# Patient Record
Sex: FEMALE | Race: WHITE | NOT HISPANIC OR LATINO | Employment: OTHER | URBAN - METROPOLITAN AREA
[De-identification: names, ages, dates, MRNs, and addresses within clinical notes are randomized per-mention and may not be internally consistent; named-entity substitution may affect disease eponyms.]

---

## 2017-01-17 ASSESSMENT — ENCOUNTER SYMPTOMS
MYALGIAS: 0
JOINT SWELLING: 0
ARTHRALGIAS: 1
MUSCLE WEAKNESS: 0
MUSCLE CRAMPS: 0
BACK PAIN: 1
STIFFNESS: 1
NECK PAIN: 1

## 2017-01-26 ENCOUNTER — TELEPHONE (OUTPATIENT)
Dept: GASTROENTEROLOGY | Facility: CLINIC | Age: 77
End: 2017-01-26

## 2017-01-31 ENCOUNTER — OFFICE VISIT (OUTPATIENT)
Dept: GASTROENTEROLOGY | Facility: CLINIC | Age: 77
End: 2017-01-31

## 2017-01-31 VITALS
DIASTOLIC BLOOD PRESSURE: 63 MMHG | OXYGEN SATURATION: 96 % | HEART RATE: 64 BPM | WEIGHT: 144.2 LBS | BODY MASS INDEX: 23.18 KG/M2 | SYSTOLIC BLOOD PRESSURE: 136 MMHG | HEIGHT: 66 IN

## 2017-01-31 DIAGNOSIS — K51.20 ULCERATIVE PROCTITIS WITHOUT COMPLICATION (H): Primary | ICD-10-CM

## 2017-01-31 NOTE — Clinical Note
1/31/2017       RE: Debra Denise  [address] [place] [address] [id number]  [facility] [address]     Dear Colleague,    Thank you for referring your patient, Debra Denise, to the [facility] GASTROENTEROLOGY AND IBD at [facility]. Please see a copy of my visit note below.    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 Apparently on initial colonoscopy there was some question of patch of inflammation at hepatic flexure but I can find no actual record of this. Last colonoscopy in 2014 shows normal colon apart from diverticula  Current UC medications: Rowasa enemas as needed  Prior UC surgeries: none  Prior IBD Medications: Rowasa enema. 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     1. Ulcerative proctitis  -completely asymptomatic now with normal labs  -recommend weekly Rowasa enemas to suppress disease and prevent potential flares (she has declined PO meds in the past). If has flare can take enema daily for 2-4 weeks.  -ok to continue current diet  -she has started Tumeric on her own - there is some limited data on this to treat UC. Ok to continue  -will consider flex if has refractory flare of symptoms    #. IBD healthcare maintenance:     Colon cancer surveillance   -- Given proctitis , patient does not need surveillance colonoscopy.       Bone mineral density screening    -- Recommend all patients supplement with calcium and vitamin  D    Vaccinations  -- Yearly influenza vaccination: recommend  -- Hepatitis B vaccination: recommend if not done  -- Hepatitis A vaccination: PCP can consider  -- TDaP: recommend  -- Pneumonia vaccination once and 5 year booster: recommend if not done  -- HPV vaccine as indicated.    Not immunocompromised:  -- Varicella vaccination:    -- Zoster vaccination: Recommend to give if over 60.  Recommend to discuss if over 50. Some authorities recommend to administer to patients > 50 due to increased risk of immunosuppression.      Misc  -- Avoid tobacco use  -- Avoid NSAIDs as there is potentially a 25% chance of causing an IBD flare  -- Recommend yearly skin cancer exam, especially if on, or previously exposed to, an immunomodulator or biologic.     RTC 6 months    Thank you for this consultation.  It was a pleasure to participate in the care of this patient; please contact us with any further questions.  A total of 45 minutes was spent with this patient, >50% of which was counseling regarding the above delineated issues.      Sohail Beal MD    [facility]  Division of Gastroenterology, Hepatology and Nutrition      HPI:    Continues to do well. No flares since last visit.    She attributes much of her improvement to cutting out sugars and chocolate. Also stopped metamucil (thought this was irritating things)    She has not been taking enemas - has not needed to. Wondering if she should take once a week to suppress things.     Having 1-2 formed stool daily with no urgency, blood or abdominal pain.    She feels the best she has in a long time.    ROS:    No fevers or chills  No weight loss  No blurry vision, double vision or change in vision  No sore throat  No lymphadenopathy  No headache, paraesthesias, or weakness in a limb  No shortness of breath or wheezing  No chest pain or pressure  No arthralgias or myalgias  No rashes or skin changes  No odynophagia or dysphagia  No BRBPR,  hematochezia, melena  No dysuria, frequency or urgency  No hot/cold intolerance or polyria  No anxiety or depression    Extra intestinal manifestations of IBD:  No uveitis/episcleritis  No aphthous ulcers    No arthritis    No erythema nodosum/pyoderma gangrenosum.      PERTINENT PAST MEDICAL HISTORY:   Past Medical History     Past Medical History    Diagnosis  Date       Hypertension         Polymyalgia rheumatica (H)             PREVIOUS SURGERIES:   Past Surgical History     Past Surgical History    Procedure  Laterality  Date       Gyn surgery             x 2       Ent surgery            tonsilectomy       Orthopedic surgery            (R) shoulder surgery for frozen shoulder       Colonoscopy    2014        Procedure: COMBINED COLONOSCOPY, SINGLE BIOPSY/POLYPECTOMY BY BIOPSY;  COLONOSCOPY;  Surgeon: Carol Ann Plasencia MD;  Location: [facility]           PREVIOUS ENDOSCOPY:  As above    ALLERGIES:      Allergies    Allergen  Reactions       Sulfa Drugs          PERTINENT MEDICATIONS:    Current Outpatient Prescriptions   Medication     NONFORMULARY     Mesalamine-Cleanser (ROWASA) 4 G KIT     mesalamine (CANASA) 1000 MG suppository     valsartan-hydrochlorothiazide (DIOVAN-HCT) 160-25 MG per tablet     Gabapentin (NEURONTIN PO)     AMITRIPTYLINE HCL PO     Omega-3 Fatty Acids (OMEGA-3 FISH OIL PO)     multivitamin, therapeutic with minerals (MULTI-VITAMIN) TABS     Cholecalciferol (VITAMIN D3 PO)     BIOTIN PO     Rosuvastatin Calcium (CRESTOR PO)     No current facility-administered medications for this visit.         SOCIAL HISTORY:   Social History     Social History        Social History       Marital Status:          Spouse Name:  N/A       Number of Children:  N/A       Years of Education:  N/A        Occupational History       Not on file.        Social History Main Topics       Smoking status:  Never Smoker        Smokeless tobacco:  Not on file       Alcohol Use:  No       Drug Use:   "No       Sexual Activity:  Not Currently        Other Topics  Concern       Not on file        Social History Narrative           FAMILY HISTORY:   Family History     Family History    Problem  Relation  Age of Onset       Cancer - colorectal  Father         CANCER  Brother             Past/family/social history reviewed and no changes    PHYSICAL EXAMINATION:  Constitutional: aaox3, cooperative, pleasant, not dyspneic/diaphoretic, no acute distress  /63 mmHg  Pulse 64  Ht 1.664 m (5' 5.5\")  Wt 65.409 kg (144 lb 3.2 oz)  BMI 23.62 kg/m2  SpO2 96%    Wt:    Wt Readings from Last 2 Encounters:    09/20/16  65.454 kg (144 lb 4.8 oz)    08/02/16  65.953 kg (145 lb 6.4 oz)       Eyes: Sclera anicteric/injected  Ears/nose/mouth/throat: Normal oropharynx without ulcers or exudate, mucus membranes moist, hearing intact  Neck: supple, thyroid normal size  CV: No edema  Respiratory: Unlabored breathing  Lymph: No axillary, submandibular, supraclavicular or inguinal lymphadenopathy  Abd:  Nondistended, +bs, no hepatosplenomegaly, nontender, no peritoneal signs  Skin: warm, perfused, no jaundice  Psych: Normal affect  MSK: Normal gait      PERTINENT STUDIES:  Reviewed in EPIC. Normal CBC with diff, LFTs, CRP, ESR, iron, B12 and folate studies. Normal BMP.               Answers for HPI/ROS submitted by the patient on 1/17/2017   General Symptoms: No  Skin Symptoms: No  HENT Symptoms: No  EYE SYMPTOMS: No  HEART SYMPTOMS: No  LUNG SYMPTOMS: No  INTESTINAL SYMPTOMS: No  URINARY SYMPTOMS: No  GYNECOLOGIC SYMPTOMS: No  BREAST SYMPTOMS: No  SKELETAL SYMPTOMS: Yes  BLOOD SYMPTOMS: No  NERVOUS SYSTEM SYMPTOMS: No  MENTAL HEALTH SYMPTOMS: No  Back pain: Yes  Muscle aches: No  Neck pain: Yes  Swollen joints: No  Joint pain: Yes  Bone pain: Yes  Muscle cramps: No  Muscle weakness: No  Joint stiffness: Yes  Bone fracture: No    Again, thank you for allowing me to participate in the care of your patient.      Sincerely,    Sohail" Girma Beal MD

## 2017-01-31 NOTE — NURSING NOTE
"Chief Complaint   Patient presents with     RECHECK     Ulcerative proctitis       Filed Vitals:    01/31/17 1050   BP: 136/63   Pulse: 64   Height: 1.664 m (5' 5.5\")   Weight: 65.409 kg (144 lb 3.2 oz)   SpO2: 96%       Body mass index is 23.62 kg/(m^2).      Alejandra Wilkerson CMA                          "

## 2017-01-31 NOTE — MR AVS SNAPSHOT
After Visit Summary   1/31/2017    Debra Denise    MRN: [id number]           Patient Information     Date Of Birth          1940        Visit Information        Provider Department      1/31/2017 11:20 AM oShail Beal MD [facility] Gastroenterology and IBD        Care Instructions    1. Keep up the good work!    2. Ok to continue the tumeric    3. Recommend to take the Rowasa enema weekly    Follow up in 6 months    Call with questions.        Follow-ups after your visit        Your next 10 appointments already scheduled     Aug 15, 2017 11:20 AM   (Arrive by 11:05 AM)   Return Visit with MD JESSE Noel [facility] Gastroenterology and IBD ([facility] [place])    [address] [facility]  [address] [facility] [address] [phone]              Who to contact     Please call your clinic at [phone] to:    Ask questions about your health    Make or cancel appointments    Discuss your medicines    Learn about your test results    Speak to your doctor   If you have compliments or concerns about an experience at your clinic, or if you wish to file a complaint, please contact [facility] Patient Relations at [phone] or email us at [email or web address]         Additional Information About Your Visit        MyChart Information     [employer] gives you secure access to your electronic health record. If you see a primary care provider, you can also send messages to your care team and make appointments. If you have questions, please call your primary care clinic.  If you do not have a primary care provider, please call [phone] and they will assist you.      [employer] is an electronic gateway that provides easy, online access to your medical records. With [employer], you can request a clinic appointment, read your test results, renew a prescription or communicate with your care team.     To access your existing  "account, please contact your [facility] or call [phone] for assistance.        Care EveryWhere ID     This is your Care EveryWhere ID. This could be used by other organizations to access your [place] medical records  [id number]        Your Vitals Were     Pulse Height BMI (Body Mass Index) Pulse Oximetry          64 1.664 m (5' 5.5\") 23.62 kg/m2 96%         Blood Pressure from Last 3 Encounters:   01/31/17 136/63   09/20/16 142/75   08/02/16 125/60    Weight from Last 3 Encounters:   01/31/17 65.409 kg (144 lb 3.2 oz)   09/20/16 65.454 kg (144 lb 4.8 oz)   08/02/16 65.953 kg (145 lb 6.4 oz)              Today, you had the following     No orders found for display       Primary Care Provider Office Phone # Fax #    Kristie MOLINA Lucio [phone]       [facility] [address] [place] [id number]        Thank you!     Thank you for choosing [facility] GASTROENTEROLOGY AND IBD  for your care. Our goal is always to provide you with excellent care. Hearing back from our patients is one way we can continue to improve our services. Please take a few minutes to complete the written survey that you may receive in the mail after your visit with us. Thank you!             Your Updated Medication List - Protect others around you: Learn how to safely use, store and throw away your medicines at www.disposemymeds.org.          This list is accurate as of: 1/31/17 11:22 AM.  Always use your most recent med list.                   Brand Name Dispense Instructions for use    AMITRIPTYLINE HCL PO          BIOTIN PO          CRESTOR PO          mesalamine 1000 MG Suppository    CANASA    12 suppository    Take one supp three times per week.       Multi-vitamin Tabs tablet      Take 1 tablet by mouth daily       NEURONTIN PO          NONFORMULARY      Tumeric       OMEGA-3 FISH OIL PO          ROWASA 4 G Kit     4 kit    Place 1 enema rectally three times a week       " valsartan-hydrochlorothiazide 160-25 MG per tablet    DIOVAN-HCT     Take 1 tablet by mouth daily       VITAMIN D3 PO      Take by mouth daily

## 2017-01-31 NOTE — Clinical Note
1/31/2017      RE: Debra Denise  [address]  [facility] [address]       IBD CLINIC VISIT     CC/REFERRING MD:  Kristie Valdes    REASON FOR CONSULTATION: ulcerative proctitis vs proctosigmoiditis    IBD HISTORY  Age at diagnosis: 63 (2004)  Extent of disease: rectum to distal sigmoid (16cm). Apparently on initial colonoscopy there was some question of patch of inflammation at hepatic flexure but I can find no actual record of this. Last colonoscopy in 2014 shows normal colon apart from diverticula  Current UC medications: Rowasa enemas as needed  Prior UC surgeries: none  Prior IBD Medications: Rowasa enema. 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     1. Ulcerative proctitis  -completely asymptomatic now with normal labs  -recommend weekly Rowasa enemas to suppress disease and prevent potential flares (she has declined PO meds in the past). If has flare can take enema daily for 2-4 weeks.  -ok to continue current diet  -she has started Tumeric on her own - there is some limited data on this to treat UC. Ok to continue  -will consider flex if has refractory flare of symptoms    #. IBD healthcare maintenance:     Colon cancer surveillance   -- Given proctitis , patient does not need surveillance colonoscopy.       Bone mineral density screening    -- Recommend all patients supplement with calcium and vitamin D    Vaccinations  -- Yearly influenza vaccination: recommend  -- Hepatitis B vaccination: recommend if not done  -- Hepatitis A vaccination: PCP can consider  -- TDaP: recommend  -- Pneumonia vaccination once and 5 year booster:  recommend if not done  -- HPV vaccine as indicated.    Not immunocompromised:  -- Varicella vaccination:    -- Zoster vaccination: Recommend to give if over 60.  Recommend to discuss if over 50. Some authorities recommend to administer to patients > 50 due to increased risk of immunosuppression.      Misc  -- Avoid tobacco use  -- Avoid NSAIDs as there is potentially a 25% chance of causing an IBD flare  -- Recommend yearly skin cancer exam, especially if on, or previously exposed to, an immunomodulator or biologic.     RTC 6 months    Thank you for this consultation.  It was a pleasure to participate in the care of this patient; please contact us with any further questions.  A total of 45 minutes was spent with this patient, >50% of which was counseling regarding the above delineated issues.      Sohail Beal MD    [facility]  Division of Gastroenterology, Hepatology and Nutrition      HPI:    Continues to do well. No flares since last visit.    She attributes much of her improvement to cutting out sugars and chocolate. Also stopped metamucil (thought this was irritating things)    She has not been taking enemas - has not needed to. Wondering if she should take once a week to suppress things.     Having 1-2 formed stool daily with no urgency, blood or abdominal pain.    She feels the best she has in a long time.    ROS:    No fevers or chills  No weight loss  No blurry vision, double vision or change in vision  No sore throat  No lymphadenopathy  No headache, paraesthesias, or weakness in a limb  No shortness of breath or wheezing  No chest pain or pressure  No arthralgias or myalgias  No rashes or skin changes  No odynophagia or dysphagia  No BRBPR, hematochezia, melena  No dysuria, frequency or urgency  No hot/cold intolerance or polyria  No anxiety or depression    Extra intestinal manifestations of IBD:  No uveitis/episcleritis  No aphthous ulcers    No arthritis    No  erythema nodosum/pyoderma gangrenosum.      PERTINENT PAST MEDICAL HISTORY:   Past Medical History     Past Medical History    Diagnosis  Date       Hypertension         Polymyalgia rheumatica (H)             PREVIOUS SURGERIES:   Past Surgical History     Past Surgical History    Procedure  Laterality  Date       Gyn surgery             x 2       Ent surgery            tonsilectomy       Orthopedic surgery            (R) shoulder surgery for frozen shoulder       Colonoscopy    2014        Procedure: COMBINED COLONOSCOPY, SINGLE BIOPSY/POLYPECTOMY BY BIOPSY;  COLONOSCOPY;  Surgeon: Carol Ann Plasencia MD;  Location:  GI           PREVIOUS ENDOSCOPY:  As above    ALLERGIES:      Allergies    Allergen  Reactions       Sulfa Drugs          PERTINENT MEDICATIONS:    Current Outpatient Prescriptions   Medication     NONFORMULARY     Mesalamine-Cleanser (ROWASA) 4 G KIT     mesalamine (CANASA) 1000 MG suppository     valsartan-hydrochlorothiazide (DIOVAN-HCT) 160-25 MG per tablet     Gabapentin (NEURONTIN PO)     AMITRIPTYLINE HCL PO     Omega-3 Fatty Acids (OMEGA-3 FISH OIL PO)     multivitamin, therapeutic with minerals (MULTI-VITAMIN) TABS     Cholecalciferol (VITAMIN D3 PO)     BIOTIN PO     Rosuvastatin Calcium (CRESTOR PO)     No current facility-administered medications for this visit.         SOCIAL HISTORY:   Social History     Social History        Social History       Marital Status:          Spouse Name:  N/A       Number of Children:  N/A       Years of Education:  N/A        Occupational History       Not on file.        Social History Main Topics       Smoking status:  Never Smoker        Smokeless tobacco:  Not on file       Alcohol Use:  No       Drug Use:  No       Sexual Activity:  Not Currently        Other Topics  Concern       Not on file        Social History Narrative           FAMILY HISTORY:   Family History     Family History    Problem  Relation  Age of Onset        "Cancer - colorectal  Father         CANCER  Brother             Past/family/social history reviewed and no changes    PHYSICAL EXAMINATION:  Constitutional: aaox3, cooperative, pleasant, not dyspneic/diaphoretic, no acute distress  /63 mmHg  Pulse 64  Ht 1.664 m (5' 5.5\")  Wt 65.409 kg (144 lb 3.2 oz)  BMI 23.62 kg/m2  SpO2 96%    Wt:    Wt Readings from Last 2 Encounters:    09/20/16  65.454 kg (144 lb 4.8 oz)    08/02/16  65.953 kg (145 lb 6.4 oz)       Eyes: Sclera anicteric/injected  Ears/nose/mouth/throat: Normal oropharynx without ulcers or exudate, mucus membranes moist, hearing intact  Neck: supple, thyroid normal size  CV: No edema  Respiratory: Unlabored breathing  Lymph: No axillary, submandibular, supraclavicular or inguinal lymphadenopathy  Abd:  Nondistended, +bs, no hepatosplenomegaly, nontender, no peritoneal signs  Skin: warm, perfused, no jaundice  Psych: Normal affect  MSK: Normal gait      PERTINENT STUDIES:  Reviewed in EPIC. Normal CBC with diff, LFTs, CRP, ESR, iron, B12 and folate studies. Normal BMP.               Answers for HPI/ROS submitted by the patient on 1/17/2017   General Symptoms: No  Skin Symptoms: No  HENT Symptoms: No  EYE SYMPTOMS: No  HEART SYMPTOMS: No  LUNG SYMPTOMS: No  INTESTINAL SYMPTOMS: No  URINARY SYMPTOMS: No  GYNECOLOGIC SYMPTOMS: No  BREAST SYMPTOMS: No  SKELETAL SYMPTOMS: Yes  BLOOD SYMPTOMS: No  NERVOUS SYSTEM SYMPTOMS: No  MENTAL HEALTH SYMPTOMS: No  Back pain: Yes  Muscle aches: No  Neck pain: Yes  Swollen joints: No  Joint pain: Yes  Bone pain: Yes  Muscle cramps: No  Muscle weakness: No  Joint stiffness: Yes  Bone fracture: No      Sohail Beal MD      "

## 2017-01-31 NOTE — PATIENT INSTRUCTIONS
1. Keep up the good work!    2. Ok to continue the tumeric    3. Recommend to take the Rowasa enema weekly    Follow up in 6 months    Call with questions.

## 2017-01-31 NOTE — PROGRESS NOTES
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 Apparently on initial colonoscopy there was some question of patch of inflammation at hepatic flexure but I can find no actual record of this. Last colonoscopy in 2014 shows normal colon apart from diverticula  Current UC medications: Rowasa enemas as needed  Prior UC surgeries: none  Prior IBD Medications: Rowasa enema. 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     1. Ulcerative proctitis  -completely asymptomatic now with normal labs  -recommend weekly Rowasa enemas to suppress disease and prevent potential flares (she has declined PO meds in the past). If has flare can take enema daily for 2-4 weeks.  -ok to continue current diet  -she has started Tumeric on her own - there is some limited data on this to treat UC. Ok to continue  -will consider flex if has refractory flare of symptoms    #. IBD healthcare maintenance:     Colon cancer surveillance   -- Given proctitis , patient does not need surveillance colonoscopy.       Bone mineral density screening    -- Recommend all patients supplement with calcium and vitamin D    Vaccinations  -- Yearly influenza vaccination: recommend  -- Hepatitis B vaccination: recommend if not done  -- Hepatitis A vaccination: PCP can consider  -- TDaP: recommend  -- Pneumonia vaccination once and 5 year booster: recommend if not done  -- HPV vaccine as indicated.    Not immunocompromised:  -- Varicella vaccination:    --  Zoster vaccination: Recommend to give if over 60.  Recommend to discuss if over 50. Some authorities recommend to administer to patients > 50 due to increased risk of immunosuppression.      Misc  -- Avoid tobacco use  -- Avoid NSAIDs as there is potentially a 25% chance of causing an IBD flare  -- Recommend yearly skin cancer exam, especially if on, or previously exposed to, an immunomodulator or biologic.     RTC 6 months    Thank you for this consultation.  It was a pleasure to participate in the care of this patient; please contact us with any further questions.  A total of 45 minutes was spent with this patient, >50% of which was counseling regarding the above delineated issues.      Sohail Beal MD    [facility]  Division of Gastroenterology, Hepatology and Nutrition      HPI:    Continues to do well. No flares since last visit.    She attributes much of her improvement to cutting out sugars and chocolate. Also stopped metamucil (thought this was irritating things)    She has not been taking enemas - has not needed to. Wondering if she should take once a week to suppress things.     Having 1-2 formed stool daily with no urgency, blood or abdominal pain.    She feels the best she has in a long time.    ROS:    No fevers or chills  No weight loss  No blurry vision, double vision or change in vision  No sore throat  No lymphadenopathy  No headache, paraesthesias, or weakness in a limb  No shortness of breath or wheezing  No chest pain or pressure  No arthralgias or myalgias  No rashes or skin changes  No odynophagia or dysphagia  No BRBPR, hematochezia, melena  No dysuria, frequency or urgency  No hot/cold intolerance or polyria  No anxiety or depression    Extra intestinal manifestations of IBD:  No uveitis/episcleritis  No aphthous ulcers    No arthritis    No erythema nodosum/pyoderma gangrenosum.      PERTINENT PAST MEDICAL HISTORY:   Past Medical History     Past Medical  History    Diagnosis  Date       Hypertension         Polymyalgia rheumatica (H)             PREVIOUS SURGERIES:   Past Surgical History     Past Surgical History    Procedure  Laterality  Date       Gyn surgery             x 2       Ent surgery            tonsilectomy       Orthopedic surgery            (R) shoulder surgery for frozen shoulder       Colonoscopy    2014        Procedure: COMBINED COLONOSCOPY, SINGLE BIOPSY/POLYPECTOMY BY BIOPSY;  COLONOSCOPY;  Surgeon: Carol Ann Plasencia MD;  Location: [facility]           PREVIOUS ENDOSCOPY:  As above    ALLERGIES:      Allergies    Allergen  Reactions       Sulfa Drugs          PERTINENT MEDICATIONS:    Current Outpatient Prescriptions   Medication     NONFORMULARY     Mesalamine-Cleanser (ROWASA) 4 G KIT     mesalamine (CANASA) 1000 MG suppository     valsartan-hydrochlorothiazide (DIOVAN-HCT) 160-25 MG per tablet     Gabapentin (NEURONTIN PO)     AMITRIPTYLINE HCL PO     Omega-3 Fatty Acids (OMEGA-3 FISH OIL PO)     multivitamin, therapeutic with minerals (MULTI-VITAMIN) TABS     Cholecalciferol (VITAMIN D3 PO)     BIOTIN PO     Rosuvastatin Calcium (CRESTOR PO)     No current facility-administered medications for this visit.         SOCIAL HISTORY:   Social History     Social History        Social History       Marital Status:          Spouse Name:  N/A       Number of Children:  N/A       Years of Education:  N/A        Occupational History       Not on file.        Social History Main Topics       Smoking status:  Never Smoker        Smokeless tobacco:  Not on file       Alcohol Use:  No       Drug Use:  No       Sexual Activity:  Not Currently        Other Topics  Concern       Not on file        Social History Narrative           FAMILY HISTORY:   Family History     Family History    Problem  Relation  Age of Onset       Cancer - colorectal  Father         CANCER  Brother             Past/family/social history reviewed and no  "changes    PHYSICAL EXAMINATION:  Constitutional: aaox3, cooperative, pleasant, not dyspneic/diaphoretic, no acute distress  /63 mmHg  Pulse 64  Ht 1.664 m (5' 5.5\")  Wt 65.409 kg (144 lb 3.2 oz)  BMI 23.62 kg/m2  SpO2 96%    Wt:    Wt Readings from Last 2 Encounters:    09/20/16  65.454 kg (144 lb 4.8 oz)    08/02/16  65.953 kg (145 lb 6.4 oz)       Eyes: Sclera anicteric/injected  Ears/nose/mouth/throat: Normal oropharynx without ulcers or exudate, mucus membranes moist, hearing intact  Neck: supple, thyroid normal size  CV: No edema  Respiratory: Unlabored breathing  Lymph: No axillary, submandibular, supraclavicular or inguinal lymphadenopathy  Abd:  Nondistended, +bs, no hepatosplenomegaly, nontender, no peritoneal signs  Skin: warm, perfused, no jaundice  Psych: Normal affect  MSK: Normal gait      PERTINENT STUDIES:  Reviewed in EPIC. Normal CBC with diff, LFTs, CRP, ESR, iron, B12 and folate studies. Normal BMP.               Answers for HPI/ROS submitted by the patient on 1/17/2017   General Symptoms: No  Skin Symptoms: No  HENT Symptoms: No  EYE SYMPTOMS: No  HEART SYMPTOMS: No  LUNG SYMPTOMS: No  INTESTINAL SYMPTOMS: No  URINARY SYMPTOMS: No  GYNECOLOGIC SYMPTOMS: No  BREAST SYMPTOMS: No  SKELETAL SYMPTOMS: Yes  BLOOD SYMPTOMS: No  NERVOUS SYSTEM SYMPTOMS: No  MENTAL HEALTH SYMPTOMS: No  Back pain: Yes  Muscle aches: No  Neck pain: Yes  Swollen joints: No  Joint pain: Yes  Bone pain: Yes  Muscle cramps: No  Muscle weakness: No  Joint stiffness: Yes  Bone fracture: No      "

## 2018-11-04 ASSESSMENT — ENCOUNTER SYMPTOMS
JOINT SWELLING: 0
SKIN CHANGES: 0
NECK PAIN: 0
STIFFNESS: 0
BACK PAIN: 1
MUSCLE WEAKNESS: 0
NAIL CHANGES: 1
ARTHRALGIAS: 0
MYALGIAS: 1
POOR WOUND HEALING: 0
MUSCLE CRAMPS: 1

## 2018-11-04 ASSESSMENT — ACTIVITIES OF DAILY LIVING (ADL)
IN_THE_PAST_7_DAYS,_DID_YOU_NEED_HELP_FROM_OTHERS_TO_TAKE_CARE_OF_THINGS_SUCH_AS_LAUNDRY_AND_HOUSEKEEPING,_BANKING,_SHOPPING,_USING_THE_TELEPHONE,_FOOD_PREPARATION,_TRANSPORTATION,_OR_TAKING_YOUR_OWN_MEDICATIONS?: N
IN_THE_PAST_7_DAYS,_DID_YOU_NEED_HELP_FROM_OTHERS_TO_PERFORM_EVERYDAY_ACTIVITIES_SUCH_AS_EATING,_GETTING_DRESSED,_GROOMING,_BATHING,_WALKING,_OR_USING_THE_TOILET: N

## 2018-11-06 ENCOUNTER — OFFICE VISIT (OUTPATIENT)
Dept: INTERNAL MEDICINE | Facility: CLINIC | Age: 78
End: 2018-11-06
Payer: COMMERCIAL

## 2018-11-06 VITALS
HEIGHT: 65 IN | OXYGEN SATURATION: 95 % | SYSTOLIC BLOOD PRESSURE: 136 MMHG | TEMPERATURE: 98.2 F | HEART RATE: 52 BPM | WEIGHT: 142.2 LBS | DIASTOLIC BLOOD PRESSURE: 67 MMHG | BODY MASS INDEX: 23.69 KG/M2

## 2018-11-06 DIAGNOSIS — Z13.220 SCREENING CHOLESTEROL LEVEL: Primary | ICD-10-CM

## 2018-11-06 DIAGNOSIS — Z13.220 SCREENING CHOLESTEROL LEVEL: ICD-10-CM

## 2018-11-06 DIAGNOSIS — Z23 NEED FOR INFLUENZA VACCINATION: ICD-10-CM

## 2018-11-06 LAB
ALBUMIN SERPL-MCNC: 3.8 G/DL (ref 3.4–5)
ALBUMIN UR-MCNC: NEGATIVE MG/DL
ALP SERPL-CCNC: 60 U/L (ref 40–150)
ALT SERPL W P-5'-P-CCNC: 33 U/L (ref 0–50)
ANION GAP SERPL CALCULATED.3IONS-SCNC: 6 MMOL/L (ref 3–14)
APPEARANCE UR: CLEAR
AST SERPL W P-5'-P-CCNC: 16 U/L (ref 0–45)
BASOPHILS # BLD AUTO: 0 10E9/L (ref 0–0.2)
BASOPHILS NFR BLD AUTO: 0.4 %
BILIRUB SERPL-MCNC: 0.3 MG/DL (ref 0.2–1.3)
BILIRUB UR QL STRIP: NEGATIVE
BUN SERPL-MCNC: 14 MG/DL (ref 7–30)
CALCIUM SERPL-MCNC: 8.7 MG/DL (ref 8.5–10.1)
CHLORIDE SERPL-SCNC: 104 MMOL/L (ref 94–109)
CO2 SERPL-SCNC: 31 MMOL/L (ref 20–32)
COLOR UR AUTO: ABNORMAL
CREAT SERPL-MCNC: 0.84 MG/DL (ref 0.52–1.04)
CRP SERPL-MCNC: 7.6 MG/L (ref 0–8)
DIFFERENTIAL METHOD BLD: NORMAL
EOSINOPHIL # BLD AUTO: 0.3 10E9/L (ref 0–0.7)
EOSINOPHIL NFR BLD AUTO: 3.5 %
ERYTHROCYTE [DISTWIDTH] IN BLOOD BY AUTOMATED COUNT: 12.2 % (ref 10–15)
ERYTHROCYTE [SEDIMENTATION RATE] IN BLOOD BY WESTERGREN METHOD: 8 MM/H (ref 0–30)
GFR SERPL CREATININE-BSD FRML MDRD: 65 ML/MIN/1.7M2
GLUCOSE SERPL-MCNC: 78 MG/DL (ref 70–99)
GLUCOSE UR STRIP-MCNC: NEGATIVE MG/DL
HCT VFR BLD AUTO: 44.1 % (ref 35–47)
HGB BLD-MCNC: 14.4 G/DL (ref 11.7–15.7)
HGB UR QL STRIP: NEGATIVE
IMM GRANULOCYTES # BLD: 0.1 10E9/L (ref 0–0.4)
IMM GRANULOCYTES NFR BLD: 0.8 %
KETONES UR STRIP-MCNC: NEGATIVE MG/DL
LEUKOCYTE ESTERASE UR QL STRIP: ABNORMAL
LYMPHOCYTES # BLD AUTO: 2.6 10E9/L (ref 0.8–5.3)
LYMPHOCYTES NFR BLD AUTO: 30.8 %
MCH RBC QN AUTO: 30.4 PG (ref 26.5–33)
MCHC RBC AUTO-ENTMCNC: 32.7 G/DL (ref 31.5–36.5)
MCV RBC AUTO: 93 FL (ref 78–100)
MONOCYTES # BLD AUTO: 0.9 10E9/L (ref 0–1.3)
MONOCYTES NFR BLD AUTO: 10.3 %
MUCOUS THREADS #/AREA URNS LPF: PRESENT /LPF
NEUTROPHILS # BLD AUTO: 4.5 10E9/L (ref 1.6–8.3)
NEUTROPHILS NFR BLD AUTO: 54.2 %
NITRATE UR QL: NEGATIVE
NRBC # BLD AUTO: 0 10*3/UL
NRBC BLD AUTO-RTO: 0 /100
PH UR STRIP: 6 PH (ref 5–7)
PLATELET # BLD AUTO: 396 10E9/L (ref 150–450)
POTASSIUM SERPL-SCNC: 3.4 MMOL/L (ref 3.4–5.3)
PROT SERPL-MCNC: 7.9 G/DL (ref 6.8–8.8)
RBC # BLD AUTO: 4.74 10E12/L (ref 3.8–5.2)
RBC #/AREA URNS AUTO: <1 /HPF (ref 0–2)
SODIUM SERPL-SCNC: 140 MMOL/L (ref 133–144)
SOURCE: ABNORMAL
SP GR UR STRIP: 1.01 (ref 1–1.03)
TSH SERPL DL<=0.005 MIU/L-ACNC: 0.72 MU/L (ref 0.4–4)
UROBILINOGEN UR STRIP-MCNC: 0 MG/DL (ref 0–2)
WBC # BLD AUTO: 8.4 10E9/L (ref 4–11)
WBC #/AREA URNS AUTO: <1 /HPF (ref 0–5)

## 2018-11-06 ASSESSMENT — PAIN SCALES - GENERAL: PAINLEVEL: NO PAIN (0)

## 2018-11-06 NOTE — PATIENT INSTRUCTIONS
Primary Care Center Phone Number [phone]  Primary Care Center Medication Refill Request Information:  * Please contact your pharmacy regarding ANY request for medication refills.  ** [facility] Prescription Fax = [phone]  * Please allow 3 business days for routine medication refills.  * Please allow 5 business days for controlled substance medication refills.     Primary Care Center Test Result notification information:  *You will be notified with in 7-10 days of your appointment day regarding the results of your test.  If you are on MyChart you will be notified as soon as the provider has reviewed the results and signed off on them.

## 2018-11-06 NOTE — NURSING NOTE
Chief Complaint   Patient presents with     Establish Care     patient is here to establish a new PCP     Debra Denise    1. Has the patient received the information for the influenza vaccine? YES    2.  Does the patient have any of the following contraindications?  Allergy to to eggs? No  Allergic reaction to previous influenza vaccines?  No  Any other problems to previous influenza vaccines? No  Paralyzed by Guillain-Fulton syndrome? No  Currently pregnant? NO  Current moderate or severe illness?  No  Allergy to contact lens solution?  No    3. The vaccine has been administered in the usual fashion and the patient was instructed to wait 20 minutes before leaving the building in the even of an allergic reaction: YES    Recorded by Jeanne Edgar, Clinic EMT at 3:25 PM on 11/6/2018

## 2018-11-06 NOTE — MR AVS SNAPSHOT
After Visit Summary   11/6/2018    Debra Denise    MRN: [id number]           Patient Information     Date Of Birth          1940        Visit Information        Provider Department      11/6/2018 3:30 PM Wolfgang Zimmerman MD [facility]        Today's Diagnoses     Screening cholesterol level    -  1    Need for influenza vaccination          Care Instructions    Primary Care Center Phone Number [phone]  Primary Care Center Medication Refill Request Information:  * Please contact your pharmacy regarding ANY request for medication refills.  ** [facility] Prescription Fax = [phone]  * Please allow 3 business days for routine medication refills.  * Please allow 5 business days for controlled substance medication refills.     [facility] Center Test Result notification information:  *You will be notified with in 7-10 days of your appointment day regarding the results of your test.  If you are on MyChart you will be notified as soon as the provider has reviewed the results and signed off on them.                  Follow-ups after your visit        Your next 10 appointments already scheduled     Nov 06, 2018  4:30 PM CST   LAB with [facility] ([facility])    [address] [place] [id number] [phone]           Please do not eat 10-12 hours before your appointment if you are coming in fasting for labs on lipids, cholesterol, or glucose (sugar). This does not apply to pregnant women. Water, hot tea and black coffee (with nothing added) are okay. Do not drink other fluids, diet soda or chew gum.              Future tests that were ordered for you today     Open Future Orders        Priority Expected Expires Ordered    UA with Micro reflex to Culture Routine 11/6/2018 11/6/2019 11/6/2018    Erythrocyte sedimentation rate Routine 11/6/2018 11/6/2019 11/6/2018    Lipid panel reflex to direct LDL Fasting Routine   "11/6/2019 11/6/2018    CRP inflammation Routine 11/6/2018 11/6/2019 11/6/2018    Comprehensive metabolic panel Routine 11/6/2018 11/20/2018 11/6/2018    TSH with free T4 reflex Routine 11/6/2018 11/6/2019 11/6/2018    CBC with platelets differential Routine 11/6/2018 11/20/2018 11/6/2018    Vitamin D Deficiency Routine 11/6/2018 11/6/2019 11/6/2018    Lipid panel reflex to direct LDL Fasting Routine  11/6/2019 11/6/2018    CBC with platelets differential Routine 11/6/2018 11/20/2018 11/6/2018    Comprehensive metabolic panel Routine 11/6/2018 11/20/2018 11/6/2018            Who to contact     Please call your clinic at [phone] to:    Ask questions about your health    Make or cancel appointments    Discuss your medicines    Learn about your test results    Speak to your doctor            Additional Information About Your Visit        [email or web address] Information     [email or web address] gives you secure access to your electronic health record. If you see a primary care provider, you can also send messages to your care team and make appointments. If you have questions, please call your primary care clinic.  If you do not have a primary care provider, please call [phone] and they will assist you.      [email or web address] is an electronic gateway that provides easy, online access to your medical records. With [email or web address], you can request a clinic appointment, read your test results, renew a prescription or communicate with your care team.     To access your existing account, please contact your [facility] or call [phone] for assistance.        Care EveryWhere ID     This is your Care EveryWhere ID. This could be used by other organizations to access your [place] medical records  [id number]        Your Vitals Were     Pulse Temperature Height Pulse Oximetry BMI (Body Mass Index)       52 98.2  F (36.8  C) (Oral) 1.656 m (5' 5.2\") 95% 23.52 kg/m2        Blood Pressure from Last 3 Encounters:   11/06/18 136/67 "   01/31/17 136/63   09/20/16 142/75    Weight from Last 3 Encounters:   11/06/18 64.5 kg (142 lb 3.2 oz)   01/31/17 65.4 kg (144 lb 3.2 oz)   09/20/16 65.5 kg (144 lb 4.8 oz)              We Performed the Following     EKG Performed in Clinic w/ Provider Reading Fee     FLU VACCINE HIGH DOSE PRESERVATIVE FREE, AGE =>65 YR          Today's Medication Changes          These changes are accurate as of 11/6/18  4:21 PM.  If you have any questions, ask your nurse or doctor.               Stop taking these medicines if you haven't already. Please contact your care team if you have questions.     AMITRIPTYLINE HCL PO   Stopped by:  Wolfgang Zimmerman MD           mesalamine 1000 MG Suppository   Commonly known as:  CANASA   Stopped by:  Wolfgang Zimmerman MD           ROWASA 4 g Kit   Stopped by:  Wolfgang Zimmerman MD                    Primary Care Provider Office Phone # Fax #    Kristie MOLINA Lucio [phone]       [facility] [address] [facility] [address]  [facility] [address]        Equal Access to Services     [facility]: Hadii aad ku hadasho Soomaali, waaxda luqadaha, qaybta kaalmada adeegyada, waxay nanyin hayaan adecelena curiel . So [facility] [phone].    ATENCIÓN: Si habla español, tiene a fletcher disposición servicios gratuitos de asistencia lingüística. Llame al [phone].    We comply with applicable federal civil rights laws and [place] laws. We do not discriminate on the basis of race, color, national origin, age, disability, sex, sexual orientation, or gender identity.            Thank you!     Thank you for choosing [facility]  for your care. Our goal is always to provide you with excellent care. Hearing back from our patients is one way we can continue to improve our services. Please take a few minutes to complete the written survey that you may receive in the mail after your visit with us. Thank you!             Your Updated Medication List - Protect others around you:  Learn how to safely use, store and throw away your medicines at www.disposemymeds.org.          This list is accurate as of 11/6/18  4:21 PM.  Always use your most recent med list.                   Brand Name Dispense Instructions for use Diagnosis    BIOTIN PO           CRESTOR PO      Take 5 mg by mouth three times a week        Multi-vitamin Tabs tablet      Take 1 tablet by mouth daily        NEURONTIN PO      Take 100 mg by mouth 2 times daily        NONFORMULARY      Tumeric        OMEGA-3 FISH OIL PO           valsartan-hydrochlorothiazide 160-25 MG per tablet    DIOVAN-HCT     Take 1 tablet by mouth daily        VITAMIN D3 PO      Take by mouth daily

## 2018-11-06 NOTE — PROGRESS NOTES
"HPI:    Pt. Comes into establish care today. Overall she is doing well. Her mother had GCA and probably had CVA from this. She does not smoke nor abuse EtOH. She had \"costochondritis\" about two years ago and started taking OTC Relief Factor Supplementation. She has no further sxs. She had central chest pain more than one month ago but no further sxs. And continues to exercise w/o problems. She has chronic L upper quadrant pain for many years (unpredictable and may get several times a day). She had CT abdomen/pelvis 2014 unremarkable. She may have had EGD as well. No report of PPI/H2 blocker changing sxs. She has h/o PMR and treated with prednisone in the past and no current sxs. She has h/o renal stones and had lithotripsy in [place] several years ago; no current sxs. Today no other HEENT, cardiopulmonary, abdominal, , GYN, neurological, systemic, lymphatic, endocrine complaints.     Past Medical History:   Diagnosis Date     Hypertension      Polymyalgia rheumatica (H)      Past Surgical History:   Procedure Laterality Date     COLONOSCOPY  2014    Procedure: COMBINED COLONOSCOPY, SINGLE BIOPSY/POLYPECTOMY BY BIOPSY;  COLONOSCOPY;  Surgeon: Carol Ann Plasencia MD;  Location:  GI     ENT SURGERY      tonsilectomy     GYN SURGERY       x 2     ORTHOPEDIC SURGERY      (R) shoulder surgery for frozen shoulder     PE:    Vitals noted gen nad cooperative alert, HEENT, B ears normal oropharynx clear no exudate, neck supple nl rom, no B carotid bruits, no masses, LCTA, B, good air movement, RRR, S1, S2, no MRG, abdomen, nt, nd no masses, positive BS, grossly normal neurological exam.     EKG today 2018; SR at 52; no acute findings.    B Carotid U/S 2018:  no significant findings    CT coronary angiogram 2017: mild non-obstructive coronary artery disease    Exercise Echo stress 3/28/2017: Post stress; normal LVF and size, ischemic EKG changes, negative stress echo for " ischemia          A/P:    1. Immunizations:  Flu shot today. New Shingles vaccination she got last week (first injection)  2. HTN; stable today and will check lab today  3. Mammogram done 10/11/2018 (in Care Everywhere)  4. Ulcerative proctitis. Seen by Dr. Beal, GI 1/31/2017 and remains on mesalamine suppositories. Currently no sxs.   5. Dermatology. She had leg lesion that may have been melanoma and follows routinely with dermatology   6. DEXA scan; 10/8/2014  7. Increased cholesterol check labs today and she will restart low dose Crestor (5 mg  Three times a week)  8. H/o PMR; no current sxs. And she has not been on prednisone for about 2 years. She states she follows with outside Rheumatology  9. Renal stones; will reimage  10. Chronic L upper quadrant pain; She had CT imaging 9/30/2014 for this and  Unremarkable; will rescan  11. Colonoscopy 4/8/2014?    Total time spent 45  minutes.  More than 50% of the time spent with Ms. Denise on counseling / coordinating her care

## 2018-11-07 LAB
DEPRECATED CALCIDIOL+CALCIFEROL SERPL-MC: 59 UG/L (ref 20–75)
INTERPRETATION ECG - MUSE: NORMAL

## 2019-02-27 DIAGNOSIS — I10 BENIGN ESSENTIAL HYPERTENSION: Primary | ICD-10-CM

## 2019-02-28 RX ORDER — GABAPENTIN 100 MG/1
200 CAPSULE ORAL AT BEDTIME
Qty: 180 CAPSULE | Refills: 0 | Status: SHIPPED | OUTPATIENT
Start: 2019-02-28 | End: 2019-05-23

## 2019-02-28 RX ORDER — VALSARTAN AND HYDROCHLOROTHIAZIDE 160; 25 MG/1; MG/1
1 TABLET ORAL DAILY
Qty: 90 TABLET | Refills: 0 | Status: SHIPPED | OUTPATIENT
Start: 2019-02-28 | End: 2019-06-24

## 2019-02-28 NOTE — TELEPHONE ENCOUNTER
valsartan-hydrochlorothiazide (DIOVAN-HCT) 160-25 MG per tablet    Historical Medication   Last Office Visit : 11/6/201  Future Office visit:  None    Routing refill request to provider for review/approval because:  Medication is reported/historical    Gabapentin (NEURONTIN PO)  Historical Medication   Not on protocol

## 2019-04-30 DIAGNOSIS — K51.211 ULCERATIVE PROCTITIS WITH RECTAL BLEEDING (H): Primary | ICD-10-CM

## 2019-04-30 RX ORDER — MESALAMINE 4 G/60ML
4 SUSPENSION RECTAL AT BEDTIME
Status: CANCELLED | OUTPATIENT
Start: 2019-04-30

## 2019-04-30 NOTE — TELEPHONE ENCOUNTER
Health Call Center    Phone Message    May a detailed message be left on voicemail: yes    Reason for Call: Medication Question or concern regarding medication   Prescription Clarification  Name of Medication: mesalamine suspension  Prescribing Provider:    Pharmacy: [id number] - [place], [place] - [address] [place] [address]   What on the order needs clarification? Pt is requesting this medication due to rectal bleeding. Is also asking for a recommendation for a GI doctor.          Action Taken: Message routed to:  [facility] ([facility] JOSELITO

## 2019-04-30 NOTE — TELEPHONE ENCOUNTER
Dear Ashtyn ;    (1) Signed GI referral    (2) I recommend Debra see ANY provider today/tommorrow for evaluation/labs to make sure she is OK to start on medication.    ThanksRUTH

## 2019-04-30 NOTE — TELEPHONE ENCOUNTER
Spoke with spouse, pt not at home. Symptoms started 7 days with mucus and rectal bleeding at the end of a BM. Has a hx Ulcerative proctitis was seen by Dr. Beal GI 1/31/2017 and was prescribed mesalamine enemas which were very effective has not had to use in 3 years. Last colonoscopy was in 2014. Requesting  mesalamine enemas and referral to GI with recommendation of a particular provider.

## 2019-05-01 NOTE — TELEPHONE ENCOUNTER
Called and spoke with patient. Scheduled appt with Ofstedal for Friday 5/3 Patient was not available to come on tomorrow 5/2. Connect patient directly with GI .

## 2019-05-01 NOTE — TELEPHONE ENCOUNTER
[facility] Call Center    Phone Message    May a detailed message be left on voicemail: yes    Reason for Call: Other: pt  Aguilar calling to say that his wife is home and will be available when the clinic coordinator calls to schedule.      Action Taken: Message routed to:  [facility] ([facility]): Primary care

## 2019-05-03 ENCOUNTER — OFFICE VISIT (OUTPATIENT)
Dept: FAMILY MEDICINE | Facility: CLINIC | Age: 79
End: 2019-05-03
Payer: MEDICARE

## 2019-05-03 VITALS
WEIGHT: 142.1 LBS | RESPIRATION RATE: 16 BRPM | SYSTOLIC BLOOD PRESSURE: 135 MMHG | BODY MASS INDEX: 23.68 KG/M2 | DIASTOLIC BLOOD PRESSURE: 62 MMHG | HEART RATE: 51 BPM | TEMPERATURE: 98.4 F | HEIGHT: 65 IN

## 2019-05-03 DIAGNOSIS — K62.89 PROCTITIS: ICD-10-CM

## 2019-05-03 RX ORDER — MESALAMINE 4 G/60ML
1 SUSPENSION RECTAL
Qty: 4 KIT | Refills: 3 | Status: SHIPPED | OUTPATIENT
Start: 2019-05-03 | End: 2020-11-23

## 2019-05-03 RX ORDER — TURMERIC 400 MG
400 CAPSULE ORAL DAILY
COMMUNITY
End: 2020-01-29

## 2019-05-03 RX ORDER — MESALAMINE 1000 MG/1
SUPPOSITORY RECTAL
Qty: 12 SUPPOSITORY | Refills: 3 | Status: SHIPPED | OUTPATIENT
Start: 2019-05-03 | End: 2020-10-05

## 2019-05-03 ASSESSMENT — MIFFLIN-ST. JEOR: SCORE: 1120.44

## 2019-05-03 ASSESSMENT — PAIN SCALES - GENERAL: PAINLEVEL: NO PAIN (0)

## 2019-05-03 NOTE — PROGRESS NOTES
[facility]  Primary Care Center   Herman Granados MD  05/03/2019      Chief Complaint:   Medication Request       History of Present Illness:   Debra Denise is a 78 year old female with a history of hypertension and polymyalgia rheumatica who presents alone for medication request. She is a new patient of Dr. Zimmerman. On 4/30/19 the patient contacted clinic and requested Mesalamine enemas and GI referral for rectal bleeding. She experienced a sudden and hard onset of symptoms of mucus and bloody discharge at the end of a bowel movement which have been ongoing for about a week now. She has additionally been experiencing cramping. Of note, she does have internal hemorrhoids. She notes she was eating more fatty foods and is curious as to whether this may have led to this. She denies nausea, vomiting, diarrhea, and fever. She has a history of ulcerative proctitis and was seen by Dr. Beal GI 1/31/2017 and was prescribed mesalamine enemas which were very effective in the past. She has not had to use this for about 2 years. Her last colonoscopy was completed in 2014.      Review of Systems:   Pertinent items are noted in HPI, remainder of complete ROS is negative.      Active Medications:      BIOTIN PO, , Disp: , Rfl:      Cholecalciferol (VITAMIN D3 PO), Take by mouth daily, Disp: , Rfl:      gabapentin (NEURONTIN) 100 MG capsule, Take 2 capsules (200 mg) by mouth At Bedtime, Disp: 180 capsule, Rfl: 0     multivitamin, therapeutic with minerals (MULTI-VITAMIN) TABS, Take 1 tablet by mouth daily, Disp: , Rfl:      NONFORMULARY, Tumeric, Disp: , Rfl:      Omega-3 Fatty Acids (OMEGA-3 FISH OIL PO), , Disp: , Rfl:      Rosuvastatin Calcium (CRESTOR PO), Take 5 mg by mouth three times a week , Disp: , Rfl:      valsartan-hydrochlorothiazide (DIOVAN HCT) 160-25 MG tablet, Take 1 tablet by mouth daily, Disp: 90 tablet, Rfl: 0      Allergies:   Atenolol  Ibuprofen  Naproxen  Ramipril  Sulfa drugs      Past Medical  "History:  Hypertension   Polymyalgia rheumatica  Ulcerative proctitis      Past Surgical History:  Tonsillectomy   section (x2)  Right shoulder surgery for frozen shoulder    Family History:   Colorectal cancer - father  Cancer - brother      Social History:   The patient is , a nonsmoker, and does not consume alcohol.      Physical Exam:   /62 (BP Location: Right arm, Patient Position: Sitting, Cuff Size: Adult Regular)   Pulse 51   Temp 98.4  F (36.9  C) (Oral)   Resp 16   Ht 1.651 m (5' 5\")   Wt 64.5 kg (142 lb 1.6 oz)   Breastfeeding? No   BMI 23.65 kg/m     Constitutional: Alert. In no distress.  Head: Normocephalic. No masses, lesions, tenderness or abnormalities.  Gastrointestinal: Abdomen soft. Non-tender. BS normal. No masses or organomegaly.  Psychiatric: Mentation appears normal. Normal affect.     Assessment and Plan:  1. Proctitis  Per patient her symptoms are exactly like previous flares. As she is seeing GI In a week, I did not order any additional tests or assessments. Of note, she may be due for a colonoscopy soon for family history of colon cancer.    - Mesalamine-Cleanser (ROWASA) 4 g KIT  Dispense: 4 kit; Refill: 3  - mesalamine (CANASA) 1000 MG suppository  Dispense: 12 suppository; Refill: 3       Follow-up: Patient should follow up in clinic in one year or on an as needed basis.         Scribe Disclosure:  I, Marjorie Allison, am serving as a scribe to document services personally performed by Herman Granados MD at this visit, based upon the provider's statements to me. All documentation has been reviewed by the aforementioned provider prior to being entered into the official medical record.       Portions of this medical record were completed by a scribe. UPON MY REVIEW AND AUTHENTICATION BY ELECTRONIC SIGNATURE, this confirms (a) I performed the applicable clinical services, and (b) the record is accurate.   Herman Granados MD    "

## 2019-05-03 NOTE — NURSING NOTE
Chief Complaint   Patient presents with     Medication Request     Patient is here for medication request, Mesalamine        Reinadlo Newell CMA (AAMA) at 12:24 PM on 5/3/2019

## 2019-05-07 ENCOUNTER — DOCUMENTATION ONLY (OUTPATIENT)
Dept: CARE COORDINATION | Facility: CLINIC | Age: 79
End: 2019-05-07

## 2019-05-12 ASSESSMENT — ENCOUNTER SYMPTOMS
HALLUCINATIONS: 0
WEIGHT GAIN: 0
DIARRHEA: 1
WEIGHT LOSS: 1
POLYPHAGIA: 0
DOUBLE VISION: 0
ALTERED TEMPERATURE REGULATION: 0
JAUNDICE: 0
CONSTIPATION: 0
EYE WATERING: 0
VOMITING: 0
DECREASED APPETITE: 0
CHILLS: 0
FATIGUE: 0
BOWEL INCONTINENCE: 0
BLOOD IN STOOL: 1
NIGHT SWEATS: 0
RECTAL PAIN: 0
EYE IRRITATION: 1
FEVER: 0
ABDOMINAL PAIN: 0
HEARTBURN: 0
INCREASED ENERGY: 0
EYE REDNESS: 0
NAUSEA: 0
POLYDIPSIA: 0
EYE PAIN: 1
BLOATING: 0

## 2019-05-14 ENCOUNTER — TELEPHONE (OUTPATIENT)
Dept: GASTROENTEROLOGY | Facility: CLINIC | Age: 79
End: 2019-05-14

## 2019-05-14 ENCOUNTER — HOSPITAL ENCOUNTER (OUTPATIENT)
Dept: LAB | Facility: CLINIC | Age: 79
Setting detail: SPECIMEN
Discharge: HOME | End: 2019-05-14
Attending: INTERNAL MEDICINE | Admitting: INTERNAL MEDICINE
Payer: MEDICARE

## 2019-05-14 ENCOUNTER — OFFICE VISIT (OUTPATIENT)
Dept: GASTROENTEROLOGY | Facility: CLINIC | Age: 79
End: 2019-05-14
Attending: INTERNAL MEDICINE
Payer: MEDICARE

## 2019-05-14 VITALS
BODY MASS INDEX: 23.63 KG/M2 | DIASTOLIC BLOOD PRESSURE: 58 MMHG | HEIGHT: 65 IN | HEART RATE: 59 BPM | SYSTOLIC BLOOD PRESSURE: 132 MMHG | WEIGHT: 141.8 LBS | OXYGEN SATURATION: 98 %

## 2019-05-14 DIAGNOSIS — K51.20 CHRONIC ULCERATIVE PROCTITIS WITHOUT COMPLICATIONS (H): Primary | ICD-10-CM

## 2019-05-14 DIAGNOSIS — K51.20 CHRONIC ULCERATIVE PROCTITIS WITHOUT COMPLICATIONS (H): ICD-10-CM

## 2019-05-14 LAB
ALBUMIN SERPL-MCNC: 3.7 G/DL (ref 3.4–5)
ALP SERPL-CCNC: 65 U/L (ref 40–150)
ALT SERPL W P-5'-P-CCNC: 21 U/L (ref 0–50)
ANION GAP SERPL CALCULATED.3IONS-SCNC: 5 MMOL/L (ref 3–14)
AST SERPL W P-5'-P-CCNC: 13 U/L (ref 0–45)
BASOPHILS # BLD AUTO: 0 10E9/L (ref 0–0.2)
BASOPHILS NFR BLD AUTO: 0.3 %
BILIRUB DIRECT SERPL-MCNC: 0.1 MG/DL (ref 0–0.2)
BILIRUB SERPL-MCNC: 0.3 MG/DL (ref 0.2–1.3)
BUN SERPL-MCNC: 18 MG/DL (ref 7–30)
C DIFF TOX B STL QL: NEGATIVE
CALCIUM SERPL-MCNC: 9.5 MG/DL (ref 8.5–10.1)
CHLORIDE SERPL-SCNC: 103 MMOL/L (ref 94–109)
CO2 SERPL-SCNC: 33 MMOL/L (ref 20–32)
CREAT SERPL-MCNC: 0.72 MG/DL (ref 0.52–1.04)
CRP SERPL-MCNC: <2.9 MG/L (ref 0–8)
DIFFERENTIAL METHOD BLD: NORMAL
EOSINOPHIL # BLD AUTO: 0.3 10E9/L (ref 0–0.7)
EOSINOPHIL NFR BLD AUTO: 2.8 %
ERYTHROCYTE [DISTWIDTH] IN BLOOD BY AUTOMATED COUNT: 12.4 % (ref 10–15)
ERYTHROCYTE [SEDIMENTATION RATE] IN BLOOD BY WESTERGREN METHOD: 7 MM/H (ref 0–30)
GFR SERPL CREATININE-BSD FRML MDRD: 80 ML/MIN/{1.73_M2}
GLUCOSE SERPL-MCNC: 108 MG/DL (ref 70–99)
HCT VFR BLD AUTO: 42.3 % (ref 35–47)
HGB BLD-MCNC: 14 G/DL (ref 11.7–15.7)
IMM GRANULOCYTES # BLD: 0 10E9/L (ref 0–0.4)
IMM GRANULOCYTES NFR BLD: 0.3 %
LYMPHOCYTES # BLD AUTO: 2.4 10E9/L (ref 0.8–5.3)
LYMPHOCYTES NFR BLD AUTO: 23 %
MCH RBC QN AUTO: 30.4 PG (ref 26.5–33)
MCHC RBC AUTO-ENTMCNC: 33.1 G/DL (ref 31.5–36.5)
MCV RBC AUTO: 92 FL (ref 78–100)
MONOCYTES # BLD AUTO: 1 10E9/L (ref 0–1.3)
MONOCYTES NFR BLD AUTO: 9.9 %
NEUTROPHILS # BLD AUTO: 6.6 10E9/L (ref 1.6–8.3)
NEUTROPHILS NFR BLD AUTO: 63.7 %
NRBC # BLD AUTO: 0 10*3/UL
NRBC BLD AUTO-RTO: 0 /100
PLATELET # BLD AUTO: 381 10E9/L (ref 150–450)
POTASSIUM SERPL-SCNC: 3.4 MMOL/L (ref 3.4–5.3)
PROT SERPL-MCNC: 7.5 G/DL (ref 6.8–8.8)
RBC # BLD AUTO: 4.6 10E12/L (ref 3.8–5.2)
SODIUM SERPL-SCNC: 141 MMOL/L (ref 133–144)
SPECIMEN SOURCE: NORMAL
WBC # BLD AUTO: 10.4 10E9/L (ref 4–11)

## 2019-05-14 PROCEDURE — 83993 ASSAY FOR CALPROTECTIN FECAL: CPT | Performed by: INTERNAL MEDICINE

## 2019-05-14 PROCEDURE — 87493 C DIFF AMPLIFIED PROBE: CPT | Performed by: INTERNAL MEDICINE

## 2019-05-14 RX ORDER — BUDESONIDE 28 MG/1
2 AEROSOL, FOAM RECTAL 2 TIMES DAILY
Qty: 4 EACH | Refills: 1 | Status: SHIPPED | OUTPATIENT
Start: 2019-05-14 | End: 2019-06-13

## 2019-05-14 ASSESSMENT — MIFFLIN-ST. JEOR: SCORE: 1124.69

## 2019-05-14 ASSESSMENT — PAIN SCALES - GENERAL: PAINLEVEL: NO PAIN (0)

## 2019-05-14 NOTE — LETTER
2019       RE: Debra Denise  [id number]  [place] [address]     Dear Colleague,    Thank you for referring your patient, Debra Denise, to the [facility] GA[facility] IBD [facility]INIC at [facility]. Please see a copy of my visit note below.    IBD CLINIC VISIT    CC/REFERRING MD:  Wolfgang Zimmerman  REASON FOR CONSULTATION: Follow-up UC Proctitis    IBD HISTORY  IBD type: UC proctitis  Age at diagnosis: 63 ()  Endoscopic extent of disease: Rectum to distal sigmoid (16cm). Apparently on initial colonoscopy there was some question of patch of inflammation at hepatic flexure but I can find no actual record of this. Last colonoscopy in  shows normal colon apart from diverticula  Histologic:   Current IBD medications: Rowasa enema PRN  Prior IBD surgeries: None  Prior IBD Medications: Rowasa enema. She reports being on balsalazide many years ago that did not help    DRUG MONITORING  TPMT enzyme activity: N/A    6-TGN/6-MMPN levels: N/A    Biologic concentration: N/A    DISEASE ASSESSMENT  Labs  Recent Labs   Lab Test 18  1655 16  1324   CRP 7.6 4.4   SED 8 6     Fecal calprotectin: To be collected  Endoscopy: To be scheduled  Enterography: N/A  C diff: To be collected    Driver score:   Stool freq: 1 (1-2 stools/day more than normal)  Rectal bleedin (Visible blood more than half of the time)  PGA: 2 (Moderate)  Endoscopy: Not done    Remission: <3   Mild disease: 3-5  Moderate disease: 6-10  Severe disease: >10    ASSESSMENT/PLAN:  Ms. Denise is a 77 yo F w/ PMHx significant for UC proctitis, HTN, PMR (not on medication) here for follow-up evaluation of UC proctitis.    #. UC Proctitis:  Has been doing well in remission ff of all therapies, now with increased symptoms over the past few weeks concerning for mild flare. Plan to treat with rectal therapy (proctofoam BID added to daily rowasa). If no improvement will  assess endoscopic activity with flex sig and consider additional therapy (oral 5-ASA or may consider budesonide if significant inflammation).    Recommendations:  - Continue rowasa enemas daily  - Start uceris foam twice daily in addition to your rowasa for the next 2 weeks then once daily for 2 weeks  - Labs today: CBC, BMP, ESR, CRP  - Stool studies: Fecal calprotectin, C. Diff  - You may  your stool sample containers at Lab 1st Floor  before you leave today.  You may drop off the stool samples at any [facility]  - Schedule a flexible sigmoidoscopy (short colonoscopy)  - Call if any fevers, chills, increased abdominal pain or bleeding    IBD Health Care Maintenance:    Vaccinations:  All patients on biologics should avoid live vaccines.    -- Influenza (every year)  -- TdaP (every 10 years)  -- Pneumococcal Pneumonia (once plus booster at 5 years)  -- Yearly assessment for latent Tb (verbal screening and exam, PPD or QuantiFERON-Tb testing)  -- Review vaccinations with primary provider    Bone mineral density screening   -- Recommend all patients supplement with calcium and vitamin D    Cancer Screening:  Colon cancer screening:  Given proctitis only do not recommend patient undergo regular dysplasia surveillance - continue routine age-appropriate screening colonoscopy.    Depression Screening:  -- Over the last month, have you felt down, depressed, or hopeless? No  -- Over the last month, have you felt little interest or pleasure doing things? No    Misc:  -- Avoid tobacco use  -- Avoid NSAIDs as there is potentially a 25% chance of causing an IBD flare    Return to clinic in 6-8 weeks.    Rosana Dubose MD  GI Fellow, PGY-4  P: [phone]    HPI:   Currently, Brook is endorsing having 1-2 type 5 bowel movements daily, 100% with blood and mucous. When she does not use the rowasa she does occasionally pass blood and mucous only. She is having urgency but no incontinence. Does have low abdominal  cramping when she is starting her bowel movement. Denies any incomplete evacuation or tenesmus. She has been on an oral antibiotic for an infected stye in her eye. She had been on this for the past month. She cannot recall what antibiotic this is. She has resumed her rowasa enemas once daily for the past 8 days. Denies any fevers, chills, or vomiting. Does have some nausea in the morning that is unchanged. No eye redness or pain, rashes, joint aches or pains or anne-anal disease. States that she thinks that she may have some hemorroids that she can feel.    Debra reports that she has been doing remarkably well for the past 2 years. She has been taking Tumeric on her own for this time. She has not needed any Rowasa enemas. However, recently, she feels that her proctitis has been flaring up for the past 2 weeks.    ROS:    No fevers or chills  No weight loss  No blurry vision, double vision or change in vision  No sore throat  No lymphadenopathy  No headache, paraesthesias, or weakness in a limb  No shortness of breath or wheezing  No chest pain or pressure  No arthralgias or myalgias  No rashes or skin changes  No odynophagia or dysphagia  No hematochezia, melena  + BRBPR  No dysuria, frequency or urgency  No hot/cold intolerance or polyria  No anxiety or depression    Extra intestinal manifestations of IBD:  No uveitis/episcleritis  No aphthous ulcers   No arthritis   No erythema nodosum/pyoderma gangrenosum.     PERTINENT PAST MEDICAL HISTORY:  Past Medical History:   Diagnosis Date     Hypertension      Polymyalgia rheumatica (H)        PREVIOUS SURGERIES:  Past Surgical History:   Procedure Laterality Date     COLONOSCOPY  2014    Procedure: COMBINED COLONOSCOPY, SINGLE BIOPSY/POLYPECTOMY BY BIOPSY;  COLONOSCOPY;  Surgeon: Carol Ann Plasencia MD;  Location:  GI     ENT SURGERY      tonsilectomy     GYN SURGERY       x 2     ORTHOPEDIC SURGERY      (R) shoulder surgery for frozen shoulder      PREVIOUS ENDOSCOPY:  Results for orders placed or performed during the hospital encounter of 04/08/14   COLONOSCOPY   Result Value Ref Range    COLONOSCOPY       [facility] Endoscopy Department  _______________________________________________________________________________  Patient Name: Debra Denise      Procedure Date: 4/8/2014 11:17:57 AM        MRN: [id number]                       Account Number: [id number]                  YOB: 1940             Admit Type: Outpatient                      Age: 73                               Room: 2                                     Note Status: Finalized                Attending MD: Carol Ann Koroma MD                 _______________________________________________________________________________     Procedure:                Colonoscopy  Indications:              Personal history of ulcerative rectosigmoiditis  Providers:                Carol Ann Plasencia MD, Lianna Menchaca RN  Referring MD:             Kristie Valdes MD  Medicines:                Midazolam 5 mg IV  Complications:            No immediate complications  _______________________________________________________________________________  Procedure:                Pre-Anesthesia Assessment:                            - Prior to the procedure, a History and Physical                             was performed, and patient medications and                             allergies were reviewed. The patient is competent.                             The risks and benefits of the procedure and the                             sedation options and risks were discussed with the                             patient. All questions were answered and informed                             consent was obtained. Patient identification and                             proposed procedure were verified by the physician                             and the nurse in the procedure  room. Mental Status                             Examination: alert and oriented. Airway                             Examination: normal oropharyngeal airway and neck                             mobility. Respiratory Examination: clear to                             auscultation. CV Examination: normal. Prophylactic                             Antibiotics: The patient does not require                             prophylactic antibiotics. Prior Anticoagulants: The                             patient has taken no previous anticoagulant or                             antiplatelet agents. ASA Grade Assessment: II - A                             patient with mild systemic disease. After reviewing                             the risks and benefits, the patient was deemed in                             satisfactory condition to undergo the procedure.                             The anesthesia plan was to use moderate sedation /                             analgesia (conscious sedation). Immediately prior                             to administration of medications, the patient was                             re-assessed for adequacy to receive sedatives. The                             heart rate, respiratory rate, oxygen saturations,                             blood pressure, adequacy of pulmonary ventilation,                             and response to care were monitored throughout the                             procedure. The physical status of the patient was                             re-assessed after the procedure.                            After obtaining informed consent, the colonoscope                             was passed under direct vision. Throughout the                             procedure, the patient's blood pressure, pulse, and                             oxygen saturations were monitored continuously. The                             Colonoscope was introduced through the anus and                              advanced to the cecum, identified by appendiceal                             orifice & ileocecal valve. The quality of the bowel                             preparation was excellent.                                                                                   Findings:       The perianal and digital rectal examinations were normal. A few        small-mouthed diverticula were found in the sigmoid colon and in the        descending colon. Internal hemorrhoids were found, and they were small.        Normal mucosa was found in the entire colon. Biopsies were taken with a        cold forceps for histology; biopsies were taken on the left and the        right colon.There was a medium-sized lipoma, at the hepatic flexure.                                                                                   Impression:               - Diverticulosis sigmoid colon and descending colon.                            - Internal hemorrhoids.                            - Normal mucosa entire examined colon. This was                             biopsied.No evidence of inflammation.  Recommendation:           - High fiber diet.                            - Repeat colonoscopy only if new symptoms develop.                            Continue to use the mesalamine suppositories for                             the proctitis symptoms.                                                                                     DIOMEDES Plasencia M.D.  _______________  Carol Ann Plasencia MD  Signed Date: 4/8/2014 12:27:49 PM  Number of Addenda: 0  I was physically present for the entire viewing portion of the exam.  Note Initiated On: 4/8/2014 11:17:57 AM  Scope Withdrawal Time: 0 hours 0 minutes 0 seconds   Scope Withdrawal Time: 0 hours 0 minutes 0 seconds   Total Procedure Duration: 0 hours 0 minutes 0 seconds   Total Procedure Duration: 0 hours 0 minutes 0 seconds        ALLERGIES:     Allergies   Allergen Reactions     Atenolol      Other  reaction(s): Intolerance-Can't Take  Fatigue     Ibuprofen      Other reaction(s): Stomach Upset  4-9-13 tele encounter     Naproxen      Other reaction(s): Stomach Upset  4-9-13 tele encounter     Ramipril Cough     Other reaction(s): Intolerance-Can't Take     Sulfa Drugs      PERTINENT MEDICATIONS:    Current Outpatient Medications:      BIOTIN PO, , Disp: , Rfl:      Cholecalciferol (VITAMIN D3 PO), Take by mouth daily, Disp: , Rfl:      gabapentin (NEURONTIN) 100 MG capsule, Take 2 capsules (200 mg) by mouth At Bedtime, Disp: 180 capsule, Rfl: 0     Mesalamine-Cleanser (ROWASA) 4 g KIT, Place 1 enema rectally three times a week, Disp: 4 kit, Rfl: 3     multivitamin, therapeutic with minerals (MULTI-VITAMIN) TABS, Take 1 tablet by mouth daily, Disp: , Rfl:      Omega-3 Fatty Acids (OMEGA-3 FISH OIL PO), , Disp: , Rfl:      Rosuvastatin Calcium (CRESTOR PO), Take 5 mg by mouth three times a week , Disp: , Rfl:      Turmeric 400 MG CAPS, Take 400 mg by mouth daily, Disp: , Rfl:      valsartan-hydrochlorothiazide (DIOVAN HCT) 160-25 MG tablet, Take 1 tablet by mouth daily, Disp: 90 tablet, Rfl: 0     mesalamine (CANASA) 1000 MG suppository, Take one supp three times per week. (Patient not taking: Reported on 5/14/2019), Disp: 12 suppository, Rfl: 3    SOCIAL HISTORY:  Retired medical technologist. Son is a nephrologist in [place]. , 3 sons.    Tobacco: Never  EtOH: Rare  Illicit: Denies    Social History     Socioeconomic History     Marital status:      Spouse name: Not on file     Number of children: Not on file     Years of education: Not on file     Highest education level: Not on file   Occupational History     Not on file   Social Needs     Financial resource strain: Not on file     Food insecurity:     Worry: Not on file     Inability: Not on file     Transportation needs:     Medical: Not on file     Non-medical: Not on file   Tobacco Use     Smoking status: Never Smoker     Smokeless tobacco:  "Never Used   Substance and Sexual Activity     Alcohol use: No     Drug use: No     Sexual activity: Not Currently   Lifestyle     Physical activity:     Days per week: Not on file     Minutes per session: Not on file     Stress: Not on file   Relationships     Social connections:     Talks on phone: Not on file     Gets together: Not on file     Attends Sikhism service: Not on file     Active member of club or organization: Not on file     Attends meetings of clubs or organizations: Not on file     Relationship status: Not on file     Intimate partner violence:     Fear of current or ex partner: Not on file     Emotionally abused: Not on file     Physically abused: Not on file     Forced sexual activity: Not on file   Other Topics Concern     Parent/sibling w/ CABG, MI or angioplasty before 65F 55M? Not Asked   Social History Narrative     Not on file       FAMILY HISTORY:    Brother - multiple myeloma. No family history of inflammatory bowel disease.    Mother - history of thyroid disease and temporal arteritis.    Family History   Problem Relation Age of Onset     Cancer - colorectal Father      Cancer Brother      Past/family/social history reviewed and no changes    PHYSICAL EXAMINATION:  Constitutional: aaox3, cooperative, pleasant, not dyspneic/diaphoretic, no acute distress  Vitals reviewed: /58   Pulse 59   Ht 1.66 m (5' 5.35\")   Wt 64.3 kg (141 lb 12.8 oz)   SpO2 98%   BMI 23.34 kg/m     Wt:   Wt Readings from Last 2 Encounters:   05/14/19 64.3 kg (141 lb 12.8 oz)   05/03/19 64.5 kg (142 lb 1.6 oz)      Eyes: Sclera anicteric/injected  Ears/nose/mouth/throat: Normal oropharynx without ulcers or exudate, mucus membranes moist, hearing intact  Neck: supple, thyroid normal size  CV: No edema  Respiratory: Unlabored breathing  Lymph: No axillary, submandibular, supraclavicular or inguinal lymphadenopathy  Abd: Nondistended, +bs, no hepatosplenomegaly, nontender, no peritoneal signs  Skin: warm, " perfused, no jaundice  Psych: Normal affect  MSK: Normal gait    PERTINENT STUDIES:  Most recent CBC:  Recent Labs   Lab Test 11/06/18  1655 09/20/16  1324   WBC 8.4 8.1   HGB 14.4 13.7   HCT 44.1 41.4    336     Most recent hepatic panel:  Recent Labs   Lab Test 11/06/18  1655 09/20/16  1324   ALT 33 22   AST 16 9     Most recent creatinine:  Recent Labs   Lab Test 11/06/18  1655 09/20/16  1324   CR 0.84 0.75       I performed a history and physical examination of the above patient and discussed the management with Dr. Dubose on 5/14/2019. I reviewed the note and there are no changes to the past medical, family or social history.  A complete 10 point review of systems was obtained. Please see the HPI for pertinent positives and negatives. All other systems were reviewed and were found to be negative.     I agree with the documented findings and plan of care as outlined.    Joycelyn Rea MD  GI Attending  Pager: [phone]

## 2019-05-14 NOTE — PROGRESS NOTES
IBD CLINIC VISIT    CC/REFERRING MD:  Wolfgang Zimmerman  REASON FOR CONSULTATION: Follow-up UC Proctitis    IBD HISTORY  IBD type: UC proctitis  Age at diagnosis: 63 ()  Endoscopic extent of disease: Rectum to distal sigmoid (16cm). Apparently on initial colonoscopy there was some question of patch of inflammation at hepatic flexure but I can find no actual record of this. Last colonoscopy in  shows normal colon apart from diverticula  Histologic:   Current IBD medications: Rowasa enema PRN  Prior IBD surgeries: None  Prior IBD Medications: Rowasa enema. She reports being on balsalazide many years ago that did not help    DRUG MONITORING  TPMT enzyme activity: N/A    6-TGN/6-MMPN levels: N/A    Biologic concentration: N/A    DISEASE ASSESSMENT  Labs  Recent Labs   Lab Test 18  1655 16  1324   CRP 7.6 4.4   SED 8 6     Fecal calprotectin: To be collected  Endoscopy: To be scheduled  Enterography: N/A  C diff: To be collected    Driver score:   Stool freq: 1 (1-2 stools/day more than normal)  Rectal bleedin (Visible blood more than half of the time)  PGA: 2 (Moderate)  Endoscopy: Not done    Remission: <3   Mild disease: 3-5  Moderate disease: 6-10  Severe disease: >10    ASSESSMENT/PLAN:  Ms. Denise is a 79 yo F w/ PMHx significant for UC proctitis, HTN, PMR (not on medication) here for follow-up evaluation of UC proctitis.    #. UC Proctitis:  Has been doing well in remission ff of all therapies, now with increased symptoms over the past few weeks concerning for mild flare. Plan to treat with rectal therapy (proctofoam BID added to daily rowasa). If no improvement will assess endoscopic activity with flex sig and consider additional therapy (oral 5-ASA or may consider budesonide if significant inflammation).    Recommendations:  - Continue rowasa enemas daily  - Start uceris foam twice daily in addition to your rowasa for the next 2 weeks then once daily for 2 weeks  - Labs today: CBC, BMP,  ESR, CRP  - Stool studies: Fecal calprotectin, C. Diff  - You may  your stool sample containers at Lab 1st Floor  before you leave today.  You may drop off the stool samples at any [facility]  - Schedule a flexible sigmoidoscopy (short colonoscopy)  - Call if any fevers, chills, increased abdominal pain or bleeding    IBD Health Care Maintenance:    Vaccinations:  All patients on biologics should avoid live vaccines.    -- Influenza (every year)  -- TdaP (every 10 years)  -- Pneumococcal Pneumonia (once plus booster at 5 years)  -- Yearly assessment for latent Tb (verbal screening and exam, PPD or QuantiFERON-Tb testing)  -- Review vaccinations with primary provider    Bone mineral density screening   -- Recommend all patients supplement with calcium and vitamin D    Cancer Screening:  Colon cancer screening:  Given proctitis only do not recommend patient undergo regular dysplasia surveillance - continue routine age-appropriate screening colonoscopy.    Depression Screening:  -- Over the last month, have you felt down, depressed, or hopeless? No  -- Over the last month, have you felt little interest or pleasure doing things? No    Misc:  -- Avoid tobacco use  -- Avoid NSAIDs as there is potentially a 25% chance of causing an IBD flare    Return to clinic in 6-8 weeks.    Rosana Dubose MD  GI Fellow, PGY-4  P: [phone]    HPI:   Currently, Brook is endorsing having 1-2 type 5 bowel movements daily, 100% with blood and mucous. When she does not use the rowasa she does occasionally pass blood and mucous only. She is having urgency but no incontinence. Does have low abdominal cramping when she is starting her bowel movement. Denies any incomplete evacuation or tenesmus. She has been on an oral antibiotic for an infected stye in her eye. She had been on this for the past month. She cannot recall what antibiotic this is. She has resumed her rowasa enemas once daily for the past 8 days. Denies any fevers,  chills, or vomiting. Does have some nausea in the morning that is unchanged. No eye redness or pain, rashes, joint aches or pains or anne-anal disease. States that she thinks that she may have some hemorroids that she can feel.    Debra reports that she has been doing remarkably well for the past 2 years. She has been taking Tumeric on her own for this time. She has not needed any Rowasa enemas. However, recently, she feels that her proctitis has been flaring up for the past 2 weeks.    ROS:    No fevers or chills  No weight loss  No blurry vision, double vision or change in vision  No sore throat  No lymphadenopathy  No headache, paraesthesias, or weakness in a limb  No shortness of breath or wheezing  No chest pain or pressure  No arthralgias or myalgias  No rashes or skin changes  No odynophagia or dysphagia  No hematochezia, melena  + BRBPR  No dysuria, frequency or urgency  No hot/cold intolerance or polyria  No anxiety or depression    Extra intestinal manifestations of IBD:  No uveitis/episcleritis  No aphthous ulcers   No arthritis   No erythema nodosum/pyoderma gangrenosum.     PERTINENT PAST MEDICAL HISTORY:  Past Medical History:   Diagnosis Date     Hypertension      Polymyalgia rheumatica (H)        PREVIOUS SURGERIES:  Past Surgical History:   Procedure Laterality Date     COLONOSCOPY  2014    Procedure: COMBINED COLONOSCOPY, SINGLE BIOPSY/POLYPECTOMY BY BIOPSY;  COLONOSCOPY;  Surgeon: Carol Ann Plasencia MD;  Location:  GI     ENT SURGERY      tonsilectomy     GYN SURGERY       x 2     ORTHOPEDIC SURGERY      (R) shoulder surgery for frozen shoulder     PREVIOUS ENDOSCOPY:  Results for orders placed or performed during the hospital encounter of 14   COLONOSCOPY   Result Value Ref Range    COLONOSCOPY       [facility] Endoscopy Department  _______________________________________________________________________________  Patient Name:  Debra Denise      Procedure Date: 4/8/2014 11:17:57 AM        MRN: [id number]                       Account Number: [id number]                  YOB: 1940             Admit Type: Outpatient                      Age: 73                               Room: 2                                     Note Status: Finalized                Attending MD: Carol Ann Koroma MD                 _______________________________________________________________________________     Procedure:                Colonoscopy  Indications:              Personal history of ulcerative rectosigmoiditis  Providers:                Carol Ann Plasencia MD, Lianna Menchaca, NELLA  Referring MD:             Kristie Valdes MD  Medicines:                Midazolam 5 mg IV  Complications:            No immediate complications  _______________________________________________________________________________  Procedure:                Pre-Anesthesia Assessment:                            - Prior to the procedure, a History and Physical                             was performed, and patient medications and                             allergies were reviewed. The patient is competent.                             The risks and benefits of the procedure and the                             sedation options and risks were discussed with the                             patient. All questions were answered and informed                             consent was obtained. Patient identification and                             proposed procedure were verified by the physician                             and the nurse in the procedure room. Mental Status                             Examination: alert and oriented. Airway                             Examination: normal oropharyngeal airway and neck                             mobility. Respiratory Examination: clear to                             auscultation. CV Examination: normal. Prophylactic                              Antibiotics: The patient does not require                             prophylactic antibiotics. Prior Anticoagulants: The                             patient has taken no previous anticoagulant or                             antiplatelet agents. ASA Grade Assessment: II - A                             patient with mild systemic disease. After reviewing                             the risks and benefits, the patient was deemed in                             satisfactory condition to undergo the procedure.                             The anesthesia plan was to use moderate sedation /                             analgesia (conscious sedation). Immediately prior                             to administration of medications, the patient was                             re-assessed for adequacy to receive sedatives. The                             heart rate, respiratory rate, oxygen saturations,                             blood pressure, adequacy of pulmonary ventilation,                             and response to care were monitored throughout the                             procedure. The physical status of the patient was                             re-assessed after the procedure.                            After obtaining informed consent, the colonoscope                             was passed under direct vision. Throughout the                             procedure, the patient's blood pressure, pulse, and                             oxygen saturations were monitored continuously. The                             Colonoscope was introduced through the anus and                             advanced to the cecum, identified by appendiceal                             orifice & ileocecal valve. The quality of the bowel                             preparation was excellent.                                                                                   Findings:       The perianal and digital rectal  examinations were normal. A few        small-mouthed diverticula were found in the sigmoid colon and in the        descending colon. Internal hemorrhoids were found, and they were small.        Normal mucosa was found in the entire colon. Biopsies were taken with a        cold forceps for histology; biopsies were taken on the left and the        right colon.There was a medium-sized lipoma, at the hepatic flexure.                                                                                   Impression:               - Diverticulosis sigmoid colon and descending colon.                            - Internal hemorrhoids.                            - Normal mucosa entire examined colon. This was                             biopsied.No evidence of inflammation.  Recommendation:           - High fiber diet.                            - Repeat colonoscopy only if new symptoms develop.                            Continue to use the mesalamine suppositories for                             the proctitis symptoms.                                                                                     DIOMEDES Plasencia M.D.  _______________  Carol Ann Plasencia MD  Signed Date: 4/8/2014 12:27:49 PM  Number of Addenda: 0  I was physically present for the entire viewing portion of the exam.  Note Initiated On: 4/8/2014 11:17:57 AM  Scope Withdrawal Time: 0 hours 0 minutes 0 seconds   Scope Withdrawal Time: 0 hours 0 minutes 0 seconds   Total Procedure Duration: 0 hours 0 minutes 0 seconds   Total Procedure Duration: 0 hours 0 minutes 0 seconds        ALLERGIES:     Allergies   Allergen Reactions     Atenolol      Other reaction(s): Intolerance-Can't Take  Fatigue     Ibuprofen      Other reaction(s): Stomach Upset  4-9-13 tele encounter     Naproxen      Other reaction(s): Stomach Upset  4-9-13 tele encounter     Ramipril Cough     Other reaction(s): Intolerance-Can't Take     Sulfa Drugs      PERTINENT MEDICATIONS:    Current Outpatient  Medications:      BIOTIN PO, , Disp: , Rfl:      Cholecalciferol (VITAMIN D3 PO), Take by mouth daily, Disp: , Rfl:      gabapentin (NEURONTIN) 100 MG capsule, Take 2 capsules (200 mg) by mouth At Bedtime, Disp: 180 capsule, Rfl: 0     Mesalamine-Cleanser (ROWASA) 4 g KIT, Place 1 enema rectally three times a week, Disp: 4 kit, Rfl: 3     multivitamin, therapeutic with minerals (MULTI-VITAMIN) TABS, Take 1 tablet by mouth daily, Disp: , Rfl:      Omega-3 Fatty Acids (OMEGA-3 FISH OIL PO), , Disp: , Rfl:      Rosuvastatin Calcium (CRESTOR PO), Take 5 mg by mouth three times a week , Disp: , Rfl:      Turmeric 400 MG CAPS, Take 400 mg by mouth daily, Disp: , Rfl:      valsartan-hydrochlorothiazide (DIOVAN HCT) 160-25 MG tablet, Take 1 tablet by mouth daily, Disp: 90 tablet, Rfl: 0     mesalamine (CANASA) 1000 MG suppository, Take one supp three times per week. (Patient not taking: Reported on 5/14/2019), Disp: 12 suppository, Rfl: 3    SOCIAL HISTORY:  Retired medical technologist. Son is a nephrologist in [place]. , 3 sons.    Tobacco: Never  EtOH: Rare  Illicit: Denies    Social History     Socioeconomic History     Marital status:      Spouse name: Not on file     Number of children: Not on file     Years of education: Not on file     Highest education level: Not on file   Occupational History     Not on file   Social Needs     Financial resource strain: Not on file     Food insecurity:     Worry: Not on file     Inability: Not on file     Transportation needs:     Medical: Not on file     Non-medical: Not on file   Tobacco Use     Smoking status: Never Smoker     Smokeless tobacco: Never Used   Substance and Sexual Activity     Alcohol use: No     Drug use: No     Sexual activity: Not Currently   Lifestyle     Physical activity:     Days per week: Not on file     Minutes per session: Not on file     Stress: Not on file   Relationships     Social connections:     Talks on phone: Not on file     Gets  "together: Not on file     Attends Caodaism service: Not on file     Active member of club or organization: Not on file     Attends meetings of clubs or organizations: Not on file     Relationship status: Not on file     Intimate partner violence:     Fear of current or ex partner: Not on file     Emotionally abused: Not on file     Physically abused: Not on file     Forced sexual activity: Not on file   Other Topics Concern     Parent/sibling w/ CABG, MI or angioplasty before 65F 55M? Not Asked   Social History Narrative     Not on file       FAMILY HISTORY:    Brother - multiple myeloma. No family history of inflammatory bowel disease.    Mother - history of thyroid disease and temporal arteritis.    Family History   Problem Relation Age of Onset     Cancer - colorectal Father      Cancer Brother      Past/family/social history reviewed and no changes    PHYSICAL EXAMINATION:  Constitutional: aaox3, cooperative, pleasant, not dyspneic/diaphoretic, no acute distress  Vitals reviewed: /58   Pulse 59   Ht 1.66 m (5' 5.35\")   Wt 64.3 kg (141 lb 12.8 oz)   SpO2 98%   BMI 23.34 kg/m    Wt:   Wt Readings from Last 2 Encounters:   05/14/19 64.3 kg (141 lb 12.8 oz)   05/03/19 64.5 kg (142 lb 1.6 oz)      Eyes: Sclera anicteric/injected  Ears/nose/mouth/throat: Normal oropharynx without ulcers or exudate, mucus membranes moist, hearing intact  Neck: supple, thyroid normal size  CV: No edema  Respiratory: Unlabored breathing  Lymph: No axillary, submandibular, supraclavicular or inguinal lymphadenopathy  Abd: Nondistended, +bs, no hepatosplenomegaly, nontender, no peritoneal signs  Skin: warm, perfused, no jaundice  Psych: Normal affect  MSK: Normal gait    PERTINENT STUDIES:  Most recent CBC:  Recent Labs   Lab Test 11/06/18 1655 09/20/16  1324   WBC 8.4 8.1   HGB 14.4 13.7   HCT 44.1 41.4    336     Most recent hepatic panel:  Recent Labs   Lab Test 11/06/18 1655 09/20/16  1324   ALT 33 22   AST 16 9 "     Most recent creatinine:  Recent Labs   Lab Test 11/06/18  1655 09/20/16  1324   CR 0.84 0.75

## 2019-05-14 NOTE — NURSING NOTE
"Chief Complaint   Patient presents with     RECHECK     Return IBD       Vitals:    05/14/19 1340   BP: 132/58   Pulse: 59   SpO2: 98%   Weight: 64.3 kg (141 lb 12.8 oz)   Height: 1.66 m (5' 5.35\")       Body mass index is 23.34 kg/m .    Devi Newell CMA  No med refill needed.   "

## 2019-05-14 NOTE — PATIENT INSTRUCTIONS
It was a pleasure taking care of you today.  I've included a brief summary of our discussion and care plan from today's visit below.  Please review this information with your primary care provider.  _____________________________________________________________________    My recommendations are summarized as follows:    - Continue rowasa enemas daily - if you are able  - Start uceris foam (budesonide foam) twice daily in addition to your rowasa for the next 2 weeks then once daily for 2 weeks  - Stop by lab today to have your blood drawn  - You may  your stool sample containers at Lab 1st Floor  before you leave today.  You may drop off the stool samples at any [facility]    Closest to you - [place] [address] [facility].[address], [place] [address]   Appointments:[phone] ([phone])   Clinic:[phone]   Fax:[phone]    - Call our nurse Kristie, in one week for updates - at [phone]  - If your symptoms are not improved we will have you schedule a flexible sigmoidoscopy (short colonoscopy)  - Call if any fevers, chills, increased abdominal pain or bleeding    Return to GI Clinic in 6-8 weekss to review your progress after you have completed your flexible sigmoidoscopy.    ____________________________________________________________________    Who do I call with any questions after my visit?  Please be in touch if there are any further questions that arise following today's visit.  There are multiple ways to contact your gastroenterology care team.        During business hours, you may reach a Gastroenterology nurse at [phone] and choose option 3.         To schedule or reschedule an appointment, please call [phone].       You can always send a secure message through [employer]ik[employer].  [employer] messages are answered by your nurse or doctor typically within 24 hours.  Please allow extra time on weekends and holidays.        For urgent/emergent questions after business hours, you may  reach the on-call GI Fellow by contacting the [facility]  at [phone].     How will I get the results of any tests ordered?    You will receive all of your results.  If you have signed up for [employer], any tests ordered at your visit will be available to you after your physician reviews them.  Typically this takes 1-2 weeks.  If there are urgent results that require a change in your care plan, your physician or nurse will call you to discuss the next steps.      What is [employer]?  [employer] is a secure way for you to access all of your healthcare records from the [facility].  It is a web based computer program, so you can sign on to it from any location.  It also allows you to send secure messages to your care team.  I recommend signing up for [employer] access if you have not already done so and are comfortable with using a computer.      How to I schedule a follow-up visit?  If you did not schedule a follow-up visit today, please call [phone] to schedule a follow-up office visit.        Sincerely,    Rosana Dubose MD  GI Fellow  [facility]  Division of Gastroenterology, Hepatology & Nutrition

## 2019-05-15 ENCOUNTER — TELEPHONE (OUTPATIENT)
Dept: GASTROENTEROLOGY | Facility: CLINIC | Age: 79
End: 2019-05-15

## 2019-05-15 NOTE — TELEPHONE ENCOUNTER
Referring Provider: Wolfgang Zimmerman MD    What is your current height? 5'5    What is your current weight?141    Are you on daily home oxygen? n    Have you had a heart or lung transplant? n      In the past year, have you had any heart related issues  Including cardiomyopathy or a MI within 6 months, that required cardiac stenting or other implantable devices? n    What type of implantable device do you have? n    Do you take nitroglycerin? If yes, how often? n    Are you currently taking any blood thinners?n      (Females) Are you currently pregnant? n  If yes, how many weeks?      Have you had a procedure in the past that was difficult to tolerate with conscious sedation? Any allergies to Fentanyl or Versed n        Do you currently use any of the following?    Are you taking any scheduled prescription narcotics more than once daily? n    Drink alcohol daily? n    Currently using any street drugs or methadone?n    Do you have any history of post-traumatic stress syndrome or mental health issues? n

## 2019-05-17 LAB — CALPROTECTIN STL-MCNT: 110.1 MG/KG (ref 0–49.9)

## 2019-05-23 DIAGNOSIS — I10 BENIGN ESSENTIAL HYPERTENSION: ICD-10-CM

## 2019-05-23 RX ORDER — GABAPENTIN 100 MG/1
200 CAPSULE ORAL AT BEDTIME
Qty: 180 CAPSULE | Refills: 0 | Status: SHIPPED | OUTPATIENT
Start: 2019-05-23 | End: 2019-08-23

## 2019-05-23 NOTE — TELEPHONE ENCOUNTER
gabapentin (NEURONTIN) 100 MG capsule    Last Written Prescription Date:  2/28/19  Last Fill Quantity: 180,   # refills: 0  Last Office Visit : 11/9/18  Future Office visit:  none    Routing refill request to provider for review/approval because:  Drug not on the [facility], [facility] or [facility] refill protocol

## 2019-05-23 NOTE — PROGRESS NOTES
I performed a history and physical examination of the above patient and discussed the management with Dr. Dubose on 5/14/2019. I reviewed the note and there are no changes to the past medical, family or social history.  A complete 10 point review of systems was obtained. Please see the HPI for pertinent positives and negatives. All other systems were reviewed and were found to be negative.     I agree with the documented findings and plan of care as outlined.    Joycelyn Rea MD  GI Attending  Pager: [phone]

## 2019-05-24 ENCOUNTER — TELEPHONE (OUTPATIENT)
Dept: GASTROENTEROLOGY | Facility: CLINIC | Age: 79
End: 2019-05-24

## 2019-05-24 ENCOUNTER — DOCUMENTATION ONLY (OUTPATIENT)
Dept: GASTROENTEROLOGY | Facility: CLINIC | Age: 79
End: 2019-05-24

## 2019-05-24 NOTE — TELEPHONE ENCOUNTER
Called pt to discuss her concerns and questions.  Pt is using uceris foam twice a day. Has not been using the rowasa enemas due to time constraints and being out of town.  Denies any loose stools. States on the constipated spectrum.  One stool a day. Little blood in stool and mucus . Blood in stool has decreased.  States she is eating the foods she normally does. Suggested she also try the rowasa enemas but sounds like she is progressing in a positive way.  Pt only picked up the twice a day dosing due to the co pay. Sent pt a message via my chart with a number and website for applying for a co pay card for [employer].

## 2019-05-24 NOTE — PROGRESS NOTES
Received incoming fax for approval of Uceris, fill date 5/14/19 for 30 days; has been scanned into chart.

## 2019-05-24 NOTE — TELEPHONE ENCOUNTER
Health Call Center    Phone Message    May a detailed message be left on voicemail: yes    Reason for Call: Other: Patient has a concern about RX Budesonide 2 MG/ACT FOAM.  She isnt sure how fast this med should start working.  She is having trouble with the canister.  She also wonders about certain types of foods she should eat.  Please call to discuss.  Also, this med isnt on her formulary per her insurance.      Action Taken: Message routed to:  [facility] ([facility] GI

## 2019-05-30 ENCOUNTER — PATIENT OUTREACH (OUTPATIENT)
Dept: GASTROENTEROLOGY | Facility: CLINIC | Age: 79
End: 2019-05-30

## 2019-05-31 NOTE — PROGRESS NOTES
Returned voice mail message asking for a call back. Pt states she is losing weight about 7 pounds.  Loss appetite since being on the uceris foam. Concerned about the weight loss being related to her use of uceris foam. Has seen less mucus and blood in stool greatly diminished. Has tapered to once a day and wondering if she had stop after one week at once a day. Has less urgency and rectal pain and feels that the foam has helped.   Suggested pt try protein shakes or ensure and pt said she has not tolerated these supplements in the past. Informed that will route to Dr. Rea about stopping uceris foam after one week.

## 2019-06-11 ENCOUNTER — TELEPHONE (OUTPATIENT)
Dept: GASTROENTEROLOGY | Facility: CLINIC | Age: 79
End: 2019-06-11

## 2019-06-11 NOTE — TELEPHONE ENCOUNTER
Patient scheduled for Flexible Sigmoidoscopy     In basket message to Kristie Danielle RN working with Dr. Rea to determine if there is a need for the procedure.  Patient reported leaving a couple of messages and received no call back.    Indication for procedure. Flare up of proctitis follow up     Referring Provider. Wolfgang Zimmerman    ? No     Arrival time verified? June 18th @ 9:25 am    Facility location verified? [address] [place] [address];     Instructions given regarding prep and procedure    Prep Type Clear liquid diet and 2 fleets enemas, sent out instructions via [employer]     Are you taking any anticoagulants or blood thinners? no    Instructions given? yes    Electronic implanted devices? no    Pre procedure teaching completed? Yes    Transportation from procedure? Yes, verbalized understanding of designated  and  6 hours after exam    H&P / Pre op physical completed? n/a

## 2019-06-11 NOTE — PROGRESS NOTES
Pt states that since dropping her uceris foam to once a day she is not losing weight.  Pt will stop the foam. Pt is wondering if she still need to have the flex sig.        OK to stop (although now it's been more than a week). She should keep us posted on her weight loss -- that is concerning.

## 2019-06-18 ENCOUNTER — HOSPITAL ENCOUNTER (OUTPATIENT)
Facility: AMBULATORY SURGERY CENTER | Age: 79
End: 2019-06-18
Attending: INTERNAL MEDICINE
Payer: MEDICARE

## 2019-06-18 VITALS
TEMPERATURE: 98.2 F | OXYGEN SATURATION: 98 % | HEART RATE: 57 BPM | RESPIRATION RATE: 16 BRPM | SYSTOLIC BLOOD PRESSURE: 130 MMHG | DIASTOLIC BLOOD PRESSURE: 66 MMHG

## 2019-06-18 LAB — FLEXIBLE SIGMOIDOSCOPY: NORMAL

## 2019-06-18 PROCEDURE — 88305 TISSUE EXAM BY PATHOLOGIST: CPT | Performed by: INTERNAL MEDICINE

## 2019-06-18 RX ORDER — ONDANSETRON 4 MG/1
4 TABLET, ORALLY DISINTEGRATING ORAL EVERY 6 HOURS PRN
Status: CANCELLED | OUTPATIENT
Start: 2019-06-18

## 2019-06-18 RX ORDER — ONDANSETRON 2 MG/ML
4 INJECTION INTRAMUSCULAR; INTRAVENOUS EVERY 6 HOURS PRN
Status: CANCELLED | OUTPATIENT
Start: 2019-06-18

## 2019-06-18 RX ORDER — FLUMAZENIL 0.1 MG/ML
0.2 INJECTION, SOLUTION INTRAVENOUS
Status: CANCELLED | OUTPATIENT
Start: 2019-06-18 | End: 2019-06-19

## 2019-06-18 RX ORDER — LIDOCAINE 40 MG/G
CREAM TOPICAL
Status: DISCONTINUED | OUTPATIENT
Start: 2019-06-18 | End: 2019-06-19 | Stop reason: HOSPADM

## 2019-06-18 RX ORDER — ONDANSETRON 2 MG/ML
4 INJECTION INTRAMUSCULAR; INTRAVENOUS
Status: DISCONTINUED | OUTPATIENT
Start: 2019-06-18 | End: 2019-06-19 | Stop reason: HOSPADM

## 2019-06-18 RX ORDER — NALOXONE HYDROCHLORIDE 0.4 MG/ML
.1-.4 INJECTION, SOLUTION INTRAMUSCULAR; INTRAVENOUS; SUBCUTANEOUS
Status: CANCELLED | OUTPATIENT
Start: 2019-06-18 | End: 2019-06-19

## 2019-06-19 ENCOUNTER — TELEPHONE (OUTPATIENT)
Dept: GASTROENTEROLOGY | Facility: CLINIC | Age: 79
End: 2019-06-19

## 2019-06-19 DIAGNOSIS — K51.20 ULCERATIVE PROCTITIS WITHOUT COMPLICATION (H): Primary | ICD-10-CM

## 2019-06-19 NOTE — TELEPHONE ENCOUNTER
[facility] Call Center    Phone Message    May a detailed message be left on voicemail: yes    Reason for Call: Other: Pharmacy calling because pt stated she needs a prescription for a supposetory but she does not know the name, please send to pharmacy or call with questions    [id number] - [place], [place] - [address] [place] [address] [phone] (Phone)  [phone] (Fax)         Action Taken: Message routed to:  [facility]

## 2019-06-20 RX ORDER — MESALAMINE 1000 MG/1
1000 SUPPOSITORY RECTAL 2 TIMES DAILY
Qty: 60 SUPPOSITORY | Refills: 3 | Status: SHIPPED | OUTPATIENT
Start: 2019-06-20 | End: 2020-10-05

## 2019-06-21 ENCOUNTER — PATIENT OUTREACH (OUTPATIENT)
Dept: GASTROENTEROLOGY | Facility: CLINIC | Age: 79
End: 2019-06-21

## 2019-06-21 DIAGNOSIS — K51.20 ULCERATIVE PROCTITIS WITHOUT COMPLICATION (H): Primary | ICD-10-CM

## 2019-06-21 RX ORDER — MESALAMINE 4 G/60ML
SUSPENSION RECTAL
Qty: 30 KIT | Refills: 3 | Status: SHIPPED | OUTPATIENT
Start: 2019-06-21 | End: 2020-10-05

## 2019-06-21 NOTE — PROGRESS NOTES
Patient called to say she can not afford the canasa  suppository as out of pocket costs is 500. Would like to see if she can go foam or do nothing as she feels her symptoms are minimal.  Will route to Dr. Rea and Ms. Danielle

## 2019-06-21 NOTE — PROGRESS NOTES
Patient is going to hold off on therapy for now.  If patient becomes symptomatic she will start the Rowasa enemas. Order sent to the patient's pharmacy for the Rowasa enemas

## 2019-06-22 LAB — COPATH REPORT: NORMAL

## 2019-06-24 DIAGNOSIS — I10 BENIGN ESSENTIAL HYPERTENSION: Primary | ICD-10-CM

## 2019-06-27 RX ORDER — VALSARTAN AND HYDROCHLOROTHIAZIDE 160; 25 MG/1; MG/1
1 TABLET ORAL DAILY
Qty: 90 TABLET | Refills: 3 | Status: SHIPPED | OUTPATIENT
Start: 2019-06-27 | End: 2020-06-23

## 2019-07-03 ENCOUNTER — TRANSFERRED RECORDS (OUTPATIENT)
Dept: HEALTH INFORMATION MANAGEMENT | Facility: CLINIC | Age: 79
End: 2019-07-03

## 2019-07-08 ENCOUNTER — TELEPHONE (OUTPATIENT)
Dept: GASTROENTEROLOGY | Facility: CLINIC | Age: 79
End: 2019-07-08

## 2019-07-08 NOTE — TELEPHONE ENCOUNTER
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

## 2019-07-11 NOTE — TELEPHONE ENCOUNTER
Prior Authorization Approval    Authorization Effective Date: 4/12/2019  Authorization Expiration Date: 7/10/2020  Medication: mesalamine (CANASA) 1000 MG suppository - APPROVED  Approved Dose/Quantity:   Reference #:     Insurance Company: [employer] Phone [phone] Fax [phone]  Expected CoPay:       CoPay Card Available:      Foundation Assistance Needed:    Which Pharmacy is filling the prescription (Not needed for infusion/clinic administered): [id number] - [place], [place] - [address] [place] [address]  Pharmacy Notified: Yes  Patient Notified: Comment:  **Instructed pharmacy to notify patient when script is ready to /ship.**

## 2019-07-11 NOTE — TELEPHONE ENCOUNTER
PA Initiation    Medication: mesalamine (CANASA) 1000 MG suppository -   Insurance Company: Medicare Blue - Phone [phone] Fax [phone]  Pharmacy Filling the Rx: [facility][id number] - [place], [place] - [address] [place] [address]  Filling Pharmacy Phone: [phone]  Filling Pharmacy Fax: [phone]  Start Date: 7/11/2019

## 2019-08-23 ENCOUNTER — TELEPHONE (OUTPATIENT)
Dept: INTERNAL MEDICINE | Facility: CLINIC | Age: 79
End: 2019-08-23

## 2019-08-23 DIAGNOSIS — I10 BENIGN ESSENTIAL HYPERTENSION: ICD-10-CM

## 2019-08-27 RX ORDER — GABAPENTIN 100 MG/1
200 CAPSULE ORAL AT BEDTIME
Qty: 180 CAPSULE | Refills: 0 | Status: SHIPPED | OUTPATIENT
Start: 2019-08-27 | End: 2019-11-18

## 2019-08-27 NOTE — TELEPHONE ENCOUNTER
gabapentin (NEURONTIN) 100 MG capsule  Last Written Prescription Date:  5/23/19  Last Fill Quantity: 180,   # refills: 0  Last Office Visit : 5/3/19  Future Office visit:  none    Routing refill request to provider for review/approval because:  Drug not on the [facility], [facility] or [facility] refill protocol or controlled substance

## 2019-09-30 ENCOUNTER — HEALTH MAINTENANCE LETTER (OUTPATIENT)
Age: 79
End: 2019-09-30

## 2019-11-18 DIAGNOSIS — I10 BENIGN ESSENTIAL HYPERTENSION: ICD-10-CM

## 2019-11-19 RX ORDER — GABAPENTIN 100 MG/1
CAPSULE ORAL
Qty: 180 CAPSULE | Refills: 0 | Status: SHIPPED | OUTPATIENT
Start: 2019-11-19 | End: 2019-11-20

## 2019-11-19 NOTE — TELEPHONE ENCOUNTER
gabapentin (NEURONTIN) 100 MG capsule      Last Written Prescription Date:  8-27-19  Last Fill Quantity: 180,   # refills: 0  Last Office Visit : 5-3-19  Future Office visit:  none    Routing refill request to provider for review/approval because:  Not on protocol    Kathleen M Doege RN

## 2019-11-20 DIAGNOSIS — I10 BENIGN ESSENTIAL HYPERTENSION: ICD-10-CM

## 2019-11-20 NOTE — TELEPHONE ENCOUNTER
[facility] Call Center    Phone Message    May a detailed message be left on voicemail: yes    Reason for Call: Other:   Pt is leaving town on Friday and will be in and out of town. Pt is unsure of date yet but pt made an appt with linette in January. Pt would like rx, Gabapentin     Please fill this rx to [facility] #[id number] - [place], [place] - [address]    Pt leaving town this Friday and would like this rx before she leaves.     Pharmacy Ph#: [phone]    Action Taken: Message routed to:  [facility] primary      RX for Neurontin faxed to pt pharmacy.  Rachel Aleman RN 7:31 AM on 11/26/2019.

## 2019-11-21 RX ORDER — GABAPENTIN 100 MG/1
CAPSULE ORAL
Qty: 180 CAPSULE | Refills: 0 | Status: SHIPPED | OUTPATIENT
Start: 2019-11-21 | End: 2020-02-19

## 2020-01-20 ASSESSMENT — ENCOUNTER SYMPTOMS
SYNCOPE: 0
LEG PAIN: 1
STIFFNESS: 1
EXERCISE INTOLERANCE: 0
JOINT SWELLING: 0
MUSCLE CRAMPS: 1
NECK PAIN: 1
BACK PAIN: 1
LIGHT-HEADEDNESS: 1
SLEEP DISTURBANCES DUE TO BREATHING: 0
PALPITATIONS: 0
ORTHOPNEA: 0
HYPOTENSION: 1
MUSCLE WEAKNESS: 0
ARTHRALGIAS: 0
HYPERTENSION: 0
MYALGIAS: 1

## 2020-01-29 ENCOUNTER — OFFICE VISIT (OUTPATIENT)
Dept: INTERNAL MEDICINE | Facility: CLINIC | Age: 80
End: 2020-01-29
Payer: MEDICARE

## 2020-01-29 VITALS
HEART RATE: 53 BPM | WEIGHT: 143.6 LBS | BODY MASS INDEX: 23.08 KG/M2 | OXYGEN SATURATION: 98 % | DIASTOLIC BLOOD PRESSURE: 71 MMHG | HEIGHT: 66 IN | SYSTOLIC BLOOD PRESSURE: 132 MMHG | RESPIRATION RATE: 15 BRPM | TEMPERATURE: 98.4 F

## 2020-01-29 DIAGNOSIS — R10.31 BILATERAL GROIN PAIN: ICD-10-CM

## 2020-01-29 DIAGNOSIS — E55.9 VITAMIN D DEFICIENCY: ICD-10-CM

## 2020-01-29 DIAGNOSIS — R10.32 BILATERAL GROIN PAIN: ICD-10-CM

## 2020-01-29 DIAGNOSIS — Z13.220 SCREENING CHOLESTEROL LEVEL: Primary | ICD-10-CM

## 2020-01-29 DIAGNOSIS — Z13.220 SCREENING CHOLESTEROL LEVEL: ICD-10-CM

## 2020-01-29 LAB
ALBUMIN SERPL-MCNC: 3.6 G/DL (ref 3.4–5)
ALP SERPL-CCNC: 59 U/L (ref 40–150)
ALT SERPL W P-5'-P-CCNC: 27 U/L (ref 0–50)
ANION GAP SERPL CALCULATED.3IONS-SCNC: 2 MMOL/L (ref 3–14)
AST SERPL W P-5'-P-CCNC: 16 U/L (ref 0–45)
BASOPHILS # BLD AUTO: 0.1 10E9/L (ref 0–0.2)
BASOPHILS NFR BLD AUTO: 0.6 %
BILIRUB SERPL-MCNC: 0.5 MG/DL (ref 0.2–1.3)
BUN SERPL-MCNC: 17 MG/DL (ref 7–30)
CALCIUM SERPL-MCNC: 9.1 MG/DL (ref 8.5–10.1)
CHLORIDE SERPL-SCNC: 104 MMOL/L (ref 94–109)
CHOLEST SERPL-MCNC: 182 MG/DL
CO2 SERPL-SCNC: 32 MMOL/L (ref 20–32)
CREAT SERPL-MCNC: 0.77 MG/DL (ref 0.52–1.04)
CRP SERPL-MCNC: 8.3 MG/L (ref 0–8)
DEPRECATED CALCIDIOL+CALCIFEROL SERPL-MC: 52 UG/L (ref 20–75)
DIFFERENTIAL METHOD BLD: NORMAL
EOSINOPHIL # BLD AUTO: 0.3 10E9/L (ref 0–0.7)
EOSINOPHIL NFR BLD AUTO: 3 %
ERYTHROCYTE [DISTWIDTH] IN BLOOD BY AUTOMATED COUNT: 12.6 % (ref 10–15)
ERYTHROCYTE [SEDIMENTATION RATE] IN BLOOD BY WESTERGREN METHOD: 9 MM/H (ref 0–30)
GFR SERPL CREATININE-BSD FRML MDRD: 73 ML/MIN/{1.73_M2}
GLUCOSE SERPL-MCNC: 94 MG/DL (ref 70–99)
HCT VFR BLD AUTO: 43.6 % (ref 35–47)
HDLC SERPL-MCNC: 52 MG/DL
HGB BLD-MCNC: 14.2 G/DL (ref 11.7–15.7)
IMM GRANULOCYTES # BLD: 0 10E9/L (ref 0–0.4)
IMM GRANULOCYTES NFR BLD: 0.5 %
LDLC SERPL CALC-MCNC: 102 MG/DL
LYMPHOCYTES # BLD AUTO: 2.3 10E9/L (ref 0.8–5.3)
LYMPHOCYTES NFR BLD AUTO: 25.8 %
MCH RBC QN AUTO: 30.8 PG (ref 26.5–33)
MCHC RBC AUTO-ENTMCNC: 32.6 G/DL (ref 31.5–36.5)
MCV RBC AUTO: 95 FL (ref 78–100)
MONOCYTES # BLD AUTO: 0.8 10E9/L (ref 0–1.3)
MONOCYTES NFR BLD AUTO: 9.4 %
NEUTROPHILS # BLD AUTO: 5.3 10E9/L (ref 1.6–8.3)
NEUTROPHILS NFR BLD AUTO: 60.7 %
NONHDLC SERPL-MCNC: 130 MG/DL
NRBC # BLD AUTO: 0 10*3/UL
NRBC BLD AUTO-RTO: 0 /100
PLATELET # BLD AUTO: 367 10E9/L (ref 150–450)
POTASSIUM SERPL-SCNC: 4.1 MMOL/L (ref 3.4–5.3)
PROT SERPL-MCNC: 7.2 G/DL (ref 6.8–8.8)
RBC # BLD AUTO: 4.61 10E12/L (ref 3.8–5.2)
SODIUM SERPL-SCNC: 138 MMOL/L (ref 133–144)
TRIGL SERPL-MCNC: 141 MG/DL
WBC # BLD AUTO: 8.8 10E9/L (ref 4–11)

## 2020-01-29 PROCEDURE — 82306 VITAMIN D 25 HYDROXY: CPT | Performed by: INTERNAL MEDICINE

## 2020-01-29 ASSESSMENT — MIFFLIN-ST. JEOR: SCORE: 1130.18

## 2020-01-29 ASSESSMENT — PAIN SCALES - GENERAL: PAINLEVEL: SEVERE PAIN (7)

## 2020-01-29 NOTE — PROGRESS NOTES
"HPI:    Pt. Comes in for physical today today. She has a few months of B groin pain worse L than R. She denies any B hip pain. No urinary complaints. She fell on her B knees a few days ago.   Her mother had GCA and probably had CVA from this. She does not smoke nor abuse EtOH. She had \"costochondritis\" about two years ago and started taking OTC Relief Factor Supplementation. She has no further sxs. She had central chest pain more than one month ago but no further sxs. And continues to exercise w/o problems. She had chronic L upper quadrant pain for many years (unpredictable and may get several times a day). She had CT abdomen/pelvis 2014 unremarkable. She may have had EGD as well. No report of PPI/H2 blocker changing sxs. She has h/o PMR and treated with prednisone in the past and no current sxs. She has h/o renal stones and had lithotripsy in [place] several years ago; no current sxs. Today no other HEENT, cardiopulmonary, abdominal, , GYN, neurological, systemic, lymphatic, endocrine complaints.     Past Medical History:   Diagnosis Date     Hypertension      Polymyalgia rheumatica (H)      Past Surgical History:   Procedure Laterality Date     COLONOSCOPY  2014    Procedure: COMBINED COLONOSCOPY, SINGLE BIOPSY/POLYPECTOMY BY BIOPSY;  COLONOSCOPY;  Surgeon: Carol Ann Plasencia MD;  Location:  GI     ENT SURGERY      tonsilectomy     GYN SURGERY       x 2     ORTHOPEDIC SURGERY      (R) shoulder surgery for frozen shoulder     PE:    Vitals noted gen nad cooperative alert, HEENT, B ears normal oropharynx clear no exudate, neck supple nl rom, no B carotid bruits, no masses, LCTA, B, good air movement, RRR, S1, S2, no MRG, abdomen, nt, nd no masses, positive BS, grossly normal neurological exam. B knees w/o tenderness, no swelling and good ROM. No B hip tenderness to palpation. No B groin tenderness nor masses. Good ROM B hips.     EKG 2018; SR at 52; no acute findings.    B Carotid U/S " 1/22/2018:  no significant findings    CT coronary angiogram 4/4/2017: mild non-obstructive coronary artery disease    Exercise Echo stress 3/28/2017: Post stress; normal LVF and size, ischemic EKG changes, negative stress echo for ischemia          A/P:    1. Immunizations:  She got this year's influenza vaccination. She has completed the Shingrex vaccination series.   2. HTN; stable today and will check lab today  3. Mammogram done 10/24/2019 (in Care Everywhere)  4. Ulcerative proctitis. Seen by Dr. Beal, GI 1/31/2017 and remains on mesalamine suppositories. Currently no sxs. She was seen again by Dr. Rea 5/14/2019.   5. Dermatology. She had leg lesion that may have been melanoma and follows routinely with dermatology   6. DEXA scan; 10/8/2014  7. Increased cholesterol check labs today and she will restart low dose Crestor (5 mg  Three times a week)  8. H/o PMR; no current sxs. And she has not been on prednisone for about 2 years. She states she follows with outside Rheumatology and last seen 7/3/2019 (note scanned into system).  Ordered ESR and CRP today  9. Chronic L upper quadrant pain; She had CT imaging 9/30/2014 for this and  Unremarkable; will rescan  10. Colonoscopy 4/8/2014?  11. Will get labs and abdominal/pelvc CT imaging and follow up regarding groin pain.

## 2020-01-29 NOTE — NURSING NOTE
Chief Complaint   Patient presents with     Physical       Reinaldo Newell CMA (AAMA) at 9:46 AM on 1/29/2020

## 2020-02-06 ENCOUNTER — ANCILLARY PROCEDURE (OUTPATIENT)
Dept: CT IMAGING | Facility: CLINIC | Age: 80
End: 2020-02-06
Attending: INTERNAL MEDICINE
Payer: MEDICARE

## 2020-02-06 DIAGNOSIS — E55.9 VITAMIN D DEFICIENCY: ICD-10-CM

## 2020-02-06 DIAGNOSIS — R10.32 BILATERAL GROIN PAIN: ICD-10-CM

## 2020-02-06 DIAGNOSIS — Z13.220 SCREENING CHOLESTEROL LEVEL: ICD-10-CM

## 2020-02-06 DIAGNOSIS — R10.31 BILATERAL GROIN PAIN: ICD-10-CM

## 2020-02-06 RX ORDER — IOPAMIDOL 755 MG/ML
88 INJECTION, SOLUTION INTRAVASCULAR ONCE
Status: COMPLETED | OUTPATIENT
Start: 2020-02-06 | End: 2020-02-06

## 2020-02-06 RX ADMIN — IOPAMIDOL 88 ML: 755 INJECTION, SOLUTION INTRAVASCULAR at 14:45

## 2020-02-06 NOTE — DISCHARGE INSTRUCTIONS

## 2020-02-12 ENCOUNTER — OFFICE VISIT (OUTPATIENT)
Dept: INTERNAL MEDICINE | Facility: CLINIC | Age: 80
End: 2020-02-12
Payer: MEDICARE

## 2020-02-12 VITALS
HEART RATE: 54 BPM | BODY MASS INDEX: 23.57 KG/M2 | DIASTOLIC BLOOD PRESSURE: 66 MMHG | RESPIRATION RATE: 16 BRPM | SYSTOLIC BLOOD PRESSURE: 131 MMHG | WEIGHT: 143.8 LBS

## 2020-02-12 DIAGNOSIS — I87.2 VENOUS (PERIPHERAL) INSUFFICIENCY: Primary | ICD-10-CM

## 2020-02-12 ASSESSMENT — PAIN SCALES - GENERAL: PAINLEVEL: SEVERE PAIN (6)

## 2020-02-12 NOTE — NURSING NOTE
Chief Complaint   Patient presents with     Results     follow up on CT and lab result       Reinaldo Newell CMA (AAMA) at 1:24 PM on 2/12/2020

## 2020-02-12 NOTE — PROGRESS NOTES
"HPI:    Pt. Comes in for follow up  today. I saw her last 2020. She has a few months of B groin pain worse L than R. She denies any B hip pain. No urinary complaints. She fell on her B knees a few days ago.   Her mother had GCA and probably had CVA from this. She does not smoke nor abuse EtOH. She had \"costochondritis\" about two years ago and started taking OTC Relief Factor Supplementation. She has no further sxs. She had central chest pain more than one month ago but no further sxs. And continues to exercise w/o problems. She had chronic L upper quadrant pain for many years (unpredictable and may get several times a day). She had CT abdomen/pelvis 2014 unremarkable. She may have had EGD as well. No report of PPI/H2 blocker changing sxs. She has h/o PMR and treated with prednisone in the past and no current sxs. She has h/o renal stones and had lithotripsy in [place] several years ago; no current sxs. Today no other HEENT, cardiopulmonary, abdominal, , GYN, neurological, systemic, lymphatic, endocrine complaints.     Past Medical History:   Diagnosis Date     Hypertension      Polymyalgia rheumatica (H)      Past Surgical History:   Procedure Laterality Date     COLONOSCOPY  2014    Procedure: COMBINED COLONOSCOPY, SINGLE BIOPSY/POLYPECTOMY BY BIOPSY;  COLONOSCOPY;  Surgeon: Carol Ann Plasencia MD;  Location:  GI     ENT SURGERY      tonsilectomy     GYN SURGERY       x 2     ORTHOPEDIC SURGERY      (R) shoulder surgery for frozen shoulder     PE:    Vitals noted gen nad cooperative alert, HEENT, B ears normal oropharynx clear no exudate, neck supple nl rom, no B carotid bruits, no masses, LCTA, B, good air movement, RRR, S1, S2, no MRG, abdomen, nt, nd no masses, positive BS, grossly normal neurological exam. B knees w/o tenderness, no swelling and good ROM. No B hip tenderness to palpation. No B groin tenderness nor masses. Good ROM B hips.     EKG 2018; SR at 52; no acute " findings.    B Carotid U/S 1/22/2018:  no significant findings    CT coronary angiogram 4/4/2017: mild non-obstructive coronary artery disease    Exercise Echo stress 3/28/2017: Post stress; normal LVF and size, ischemic EKG changes, negative stress echo for ischemia          A/P:    1. Immunizations:  She got this year's influenza vaccination. She has completed the Shingrex vaccination series.   2. HTN; stable today and will check lab today  3. Mammogram done 10/24/2019 (in Care Everywhere)  4. Ulcerative proctitis. Seen by Dr. Beal, GI 1/31/2017 and remains on mesalamine suppositories. Currently no sxs. She was seen again by Dr. Rea 5/14/2019.   5. Dermatology. She had leg lesion that may have been melanoma and follows routinely with dermatology   6. DEXA scan; 10/8/2014  7. Increased cholesterol check labs today and she will restart low dose Crestor (5 mg  Three times a week)  8. H/o PMR; no current sxs. And she has not been on prednisone for about 2 years. She states she follows with outside Rheumatology and last seen 7/3/2019 (note scanned into system).  Ordered ESR and CRP today  9. Chronic L upper quadrant pain; She had CT imaging 9/30/2014 for this and  Unremarkable; will rescan  10. Colonoscopy 4/8/2014?  11. For L groin pain; Debra had abodominal/pelvic CT imaging 2/6/2020 showing a small R sided hernia. Stable L renal stone and adrenal adenoma. Will get MRI for adrenal adenoma and consider testing for functional adrenal mass. Recommended she consider MRI imaging of L hip and/or Lumbar spine. She wants to wait on this.     Total time spent 25 minutes.  More than 50% of the time spent with Ms. Denise on counseling / coordinating her care

## 2020-02-13 ENCOUNTER — TELEPHONE (OUTPATIENT)
Dept: INTERNAL MEDICINE | Facility: CLINIC | Age: 80
End: 2020-02-13

## 2020-02-13 DIAGNOSIS — G89.29 OTHER CHRONIC BACK PAIN: ICD-10-CM

## 2020-02-13 DIAGNOSIS — M54.2 NECK PAIN: ICD-10-CM

## 2020-02-13 DIAGNOSIS — E27.9 ADRENAL NODULE (H): Primary | ICD-10-CM

## 2020-02-13 DIAGNOSIS — M54.89 OTHER CHRONIC BACK PAIN: ICD-10-CM

## 2020-02-13 NOTE — TELEPHONE ENCOUNTER
[facility] Call Center    Phone Message    May a detailed message be left on voicemail: yes     Reason for Call: Order(s): Other:   Reason for requested: MRI for full back because pt had a fall. Pt is okay if Dr. Zimmerman wants to include the neck.   Date needed: Please call pt once it's been place  Provider name: Dr. Zimmerman      Action Taken: Message routed to:  [facility]    Travel Screening: Not Applicable

## 2020-02-16 DIAGNOSIS — I10 BENIGN ESSENTIAL HYPERTENSION: ICD-10-CM

## 2020-02-16 NOTE — TELEPHONE ENCOUNTER
Dear Rachel;    Please see results note I sent to Dia.     I ordered labs this encounter for adrenal nodule     I ordered three MRI's: (1) abdominal for adrenal nodule, (2) cervical MRI for neck pain, and (3) lumbar MRI for back pain    She is aware of these issues.    Please help coordinate    RUTH Coates    Pt called and plan of care for MRI'S and labs. Appt with Dr Zimmerman on March 3rd at 11:35pm. Clinic numbers given for questions or concerns.  Rachel Aleman RN 4:31 PM on 2/17/2020.          Dear Dia;    The results of your abdominal CT scan are attached and as I mentioned you have most likely a benign adrenal nodule. I recommend some additional laboratory tests and an MRI scan. Also my nurse Rachel will coordinate cervical and lumbar MRI imaging for your neck and back pain.     RUTH Zimmerman MD    Left message for pt to call back.  Rachel Aleman RN 11:49 AM on 2/17/2020.

## 2020-02-17 NOTE — TELEPHONE ENCOUNTER
[facility] Call Center    Phone Message    May a detailed message be left on voicemail: yes     Reason for Call: Other: pt is calling back to speak with jd. please call pt thanks     Action Taken: Message routed to:  [facility]    Travel Screening: Not Applicable

## 2020-02-17 NOTE — TELEPHONE ENCOUNTER
[facility] Call Center    Phone Message    May a detailed message be left on voicemail: yes     Reason for Call: Other: pt calling Rachel back. Please call pt back     Action Taken: Message routed to:  [facility] Primary care    Travel Screening: Not Applicable

## 2020-02-18 ENCOUNTER — TELEPHONE (OUTPATIENT)
Dept: INTERNAL MEDICINE | Facility: CLINIC | Age: 80
End: 2020-02-18

## 2020-02-18 DIAGNOSIS — F41.9 ANXIETY: Primary | ICD-10-CM

## 2020-02-18 RX ORDER — LORAZEPAM 0.5 MG/1
TABLET ORAL
Qty: 3 TABLET | Refills: 0 | Status: SHIPPED | OUTPATIENT
Start: 2020-02-18 | End: 2020-03-03

## 2020-02-18 NOTE — TELEPHONE ENCOUNTER
Discussed with Rachel Zimmerman    RX for Ativan to be taken before MRI's on Feb 23rd and March 1st. Signed by Dr Zimmerman.  RX faxed to [facility] @[phone].  Rachel Aleman RN 2:49 PM on 2/18/2020.

## 2020-02-18 NOTE — TELEPHONE ENCOUNTER
[facility] Call Center    Phone Message    May a detailed message be left on voicemail: yes     Reason for Call: Other: PT states she has appointments for MRI's coming up and is requesting a sedative for them.  Her first appointment is this Sunday.  PT is requesting a call back from Rachel.      Action Taken: Message routed to:  [facility]    Travel Screening: Not Applicable                    RX for Ativan signed and faxed to [facility].  Rachel Aleman RN 2:42 PM on 2/18/2020.

## 2020-02-18 NOTE — TELEPHONE ENCOUNTER
[facility] Call Center    Phone Message    May a detailed message be left on voicemail: yes     Reason for Call: Other: The patient tried calling the clinic back to address concerns about Sedative for the upcoming MRI appointments, please call her back to address concerns thank you.     Action Taken: Message routed to:  [facility]    Travel Screening: Not Applicable

## 2020-02-19 RX ORDER — GABAPENTIN 100 MG/1
CAPSULE ORAL
Qty: 180 CAPSULE | Refills: 0 | Status: SHIPPED | OUTPATIENT
Start: 2020-02-19 | End: 2020-05-15

## 2020-02-19 NOTE — TELEPHONE ENCOUNTER
gabapentin (NEURONTIN) 100 MG capsule      Last Written Prescription Date:  11-21-19  Last Fill Quantity: 180,   # refills: 0  Last Office Visit : 2-  Future Office visit:  3-3-2020    Routing refill request to provider for review/approval because:  Not on protocol.       Kathleen M Doege RN

## 2020-02-23 ENCOUNTER — ANCILLARY PROCEDURE (OUTPATIENT)
Dept: MRI IMAGING | Facility: CLINIC | Age: 80
End: 2020-02-23
Attending: INTERNAL MEDICINE
Payer: MEDICARE

## 2020-02-23 DIAGNOSIS — M54.89 OTHER CHRONIC BACK PAIN: ICD-10-CM

## 2020-02-23 DIAGNOSIS — E27.9 ADRENAL NODULE (H): ICD-10-CM

## 2020-02-23 DIAGNOSIS — M54.2 NECK PAIN: ICD-10-CM

## 2020-02-23 DIAGNOSIS — G89.29 OTHER CHRONIC BACK PAIN: ICD-10-CM

## 2020-02-24 DIAGNOSIS — E27.9 ADRENAL NODULE (H): Primary | ICD-10-CM

## 2020-02-24 DIAGNOSIS — G89.29 OTHER CHRONIC BACK PAIN: ICD-10-CM

## 2020-02-24 DIAGNOSIS — M54.2 NECK PAIN: ICD-10-CM

## 2020-02-24 DIAGNOSIS — M54.89 OTHER CHRONIC BACK PAIN: ICD-10-CM

## 2020-02-24 LAB
RADIOLOGIST FLAGS: NORMAL
RADIOLOGIST FLAGS: NORMAL

## 2020-02-24 PROCEDURE — 82088 ASSAY OF ALDOSTERONE: CPT | Performed by: INTERNAL MEDICINE

## 2020-02-25 LAB — ALDOST SERPL-MCNC: 3.4 NG/DL (ref 0–31)

## 2020-02-26 LAB
ANNOTATION COMMENT IMP: NORMAL
METANEPHS SERPL-SCNC: 0.15 NMOL/L (ref 0–0.49)
NORMETANEPHRINE SERPL-SCNC: 0.62 NMOL/L (ref 0–0.89)

## 2020-02-28 ENCOUNTER — TELEPHONE (OUTPATIENT)
Dept: INTERNAL MEDICINE | Facility: CLINIC | Age: 80
End: 2020-02-28

## 2020-02-28 DIAGNOSIS — M54.89 OTHER CHRONIC BACK PAIN: ICD-10-CM

## 2020-02-28 DIAGNOSIS — G89.29 OTHER CHRONIC BACK PAIN: ICD-10-CM

## 2020-02-28 DIAGNOSIS — R82.90 ABNORMAL URINE: Primary | ICD-10-CM

## 2020-02-28 DIAGNOSIS — R82.90 ABNORMAL URINE: ICD-10-CM

## 2020-02-28 DIAGNOSIS — E27.9 ADRENAL NODULE (H): ICD-10-CM

## 2020-02-28 DIAGNOSIS — M54.2 NECK PAIN: ICD-10-CM

## 2020-02-28 LAB
COLLECT DURATION TIME UR: 24 H
SPECIMEN VOL UR: 2140 ML

## 2020-03-01 ENCOUNTER — ANCILLARY PROCEDURE (OUTPATIENT)
Dept: MRI IMAGING | Facility: CLINIC | Age: 80
End: 2020-03-01
Attending: INTERNAL MEDICINE
Payer: MEDICARE

## 2020-03-01 DIAGNOSIS — M54.89 OTHER CHRONIC BACK PAIN: ICD-10-CM

## 2020-03-01 DIAGNOSIS — E27.9 ADRENAL NODULE (H): ICD-10-CM

## 2020-03-01 DIAGNOSIS — G89.29 OTHER CHRONIC BACK PAIN: ICD-10-CM

## 2020-03-01 DIAGNOSIS — M54.2 NECK PAIN: ICD-10-CM

## 2020-03-01 LAB — RENIN PLAS-CCNC: 73.8 NG/ML/HR

## 2020-03-01 RX ORDER — GADOBUTROL 604.72 MG/ML
7.5 INJECTION INTRAVENOUS ONCE
Status: COMPLETED | OUTPATIENT
Start: 2020-03-01 | End: 2020-03-01

## 2020-03-01 RX ADMIN — GADOBUTROL 6.5 ML: 604.72 INJECTION INTRAVENOUS at 10:41

## 2020-03-03 ENCOUNTER — OFFICE VISIT (OUTPATIENT)
Dept: INTERNAL MEDICINE | Facility: CLINIC | Age: 80
End: 2020-03-03
Payer: MEDICARE

## 2020-03-03 VITALS
DIASTOLIC BLOOD PRESSURE: 65 MMHG | HEART RATE: 56 BPM | OXYGEN SATURATION: 99 % | SYSTOLIC BLOOD PRESSURE: 139 MMHG | BODY MASS INDEX: 23.43 KG/M2 | WEIGHT: 143 LBS

## 2020-03-03 DIAGNOSIS — F41.9 ANXIETY: ICD-10-CM

## 2020-03-03 DIAGNOSIS — M54.2 NECK PAIN: Primary | ICD-10-CM

## 2020-03-03 LAB
COLLECT DURATION TIME SPEC: 24 H
COLLECT DURATION TIME UR: 24 H
CORTIS 24H UR-MRATE: 12 MCG/24 H (ref 3.5–45)
CORTISONE 24H UR-MRATE: 68 MCG/24 H (ref 17–129)
CREAT 24H UR-MRATE: 899 MG/D (ref 500–1400)
CREAT UR-MCNC: 42 MG/DL
METANEPH 24H UR-MCNC: 74 UG/L
METANEPH 24H UR-MRATE: 158 UG/D (ref 36–229)
METANEPH+NORMETANEPH UR-IMP: NORMAL
METANEPH/CREAT 24H UR: 176 UG/G CRT (ref 0–300)
NORMETANEPHRINE 24H UR-MCNC: 148 UG/L
NORMETANEPHRINE 24H UR-MRATE: 317 UG/D (ref 95–650)
NORMETANEPHRINE/CREAT 24H UR: 352 UG/G CRT (ref 0–400)
SPECIMEN VOL 24H UR: 2140 ML
SPECIMEN VOL ?TM UR: 2140 ML

## 2020-03-03 RX ORDER — LORAZEPAM 0.5 MG/1
TABLET ORAL
Qty: 3 TABLET | Refills: 0 | Status: SHIPPED | OUTPATIENT
Start: 2020-03-03 | End: 2020-09-19

## 2020-03-03 ASSESSMENT — PAIN SCALES - GENERAL: PAINLEVEL: NO PAIN (0)

## 2020-03-03 NOTE — PATIENT INSTRUCTIONS
[facility] Medication Refill Request Information:  * Please contact your pharmacy regarding ANY request for medication refills.  ** [facility] Prescription Fax = [phone]  * Please allow 3 business days for routine medication refills.  * Please allow 5 business days for controlled substance medication refills.     [facility] Test Result notification information:  *You will be notified with in 7-10 days of your appointment day regarding the results of your test.  If you are on MyChart you will be notified as soon as the provider has reviewed the results and signed off on them.    [facility]: [phone]

## 2020-03-03 NOTE — NURSING NOTE
Chief Complaint   Patient presents with     Results     pt here to discuss lab and MRI results       Jeffrey Esqueda CMA, EMT at 11:29 AM on 3/3/2020.

## 2020-03-03 NOTE — PROGRESS NOTES
"HPI:    Pt. Comes in for follow up  today. I saw her last 2020. She has a few months of B groin pain worse L than R. She denies any B hip pain. No urinary complaints. She fell on her B knees a few days ago.   Her mother had GCA and probably had CVA from this. She does not smoke nor abuse EtOH. She had \"costochondritis\" about two years ago and started taking OTC Relief Factor Supplementation. She has no further sxs. She had central chest pain more than one month ago but no further sxs. And continues to exercise w/o problems. She had chronic L upper quadrant pain for many years (unpredictable and may get several times a day). She had CT abdomen/pelvis 2014 unremarkable. She may have had EGD as well. No report of PPI/H2 blocker changing sxs. She has h/o PMR and treated with prednisone in the past and no current sxs. She has h/o renal stones and had lithotripsy in [place] several years ago; no current sxs. Today no other HEENT, cardiopulmonary, abdominal, , GYN, neurological, systemic, lymphatic, endocrine complaints.     Past Medical History:   Diagnosis Date     Hypertension      Polymyalgia rheumatica (H)      Past Surgical History:   Procedure Laterality Date     COLONOSCOPY  2014    Procedure: COMBINED COLONOSCOPY, SINGLE BIOPSY/POLYPECTOMY BY BIOPSY;  COLONOSCOPY;  Surgeon: Carol Ann Plasencia MD;  Location:  GI     ENT SURGERY      tonsilectomy     GYN SURGERY       x 2     ORTHOPEDIC SURGERY      (R) shoulder surgery for frozen shoulder     PE:    Vitals noted gen nad cooperative alert, HEENT, B ears normal oropharynx clear no exudate, neck supple nl rom, no B carotid bruits, no masses, LCTA, B, good air movement, RRR, S1, S2, no MRG, abdomen, nt, nd no masses, positive BS, grossly normal neurological exam. B knees w/o tenderness, no swelling and good ROM. No B hip tenderness to palpation. No B groin tenderness nor masses. Good ROM B hips.     EKG 2018; SR at 52; no acute " findings.    B Carotid U/S 1/22/2018:  no significant findings    CT coronary angiogram 4/4/2017: mild non-obstructive coronary artery disease    Exercise Echo stress 3/28/2017: Post stress; normal LVF and size, ischemic EKG changes, negative stress echo for ischemia          A/P:    1. Immunizations:  She got this year's influenza vaccination. She has completed the Shingrex vaccination series.   2. HTN; stable today and will check lab today  3. Mammogram done 10/24/2019 (in Care Everywhere)  4. Ulcerative proctitis. Seen by Dr. Beal, GI 1/31/2017 and remains on mesalamine suppositories. Currently no sxs. She was seen again by Dr. Rea 5/14/2019.   5. Dermatology. She had leg lesion that may have been melanoma and follows routinely with dermatology   6. DEXA scan; 10/8/2014  7. Increased cholesterol check labs today and she will restart low dose Crestor (5 mg  Three times a week)  8. H/o PMR; no current sxs. And she has not been on prednisone for about 2 years. She states she follows with outside Rheumatology and last seen 7/3/2019 (note scanned into system).  Ordered ESR and CRP today  9. Chronic L upper quadrant pain; She had CT imaging 9/30/2014 for this and  Unremarkable; will rescan  10. Colonoscopy 4/8/2014?  11. For L groin pain; Debra had abodominal/pelvic CT imaging 2/6/2020 showing a small R sided hernia. Stable L renal stone and adrenal adenoma.  12. MRI adrenal 3/1/2020 suggests a benign adenoma  13. Abnormal findings on cervical and lumbar MRI's. Ordered cervical, thoracic, and lumbar MRI imaging with contrast.     Total time spent 40 minutes.  More than 50% of the time spent with Ms. Denise on counseling / coordinating her care

## 2020-03-05 ENCOUNTER — TELEPHONE (OUTPATIENT)
Dept: INTERNAL MEDICINE | Facility: CLINIC | Age: 80
End: 2020-03-05

## 2020-03-05 NOTE — TELEPHONE ENCOUNTER
[facility] Call Center    Phone Message    May a detailed message be left on voicemail: yes     Reason for Call: Other: Pt requesting call back from Rachel. Pt called and stated that she is having an MRI done on 3/9 and she is having it done in a smaller machine at [facility] and they said it could take up to 2 hours and she does not think she can do it due to be very claustrophobic       Action Taken: Message routed to:  [facility]    Travel Screening: Not Applicable

## 2020-03-06 NOTE — TELEPHONE ENCOUNTER
13th, MRI  Pt advised of Ativan, reminded to get a . Kaila Canales Paramedic on 3/6/2020 at 11:03 AM

## 2020-03-13 ENCOUNTER — ANCILLARY PROCEDURE (OUTPATIENT)
Dept: MRI IMAGING | Facility: CLINIC | Age: 80
End: 2020-03-13
Attending: INTERNAL MEDICINE
Payer: MEDICARE

## 2020-03-13 DIAGNOSIS — M54.2 NECK PAIN: ICD-10-CM

## 2020-03-13 RX ORDER — GADOBUTROL 604.72 MG/ML
7.5 INJECTION INTRAVENOUS ONCE
Status: COMPLETED | OUTPATIENT
Start: 2020-03-13 | End: 2020-03-13

## 2020-03-13 RX ADMIN — GADOBUTROL 7.5 ML: 604.72 INJECTION INTRAVENOUS at 07:54

## 2020-03-13 NOTE — DISCHARGE INSTRUCTIONS
MRI Contrast Discharge Instructions    The IV contrast you received today will pass out of your body in your  urine. This will happen in the next 24 hours. You will not feel this process.  Your urine will not change color.    Drink at least 4 extra glasses of water or juice today (unless your doctor  has restricted your fluids). This reduces the stress on your kidneys.  You may take your regular medicines.    If you are on dialysis: It is best to have dialysis today.    If you have a reaction: Most reactions happen right away. If you have  any new symptoms after leaving the hospital (such as hives or swelling),  call your hospital at the correct number below. Or call your family doctor.  If you have breathing distress or wheezing, call 911.    Special instructions: ***    I have read and understand the above information.    Signature:______________________________________ Date:___________    Staff:__________________________________________ Date:___________     Time:__________    [facility] Departments:    ___[place]: [phone]  ___[facility]: [phone]  ___[place]: [phone] ___[facility]: [phone]  ___[facility]: [phone]  ___[facility]: [phone]  ___[phone]  ___[facility]: [phone]  ___[phone]

## 2020-03-17 ENCOUNTER — MYC MEDICAL ADVICE (OUTPATIENT)
Dept: INTERNAL MEDICINE | Facility: CLINIC | Age: 80
End: 2020-03-17

## 2020-03-17 ENCOUNTER — TELEPHONE (OUTPATIENT)
Dept: INTERNAL MEDICINE | Facility: CLINIC | Age: 80
End: 2020-03-17

## 2020-03-17 NOTE — TELEPHONE ENCOUNTER
[facility] Call Center    Phone Message    May a detailed message be left on voicemail: yes     Reason for Call: Requesting Call back  Name/type of test: MRI  Date of test: 3/13/2020   Was test done at a location other than [facility] : NO    Patient is requesting a call back from Dr. Zimmerman to discuss concerns regarding patient's MRI.    Action Taken: Message routed to:  [facility]    Travel Screening: Not Applicable

## 2020-03-18 ENCOUNTER — HOSPITAL ENCOUNTER (OUTPATIENT)
Facility: CLINIC | Age: 80
End: 2020-03-18
Admitting: INTERNAL MEDICINE
Payer: MEDICARE

## 2020-03-18 ENCOUNTER — TELEPHONE (OUTPATIENT)
Dept: INTERVENTIONAL RADIOLOGY/VASCULAR | Facility: CLINIC | Age: 80
End: 2020-03-18

## 2020-03-18 ENCOUNTER — VIRTUAL VISIT (OUTPATIENT)
Dept: INTERVENTIONAL RADIOLOGY/VASCULAR | Facility: CLINIC | Age: 80
End: 2020-03-18
Payer: MEDICARE

## 2020-03-18 DIAGNOSIS — M89.9 BONE LESION: Primary | ICD-10-CM

## 2020-03-18 PROBLEM — E78.5 HYPERLIPIDEMIA WITH TARGET LOW DENSITY LIPOPROTEIN (LDL) CHOLESTEROL LESS THAN 70 MG/DL: Status: ACTIVE | Noted: 2020-03-18

## 2020-03-18 PROBLEM — E78.5 DYSLIPIDEMIA: Status: ACTIVE | Noted: 2020-03-18

## 2020-03-18 PROBLEM — N20.0 NEPHROLITHIASIS: Status: ACTIVE | Noted: 2020-03-18

## 2020-03-18 PROBLEM — M85.80 LOW BONE MASS: Status: ACTIVE | Noted: 2020-03-18

## 2020-03-18 PROBLEM — M25.50 PAIN IN JOINT, MULTIPLE SITES: Status: ACTIVE | Noted: 2020-03-18

## 2020-03-18 PROBLEM — K51.20 CHRONIC ULCERATIVE PROCTITIS WITHOUT COMPLICATIONS (H): Status: ACTIVE | Noted: 2020-03-18

## 2020-03-18 PROBLEM — Z80.0 FAMILY HISTORY OF MALIGNANT NEOPLASM OF GASTROINTESTINAL TRACT: Status: ACTIVE | Noted: 2020-03-18

## 2020-03-18 PROBLEM — I10 HTN (HYPERTENSION): Status: ACTIVE | Noted: 2020-03-18

## 2020-03-18 PROBLEM — R10.13 ABDOMINAL PAIN, EPIGASTRIC: Status: ACTIVE | Noted: 2020-03-18

## 2020-03-18 PROBLEM — G44.85 IDIOPATHIC STABBING HEADACHE: Status: ACTIVE | Noted: 2020-03-18

## 2020-03-18 RX ORDER — SODIUM CHLORIDE 9 MG/ML
INJECTION, SOLUTION INTRAVENOUS CONTINUOUS
Status: CANCELLED | OUTPATIENT
Start: 2020-03-18

## 2020-03-18 RX ORDER — DEXTROSE MONOHYDRATE 25 G/50ML
25-50 INJECTION, SOLUTION INTRAVENOUS
Status: CANCELLED | OUTPATIENT
Start: 2020-03-18

## 2020-03-18 RX ORDER — LIDOCAINE 40 MG/G
CREAM TOPICAL
Status: CANCELLED | OUTPATIENT
Start: 2020-03-18

## 2020-03-18 RX ORDER — NICOTINE POLACRILEX 4 MG
15-30 LOZENGE BUCCAL
Status: CANCELLED | OUTPATIENT
Start: 2020-03-18

## 2020-03-18 NOTE — PATIENT INSTRUCTIONS
Dear Debra,   You are scheduled for your bone biopsy on Monday, March 23, 2020  Report to the [facility] Waiting room at 8:00 AM  The [facility] Waiting Room is located on the 2nd floor (street level) of the [address].  Your procedure is scheduled to start at approximately 9:30 AM    No solid foods or milk products for 6 hours prior on the day of the procedure 3:30 AM  You may have clear liquids until 2 hours prior on the day of the procedure.(water, apple juice, broth, coffee or tea without milk or sugar, jell-o, white grape juice)  7:30 AM    You may take your medications the morning of the biopsy    You will need a     If you have any questions you may call the Radiology Nurse Line [phone]

## 2020-03-18 NOTE — PROGRESS NOTES
"There is a standardized SmartPhrase available that may be used for both visits: \".TELEPHONE\"   Patient opted to conduct today's visit via telephone vs an in person visit to the clinic.   I spoke with: Debra   The reason for the telephone visit was: Consult for bone lesion biopsy  Pertinent history and review of systems: She has a few months of bilateral groin pain worse L than R. She denies any bilateral hip pain.  Dr. Zimmerman has been evaluating the patient, first with CT which found a benign adrenal adenoma (when followed up with MRI).  She had MRI of the spine without contrast and then with contrast.  Impression:   1. 6 lumbar type vertebrae including a lumbarized S1.   2. Multiple T1 hypointense, T2 iso/hyperintense lesions throughout cervical, thoracic and lumbar vertebral bodies. No abnormal enhancement is seen in cervical spine however, some lesions demonstrate faint enhancement in the thoracic and lumbar spine. The most prominent lesion is in the right side of L1 vertebral body which also extends into right pedicle. This lesion demonstrates diffusion restriction. Findings are suspicious for metastatic disease or lymphoproliferative disease. L1 lesion is amenable to biopsy. Recommend biopsy.  3. Please refer to 2020 dated MRIs for degenerative findings in cervical and lumbar spine.  4. No significant spinal canal or neural foraminal stenosis and thoracic spine. No abnormal cord signal.  5. 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  ODILIA LUNSFORD MD (neuro-radiology)    Patient has a brother with a hx of multiple myeloma, who is now .    Assessment:  Debra is an 80 yo who recently developed bilat groin pain, L>R.  MRI with contrast imaging performed of the spine.  Abnormalities seen in the thoracic and lumbar spine.  The most prominent lesion is at the right side of L1 vertebral body which extends into the right pedicle.  Biopsy is requested to evaluate for " malignancy.  FYI:  The patient tells me that when she has her colonoscopies, she can use Versed, but she has gotten nauseated from Fentanyl-so she asked me to put it on her allergy list.  So if we could give her something else for pain to as a substitute for the conscious sedation that would be great.  Also I would probably give her something for nausea just in case.    Advice/instructions given to patient/guardian including prescriptions, follow up appointment or orders for diagnostic testing:   The patient is scheduled for a bone biopsy, Monday, March 23, 2020  She will arrive at 8:00 AM, biopsy to start @ 9:30 AM, pt told biopsy will last 1 hour and recovery is 1 hour  Given eating and drinking instructions, will have a , instructed there will be no   Patient sent an e-mail with instructions and a map, so they would know where to park.    Will need blood work the morning of the procedure, adding an SPEP (protein electrophoresis) blood work    Phone call contact time   Call Started at: 2:30 PM  Call Ended at: 2:49 PM      Emy Zuniga MS, APRN, CNS, CRN  Clinical Nurse Specialist  Interventional Radiology  [phone] (voice mail)  [phone] (pager)

## 2020-03-18 NOTE — TELEPHONE ENCOUNTER
I called Debra last night and will have her set up with [facility] O[facility]ina for possible biopsy    RUTH Zimmerman

## 2020-03-18 NOTE — PROGRESS NOTES
She has a few months of B groin pain worse L than R. She denies any B hip pain.  Dr. Zimmerman has been evaluating the patient, first with CT which found a benign adrenal adenoma (when followed up with MRI).  She had MRI of the spine without contrast and then with contrast.  Impression:   1. 6 lumbar type vertebrae including a lumbarized S1.   2. Multiple T1 hypointense, T2 iso/hyperintense lesions throughout cervical, thoracic and lumbar vertebral bodies. No abnormal enhancement is seen in cervical spine however, some lesions demonstrate faint enhancement in the thoracic and lumbar spine. The most prominent lesion is in the right side of L1 vertebral body which also extends into right pedicle. This lesion demonstrates diffusion restriction. Findings are suspicious for metastatic disease or lymphoproliferative disease. L1 lesion is amenable to biopsy. Recommend biopsy.  3. Please refer to 2/23/2020 dated MRIs for degenerative findings in cervical and lumbar spine.  4. No significant spinal canal or neural foraminal stenosis and thoracic spine. No abnormal cord signal.  5. 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  MD Emy VALVERDE,   Clinical Nurse Specialist  Interventional Radiology

## 2020-03-20 DIAGNOSIS — M89.9 BONE LESION: Primary | ICD-10-CM

## 2020-03-20 NOTE — TELEPHONE ENCOUNTER
ONCOLOGY INTAKE: Records Information      APPT INFORMATION:  Referring provider: Dr. Wolfgang Zimmerman  Referring provider s clinic:  [facility]  Reason for visit/diagnosis: Bone lesion   Has patient been notified of appointment date and time?: Yes    RECORDS INFORMATION:  Were the records received with the referral (via [employer])? No    Has patient been seen for any external appt for this diagnosis? No    If yes, where? N/a    Has patient had any imaging or procedures outside of [place] for this condition? No      If Yes, where? N/a    ADDITIONAL INFORMATION:  None

## 2020-03-23 ENCOUNTER — VIRTUAL VISIT (OUTPATIENT)
Dept: INTERNAL MEDICINE | Facility: CLINIC | Age: 80
End: 2020-03-23
Payer: MEDICARE

## 2020-03-23 DIAGNOSIS — R93.7: Primary | ICD-10-CM

## 2020-03-23 NOTE — PROGRESS NOTES
Telephone visit    Telephone visit follow up for neck/back pain and abnormal findings on imaging. She has chronic to more acute neck and lower back pain for several years. Prior lumbar MRI w/o contrast 5/11/2016 in [facility]. She had contrast abdominal/pelvic CT scan 2/6/2020 for back pain (and to assess for renal stones) that showed an adrenal mass. MRI adrenal 3/1/2010 was unremarkable. She subsequently had contrast MRI imaging of cervical, thoracic, and lumbar spine areas on 3/13/2020 with multiple lesions. She had unremarkable laboratory testing 1/29/2020. She saw Ms. Campuzano 3/18/2020 Interventional radiology regarding the need for a biopsy. Discussion with neuroradiology recommended she see Dr. Orr (scheduled 3/25/2020). She had a mammogram 10/24/2019 (in [facility]). She had a colonoscopy 4/8/2014 on 4/8/2014 (in [facility]). She has not had Chest imagining yet. Her  Aguilar is also on the phone today. Clinically she feels fine and denies any new HEENT, cardiopulmonary, abdominal, , GYN, neurological, systemic, lymphatic, endocrine complaints. She has some minor chronic B groin pain; as noted CT abdominal/pelvic imaging from 2/6/2020 unremarkable. Could consider MRI hips for musculoskeletal evaluation?     RUTH Zimmerman MD    Spent 11-15 minutes on the phone

## 2020-03-25 ENCOUNTER — ONCOLOGY VISIT (OUTPATIENT)
Dept: ONCOLOGY | Facility: CLINIC | Age: 80
End: 2020-03-25
Attending: CLINICAL NURSE SPECIALIST
Payer: MEDICARE

## 2020-03-25 ENCOUNTER — PRE VISIT (OUTPATIENT)
Dept: ONCOLOGY | Facility: CLINIC | Age: 80
End: 2020-03-25

## 2020-03-25 VITALS
HEART RATE: 62 BPM | SYSTOLIC BLOOD PRESSURE: 184 MMHG | WEIGHT: 140.6 LBS | DIASTOLIC BLOOD PRESSURE: 74 MMHG | TEMPERATURE: 98 F | RESPIRATION RATE: 14 BRPM | HEIGHT: 66 IN | OXYGEN SATURATION: 100 % | BODY MASS INDEX: 22.6 KG/M2

## 2020-03-25 DIAGNOSIS — M89.9 BONE LESION: ICD-10-CM

## 2020-03-25 LAB
ALBUMIN SERPL-MCNC: 4 G/DL (ref 3.4–5)
ALP SERPL-CCNC: 62 U/L (ref 40–150)
ALT SERPL W P-5'-P-CCNC: 22 U/L (ref 0–50)
ANION GAP SERPL CALCULATED.3IONS-SCNC: 2 MMOL/L (ref 3–14)
AST SERPL W P-5'-P-CCNC: 12 U/L (ref 0–45)
BASOPHILS # BLD AUTO: 0 10E9/L (ref 0–0.2)
BASOPHILS NFR BLD AUTO: 0.5 %
BILIRUB SERPL-MCNC: 0.4 MG/DL (ref 0.2–1.3)
BUN SERPL-MCNC: 14 MG/DL (ref 7–30)
CALCIUM SERPL-MCNC: 9.2 MG/DL (ref 8.5–10.1)
CHLORIDE SERPL-SCNC: 106 MMOL/L (ref 94–109)
CO2 SERPL-SCNC: 32 MMOL/L (ref 20–32)
CREAT SERPL-MCNC: 0.72 MG/DL (ref 0.52–1.04)
CREAT UR-MCNC: 52 MG/DL
DIFFERENTIAL METHOD BLD: NORMAL
EOSINOPHIL # BLD AUTO: 0.2 10E9/L (ref 0–0.7)
EOSINOPHIL NFR BLD AUTO: 2 %
ERYTHROCYTE [DISTWIDTH] IN BLOOD BY AUTOMATED COUNT: 12.6 % (ref 10–15)
GFR SERPL CREATININE-BSD FRML MDRD: 78 ML/MIN/{1.73_M2}
GLUCOSE SERPL-MCNC: 98 MG/DL (ref 70–99)
HCT VFR BLD AUTO: 45.2 % (ref 35–47)
HGB BLD-MCNC: 14.9 G/DL (ref 11.7–15.7)
IMM GRANULOCYTES # BLD: 0 10E9/L (ref 0–0.4)
IMM GRANULOCYTES NFR BLD: 0.2 %
LDH SERPL L TO P-CCNC: 146 U/L (ref 81–234)
LYMPHOCYTES # BLD AUTO: 1.7 10E9/L (ref 0.8–5.3)
LYMPHOCYTES NFR BLD AUTO: 19.5 %
MCH RBC QN AUTO: 30.3 PG (ref 26.5–33)
MCHC RBC AUTO-ENTMCNC: 33 G/DL (ref 31.5–36.5)
MCV RBC AUTO: 92 FL (ref 78–100)
MONOCYTES # BLD AUTO: 0.8 10E9/L (ref 0–1.3)
MONOCYTES NFR BLD AUTO: 8.7 %
NEUTROPHILS # BLD AUTO: 6 10E9/L (ref 1.6–8.3)
NEUTROPHILS NFR BLD AUTO: 69.1 %
NRBC # BLD AUTO: 0 10*3/UL
NRBC BLD AUTO-RTO: 0 /100
PLATELET # BLD AUTO: 409 10E9/L (ref 150–450)
POTASSIUM SERPL-SCNC: 3.5 MMOL/L (ref 3.4–5.3)
PROT SERPL-MCNC: 7.7 G/DL (ref 6.8–8.8)
PROT UR-MCNC: 0.05 G/L
PROT/CREAT 24H UR: 0.1 G/G CR (ref 0–0.2)
RBC # BLD AUTO: 4.92 10E12/L (ref 3.8–5.2)
SODIUM SERPL-SCNC: 141 MMOL/L (ref 133–144)
URATE SERPL-MCNC: 6.8 MG/DL (ref 2.6–6)
WBC # BLD AUTO: 8.7 10E9/L (ref 4–11)

## 2020-03-25 PROCEDURE — 82232 ASSAY OF BETA-2 PROTEIN: CPT | Performed by: INTERNAL MEDICINE

## 2020-03-25 PROCEDURE — 00000402 ZZHCL STATISTIC TOTAL PROTEIN: Performed by: INTERNAL MEDICINE

## 2020-03-25 PROCEDURE — 83615 LACTATE (LD) (LDH) ENZYME: CPT | Performed by: INTERNAL MEDICINE

## 2020-03-25 PROCEDURE — 84550 ASSAY OF BLOOD/URIC ACID: CPT | Performed by: INTERNAL MEDICINE

## 2020-03-25 PROCEDURE — 82784 ASSAY IGA/IGD/IGG/IGM EACH: CPT | Performed by: INTERNAL MEDICINE

## 2020-03-25 PROCEDURE — 99204 OFFICE O/P NEW MOD 45 MIN: CPT | Mod: ZP | Performed by: INTERNAL MEDICINE

## 2020-03-25 PROCEDURE — 86335 IMMUNFIX E-PHORSIS/URINE/CSF: CPT | Performed by: INTERNAL MEDICINE

## 2020-03-25 PROCEDURE — 84166 PROTEIN E-PHORESIS/URINE/CSF: CPT | Performed by: INTERNAL MEDICINE

## 2020-03-25 PROCEDURE — G0463 HOSPITAL OUTPT CLINIC VISIT: HCPCS | Mod: ZF

## 2020-03-25 PROCEDURE — 85025 COMPLETE CBC W/AUTO DIFF WBC: CPT | Performed by: INTERNAL MEDICINE

## 2020-03-25 PROCEDURE — 86334 IMMUNOFIX E-PHORESIS SERUM: CPT | Performed by: INTERNAL MEDICINE

## 2020-03-25 PROCEDURE — 82164 ANGIOTENSIN I ENZYME TEST: CPT | Performed by: INTERNAL MEDICINE

## 2020-03-25 PROCEDURE — 80053 COMPREHEN METABOLIC PANEL: CPT | Performed by: INTERNAL MEDICINE

## 2020-03-25 PROCEDURE — 83883 ASSAY NEPHELOMETRY NOT SPEC: CPT | Performed by: INTERNAL MEDICINE

## 2020-03-25 PROCEDURE — 84156 ASSAY OF PROTEIN URINE: CPT | Performed by: INTERNAL MEDICINE

## 2020-03-25 PROCEDURE — 84165 PROTEIN E-PHORESIS SERUM: CPT | Performed by: INTERNAL MEDICINE

## 2020-03-25 ASSESSMENT — MIFFLIN-ST. JEOR: SCORE: 1116.57

## 2020-03-25 ASSESSMENT — PAIN SCALES - GENERAL: PAINLEVEL: NO PAIN (0)

## 2020-03-25 NOTE — NURSING NOTE
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    Weight as of this encounter: 63.8 kg (140 lb 9.6 oz). Body surface area is 1.72 meters squared.  No Pain (0) Comment: Data Unavailable   No LMP recorded. Patient has had a hysterectomy.  Allergies reviewed: Yes  Medications reviewed: Yes    Medications: Medication refills not needed today.  Pharmacy name entered into [facility]: [id number] - [place], [place] - [address] [place] [address]    Clinical concerns: Patient has no new concerns since referral.       José Miguel Walsh, EMT              "

## 2020-03-25 NOTE — PROGRESS NOTES
REASON FOR CONSULTATION:  I was asked to see Ms. Denise for possible myeloma by Dr. Zimmerman.    HISTORY OF PRESENT ILLNESS:  Ms. Denise is a 79 year old woman with acute on chronic neck/back pain and MRI with several lesions in the cervical, thoracic, and lumbar spine suspicious for metastatic vs lymphoproliferative disease such as myeloma.  CT of the abdomen and pelvis in 02/2020 was unremarkable with no bone lesions or adenopathy or other masses.  Blood counts are normal, chemistries including creatinine, calcium, and total protein are normal.  She has ulcerative colitis/proctitis and history of polymyalgia rheumatica but otherwise has been healthy.  Up to date with mammography and colonoscopy last in 2014 was unremarkable.  No fevers, chills, or sweats.    Energy level and appetite are good.  Weight stable.  No fevers, chills, or night sweats.  No headache, vision changes, focal weakness or sensory changes.  No dyspnea, cough, or chest pain.  Normal bowel function.  No urinary complaints.  No bleeding, bruising, or skin rashes.  No pain other than chronic back/hip pain.  No lumps or bumps.  Managing well.    REVIEW OF SYSTEMS:  A complete review of systems was negative other than noted.    PAST MEDICAL HISTORY:  Ulcerative colitis/proctitis, history of PMR, HTN, Kidney stones, DLP    MEDICATIONS:  Current Outpatient Medications   Medication     BIOTIN PO     Cholecalciferol (VITAMIN D3 PO)     GABAPENTIN 100 MG PO capsule     LORazepam (ATIVAN) 0.5 MG tablet     mesalamine (CANASA) 1000 MG suppository     mesalamine (CANASA) 1000 MG suppository     Mesalamine-Cleanser (ROWASA) 4 g KIT     Mesalamine-Cleanser (ROWASA) 4 g KIT     Nutritional Supplements (NUTRITIONAL SUPPLEMENT PO)     Probiotic Product (PROBIOTIC PO)     Rosuvastatin Calcium (CRESTOR PO)     valsartan-hydrochlorothiazide (DIOVAN HCT) 160-25 MG tablet     No current facility-administered medications for this visit.      FAMILY HISTORY:   "Brother had myeloma and  several years ago, no other family history of blood cancer    SOCIAL HISTORY:  Never smoker, no alcohol    PHYSICAL EXAMINATION:  BP (!) 184/74   Pulse 62   Temp 98  F (36.7  C) (Oral)   Resp 14   Ht 1.664 m (5' 5.5\")   Wt 63.8 kg (140 lb 9.6 oz)   SpO2 100%   BMI 23.04 kg/m    Wt Readings from Last 5 Encounters:   20 63.8 kg (140 lb 9.6 oz)   20 64.9 kg (143 lb)   20 65.2 kg (143 lb 12.8 oz)   20 65.1 kg (143 lb 9.6 oz)   19 64.3 kg (141 lb 12.8 oz)     General: Well appearing. No acute distress.  HEENT: Sclerae anicteric. No OP lesions, masses, or tonsilar enlargement.  Lungs: Clear bilaterally without wheezing or crackles.  Heart: Regular rate and rhythm without murmurs.  Gastrointestinal: Bowel sounds present, no tenderness to palpation, spleen tip not palpable.  Extremities: No lower extremity edema.  Lymph:  No cervical, clavicular, axillary, epitrochlear, or inguinal lymphadenopathy.  Neuro: Grossly non-focal.  Skin/access: No visible rash.  Performance status: ECOG 0    LABORATORY DATA:  Recent Labs   Lab Test 208 19  1518 18  1655   WBC 8.8 10.4 8.4   HGB 14.2 14.0 14.4    381 396   ANEU 5.3 6.6 4.5   ALYM 2.3 2.4 2.6     Recent Labs   Lab Test 20  1108 19  1518 18  1655    141 140   POTASSIUM 4.1 3.4 3.4   CHLORIDE 104 103 104   CO2 32 33* 31   BUN 17 18 14   CR 0.77 0.72 0.84   PRASHANTH 9.1 9.5 8.7     Recent Labs   Lab Test 20  1108 19  1518 18  1655   AST 16 13 16   ALT 27 21 33   ALKPHOS 59 65 60   BILITOTAL 0.5 0.3 0.3     IMAGING DATA:  Imaging reviewed by me as outlined in HPI, discussed imaging with Dr. Chadwick from Neuroradiology    IMPRESSION AND PLAN:  Ms. Denise is a 79 year old woman with acute on chronic neck/back pain and MRI with several lesions in the cervical, thoracic, and lumbar spine suspicious for metastatic vs lymphoproliferative disease such as " myeloma.    Her vertebral lesions are not specific for but concerning for some type of malignant process such as myeloma.  Notably, she does not have abnormal blood counts, anemia, hypercalcemia, renal dysfunction, or adenopathy to suggest a lymphoproliferative process.  At this point it would be reasonable to obtain additional blood work and urine analysis to look for abnormal proteins that could be indicative of a lymphoproliferative or plasma cell disorder. If these are normal, a CT chest would also be helpful to look for other primary malignancy, noting that her recent mammogram and abdomen/pelvis CT were unremarkable.  If all of these studies are unrevealing then biopsying a vertebral lesion would be reasonable.    I will call her with the results of the initial studies and discuss next steps from there.  I provided our contact information and advised her to call with questions, concerns, or new issues.    Kristian Orr MD, PhD  Attending Physician, [facility]   of Medicine, [facility]  Division of Hematology, Oncology, and Transplantation  [email or web address] email  [phone] clinic  [phone] pager

## 2020-03-25 NOTE — LETTER
3/25/2020       RE: Debra Denise  [id number]  [place] [address]     Dear Colleague,    Thank you for referring your patient, Debra Denise, to the [facility]. Please see a copy of my visit note below.    REASON FOR CONSULTATION:  I was asked to see Ms. Denise for possible myeloma by Dr. Zimmerman.    HISTORY OF PRESENT ILLNESS:  Ms. Denise is a 79 year old woman with acute on chronic neck/back pain and MRI with several lesions in the cervical, thoracic, and lumbar spine suspicious for metastatic vs lymphoproliferative disease such as myeloma.  CT of the abdomen and pelvis in 02/2020 was unremarkable with no bone lesions or adenopathy or other masses.  Blood counts are normal, chemistries including creatinine, calcium, and total protein are normal.  She has ulcerative colitis/proctitis and history of polymyalgia rheumatica but otherwise has been healthy.  Up to date with mammography and colonoscopy last in 2014 was unremarkable.  No fevers, chills, or sweats.    Energy level and appetite are good.  Weight stable.  No fevers, chills, or night sweats.  No headache, vision changes, focal weakness or sensory changes.  No dyspnea, cough, or chest pain.  Normal bowel function.  No urinary complaints.  No bleeding, bruising, or skin rashes.  No pain other than chronic back/hip pain.  No lumps or bumps.  Managing well.    REVIEW OF SYSTEMS:  A complete review of systems was negative other than noted.    PAST MEDICAL HISTORY:  Ulcerative colitis/proctitis, history of PMR, HTN, Kidney stones, DLP    MEDICATIONS:  Current Outpatient Medications   Medication     BIOTIN PO     Cholecalciferol (VITAMIN D3 PO)     GABAPENTIN 100 MG PO capsule     LORazepam (ATIVAN) 0.5 MG tablet     mesalamine (CANASA) 1000 MG suppository     mesalamine (CANASA) 1000 MG suppository     Mesalamine-Cleanser (ROWASA) 4 g KIT     Mesalamine-Cleanser (ROWASA) 4 g KIT     Nutritional  "Supplements (NUTRITIONAL SUPPLEMENT PO)     Probiotic Product (PROBIOTIC PO)     Rosuvastatin Calcium (CRESTOR PO)     valsartan-hydrochlorothiazide (DIOVAN HCT) 160-25 MG tablet     No current facility-administered medications for this visit.      FAMILY HISTORY:  Brother had myeloma and  several years ago, no other family history of blood cancer    SOCIAL HISTORY:  Never smoker, no alcohol    PHYSICAL EXAMINATION:  BP (!) 184/74   Pulse 62   Temp 98  F (36.7  C) (Oral)   Resp 14   Ht 1.664 m (5' 5.5\")   Wt 63.8 kg (140 lb 9.6 oz)   SpO2 100%   BMI 23.04 kg/m    Wt Readings from Last 5 Encounters:   20 63.8 kg (140 lb 9.6 oz)   20 64.9 kg (143 lb)   20 65.2 kg (143 lb 12.8 oz)   20 65.1 kg (143 lb 9.6 oz)   19 64.3 kg (141 lb 12.8 oz)     General: Well appearing. No acute distress.  HEENT: Sclerae anicteric. No OP lesions, masses, or tonsilar enlargement.  Lungs: Clear bilaterally without wheezing or crackles.  Heart: Regular rate and rhythm without murmurs.  Gastrointestinal: Bowel sounds present, no tenderness to palpation, spleen tip not palpable.  Extremities: No lower extremity edema.  Lymph:  No cervical, clavicular, axillary, epitrochlear, or inguinal lymphadenopathy.  Neuro: Grossly non-focal.  Skin/access: No visible rash.  Performance status: ECOG 0    LABORATORY DATA:  Recent Labs   Lab Test 20  1108 19  1518 18  1655   WBC 8.8 10.4 8.4   HGB 14.2 14.0 14.4    381 396   ANEU 5.3 6.6 4.5   ALYM 2.3 2.4 2.6     Recent Labs   Lab Test 20  1108 19  1518 18  1655    141 140   POTASSIUM 4.1 3.4 3.4   CHLORIDE 104 103 104   CO2 32 33* 31   BUN 17 18 14   CR 0.77 0.72 0.84   PRASHANTH 9.1 9.5 8.7     Recent Labs   Lab Test 20  1108 19  1518 18  1655   AST 16 13 16   ALT 27 21 33   ALKPHOS 59 65 60   BILITOTAL 0.5 0.3 0.3     IMAGING DATA:  Imaging reviewed by me as outlined in HPI, discussed imaging with  " Farrukh from Neuroradiology    IMPRESSION AND PLAN:  Ms. Denise is a 79 year old woman with acute on chronic neck/back pain and MRI with several lesions in the cervical, thoracic, and lumbar spine suspicious for metastatic vs lymphoproliferative disease such as myeloma.    Her vertebral lesions are not specific for but concerning for some type of malignant process such as myeloma.  Notably, she does not have abnormal blood counts, anemia, hypercalcemia, renal dysfunction, or adenopathy to suggest a lymphoproliferative process.  At this point it would be reasonable to obtain additional blood work and urine analysis to look for abnormal proteins that could be indicative of a lymphoproliferative or plasma cell disorder. If these are normal, a CT chest would also be helpful to look for other primary malignancy, noting that her recent mammogram and abdomen/pelvis CT were unremarkable.  If all of these studies are unrevealing then biopsying a vertebral lesion would be reasonable.    I will call her with the results of the initial studies and discuss next steps from there.  I provided our contact information and advised her to call with questions, concerns, or new issues.    Kristian Orr MD, PhD  Attending Physician, [facility]   of Medicine, [facility]  Division of Hematology, Oncology, and Transplantation  [email or web address] email  [phone] clinic  [phone] pager

## 2020-03-26 LAB
ACE SERPL-CCNC: 15 U/L (ref 9–67)
ALBUMIN SERPL ELPH-MCNC: 4.6 G/DL (ref 3.7–5.1)
ALPHA1 GLOB SERPL ELPH-MCNC: 0.3 G/DL (ref 0.2–0.4)
ALPHA2 GLOB SERPL ELPH-MCNC: 0.6 G/DL (ref 0.5–0.9)
B-GLOBULIN SERPL ELPH-MCNC: 0.8 G/DL (ref 0.6–1)
B2 MICROGLOB SERPL-MCNC: 1.9 MG/L
GAMMA GLOB SERPL ELPH-MCNC: 1.1 G/DL (ref 0.7–1.6)
IGA SERPL-MCNC: 207 MG/DL (ref 84–499)
IGG SERPL-MCNC: 1036 MG/DL (ref 610–1616)
IGM SERPL-MCNC: 74 MG/DL (ref 35–242)
KAPPA LC UR-MCNC: <2.83 MG/DL (ref 0.33–1.94)
KAPPA LC/LAMBDA SER: ABNORMAL {RATIO} (ref 0.26–1.65)
LAMBDA LC SERPL-MCNC: 1.01 MG/DL (ref 0.57–2.63)
M PROTEIN SERPL ELPH-MCNC: 0 G/DL
PROT ELPH PNL UR ELPH: NORMAL
PROT PATTERN SERPL ELPH-IMP: NORMAL
PROT PATTERN SERPL IFE-IMP: NORMAL
PROT PATTERN UR ELPH-IMP: NORMAL

## 2020-03-27 ENCOUNTER — TELEPHONE (OUTPATIENT)
Dept: ONCOLOGY | Facility: CLINIC | Age: 80
End: 2020-03-27

## 2020-03-27 ENCOUNTER — VIRTUAL VISIT (OUTPATIENT)
Dept: INTERNAL MEDICINE | Facility: CLINIC | Age: 80
End: 2020-03-27
Payer: MEDICARE

## 2020-03-27 DIAGNOSIS — R93.7 ABNORMAL MAGNETIC RESONANCE IMAGING OF CERVICAL SPINE: Primary | ICD-10-CM

## 2020-03-27 ASSESSMENT — PAIN SCALES - GENERAL: PAINLEVEL: NO PAIN (0)

## 2020-03-27 NOTE — TELEPHONE ENCOUNTER
Spoke with patient on phone to review lab workup. Overall workup unrevealing with nothing to suggest myeloma or plasma cell disorder, lymphoproliferative disorder, sarcoidosis, or other malignant process. She understands that tests are not 100% sensitive. We will proceed with CT chest to further exclude other primary malignancy, and if unremarkable will revisit biopsy of a vertebral lesion with Neuroradiology. She continues to be asymptomatic and agrees with the plan.  Kristian Orr MD, PhD

## 2020-03-27 NOTE — PROGRESS NOTES
Telephone visit    Debra agrees to a telephone visit    Last telephone visit was 3/23/2020 and additional details in that note. She saw Dr. Orr on 3/25/2020 for abnormal findings on MRI spine imaging. He ordered laboratory testing to further evaluate a myelodysplastic process. Depending upon these results he may consider a bone marrow bx. And/or bx. Of the vertebral lesion(s). As noted before, Clinically Debra feels fine and is quite functional. She specifically denies any additional HEENT, cardiopulmonary, abdominal, , GYN, neurological, systemic complaints. She states Dr. Orr called her this AM with results of some of her blood tests and these were stable. His plan it to complete a Chest CT and then follow up. Debra's  Aguilar was also on the phone. Will follow up with her in a month or so.      RUTH Zimmerman MD    Spent between 5-10 minutes on the phone today

## 2020-03-30 ENCOUNTER — ANCILLARY PROCEDURE (OUTPATIENT)
Dept: CT IMAGING | Facility: CLINIC | Age: 80
End: 2020-03-30
Attending: INTERNAL MEDICINE
Payer: MEDICARE

## 2020-03-30 DIAGNOSIS — M89.9 BONE LESION: ICD-10-CM

## 2020-03-30 RX ORDER — IOPAMIDOL 755 MG/ML
69 INJECTION, SOLUTION INTRAVASCULAR ONCE
Status: COMPLETED | OUTPATIENT
Start: 2020-03-30 | End: 2020-03-30

## 2020-03-30 RX ADMIN — IOPAMIDOL 69 ML: 755 INJECTION, SOLUTION INTRAVASCULAR at 10:06

## 2020-03-31 ENCOUNTER — TELEPHONE (OUTPATIENT)
Dept: ONCOLOGY | Facility: CLINIC | Age: 80
End: 2020-03-31

## 2020-03-31 DIAGNOSIS — M89.9 BONE LESION: Primary | ICD-10-CM

## 2020-03-31 NOTE — TELEPHONE ENCOUNTER
Spoke with patient by phone to review CT chest that was unremarkable. Discussed with Dr. Chadwick from Neuroradiology who agrees that biopsy is logical next step. She will await a call for scheduling and get in touch if new questions/problems arise.  Kristian Orr MD, PhD

## 2020-04-01 ENCOUNTER — TELEPHONE (OUTPATIENT)
Dept: INTERVENTIONAL RADIOLOGY/VASCULAR | Facility: CLINIC | Age: 80
End: 2020-04-01

## 2020-04-01 DIAGNOSIS — M89.9 BONE LESION: Primary | ICD-10-CM

## 2020-04-01 NOTE — TELEPHONE ENCOUNTER
"Debra is an 80 yo who has had a few months of bilateral groin pain worse on the left than the right.   She denies any bilateral hip pain.  Dr. Zimmerman has been evaluating the patient, first with CT which found a benign adrenal adenoma (when followed up with MRI).  She had MRI of the spine without contrast and then with contrast.  Impression:   1. 6 lumbar type vertebrae including a lumbarized S1.   2. Multiple T1 hypointense, T2 iso/hyperintense lesions throughout cervical, thoracic and lumbar vertebral bodies. No abnormal enhancement is seen in cervical spine however, some lesions demonstrate faint enhancement in the thoracic and lumbar spine. The most prominent lesion is in the right side of L1 vertebral body which also extends into right pedicle. This lesion demonstrates diffusion restriction. Findings are suspicious for metastatic disease or lymphoproliferative disease. L1 lesion is amenable to biopsy. Recommend biopsy.  3. Please refer to 2/23/2020 dated MRIs for degenerative findings in cervical and lumbar spine.  4. No significant spinal canal or neural foraminal stenosis and thoracic spine. No abnormal cord signal.  5. 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  ODILIA LUNSFORD MD (neuro-radiology)    She was approved for a biopsy with neuro-radiology and scheduled, but then the decision was made that the patient should see oncology first.  The patient say Dr. Orr on 3/25/2020 and has had extensive blood work-up which has been unrevealing.  The patient also had a CT chest which did not find any lesions in the chest.      PLAN:  Schedule the patient for Image guided bone biopsy.  Dr. Orr spoke with Dr. Chadwick who agrees that the bone biopsy is the l\"ogical next step\"  Patient is scheduled for Friday, April 10.  She has been sent her instructions in My Chart and verbally given her instructions over the telephone.  She will need an INR the morning of the procedure  FYI:  The " patient tells me that when she has her colonoscopies, she can use Versed, but she has gotten nauseated from Fentanyl-so she asked me to put it on her allergy list.  So if we could give her something else for pain to as a substitute for the conscious sedation that would be great.  Also I would probably give her something for nausea just in case.    Emy Zuniga MS, APRN, CNS, CRN  Clinical Nurse Specialist  Interventional Radiology  [phone] (voice mail)  [phone] (pager)

## 2020-04-07 ENCOUNTER — TELEPHONE (OUTPATIENT)
Dept: INTERVENTIONAL RADIOLOGY/VASCULAR | Facility: CLINIC | Age: 80
End: 2020-04-07

## 2020-04-10 ENCOUNTER — APPOINTMENT (OUTPATIENT)
Dept: INTERVENTIONAL RADIOLOGY/VASCULAR | Facility: CLINIC | Age: 80
End: 2020-04-10
Attending: CLINICAL NURSE SPECIALIST
Payer: MEDICARE

## 2020-04-10 ENCOUNTER — APPOINTMENT (OUTPATIENT)
Dept: MEDSURG UNIT | Facility: CLINIC | Age: 80
End: 2020-04-10
Attending: INTERNAL MEDICINE
Payer: MEDICARE

## 2020-04-10 ENCOUNTER — HOSPITAL ENCOUNTER (OUTPATIENT)
Facility: CLINIC | Age: 80
Discharge: HOME | End: 2020-04-10
Attending: INTERNAL MEDICINE | Admitting: INTERNAL MEDICINE
Payer: MEDICARE

## 2020-04-10 VITALS
DIASTOLIC BLOOD PRESSURE: 56 MMHG | HEIGHT: 66 IN | WEIGHT: 141 LBS | HEART RATE: 54 BPM | TEMPERATURE: 98.2 F | SYSTOLIC BLOOD PRESSURE: 124 MMHG | OXYGEN SATURATION: 98 % | RESPIRATION RATE: 16 BRPM | BODY MASS INDEX: 22.66 KG/M2

## 2020-04-10 DIAGNOSIS — M89.9 BONE LESION: ICD-10-CM

## 2020-04-10 LAB — INR PPP: 1.13 (ref 0.86–1.14)

## 2020-04-10 PROCEDURE — 25800030 ZZH RX IP 258 OP 636: Performed by: RADIOLOGY

## 2020-04-10 PROCEDURE — 20225 BONE BIOPSY TROCAR/NDL DEEP: CPT

## 2020-04-10 PROCEDURE — 99152 MOD SED SAME PHYS/QHP 5/>YRS: CPT

## 2020-04-10 PROCEDURE — 88341 IMHCHEM/IMCYTCHM EA ADD ANTB: CPT | Performed by: INTERNAL MEDICINE

## 2020-04-10 PROCEDURE — 25000128 H RX IP 250 OP 636: Performed by: RADIOLOGY

## 2020-04-10 PROCEDURE — 88312 SPECIAL STAINS GROUP 1: CPT | Performed by: INTERNAL MEDICINE

## 2020-04-10 PROCEDURE — 88342 IMHCHEM/IMCYTCHM 1ST ANTB: CPT | Performed by: INTERNAL MEDICINE

## 2020-04-10 PROCEDURE — 85610 PROTHROMBIN TIME: CPT | Performed by: RADIOLOGY

## 2020-04-10 PROCEDURE — 88307 TISSUE EXAM BY PATHOLOGIST: CPT | Performed by: INTERNAL MEDICINE

## 2020-04-10 PROCEDURE — 88311 DECALCIFY TISSUE: CPT | Performed by: INTERNAL MEDICINE

## 2020-04-10 PROCEDURE — 25000125 ZZHC RX 250: Performed by: RADIOLOGY

## 2020-04-10 PROCEDURE — 88313 SPECIAL STAINS GROUP 2: CPT | Performed by: INTERNAL MEDICINE

## 2020-04-10 PROCEDURE — 40000167 ZZH STATISTIC PP CARE STAGE 2

## 2020-04-10 RX ORDER — NICOTINE POLACRILEX 4 MG
15-30 LOZENGE BUCCAL
Status: DISCONTINUED | OUTPATIENT
Start: 2020-04-10 | End: 2020-04-10 | Stop reason: HOSPADM

## 2020-04-10 RX ORDER — LIDOCAINE 40 MG/G
CREAM TOPICAL
Status: DISCONTINUED | OUTPATIENT
Start: 2020-04-10 | End: 2020-04-10 | Stop reason: HOSPADM

## 2020-04-10 RX ORDER — DEXTROSE MONOHYDRATE 25 G/50ML
25-50 INJECTION, SOLUTION INTRAVENOUS
Status: DISCONTINUED | OUTPATIENT
Start: 2020-04-10 | End: 2020-04-10 | Stop reason: HOSPADM

## 2020-04-10 RX ORDER — DIPHENHYDRAMINE HYDROCHLORIDE 50 MG/ML
50 INJECTION INTRAMUSCULAR; INTRAVENOUS ONCE
Status: COMPLETED | OUTPATIENT
Start: 2020-04-10 | End: 2020-04-10

## 2020-04-10 RX ORDER — SODIUM CHLORIDE 9 MG/ML
INJECTION, SOLUTION INTRAVENOUS CONTINUOUS
Status: DISCONTINUED | OUTPATIENT
Start: 2020-04-10 | End: 2020-04-10 | Stop reason: HOSPADM

## 2020-04-10 RX ORDER — DIPHENHYDRAMINE HYDROCHLORIDE 50 MG/ML
25 INJECTION INTRAMUSCULAR; INTRAVENOUS ONCE
Status: DISCONTINUED | OUTPATIENT
Start: 2020-04-10 | End: 2020-04-10 | Stop reason: HOSPADM

## 2020-04-10 RX ORDER — ONDANSETRON 2 MG/ML
4 INJECTION INTRAMUSCULAR; INTRAVENOUS
Status: COMPLETED | OUTPATIENT
Start: 2020-04-10 | End: 2020-04-10

## 2020-04-10 RX ORDER — NALOXONE HYDROCHLORIDE 0.4 MG/ML
.1-.4 INJECTION, SOLUTION INTRAMUSCULAR; INTRAVENOUS; SUBCUTANEOUS
Status: DISCONTINUED | OUTPATIENT
Start: 2020-04-10 | End: 2020-04-10 | Stop reason: HOSPADM

## 2020-04-10 RX ORDER — FLUMAZENIL 0.1 MG/ML
0.2 INJECTION, SOLUTION INTRAVENOUS
Status: DISCONTINUED | OUTPATIENT
Start: 2020-04-10 | End: 2020-04-10 | Stop reason: HOSPADM

## 2020-04-10 RX ORDER — FENTANYL CITRATE 50 UG/ML
25-50 INJECTION, SOLUTION INTRAMUSCULAR; INTRAVENOUS EVERY 5 MIN PRN
Status: DISCONTINUED | OUTPATIENT
Start: 2020-04-10 | End: 2020-04-10 | Stop reason: HOSPADM

## 2020-04-10 RX ADMIN — DIPHENHYDRAMINE HYDROCHLORIDE 50 MG: 50 INJECTION, SOLUTION INTRAMUSCULAR; INTRAVENOUS at 09:44

## 2020-04-10 RX ADMIN — LIDOCAINE HYDROCHLORIDE 10 ML: 10 INJECTION, SOLUTION EPIDURAL; INFILTRATION; INTRACAUDAL; PERINEURAL at 09:58

## 2020-04-10 RX ADMIN — MIDAZOLAM 0.5 MG: 1 INJECTION INTRAMUSCULAR; INTRAVENOUS at 09:50

## 2020-04-10 RX ADMIN — SODIUM CHLORIDE: 9 INJECTION, SOLUTION INTRAVENOUS at 08:54

## 2020-04-10 RX ADMIN — MIDAZOLAM 0.5 MG: 1 INJECTION INTRAMUSCULAR; INTRAVENOUS at 09:45

## 2020-04-10 RX ADMIN — ONDANSETRON 4 MG: 2 INJECTION INTRAMUSCULAR; INTRAVENOUS at 09:43

## 2020-04-10 RX ADMIN — FENTANYL CITRATE 25 MCG: 50 INJECTION INTRAMUSCULAR; INTRAVENOUS at 09:45

## 2020-04-10 RX ADMIN — FENTANYL CITRATE 25 MCG: 50 INJECTION INTRAMUSCULAR; INTRAVENOUS at 09:50

## 2020-04-10 ASSESSMENT — MIFFLIN-ST. JEOR: SCORE: 1118.38

## 2020-04-10 NOTE — PROGRESS NOTES
Pt here post bone biopsy; site lower back slightly to right; site flat and dry, covered in opsite. Pt sleepy but arouses easily; denies pain; taking po water sips. Report to Chrissy VALLEJO RN.

## 2020-04-10 NOTE — PROGRESS NOTES
Pt has tolerated PO. Ambulated in enriquez with steady gait-denies dizziness/lightheadedness. Urinated without difficulty. Denies pain. Right mid back site remains CDI, soft, flat, non tender. Discharge instructions reviewed with pt, pt verbalizes understanding. PIV d/c'd.     1205 Pt transported to vehicle via wheelchair. Spouse present and providing ride home.

## 2020-04-10 NOTE — IP AVS SNAPSHOT
[address] [address]                                    After Visit Summary   4/10/2020    Debra Denise    MRN: [id number]           After Visit Summary Signature Page    I have received my discharge instructions, and my questions have been answered. I have discussed any challenges I see with this plan with the nurse or doctor.    ..........................................................................................................................................  Patient/Patient Representative Signature      ..........................................................................................................................................  Patient Representative Print Name and Relationship to Patient    ..................................................               ................................................  Date                                   Time    ..........................................................................................................................................  Reviewed by Signature/Title    ...................................................              ..............................................  Date                                               Time          22EPIC Rev 08/18

## 2020-04-10 NOTE — PROGRESS NOTES
Pt arrives to 2a for bone biopsy. Pre procedure assessment completed. H&P is up to date. Consent is signed. PIV placed. INR sent.   Pt requesting that Dr Meek complete procedure-Dr Mcgowan notified.

## 2020-04-10 NOTE — DISCHARGE INSTRUCTIONS
[facility]    Neuro Radiology  Patient Instructions Following Biopsy    AFTER YOU GO HOME  ? If you were given sedation DO NOT drive or operate machinery at home or at work for at least 24 hours  ? DO relax and take it easy for 48 hours, no strenuous activity for 24 hours  ? DO drink plenty of fluids  ? DO resume your regular diet, unless otherwise instructed by your Primary Physician  ? Keep the dressing dry and in place for 24 hours.  ? DO NOT SMOKE FOR AT LEAST 24 HOURS, if you have been given any medications that were to help you relax or sedate you during your procedure  ? DO NOT drink alcoholic beverages the day of your procedure  ? DO NOT do any strenuous exercise or lifting (> 10 lbs) for at least 7 days following your procedure  ? DO NOT take a bath or shower for at least 12 hours following your procedure  ? Remove dressing after shower the next day. Replace with Band aid for 2 days.  Never leave a wet dressing in place.  ? DO NOT make any important or legal decisions for 24 hours following your procedure  ? There should be minimum drainage from the biopsy site    CALL THE PHYSICIAN IF:  ? You start bleeding from the procedure site.  If you do start to bleed from that site, lie down flat and hold pressure on the site for a minimum of 10 minutes.  Your physician will tell you if you need to return to the hospital  ? You develop nausea or vomiting  ? You have excessive swelling, redness, or tenderness at the site  ? You have drainage that looks like it is infected.  ? You experience severe pain  ? You develop hives or a rash or unexplained itching  ? You develop shortness of breath  ? You develop a temperature of 101 degrees F or greater        [facility] NEURO RADIOLOGY DEPARTMENT  Procedure Physician: Dr Meek                                       Date of procedure: April 10, 2020    Telephone Numbers: [phone] After 4:30 pm Monday-Friday, Weekends & Holidays.   Ask for the Neuro Radiologist  on call.  Someone is on call 24 hrs/day    [facility] Emergency Dept: [phone]

## 2020-04-10 NOTE — PROGRESS NOTES
Interventional Radiology Pre-Procedure Sedation Assessment   Time of Assessment: 8:52 AM    Expected Level: Moderate Sedation    Indication: Sedation is required for the following type of Procedure: Biopsy    Sedation and procedural consent: Risks, benefits and alternatives were discussed with Patient    PO Intake: Appropriately NPO for procedure    ASA Class: Class 1 - HEALTHY PATIENT    Mallampati: Grade 1:  Soft palate, uvula, tonsillar pillars, and posterior pharyngeal wall visible    Lungs: Lungs Clear with good breath sounds bilaterally    Heart: Normal heart sounds and rate    History and physical reviewed and no updates needed. I have reviewed the lab findings, diagnostic data, medications, and the plan for sedation. I have determined this patient to be an appropriate candidate for the planned sedation and procedure and have reassessed the patient IMMEDIATELY PRIOR to sedation and procedure.    Arely Mcgowan MD

## 2020-04-10 NOTE — IP AVS SNAPSHOT
MRN:[id number]                      After Visit Summary   4/10/2020    Debra Denise    MRN: [id number]           Visit Information        Department      4/10/2020  7:52 AM [address] [facility]          Review of your medicines      UNREVIEWED medicines. Ask your doctor about these medicines       Dose / Directions   BIOTIN PO      Dose:  1,000 mg  Take 1,000 mg by mouth daily  Refills:  0     CRESTOR PO      Dose:  5 mg  Take 5 mg by mouth three times a week  Refills:  0     gabapentin 100 MG capsule  Commonly known as:  NEURONTIN  Used for:  Benign essential hypertension      Take 2 capsules (200 mg) by mouth At Bedtime  Quantity:  180 capsule  Refills:  0     LORazepam 0.5 MG tablet  Commonly known as:  ATIVAN  Used for:  Anxiety      Take 1 tablet 30 minutes before MRI scan may repeat once if needed on Feburary 23rd and  then take 1 tablet 30 minutes before your MRI scan on February 25th.  Quantity:  3 tablet  Refills:  0     * mesalamine 1000 MG suppository  Commonly known as:  Canasa  Used for:  Proctitis      Take one supp three times per week.  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 Read the directions carefully, and ask your doctor or other care provider to review them with you.            CONTINUE these medicines which have NOT CHANGED       Dose / Directions   NUTRITIONAL SUPPLEMENT PO      Relief Factor: Fish oil and Tumeric.    Take 1 capsule daily  Refills:  0              Protect others around you: Learn how to safely use, store and throw away your medicines at www.disposemymeds.org.       Follow-ups after your visit       Care Instructions       Further instructions from your care team       [facility]    Neuro Radiology  Patient Instructions Following Biopsy    AFTER YOU GO HOME  ? If you were given sedation DO NOT drive or operate machinery at home or at work for at least 24 hours  ? DO relax and take it easy for 48 hours, no strenuous activity for 24 hours  ? DO drink plenty of fluids  ? DO resume your regular diet, unless otherwise instructed by your Primary Physician  ? Keep the dressing dry and in place for 24 hours.  ? DO NOT SMOKE FOR AT LEAST 24 HOURS, if you have been given any medications that were to help you relax or sedate you during your procedure  ? DO NOT drink alcoholic beverages the day of your procedure  ? DO NOT do any strenuous exercise or lifting (> 10 lbs) for at least 7 days following your procedure  ? DO NOT take a bath or shower for at least 12 hours following your procedure  ? Remove dressing after shower the next day. Replace with Band aid for 2 days.  Never leave a wet dressing in place.  ? DO NOT make any important or legal decisions for 24 hours following your procedure  ? There should be minimum drainage from the biopsy site    CALL THE PHYSICIAN IF:  ? You start bleeding from the procedure site.  If you do start to bleed from that site, lie down flat and hold pressure on  "the site for a minimum of 10 minutes.  Your physician will tell you if you need to return to the hospital  ? You develop nausea or vomiting  ? You have excessive swelling, redness, or tenderness at the site  ? You have drainage that looks like it is infected.  ? You experience severe pain  ? You develop hives or a rash or unexplained itching  ? You develop shortness of breath  ? You develop a temperature of 101 degrees F or greater        [facility] NEURO RADIOLOGY DEPARTMENT  Procedure Physician: Dr Meek                                       Date of procedure: April 10, 2020    Telephone Numbers: [phone] After 4:30 pm Monday-Friday, Weekends & Holidays.   Ask for the Neuro Radiologist on call.  Someone is on call 24 hrs/day    [facility] Emergency Dept: [phone]          Additional Information About Your Visit       [employer] Information    [employer] gives you secure access to your electronic health record. If you see a primary care provider, you can also send messages to your care team and make appointments. If you have questions, please call your primary care clinic.  If you do not have a primary care provider, please call [phone] and they will assist you.       Care EveryWhere ID    This is your Care EveryWhere ID. This could be used by other organizations to access your [place] medical records  [id number]       Your Vitals Were  Most recent update: 4/10/2020 11:20 AM    Blood Pressure   141/55            Pulse   54          Temperature   98.2  F (36.8  C) (Oral)          Respirations   16          Height   1.664 m (5' 5.5\")             Weight   64 kg (141 lb)    Pulse Oximetry   98%    BMI (Body Mass Index)   23.11 kg/m           Primary Care Provider Office Phone # Fax #    Wolfgang Zimmerman [phone]      Equal Access to Services    VÍCTOR COATES AH: Kenroy Shore, fran walters, telloybharrison hassan. So wakatt " [phone].    ATENCIÓN: Si robel lam, tiene a fletcher disposición servicios gratuitos de asistencia lingüística. Dorian silva [phone].    We comply with applicable federal and state civil rights laws, including the [place] Human Rights Act. We do not discriminate on the basis of race, color, creed, Samaritan, national origin, marital status, age, disability, sex, sexual orientation, or gender identity.       Thank you!    Thank you for choosing [facility] for your care. Our goal is always to provide you with excellent care. Hearing back from our patients is one way we can continue to improve our services. Please take a few minutes to complete the written survey that you may receive in the mail after you visit with us. Thank you!            Medication List      Medications          Morning Afternoon Evening Bedtime As Needed    NUTRITIONAL SUPPLEMENT PO  INSTRUCTIONS:  Relief Factor: Fish oil and Tumeric.    Take 1 capsule daily                       ASK your doctor about these medications          Morning Afternoon Evening Bedtime As Needed    BIOTIN PO  INSTRUCTIONS:  Take 1,000 mg by mouth daily                     CRESTOR PO  INSTRUCTIONS:  Take 5 mg by mouth three times a week                     gabapentin 100 MG capsule  Also known as:  NEURONTIN  INSTRUCTIONS:  Take 2 capsules (200 mg) by mouth At Bedtime                     LORazepam 0.5 MG tablet  Also known as:  ATIVAN  INSTRUCTIONS:  Take 1 tablet 30 minutes before MRI scan may repeat once if needed on Feburary 23rd and  then take 1 tablet 30 minutes before your MRI scan on February 25th.                     * mesalamine 1000 MG suppository  Also known as:  Canasa  INSTRUCTIONS:  Take one supp three times per week.                     * mesalamine 1000 MG suppository  Also known as:  Canasa  INSTRUCTIONS:  Place 1 suppository (1,000 mg) rectally 2 times daily                     PROBIOTIC PO  INSTRUCTIONS:  Take by mouth daily                     *  Rowasa 4 g Kit  INSTRUCTIONS:  Place 1 enema rectally three times a week                     * Rowasa 4 g Kit  INSTRUCTIONS:  4 grams in 60 mL once daily                     valsartan-hydrochlorothiazide 160-25 MG tablet  Also known as:  DIOVAN HCT  INSTRUCTIONS:  Take 1 tablet by mouth daily                     VITAMIN D3 PO  INSTRUCTIONS:  Take 2,000 Units by mouth daily                        * This list has 4 medication(s) that are the same as other medications prescribed for you. Read the directions carefully, and ask your doctor or other care provider to review them with you.

## 2020-04-16 LAB — COPATH REPORT: NORMAL

## 2020-04-17 ENCOUNTER — TELEPHONE (OUTPATIENT)
Dept: ONCOLOGY | Facility: CLINIC | Age: 80
End: 2020-04-17

## 2020-04-17 NOTE — TELEPHONE ENCOUNTER
Called patient to review vertebral bone biopsy that showed normal marrow with no evidence of malignant process. At this point with normal lab workup, imaging, and biopsy of vertebral lesion being unrevealing malignancy seems very unlikely, and there is no indication for further workup unless new symptoms/problems arise in the future. Back pain is actually better and no other symptoms. She will continue to work with Dr. Zimmerman for her back and other medical issues. We will not schedule follow up with me, but I am glad to see her in the future if new issues arise.    Kristian Orr MD, PhD

## 2020-06-19 DIAGNOSIS — I10 BENIGN ESSENTIAL HYPERTENSION: ICD-10-CM

## 2020-06-23 RX ORDER — VALSARTAN AND HYDROCHLOROTHIAZIDE 160; 25 MG/1; MG/1
1 TABLET ORAL DAILY
Qty: 90 TABLET | Refills: 2 | Status: SHIPPED | OUTPATIENT
Start: 2020-06-23 | End: 2021-03-24

## 2020-08-08 ENCOUNTER — TRANSFERRED RECORDS (OUTPATIENT)
Dept: HEALTH INFORMATION MANAGEMENT | Facility: CLINIC | Age: 80
End: 2020-08-08

## 2020-08-18 ENCOUNTER — TRANSFERRED RECORDS (OUTPATIENT)
Dept: HEALTH INFORMATION MANAGEMENT | Facility: CLINIC | Age: 80
End: 2020-08-18

## 2020-08-20 ENCOUNTER — TELEPHONE (OUTPATIENT)
Dept: INTERNAL MEDICINE | Facility: CLINIC | Age: 80
End: 2020-08-20

## 2020-08-20 NOTE — TELEPHONE ENCOUNTER
Sent [facility] message: Cannot send PT referral to [facility].  Best option is to provide us fax number to her preferred physical therapist than to mail the order to her home address which can take 3-7 business days.      Reinaldo Newell CMA ([facility]) at 11:54 AM on 8/20/2020 '

## 2020-08-20 NOTE — TELEPHONE ENCOUNTER
[facility] Call Center    Phone Message    May a detailed message be left on voicemail: yes     Reason for Call: Other: Patient Request: Patient is requesting a copy of the Physical Therapy order be sent to her via [facility] so she can bring the order to a Physical therapist that she is familiar with. Patient states she would like it by next week because that's when she can get in with her 's Physical Therapist. Please call patient back to advise.     Action Taken: Message routed to:  [facility]    Travel Screening: Not Applicable

## 2020-08-21 NOTE — TELEPHONE ENCOUNTER
See MyChart encounter.     Reinaldo Newell CMA ([facility]) at 11:49 AM on 8/21/2020

## 2020-08-21 NOTE — TELEPHONE ENCOUNTER
[facility] Call Center    Phone Message    May a detailed message be left on voicemail: yes     Reason for Call: Other: Debra calling to give the fax number where her physical therapy orders can be faxed to and also request a call back from the care team. Debra said the fax number [facility] is: [phone]. Debra would like to get this faxed today (8/21/20), or she will not be able to get physical therapy for next week. Debra would like to request a call back from the care team once the orders have been sent over. Please give Debra a call back at your earliest convenience to discuss.     Action Taken: Message routed to:  [facility]  Primary Care    Travel Screening: Not Applicable

## 2020-08-28 ENCOUNTER — TRANSFERRED RECORDS (OUTPATIENT)
Dept: HEALTH INFORMATION MANAGEMENT | Facility: CLINIC | Age: 80
End: 2020-08-28

## 2020-09-08 ENCOUNTER — TRANSFERRED RECORDS (OUTPATIENT)
Dept: HEALTH INFORMATION MANAGEMENT | Facility: CLINIC | Age: 80
End: 2020-09-08

## 2020-09-08 ENCOUNTER — TELEPHONE (OUTPATIENT)
Dept: INTERNAL MEDICINE | Facility: CLINIC | Age: 80
End: 2020-09-08

## 2020-09-08 NOTE — TELEPHONE ENCOUNTER
[facility] Call Center    Phone Message    May a detailed message be left on voicemail: yes     Reason for Call: Other: Pt  calling to make an appt for the pt to get in for ER f/u. Pt  didn't like what the writer  had to offer him. He wants Dr Zimmerman's nurse to call him back to discuss further     Action Taken: Message routed to:  [facility]    Travel Screening: Not Applicable

## 2020-09-09 NOTE — TELEPHONE ENCOUNTER
Pt  said next Wednesday is not good enough. I notified pt  that there was nothing available sooner. Pt  wants a call back from a nurse.

## 2020-09-10 NOTE — TELEPHONE ENCOUNTER
How about next Monday? Does Debra want to be seen in-person or telephone. She could come in at 7:30 AM?

## 2020-09-10 NOTE — TELEPHONE ENCOUNTER
Appointment changed to Monday for in person per provider approval   BRITTANY Urrutia at 12:42 PM on 9/10/2020.

## 2020-09-12 ENCOUNTER — TRANSFERRED RECORDS (OUTPATIENT)
Dept: HEALTH INFORMATION MANAGEMENT | Facility: CLINIC | Age: 80
End: 2020-09-12

## 2020-09-14 ENCOUNTER — OFFICE VISIT (OUTPATIENT)
Dept: INTERNAL MEDICINE | Facility: CLINIC | Age: 80
End: 2020-09-14
Payer: MEDICARE

## 2020-09-14 VITALS
SYSTOLIC BLOOD PRESSURE: 139 MMHG | DIASTOLIC BLOOD PRESSURE: 67 MMHG | TEMPERATURE: 98.1 F | HEART RATE: 54 BPM | BODY MASS INDEX: 23.48 KG/M2 | OXYGEN SATURATION: 97 % | WEIGHT: 143.3 LBS

## 2020-09-14 DIAGNOSIS — I10 BENIGN ESSENTIAL HYPERTENSION: Primary | ICD-10-CM

## 2020-09-14 DIAGNOSIS — Z23 NEED FOR VACCINATION: ICD-10-CM

## 2020-09-14 DIAGNOSIS — R42 DIZZINESS AND GIDDINESS: ICD-10-CM

## 2020-09-14 DIAGNOSIS — I10 BENIGN ESSENTIAL HYPERTENSION: ICD-10-CM

## 2020-09-14 DIAGNOSIS — M25.552 HIP PAIN, LEFT: ICD-10-CM

## 2020-09-14 LAB
ALBUMIN SERPL-MCNC: 3.7 G/DL (ref 3.4–5)
ALBUMIN UR-MCNC: NEGATIVE MG/DL
ALP SERPL-CCNC: 59 U/L (ref 40–150)
ALT SERPL W P-5'-P-CCNC: 28 U/L (ref 0–50)
ANION GAP SERPL CALCULATED.3IONS-SCNC: 6 MMOL/L (ref 3–14)
APPEARANCE UR: CLEAR
AST SERPL W P-5'-P-CCNC: 12 U/L (ref 0–45)
BASOPHILS # BLD AUTO: 0.1 10E9/L (ref 0–0.2)
BASOPHILS NFR BLD AUTO: 0.7 %
BILIRUB SERPL-MCNC: 0.6 MG/DL (ref 0.2–1.3)
BILIRUB UR QL STRIP: NEGATIVE
BUN SERPL-MCNC: 13 MG/DL (ref 7–30)
CALCIUM SERPL-MCNC: 9.7 MG/DL (ref 8.5–10.1)
CHLORIDE SERPL-SCNC: 102 MMOL/L (ref 94–109)
CHOLEST SERPL-MCNC: 179 MG/DL
CO2 SERPL-SCNC: 30 MMOL/L (ref 20–32)
COLOR UR AUTO: YELLOW
CREAT SERPL-MCNC: 0.77 MG/DL (ref 0.52–1.04)
CREAT UR-MCNC: 28 MG/DL
CRP SERPL-MCNC: <2.9 MG/L (ref 0–8)
DIFFERENTIAL METHOD BLD: NORMAL
EOSINOPHIL # BLD AUTO: 0.2 10E9/L (ref 0–0.7)
EOSINOPHIL NFR BLD AUTO: 2.3 %
ERYTHROCYTE [DISTWIDTH] IN BLOOD BY AUTOMATED COUNT: 12.4 % (ref 10–15)
ERYTHROCYTE [SEDIMENTATION RATE] IN BLOOD BY WESTERGREN METHOD: 7 MM/H (ref 0–30)
GFR SERPL CREATININE-BSD FRML MDRD: 73 ML/MIN/{1.73_M2}
GLUCOSE SERPL-MCNC: 94 MG/DL (ref 70–99)
GLUCOSE UR STRIP-MCNC: NEGATIVE MG/DL
HCT VFR BLD AUTO: 42.8 % (ref 35–47)
HDLC SERPL-MCNC: 52 MG/DL
HGB BLD-MCNC: 14.2 G/DL (ref 11.7–15.7)
HGB UR QL STRIP: NEGATIVE
IMM GRANULOCYTES # BLD: 0 10E9/L (ref 0–0.4)
IMM GRANULOCYTES NFR BLD: 0.4 %
INTERPRETATION ECG - MUSE: NORMAL
KETONES UR STRIP-MCNC: NEGATIVE MG/DL
LDLC SERPL CALC-MCNC: 98 MG/DL
LEUKOCYTE ESTERASE UR QL STRIP: NEGATIVE
LYMPHOCYTES # BLD AUTO: 1.9 10E9/L (ref 0.8–5.3)
LYMPHOCYTES NFR BLD AUTO: 25.2 %
MCH RBC QN AUTO: 30.7 PG (ref 26.5–33)
MCHC RBC AUTO-ENTMCNC: 33.2 G/DL (ref 31.5–36.5)
MCV RBC AUTO: 92 FL (ref 78–100)
MONOCYTES # BLD AUTO: 0.9 10E9/L (ref 0–1.3)
MONOCYTES NFR BLD AUTO: 11.4 %
NEUTROPHILS # BLD AUTO: 4.6 10E9/L (ref 1.6–8.3)
NEUTROPHILS NFR BLD AUTO: 60 %
NITRATE UR QL: NEGATIVE
NONHDLC SERPL-MCNC: 128 MG/DL
NRBC # BLD AUTO: 0 10*3/UL
NRBC BLD AUTO-RTO: 0 /100
PH UR STRIP: 7 PH (ref 5–7)
PLATELET # BLD AUTO: 364 10E9/L (ref 150–450)
POTASSIUM SERPL-SCNC: 3.8 MMOL/L (ref 3.4–5.3)
PROT SERPL-MCNC: 7.4 G/DL (ref 6.8–8.8)
PROT UR-MCNC: <0.05 G/L
PROT/CREAT 24H UR: NORMAL G/G CR (ref 0–0.2)
RBC # BLD AUTO: 4.63 10E12/L (ref 3.8–5.2)
RBC #/AREA URNS AUTO: <1 /HPF (ref 0–2)
SODIUM SERPL-SCNC: 138 MMOL/L (ref 133–144)
SOURCE: NORMAL
SP GR UR STRIP: 1 (ref 1–1.03)
TRIGL SERPL-MCNC: 149 MG/DL
TROPONIN I SERPL-MCNC: <0.015 UG/L (ref 0–0.04)
UROBILINOGEN UR STRIP-MCNC: 0 MG/DL (ref 0–2)
WBC # BLD AUTO: 7.7 10E9/L (ref 4–11)
WBC #/AREA URNS AUTO: <1 /HPF (ref 0–5)

## 2020-09-14 RX ORDER — AMLODIPINE BESYLATE 5 MG/1
2.5 TABLET ORAL DAILY
COMMUNITY
Start: 2020-09-13 | End: 2020-11-18

## 2020-09-14 ASSESSMENT — PAIN SCALES - GENERAL: PAINLEVEL: MILD PAIN (3)

## 2020-09-14 NOTE — NURSING NOTE
Chief Complaint   Patient presents with     Hypertension     pt here for high blood pressure       Yeimy Romero CMA at 7:28 AM on 9/14/2020.

## 2020-09-14 NOTE — PATIENT INSTRUCTIONS
Primary Care Center Medication Refill Request Information:  * Please contact your pharmacy regarding ANY request for medication refills.  ** [facility] Prescription Fax = [phone]  * Please allow 3 business days for routine medication refills.  * Please allow 5 business days for controlled substance medication refills.     Primary Care Center Test Result notification information:  *You will be notified with in 7-10 days of your appointment day regarding the results of your test.  If you are on MyChart you will be notified as soon as the provider has reviewed the results and signed off on them.    Primary Care Center: [phone]     ECHO [phone] (3rd Floor [facility], 3-S)     Radiology:  [phone] [facility]  [phone] [facility]  [phone] [facility]    [facility] [phone] (4th Floor [facility])

## 2020-09-16 ENCOUNTER — TELEPHONE (OUTPATIENT)
Dept: INTERNAL MEDICINE | Facility: CLINIC | Age: 80
End: 2020-09-16

## 2020-09-16 DIAGNOSIS — F41.9 ANXIETY: ICD-10-CM

## 2020-09-16 NOTE — TELEPHONE ENCOUNTER
Patient asking for Rx before MRI on Thursday 9/24/20. Rx pended. Will send to provider to review. Danitza Dorado LPN 9/16/2020 11:44 AM

## 2020-09-18 NOTE — TELEPHONE ENCOUNTER
Action Right hip pain/no img/Wolfgang Zimmerman MD/medicare [facility]/ortho con   Action Taken records in epic

## 2020-09-19 RX ORDER — LORAZEPAM 0.5 MG/1
TABLET ORAL
Qty: 3 TABLET | Refills: 0 | Status: SHIPPED | OUTPATIENT
Start: 2020-09-19 | End: 2020-10-05

## 2020-09-22 ENCOUNTER — HOSPITAL ENCOUNTER (OUTPATIENT)
Dept: CARDIOLOGY | Facility: CLINIC | Age: 80
Discharge: HOME | End: 2020-09-22
Attending: INTERNAL MEDICINE | Admitting: INTERNAL MEDICINE
Payer: MEDICARE

## 2020-09-22 DIAGNOSIS — I10 BENIGN ESSENTIAL HYPERTENSION: ICD-10-CM

## 2020-09-22 PROCEDURE — 93306 TTE W/DOPPLER COMPLETE: CPT | Mod: 26 | Performed by: STUDENT IN AN ORGANIZED HEALTH CARE EDUCATION/TRAINING PROGRAM

## 2020-09-22 PROCEDURE — 93306 TTE W/DOPPLER COMPLETE: CPT

## 2020-09-24 ENCOUNTER — HOSPITAL ENCOUNTER (OUTPATIENT)
Dept: MRI IMAGING | Facility: CLINIC | Age: 80
Discharge: HOME | End: 2020-09-24
Attending: INTERNAL MEDICINE | Admitting: INTERNAL MEDICINE
Payer: MEDICARE

## 2020-09-24 DIAGNOSIS — I10 BENIGN ESSENTIAL HYPERTENSION: ICD-10-CM

## 2020-09-24 DIAGNOSIS — R42 DIZZINESS AND GIDDINESS: ICD-10-CM

## 2020-09-24 PROCEDURE — A9585 GADOBUTROL INJECTION: HCPCS | Performed by: INTERNAL MEDICINE

## 2020-09-24 PROCEDURE — 70549 MR ANGIOGRAPH NECK W/O&W/DYE: CPT

## 2020-09-24 PROCEDURE — 25500064 ZZH RX 255 OP 636: Performed by: INTERNAL MEDICINE

## 2020-09-24 PROCEDURE — 25800030 ZZH RX IP 258 OP 636: Performed by: INTERNAL MEDICINE

## 2020-09-24 RX ORDER — GADOBUTROL 604.72 MG/ML
7.5 INJECTION INTRAVENOUS ONCE
Status: COMPLETED | OUTPATIENT
Start: 2020-09-24 | End: 2020-09-24

## 2020-09-24 RX ADMIN — GADOBUTROL 6.5 ML: 604.72 INJECTION INTRAVENOUS at 10:43

## 2020-09-24 RX ADMIN — SODIUM CHLORIDE 40 ML: 9 INJECTION, SOLUTION INTRAVENOUS at 10:43

## 2020-09-25 ENCOUNTER — TELEPHONE (OUTPATIENT)
Dept: INTERNAL MEDICINE | Facility: CLINIC | Age: 80
End: 2020-09-25

## 2020-09-25 NOTE — TELEPHONE ENCOUNTER
[facility] Call Center    Phone Message    May a detailed message be left on voicemail: yes     Reason for Call: Symptoms or Concerns     If patient has red-flag symptoms, warm transfer to triage line    Current symptom or concern: Per Patients  states Patient woke up feeling very dizzy and states has some nausea also. Patients  states Patient had an MRI yesterday on the brain and wanting to know what to do. Patients  state the dizziness is very bad. Patients  states she has been drinking water.     Symptoms have been present for:  1 day(s)    Has patient previously been seen for this? No    By [facility]    Date: 9/25/2020    Are there any new or worsening symptoms? Yes: the dizziness is very bad      Action Taken: Message routed to:  [facility]    Travel Screening: Not Applicable

## 2020-09-26 ENCOUNTER — TELEPHONE (OUTPATIENT)
Dept: INTERNAL MEDICINE | Facility: CLINIC | Age: 80
End: 2020-09-26

## 2020-09-26 DIAGNOSIS — I10 BENIGN ESSENTIAL HYPERTENSION: Primary | ICD-10-CM

## 2020-09-26 NOTE — TELEPHONE ENCOUNTER
Placed cardiology referral this encounter      Dear Dia;    Your resting heart ultrasound results are attached and there are some subtle findings. Given your increased recent blood pressure, I recommend you have a visit (can be virtual) with the cardiology doctors and I placed referral today. We can also discuss in more detail when I see you on 10/8/2020.    RUTH Zimmerman MD

## 2020-09-28 NOTE — TELEPHONE ENCOUNTER
Called [facility] and left voice message:  Advised urgent care or ED since patient dizziness was reported bad. Scheduled an in-clinic visit to see Dr. Zimmerman on Wednesday at 3:30 PM if pt choose to see Dr. Zimmerman instead and can wait and dizziness is not severe. Otherwise, ED or urgent care if dizziness is severe.         Reinaldo Newell CMA ([facility]) at 10:13 AM on 9/28/2020

## 2020-09-28 NOTE — TELEPHONE ENCOUNTER
Called patient:  Pt reports of dizziness the day after her MRI.  Her dizziness is now resolved and is not of a concern.  Pt will see Dr. Zimmerman on 10/08/20.  Wednesday appt cancelled.     Reinaldo Newell CMA ([facility]) at 3:24 PM on 9/28/2020

## 2020-10-01 ENCOUNTER — TELEPHONE (OUTPATIENT)
Dept: INTERNAL MEDICINE | Facility: CLINIC | Age: 80
End: 2020-10-01

## 2020-10-01 NOTE — TELEPHONE ENCOUNTER
Health Call Center    Phone Message    May a detailed message be left on voicemail: yes     Reason for Call: Medication Question or concern regarding medication   Prescription Clarification  Name of Medication: amLODIPine (NORVASC) 5 MG tablet  Prescribing Provider: Elsie   Pharmacy: [id number] - [place], [place] - [address] [place] [address]   What on the order needs clarification? Per Patient states has been taking medication for 3 weeks, and states still having side effects. Patient states has dizziness and nausea and swelling in feet and muscle twitches. Patient is wanting to know if it is dangerous to stay on the medication or if should be prescribed a different medication.    Patient states has had dizziness since beginning and thinks it is getting worse rather than better. Patient states the medication seems to work just has side effects. Please advise.           Action Taken: Message routed to:  [facility] min    Travel Screening: Not Applicable

## 2020-10-02 NOTE — PROGRESS NOTES
Pt is a 79 year old female referred by Dr Zimmerman here today for:     Bilateral Hip pain :   Location? Groin of both hips. Primarily in her left side. Some low back pain (longstanding).   Duration? 10 months  Injury/ Inciting activity? Hip stretches, brisk walking, too much bending, and lying down.   Pop? No  Swelling/Bruising? No  Limited motion? Yes  Snapping/ Clicking? No  Giving way/ instability? No  Numbness/Tingling? No, but she experiences radiating pain down both legs.  Imaging? Xrays today  Treatment? Tylenol and stretches. Gabapentin for low back pain.     Polymyalgia rheumatica - was on pred 15 mg long ago and weaned off >6 yrs ago  Normal sed rate and CRP in 2020     Past Medical History:   Diagnosis Date     Hypertension      Polymyalgia rheumatica (H)       Past Surgical History:   Procedure Laterality Date     COLONOSCOPY  2014    Procedure: COMBINED COLONOSCOPY, SINGLE BIOPSY/POLYPECTOMY BY BIOPSY;  COLONOSCOPY;  Surgeon: Carol Ann Plasencia MD;  Location:  GI     ENT SURGERY      tonsilectomy, adenoidectomy     GYN SURGERY  ,     x 2     ORTHOPEDIC SURGERY  2004    (R) shoulder surgery for frozen shoulder     ZAC BSO      for fibroids      Current Outpatient Medications   Medication Sig Dispense Refill     amLODIPine (NORVASC) 5 MG tablet Take 2.5 mg by mouth daily       BIOTIN PO Take 1,000 mg by mouth daily        Cholecalciferol (VITAMIN D3 PO) Take 2,000 Units by mouth daily        gabapentin (NEURONTIN) 100 MG capsule Take 2 capsules (200 mg) by mouth At Bedtime 180 capsule 3     Mesalamine-Cleanser (ROWASA) 4 g KIT Place 1 enema rectally three times a week 4 kit 3     Nutritional Supplements (NUTRITIONAL SUPPLEMENT PO) Relief Factor: Fish oil and Tumeric.    Take 1 capsule daily       Probiotic Product (PROBIOTIC PO) Take by mouth daily       valsartan-hydrochlorothiazide (DIOVAN HCT) 160-25 MG tablet Take 1 tablet by mouth daily 90 tablet 2      Allergies  "  Allergen Reactions     Atenolol      Other reaction(s): Intolerance-Can't Take  Fatigue     Ibuprofen      Other reaction(s): Stomach Upset  4-9-13 tele encounter     Naproxen      Other reaction(s): Stomach Upset  4-9-13 tele encounter     Ramipril Cough     Other reaction(s): Intolerance-Can't Take     Sulfa Drugs      Codeine Nausea     Fentanyl Nausea      Social History     Tobacco Use     Smoking status: Never Smoker     Smokeless tobacco: Never Used   Substance Use Topics     Alcohol use: No     Drug use: No      Family History   Problem Relation Age of Onset     Cancer - colorectal Father      Lung Cancer Father      Macular Degeneration Father      Multiple myeloma Brother      Pacemaker Brother      Hypertension Mother      Cerebrovascular Disease Mother         temporal arteritis     Suicide Sister      Hypertension Maternal Grandmother      Cerebrovascular Disease Maternal Grandmother       ROS:   Gen- no fevers/chills   Rheum - no morning stiffness   Derm - no rash/ redness   Neuro - no numbness, no tingling   Remainder of ROS negative.     Exam:   Ht 1.664 m (5' 5.5\")   Wt 64.9 kg (143 lb)   BMI 23.43 kg/m         Bilateral Hip:   Inspection: Swelling - No; Bruising - NO  ROM: Flexion -full; Extension - full; ER - full; IR -full; Abduction -full; Adduction full  Strength: Full in all planes; Pain with resisted abduction in groin  Tenderness: Trochanter - YES ASIS - No; Inguinal Ligament - NO; Pubic Symphysis - NO; Ischial Tuberosity- NO; Hamstring - Neg; SI Joint - Neg.   Maneuvers: HUSSAIN - POS for groin pain; FADIR - Pos for groin pain; Atul - neg; Grind - Neg ; Flaquito - Neg; SI joint - Neg; Trendelenburg - Pos      [place] [facility] [place] w/ pt    Spurring osteophytes of acetabulum, R>L  Moderate OA       (M16.0) Primary osteoarthritis of both hips  (primary encounter diagnosis)  Comment: exam consistent w/ mild hip OA; reviewed tx options including meds/ " "PT/ injections/ surgery; I recommended trying PT again; she was hesitant given she feels she had a great PT in the past who relocated and then her colleague who took over aggravated her pain; I recommended requesting the \"hip specialist\" at a new PT facility and progressing slowly; f/u prn  Plan: PHYSICAL THERAPY REFERRAL (Internal)            Davis Robert MD  October 6, 2020  11:53 AM    "

## 2020-10-02 NOTE — TELEPHONE ENCOUNTER
Action    Action Taken 10-2 add on: requested EKGs 9-13-20 and 9-8-20 from [facility]     Action 10.2.20 MJ    Action Taken Received EKG strips from [employer], sent to scanning.

## 2020-10-03 ENCOUNTER — TELEPHONE (OUTPATIENT)
Dept: INTERNAL MEDICINE | Facility: CLINIC | Age: 80
End: 2020-10-03

## 2020-10-03 NOTE — TELEPHONE ENCOUNTER
Dear Kasey;    (1) She can stop Amlodipine    (2) I recommend she keep her Cardiology appt. With Dr. Spencer on 10/5/2020    (3) I would be happy to see her in person next week as well. She does not have an appt.    ThanksRUTH

## 2020-10-03 NOTE — TELEPHONE ENCOUNTER
----- Message from Audi Marinelli MD sent at 9/14/2020  4:26 PM CDT -----  I probably wouldn't lola it as would avoid surgery in an 80 yo.  You could try just starting her on a low dose spironolactone 25 mg daily and see how she does.  ----- Message -----  From: Wolfgang Zimmerman MD  Sent: 9/14/2020   4:09 PM CDT  To: Audi Marinelli MD    Dear Bakari;    I saw Ms. Bae today. She had been in outside ED last week (twice) for elevated BP with some Head aches. BP is better now with the additional of Amlodipine. I went back an looked at her records. She had an adrenal adenoma last spring. Biochemical labs unremarkable except for elevated renin? Any reason to pursue this?    Thanks, Jason MOLINA

## 2020-10-05 ENCOUNTER — PRE VISIT (OUTPATIENT)
Dept: CARDIOLOGY | Facility: CLINIC | Age: 80
End: 2020-10-05

## 2020-10-05 ENCOUNTER — VIRTUAL VISIT (OUTPATIENT)
Dept: CARDIOLOGY | Facility: CLINIC | Age: 80
End: 2020-10-05
Attending: INTERNAL MEDICINE
Payer: MEDICARE

## 2020-10-05 ENCOUNTER — PRE VISIT (OUTPATIENT)
Dept: ORTHOPEDICS | Facility: CLINIC | Age: 80
End: 2020-10-05

## 2020-10-05 DIAGNOSIS — E78.5 DYSLIPIDEMIA: ICD-10-CM

## 2020-10-05 DIAGNOSIS — M25.551 RIGHT HIP PAIN: Primary | ICD-10-CM

## 2020-10-05 DIAGNOSIS — I10 BENIGN ESSENTIAL HYPERTENSION: Primary | ICD-10-CM

## 2020-10-05 PROCEDURE — 99203 OFFICE O/P NEW LOW 30 MIN: CPT | Mod: 95 | Performed by: INTERNAL MEDICINE

## 2020-10-05 NOTE — TELEPHONE ENCOUNTER
Message left for patient informing of PCP recommendations to stop amlodipine, follow up with cardiology today as scheduled. Patient does have an appointment scheduled for 10/8, advised patient to keep this appointment as well.    Kasey Dailey RN ([place])

## 2020-10-05 NOTE — PATIENT INSTRUCTIONS
Patient Instructions:  It was a pleasure to see you in the cardiology clinic today.      If you have any questions, call  Nereida Beal RN, at [phone].  Press Option #1 for the [facility], and then press Option #4  We are encouraging the use of [employer] to communicate with your HealthCare Provider    Note the new or changes to your medications: Reduce amlodipine to 2.5 mg daily.     Please follow up with Dr. Spencer as needed.     Monitor your blood pressure at home. Follow-up with Dr. Zimmerman this Thursday for reassessment.       If you have an urgent need after hours (8:00 am to 4:30 pm) please call [phone] and ask for the cardiology fellow on call.
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
## 2020-10-06 ENCOUNTER — OFFICE VISIT (OUTPATIENT)
Dept: ORTHOPEDICS | Facility: CLINIC | Age: 80
End: 2020-10-06
Attending: INTERNAL MEDICINE
Payer: MEDICARE

## 2020-10-06 ENCOUNTER — ANCILLARY PROCEDURE (OUTPATIENT)
Dept: GENERAL RADIOLOGY | Facility: CLINIC | Age: 80
End: 2020-10-06
Payer: MEDICARE

## 2020-10-06 VITALS — WEIGHT: 143 LBS | HEIGHT: 66 IN | BODY MASS INDEX: 22.98 KG/M2

## 2020-10-06 DIAGNOSIS — M16.0 PRIMARY OSTEOARTHRITIS OF BOTH HIPS: Primary | ICD-10-CM

## 2020-10-06 DIAGNOSIS — M25.551 RIGHT HIP PAIN: ICD-10-CM

## 2020-10-06 PROCEDURE — 99214 OFFICE O/P EST MOD 30 MIN: CPT | Performed by: FAMILY MEDICINE

## 2020-10-06 PROCEDURE — 73523 X-RAY EXAM HIPS BI 5/> VIEWS: CPT | Mod: GC | Performed by: RADIOLOGY

## 2020-10-06 ASSESSMENT — MIFFLIN-ST. JEOR: SCORE: 1127.45

## 2020-10-06 NOTE — LETTER
10/6/2020      RE: Debra Denise  [address]  [facility] [address]       Pt is a 79 year old female referred by Dr Zimmerman here today for:     Bilateral Hip pain :   Location? Groin of both hips. Primarily in her left side. Some low back pain (longstanding).   Duration? 10 months  Injury/ Inciting activity? Hip stretches, brisk walking, too much bending, and lying down.   Pop? No  Swelling/Bruising? No  Limited motion? Yes  Snapping/ Clicking? No  Giving way/ instability? No  Numbness/Tingling? No, but she experiences radiating pain down both legs.  Imaging? Xrays today  Treatment? Tylenol and stretches. Gabapentin for low back pain.     Polymyalgia rheumatica - was on pred 15 mg long ago and weaned off >6 yrs ago  Normal sed rate and CRP in 2020     Past Medical History:   Diagnosis Date     Hypertension      Polymyalgia rheumatica (H)       Past Surgical History:   Procedure Laterality Date     COLONOSCOPY  2014    Procedure: COMBINED COLONOSCOPY, SINGLE BIOPSY/POLYPECTOMY BY BIOPSY;  COLONOSCOPY;  Surgeon: Carol Ann Plasencia MD;  Location:  GI     ENT SURGERY      tonsilectomy, adenoidectomy     GYN SURGERY  ,     x 2     ORTHOPEDIC SURGERY  2004    (R) shoulder surgery for frozen shoulder     ZAC BSO      for fibroids      Current Outpatient Medications   Medication Sig Dispense Refill     amLODIPine (NORVASC) 5 MG tablet Take 2.5 mg by mouth daily       BIOTIN PO Take 1,000 mg by mouth daily        Cholecalciferol (VITAMIN D3 PO) Take 2,000 Units by mouth daily        gabapentin (NEURONTIN) 100 MG capsule Take 2 capsules (200 mg) by mouth At Bedtime 180 capsule 3     Mesalamine-Cleanser (ROWASA) 4 g KIT Place 1 enema rectally three times a week 4 kit 3     Nutritional Supplements (NUTRITIONAL SUPPLEMENT PO) Relief Factor: Fish oil and Tumeric.    Take 1 capsule daily       Probiotic Product (PROBIOTIC PO) Take by mouth daily        "valsartan-hydrochlorothiazide (DIOVAN HCT) 160-25 MG tablet Take 1 tablet by mouth daily 90 tablet 2      Allergies   Allergen Reactions     Atenolol      Other reaction(s): Intolerance-Can't Take  Fatigue     Ibuprofen      Other reaction(s): Stomach Upset  4-9-13 tele encounter     Naproxen      Other reaction(s): Stomach Upset  4-9-13 tele encounter     Ramipril Cough     Other reaction(s): Intolerance-Can't Take     Sulfa Drugs      Codeine Nausea     Fentanyl Nausea      Social History     Tobacco Use     Smoking status: Never Smoker     Smokeless tobacco: Never Used   Substance Use Topics     Alcohol use: No     Drug use: No      Family History   Problem Relation Age of Onset     Cancer - colorectal Father      Lung Cancer Father      Macular Degeneration Father      Multiple myeloma Brother      Pacemaker Brother      Hypertension Mother      Cerebrovascular Disease Mother         temporal arteritis     Suicide Sister      Hypertension Maternal Grandmother      Cerebrovascular Disease Maternal Grandmother       ROS:   Gen- no fevers/chills   Rheum - no morning stiffness   Derm - no rash/ redness   Neuro - no numbness, no tingling   Remainder of ROS negative.     Exam:   Ht 1.664 m (5' 5.5\")   Wt 64.9 kg (143 lb)   BMI 23.43 kg/m         Bilateral Hip:   Inspection: Swelling - No; Bruising - NO  ROM: Flexion -full; Extension - full; ER - full; IR -full; Abduction -full; Adduction full  Strength: Full in all planes; Pain with resisted abduction in groin  Tenderness: Trochanter - YES ASIS - No; Inguinal Ligament - NO; Pubic Symphysis - NO; Ischial Tuberosity- NO; Hamstring - Neg; SI Joint - Neg.   Maneuvers: HUSSAIN - POS for groin pain; FADIR - Pos for groin pain; Atul - neg; Grind - Neg ; Flqauito - Neg; SI joint - Neg; Trendelenburg - Pos      Xray bilateral hips - 10/6/20 at [facility] - films personally reviewed w/ pt    Spurring osteophytes of acetabulum, R>L  Moderate OA       (M16.0) Primary osteoarthritis of " "both hips  (primary encounter diagnosis)  Comment: exam consistent w/ mild hip OA; reviewed tx options including meds/ PT/ injections/ surgery; I recommended trying PT again; she was hesitant given she feels she had a great PT in the past who relocated and then her colleague who took over aggravated her pain; I recommended requesting the \"hip specialist\" at a new PT facility and progressing slowly; f/u prn  Plan: PHYSICAL THERAPY REFERRAL (Internal)            Davis Robert MD  October 6, 2020  11:53 AM        Davis Robert MD    "

## 2020-10-08 ENCOUNTER — OFFICE VISIT (OUTPATIENT)
Dept: INTERNAL MEDICINE | Facility: CLINIC | Age: 80
End: 2020-10-08
Payer: MEDICARE

## 2020-10-08 VITALS
HEART RATE: 58 BPM | SYSTOLIC BLOOD PRESSURE: 137 MMHG | DIASTOLIC BLOOD PRESSURE: 69 MMHG | OXYGEN SATURATION: 97 % | BODY MASS INDEX: 23.43 KG/M2 | WEIGHT: 143 LBS

## 2020-10-08 DIAGNOSIS — Z28.9 VACCINATION DELAY: Primary | ICD-10-CM

## 2020-10-08 DIAGNOSIS — Z23 ENCOUNTER FOR IMMUNIZATION: ICD-10-CM

## 2020-10-08 DIAGNOSIS — I10 BENIGN ESSENTIAL HYPERTENSION: ICD-10-CM

## 2020-10-08 PROCEDURE — 90662 IIV NO PRSV INCREASED AG IM: CPT | Performed by: INTERNAL MEDICINE

## 2020-10-08 PROCEDURE — G0008 ADMIN INFLUENZA VIRUS VAC: HCPCS | Performed by: INTERNAL MEDICINE

## 2020-10-08 PROCEDURE — 99214 OFFICE O/P EST MOD 30 MIN: CPT | Mod: 25 | Performed by: INTERNAL MEDICINE

## 2020-10-08 RX ORDER — AMLODIPINE BESYLATE 2.5 MG/1
2.5 TABLET ORAL DAILY
Qty: 90 TABLET | Refills: 1 | Status: SHIPPED | OUTPATIENT
Start: 2020-10-08 | End: 2021-02-10

## 2020-10-08 ASSESSMENT — PAIN SCALES - GENERAL: PAINLEVEL: NO PAIN (0)

## 2020-10-08 NOTE — NURSING NOTE
Chief Complaint   Patient presents with     Back Pain     pt here to discuss back issue     Dizziness     pt here to discuss dizziness       Jeffrey Esqueda CMA, EMT at 1:40 PM on 10/8/2020.

## 2020-10-08 NOTE — PROGRESS NOTES
HPI:    Ms. Denise comes in for follow up today. She has HTN and some dizziness. Minor nausea. Overall feeling better. Her BP is controlled and she feels less LE swelling on 2.5 mg Amlodipine. She was seen by Dr. Robert, ortho 10/6/2020 for back and hip pain. No other HEENT, cardiopulmonary, abdominal, , GYN, neurological, systemic, psychiatric, lymphatic, endocrine complaints.     Past Medical History:   Diagnosis Date     Hypertension      Polymyalgia rheumatica (H)      Past Surgical History:   Procedure Laterality Date     COLONOSCOPY  2014    Procedure: COMBINED COLONOSCOPY, SINGLE BIOPSY/POLYPECTOMY BY BIOPSY;  COLONOSCOPY;  Surgeon: Carol Ann Plasencia MD;  Location:  GI     ENT SURGERY      tonsilectomy, adenoidectomy     GYN SURGERY  ,     x 2     ORTHOPEDIC SURGERY  2004    (R) shoulder surgery for frozen shoulder     ZAC BSO      for fibroids     PE:    Vitals noted, gen, nad, cooperative, alert, she is wearing a mask, neck supple nl rom, lungs with good air movement, RRR, S1, S2, no MRG, abdomen, no acute finding and grossly normal neurological exam    1. HTN: stable on current medications and lower dose Amlodipine (2.5 mg). She had a visit with Dr. Spencer, Cardiology 10/5/2020 who commented on her resting echo from 2020.  2. Dizziness; sxs. Mostly resolved and she had normal MRI brain imaging 2020  3. Influenza vaccination today  4. Orthopedics seen by Dr. Robert 10/6/2020 and will continue with physical therapy.   5. Mammogram 10/24/2019    Total time spent 25 minutes.  More than 50% of the time spent with Ms. Denise on counseling / coordinating her care

## 2020-10-08 NOTE — PATIENT INSTRUCTIONS
[facility] Medication Refill Request Information:  * Please contact your pharmacy regarding ANY request for medication refills.  ** [facility] Prescription Fax = [phone]  * Please allow 3 business days for routine medication refills.  * Please allow 5 business days for controlled substance medication refills.     [facility] Test Result notification information:  *You will be notified with in 7-10 days of your appointment day regarding the results of your test.  If you are on MyChart you will be notified as soon as the provider has reviewed the results and signed off on them.    [facility]: [phone]

## 2020-10-19 ENCOUNTER — THERAPY VISIT (OUTPATIENT)
Dept: PHYSICAL THERAPY | Facility: CLINIC | Age: 80
End: 2020-10-19
Attending: FAMILY MEDICINE
Payer: MEDICARE

## 2020-10-19 DIAGNOSIS — M16.0 PRIMARY OSTEOARTHRITIS OF BOTH HIPS: ICD-10-CM

## 2020-10-19 PROCEDURE — 97112 NEUROMUSCULAR REEDUCATION: CPT | Mod: GP | Performed by: PHYSICAL THERAPIST

## 2020-10-19 PROCEDURE — 97161 PT EVAL LOW COMPLEX 20 MIN: CPT | Mod: GP | Performed by: PHYSICAL THERAPIST

## 2020-10-19 PROCEDURE — 97110 THERAPEUTIC EXERCISES: CPT | Mod: GP | Performed by: PHYSICAL THERAPIST

## 2020-10-19 ASSESSMENT — ACTIVITIES OF DAILY LIVING (ADL)
HOW_WOULD_YOU_RATE_YOUR_CURRENT_LEVEL_OF_FUNCTION_DURING_YOUR_USUAL_ACTIVITIES_OF_DAILY_LIVING_FROM_0_TO_100_WITH_100_BEING_YOUR_LEVEL_OF_FUNCTION_PRIOR_TO_YOUR_HIP_PROBLEM_AND_0_BEING_THE_INABILITY_TO_PERFORM_ANY_OF_YOUR_USUAL_DAILY_ACTIVITIES?: 50
HEAVY_WORK: SLIGHT DIFFICULTY
WALKING_UP_STEEP_HILLS: SLIGHT DIFFICULTY
STANDING_FOR_15_MINUTES: SLIGHT DIFFICULTY
LIGHT_TO_MODERATE_WORK: MODERATE DIFFICULTY
HOS_ADL_ITEM_SCORE_TOTAL: 27
GETTING_INTO_AND_OUT_OF_AN_AVERAGE_CAR: SLIGHT DIFFICULTY
RECREATIONAL_ACTIVITIES: SLIGHT DIFFICULTY
TWISTING/PIVOTING_ON_INVOLVED_LEG: EXTREME DIFFICULTY
HOS_ADL_COUNT: 11
PUTTING_ON_SOCKS_AND_SHOES: MODERATE DIFFICULTY
STEPPING_UP_AND_DOWN_CURBS: SLIGHT DIFFICULTY
SITTING_FOR_15_MINUTES: SLIGHT DIFFICULTY
HOS_ADL_HIGHEST_POTENTIAL_SCORE: 44
ROLLING_OVER_IN_BED: MODERATE DIFFICULTY
DEEP_SQUATTING: EXTREME DIFFICULTY
WALKING_DOWN_STEEP_HILLS: SLIGHT DIFFICULTY

## 2020-10-19 NOTE — LETTER
DEPARTMENT OF HEALTH AND HUMAN SERVICES  CENTERS FOR MEDICARE & MEDICAID SERVICES    PLAN/UPDATED PLAN OF PROGRESS FOR OUTPATIENT REHABILITATION    PATIENTS NAME:  Debra Denise     : 1940    PROVIDER NUMBER:    [id number]    HICN:  [id number]    PROVIDER NAME: [place] [facility] [place] [facility]    MEDICAL RECORD NUMBER: [id number]     START OF CARE DATE:  SOC Date: 10/19/20   TYPE:  PT    PRIMARY/TREATMENT DIAGNOSIS: (Pertinent Medical Diagnosis)  Primary osteoarthritis of both hips    VISITS FROM START OF CARE:  Rxs Used: 1     [place] [facility] Initial Evaluation  Subjective:  Patient Health History  Debra Denise being seen for Hip and back pain.     Problem began: 10/6/2020 (MD visit).   Problem occurred: Has had low back pain most of the lift, 2 C-sections, had HNP in 's.  2020 fell and re-provoked pain, is unsure if pain is B hips or low back.  Had therapy in the past but now painful to do those exercises.   Pain is reported as 8/10 on pain scale.  General health as reported by patient is good.  Pertinent medical history includes: calf pain-swelling-warmth, high blood pressure and pain at night/rest (Osteopenia, light headedness).   Red flags:  Pain at rest/night.  Medical allergies: other. Other medical allergies details: Sulfa.   Surgeries include:  Other. Other surgery history details: 2 cercerians . Hysterectomy Shoulder.    Current medications:  High blood pressure medication and pain medication.    Current occupation is None.   Primary job tasks include:  Other.   Other job/home tasks details: Household.                Therapist Generated HPI Evaluation  Type of problem:  Bilateral hips (L>R).  This is a chronic (acute on chronic) condition.    Patient reports pain:  Groin (L>R).  Pain is described as aching and burning (toothache) and is intermittent.  Pain radiates to:  Thigh and low back (posterior thigh). Pain is worse during the  night.  Since onset symptoms are gradually worsening.  PATIENTS NAME:  Debra Denise   : 1940    Associated symptoms:  Loss of motion/stiffness and loss of strength (tired legs, denies T/N, denies crepitus in hip joints). Symptoms are exacerbated by walking, sitting and lying on extremity (turning quickly.  Trying to do old therapy exercises.  Sometimes losing 1-5 hours sleep/night, lying on L side, walking briskly (unable 60 min, now 30 min tolerance), stiff getting up from sitting)  and relieved by ice and other (ice for low back (heat and ice don't help hip), neurontin).  Special tests included:  X-ray ( moderate B hip OA, spurring osteophytes of acetablulum R>L ( although pain L>R)).  Barriers include:  Other (lives in 2nd floor apartment with .  Has to bend low for oven).    Objective:  System   Lumbar/SI Evaluation  ROM:    AROM Lumbar:   Flexion:          Min loss  Ext:                    Max loss    Side Bend:        Left:     Right:   Rotation:           Left:     Right:   Side Glide:        Left:  Min loss L groin/inner thigh strain    Right:  Min loss        Neural Tension/Mobility:    Left side:Slump (almost no difference)  negative.   Right side:   Slump (mild (+) test) positive.    Hip Evaluation  HIP AROM:    Flexion: Left: 90    Right:  90  Abduction: Left:  10    Right:  10  Internal Rotation: Left: 25 groin pain    Right: 25 groin pain  External Rotation: Left: 40    Right: 50      Hip Strength:    Flexion:   Left: 4/5   Pain:  Right: 4/5   Pain:  Internal Rotation:  Left: 3+/5    Pain:Right: 3+/5   Pain:  External Rotation:  Left: 4-/5   Pain:  Right: 4-/5   Pain:  Knee Flexion:  Left: 5/5   Pain:Right: 5/5   Pain:  Knee Extension:  Left: 5/5   Pain:Right: 5/5    Pain:    Functional Testing:      Proprioception:  Stork balance test:   Left:    13 sec hip drop  Right:  13 sec mild hip drop  % of Uninvolved:           PATIENTS NAME:  Debra Denise   :  1940    [place]ie Lumbar Evaluation    Posture:  Sitting: fair  Standing: fair  Lordosis: WNL  Lateral Shift: no  Correction of Posture: no effect    Test Movements:  FIS: Pretest Movements: LBP  EIS: During: no effect  After: no effect    Repeat EIS: During: increases  After: no worse  Mechanical Response: IncROM    Conclusion lumbar: potential lumbar derangement and separate hip OA.    Principle of Treatment:  Posture Correction: Educate keep neutral C-T-L spine, use of lumbar support, slouch over-correct, over-correct and back off 10% to neutral unsupported sitting, see if/how posture affects pain in LB, hips and posterior thighs,     Extension: Trial lumbar EIS supported against counter x 10 every 2-3 hours or prn for relief    Other: Begin assessment just with low back to see if affects hips, will progress to further hip treatment next visit as suspect has both hip OA and lumbar involvement    Assessment/Plan:    Patient is a 80 year old female with bilateral hip and low back complaints.    Patient has the following significant findings with corresponding treatment plan.                Diagnosis 1:  B groin pain, primary hip OA    Pain -  manual therapy, self management, education and home program  Decreased ROM/flexibility - manual therapy, therapeutic exercise and home program  Decreased strength - therapeutic exercise, therapeutic activities and home program  Decreased proprioception - neuro re-education, gait training, therapeutic activities and home program  Decreased function - therapeutic activities and home program  Impaired posture - neuro re-education and home program    Therapy Evaluation Codes:   Cumulative Therapy Evaluation is: Low complexity.    Previous and current functional limitations:  (See Goal Flow Sheet for this information)    Short term and Long term goals: (See Goal Flow Sheet for this information)     Communication ability:  Patient appears to be able to clearly communicate and  "understand verbal and written communication and follow directions correctly.  Treatment Explanation - The following has been discussed with the patient:   RX ordered/plan of care  Anticipated outcomes  Possible risks and side effects  PATIENTS NAME:  Debra Denise   : 1940    This patient would benefit from PT intervention to resume normal activities.   Rehab potential is good.    Frequency:  1 X week, once daily  Duration:  for 8 weeks  Discharge Plan:  Achieve all LTG.  Independent in home treatment program.  Reach maximal therapeutic benefit.        Caregiver Signature/Credentials _____________________________ Date ________       Treating Provider: Sheila Ordonez DPT   I have reviewed and certified the need for these services and plan of treatment while under my care.        PHYSICIAN'S SIGNATURE:   _________________________________________  Date___________   Davis Robert MD     Certification period:  Beginning of Cert date period: 10/19/20 to  End of Cert period date: 21     Functional Level Progress Report: Please see attached \"Goal Flow sheet for Functional level.\"    ____X____ Continue Services or       ________ DC Services                Service dates: From  SOC Date: 10/19/20 date to present                         "

## 2020-10-19 NOTE — PROGRESS NOTES
[facility] Initial Evaluation  Subjective:    Patient Health History  Debra Denise being seen for Hip and back pain.     Problem began: 10/6/2020 (MD visit).   Problem occurred: Has had low back pain most of the lift, 2 C-sections, had HNP in 20's.  1/2020 fell and re-provoked pain, is unsure if pain is B hips or low back.  Had therapy in the past but now painful to do those exercises.   Pain is reported as 8/10 on pain scale.  General health as reported by patient is good.  Pertinent medical history includes: calf pain-swelling-warmth, high blood pressure and pain at night/rest (Osteopenia, light headedness).   Red flags:  Pain at rest/night.  Medical allergies: other. Other medical allergies details: Sulfa.   Surgeries include:  Other. Other surgery history details: 2 cercerians . Hysterectomy Shoulder.    Current medications:  High blood pressure medication and pain medication.    Current occupation is None.   Primary job tasks include:  Other.   Other job/home tasks details: Household.                Therapist Generated HPI Evaluation         Type of problem:  Bilateral hips (L>R).    This is a chronic (acute on chronic) condition.      Patient reports pain:  Groin (L>R).  Pain is described as aching and burning (toothache) and is intermittent.  Pain radiates to:  Thigh and low back (posterior thigh). Pain is worse during the night.  Since onset symptoms are gradually worsening.  Associated symptoms:  Loss of motion/stiffness and loss of strength (tired legs, denies T/N, denies crepitus in hip joints). Symptoms are exacerbated by walking, sitting and lying on extremity (turning quickly.  Trying to do old therapy exercises.  Sometimes losing 1-5 hours sleep/night, lying on L side, walking briskly (unable 60 min, now 30 min tolerance), stiff getting up from sitting)  and relieved by ice and other (ice for low back (heat and ice don't help hip), neurontin).  Special tests included:   X-ray ( moderate B hip OA, spurring osteophytes of acetablulum R>L ( although pain L>R)).    Barriers include:  Other (lives in 2nd floor apartment with .  Has to bend low for oven).                        Objective:  System         Lumbar/SI Evaluation  ROM:    AROM Lumbar:   Flexion:          Min loss  Ext:                    Max loss    Side Bend:        Left:     Right:   Rotation:           Left:     Right:   Side Glide:        Left:  Min loss L groin/inner thigh strain    Right:  Min loss                  Neural Tension/Mobility:      Left side:Slump (almost no difference)  negative.   Right side:   Slump (mild (+) test) positive.                                            Hip Evaluation  HIP AROM:    Flexion: Left: 90    Right:  90      Abduction: Left:  10    Right:  10      Internal Rotation: Left: 25 groin pain    Right: 25 groin pain  External Rotation: Left: 40    Right: 50        Hip Strength:    Flexion:   Left: 4/5   Pain:  Right: 4/5   Pain:                          Internal Rotation:  Left: 3+/5    Pain:Right: 3+/5   Pain:  External Rotation:  Left: 4-/5   Pain:  Right: 4-/5   Pain:  Knee Flexion:  Left: 5/5   Pain:Right: 5/5   Pain:  Knee Extension:  Left: 5/5   Pain:Right: 5/5    Pain:            Functional Testing:            Proprioception:    Stork balance test:   Left:    13 sec hip drop  Right:  13 sec mild hip drop  % of Uninvolved:                  Louie Lumbar Evaluation    Posture:  Sitting: fair  Standing: fair  Lordosis: WNL  Lateral Shift: no  Correction of Posture: no effect      Test Movements:  FIS: Pretest Movements: LBP    EIS: During: no effect  After: no effect    Repeat EIS: During: increases  After: no worse  Mechanical Response: IncROM            Conclusion lumbar: potential lumbar derangement and separate hip OA.  Principle of Treatment:  Posture Correction: Educate keep neutral C-T-L spine, use of lumbar support, slouch over-correct, over-correct and back off 10%  to neutral unsupported sitting, see if/how posture affects pain in LB, hips and posterior thighs,     Extension: Trial lumbar EIS supported against counter x 10 every 2-3 hours or prn for relief    Other: Begin assessment just with low back to see if affects hips, will progress to further hip treatment next visit as suspect has both hip OA and lumbar involvement                                       ROS    Assessment/Plan:    Patient is a 80 year old female with bilateral hip and low back complaints.    Patient has the following significant findings with corresponding treatment plan.                Diagnosis 1:  B groin pain, primary hip OA    Pain -  manual therapy, self management, education and home program  Decreased ROM/flexibility - manual therapy, therapeutic exercise and home program  Decreased strength - therapeutic exercise, therapeutic activities and home program  Decreased proprioception - neuro re-education, gait training, therapeutic activities and home program  Decreased function - therapeutic activities and home program  Impaired posture - neuro re-education and home program    Therapy Evaluation Codes:   Cumulative Therapy Evaluation is: Low complexity.    Previous and current functional limitations:  (See Goal Flow Sheet for this information)    Short term and Long term goals: (See Goal Flow Sheet for this information)     Communication ability:  Patient appears to be able to clearly communicate and understand verbal and written communication and follow directions correctly.  Treatment Explanation - The following has been discussed with the patient:   RX ordered/plan of care  Anticipated outcomes  Possible risks and side effects  This patient would benefit from PT intervention to resume normal activities.   Rehab potential is good.    Frequency:  1 X week, once daily  Duration:  for 8 weeks  Discharge Plan:  Achieve all LTG.  Independent in home treatment program.  Reach maximal therapeutic  benefit.    Please refer to the daily flowsheet for treatment today, total treatment time and time spent performing 1:1 timed codes.

## 2020-10-27 ENCOUNTER — THERAPY VISIT (OUTPATIENT)
Dept: PHYSICAL THERAPY | Facility: CLINIC | Age: 80
End: 2020-10-27
Payer: MEDICARE

## 2020-10-27 DIAGNOSIS — M16.0 PRIMARY OSTEOARTHRITIS OF BOTH HIPS: ICD-10-CM

## 2020-10-27 PROCEDURE — 97112 NEUROMUSCULAR REEDUCATION: CPT | Mod: GP | Performed by: PHYSICAL THERAPIST

## 2020-10-27 PROCEDURE — 97110 THERAPEUTIC EXERCISES: CPT | Mod: GP | Performed by: PHYSICAL THERAPIST

## 2020-11-02 ENCOUNTER — THERAPY VISIT (OUTPATIENT)
Dept: PHYSICAL THERAPY | Facility: CLINIC | Age: 80
End: 2020-11-02
Payer: MEDICARE

## 2020-11-02 DIAGNOSIS — M16.0 PRIMARY OSTEOARTHRITIS OF BOTH HIPS: ICD-10-CM

## 2020-11-02 PROCEDURE — 97110 THERAPEUTIC EXERCISES: CPT | Mod: GP | Performed by: PHYSICAL THERAPIST

## 2020-11-02 PROCEDURE — 97112 NEUROMUSCULAR REEDUCATION: CPT | Mod: GP | Performed by: PHYSICAL THERAPIST

## 2020-11-10 ENCOUNTER — MYC MEDICAL ADVICE (OUTPATIENT)
Dept: INTERNAL MEDICINE | Facility: CLINIC | Age: 80
End: 2020-11-10

## 2020-11-10 DIAGNOSIS — M54.89 OTHER CHRONIC BACK PAIN: ICD-10-CM

## 2020-11-10 DIAGNOSIS — M25.552 HIP PAIN, LEFT: Primary | ICD-10-CM

## 2020-11-10 DIAGNOSIS — G89.29 OTHER CHRONIC BACK PAIN: ICD-10-CM

## 2020-11-11 ENCOUNTER — TELEPHONE (OUTPATIENT)
Dept: INTERNAL MEDICINE | Facility: CLINIC | Age: 80
End: 2020-11-11

## 2020-11-11 NOTE — TELEPHONE ENCOUNTER
Requesting a referral sent to [facility] for PT on Thursday 11/12/20.  Rachel Aleman RN 10:57 AM on 11/11/2020.

## 2020-11-11 NOTE — TELEPHONE ENCOUNTER
[facility] Call Center    Phone Message    May a detailed message be left on voicemail: yes     Reason for Call: Other: the PT is for the back and hips per the pt. if you would like to give her a call back.     Action Taken: Message routed to:  [facility]    Travel Screening: Not Applicable

## 2020-11-12 NOTE — TELEPHONE ENCOUNTER
I called the patient, no questions or concerns. She was informed referral was sent.   Drea Schaefer, EMT at 10:31 AM on 11/12/2020.

## 2020-11-18 DIAGNOSIS — I10 BENIGN ESSENTIAL HYPERTENSION: Primary | ICD-10-CM

## 2020-11-18 NOTE — TELEPHONE ENCOUNTER
[facility] Call Center    Phone Message    May a detailed message be left on voicemail: yes     Reason for Call: Medication Refill Request    Has the patient contacted the pharmacy for the refill? Yes   Name of medication being requested: amLODIPine (NORVASC) 5 MG tablet     Provider who prescribed the medication: Yeimy Romero [facility] MEDICINE   Pharmacy: [id number] - [place], [place] - [address] [place] [address]  Date medication is needed: asap   Please call patient to address any questions or concerns       Action Taken: Message routed to:  [facility]    Travel Screening: Not Applicable

## 2020-11-18 NOTE — TELEPHONE ENCOUNTER
Centralized Medication Refill Team note:      amLODIPine (NORVASC) 5 MG tablet is reported/ historical.      amLODIPine (NORVASC) 2.5 MG tablet  Last Written Prescription Date:  10/8/20  Last Fill Quantity: 90,   # refills: 1  Sent 10/8/20 to: [id number] - [place], [place] - [address] [place] [address]  Last Office Visit : 10/8/20 [facility]  Future Office visit:  None    Actions:   1. Contacted pharmacy to review   -Per pharmacy staff,  and wife they think she should be on amlodipine 7.5 mg total per day  Per Dr Spencer 10/5/20>Reduce amlodipine to 2.5 mg/day  Per Dr Zimmerman 10/8/20: HTN: stable on current medications and lower dose Amlodipine (2.5 mg).   She had a visit with Dr. Spencer, Cardiology 10/5/2020 who commented on her resting echo from 9/22/2020.    2. Contacted family and [facility] on 9/12/20 ED [facility] her BP was 220-230 systolic @ ED ordered additional 5 MG ( Care Everywhere)  She has been taking 3 of the 2.5 mg pills for total daily dose of 7.5 MG amlodipine    She takes her BP daily and its been within range.       Routing refill request to provider for review/approval because:   -Clarify medication dosage. Per chart, taking amlodipine 2.5 mg daily.  -Per patient:She has been taking 3 of the 2.5 mg pills for total daily dose of 7.5 MG amlodipine    She takes her BP daily and its been within range.

## 2020-11-19 DIAGNOSIS — K62.89 PROCTITIS: ICD-10-CM

## 2020-11-19 RX ORDER — AMLODIPINE BESYLATE 5 MG/1
2.5 TABLET ORAL DAILY
Qty: 90 TABLET | Refills: 1 | Status: SHIPPED | OUTPATIENT
Start: 2020-11-19 | End: 2021-02-10

## 2020-11-23 RX ORDER — MESALAMINE 4 G/60ML
1 SUSPENSION RECTAL
Qty: 4 KIT | Refills: 3 | Status: SHIPPED | OUTPATIENT
Start: 2020-11-23 | End: 2021-02-02

## 2020-11-23 NOTE — TELEPHONE ENCOUNTER
Mesalamine-Cleanser (ROWASA) 4 g KIT  Last Written Prescription Date:  5/3/2019  Last Fill Quantity: 4,   # refills: 3  Last Office Visit : 10/8/2020  Future Office visit:  None  4 Kits, 3 Refills sent to pharm 11/23/2020    Martha Casanova RN  Central Triage Red Flags/Med Refills  Warnings Override History for Mesalamine-Cleanser (ROWASA) 4 g KIT [[id number]]    Overridden by Herman Granados MD on May 3, 2019 12:39 PM   Allergy/Contraindication   1. IBUPROFEN [Level: Cross-sensitive Class Match]   2. NAPROXEN [Level: Cross-sensitive Class Match]

## 2020-12-10 PROBLEM — M16.0 PRIMARY OSTEOARTHRITIS OF BOTH HIPS: Status: RESOLVED | Noted: 2020-10-19 | Resolved: 2020-12-10

## 2020-12-10 NOTE — PROGRESS NOTES
Discharge Note    Progress reporting period is from initial evaluation date (please see noted date below) to Nov 2, 2020.  Linked Episodes   Type: Episode: Status: Noted: Resolved: Last update: Updated by:   PHYSICAL THERAPY B hip OA, groin pain Active 10/19/2020  11/2/2020 12:08 PM Sheila Ordonez, PT      Comments:       Debra failed to follow up and current status is unknown.  Please see information below for last relevant information on current status.  Patient seen for 3 visits.    SUBJECTIVE  Subjective changes noted by patient:  After lateral hip distraction felt good the day of last treatment.  Did SLR ABD and thinks it provoked nerve type pain into legs lateral thigh and to lateral B shins- some burning strain.  Rubbing lateral thighs helps some.  Yesterday was putting tarp on outdoor furniture with bending and rotating and this increased pain.    Current pain level is 2/10.     Previous pain level was  8/10.   Changes in function:  Yes (See Goal flowsheet attached for changes in current functional level)  Adverse reaction to treatment or activity: None    OBJECTIVE  Changes noted in objective findings:   Seated lumbar flexion x 10 NE leg pain, NE after but low back strain.    sit to stand functional valgus noted, but able to prevent with cueing.    Responds well to lateral hip distraction to L hip.       ASSESSMENT/PLAN  Diagnosis: B primary hip OA, groin pain L>R   Updated problem list and treatment plan:   Pain - HEP  Decreased ROM/flexibility - HEP  Decreased function - HEP  Decreased strength - HEP  Decreased proprioception - HEP  STG/LTGs have been met or progress has been made towards goals:  Yes, please see goal flowsheet for most current information  Assessment of Progress: current status is unknown.    Self Management Plans:  HEP  I have re-evaluated this patient and find that the nature, scope, duration and intensity of the therapy is appropriate for the medical condition of the  patient.  Debra continues to require the following intervention to meet STG and LTG's:  HEP.    Recommendations:  Discharge with current home program.  Patient to follow up with MD as needed.    Please refer to the daily flowsheet for treatment today, total treatment time and time spent performing 1:1 timed codes.

## 2021-01-20 ENCOUNTER — TELEPHONE (OUTPATIENT)
Dept: INTERNAL MEDICINE | Facility: CLINIC | Age: 81
End: 2021-01-20

## 2021-01-20 DIAGNOSIS — E55.9 VITAMIN D DEFICIENCY: ICD-10-CM

## 2021-01-20 DIAGNOSIS — R94.6 THYROID FUNCTION TEST ABNORMAL: Primary | ICD-10-CM

## 2021-01-20 NOTE — TELEPHONE ENCOUNTER
[facility] Call Center    Phone Message    May a detailed message be left on voicemail: yes     Reason for Call: Order(s): Other: lab orders, including D3 and thyroid per patient    Reason for requested: for physical 1/28/21  Date needed: prior to appt 1/28/21   Provider name: Elsie     Patient requesting orders for labs prior to appointment. Thank you!       Action Taken: Message routed to:  [facility]    Travel Screening: Not Applicable

## 2021-01-23 ASSESSMENT — ENCOUNTER SYMPTOMS
RECTAL PAIN: 0
ABDOMINAL PAIN: 1
NAUSEA: 0
CONSTIPATION: 0
NECK PAIN: 1
HEARTBURN: 0
BLOOD IN STOOL: 1
BACK PAIN: 1
MUSCLE CRAMPS: 0
MYALGIAS: 1
DIARRHEA: 1
MUSCLE WEAKNESS: 0
STIFFNESS: 1
JAUNDICE: 0
JOINT SWELLING: 0
VOMITING: 0
BOWEL INCONTINENCE: 0
BLOATING: 1
ARTHRALGIAS: 1

## 2021-01-28 ENCOUNTER — OFFICE VISIT (OUTPATIENT)
Dept: INTERNAL MEDICINE | Facility: CLINIC | Age: 81
End: 2021-01-28
Payer: COMMERCIAL

## 2021-01-28 VITALS
WEIGHT: 140 LBS | HEART RATE: 58 BPM | BODY MASS INDEX: 22.5 KG/M2 | OXYGEN SATURATION: 99 % | SYSTOLIC BLOOD PRESSURE: 133 MMHG | DIASTOLIC BLOOD PRESSURE: 67 MMHG | HEIGHT: 66 IN

## 2021-01-28 DIAGNOSIS — I10 BENIGN ESSENTIAL HYPERTENSION: ICD-10-CM

## 2021-01-28 DIAGNOSIS — E55.9 VITAMIN D DEFICIENCY: ICD-10-CM

## 2021-01-28 DIAGNOSIS — I10 BENIGN ESSENTIAL HYPERTENSION: Primary | ICD-10-CM

## 2021-01-28 DIAGNOSIS — R94.6 THYROID FUNCTION TEST ABNORMAL: ICD-10-CM

## 2021-01-28 LAB
ALBUMIN SERPL-MCNC: 3.8 G/DL (ref 3.4–5)
ALBUMIN UR-MCNC: NEGATIVE MG/DL
ALP SERPL-CCNC: 63 U/L (ref 40–150)
ALT SERPL W P-5'-P-CCNC: 18 U/L (ref 0–50)
ANION GAP SERPL CALCULATED.3IONS-SCNC: 3 MMOL/L (ref 3–14)
APPEARANCE UR: CLEAR
AST SERPL W P-5'-P-CCNC: 8 U/L (ref 0–45)
BASOPHILS # BLD AUTO: 0 10E9/L (ref 0–0.2)
BASOPHILS NFR BLD AUTO: 0.2 %
BILIRUB SERPL-MCNC: 0.6 MG/DL (ref 0.2–1.3)
BILIRUB UR QL STRIP: NEGATIVE
BUN SERPL-MCNC: 15 MG/DL (ref 7–30)
CALCIUM SERPL-MCNC: 9.2 MG/DL (ref 8.5–10.1)
CHLORIDE SERPL-SCNC: 103 MMOL/L (ref 94–109)
CO2 SERPL-SCNC: 34 MMOL/L (ref 20–32)
COLOR UR AUTO: NORMAL
CREAT SERPL-MCNC: 0.76 MG/DL (ref 0.52–1.04)
DEPRECATED CALCIDIOL+CALCIFEROL SERPL-MC: 54 UG/L (ref 20–75)
DIFFERENTIAL METHOD BLD: NORMAL
EOSINOPHIL # BLD AUTO: 0.2 10E9/L (ref 0–0.7)
EOSINOPHIL NFR BLD AUTO: 2.7 %
ERYTHROCYTE [DISTWIDTH] IN BLOOD BY AUTOMATED COUNT: 12.5 % (ref 10–15)
GFR SERPL CREATININE-BSD FRML MDRD: 74 ML/MIN/{1.73_M2}
GLUCOSE SERPL-MCNC: 91 MG/DL (ref 70–99)
GLUCOSE UR STRIP-MCNC: NEGATIVE MG/DL
HCT VFR BLD AUTO: 42 % (ref 35–47)
HGB BLD-MCNC: 13.8 G/DL (ref 11.7–15.7)
HGB UR QL STRIP: NEGATIVE
IMM GRANULOCYTES # BLD: 0 10E9/L (ref 0–0.4)
IMM GRANULOCYTES NFR BLD: 0.2 %
KETONES UR STRIP-MCNC: NEGATIVE MG/DL
LEUKOCYTE ESTERASE UR QL STRIP: NEGATIVE
LYMPHOCYTES # BLD AUTO: 2.3 10E9/L (ref 0.8–5.3)
LYMPHOCYTES NFR BLD AUTO: 28.1 %
MCH RBC QN AUTO: 30.6 PG (ref 26.5–33)
MCHC RBC AUTO-ENTMCNC: 32.9 G/DL (ref 31.5–36.5)
MCV RBC AUTO: 93 FL (ref 78–100)
MONOCYTES # BLD AUTO: 0.9 10E9/L (ref 0–1.3)
MONOCYTES NFR BLD AUTO: 11.2 %
NEUTROPHILS # BLD AUTO: 4.7 10E9/L (ref 1.6–8.3)
NEUTROPHILS NFR BLD AUTO: 57.6 %
NITRATE UR QL: NEGATIVE
NRBC # BLD AUTO: 0 10*3/UL
NRBC BLD AUTO-RTO: 0 /100
PH UR STRIP: 7 PH (ref 5–7)
PLATELET # BLD AUTO: 414 10E9/L (ref 150–450)
POTASSIUM SERPL-SCNC: 3.9 MMOL/L (ref 3.4–5.3)
PROT SERPL-MCNC: 7.4 G/DL (ref 6.8–8.8)
RBC # BLD AUTO: 4.51 10E12/L (ref 3.8–5.2)
RBC #/AREA URNS AUTO: 1 /HPF (ref 0–2)
SODIUM SERPL-SCNC: 140 MMOL/L (ref 133–144)
SOURCE: NORMAL
SP GR UR STRIP: 1 (ref 1–1.03)
TSH SERPL DL<=0.005 MIU/L-ACNC: 0.66 MU/L (ref 0.4–4)
UROBILINOGEN UR STRIP-MCNC: 0 MG/DL (ref 0–2)
WBC # BLD AUTO: 8.2 10E9/L (ref 4–11)
WBC #/AREA URNS AUTO: 1 /HPF (ref 0–5)

## 2021-01-28 PROCEDURE — 99215 OFFICE O/P EST HI 40 MIN: CPT | Performed by: INTERNAL MEDICINE

## 2021-01-28 PROCEDURE — 82306 VITAMIN D 25 HYDROXY: CPT | Mod: 90 | Performed by: PATHOLOGY

## 2021-01-28 PROCEDURE — 36415 COLL VENOUS BLD VENIPUNCTURE: CPT | Performed by: PATHOLOGY

## 2021-01-28 PROCEDURE — 85025 COMPLETE CBC W/AUTO DIFF WBC: CPT | Performed by: PATHOLOGY

## 2021-01-28 PROCEDURE — 84443 ASSAY THYROID STIM HORMONE: CPT | Performed by: PATHOLOGY

## 2021-01-28 PROCEDURE — 80053 COMPREHEN METABOLIC PANEL: CPT | Performed by: PATHOLOGY

## 2021-01-28 PROCEDURE — 81001 URINALYSIS AUTO W/SCOPE: CPT | Performed by: PATHOLOGY

## 2021-01-28 ASSESSMENT — ANXIETY QUESTIONNAIRES
IF YOU CHECKED OFF ANY PROBLEMS ON THIS QUESTIONNAIRE, HOW DIFFICULT HAVE THESE PROBLEMS MADE IT FOR YOU TO DO YOUR WORK, TAKE CARE OF THINGS AT HOME, OR GET ALONG WITH OTHER PEOPLE: NOT DIFFICULT AT ALL
3. WORRYING TOO MUCH ABOUT DIFFERENT THINGS: SEVERAL DAYS
2. NOT BEING ABLE TO STOP OR CONTROL WORRYING: NOT AT ALL
1. FEELING NERVOUS, ANXIOUS, OR ON EDGE: NOT AT ALL
6. BECOMING EASILY ANNOYED OR IRRITABLE: SEVERAL DAYS
GAD7 TOTAL SCORE: 2
7. FEELING AFRAID AS IF SOMETHING AWFUL MIGHT HAPPEN: NOT AT ALL
5. BEING SO RESTLESS THAT IT IS HARD TO SIT STILL: NOT AT ALL

## 2021-01-28 ASSESSMENT — PATIENT HEALTH QUESTIONNAIRE - PHQ9
SUM OF ALL RESPONSES TO PHQ QUESTIONS 1-9: 2
5. POOR APPETITE OR OVEREATING: NOT AT ALL

## 2021-01-28 ASSESSMENT — MIFFLIN-ST. JEOR: SCORE: 1116.79

## 2021-01-28 NOTE — NURSING NOTE
Chief Complaint   Patient presents with     Physical     physical        Mirella Montano MA, at 10:46 AM on 1/28/2021.

## 2021-01-28 NOTE — PROGRESS NOTES
HPI:    Last in-person visit with me 10/8/2020. She has some rectal bleeding last month or so and needs to see GI. She has continued back pain and has not yet found suitable PT. She has B LE varicosities as well. She has discontinued Amlodipine because of B LE swelling. No other HEENT, cardiopulmonary, abdominal, , GYN, neurological, systemic complaints.     Past Medical History:   Diagnosis Date     Hypertension      Polymyalgia rheumatica (H)      Past Surgical History:   Procedure Laterality Date     COLONOSCOPY  2014    Procedure: COMBINED COLONOSCOPY, SINGLE BIOPSY/POLYPECTOMY BY BIOPSY;  COLONOSCOPY;  Surgeon: Carol Ann Plasencia MD;  Location:  GI     ENT SURGERY      tonsilectomy, adenoidectomy     GYN SURGERY  ,     x 2     ORTHOPEDIC SURGERY  2004    (R) shoulder surgery for frozen shoulder     [facility] BSO      for fibroids     PE:    Vitals noted, gen, nad, cooperative, alert. She is wearing a mask, neck supple nl rom, lungs with good air movement, RRR, S1, S2, no MRG, abdomen, no acute findings and Grossly normal neurological exam.  Minimal tenderness to palpation of lower back.     A/P:    1. Hip pain she is getting PT; see note from 2020. Seen by Dr. Robert 10/6/2020. Plain Film imaging 10/6/2020 pelvis/hips.  Will have her follow up with kendra Banegas for her back complaints. As noted 3/2020 extensive imaging.  2. Mammogram; 10/24/2019 (in Care Everywhere). She states she got her mammogram this 2020  3. Inflammatory bowel disease on Rowasa. Flex Sigmoidoscopy 2019 with Dr. Rea.   Ulcerative proctitis. Seen by Dr. Beal, GI 2017 and remains on mesalamine suppositories. Currently no sxs. She was seen again by Dr. Rea 2019. She would like to follow up in GI again  4. HTN; See Dr. Spencer's Cardiology note from 10/5/2020. Stable today and off Amlodipine. She will remain on Valsartan/HCTZ and ordered labs today  5. Immunizations; Influenza  vaccination 10/8/2020. She had completed the Shingrix vaccination series. She sates she got the first COVID vaccine yesterday  6. She would like a UA today; no significant sxs.  7. Will refer to vascular for her reported B LE varicosities        40 minutes spent on the date of the encounter doing chart review, history and exam, documentation and further activities as noted above

## 2021-01-29 ASSESSMENT — ANXIETY QUESTIONNAIRES: GAD7 TOTAL SCORE: 2

## 2021-01-30 ENCOUNTER — TELEPHONE (OUTPATIENT)
Dept: INTERNAL MEDICINE | Facility: CLINIC | Age: 81
End: 2021-01-30

## 2021-01-30 DIAGNOSIS — M54.89 OTHER CHRONIC BACK PAIN: ICD-10-CM

## 2021-01-30 DIAGNOSIS — K58.8 OTHER IRRITABLE BOWEL SYNDROME: Primary | ICD-10-CM

## 2021-01-30 DIAGNOSIS — G89.29 OTHER CHRONIC BACK PAIN: ICD-10-CM

## 2021-01-30 DIAGNOSIS — M79.661 PAIN IN BOTH LOWER LEGS: ICD-10-CM

## 2021-01-30 DIAGNOSIS — M79.662 PAIN IN BOTH LOWER LEGS: ICD-10-CM

## 2021-01-30 NOTE — TELEPHONE ENCOUNTER
Dear Rachel;    I saw Debra this past weeks and she needs the following and referrals placed this encounter:    (1) Orthopedics back pain with Dr. Mehta    (2) GI with Dr. Villanueva inflammatory bowel disease    (3) Vascular surgery varicose veins    ThanksRUTH

## 2021-02-01 DIAGNOSIS — K62.89 PROCTITIS: ICD-10-CM

## 2021-02-01 NOTE — TELEPHONE ENCOUNTER
Spoke to patient and phone numbers were given for GI and Vascular. Patient want to call to schedule own appointments.

## 2021-02-02 ENCOUNTER — TELEPHONE (OUTPATIENT)
Dept: VASCULAR SURGERY | Facility: CLINIC | Age: 81
End: 2021-02-02

## 2021-02-02 DIAGNOSIS — I83.893 SYMPTOMATIC VARICOSE VEINS OF BOTH LOWER EXTREMITIES: Primary | ICD-10-CM

## 2021-02-02 RX ORDER — MESALAMINE 4 G/60ML
1 SUSPENSION RECTAL
Qty: 6 KIT | Refills: 3 | Status: SHIPPED | OUTPATIENT
Start: 2021-02-03 | End: 2022-05-29

## 2021-02-02 NOTE — TELEPHONE ENCOUNTER
I called and spoke with Debra.  She has had spider veins for years and worsening and becoming bulging and painful.  She states about 12 yrs ago she saw some vascular [place] physician but cant remember the treatment, and didn't go back for additional follow up because she was disappointed that she thought her legs worsened after.  She has not worn compression stockings.  She denies known DVT, her mother had varicose veins.  Pt to be scheduled for bilateral venous competency ultrasound and virtual visit with Dr Galaviz, clinic coordinators were messaged to schedule.  JOE Toussaint RN, BSN  Interventional Radiology Nurse Coordinator   Phone:  [phone]

## 2021-02-03 ENCOUNTER — TELEPHONE (OUTPATIENT)
Dept: GASTROENTEROLOGY | Facility: CLINIC | Age: 81
End: 2021-02-03

## 2021-02-03 NOTE — TELEPHONE ENCOUNTER
[facility] Call Center    Phone Message    May a detailed message be left on voicemail: yes     Reason for Call: Appointment Intake    Referring Provider Name: Dr. Wolfgang Zimmerman  Diagnosis and/or Symptoms: proctitis    Patient is requesting to see Dr. Villanueva for her diagnosis, however she has seen Sedrick Darby and Rona in the past. Please advise if patient is OK to schedule with Dr. Villanueva. Referral/recs in system, please call patient directly for scheduling. Thanks!    Action Taken: Message routed to:  [facility] Gastroenterology    Travel Screening: Not Applicable

## 2021-02-03 NOTE — TELEPHONE ENCOUNTER
Contacted patient   Patient states that she asked to see another provider and that Dr. Zimmerman suggested Dr. Villanueva  Patient told that Dr. Beal had left   informed her that he is back at [facility]  No openings with Dr. Villanueva untill April   Will route to providers.

## 2021-02-05 NOTE — TELEPHONE ENCOUNTER
Contacted patient to discuss appointment   Offered patient appointment with Dr. Beal and patient declined.  Patient states wants to see Dr. Villanueva as states her primary suggested him  Earliest opening is with Dr. Villanueva is April Pt now scheduled in fellow clinic with Dr. SHANI Solomon on March 23 220  Last time spoke with patient she was fine waiting for an appointment with Dr. Villanueva   Now states she is bleeding and has to limit her diet. apologized and will route to Dr. Zimmerman

## 2021-02-05 NOTE — TELEPHONE ENCOUNTER
DIAGNOSIS: Other chronic back pain /Pain in both lower legs /Dr Zimmerman/ XR-Pelvis   APPOINTMENT DATE: 2.10.21   NOTES STATUS DETAILS   OFFICE NOTE from referring provider Internal 1.28.21 Dr Wolfgang Zimmerman, [facility]   OFFICE NOTE from other specialist Internal 11.2.20 Sheila Ordonez PTm [facility]  10.19.20  10.6.20 Dr Davis Robert, [facility]   DISCHARGE SUMMARY from hospital N/A    DISCHARGE REPORT from the ER N/A    OPERATIVE REPORT N/A    EMG report N/A    MEDICATION LIST Internal    MRI Internal 3.13.20 cervical, thoracic, lumbar spine  2.23.20 lumbar, cervical spine   DEXA (osteoporosis/bone health) N/A    CT SCAN Internal 2.6.20 abdomen pelvis   XRAYS (IMAGES & REPORTS) Internal 10.6.20 pelvis

## 2021-02-10 ENCOUNTER — OFFICE VISIT (OUTPATIENT)
Dept: ORTHOPEDICS | Facility: CLINIC | Age: 81
End: 2021-02-10
Payer: COMMERCIAL

## 2021-02-10 ENCOUNTER — PRE VISIT (OUTPATIENT)
Dept: ORTHOPEDICS | Facility: CLINIC | Age: 81
End: 2021-02-10

## 2021-02-10 VITALS — BODY MASS INDEX: 22.5 KG/M2 | WEIGHT: 140 LBS | HEIGHT: 66 IN

## 2021-02-10 DIAGNOSIS — M54.89 OTHER CHRONIC BACK PAIN: ICD-10-CM

## 2021-02-10 DIAGNOSIS — M16.0 PRIMARY OSTEOARTHRITIS OF BOTH HIPS: Primary | ICD-10-CM

## 2021-02-10 DIAGNOSIS — M79.661 PAIN IN BOTH LOWER LEGS: ICD-10-CM

## 2021-02-10 DIAGNOSIS — G89.29 OTHER CHRONIC BACK PAIN: ICD-10-CM

## 2021-02-10 DIAGNOSIS — M79.662 PAIN IN BOTH LOWER LEGS: ICD-10-CM

## 2021-02-10 DIAGNOSIS — K58.8 OTHER IRRITABLE BOWEL SYNDROME: ICD-10-CM

## 2021-02-10 PROCEDURE — 99203 OFFICE O/P NEW LOW 30 MIN: CPT | Performed by: FAMILY MEDICINE

## 2021-02-10 ASSESSMENT — MIFFLIN-ST. JEOR: SCORE: 1116.79

## 2021-02-10 NOTE — LETTER
2/10/2021      RE: Debra Denise  [id number]  [place] [address]        Subjective:   Debra Denise is a 80 year old female who presents with midline low back pain. She reports burning pain in lateral hips and into groin. She reports radiating pain into her both her bilateral lower extremities.   Low back pain, disc herniation, 3 kids, vertical   Tripped over 's foot in dining room, increased groin pain.  Seen by PT.  Thinks pain is in the hips, pain lateral legs.  [facility] 9 sessions, going back to square one again.  Not as agile, at night can't find sleep position  Lateral hip pain, burning on right side.  Can't sleep on her back, both hips flare.  Low back  Left groin pain> right groin pain  Walking slower, weaker in groin  Can't find her glutes when laying down, unable to sleep and more stressful if not sleeping well  Hard to do stretching and not flare the groin.  Fell onto knees, icing hip and right lumbar area is burning.  MTKA area  Not incontinent but has to go in a hurry.  Patient has previously had MRI of her lumbar spine which did not reveal malignancy    Background:   Date of injury: None  Duration of symptoms: Back pain years intermittently; 1 year groin pain   Mechanism of Injury: Chronic; Unknown   Intensity:  8-9/10 with lying down   Aggravating factors: Sitting and lying down   Relieving Factors: Standing  Prior Evaluation: Dr. Robert, xrays, lumbar MRI    PAST MEDICAL, SOCIAL, SURGICAL AND FAMILY HISTORY: She  has a past medical history of Hypertension and Polymyalgia rheumatica (H).  She  has a past surgical history that includes GYN surgery (,); ENT surgery; orthopedic surgery (2004); Colonoscopy (2014); and [facility] BSO ().  Her family history includes Cancer - colorectal in her father; Cerebrovascular Disease in her maternal grandmother and mother; Hypertension in her maternal grandmother and mother; Lung Cancer in her  "father; Macular Degeneration in her father; Multiple myeloma in her brother; Pacemaker in her brother; Suicide in her sister.  She reports that she has never smoked. She has never used smokeless tobacco. She reports that she does not drink alcohol or use drugs.    ALLERGIES: She is allergic to atenolol; ibuprofen; naproxen; ramipril; sulfa drugs; codeine; and fentanyl.    CURRENT MEDICATIONS: She has a current medication list which includes the following prescription(s): biotin, cholecalciferol, gabapentin, rowasa, nutritional supplements, probiotic product, valsartan-hydrochlorothiazide, amlodipine, and amlodipine.     REVIEW OF SYSTEMS: 10 point review of systems is negative except as noted above.     Exam:   Ht 1.676 m (5' 6\")   Wt 63.5 kg (140 lb)   BMI 22.60 kg/m             CONSTITUTIONAL: alert, moderate distress and cooperative  HEAD: Normocephalic. No masses, lesions, tenderness or abnormalities  SKIN: no suspicious lesions or rashes  GAIT: normal  NEUROLOGIC: Non-focal  PSYCHIATRIC: affect normal/bright and mentation appears normal.    MUSCULOSKELETAL: right hip > left hip  Palpation: Tender:   left greater trochanter, right greater trochanter, left gluteus medius, right gluteus medius  Non-tender:  left ASIS, right ASIS, left iliac crest, right iliac crest  Range of Motion:  Left Hip  external rotation  decreased, internal rotation  decreased, Right Hip  external rotation  decreased, internal rotation  decreased  Strength:  full strength  Special tests:  no crepitation/snapping over central inguinal region, no crepitation/snapping over greater trochanter, negative Atul's, logrolling positive, HUSSAIN difficulty reaching position         Assessment/Plan:   Patient is an 80-year-old female with past medical history of low bone mass, irritable bowel syndrome presenting with bilateral hip pain and chronic low back pain  1.  Bilateral hip pain-mild OA  Patient in the past worked with different physical therapy " techniques  Patient getting the most improvement with stretching until recently and now everything is flared  Patient is willing to see a hip specialist.  Recommended Radha MELÉNDEZ at [facility] in [place] because the patient lives in the [facility]  She would like something more convenient to her home  2.  Chronic low back pain-  Patient with hip abductor weakness and history of vertical   Patient reports that she likely does not have the core strength that she would require  Patient not incontinent but has urinary urgency  Patient to pursue whether or not insurance would cover an outside physical therapy referral and if not we will consider a pelvic  at [facility] 6-8 weeks  X-RAY INTERPRETATION:   X-Ray of the Hip: 2-view, ap pelvis, Left/Right Frogleg Lateral ordered and interpreted in the office today was positive for Impression:  1. No acute osseous abnormality.  2. Osteoarthrosis of the both hips with acetabular osteophytes and  tiny osteophytes at the femoral head and neck junction, with no  significant loss of joint space.      Glendy Mehta MD

## 2021-02-10 NOTE — PROGRESS NOTES
Subjective:   Debra Denise is a 80 year old female who presents with midline low back pain. She reports burning pain in lateral hips and into groin. She reports radiating pain into her both her bilateral lower extremities.   Low back pain, disc herniation, 3 kids, vertical   Tripped over 's foot in dining room, increased groin pain.  Seen by PT.  Thinks pain is in the hips, pain lateral legs.  [facility] 9 sessions, going back to square one again.  Not as agile, at night can't find sleep position  Lateral hip pain, burning on right side.  Can't sleep on her back, both hips flare.  Low back  Left groin pain> right groin pain  Walking slower, weaker in groin  Can't find her glutes when laying down, unable to sleep and more stressful if not sleeping well  Hard to do stretching and not flare the groin.  Fell onto knees, icing hip and right lumbar area is burning.  MTKA area  Not incontinent but has to go in a hurry.  Patient has previously had MRI of her lumbar spine which did not reveal malignancy    Background:   Date of injury: None  Duration of symptoms: Back pain years intermittently; 1 year groin pain   Mechanism of Injury: Chronic; Unknown   Intensity:  8-9/10 with lying down   Aggravating factors: Sitting and lying down   Relieving Factors: Standing  Prior Evaluation: Dr. Robert, xrays, lumbar MRI    PAST MEDICAL, SOCIAL, SURGICAL AND FAMILY HISTORY: She  has a past medical history of Hypertension and Polymyalgia rheumatica (H).  She  has a past surgical history that includes GYN surgery (,); ENT surgery; orthopedic surgery (2004); Colonoscopy (2014); and [facility] BSO ().  Her family history includes Cancer - colorectal in her father; Cerebrovascular Disease in her maternal grandmother and mother; Hypertension in her maternal grandmother and mother; Lung Cancer in her father; Macular Degeneration in her father; Multiple myeloma in her brother; Pacemaker in her brother; Suicide  "in her sister.  She reports that she has never smoked. She has never used smokeless tobacco. She reports that she does not drink alcohol or use drugs.    ALLERGIES: She is allergic to atenolol; ibuprofen; naproxen; ramipril; sulfa drugs; codeine; and fentanyl.    CURRENT MEDICATIONS: She has a current medication list which includes the following prescription(s): biotin, cholecalciferol, gabapentin, rowasa, nutritional supplements, probiotic product, valsartan-hydrochlorothiazide, amlodipine, and amlodipine.     REVIEW OF SYSTEMS: 10 point review of systems is negative except as noted above.     Exam:   Ht 1.676 m (5' 6\")   Wt 63.5 kg (140 lb)   BMI 22.60 kg/m             CONSTITUTIONAL: alert, moderate distress and cooperative  HEAD: Normocephalic. No masses, lesions, tenderness or abnormalities  SKIN: no suspicious lesions or rashes  GAIT: normal  NEUROLOGIC: Non-focal  PSYCHIATRIC: affect normal/bright and mentation appears normal.    MUSCULOSKELETAL: right hip > left hip  Palpation: Tender:   left greater trochanter, right greater trochanter, left gluteus medius, right gluteus medius  Non-tender:  left ASIS, right ASIS, left iliac crest, right iliac crest  Range of Motion:  Left Hip  external rotation  decreased, internal rotation  decreased, Right Hip  external rotation  decreased, internal rotation  decreased  Strength:  full strength  Special tests:  no crepitation/snapping over central inguinal region, no crepitation/snapping over greater trochanter, negative Atul's, logrolling positive, HUSSAIN difficulty reaching position         Assessment/Plan:   Patient is an 80-year-old female with past medical history of low bone mass, irritable bowel syndrome presenting with bilateral hip pain and chronic low back pain  1.  Bilateral hip pain-mild OA  Patient in the past worked with different physical therapy techniques  Patient getting the most improvement with stretching until recently and now everything is " flared  Patient is willing to see a hip specialist.  Recommended Radha MELÉNDEZ at [facility] in [place] because the patient lives in the [facility]  She would like something more convenient to her home  2.  Chronic low back pain-  Patient with hip abductor weakness and history of vertical   Patient reports that she likely does not have the core strength that she would require  Patient not incontinent but has urinary urgency  Patient to pursue whether or not insurance would cover an outside physical therapy referral and if not we will consider a pelvic  at [facility] 6-8 weeks  X-RAY INTERPRETATION:   X-Ray of the Hip: 2-view, ap pelvis, Left/Right Frogleg Lateral ordered and interpreted in the office today was positive for Impression:  1. No acute osseous abnormality.  2. Osteoarthrosis of the both hips with acetabular osteophytes and  tiny osteophytes at the femoral head and neck junction, with no  significant loss of joint space.

## 2021-02-23 ENCOUNTER — VIRTUAL VISIT (OUTPATIENT)
Dept: GASTROENTEROLOGY | Facility: CLINIC | Age: 81
End: 2021-02-23
Payer: COMMERCIAL

## 2021-02-23 VITALS — WEIGHT: 140 LBS | BODY MASS INDEX: 22.5 KG/M2 | HEIGHT: 66 IN

## 2021-02-23 DIAGNOSIS — K62.89 PROCTITIS: Primary | ICD-10-CM

## 2021-02-23 PROCEDURE — 99214 OFFICE O/P EST MOD 30 MIN: CPT | Mod: GC | Performed by: INTERNAL MEDICINE

## 2021-02-23 RX ORDER — MESALAMINE 1000 MG/1
1000 SUPPOSITORY RECTAL AT BEDTIME
Qty: 30 SUPPOSITORY | Refills: 0 | Status: SHIPPED | OUTPATIENT
Start: 2021-02-23 | End: 2021-03-25

## 2021-02-23 ASSESSMENT — MIFFLIN-ST. JEOR: SCORE: 1116.79

## 2021-02-23 NOTE — PROGRESS NOTES
"Debra Denise is a 80 year old female who is being evaluated via a billable video visit.      The patient has been notified of following:     \"This video visit will be conducted via a call between you and your physician/provider. We have found that certain health care needs can be provided without the need for an in-person physical exam.  This service lets us provide the care you need with a video conversation.  If a prescription is necessary we can send it directly to your pharmacy.  If lab work is needed we can place an order for that and you can then stop by our lab to have the test done at a later time.    If during the course of the call the physician/provider feels a video visit is not appropriate, you will not be charged for this service.\"     Patient confirmed that they are in [place] for today's visit yes.    Video-Visit Details  Type of service:  Video Visit    Video Start Time: 2:46 PM  Video End Time:  3:00 PM    Originating Location (pt. Location): [place]    Distant Location (provider location):  [facility] [place]     Platform used: [employer]    Virtual visit for patient conducted via three way video conference with myself, patient and Dr. Gaitan. I reviewed the history and abbreviated physical exam and discussed the management with Dr. Gaitan on 2/23/2021. I reviewed the note and there are no changes to the past medical, family or social history.  A complete 10 point review of systems was obtained. Please see the HPI for pertinent positives and negatives. All other systems were reviewed and were found to be negative. I agree with the documented findings and plan of care as outlined.    -- OK to use as needed canasa for proctitis.   -- Annual CBC, hepatic panel and Creatinint    -Dr. Villanueva            "

## 2021-02-23 NOTE — PROGRESS NOTES
IBD CLINIC VISIT     CC/REFERRING MD:  No ref. provider found  REASON FOR CONSULTATION: Ulcerative protcitis    ASSESSMENT/PLAN  80-year-old female with history of ulcerative proctitis here for follow-up.  Recently had a flareup and used her as needed mesalamine enemas with significant relief, now with baseline.    1. Ulcerative proctitis  She currently takes Rowasa enemas and as needed basis if she has any new symptoms.  We discussed few options for future episodes, namely, doing schedule daily oral mesalamine for suppression, continuing the enemas or changing to Canasa suppository.  Given her rectal disease, suppositories might work slightly better than enemas for targeted therapy.  -We will have Canasa available for her and her pharmacy to .  If she does have another flareup episode, we discussed that she should use Canasa every night for 2 weeks, followed by every other day for 2 additional week and then stop.  If she is in remission, no need to continue further details of next episode.  If her symptoms return then maintenance dose would be the lowest dose of Canasa that would provide her symptomatic relief.      2. IBD dysplasia surveillance:     -Not required    Return to clinic in 1 year or earlier if worsening symptoms.     Thank you for this consultation.  It was a pleasure to participate in the care of this patient; please contact us with any further questions.      This note was created with voice recognition software, and while reviewed for accuracy, typos may remain.     Neha MCLEOD MD  Gastroenterology Fellow  Division of Gastroenterology, Hepatology and Nutrition  [facility]  Text page Pager [phone]      IBD HISTORY  Age at diagnosis: 63  Extent of endoscopic disease:Rectum to distal sigmoid (16cm). Apparently on initial colonoscopy there was some question of patch of inflammation at hepatic flexure but I can find no actual record of this. Last colonoscopy in 2014 shows normal colon  apart from diverticula ; last flex sig showed proctitis (Driver 1), with normal sigmoid and descending colon  Extent of histologic disease: proctitis  Prior UC surgeries: None  Prior IBD Medications: Mesalamine    DRUG MONITORING  TPMT enzyme activity: n/a  6-TGN/6-MMPN levels: n/a  Biologic concentration: n/a    HPI:     Ms. Debra Denise is a 80 year old woman with history of UC proctitis , HTN, here for follow-up of UC proctitis.     1-1.5 months ago started having bleeding  1-2 BMs now, no blood anymore  Even during flare; no diarrhea  Rowasa enema started using, started getting better    Extra intestinal manifestations of IBD:  No uveitis/episcleritis  No aphthous ulcers   No arthritis   No erythema nodosum/pyoderma gangrenosum.     sIBDQ:  IBDQ Score Date IBDQ - Total Score  (Higher score better)   2019 51   2017 64        Last endoscopic activity:  Last histologic activity:      Pertinent labs / imaging:     ROS:    Constitutional, HEENT, cardiovascular, pulmonary, GI, , musculoskeletal, neuro, skin, endocrine and psych systems are negative, except as otherwise noted.    PHYSICAL EXAMINATION:  Video Visit     PERTINENT PAST MEDICAL HISTORY:  Past Medical History:   Diagnosis Date     Hypertension      Polymyalgia rheumatica (H)        PREVIOUS SURGERIES:  Past Surgical History:   Procedure Laterality Date     COLONOSCOPY  2014    Procedure: COMBINED COLONOSCOPY, SINGLE BIOPSY/POLYPECTOMY BY BIOPSY;  COLONOSCOPY;  Surgeon: Carol Ann Plasencia MD;  Location:  GI     ENT SURGERY      tonsilectomy, adenoidectomy     GYN SURGERY  ,     x 2     ORTHOPEDIC SURGERY  2004    (R) shoulder surgery for frozen shoulder     ZAC BSO      for fibroids       ALLERGIES:     Allergies   Allergen Reactions     Atenolol      Other reaction(s): Intolerance-Can't Take  Fatigue     Ibuprofen      Other reaction(s): Stomach Upset  13 tele encounter     Naproxen      Other  reaction(s): Stomach Upset  4-9-13 tele encounter     Ramipril Cough     Other reaction(s): Intolerance-Can't Take     Sulfa Drugs      Codeine Nausea     Fentanyl Nausea       PERTINENT MEDICATIONS:    Current Outpatient Medications:      BIOTIN PO, Take 1,000 mg by mouth daily , Disp: , Rfl:      Cholecalciferol (VITAMIN D3 PO), Take 2,000 Units by mouth daily , Disp: , Rfl:      gabapentin (NEURONTIN) 100 MG capsule, Take 2 capsules (200 mg) by mouth At Bedtime, Disp: 180 capsule, Rfl: 3     Mesalamine-Cleanser (ROWASA) 4 g KIT, Place 1 enema rectally three times a week, Disp: 6 kit, Rfl: 3     Nutritional Supplements (NUTRITIONAL SUPPLEMENT PO), Relief Factor: Fish oil and Tumeric.  Take 1 capsule daily, Disp: , Rfl:      Probiotic Product (PROBIOTIC PO), Take by mouth daily, Disp: , Rfl:      valsartan-hydrochlorothiazide (DIOVAN HCT) 160-25 MG tablet, Take 1 tablet by mouth daily, Disp: 90 tablet, Rfl: 2    SOCIAL HISTORY:  Social History     Socioeconomic History     Marital status:      Spouse name: Not on file     Number of children: Not on file     Years of education: Not on file     Highest education level: Not on file   Occupational History     Not on file   Social Needs     Financial resource strain: Not on file     Food insecurity     Worry: Not on file     Inability: Not on file     Transportation needs     Medical: Not on file     Non-medical: Not on file   Tobacco Use     Smoking status: Never Smoker     Smokeless tobacco: Never Used   Substance and Sexual Activity     Alcohol use: No     Drug use: No     Sexual activity: Not Currently   Lifestyle     Physical activity     Days per week: Not on file     Minutes per session: Not on file     Stress: Not on file   Relationships     Social connections     Talks on phone: Not on file     Gets together: Not on file     Attends Gnosticist service: Not on file     Active member of club or organization: Not on file     Attends meetings of clubs or  organizations: Not on file     Relationship status: Not on file     Intimate partner violence     Fear of current or ex partner: Not on file     Emotionally abused: Not on file     Physically abused: Not on file     Forced sexual activity: Not on file   Other Topics Concern     Parent/sibling w/ CABG, MI or angioplasty before 65F 55M? Not Asked   Social History Narrative     Not on file       FAMILY HISTORY:  Family History   Problem Relation Age of Onset     Cancer - colorectal Father      Lung Cancer Father      Macular Degeneration Father      Multiple myeloma Brother      Pacemaker Brother      Hypertension Mother      Cerebrovascular Disease Mother         temporal arteritis     Suicide Sister      Hypertension Maternal Grandmother      Cerebrovascular Disease Maternal Grandmother        Past/family/social history reviewed and no changes

## 2021-02-23 NOTE — NURSING NOTE
"Chief Complaint   Patient presents with     Follow Up       Vitals:    02/23/21 1357   Weight: 63.5 kg (140 lb)   Height: 1.676 m (5' 6\")       Body mass index is 22.6 kg/m .    Devi Aviles CMA    "

## 2021-02-23 NOTE — LETTER
"  2/23/2021      RE: Debra Denise  [id number]  [place] [address]      Dear Colleague,    Thank you for referring your patient, Debra Denise, to the [facility]. Please see a copy of my visit note below.    Debra Denise is a 80 year old female who is being evaluated via a billable video visit.      The patient has been notified of following:     \"This video visit will be conducted via a call between you and your physician/provider. We have found that certain health care needs can be provided without the need for an in-person physical exam.  This service lets us provide the care you need with a video conversation.  If a prescription is necessary we can send it directly to your pharmacy.  If lab work is needed we can place an order for that and you can then stop by our lab to have the test done at a later time.    If during the course of the call the physician/provider feels a video visit is not appropriate, you will not be charged for this service.\"     Patient confirmed that they are in [place] for today's visit yes.    Video-Visit Details  Type of service:  Video Visit    Video Start Time: 2:46 PM  Video End Time:  3:00 PM    Originating Location (pt. Location): Home    Distant Location (provider location):  [facility] [place]     Platform used: [employer]    Virtual visit for patient conducted via three way video conference with myself, patient and Dr. Gaitan. I reviewed the history and abbreviated physical exam and discussed the management with Dr. Gaitan on 2/23/2021. I reviewed the note and there are no changes to the past medical, family or social history.  A complete 10 point review of systems was obtained. Please see the HPI for pertinent positives and negatives. All other systems were reviewed and were found to be negative. I agree with the documented findings and plan of care as " outlined.    -- OK to use as needed canasa for proctitis.   -- Annual CBC, hepatic panel and Creatinint    -Dr. Villanueva      IBD CLINIC VISIT     CC/REFERRING MD:  No ref. provider found  REASON FOR CONSULTATION: Ulcerative protcitis    ASSESSMENT/PLAN  80-year-old female with history of ulcerative proctitis here for follow-up.  Recently had a flareup and used her as needed mesalamine enemas with significant relief, now with baseline.    1. Ulcerative proctitis  She currently takes Rowasa enemas and as needed basis if she has any new symptoms.  We discussed few options for future episodes, namely, doing schedule daily oral mesalamine for suppression, continuing the enemas or changing to Canasa suppository.  Given her rectal disease, suppositories might work slightly better than enemas for targeted therapy.  -We will have Canasa available for her and her pharmacy to .  If she does have another flareup episode, we discussed that she should use Canasa every night for 2 weeks, followed by every other day for 2 additional week and then stop.  If she is in remission, no need to continue further details of next episode.  If her symptoms return then maintenance dose would be the lowest dose of Canasa that would provide her symptomatic relief.      2. IBD dysplasia surveillance:     -Not required    Return to clinic in 1 year or earlier if worsening symptoms.     Thank you for this consultation.  It was a pleasure to participate in the care of this patient; please contact us with any further questions.      This note was created with voice recognition software, and while reviewed for accuracy, typos may remain.     Neha MCLEOD MD  Gastroenterology Fellow  Division of Gastroenterology, Hepatology and Nutrition  [facility]  Text page Pager [phone]      IBD HISTORY  Age at diagnosis: 63  Extent of endoscopic disease:Rectum to distal sigmoid (16cm). Apparently on initial colonoscopy there was some question of  patch of inflammation at hepatic flexure but I can find no actual record of this. Last colonoscopy in  shows normal colon apart from diverticula ; last flex sig showed proctitis (Driver 1), with normal sigmoid and descending colon  Extent of histologic disease: proctitis  Prior UC surgeries: None  Prior IBD Medications: Mesalamine    DRUG MONITORING  TPMT enzyme activity: n/a  6-TGN/6-MMPN levels: n/a  Biologic concentration: n/a    HPI:     Ms. Debra Denise is a 80 year old woman with history of UC proctitis , HTN, here for follow-up of UC proctitis.     1-1.5 months ago started having bleeding  1-2 BMs now, no blood anymore  Even during flare; no diarrhea  Rowasa enema started using, started getting better    Extra intestinal manifestations of IBD:  No uveitis/episcleritis  No aphthous ulcers   No arthritis   No erythema nodosum/pyoderma gangrenosum.     sIBDQ:  IBDQ Score Date IBDQ - Total Score  (Higher score better)   2019 51   2017 64        Last endoscopic activity:  Last histologic activity:      Pertinent labs / imaging:     ROS:    Constitutional, HEENT, cardiovascular, pulmonary, GI, , musculoskeletal, neuro, skin, endocrine and psych systems are negative, except as otherwise noted.    PHYSICAL EXAMINATION:  Video Visit     PERTINENT PAST MEDICAL HISTORY:  Past Medical History:   Diagnosis Date     Hypertension      Polymyalgia rheumatica (H)        PREVIOUS SURGERIES:  Past Surgical History:   Procedure Laterality Date     COLONOSCOPY  2014    Procedure: COMBINED COLONOSCOPY, SINGLE BIOPSY/POLYPECTOMY BY BIOPSY;  COLONOSCOPY;  Surgeon: Carol Ann Plasencia MD;  Location:  GI     ENT SURGERY      tonsilectomy, adenoidectomy     GYN SURGERY  ,     x 2     ORTHOPEDIC SURGERY  2004    (R) shoulder surgery for frozen shoulder     ZAC BSO      for fibroids       ALLERGIES:     Allergies   Allergen Reactions     Atenolol      Other reaction(s):  Intolerance-Can't Take  Fatigue     Ibuprofen      Other reaction(s): Stomach Upset  4-9-13 tele encounter     Naproxen      Other reaction(s): Stomach Upset  4-9-13 tele encounter     Ramipril Cough     Other reaction(s): Intolerance-Can't Take     Sulfa Drugs      Codeine Nausea     Fentanyl Nausea       PERTINENT MEDICATIONS:    Current Outpatient Medications:      BIOTIN PO, Take 1,000 mg by mouth daily , Disp: , Rfl:      Cholecalciferol (VITAMIN D3 PO), Take 2,000 Units by mouth daily , Disp: , Rfl:      gabapentin (NEURONTIN) 100 MG capsule, Take 2 capsules (200 mg) by mouth At Bedtime, Disp: 180 capsule, Rfl: 3     Mesalamine-Cleanser (ROWASA) 4 g KIT, Place 1 enema rectally three times a week, Disp: 6 kit, Rfl: 3     Nutritional Supplements (NUTRITIONAL SUPPLEMENT PO), Relief Factor: Fish oil and Tumeric.  Take 1 capsule daily, Disp: , Rfl:      Probiotic Product (PROBIOTIC PO), Take by mouth daily, Disp: , Rfl:      valsartan-hydrochlorothiazide (DIOVAN HCT) 160-25 MG tablet, Take 1 tablet by mouth daily, Disp: 90 tablet, Rfl: 2    SOCIAL HISTORY:  Social History     Socioeconomic History     Marital status:      Spouse name: Not on file     Number of children: Not on file     Years of education: Not on file     Highest education level: Not on file   Occupational History     Not on file   Social Needs     Financial resource strain: Not on file     Food insecurity     Worry: Not on file     Inability: Not on file     Transportation needs     Medical: Not on file     Non-medical: Not on file   Tobacco Use     Smoking status: Never Smoker     Smokeless tobacco: Never Used   Substance and Sexual Activity     Alcohol use: No     Drug use: No     Sexual activity: Not Currently   Lifestyle     Physical activity     Days per week: Not on file     Minutes per session: Not on file     Stress: Not on file   Relationships     Social connections     Talks on phone: Not on file     Gets together: Not on file      Attends Sabianist service: Not on file     Active member of club or organization: Not on file     Attends meetings of clubs or organizations: Not on file     Relationship status: Not on file     Intimate partner violence     Fear of current or ex partner: Not on file     Emotionally abused: Not on file     Physically abused: Not on file     Forced sexual activity: Not on file   Other Topics Concern     Parent/sibling w/ CABG, MI or angioplasty before 65F 55M? Not Asked   Social History Narrative     Not on file       FAMILY HISTORY:  Family History   Problem Relation Age of Onset     Cancer - colorectal Father      Lung Cancer Father      Macular Degeneration Father      Multiple myeloma Brother      Pacemaker Brother      Hypertension Mother      Cerebrovascular Disease Mother         temporal arteritis     Suicide Sister      Hypertension Maternal Grandmother      Cerebrovascular Disease Maternal Grandmother        Past/family/social history reviewed and no changes    Again, thank you for allowing me to participate in the care of your patient.      Sincerely,    Neha Gaitan MD

## 2021-03-14 ENCOUNTER — HEALTH MAINTENANCE LETTER (OUTPATIENT)
Age: 81
End: 2021-03-14

## 2021-03-16 ASSESSMENT — ENCOUNTER SYMPTOMS
MYALGIAS: 1
VOMITING: 0
ABDOMINAL PAIN: 1
CONSTIPATION: 0
NECK PAIN: 1
BLOATING: 0
MUSCLE WEAKNESS: 0
MUSCLE CRAMPS: 0
STIFFNESS: 1
JOINT SWELLING: 0
NAUSEA: 0
HEARTBURN: 1
BLOOD IN STOOL: 1
BACK PAIN: 1
DIARRHEA: 0
ARTHRALGIAS: 1
JAUNDICE: 0
BOWEL INCONTINENCE: 0
RECTAL PAIN: 0

## 2021-03-17 ENCOUNTER — TRANSFERRED RECORDS (OUTPATIENT)
Dept: HEALTH INFORMATION MANAGEMENT | Facility: CLINIC | Age: 81
End: 2021-03-17

## 2021-03-17 NOTE — PROGRESS NOTES
HPI:    Last note from me 2021 and additional details in that note. She comes in for a physical today. She states continued l hip pain and has gotten PT. She states feels the PT has made her sxs. Worse. She did see her Rheumatology provider yesterday and ESR/Crp were checked as normal (she has h/o PMR). Recommended MRI L hip. She did have plain pelvic X-rays done 10/6/2020. She still has some concerns related to her previous lumbar MRI findings from 3/13/2020. She had a bx. See telephone from Dr. Orr ONC note 2020. We discussed repeating lumbar MRI imagining but she wants to wait on this. No other HEENT, cardiopulmonary, abdominal, , GYN, neurological, systemic, psychiatric, lymphatic, endocrine, vascular complaints.       Past Medical History:   Diagnosis Date     Hypertension      Polymyalgia rheumatica (H)      Past Surgical History:   Procedure Laterality Date     COLONOSCOPY  2014    Procedure: COMBINED COLONOSCOPY, SINGLE BIOPSY/POLYPECTOMY BY BIOPSY;  COLONOSCOPY;  Surgeon: Carol Ann Plasencia MD;  Location:  GI     ENT SURGERY      tonsilectomy, adenoidectomy     GYN SURGERY  ,     x 2     ORTHOPEDIC SURGERY  2004    (R) shoulder surgery for frozen shoulder     ZAC BSO      for fibroids     PE:    Vitals noted, gen, nad, cooperative, alert, neck supple nl rom, lungs with good air movement, RRR, S1, S2, no MRG, abdomen, no acute findings. No tenderness to palpation of lower lumbar spine area. L hip with tenderness to palpation. Grossly normal neurological exam.     A/P:    1. Pt. Had GI visit Ulcerative Proctitis 2021; she is on Meslamine suppository.   2. She was seen by Dr. Mehta, Orthopedics 2/10/2021 for back pain  3. Mammogram in Care Everywhere 2020   4. Vaccinations; she has completed the COVID vaccination series Pfizer  5. She saw outside Dermatology  6. She is off Crestor and lipids checked 2020  7. Vit D labs checked 2021  normal   8. L hip pain ordered future MRI imaging and sent in Rx. For 4% Lidocaine patc

## 2021-03-18 ENCOUNTER — OFFICE VISIT (OUTPATIENT)
Dept: INTERNAL MEDICINE | Facility: CLINIC | Age: 81
End: 2021-03-18
Payer: COMMERCIAL

## 2021-03-18 VITALS
HEART RATE: 59 BPM | SYSTOLIC BLOOD PRESSURE: 138 MMHG | DIASTOLIC BLOOD PRESSURE: 72 MMHG | WEIGHT: 140 LBS | OXYGEN SATURATION: 97 % | BODY MASS INDEX: 22.6 KG/M2

## 2021-03-18 DIAGNOSIS — M25.552 HIP PAIN, LEFT: Primary | ICD-10-CM

## 2021-03-18 PROCEDURE — 99397 PER PM REEVAL EST PAT 65+ YR: CPT | Performed by: INTERNAL MEDICINE

## 2021-03-18 RX ORDER — LIDOCAINE 4 G/G
1 PATCH TOPICAL EVERY 24 HOURS
Qty: 5 PATCH | Refills: 1 | Status: ON HOLD | OUTPATIENT
Start: 2021-03-18 | End: 2021-10-11

## 2021-03-18 ASSESSMENT — PAIN SCALES - GENERAL: PAINLEVEL: NO PAIN (0)

## 2021-03-18 NOTE — PATIENT INSTRUCTIONS
Call to make MRI   Radiology:  [phone] [facility]  [phone] [facility]  [phone] [facility]    Call to make apt with Dr. Zimmerman after MRI   [phone]

## 2021-03-18 NOTE — NURSING NOTE
Chief Complaint   Patient presents with     Physical     Pt would like a physical exam today      BRITTANY Buchanan at 10:43 AM sign on 3/18/2021

## 2021-03-21 DIAGNOSIS — I10 BENIGN ESSENTIAL HYPERTENSION: ICD-10-CM

## 2021-03-22 NOTE — PROGRESS NOTES
HPI:    Last in-person appt. With me was 3/18/2021 and additional details in that note. Discussed ongoing L hip pain. Ordered L hip MRI scan. She still has L hip pain more with walking. She still has pain when she sleeps on her L hip. She has see her Rheumatology provider as well and her sxs. Are not felt to be PMR (she has h/o this). No other HEENT, cardiopulmonary, abdominal, , GYN, neurological, systemic, psychiatric, lymphatic, endocrine complaints.   Past Medical History:   Diagnosis Date     Hypertension      Polymyalgia rheumatica (H)      Past Surgical History:   Procedure Laterality Date     COLONOSCOPY  2014    Procedure: COMBINED COLONOSCOPY, SINGLE BIOPSY/POLYPECTOMY BY BIOPSY;  COLONOSCOPY;  Surgeon: Carol Ann Plasencia MD;  Location:  GI     ENT SURGERY      tonsilectomy, adenoidectomy     GYN SURGERY  ,     x 2     ORTHOPEDIC SURGERY  2004    (R) shoulder surgery for frozen shoulder     ZAC BSO      for fibroids     PE:    Vitals noted, limited exam of the L hip with minimal tenderness to palpation.     MRI L hip 3/23/2021:    IMPRESSION:  1. No marrow signal abnormalities in the left hip to suggest fracture,  osteonecrosis, or marrow infiltration. No stress changes.  2. No significant left hip joint effusion.  3. Tearing of the anterior superior labrum. Thinning of the articular  cartilage along the posterior acetabulum with a subtle focus of  subchondral edema, representing osteoarthrosis. Paralabral cyst  adjacent to the posterior labrum.  4. 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         A/P:    1. She has MRI L hip 3/23/2021 and reviewed. Will have her see hip orthopedics.       20 minutes spent on the date of the encounter doing chart review, history and exam, documentation and further activities as noted above

## 2021-03-23 ENCOUNTER — OFFICE VISIT (OUTPATIENT)
Dept: INTERNAL MEDICINE | Facility: CLINIC | Age: 81
End: 2021-03-23
Payer: COMMERCIAL

## 2021-03-23 ENCOUNTER — TELEPHONE (OUTPATIENT)
Dept: INTERNAL MEDICINE | Facility: CLINIC | Age: 81
End: 2021-03-23

## 2021-03-23 ENCOUNTER — ANCILLARY PROCEDURE (OUTPATIENT)
Dept: MRI IMAGING | Facility: CLINIC | Age: 81
End: 2021-03-23
Attending: INTERNAL MEDICINE
Payer: COMMERCIAL

## 2021-03-23 VITALS — SYSTOLIC BLOOD PRESSURE: 157 MMHG | OXYGEN SATURATION: 97 % | HEART RATE: 61 BPM | DIASTOLIC BLOOD PRESSURE: 75 MMHG

## 2021-03-23 DIAGNOSIS — F41.9 ANXIETY: Primary | ICD-10-CM

## 2021-03-23 DIAGNOSIS — M25.552 HIP PAIN, LEFT: ICD-10-CM

## 2021-03-23 DIAGNOSIS — M25.552 HIP PAIN, LEFT: Primary | ICD-10-CM

## 2021-03-23 PROCEDURE — 73721 MRI JNT OF LWR EXTRE W/O DYE: CPT | Mod: LT | Performed by: RADIOLOGY

## 2021-03-23 PROCEDURE — 99213 OFFICE O/P EST LOW 20 MIN: CPT | Performed by: INTERNAL MEDICINE

## 2021-03-23 RX ORDER — LORAZEPAM 0.5 MG/1
TABLET ORAL
Qty: 1 TABLET | Refills: 0 | Status: ON HOLD | COMMUNITY
Start: 2021-03-23 | End: 2021-10-11

## 2021-03-23 NOTE — TELEPHONE ENCOUNTER
[facility] Call Center    Phone Message    May a detailed message be left on voicemail: yes     Reason for Call: Other: requesting sedative for MRI    [id number] - [place], [place] - [address]  Pt would need this by 1pm so she can have it for her 2:30 appt. notified pt of 24 hour turn around time to responding to messages.    Action Taken: Message routed to:  [facility] pcc    Travel Screening: Not Applicable

## 2021-03-23 NOTE — NURSING NOTE
Chief Complaint   Patient presents with     Results     MRI result       Jennifer Lockett, EMT at 4:30 PM on 3/23/2021

## 2021-03-23 NOTE — TELEPHONE ENCOUNTER
Called patient.  Patient report she has 2 ativan left from Feb, 2020.  Looks like the pills expires on 09/2021.  Instruct pt to take 1 tablet.  Have pt  drive her.     No Rx needed at this time.     Reinaldo Newell CMA ([facility]) at 1:55 PM on 3/23/2021

## 2021-03-24 ENCOUNTER — TELEPHONE (OUTPATIENT)
Dept: INTERNAL MEDICINE | Facility: CLINIC | Age: 81
End: 2021-03-24

## 2021-03-24 DIAGNOSIS — M25.552 HIP PAIN, LEFT: Primary | ICD-10-CM

## 2021-03-24 RX ORDER — VALSARTAN AND HYDROCHLOROTHIAZIDE 160; 25 MG/1; MG/1
1 TABLET ORAL DAILY
Qty: 90 TABLET | Refills: 3 | Status: SHIPPED | OUTPATIENT
Start: 2021-03-24 | End: 2022-03-18

## 2021-03-26 NOTE — TELEPHONE ENCOUNTER
RECORDS RECEIVED FROM: Hip pain, left/ Dr Zimmerman/ MRI and XR/ [employer]/ ortho con   DATE RECEIVED: Apr 29, 2021     NOTES STATUS DETAILS   OFFICE NOTE from referring provider Internal  Wolfgang Zimmerman MD   OFFICE NOTE from other specialist N/A    DISCHARGE SUMMARY from hospital N/A    DISCHARGE REPORT from the ER N/A    OPERATIVE REPORT N/A    MEDICATION LIST Internal    IMPLANT RECORD/STICKER N/A    LABS     CBC/DIFF N/A    CULTURES N/A    INJECTIONS DONE IN RADIOLOGY N/A    MRI Internal    CT SCAN N/A    XRAYS (IMAGES & REPORTS) Internal    TUMOR     PATHOLOGY  Slides & report N/A      03/26/21   2:19 PM   COMPLETE  Sophia Harris, CMA

## 2021-04-06 ENCOUNTER — HOSPITAL ENCOUNTER (OUTPATIENT)
Dept: MRI IMAGING | Facility: CLINIC | Age: 81
Discharge: HOME | End: 2021-04-06
Attending: INTERNAL MEDICINE | Admitting: INTERNAL MEDICINE
Payer: COMMERCIAL

## 2021-04-06 DIAGNOSIS — G89.29 OTHER CHRONIC BACK PAIN: ICD-10-CM

## 2021-04-06 DIAGNOSIS — M54.89 OTHER CHRONIC BACK PAIN: ICD-10-CM

## 2021-04-06 PROCEDURE — 255N000002 HC RX 255 OP 636: Performed by: INTERNAL MEDICINE

## 2021-04-06 PROCEDURE — A9585 GADOBUTROL INJECTION: HCPCS | Performed by: INTERNAL MEDICINE

## 2021-04-06 PROCEDURE — 72158 MRI LUMBAR SPINE W/O & W/DYE: CPT

## 2021-04-06 RX ORDER — GADOBUTROL 604.72 MG/ML
6 INJECTION INTRAVENOUS ONCE
Status: COMPLETED | OUTPATIENT
Start: 2021-04-06 | End: 2021-04-06

## 2021-04-06 RX ADMIN — GADOBUTROL 6 ML: 604.72 INJECTION INTRAVENOUS at 14:52

## 2021-04-15 ENCOUNTER — TELEPHONE (OUTPATIENT)
Dept: INTERNAL MEDICINE | Facility: CLINIC | Age: 81
End: 2021-04-15

## 2021-04-15 DIAGNOSIS — M54.89 OTHER ACUTE BACK PAIN: Primary | ICD-10-CM

## 2021-04-15 NOTE — TELEPHONE ENCOUNTER
Dear Rachel; I spoke Dia on the phone today and placed an orthopedic referral to Dr. Pimentel, ortho spine. Please help coordinate    RUTH Coates    Dear Debra;    As we discussed on the phone, I recommend you see Dr. Pimentel, orthopedic spine. I placed a referral today and Rachel our nursing staff will give you a call to help coordinate this appointment.    RUTH Zimmerman MD    Pt called and plan of care discussed. Clinic coordinator will assist in scheduling ortho appt.  Rachel Aleman RN 10:58 AM on 4/15/2021.

## 2021-04-16 ENCOUNTER — TELEPHONE (OUTPATIENT)
Dept: INTERNAL MEDICINE | Facility: CLINIC | Age: 81
End: 2021-04-16

## 2021-04-16 NOTE — TELEPHONE ENCOUNTER
----- Message from Kristian Orr MD sent at 4/11/2021  1:41 PM CDT -----  Sixto Gomez, I looked at the recent MRI and labs  I would agree that MRI seems most likely normal variant  No need for further workup for that from my standpoint  Kristian  ----- Message -----  From: Wolfgang Zimmerman MD  Sent: 4/10/2021   9:41 AM CDT  To: Kristian Orr MD    Dear Kristian;    When you get a chance, please take a look at Ms. Denise's recent repeat Lumbar MRI scan. You saw her 3/5/2020 for abnormal findings and she had a bx. Clinically she is fine with a little back pain.     Thanks, Jason Zimmerman

## 2021-04-17 ASSESSMENT — ENCOUNTER SYMPTOMS
MUSCLE WEAKNESS: 0
MUSCLE CRAMPS: 1
STIFFNESS: 1
SKIN CHANGES: 0
POOR WOUND HEALING: 0
BACK PAIN: 1
JOINT SWELLING: 0
NECK PAIN: 1
NAIL CHANGES: 0
ARTHRALGIAS: 1
MYALGIAS: 1

## 2021-04-17 ASSESSMENT — HOOS S4: HOW SEVERE IS YOUR HIP JOINT STIFFNESS AFTER FIRST WAKENING IN THE MORNING?: MILD

## 2021-04-17 ASSESSMENT — ACTIVITIES OF DAILY LIVING (ADL)
ADL_SUBSCALE_SCORE: 55.88
ADL_MEAN: 1.76
ADL_SUM: 30

## 2021-04-20 ENCOUNTER — TELEPHONE (OUTPATIENT)
Dept: INTERNAL MEDICINE | Facility: CLINIC | Age: 81
End: 2021-04-20

## 2021-04-20 NOTE — TELEPHONE ENCOUNTER
[facility] Call Center    Phone Message    May a detailed message be left on voicemail: yes     Reason for Call: Other:    The patient would like the call from the care team to go over her Back pain issues, the patient would like to be contacted to get the correct referral for Dr. Pimentel please review and follow up asap thank you.      Action Taken: Message routed to:  [facility]    Travel Screening: Not Applicable     Questions answered. Phone numbers given for scheduling in the spine clinic.  Rachel Aleman RN 12:34 PM on 4/21/2021.

## 2021-04-20 NOTE — TELEPHONE ENCOUNTER
[facility] Call Center    Phone Message    May a detailed message be left on voicemail: yes     Reason for Call: Other: . patient said she was expecting a call back from Rachel for an appointment in orthopedics for a particular doctor. Please call patient back.    Action Taken: Message routed to:  [facility] primary care clinic    Travel Screening: Not Applicable                  April 29th appt with DR Neumann.  Pt called.  Rachel Aleman RN 3:01 PM on 4/20/2021.

## 2021-04-22 NOTE — TELEPHONE ENCOUNTER
RECORDS RECEIVED FROM: Referral to Dr Pimentel ([facility])  Other acute back pain /Dr Zimmerman/ MRI/ [employer]/ ortho con   DATE RECEIVED: Apr 30, 2021     NOTES STATUS DETAILS   OFFICE NOTE from referring provider Internal  Wolfgang Zimmerman MD   OFFICE NOTE from other specialist N/A    DISCHARGE SUMMARY from hospital N/A    DISCHARGE REPORT from the ER N/A    OPERATIVE REPORT N/A    MEDICATION LIST Internal    IMPLANT RECORD/STICKER N/A    LABS     CBC/DIFF N/A    CULTURES N/A    INJECTIONS DONE IN RADIOLOGY N/A    MRI Internal    CT SCAN N/A    XRAYS (IMAGES & REPORTS) N/A    TUMOR     PATHOLOGY  Slides & report N/A      04/22/21   11:36 AM   COMPLETE  Sophia Harris CMA

## 2021-04-23 DIAGNOSIS — M54.50 LUMBAR PAIN: ICD-10-CM

## 2021-04-23 DIAGNOSIS — M25.552 LEFT HIP PAIN: Primary | ICD-10-CM

## 2021-04-26 ENCOUNTER — TELEPHONE (OUTPATIENT)
Dept: ORTHOPEDICS | Facility: CLINIC | Age: 81
End: 2021-04-26

## 2021-04-26 NOTE — TELEPHONE ENCOUNTER
RN called and spoke with patient. Explained to patient the difference between XR and MRIs. Patient expressed understanding. Patient is a new patient scheduled to see Dr. Neumann and Dr. Pimentel.    Alvarez Harris RN        [facility] Call Center    Phone Message    May a detailed message be left on voicemail: yes     Reason for Call: Other: Patient would like to know why she need the xrays for her hip and back when she recently had MRI's done.     Action Taken: Message routed to:  [facility] Orthopedics    Travel Screening: Not Applicable

## 2021-04-27 DIAGNOSIS — I10 BENIGN ESSENTIAL HYPERTENSION: ICD-10-CM

## 2021-04-29 ENCOUNTER — OFFICE VISIT (OUTPATIENT)
Dept: ORTHOPEDICS | Facility: CLINIC | Age: 81
End: 2021-04-29
Attending: INTERNAL MEDICINE
Payer: COMMERCIAL

## 2021-04-29 ENCOUNTER — PRE VISIT (OUTPATIENT)
Dept: ORTHOPEDICS | Facility: CLINIC | Age: 81
End: 2021-04-29

## 2021-04-29 ENCOUNTER — ANCILLARY PROCEDURE (OUTPATIENT)
Dept: GENERAL RADIOLOGY | Facility: CLINIC | Age: 81
End: 2021-04-29
Attending: ORTHOPAEDIC SURGERY
Payer: COMMERCIAL

## 2021-04-29 VITALS — WEIGHT: 141 LBS | HEIGHT: 65 IN | BODY MASS INDEX: 23.49 KG/M2

## 2021-04-29 DIAGNOSIS — M25.552 LEFT HIP PAIN: Primary | ICD-10-CM

## 2021-04-29 DIAGNOSIS — M25.552 HIP PAIN, LEFT: ICD-10-CM

## 2021-04-29 PROCEDURE — 73502 X-RAY EXAM HIP UNI 2-3 VIEWS: CPT | Mod: LT | Performed by: RADIOLOGY

## 2021-04-29 PROCEDURE — 99204 OFFICE O/P NEW MOD 45 MIN: CPT | Performed by: ORTHOPAEDIC SURGERY

## 2021-04-29 RX ORDER — GABAPENTIN 100 MG/1
CAPSULE ORAL
Qty: 180 CAPSULE | Refills: 3 | Status: SHIPPED | OUTPATIENT
Start: 2021-04-29 | End: 2022-01-31

## 2021-04-29 ASSESSMENT — MIFFLIN-ST. JEOR: SCORE: 1105.45

## 2021-04-29 NOTE — LETTER
4/29/2021         RE: Debra Denise  [id number]  [place] [address]        Dear Colleague,    Thank you for referring your patient, Debra Denise, to the [facility] [place]. Please see a copy of my visit note below.    Assessment: This is a 80 year old with both hip and back/radicular symptoms associated with ossification of the cheri, early limitation of motion, and a physical examination and radiographs that suggest that her groin pain is from the hip.     Plan:  If she wants to get after the groin pain I have suggested that a good placed to start would be to consider a steroid injection.   Chief Complaint: Consult (Left hip pain referred by Dr. Zimmerman. a year ago in [place] fell over her husbands food and has been having alot of groin pain, back pain and pain that radiates down the thigh. )      Physician:  Wolfgang Zimmerman    HPI: Debra Denise is a 80 year old female who presents today for evaluation of    Symptom Profile  Location of symptoms:  Left groin and bilateral lateral calf  Onset: insidious  Trend: getting worse   Duration of symptoms: several years  Quality of symptoms: aching, sharp/stabbing  Severity: moderate to severe   Alleviate: activity modification   Exacerbating: activities   Previous Treatments: Previous treatments include activity modification, oral pain medication,     Current Status:  Results of the patient s Hip Disability and Osteoarthritis Outcome Score (HOOS)  are as follows (0-100 scales with 100 being the theoretical best):  Pain: 67.5   Symptoms:75  ADLs:55.88   Sports/Recreation:31.25  Quality of Life:50  (http://koos.nu/)  [facility] Activity Score: 4    MEDICAL HISTORY:   Past Medical History:   Diagnosis Date     Hypertension      Polymyalgia rheumatica (H)        Medications:     Current Outpatient Medications:      BIOTIN PO, Take 1,000 mg by mouth daily , Disp: , Rfl:      Cholecalciferol  (VITAMIN D3 PO), Take 2,000 Units by mouth daily , Disp: , Rfl:      gabapentin (NEURONTIN) 100 MG capsule, Take 2 capsules (200 mg) by mouth At Bedtime, Disp: 180 capsule, Rfl: 3     Lidocaine (LIDOCARE) 4 % Patch, Place 1 patch onto the skin every 24 hours To prevent lidocaine toxicity, patient should be patch free for 12 hrs daily., Disp: 5 patch, Rfl: 1     LORazepam (ATIVAN) 0.5 MG tablet, Take one tablet before MRI, Disp: 1 tablet, Rfl: 0     Mesalamine-Cleanser (ROWASA) 4 g KIT, Place 1 enema rectally three times a week, Disp: 6 kit, Rfl: 3     Nutritional Supplements (NUTRITIONAL SUPPLEMENT PO), Relief Factor: Fish oil and Tumeric.  Take 1 capsule daily, Disp: , Rfl:      Probiotic Product (PROBIOTIC PO), Take by mouth daily, Disp: , Rfl:      valsartan-hydrochlorothiazide (DIOVAN HCT) 160-25 MG tablet, Take 1 tablet by mouth daily, Disp: 90 tablet, Rfl: 3    Allergies: Atenolol, Ibuprofen, Naproxen, Ramipril, Sulfa drugs, Codeine, and Fentanyl    SURGICAL HISTORY:   Past Surgical History:   Procedure Laterality Date     COLONOSCOPY  2014    Procedure: COMBINED COLONOSCOPY, SINGLE BIOPSY/POLYPECTOMY BY BIOPSY;  COLONOSCOPY;  Surgeon: Carol Ann Plasencia MD;  Location:  GI     ENT SURGERY      tonsilectomy, adenoidectomy     GYN SURGERY  ,     x 2     ORTHOPEDIC SURGERY  2004    (R) shoulder surgery for frozen shoulder     ZAC BSO      for fibroids       FAMILY HISTORY:   Family History   Problem Relation Age of Onset     Cancer - colorectal Father      Lung Cancer Father      Macular Degeneration Father      Multiple myeloma Brother      Pacemaker Brother      Hypertension Mother      Cerebrovascular Disease Mother         temporal arteritis     Suicide Sister      Hypertension Maternal Grandmother      Cerebrovascular Disease Maternal Grandmother        SOCIAL HISTORY:   Social History     Tobacco Use     Smoking status: Never Smoker     Smokeless tobacco: Never Used  "  Substance Use Topics     Alcohol use: No       REVIEW OF SYSTEMS:  The comprehensive review of systems from the intake form was reviewed with the patient.  No fever, weight change or fatigue. No dry eyes. No oral ulcers, sore throat or voice change. No palpitations, syncope, angina or edema.  No chest pain, excessive sleepiness, shortness of breath or hemoptysis.   No abdominal pain, nausea, vomiting, diarrhea or heartburn.  No skin rash. No focal weakness or numbness. No bleeding or lymphadenopathy. No rhinitis or hives.     Exam:  On physical examination the patient appears the stated age, is in no acute distress, affectThe is appropriate, and breathing is non-labored.  Vitals are documented in the EMR and have been reviewed:    Ht 1.651 m (5' 5\")   Wt 64 kg (141 lb)   BMI 23.46 kg/m    5' 5\"  Body mass index is 23.46 kg/m .    Rises from chair: easily   Gait: normal   Trendelenburg test:  Gains the exam table easily     LEFT hip subjective: not irritated   Abd:25  Add:10  PFC:  Flexion: 90  IRF:0  ERF:30  Impingement test: + groin symptoms and this reproduces her groin symptoms but does not cause any ridicular symptoms.     Distally, the circulatory, motor, and sensation exam is intact with 5/5 EHL, gastroc-soleus, and tibialis anterior.  Sensation to light touch is intact.  Dorsalis pedis and posterior tibialis pulses are palpable.  There are no sores on the feet, no bruising, and no lymphedema.    X-rays:   Bilateral hips with ossified labrum and moderate joitn space nattoring.     IMPRESSION:  1. Transitional anatomy at the lumbosacral junction. In keeping with  the numbering system used on the previous MRI lumbar spine report of  3/13/2020, I will presume that there are 5 lumbar vertebral bodies  labeled L1-L5 and complete lumbarization of the S1 vertebra with a  well-formed S1-S2 intervertebral disc.  2. Overall, no significant change compared to MRI lumbar spine of  3/13/2020 and 2/23/2020.  3. " Unchanged mild diffuse heterogeneity of the bone marrow signal on  T1 and T2-weighted imaging without convincing abnormality on the STIR  sequence. This is potentially a normal variant. Clinical correlation  and correlation with reported results of previous bone marrow biopsy  is recommended.  4. Multilevel degenerative disc disease and facet arthropathy, as  described.  5. No high-grade central spinal canal stenosis. Mild multilevel  lateral recess stenosis, as described.  6. Moderate right S1-S2 neural foraminal stenosis with lesser degrees  of neural foraminal narrowing elsewhere.                                                                      IMPRESSION:  1. No marrow signal abnormalities in the left hip to suggest fracture,  osteonecrosis, or marrow infiltration. No stress changes.  2. No significant left hip joint effusion.  3. Tearing of the anterior superior labrum. Thinning of the articular  cartilage along the posterior acetabulum with a subtle focus of  subchondral edema, representing osteoarthrosis. Paralabral cyst  adjacent to the posterior labrum.  4. 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     GUILLE WRIGHT MD  Answers for HPI/ROS submitted by the patient on 4/17/2021   General Symptoms: No  Skin Symptoms: Yes  HENT Symptoms: No  EYE SYMPTOMS: No  HEART SYMPTOMS: No  LUNG SYMPTOMS: No  INTESTINAL SYMPTOMS: No  URINARY SYMPTOMS: No  GYNECOLOGIC SYMPTOMS: No  BREAST SYMPTOMS: No  SKELETAL SYMPTOMS: Yes  BLOOD SYMPTOMS: No  NERVOUS SYSTEM SYMPTOMS: No  MENTAL HEALTH SYMPTOMS: No  Changes in hair: No  Changes in moles/birth marks: No  Itching: No  Rashes: Yes  Changes in nails: No  Acne: No  Hair in places you don't want it: No  Change in facial hair: No  Warts: No  Non-healing sores: No  Scarring: No  Flaking of skin: No  Color changes of hands/feet in cold : No  Sun sensitivity: No  Skin thickening: No  Back pain: Yes  Muscle aches: Yes  Neck pain: Yes  Swollen joints: No  Joint  pain: Yes  Bone pain: No  Muscle cramps: Yes  Muscle weakness: No  Joint stiffness: Yes  Bone fracture: No

## 2021-04-29 NOTE — TELEPHONE ENCOUNTER
Gabapentin Oral Capsule 100 MG  Last Written Prescription Date:  5/16/20  Last Fill Quantity: 180,   # refills: 3  Last Office Visit : 3/23/21  Future Office visit:  6/3/21 Elsie Abdalla refill request to provider for review/approval because:  Refill team is not authorized to refill Gabapentin

## 2021-04-29 NOTE — NURSING NOTE
"Reason For Visit:   Chief Complaint   Patient presents with     Consult     Left hip pain referred by Dr. Zimmerman. a year ago in [place] fell over her husbands food and has been having alot of groin pain, back pain and pain that radiates down the thigh.        Ht 1.651 m (5' 5\")   Wt 64 kg (141 lb)   BMI 23.46 kg/m           Nicolas Ruiz ATC  "

## 2021-04-29 NOTE — PROGRESS NOTES
Assessment: This is a 80 year old with both hip and back/radicular symptoms associated with ossification of the cheri, early limitation of motion, and a physical examination and radiographs that suggest that her groin pain is from the hip.     Plan:  If she wants to get after the groin pain I have suggested that a good placed to start would be to consider a steroid injection.   Chief Complaint: Consult (Left hip pain referred by Dr. Zimmerman. a year ago in [place] fell over her husbands food and has been having alot of groin pain, back pain and pain that radiates down the thigh. )      Physician:  Wolfgang Zimmerman    HPI: Debra Denise is a 80 year old female who presents today for evaluation of    Symptom Profile  Location of symptoms:  Left groin and bilateral lateral calf  Onset: insidious  Trend: getting worse   Duration of symptoms: several years  Quality of symptoms: aching, sharp/stabbing  Severity: moderate to severe   Alleviate: activity modification   Exacerbating: activities   Previous Treatments: Previous treatments include activity modification, oral pain medication,     Current Status:  Results of the patient s Hip Disability and Osteoarthritis Outcome Score (HOOS)  are as follows (0-100 scales with 100 being the theoretical best):  Pain: 67.5   Symptoms:75  ADLs:55.88   Sports/Recreation:31.25  Quality of Life:50  ([email or web address].nu/)  [facility] Activity Score: 4    MEDICAL HISTORY:   Past Medical History:   Diagnosis Date     Hypertension      Polymyalgia rheumatica (H)        Medications:     Current Outpatient Medications:      BIOTIN PO, Take 1,000 mg by mouth daily , Disp: , Rfl:      Cholecalciferol (VITAMIN D3 PO), Take 2,000 Units by mouth daily , Disp: , Rfl:      gabapentin (NEURONTIN) 100 MG capsule, Take 2 capsules (200 mg) by mouth At Bedtime, Disp: 180 capsule, Rfl: 3     Lidocaine (LIDOCARE) 4 % Patch, Place 1 patch onto the skin every 24 hours To prevent lidocaine toxicity, patient  should be patch free for 12 hrs daily., Disp: 5 patch, Rfl: 1     LORazepam (ATIVAN) 0.5 MG tablet, Take one tablet before MRI, Disp: 1 tablet, Rfl: 0     Mesalamine-Cleanser (ROWASA) 4 g KIT, Place 1 enema rectally three times a week, Disp: 6 kit, Rfl: 3     Nutritional Supplements (NUTRITIONAL SUPPLEMENT PO), Relief Factor: Fish oil and Tumeric.  Take 1 capsule daily, Disp: , Rfl:      Probiotic Product (PROBIOTIC PO), Take by mouth daily, Disp: , Rfl:      valsartan-hydrochlorothiazide (DIOVAN HCT) 160-25 MG tablet, Take 1 tablet by mouth daily, Disp: 90 tablet, Rfl: 3    Allergies: Atenolol, Ibuprofen, Naproxen, Ramipril, Sulfa drugs, Codeine, and Fentanyl    SURGICAL HISTORY:   Past Surgical History:   Procedure Laterality Date     COLONOSCOPY  2014    Procedure: COMBINED COLONOSCOPY, SINGLE BIOPSY/POLYPECTOMY BY BIOPSY;  COLONOSCOPY;  Surgeon: Carol Ann Plasencia MD;  Location:  GI     ENT SURGERY      tonsilectomy, adenoidectomy     GYN SURGERY  ,     x 2     ORTHOPEDIC SURGERY  2004    (R) shoulder surgery for frozen shoulder     [facility] BSO      for fibroids       FAMILY HISTORY:   Family History   Problem Relation Age of Onset     Cancer - colorectal Father      Lung Cancer Father      Macular Degeneration Father      Multiple myeloma Brother      Pacemaker Brother      Hypertension Mother      Cerebrovascular Disease Mother         temporal arteritis     Suicide Sister      Hypertension Maternal Grandmother      Cerebrovascular Disease Maternal Grandmother        SOCIAL HISTORY:   Social History     Tobacco Use     Smoking status: Never Smoker     Smokeless tobacco: Never Used   Substance Use Topics     Alcohol use: No       REVIEW OF SYSTEMS:  The comprehensive review of systems from the intake form was reviewed with the patient.  No fever, weight change or fatigue. No dry eyes. No oral ulcers, sore throat or voice change. No palpitations, syncope, angina or edema.  No chest  "pain, excessive sleepiness, shortness of breath or hemoptysis.   No abdominal pain, nausea, vomiting, diarrhea or heartburn.  No skin rash. No focal weakness or numbness. No bleeding or lymphadenopathy. No rhinitis or hives.     Exam:  On physical examination the patient appears the stated age, is in no acute distress, affectThe is appropriate, and breathing is non-labored.  Vitals are documented in the EMR and have been reviewed:    Ht 1.651 m (5' 5\")   Wt 64 kg (141 lb)   BMI 23.46 kg/m    5' 5\"  Body mass index is 23.46 kg/m .    Rises from chair: easily   Gait: normal   Trendelenburg test:  Gains the exam table easily     LEFT hip subjective: not irritated   Abd:25  Add:10  PFC:  Flexion: 90  IRF:0  ERF:30  Impingement test: + groin symptoms and this reproduces her groin symptoms but does not cause any ridicular symptoms.     Distally, the circulatory, motor, and sensation exam is intact with 5/5 EHL, gastroc-soleus, and tibialis anterior.  Sensation to light touch is intact.  Dorsalis pedis and posterior tibialis pulses are palpable.  There are no sores on the feet, no bruising, and no lymphedema.    X-rays:   Bilateral hips with ossified labrum and moderate joitn space nattoring.     IMPRESSION:  1. Transitional anatomy at the lumbosacral junction. In keeping with  the numbering system used on the previous MRI lumbar spine report of  3/13/2020, I will presume that there are 5 lumbar vertebral bodies  labeled L1-L5 and complete lumbarization of the S1 vertebra with a  well-formed S1-S2 intervertebral disc.  2. Overall, no significant change compared to MRI lumbar spine of  3/13/2020 and 2/23/2020.  3. Unchanged mild diffuse heterogeneity of the bone marrow signal on  T1 and T2-weighted imaging without convincing abnormality on the STIR  sequence. This is potentially a normal variant. Clinical correlation  and correlation with reported results of previous bone marrow biopsy  is recommended.  4. Multilevel " degenerative disc disease and facet arthropathy, as  described.  5. No high-grade central spinal canal stenosis. Mild multilevel  lateral recess stenosis, as described.  6. Moderate right S1-S2 neural foraminal stenosis with lesser degrees  of neural foraminal narrowing elsewhere.                                                                      IMPRESSION:  1. No marrow signal abnormalities in the left hip to suggest fracture,  osteonecrosis, or marrow infiltration. No stress changes.  2. No significant left hip joint effusion.  3. Tearing of the anterior superior labrum. Thinning of the articular  cartilage along the posterior acetabulum with a subtle focus of  subchondral edema, representing osteoarthrosis. Paralabral cyst  adjacent to the posterior labrum.  4. 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     GUILLE WRIGHT MD  Answers for HPI/ROS submitted by the patient on 4/17/2021   General Symptoms: No  Skin Symptoms: Yes  HENT Symptoms: No  EYE SYMPTOMS: No  HEART SYMPTOMS: No  LUNG SYMPTOMS: No  INTESTINAL SYMPTOMS: No  URINARY SYMPTOMS: No  GYNECOLOGIC SYMPTOMS: No  BREAST SYMPTOMS: No  SKELETAL SYMPTOMS: Yes  BLOOD SYMPTOMS: No  NERVOUS SYSTEM SYMPTOMS: No  MENTAL HEALTH SYMPTOMS: No  Changes in hair: No  Changes in moles/birth marks: No  Itching: No  Rashes: Yes  Changes in nails: No  Acne: No  Hair in places you don't want it: No  Change in facial hair: No  Warts: No  Non-healing sores: No  Scarring: No  Flaking of skin: No  Color changes of hands/feet in cold : No  Sun sensitivity: No  Skin thickening: No  Back pain: Yes  Muscle aches: Yes  Neck pain: Yes  Swollen joints: No  Joint pain: Yes  Bone pain: No  Muscle cramps: Yes  Muscle weakness: No  Joint stiffness: Yes  Bone fracture: No

## 2021-04-30 ENCOUNTER — PRE VISIT (OUTPATIENT)
Dept: ORTHOPEDICS | Facility: CLINIC | Age: 81
End: 2021-04-30

## 2021-04-30 ENCOUNTER — OFFICE VISIT (OUTPATIENT)
Dept: ORTHOPEDICS | Facility: CLINIC | Age: 81
End: 2021-04-30
Payer: COMMERCIAL

## 2021-04-30 ENCOUNTER — ANCILLARY PROCEDURE (OUTPATIENT)
Dept: GENERAL RADIOLOGY | Facility: CLINIC | Age: 81
End: 2021-04-30
Attending: ORTHOPAEDIC SURGERY
Payer: COMMERCIAL

## 2021-04-30 VITALS — BODY MASS INDEX: 23.49 KG/M2 | HEIGHT: 65 IN | WEIGHT: 141 LBS

## 2021-04-30 DIAGNOSIS — M25.552 LEFT HIP PAIN: Primary | ICD-10-CM

## 2021-04-30 DIAGNOSIS — M54.50 LUMBAR PAIN: ICD-10-CM

## 2021-04-30 PROCEDURE — 99214 OFFICE O/P EST MOD 30 MIN: CPT | Performed by: ORTHOPAEDIC SURGERY

## 2021-04-30 PROCEDURE — 72110 X-RAY EXAM L-2 SPINE 4/>VWS: CPT | Performed by: RADIOLOGY

## 2021-04-30 ASSESSMENT — MIFFLIN-ST. JEOR: SCORE: 1105.45

## 2021-04-30 NOTE — LETTER
2021         RE: Debra Denise  [id number]  [place] [address]        Dear Colleague,    Thank you for referring your patient, Debra Denise, to the [facility] [place]. Please see a copy of my visit note below.    Spine Surgery Return Clinic Visit      Chief Complaint:   Consult (low back pain. was seen by Dr. Neumann yesterday 21 for hip pain. coming in to discuss options for her low back.)      Interval HPI:  Symptom Profile Including: location of symptoms, onset, severity, exacerbating/alleviating factors, previous treatments:        Debra Denise is a 80 year old female who presents today as an referral from Dr. Payne for evaluation of back and left leg pain symptoms.  She is intermittent back issues for a number of years.  She is been through 3 rounds of physical therapy, each of which she feels made things different and worse.  Stretching has helped the most.  She also takes 200 mg of gabapentin at nighttime to help her sleep.  She cannot sleep on her left side because she gets a pain coming down into the left anterior thigh.  She had an MRI of the lumbar spine as well as the pelvis and she went to see my partner Dr. Neumann to have the hip evaluated.  She was felt to have some mild degenerative changes there but not bad enough to need a surgery.  She was referred to me to see if the spine could be contributing.            Past Medical History:     Past Medical History:   Diagnosis Date     Hypertension      Polymyalgia rheumatica (H)             Past Surgical History:     Past Surgical History:   Procedure Laterality Date     COLONOSCOPY  2014    Procedure: COMBINED COLONOSCOPY, SINGLE BIOPSY/POLYPECTOMY BY BIOPSY;  COLONOSCOPY;  Surgeon: Carol Ann Plasencia MD;  Location:  GI     ENT SURGERY      tonsilectomy, adenoidectomy     GYN SURGERY  ,     x 2     ORTHOPEDIC SURGERY  2004    (R)  "shoulder surgery for frozen shoulder     [facility] BSO  1988    for fibroids            Social History:     Social History     Tobacco Use     Smoking status: Never Smoker     Smokeless tobacco: Never Used   Substance Use Topics     Alcohol use: No            Family History:     Family History   Problem Relation Age of Onset     Cancer - colorectal Father      Lung Cancer Father      Macular Degeneration Father      Multiple myeloma Brother      Pacemaker Brother      Hypertension Mother      Cerebrovascular Disease Mother         temporal arteritis     Suicide Sister      Hypertension Maternal Grandmother      Cerebrovascular Disease Maternal Grandmother             Allergies:     Allergies   Allergen Reactions     Atenolol      Other reaction(s): Intolerance-Can't Take  Fatigue     Ibuprofen      Other reaction(s): Stomach Upset  4-9-13 tele encounter     Naproxen      Other reaction(s): Stomach Upset  4-9-13 tele encounter     Ramipril Cough     Other reaction(s): Intolerance-Can't Take     Sulfa Drugs      Codeine Nausea     Fentanyl Nausea            Medications:     Current Outpatient Medications   Medication     BIOTIN PO     Cholecalciferol (VITAMIN D3 PO)     gabapentin (NEURONTIN) 100 MG capsule     Lidocaine (LIDOCARE) 4 % Patch     LORazepam (ATIVAN) 0.5 MG tablet     Mesalamine-Cleanser (ROWASA) 4 g KIT     Nutritional Supplements (NUTRITIONAL SUPPLEMENT PO)     Probiotic Product (PROBIOTIC PO)     valsartan-hydrochlorothiazide (DIOVAN HCT) 160-25 MG tablet     No current facility-administered medications for this visit.              Review of Systems:   A focused musculoskeletal and neurologic ROS was performed with pertinent positives and negatives noted in the HPI.  Additional systems were also reviewed and are documented at the bottom of the note.         Physical Exam:   Vitals: Ht 1.651 m (5' 5\")   Wt 64 kg (141 lb)   BMI 23.46 kg/m    Musculoskeletal, Neurologic, and Spine:            Lumbar " Spine:    Appearance - No gross stepoffs or deformities    Motor -     L2-3: Hip flexion 5/5 R and 5/5 L strength          L3/4:  Knee extension R 5/5 and L 5/5 strength         L4/5:  Foot dorsiflexion R 5/5 L 5/5 and       EHL dorsiflexion R 5/5 L 5/5 strength         S1:  Plantarflexion/Peroneal Muscles  R 5/5 and L 5/5 strength    Sensation: intact to light touch L3-S1 distribution BLE      Neurologic:      REFLEXES Left Right                  Patella 1+ 1+   Ankle jerk 1+ 1+   Babinski No upgoing great toe No upgoing great toe   Clonus 0 beats 0 beats       Alignment:  Patient stands with a neutral standing sagittal balance.           Imaging:   We ordered and independently reviewed new radiographs at this clinic visit. The results were discussed with the patient. Findings include:     I reviewed her pelvic MRI and radiographs which show some mild degenerative changes bilaterally    Lumbar MRI from April 2021 shows mild diffuse degenerative changes with right L5-S1 foraminal stenosis but no severe left-sided stenosis.    Standing lumbar radiographs show preserved alignment and no severe deformity or degenerative changes       Assessment and Plan:     80 year old female with age-appropriate lumbar changes.  I do not see any severe stenosis.  She does have some right L5-S1 foraminal stenosis but the left leg is really the one that bothers her so this does not seem to correlate.    Given the absence of clear stenosis or deformity I do not think any surgical intervention is warranted.  We discussed possibly repeating physical therapy, but each of her past therapy visits has made things worse so she was inclined not to do this.  I asked her to speak to Dr. Payne about possibly increasing her gabapentin dose as this could be of some benefit.  She cannot take anti-inflammatories because of stomach issues but is willing to try Tylenol.    I think she was reassured that there is no major issues on the imaging.   Follow-up as needed.           Respectfully,  Sergei Pimentel MD  Spine Surgery  [facility]

## 2021-04-30 NOTE — NURSING NOTE
"Reason For Visit:   Chief Complaint   Patient presents with     Consult     low back pain. was seen by Dr. Neumann yesterday 4/29/21 for hip pain. coming in to discuss options for her low back.       Primary MD: Wolfgang Zimmerman  Ref. MD: Gerald     ?  No  Occupation retired .  Currently working? No.  Work status?  Retired.  Date of surgery: none   Type of surgery: none .  Smoker: No  Request smoking cessation information: No    Ht 1.651 m (5' 5\")   Wt 64 kg (141 lb)   BMI 23.46 kg/m           Oswestry (NGOZI) Questionnaire    No flowsheet data found.         Neck Disability Index (NDI) Questionnaire    No flowsheet data found.                Promis 10 Assessment    PROMIS 10 4/17/2021   In general, would you say your health is: Good   In general, would you say your quality of life is: Very good   In general, how would you rate your physical health? Good   In general, how would you rate your mental health, including your mood and your ability to think? Very good   In general, how would you rate your satisfaction with your social activities and relationships? Very good   In general, please rate how well you carry out your usual social activities and roles Very good   To what extent are you able to carry out your everyday physical activities such as walking, climbing stairs, carrying groceries, or moving a chair? Moderately   How often have you been bothered by emotional problems such as feeling anxious, depressed or irritable? Rarely   How would you rate your fatigue on average? Moderate   How would you rate your pain on average?   0 = No Pain  to  10 = Worst Imaginable Pain 7   In general, would you say your health is: 3   In general, would you say your quality of life is: 4   In general, how would you rate your physical health? 3   In general, how would you rate your mental health, including your mood and your ability to think? 4   In general, how would you rate your satisfaction with your social " activities and relationships? 4   In general, please rate how well you carry out your usual social activities and roles. (This includes activities at home, at work and in your community, and responsibilities as a parent, child, spouse, employee, friend, etc.) 4   To what extent are you able to carry out your everyday physical activities such as walking, climbing stairs, carrying groceries, or moving a chair? 3   In the past 7 days, how often have you been bothered by emotional problems such as feeling anxious, depressed, or irritable? 2   In the past 7 days, how would you rate your fatigue on average? 3   In the past 7 days, how would you rate your pain on average, where 0 means no pain, and 10 means worst imaginable pain? 7   Global Mental Health Score 16   Global Physical Health Score 11   PROMIS TOTAL - SUBSCORES 27   Some recent data might be hidden                Nicolas Ruiz ATC

## 2021-04-30 NOTE — PROGRESS NOTES
Spine Surgery Return Clinic Visit      Chief Complaint:   Consult (low back pain. was seen by Dr. Neumann yesterday 21 for hip pain. coming in to discuss options for her low back.)      Interval HPI:  Symptom Profile Including: location of symptoms, onset, severity, exacerbating/alleviating factors, previous treatments:        Debra Denise is a 80 year old female who presents today as an referral from Dr. Payne for evaluation of back and left leg pain symptoms.  She is intermittent back issues for a number of years.  She is been through 3 rounds of physical therapy, each of which she feels made things different and worse.  Stretching has helped the most.  She also takes 200 mg of gabapentin at nighttime to help her sleep.  She cannot sleep on her left side because she gets a pain coming down into the left anterior thigh.  She had an MRI of the lumbar spine as well as the pelvis and she went to see my partner Dr. Neumann to have the hip evaluated.  She was felt to have some mild degenerative changes there but not bad enough to need a surgery.  She was referred to me to see if the spine could be contributing.            Past Medical History:     Past Medical History:   Diagnosis Date     Hypertension      Polymyalgia rheumatica (H)             Past Surgical History:     Past Surgical History:   Procedure Laterality Date     COLONOSCOPY  2014    Procedure: COMBINED COLONOSCOPY, SINGLE BIOPSY/POLYPECTOMY BY BIOPSY;  COLONOSCOPY;  Surgeon: Carol Ann Plasencia MD;  Location:  GI     ENT SURGERY      tonsilectomy, adenoidectomy     GYN SURGERY  ,     x 2     ORTHOPEDIC SURGERY  2004    (R) shoulder surgery for frozen shoulder     [facility] BSO      for fibroids            Social History:     Social History     Tobacco Use     Smoking status: Never Smoker     Smokeless tobacco: Never Used   Substance Use Topics     Alcohol use: No            Family History:     Family History  "  Problem Relation Age of Onset     Cancer - colorectal Father      Lung Cancer Father      Macular Degeneration Father      Multiple myeloma Brother      Pacemaker Brother      Hypertension Mother      Cerebrovascular Disease Mother         temporal arteritis     Suicide Sister      Hypertension Maternal Grandmother      Cerebrovascular Disease Maternal Grandmother             Allergies:     Allergies   Allergen Reactions     Atenolol      Other reaction(s): Intolerance-Can't Take  Fatigue     Ibuprofen      Other reaction(s): Stomach Upset  4-9-13 tele encounter     Naproxen      Other reaction(s): Stomach Upset  4-9-13 tele encounter     Ramipril Cough     Other reaction(s): Intolerance-Can't Take     Sulfa Drugs      Codeine Nausea     Fentanyl Nausea            Medications:     Current Outpatient Medications   Medication     BIOTIN PO     Cholecalciferol (VITAMIN D3 PO)     gabapentin (NEURONTIN) 100 MG capsule     Lidocaine (LIDOCARE) 4 % Patch     LORazepam (ATIVAN) 0.5 MG tablet     Mesalamine-Cleanser (ROWASA) 4 g KIT     Nutritional Supplements (NUTRITIONAL SUPPLEMENT PO)     Probiotic Product (PROBIOTIC PO)     valsartan-hydrochlorothiazide (DIOVAN HCT) 160-25 MG tablet     No current facility-administered medications for this visit.              Review of Systems:   A focused musculoskeletal and neurologic ROS was performed with pertinent positives and negatives noted in the HPI.  Additional systems were also reviewed and are documented at the bottom of the note.         Physical Exam:   Vitals: Ht 1.651 m (5' 5\")   Wt 64 kg (141 lb)   BMI 23.46 kg/m    Musculoskeletal, Neurologic, and Spine:            Lumbar Spine:    Appearance - No gross stepoffs or deformities    Motor -     L2-3: Hip flexion 5/5 R and 5/5 L strength          L3/4:  Knee extension R 5/5 and L 5/5 strength         L4/5:  Foot dorsiflexion R 5/5 L 5/5 and       EHL dorsiflexion R 5/5 L 5/5 strength         S1:  " Plantarflexion/Peroneal Muscles  R 5/5 and L 5/5 strength    Sensation: intact to light touch L3-S1 distribution BLE      Neurologic:      REFLEXES Left Right                  Patella 1+ 1+   Ankle jerk 1+ 1+   Babinski No upgoing great toe No upgoing great toe   Clonus 0 beats 0 beats       Alignment:  Patient stands with a neutral standing sagittal balance.           Imaging:   We ordered and independently reviewed new radiographs at this clinic visit. The results were discussed with the patient. Findings include:     I reviewed her pelvic MRI and radiographs which show some mild degenerative changes bilaterally    Lumbar MRI from April 2021 shows mild diffuse degenerative changes with right L5-S1 foraminal stenosis but no severe left-sided stenosis.    Standing lumbar radiographs show preserved alignment and no severe deformity or degenerative changes       Assessment and Plan:     80 year old female with age-appropriate lumbar changes.  I do not see any severe stenosis.  She does have some right L5-S1 foraminal stenosis but the left leg is really the one that bothers her so this does not seem to correlate.    Given the absence of clear stenosis or deformity I do not think any surgical intervention is warranted.  We discussed possibly repeating physical therapy, but each of her past therapy visits has made things worse so she was inclined not to do this.  I asked her to speak to Dr. Payne about possibly increasing her gabapentin dose as this could be of some benefit.  She cannot take anti-inflammatories because of stomach issues but is willing to try Tylenol.    I think she was reassured that there is no major issues on the imaging.  Follow-up as needed.           Respectfully,  Sergei Pimentel MD  Spine Surgery  [facility]

## 2021-05-06 ENCOUNTER — TELEPHONE (OUTPATIENT)
Dept: INTERNAL MEDICINE | Facility: CLINIC | Age: 81
End: 2021-05-06

## 2021-05-06 NOTE — TELEPHONE ENCOUNTER
Spoke to Aguilar.  I double checked with Dr. Zimmerman about having patient checked in at 10:00 AM for her 11:30 AM appointment with Dr. Zimmerman on 05/10/21. Dr. Zimmerman had no reason to see patient early.  Confirmed patient appointment at 11:30 AM and to arrive by 11:15 AM.  Aguilar gave verbal understanding.      Reinaldo Newell CMA ([facility]) at 4:38 PM on 5/6/2021

## 2021-05-06 NOTE — TELEPHONE ENCOUNTER
[facility] Call Center    Phone Message    May a detailed message be left on voicemail: yes     Reason for Call: Other: Call back: Patient is scheduled for 11:30am on Monday 05/10/2021. Patient's  would like a call back from Dr. Zimmerman's care team to see why the patient was told by Manpreet to be in by 10am. Please review and contact the patient's  to advise. Patient's  was advised the appointment time scheduled for the patient is 11:30am and arrival time is 11:15am, but the patient's  wants to hear it from the clinic.     Action Taken: Message routed to:  [facility] ([facility]    Travel Screening: Not Applicable

## 2021-05-10 ENCOUNTER — OFFICE VISIT (OUTPATIENT)
Dept: INTERNAL MEDICINE | Facility: CLINIC | Age: 81
End: 2021-05-10
Payer: COMMERCIAL

## 2021-05-10 VITALS
BODY MASS INDEX: 23.83 KG/M2 | HEART RATE: 57 BPM | OXYGEN SATURATION: 96 % | WEIGHT: 143.2 LBS | DIASTOLIC BLOOD PRESSURE: 71 MMHG | SYSTOLIC BLOOD PRESSURE: 152 MMHG

## 2021-05-10 DIAGNOSIS — G89.29 OTHER CHRONIC BACK PAIN: Primary | ICD-10-CM

## 2021-05-10 DIAGNOSIS — M54.89 OTHER CHRONIC BACK PAIN: Primary | ICD-10-CM

## 2021-05-10 PROCEDURE — 99215 OFFICE O/P EST HI 40 MIN: CPT | Performed by: INTERNAL MEDICINE

## 2021-05-10 NOTE — NURSING NOTE
Chief Complaint   Patient presents with     RECHECK     follow up on L sided hip pain       Mirella Montano MA, at 11:11 AM on 5/10/2021.

## 2021-05-10 NOTE — PROGRESS NOTES
HPI:    Last visit with me 3/23/2021 and see additional details from 3/18/2021 note. Recent dx. Pityriasis Rosea. She has some non-specific knee and orther joint complaint since this diagnosis. She denies any systemic sxs. No F/C/NS. Her back an hip pain sxs. Are stable. She has increased her Gabapentin at night.     Past Medical History:   Diagnosis Date     Hypertension      Polymyalgia rheumatica (H)      Past Surgical History:   Procedure Laterality Date     COLONOSCOPY  2014    Procedure: COMBINED COLONOSCOPY, SINGLE BIOPSY/POLYPECTOMY BY BIOPSY;  COLONOSCOPY;  Surgeon: Carol Ann Plasencia MD;  Location:  GI     ENT SURGERY      tonsilectomy, adenoidectomy     GYN SURGERY  ,     x 2     ORTHOPEDIC SURGERY  2004    (R) shoulder surgery for frozen shoulder     ZAC BSO      for fibroids     PE:    Vitals noted, gen, nad, cooperative, alert, neck supple nl rom, lungs with good air movement, RRR, S1, S2, no MRG, abdomen, no acute findings. Grossly normal neurological exam. She has skin changes on B arms, some patches and erythema. Joints do not have tenderness nor swelling.     A/P:    1. Back pain; she had MRI lumbar imaging 2021 and saw Dr. Pimentel, ortho spine 2021. See also telephone note from me and Dr. Orr, ONC 2021 regarding MRI findings.   2. She had an orthopedic appt. With Dr. Neumann 2021 to discuss her hip pain.   3. Scanned in Rheumatology note from Dr. Damian, 3/17/2021. Debra has a h/o PMR.   4. Immunizations; she has completed the Pfizer, COVID vaccination series.   5. HTN; she remains on Diovan HcTz; electrolytes checked 2021   6. Dermatological; she was seen recently outside dermatology for a dx.Pityriasis Rosea and has follow up in about 2 weeks  7. Would recommend she restart low dose Crestor (5 mg) after her musculoskeletal complaints bishnu.   8. Mammogram in Care Everywhere 2020  9. Ulcerative Proctitis. She has followed up in  GI with Dr. Villanueva and is clinically stable. She is now using Mesalamine suppositories and that is effective.       40 minutes spent on the date of the encounter doing chart review, history and exam, documentation and further activities as noted above

## 2021-05-25 ENCOUNTER — TELEPHONE (OUTPATIENT)
Dept: ORTHOPEDICS | Facility: CLINIC | Age: 81
End: 2021-05-25

## 2021-05-25 NOTE — TELEPHONE ENCOUNTER
RN called and spoke with patient. Patient agreed to a phone visit with Dr. Pimentel to discuss potential lumbar injection as his last visit with Dr. Pimentel, there is no indication for lumbar injection. Patient expressed understanding. RN did emphasize that patient also saw Dr. Neumann and he did prescribe a major left joint injection for her. Patient expressed understanding and will follow up with Dr. Neumann when she is ready.    Alvarez Harris RN      [facility]    Phone Message    May a detailed message be left on voicemail: yes     Reason for Call: Other: Pt would like an order for an epidural injection for back sent to [facility] in [place]. Please call regarding this.     Action Taken: Other:  ortho    Travel Screening: Not Applicable

## 2021-06-08 ENCOUNTER — VIRTUAL VISIT (OUTPATIENT)
Dept: ORTHOPEDICS | Facility: CLINIC | Age: 81
End: 2021-06-08
Payer: COMMERCIAL

## 2021-06-08 DIAGNOSIS — M54.50 LUMBAR PAIN: Primary | ICD-10-CM

## 2021-06-08 PROCEDURE — 99212 OFFICE O/P EST SF 10 MIN: CPT | Mod: 95 | Performed by: ORTHOPAEDIC SURGERY

## 2021-06-08 NOTE — NURSING NOTE
RN called and spoke with patient. Advised patient what to do in respect to PT and Pain clinic referral.  Patient will call RN back if she has other questions.    Alvarez Harris RN

## 2021-06-08 NOTE — PROGRESS NOTES
Debra is a 80 year old who is being evaluated via a billable telephone visit.      What phone number would you like to be contacted at? Home  How would you like to obtain your AVS? [facility]  Phone call duration: 18 minutes    Debra has been having more stiffness in her back.      She did get evaluated by Dr. Neumann who offered her a steroid injection of the hip, but she has not pursued this to this point.    She has been to PT but PT is making her back worse.    I again reviewed her imaging which shows diffuse spondylosis in the lumbar spine and her MRI shows a right L5-S1 foraminal disc protrusion, but her symptoms are really on the left side.    I stressed to her that I think the groin pain is in the hip and if she wished to pursue that part she could consider an injection in the left hip per Dr. Neumann's offer.    I do not see any severe spinal deformity or neurologic compression that would benefit from surgery and I recommended against spine interventions at this time.    She was using a corset brace but this made things worse.      I expressed sympathy to her and I am sorry things are so painful.  I do not recommend spine surgery.  I recommended a referral to the pain clinic so she could discuss non-operative options.    Sergei Pimentel MD

## 2021-06-08 NOTE — LETTER
6/8/2021       RE: Debra Denise  [id number]  [place] [address]    Dear Colleague,    Thank you for referring your patient, Debra Denise, to the [facility] [place]. Please see a copy of my visit note below.    Debra is a 80 year old who is being evaluated via a billable telephone visit.      What phone number would you like to be contacted at? Home  How would you like to obtain your AVS? [facility]  Phone call duration: 18 minutes    Debra has been having more stiffness in her back.      She did get evaluated by Dr. Neumann who offered her a steroid injection of the hip, but she has not pursued this to this point.    She has been to PT but PT is making her back worse.    I again reviewed her imaging which shows diffuse spondylosis in the lumbar spine and her MRI shows a right L5-S1 foraminal disc protrusion, but her symptoms are really on the left side.    I stressed to her that I think the groin pain is in the hip and if she wished to pursue that part she could consider an injection in the left hip per Dr. Neumann's offer.    I do not see any severe spinal deformity or neurologic compression that would benefit from surgery and I recommended against spine interventions at this time.    She was using a corset brace but this made things worse.      I expressed sympathy to her and I am sorry things are so painful.  I do not recommend spine surgery.  I recommended a referral to the pain clinic so she could discuss non-operative options.    Sergei Pimentel MD

## 2021-06-08 NOTE — NURSING NOTE
Reason For Visit:   Chief Complaint   Patient presents with     RECHECK     follow up to discuss potential lumbar injection        There were no vitals taken for this visit.         Nicolas Ruiz ATC

## 2021-06-22 NOTE — PROGRESS NOTES
HPI:    Last visit with me 5/10/2021 and additional information in that note. Still with lower back pain. She is going to get PT. She did see an integrated chiropractor yesterday (2021). She states taking more gabapentin at night has side effects of fatigue and a little confusion. She states acetaminophen is not effective his reducing her pain complaints. No other HEENT, cardiopulmonary, abdominal, , GYN, neurological, systemic, psychiatric, lymphatic, endocrine, vascular complaints.      Past Medical History:   Diagnosis Date     Hypertension      Polymyalgia rheumatica (H)      Past Surgical History:   Procedure Laterality Date     COLONOSCOPY  2014    Procedure: COMBINED COLONOSCOPY, SINGLE BIOPSY/POLYPECTOMY BY BIOPSY;  COLONOSCOPY;  Surgeon: Carol Ann Plasencia MD;  Location:  GI     ENT SURGERY      tonsilectomy, adenoidectomy     GYN SURGERY  ,     x 2     ORTHOPEDIC SURGERY  2004    (R) shoulder surgery for frozen shoulder     ZAC BSO      for fibroids     PE:    Vitals noted, gen, nad, cooperative, alert, neck supple, normal rom, lungs with good air movement, RRR, S1, S2, no MRG, abdomen, no acute findings, Grossly normal neurological exam.     A/P:    1. Back pain; she was seen 2021 by Dr. Pimentel, ortho spine and is getting PT. She had a Lumbar MRI 2021. He uses lidocaine patches and Gabapentin but not much relief. She will also follow  With her new Chiropractor provider.   2. She has completed the Pfizer, COVID vaccination series  3. HTN on Valsartan/HcTz; labs ordered checked 2021. BP stable today.   4. Vitamin D level checked normal at 54 on 2021. .   5. Lipid panel today not currently on Crestor and will follow up.   6. Rheumatology h/o PMR, she gets outside ESR,CrP labs, no current sxs.     30 minutes spent on the date of the encounter doing chart review, history and exam, documentation and further activities as noted above

## 2021-06-23 ENCOUNTER — OFFICE VISIT (OUTPATIENT)
Dept: INTERNAL MEDICINE | Facility: CLINIC | Age: 81
End: 2021-06-23
Payer: COMMERCIAL

## 2021-06-23 VITALS
BODY MASS INDEX: 23.8 KG/M2 | WEIGHT: 143 LBS | SYSTOLIC BLOOD PRESSURE: 124 MMHG | OXYGEN SATURATION: 95 % | DIASTOLIC BLOOD PRESSURE: 61 MMHG | HEART RATE: 56 BPM

## 2021-06-23 DIAGNOSIS — E78.00 HIGH BLOOD CHOLESTEROL: ICD-10-CM

## 2021-06-23 DIAGNOSIS — I10 BENIGN ESSENTIAL HYPERTENSION: Primary | ICD-10-CM

## 2021-06-23 LAB
CHOLEST SERPL-MCNC: 157 MG/DL
HDLC SERPL-MCNC: 47 MG/DL
LDLC SERPL CALC-MCNC: 89 MG/DL
NONHDLC SERPL-MCNC: 110 MG/DL
TRIGL SERPL-MCNC: 106 MG/DL

## 2021-06-23 PROCEDURE — 99214 OFFICE O/P EST MOD 30 MIN: CPT | Performed by: INTERNAL MEDICINE

## 2021-06-23 PROCEDURE — 36415 COLL VENOUS BLD VENIPUNCTURE: CPT | Performed by: PATHOLOGY

## 2021-06-23 PROCEDURE — 80061 LIPID PANEL: CPT | Performed by: PATHOLOGY

## 2021-06-23 ASSESSMENT — PAIN SCALES - GENERAL: PAINLEVEL: MILD PAIN (3)

## 2021-06-23 NOTE — NURSING NOTE
Chief Complaint   Patient presents with     Recheck Medication     pt here to follow up       Jeffrey Esqueda CMA, EMT at 10:13 AM on 6/23/2021.

## 2021-07-02 ENCOUNTER — THERAPY VISIT (OUTPATIENT)
Dept: PHYSICAL THERAPY | Facility: CLINIC | Age: 81
End: 2021-07-02
Attending: ORTHOPAEDIC SURGERY
Payer: COMMERCIAL

## 2021-07-02 DIAGNOSIS — M54.50 LUMBAR PAIN: ICD-10-CM

## 2021-07-02 PROCEDURE — 97110 THERAPEUTIC EXERCISES: CPT | Mod: GP | Performed by: PHYSICAL THERAPIST

## 2021-07-02 PROCEDURE — 97161 PT EVAL LOW COMPLEX 20 MIN: CPT | Mod: GP | Performed by: PHYSICAL THERAPIST

## 2021-07-02 PROCEDURE — 97530 THERAPEUTIC ACTIVITIES: CPT | Mod: GP | Performed by: PHYSICAL THERAPIST

## 2021-07-02 NOTE — PROGRESS NOTES
Physical Therapy Initial Evaluation  Subjective:  The history is provided by the patient. No  was used.   Patient Health History  Debra Denise being seen for Low back pain radiating into legs   Left groin pain. Hip and neck pain.     Problem began: 3/1/2020.   Problem occurred: Chronic back pain but has gotten worse   Pain is reported as 6/10 on pain scale.  General health as reported by patient is good.  Pertinent medical history includes: high blood pressure and pain at night/rest.   Red flags:  None as reported by patient.  Medical allergies: other. Other medical allergies details: Sulfa some medications.       Current medications:  Anti-inflammatory and high blood pressure medication.    Current occupation is None.   Primary job tasks include:  Other.   Other job/home tasks details: Cooking and cleaning.                                  Objective:  System    Physical Exam    General     [facility]   [facility] Initial Evaluation -- Lumbar    Referral: Orthopedic spine  Work/Leisure mechanical stresses:  Unable to do regular walks, restricting bending  Functional disability score (NGOZI/STarT Back):  see Epic  VAS score (0-10): 4-8/10  Patient goals/expectations:  Be able to walk and move like before fall    HISTORY:    Present symptoms: Left groin pain with walking, bilateral lower leg pain intermittant; LB stiffness  Pain quality (sharp/shooting/stabbing/aching/burning/cramping):  Ache, nervy/burning   Paresthesia (yes/no):  no  1. No marrow signal abnormalities in the left hip to suggest fracture,  osteonecrosis, or marrow infiltration. No stress changes.  2. No significant left hip joint effusion.  3. Tearing of the anterior superior labrum. Thinning of the articular  cartilage along the posterior acetabulum with a subtle focus of  subchondral edema, representing osteoarthrosis. Paralabral cyst  adjacent to the posterior labrum.  4. Insertional tendinopathy of the  gluteus minimus tendon.    1. Transitional anatomy at the lumbosacral junction. In keeping with  the numbering system used on the previous MRI lumbar spine report of  3/13/2020, I will presume that there are 5 lumbar vertebral bodies  labeled L1-L5 and complete lumbarization of the S1 vertebra with a  well-formed S1-S2 intervertebral disc.  2. Overall, no significant change compared to MRI lumbar spine of  3/13/2020 and 2/23/2020.  3. Unchanged mild diffuse heterogeneity of the bone marrow signal on  T1 and T2-weighted imaging without convincing abnormality on the STIR  sequence. This is potentially a normal variant. Clinical correlation  and correlation with reported results of previous bone marrow biopsy  is recommended.  4. Multilevel degenerative disc disease and facet arthropathy, as  described.  5. No high-grade central spinal canal stenosis. Mild multilevel  lateral recess stenosis, as described.  6. Moderate right S1-S2 neural foraminal stenosis with lesser degrees  of neural foraminal narrowing elsewhere.  Present since (onset date): 3/1/2020 (exacerbation of chronic symptoms..     Symptoms (improving/unchanging/worsening):  unchanging.     Symptoms commenced as a result of: Exacerbation of long-standing issues of LB and left LE pain when she fell at home last year (tripped over husbands legs).  MRI w contrast and biopsy of lesions showed no metastatic or bone issues; repeat MRI normal (spondylolysis).  She notes significant decrease in stretching/normal exercise resulting in changes in function with dressing, cooking, etc.  Has a small gym in her facility, mat, ball, pool but not currently using, because stretches seem to exacerbate.  Condition occurred in the following environment:   home     Symptoms at onset (back/thigh/leg): back  Constant symptoms (back/thigh/leg): back  Intermittent symptoms (back/thigh/leg): thigh, leg    Symptoms are made worse with the following: sleeping/lying; lying on left  side, some movments but not able to clarify  Symptoms are made better with the following: am better, worsens through day    Disturbed sleep (yes/no):  Yes- wakes approx 1 hr after going to sllep and needs to get up.     Previous episodes (0/1-5/6-10/11+): 1-5     Previous history: PT 5 yrs ago that was helpful: dry needling, soft tissue work; more recent PT too aggressive and not as helpful (mecnaical testing and flexion or extension biased exercises); chiropractic- no help    Medications (nil/NSAIDS/analg/steroids/anticoag/other): Gabapentin increased to 300 mg  General health (excellent/good/fair/poor):  good  Imaging (None/Xray/MRI/Other):  See above  Recent or major surgery (yes/no):  no  Night pain (yes/no): yes  Accidents (yes/no): no  Unexplained weight loss (yes/no): no  Barriers at home: no  Other red flags: extensive medical screening by neurology and orthopedic specialties    EXAMINATION    Posture:   Sitting (good/fair/poor): fair  Standing (good/fair/poor):fiar  Lordosis (red/acc/normal): red  Correction of posture (better/worse/no effect): NE    Lateral Shift (right/left/nil): no  Relevant (yes/no):  na  Other Observations: generally guarded posture    Neurological:    Neurological testing (myotomes, sensation, reflexes, nerve tension) not indicated at this time.    Movement Loss:   Floyd Mod Min Nil Pain   Flexion   x  NE   Extension   x  NE   Side Gliding R    x NE   Side Gliding L    x NE       Provisional Classification:  Inconclusive/Other - Chronic Pain Syndrome    Principle of Management:  Education: (Therapeutic Exercise): Pt education regarding four stages of healing: pain reduction, maintenance through exercise, recovery of function, and prevention of re-occurrence. Utilized prescription dosage of exercise theory, cut finger analogy, and scientific method to help patient understand purpose of exercise specificity and importance of compliance with exercise.  Pt also educated in traffic light  response to exercise, and self assessment to guide home exercise program.    Equipment provided:  Postural correction with instruct in use of lumbar roll and sitting posture when unsupported, w education provided re: impact of posture and body mechanics on symptom production. Pt encouraged to monitor this correlation as part of overall self management.  Mechanical therapy (Y/N):  No- general graded activity and pain education   Extension principle:  n  Lateral Principle:  n  Flexion principle:  n  Other:  education              Assessment/Plan:    Patient is a 80 year old female with lumbar complaints.    Patient has the following significant findings with corresponding treatment plan.                Diagnosis 1:  Chronic LBP  Pain -  manual therapy, self management, education and home program  Decreased ROM/flexibility - manual therapy and therapeutic exercise  Decreased strength - therapeutic exercise and therapeutic activities  Impaired muscle performance - neuro re-education  Decreased function - therapeutic activities    Therapy Evaluation Codes:   1) History comprised of:   Personal factors that impact the plan of care:      Overall behavior pattern.    Comorbidity factors that impact the plan of care are:      None.     Medications impacting care: Fariba.  2) Examination of Body Systems comprised of:   Body structures and functions that impact the plan of care:      Lumbar spine.   Activity limitations that impact the plan of care are:      Walking.  3) Clinical presentation characteristics are:   Stable/Uncomplicated.  4) Decision-Making    Low complexity using standardized patient assessment instrument and/or measureable assessment of functional outcome.  Cumulative Therapy Evaluation is: Low complexity.    Previous and current functional limitations:  (See Goal Flow Sheet for this information)    Short term and Long term goals: (See Goal Flow Sheet for this information)     Communication ability:  Patient  appears to be able to clearly communicate and understand verbal and written communication and follow directions correctly.  Treatment Explanation - The following has been discussed with the patient:   RX ordered/plan of care  Anticipated outcomes  Possible risks and side effects  This patient would benefit from PT intervention to resume normal activities.   Rehab potential is good.    Frequency:  1 X week, once daily  Duration:  for 8 weeks  Discharge Plan:  Achieve all LTG.  Independent in home treatment program.  Reach maximal therapeutic benefit.    Please refer to the daily flowsheet for treatment today, total treatment time and time spent performing 1:1 timed codes.

## 2021-07-06 ENCOUNTER — THERAPY VISIT (OUTPATIENT)
Dept: PHYSICAL THERAPY | Facility: CLINIC | Age: 81
End: 2021-07-06
Attending: ORTHOPAEDIC SURGERY
Payer: COMMERCIAL

## 2021-07-06 ENCOUNTER — TELEPHONE (OUTPATIENT)
Dept: RADIOLOGY | Facility: CLINIC | Age: 81
End: 2021-07-06

## 2021-07-06 DIAGNOSIS — M54.50 LUMBAR PAIN: Primary | ICD-10-CM

## 2021-07-06 PROCEDURE — 97140 MANUAL THERAPY 1/> REGIONS: CPT | Mod: GP | Performed by: PHYSICAL THERAPIST

## 2021-07-06 PROCEDURE — 97110 THERAPEUTIC EXERCISES: CPT | Mod: GP | Performed by: PHYSICAL THERAPIST

## 2021-07-06 NOTE — TELEPHONE ENCOUNTER
----- Message from Carol Ann Toussaint RN sent at 2/2/2021  1:12 PM CST -----  Regarding: New referral for bilat varicose veins Dr Zimmerman referring  Please contact pt to schedule her for bilat venous competency ultrasound first on a different date and then virtual (video preferred) visit with Dr Galaviz    Due anytime now per pt preference    Thanks,  JOE Toussaint, RN, BSN  Interventional Radiology Nurse Coordinator   Phone:  [phone]

## 2021-07-06 NOTE — TELEPHONE ENCOUNTER
I left a voice for the pt on 3/20/21stating that the pts provider  has enter a referral for the pt to be seen in Vascular Surgery and to please call to schedule.    I called an spoke with the pt this morning. The pt states that she still has a few other things that she needs to get done before scheduling these appts. The pt asked that we call her back again early July.   I will post pone the pt for the second time until July 2, 2021.   Mo Nguyễn on 3/23/2021 at 9:40 AM     I spoke with the pt this morning and the pt stated that she is having to many problems at this time and she will have to call    before she can schedule in  Vascular. The pt states that she will call back to schedule.  Mo Nguyễn on 7/6/2021 at 12:23 PM

## 2021-07-13 ENCOUNTER — THERAPY VISIT (OUTPATIENT)
Dept: PHYSICAL THERAPY | Facility: CLINIC | Age: 81
End: 2021-07-13
Attending: ORTHOPAEDIC SURGERY
Payer: COMMERCIAL

## 2021-07-13 DIAGNOSIS — M54.50 LUMBAR PAIN: Primary | ICD-10-CM

## 2021-07-13 DIAGNOSIS — M16.0 PRIMARY OSTEOARTHRITIS OF BOTH HIPS: ICD-10-CM

## 2021-07-13 PROCEDURE — 97110 THERAPEUTIC EXERCISES: CPT | Mod: GP | Performed by: PHYSICAL THERAPIST

## 2021-07-13 PROCEDURE — 97140 MANUAL THERAPY 1/> REGIONS: CPT | Mod: GP | Performed by: PHYSICAL THERAPIST

## 2021-07-19 ENCOUNTER — TRANSFERRED RECORDS (OUTPATIENT)
Dept: HEALTH INFORMATION MANAGEMENT | Facility: CLINIC | Age: 81
End: 2021-07-19
Payer: COMMERCIAL

## 2021-07-20 ENCOUNTER — THERAPY VISIT (OUTPATIENT)
Dept: PHYSICAL THERAPY | Facility: CLINIC | Age: 81
End: 2021-07-20
Attending: ORTHOPAEDIC SURGERY
Payer: COMMERCIAL

## 2021-07-20 DIAGNOSIS — M54.50 LUMBAR PAIN: Primary | ICD-10-CM

## 2021-07-20 DIAGNOSIS — M16.0 PRIMARY OSTEOARTHRITIS OF BOTH HIPS: ICD-10-CM

## 2021-07-20 PROCEDURE — 97140 MANUAL THERAPY 1/> REGIONS: CPT | Mod: GP | Performed by: PHYSICAL THERAPIST

## 2021-07-20 PROCEDURE — 97110 THERAPEUTIC EXERCISES: CPT | Mod: GP | Performed by: PHYSICAL THERAPIST

## 2021-07-27 ENCOUNTER — THERAPY VISIT (OUTPATIENT)
Dept: PHYSICAL THERAPY | Facility: CLINIC | Age: 81
End: 2021-07-27
Payer: COMMERCIAL

## 2021-07-27 DIAGNOSIS — M54.50 LUMBAR PAIN: Primary | ICD-10-CM

## 2021-07-27 PROCEDURE — 97110 THERAPEUTIC EXERCISES: CPT | Mod: GP | Performed by: PHYSICAL THERAPIST

## 2021-07-27 PROCEDURE — 97140 MANUAL THERAPY 1/> REGIONS: CPT | Mod: GP | Performed by: PHYSICAL THERAPIST

## 2021-07-29 DIAGNOSIS — K62.89 PROCTITIS: Primary | ICD-10-CM

## 2021-07-29 NOTE — TELEPHONE ENCOUNTER
mesalamine (CANASA) 1000 MG suppository      Last Written Prescription Date:  2/23/21  Last Fill Quantity: 30,   # refills: 0  Last Office Visit : 2/23/21 recommended 1 year follow up  Future Office visit:  None scheduled    Routing refill request to provider for review/approval because:  Drug not active on patient's medication list

## 2021-08-03 ENCOUNTER — THERAPY VISIT (OUTPATIENT)
Dept: PHYSICAL THERAPY | Facility: CLINIC | Age: 81
End: 2021-08-03
Payer: COMMERCIAL

## 2021-08-03 DIAGNOSIS — M16.0 PRIMARY OSTEOARTHRITIS OF BOTH HIPS: ICD-10-CM

## 2021-08-03 DIAGNOSIS — M54.50 LUMBAR PAIN: Primary | ICD-10-CM

## 2021-08-03 PROCEDURE — 97110 THERAPEUTIC EXERCISES: CPT | Mod: GP | Performed by: PHYSICAL THERAPIST

## 2021-08-03 PROCEDURE — 97530 THERAPEUTIC ACTIVITIES: CPT | Mod: GP | Performed by: PHYSICAL THERAPIST

## 2021-08-05 RX ORDER — MESALAMINE 1000 MG/1
1000 SUPPOSITORY RECTAL AT BEDTIME
Qty: 30 SUPPOSITORY | Refills: 0 | Status: SHIPPED | OUTPATIENT
Start: 2021-08-05 | End: 2021-11-16

## 2021-08-10 ENCOUNTER — VIRTUAL VISIT (OUTPATIENT)
Dept: GASTROENTEROLOGY | Facility: CLINIC | Age: 81
End: 2021-08-10
Payer: COMMERCIAL

## 2021-08-10 VITALS — BODY MASS INDEX: 22.82 KG/M2 | HEIGHT: 65 IN | WEIGHT: 137 LBS

## 2021-08-10 DIAGNOSIS — K51.211 ULCERATIVE PROCTITIS WITH RECTAL BLEEDING (H): Primary | ICD-10-CM

## 2021-08-10 PROCEDURE — 99215 OFFICE O/P EST HI 40 MIN: CPT | Mod: 95 | Performed by: PHYSICIAN ASSISTANT

## 2021-08-10 ASSESSMENT — PAIN SCALES - GENERAL: PAINLEVEL: NO PAIN (0)

## 2021-08-10 ASSESSMENT — MIFFLIN-ST. JEOR: SCORE: 1091.27

## 2021-08-10 NOTE — PATIENT INSTRUCTIONS
It was a pleasure taking care of you today.  I've included a brief summary of our discussion and care plan from today's visit below.  Please review this information with your primary care provider.  ______________________________________________________________________    My recommendations are summarized as follows:    -- Continue rowasa enemas and canasa supp as you are   -- Labs with able   -- Stool sample when able   -- Next endoscopic assessment: flex sig when able   -- Patient with IBD we recommend supplementation vitamin D 1000 units daily and calcium 500 mg twice daily.  -- Vaccines/immunizations to be updated: Recommend yearly flu shot, pneumonia vaccines (Prevnar 13 then 8 weeks later Pneumovax 23 then 5 years later Pneumovax 23), tetanus every 10 years.  -- No NSAIDs (ibuprofen, or anything containing ibuprofen).       For additional resources about inflammatory bowel disease visit http://[email or web address].[email or web address]       Return to GI Clinic in 2 months to review your progress.    ______________________________________________________________________    How do I schedule labs, imaging studies, or procedures that were ordered in clinic today?     Labs: To schedule lab appointment at the [facility] and [facility], use my chart or call [phone]. If you have a [place] lab closer to home where you are regularly seen you can give them a call.     Procedures: If a colonoscopy, upper endoscopy, breath test, esophageal manometry, or pH impedence was ordered today, our endoscopy team will call you to schedule this. If you have not heard from our endoscopy team within a week, please call [phone] to schedule.     Imaging Studies: If you were scheduled for a CT scan, X-ray, MRI, ultrasound, HIDA scan or other imaging study, please call [phone] to have this scheduled.     Referral: If a referral to another specialty was ordered, expect a phone call or follow instructions above. If you have not  heard from anyone regarding your referral in a week, please call our clinic to check the status.     Who do I call with any questions after my visit?  Please be in touch if there are any further questions that arise following today's visit.  There are multiple ways to contact your gastroenterology care team.        During business hours, you may reach a Gastroenterology nurse at [phone]      To schedule or reschedule an appointment, please call [phone].       You can always send a secure message through [facility] [employer] ([facility] [place]).  [facility] [employer] [facility] [place]) messages are answered by your nurse or doctor typically within 24 hours.  Please allow extra time on weekends and holidays.        For urgent/emergent questions after business hours, you may reach the on-call GI Fellow by contacting the [facility]  at [phone].     How will I get the results of any tests ordered?    You will receive all of your results.  If you have signed up for [facility], any tests ordered at your visit will be available to you after your physician reviews them.  Typically this takes 1-2 weeks.  If there are urgent results that require a change in your care plan, your physician or nurse will call you to discuss the next steps.      What is [facility] ([facility] [place])?  [facility] ([facility] [place] is a secure way for you to access all of your healthcare records from the [facility].  It is a web based computer program, so you can sign on to it from any location.  It also allows you to send secure messages to your care team.  I recommend signing up for [facility] [employer] [facility] [place] access if you have not already done so and are comfortable with using a computer.         Sincerely,    Lawrence Pang PA-C  [facility]  Division of Gastroenterology

## 2021-08-10 NOTE — LETTER
8/10/2021         RE: Debra Denise  [id number]  [place] [address]        Dear Colleague,    Thank you for referring your patient, Debra Denise, to the [facility] [place]. Please see a copy of my visit note below.    IBD CLINIC VISIT     CC/REFERRING MD:  No ref. provider found  REASON FOR CONSULTATION: Ulcerative protcitis    ASSESSMENT/PLAN  80-year-old female with history of ulcerative proctitis here for follow-up.  Recently had a flareup and used her as needed mesalamine enemas with significant relief, now getting back to baseline.    1. Ulcerative proctitis  Current medication: rowasa enemas + canasa supp  Current clinical disease activity: pMayo2  Current endoscopic disease activity: last flex sig 6/2019 showed proctitis (Driver 1), with normal sigmoid and descending colon    Concern for a second flare this year that seems to be getting better upon returning to using her rectal mesalamine.  Following last appointment she did Canasa suppositories briefly then did not use maintenance therapy for a period of time until symptoms returned a few months later.  She returned to the Canasa suppositories but felt they were not as easy to use as the Rowasa enemas so return to the Rowasa enemas as of a few days ago.    We discussed a few options.  We could start Uceris rectal foam, however patient has used this in the past and due to the expense and minimal effect she did not want to try this.  She was amenable to repeating the flexible sigmoidoscopy and pending the findings return to regular use of Rowasa enemas or if this is not feasible, we would need to consider other options such as tacrolimus suppositories or possible biologic therapy pending the distribution of inflammation.    We will proceed with laboratory studies and stool studies to check for C. difficile and obtain a fecal calprotectin to trend and check in a few months time to  ensure it is either downtrending or normalized.    --Flexible sigmoidoscopy to be completed  --Continue Rowasa enemas for now  --Laboratory studies to include CBC, BMP, CRP, ESR  --Stool studies to include C. difficile and fecal calprotectin  --Nutritional referral placed, although insurance may not cover.  Patient will think about this    2. IBD dysplasia surveillance:     -Not required    Return to clinic in 2 months or earlier if worsening symptoms.     Thank you for this consultation.  52 minutes spent on the date of the encounter doing chart review, history and exam, documentation and further activities as noted above.  It was a pleasure to participate in the care of this patient; please contact us with any further questions.      Lawrence Pang PA-C  Division of Gastroenterology, Hepatology and Nutrition  [facility]          IBD HISTORY  Age at diagnosis: 63  Extent of endoscopic disease:Rectum to distal sigmoid (16cm). Apparently on initial colonoscopy there was some question of patch of inflammation at hepatic flexure but I can find no actual record of this. Last colonoscopy in 2014 shows normal colon apart from diverticula ; last flex sig showed proctitis (Driver 1), with normal sigmoid and descending colon  Extent of histologic disease: proctitis  Prior UC surgeries: None  Prior IBD Medications: Mesalamine    DRUG MONITORING  TPMT enzyme activity: n/a  6-TGN/6-MMPN levels: n/a  Biologic concentration: n/a    HPI:   Ms. Debra Denise is a 80 year old woman with history of UC proctitis , HTN, here for follow-up of UC proctitis.     After last visit, she used the canasa supp for a while (recommended switch from enemas) then only began to use them just sparingly.  She began to have symptoms about a month ago with blood and mucus in stools. Minimized food intake because of the urgency and cramping.  She has lost about 5 lbs with this recent flare. She had a hard time telling if it was blood from  hemorrhoids or if it were IBD. She returned to the mesalamine enemas (changed from the suppositories). She has now had improvement, still with small amount of blood.     Currently having 2 BMs per day. Blood in less than half at this point.  Still cautious with what she is eating. Less cramping. She is doing rowasa enemas nightly.     Driver score:   Stool freq: 1 (1-2 stools/day more than normal)  Rectal bleedin (Visible blood less than half of the time)  PGA: 1 (Mild)  Endoscopy: Not done    Remission: <3   Mild disease: 3-5  Moderate disease: 6-10  Severe disease: >10    Extra intestinal manifestations of IBD:  No uveitis/episcleritis  No aphthous ulcers   No arthritis   No erythema nodosum/pyoderma gangrenosum.     sIBDQ:  IBDQ Score Date IBDQ - Total Score  (Higher score better)   2019 51   2017 64        Last endoscopic activity:  Last histologic activity:      Pertinent labs / imaging:     ROS:    Constitutional, HEENT, cardiovascular, pulmonary, GI, , musculoskeletal, neuro, skin, endocrine and psych systems are negative, except as otherwise noted.    PHYSICAL EXAMINATION:  Video Visit     PERTINENT PAST MEDICAL HISTORY:  Past Medical History:   Diagnosis Date     Hypertension      Polymyalgia rheumatica (H)        PREVIOUS SURGERIES:  Past Surgical History:   Procedure Laterality Date     COLONOSCOPY  2014    Procedure: COMBINED COLONOSCOPY, SINGLE BIOPSY/POLYPECTOMY BY BIOPSY;  COLONOSCOPY;  Surgeon: Carol Ann Plasencia MD;  Location:  GI     ENT SURGERY      tonsilectomy, adenoidectomy     GYN SURGERY  ,     x 2     ORTHOPEDIC SURGERY  2004    (R) shoulder surgery for frozen shoulder     ZAC BSO      for fibroids       ALLERGIES:     Allergies   Allergen Reactions     Atenolol      Other reaction(s): Intolerance-Can't Take  Fatigue     Ibuprofen      Other reaction(s): Stomach Upset  13 tele encounter     Naproxen      Other reaction(s): Stomach  Upset  4-9-13 tele encounter     Ramipril Cough     Other reaction(s): Intolerance-Can't Take     Sulfa Drugs      Codeine Nausea     Fentanyl Nausea       PERTINENT MEDICATIONS:    Current Outpatient Medications:      BIOTIN PO, Take 1,000 mg by mouth daily , Disp: , Rfl:      Cholecalciferol (VITAMIN D3 PO), Take 2,000 Units by mouth daily , Disp: , Rfl:      gabapentin (NEURONTIN) 100 MG capsule, Take 2 capsules (200 mg) by mouth At Bedtime, Disp: 180 capsule, Rfl: 3     mesalamine (CANASA) 1000 MG suppository, Place 1 suppository (1,000 mg) rectally At Bedtime, Disp: 30 suppository, Rfl: 0     Mesalamine-Cleanser (ROWASA) 4 g KIT, Place 1 enema rectally three times a week, Disp: 6 kit, Rfl: 3     Nutritional Supplements (NUTRITIONAL SUPPLEMENT PO), Relief Factor: Fish oil and Tumeric.  Take 1 capsule daily, Disp: , Rfl:      Probiotic Product (PROBIOTIC PO), Take by mouth daily, Disp: , Rfl:      valsartan-hydrochlorothiazide (DIOVAN HCT) 160-25 MG tablet, Take 1 tablet by mouth daily, Disp: 90 tablet, Rfl: 3     Lidocaine (LIDOCARE) 4 % Patch, Place 1 patch onto the skin every 24 hours To prevent lidocaine toxicity, patient should be patch free for 12 hrs daily., Disp: 5 patch, Rfl: 1     LORazepam (ATIVAN) 0.5 MG tablet, Take one tablet before MRI, Disp: 1 tablet, Rfl: 0    SOCIAL HISTORY:  Social History     Socioeconomic History     Marital status:      Spouse name: Not on file     Number of children: Not on file     Years of education: Not on file     Highest education level: Not on file   Occupational History     Not on file   Tobacco Use     Smoking status: Never Smoker     Smokeless tobacco: Never Used   Substance and Sexual Activity     Alcohol use: No     Drug use: No     Sexual activity: Not Currently   Other Topics Concern     Parent/sibling w/ CABG, MI or angioplasty before 65F 55M? Not Asked   Social History Narrative     Not on file     Social Determinants of Health     Financial Resource  Strain:      Difficulty of Paying Living Expenses:    Food Insecurity:      Worried About Running Out of Food in the Last Year:      Ran Out of Food in the Last Year:    Transportation Needs:      Lack of Transportation (Medical):      Lack of Transportation (Non-Medical):    Physical Activity:      Days of Exercise per Week:      Minutes of Exercise per Session:    Stress:      Feeling of Stress :    Social Connections:      Frequency of Communication with Friends and Family:      Frequency of Social Gatherings with Friends and Family:      Attends Mandaen Services:      Active Member of Clubs or Organizations:      Attends Club or Organization Meetings:      Marital Status:    Intimate Partner Violence:      Fear of Current or Ex-Partner:      Emotionally Abused:      Physically Abused:      Sexually Abused:        FAMILY HISTORY:  Family History   Problem Relation Age of Onset     Cancer - colorectal Father      Lung Cancer Father      Macular Degeneration Father      Multiple myeloma Brother      Pacemaker Brother      Hypertension Mother      Cerebrovascular Disease Mother         temporal arteritis     Suicide Sister      Hypertension Maternal Grandmother      Cerebrovascular Disease Maternal Grandmother        Past/family/social history reviewed and no changes      Again, thank you for allowing me to participate in the care of your patient.      Sincerely,    Lawrence Pang PA-C

## 2021-08-10 NOTE — NURSING NOTE
"Chief Complaint   Patient presents with     RECHECK     blood in stool       Vitals:    08/10/21 1248   Weight: 62.1 kg (137 lb)   Height: 1.657 m (5' 5.25\")       Body mass index is 22.62 kg/m .      Kalen Mcmillan LPN                        "

## 2021-08-12 ENCOUNTER — LAB (OUTPATIENT)
Dept: LAB | Facility: CLINIC | Age: 81
End: 2021-08-12
Attending: PHYSICIAN ASSISTANT
Payer: COMMERCIAL

## 2021-08-12 DIAGNOSIS — K51.211 ULCERATIVE PROCTITIS WITH RECTAL BLEEDING (H): ICD-10-CM

## 2021-08-12 LAB
ALBUMIN SERPL-MCNC: 3.5 G/DL (ref 3.4–5)
ALP SERPL-CCNC: 56 U/L (ref 40–150)
ALT SERPL W P-5'-P-CCNC: 21 U/L (ref 0–50)
ANION GAP SERPL CALCULATED.3IONS-SCNC: 3 MMOL/L (ref 3–14)
AST SERPL W P-5'-P-CCNC: 18 U/L (ref 0–45)
BILIRUB DIRECT SERPL-MCNC: <0.1 MG/DL (ref 0–0.2)
BILIRUB SERPL-MCNC: 0.5 MG/DL (ref 0.2–1.3)
BUN SERPL-MCNC: 16 MG/DL (ref 7–30)
CALCIUM SERPL-MCNC: 9.4 MG/DL (ref 8.5–10.1)
CHLORIDE BLD-SCNC: 104 MMOL/L (ref 94–109)
CO2 SERPL-SCNC: 32 MMOL/L (ref 20–32)
CREAT SERPL-MCNC: 0.88 MG/DL (ref 0.52–1.04)
CRP SERPL-MCNC: <2.9 MG/L (ref 0–8)
ERYTHROCYTE [SEDIMENTATION RATE] IN BLOOD BY WESTERGREN METHOD: 8 MM/HR (ref 0–30)
GFR SERPL CREATININE-BSD FRML MDRD: 62 ML/MIN/1.73M2
GLUCOSE BLD-MCNC: 90 MG/DL (ref 70–99)
POTASSIUM BLD-SCNC: 4 MMOL/L (ref 3.4–5.3)
PROT SERPL-MCNC: 7.2 G/DL (ref 6.8–8.8)
SODIUM SERPL-SCNC: 139 MMOL/L (ref 133–144)

## 2021-08-12 PROCEDURE — 85652 RBC SED RATE AUTOMATED: CPT

## 2021-08-12 PROCEDURE — 80053 COMPREHEN METABOLIC PANEL: CPT

## 2021-08-12 PROCEDURE — 36415 COLL VENOUS BLD VENIPUNCTURE: CPT

## 2021-08-12 PROCEDURE — 86140 C-REACTIVE PROTEIN: CPT

## 2021-08-17 ENCOUNTER — THERAPY VISIT (OUTPATIENT)
Dept: PHYSICAL THERAPY | Facility: CLINIC | Age: 81
End: 2021-08-17
Payer: COMMERCIAL

## 2021-08-17 DIAGNOSIS — M54.50 LUMBAR PAIN: Primary | ICD-10-CM

## 2021-08-17 DIAGNOSIS — M16.0 PRIMARY OSTEOARTHRITIS OF BOTH HIPS: ICD-10-CM

## 2021-08-17 PROCEDURE — 97530 THERAPEUTIC ACTIVITIES: CPT | Mod: GP | Performed by: PHYSICAL THERAPIST

## 2021-08-19 ENCOUNTER — TELEPHONE (OUTPATIENT)
Dept: GASTROENTEROLOGY | Facility: CLINIC | Age: 81
End: 2021-08-19

## 2021-08-19 NOTE — TELEPHONE ENCOUNTER
Screening Questions  1. Are you active on mychart? YES    2. What insurance is in the chart? BCBS Medicare     2.  Ordering/Referring Provider: Dr. Pang     3. BMI 23.1    4. Are you on daily home oxygen? NO    5. Do you have a history of difficult airway? NO    6. Have you had a heart, lung, or liver transplant? NO    7. Are you currently on dialysis? NO    8. Have you had a stroke or Transient ischemic atttack (TIA) within 6 months? NO    9. In the past 6 months, have you had any heart related issues including cardiomyopathy or heart attack?         If yes, did it require cardiac stenting or other implantable device?NO    10. Do you have any implantable devices in your body (pacemaker, defib, LVAD)? NO    11. Do you take nitroglycerin? If yes, how often? NO    12. Are you currently taking any blood thinners?NO    13. Are you a diabetic? NO    14. (Females) Are you currently pregnant? NO  If yes, how many weeks?    15. Have you had a procedure in the past that was difficult to tolerate with conscious sedation? Any allergies to Fentanyl or Versed Allergic to Fentanyl    16. Are you taking any scheduled prescription narcotics more than once daily? NO    17. Do you have any chemical dependencies such as alcohol, street drugs, or methadone? NO    18. Do you have any history of post-traumatic stress syndrome or mental health issues? NO    19. Do you transfer independently? YES    20.  Do you have any issues with constipation? NO    21. Preferred Pharmacy for Pre Prescription C[facility] on chart     Scheduling Details    Which Colonoscopy Prep was Sent?:   Procedure Scheduled: Flex Sig   Provider/Surgeon: Dr. Villanueva  Date of Procedure: 10/04/2021  Location: [facility]  Caller (Please ask for phone number if not scheduled by patient): Debra Denise      Sedation Type: CS  Conscious Sedation- Needs  for 6 hours after the procedure  MAC/General-Needs  for 24 hours after procedure    Pre-op Required at [facility], [facility] and OR for MAC sedation:   (if yes advise patient they will need a pre-op prior to procedure)      Is patient on blood thinners? -NO (If yes- inform patient to follow up with PCP or provider for follow up instructions)     Informed patient they will need an adult  YES  Cannot take any type of public or medical transportation alone    Informed Patient of COVID Test Requirement YES    Confirmed Nurse will call to complete assessment YES    Additional comments: NO

## 2021-09-01 DIAGNOSIS — Z11.59 ENCOUNTER FOR SCREENING FOR OTHER VIRAL DISEASES: ICD-10-CM

## 2021-09-20 ENCOUNTER — TELEPHONE (OUTPATIENT)
Dept: GASTROENTEROLOGY | Facility: CLINIC | Age: 81
End: 2021-09-20

## 2021-09-20 NOTE — TELEPHONE ENCOUNTER
[facility] Call Center    Phone Message    May a detailed message be left on voicemail: yes     Reason for Call: Other: Pt rescheduled her appt, however she is requesting a call back from a member of the care team to answer some questions she has. Thank you.     Action Taken: Message routed to:  [facility]    Travel Screening: Not Applicable

## 2021-09-20 NOTE — TELEPHONE ENCOUNTER
Had trouble connecting to phone. Sent mychart regarding 9/22 appt cancellation due to provider out ill. R/S to next available with Poly.

## 2021-09-22 NOTE — PROGRESS NOTES
HPI:    Last in-person visit with me 2021. She still states GI sxs. Related to her proctitis; some sxs. At night. She has been following carefully in GI. She states some atypical central chest pain that is not exertional. She has had this for more than one year but more prominent the last few months. She does not have sxs. Today. No cough, no SOB. No systemic sxs. No F/C/NS. Still with back pain with some B leg radiation sxs. She has tried PT but his is not as effective as in the past. No other HEENT, cardiopulmonary, GI, neurological, systemic, psychiatric, lymphatic, endocrine, vascular complaints.     Past Medical History:   Diagnosis Date     Hypertension      Polymyalgia rheumatica (H)      Past Surgical History:   Procedure Laterality Date     COLONOSCOPY  2014    Procedure: COMBINED COLONOSCOPY, SINGLE BIOPSY/POLYPECTOMY BY BIOPSY;  COLONOSCOPY;  Surgeon: Carol Ann Plasencia MD;  Location:  GI     ENT SURGERY      tonsilectomy, adenoidectomy     GYN SURGERY  ,     x 2     ORTHOPEDIC SURGERY  2004    (R) shoulder surgery for frozen shoulder     ZAC BSO      for fibroids     PE:    Vitals noted, gen, nad, cooperative, alert, neck supple nl rom, lungs with good air movement, some minor chest wall tenderness to palpation anteriorly. RRR, S1, S2, no MRG, abdomen, no acute findings. Grossly normal neurological exam.     EKG; SR at 51; no acute findings. No change from 2020    A/P:    1. Immunizations; she has gotten the COVID Pfizer vaccination series. She has completed the Shingrix vaccination series. Recommend influenza vaccination she wants to wait a while because she states she got the 3rd COVID booster yesterday.   2. Inflammatory bowel disease/ulcerative proctitis. See Ms. Pang's GI note from 8/10/2021 and she remains on PRN mesalamine enemas and/or suppositories. She has flex sig scheduled with Dr. Pace 10/4/2021.   3. HTN; on Valsartan/HcTz; stable today   4. On  Neurontin and Lidocaine patch for back pain. She was seen 6/8/2021 ortho-spine for back pain   5. Lipids done 6/23/2021 with HDL 47 and LDL 89  6. Mammogram 11/16/2020 and results in Care Everywhere   7. Atypical CP,. CXR today. EKG and dobutamine stress echo  8. Back pain; she has gotten PT. She had lumbar MRI imaging 4/6/2021. She saw Dr. Pimentel, ortho spine 6/8/2021 and 4/30/2021. Offered her low dose Tramadol or even Vicodin. She states she has significant sensitivity to narcotics and would like to avoid at this time. She states Lidocaine patches are equivocal to manage her pain.     40 minutes spent on the date of the encounter doing chart review, history and exam, documentation and further activities as noted above

## 2021-09-23 ENCOUNTER — OFFICE VISIT (OUTPATIENT)
Dept: INTERNAL MEDICINE | Facility: CLINIC | Age: 81
End: 2021-09-23
Payer: COMMERCIAL

## 2021-09-23 ENCOUNTER — ANCILLARY PROCEDURE (OUTPATIENT)
Dept: GENERAL RADIOLOGY | Facility: CLINIC | Age: 81
End: 2021-09-23
Attending: INTERNAL MEDICINE
Payer: COMMERCIAL

## 2021-09-23 VITALS
BODY MASS INDEX: 22.66 KG/M2 | OXYGEN SATURATION: 97 % | HEART RATE: 55 BPM | DIASTOLIC BLOOD PRESSURE: 64 MMHG | WEIGHT: 136 LBS | SYSTOLIC BLOOD PRESSURE: 129 MMHG | HEIGHT: 65 IN

## 2021-09-23 DIAGNOSIS — R07.89 ATYPICAL CHEST PAIN: Primary | ICD-10-CM

## 2021-09-23 DIAGNOSIS — R07.89 ATYPICAL CHEST PAIN: ICD-10-CM

## 2021-09-23 LAB
ATRIAL RATE - MUSE: 51 BPM
DIASTOLIC BLOOD PRESSURE - MUSE: NORMAL MMHG
INTERPRETATION ECG - MUSE: NORMAL
P AXIS - MUSE: 50 DEGREES
PR INTERVAL - MUSE: 150 MS
QRS DURATION - MUSE: 86 MS
QT - MUSE: 472 MS
QTC - MUSE: 435 MS
R AXIS - MUSE: -8 DEGREES
SYSTOLIC BLOOD PRESSURE - MUSE: NORMAL MMHG
T AXIS - MUSE: 47 DEGREES
VENTRICULAR RATE- MUSE: 51 BPM

## 2021-09-23 PROCEDURE — 71046 X-RAY EXAM CHEST 2 VIEWS: CPT | Mod: GC | Performed by: RADIOLOGY

## 2021-09-23 PROCEDURE — 93010 ELECTROCARDIOGRAM REPORT: CPT | Performed by: INTERNAL MEDICINE

## 2021-09-23 PROCEDURE — 99215 OFFICE O/P EST HI 40 MIN: CPT | Mod: 25 | Performed by: INTERNAL MEDICINE

## 2021-09-23 ASSESSMENT — MIFFLIN-ST. JEOR: SCORE: 1082.77

## 2021-09-23 ASSESSMENT — PAIN SCALES - GENERAL: PAINLEVEL: MILD PAIN (3)

## 2021-09-23 NOTE — NURSING NOTE
Chief Complaint   Patient presents with     Recheck Medication     stomach issues, back issues       BRITTANY Reyes at 10:31 AM on 9/23/2021

## 2021-09-27 ENCOUNTER — TELEPHONE (OUTPATIENT)
Dept: GASTROENTEROLOGY | Facility: OUTPATIENT CENTER | Age: 81
End: 2021-09-27

## 2021-09-27 NOTE — TELEPHONE ENCOUNTER
Is patient taking blood thinners/antiplatelet medications? No     Heart Disease? Denies     Lung Disease? Denies     Sleep Apnea? Denies     Diabetic? Denies     Kidney Disease? Denies     Electronic Implantable Devices? Denies     PTSD? N/a    Prep instructions reviewed with patient? Instructions,  policy, MAC sedation plan reviewed. Instructed patient to have someone stay with them for 24 hours post exam    : Yes    Pharmacy: N/a    Indication for Procedure: Ulcerative proctitis with rectal bleeding    Referring Provider: Lawrence Pang PA-C    Arrival Time: 0900    COVID test? 10-1 [facility]    Tresa Colon RN

## 2021-09-30 NOTE — PROGRESS NOTES
Discharge Note    Progress reporting period is from initial eval to Aug 17, 2021.     Debra failed to return for next follow up visit and current status is unknown.  Please see information below for last relevant information on current status.  Patient seen for 7 visits.  SUBJECTIVE  Subjective changes noted by patient:  Walking in the water feels really good, but then later in day will get bilateral leg pain that lasts through night. DIfficulty sitting, not much activity then seems to flare.  Colitis is flared.  Unable to sleep on sides due to hip pain.  .  Current pain level is No change.     Previous pain level was   .   Changes in function:  Yes (See Goal flowsheet attached for changes in current functional level)  Adverse reaction to treatment or activity: None    OBJECTIVE  Changes noted in objective findings: Questionned potential for sytemic inflammation but recent Sed and ESR normal.  Pt persists with need for manual techniques only; rec acupuncture and pain managment.  Enc cont of graded movement and recuction of GUSTAVO     ASSESSMENT/PLAN  Diagnosis: Chronic LBP   DIAGP:  The primary encounter diagnosis was Lumbar pain. A diagnosis of Primary osteoarthritis of both hips was also pertinent to this visit.  Updated problem list and treatment plan:     STG/LTGs have been met or progress has been made towards goals:  Yes, please see goal flowsheet for most current information  Assessment of Progress: current status is unknown.    Last current status: Pt has not made progress   Self Management Plans:  HEP  I have re-evaluated this patient and find that the nature, scope, duration and intensity of the therapy is appropriate for the medical condition of the patient.  Debra continues to require the following intervention to meet STG and LTG's:  HEP.    Recommendations:  Discharge with current home program.  Patient to follow up with MD as needed.    Please refer to the daily flowsheet for treatment today,  total treatment time and time spent performing 1:1 timed codes.

## 2021-10-01 ENCOUNTER — LAB (OUTPATIENT)
Dept: LAB | Facility: CLINIC | Age: 81
End: 2021-10-01
Payer: COMMERCIAL

## 2021-10-01 DIAGNOSIS — Z11.59 ENCOUNTER FOR SCREENING FOR OTHER VIRAL DISEASES: ICD-10-CM

## 2021-10-01 LAB — SARS-COV-2 RNA RESP QL NAA+PROBE: NEGATIVE

## 2021-10-01 PROCEDURE — U0005 INFEC AGEN DETEC AMPLI PROBE: HCPCS

## 2021-10-01 PROCEDURE — U0003 INFECTIOUS AGENT DETECTION BY NUCLEIC ACID (DNA OR RNA); SEVERE ACUTE RESPIRATORY SYNDROME CORONAVIRUS 2 (SARS-COV-2) (CORONAVIRUS DISEASE [COVID-19]), AMPLIFIED PROBE TECHNIQUE, MAKING USE OF HIGH THROUGHPUT TECHNOLOGIES AS DESCRIBED BY CMS-2020-01-R: HCPCS

## 2021-10-01 NOTE — TELEPHONE ENCOUNTER
Patient said she was trying to get an appt with Dr. Zimmerman.  Pt does have a flex sig with Dr Villanueva on October 4.

## 2021-10-04 ENCOUNTER — TRANSFERRED RECORDS (OUTPATIENT)
Dept: HEALTH INFORMATION MANAGEMENT | Facility: CLINIC | Age: 81
End: 2021-10-04

## 2021-10-04 ENCOUNTER — DOCUMENTATION ONLY (OUTPATIENT)
Dept: GASTROENTEROLOGY | Facility: OUTPATIENT CENTER | Age: 81
End: 2021-10-04
Payer: COMMERCIAL

## 2021-10-04 DIAGNOSIS — K51.211 ULCERATIVE PROCTITIS WITH RECTAL BLEEDING (H): ICD-10-CM

## 2021-10-04 PROCEDURE — 88305 TISSUE EXAM BY PATHOLOGIST: CPT | Mod: 26 | Performed by: PATHOLOGY

## 2021-10-04 PROCEDURE — 88305 TISSUE EXAM BY PATHOLOGIST: CPT | Mod: TC,ORL | Performed by: INTERNAL MEDICINE

## 2021-10-05 ENCOUNTER — LAB REQUISITION (OUTPATIENT)
Dept: LAB | Facility: CLINIC | Age: 81
End: 2021-10-05
Payer: COMMERCIAL

## 2021-10-07 LAB
PATH REPORT.COMMENTS IMP SPEC: NORMAL
PATH REPORT.COMMENTS IMP SPEC: NORMAL
PATH REPORT.FINAL DX SPEC: NORMAL
PATH REPORT.GROSS SPEC: NORMAL
PATH REPORT.MICROSCOPIC SPEC OTHER STN: NORMAL
PATH REPORT.RELEVANT HX SPEC: NORMAL
PHOTO IMAGE: NORMAL

## 2021-10-11 ENCOUNTER — HOSPITAL ENCOUNTER (OUTPATIENT)
Facility: CLINIC | Age: 81
Discharge: HOME | End: 2021-10-11
Attending: NURSE PRACTITIONER | Admitting: NURSE PRACTITIONER
Payer: COMMERCIAL

## 2021-10-11 ENCOUNTER — HOSPITAL ENCOUNTER (OUTPATIENT)
Dept: CARDIOLOGY | Facility: CLINIC | Age: 81
End: 2021-10-11
Attending: INTERNAL MEDICINE | Admitting: NURSE PRACTITIONER
Payer: COMMERCIAL

## 2021-10-11 VITALS
DIASTOLIC BLOOD PRESSURE: 77 MMHG | HEIGHT: 65 IN | HEART RATE: 77 BPM | BODY MASS INDEX: 22.49 KG/M2 | RESPIRATION RATE: 16 BRPM | WEIGHT: 135 LBS | OXYGEN SATURATION: 99 % | SYSTOLIC BLOOD PRESSURE: 150 MMHG

## 2021-10-11 DIAGNOSIS — R07.89 ATYPICAL CHEST PAIN: ICD-10-CM

## 2021-10-11 PROCEDURE — 999N000184 HC STATISTIC TELEMETRY

## 2021-10-11 PROCEDURE — 93325 DOPPLER ECHO COLOR FLOW MAPG: CPT | Mod: TC

## 2021-10-11 PROCEDURE — 93018 CV STRESS TEST I&R ONLY: CPT | Performed by: INTERNAL MEDICINE

## 2021-10-11 PROCEDURE — 93350 STRESS TTE ONLY: CPT | Mod: 26 | Performed by: INTERNAL MEDICINE

## 2021-10-11 PROCEDURE — 999N000049 HC STATISTIC ECHO STRESS OR NM NPI

## 2021-10-11 PROCEDURE — 250N000011 HC RX IP 250 OP 636: Performed by: INTERNAL MEDICINE

## 2021-10-11 PROCEDURE — 93321 DOPPLER ECHO F-UP/LMTD STD: CPT | Mod: 26 | Performed by: INTERNAL MEDICINE

## 2021-10-11 PROCEDURE — 258N000003 HC RX IP 258 OP 636: Performed by: INTERNAL MEDICINE

## 2021-10-11 PROCEDURE — 93350 STRESS TTE ONLY: CPT | Mod: TC

## 2021-10-11 PROCEDURE — 93325 DOPPLER ECHO COLOR FLOW MAPG: CPT | Mod: 26 | Performed by: INTERNAL MEDICINE

## 2021-10-11 PROCEDURE — 93016 CV STRESS TEST SUPVJ ONLY: CPT | Performed by: INTERNAL MEDICINE

## 2021-10-11 RX ORDER — SODIUM CHLORIDE 9 MG/ML
INJECTION, SOLUTION INTRAVENOUS CONTINUOUS
Status: DISCONTINUED | OUTPATIENT
Start: 2021-10-11 | End: 2021-10-11 | Stop reason: HOSPADM

## 2021-10-11 RX ORDER — DOBUTAMINE HYDROCHLORIDE 200 MG/100ML
10-50 INJECTION INTRAVENOUS CONTINUOUS
Status: DISCONTINUED | OUTPATIENT
Start: 2021-10-11 | End: 2021-10-11 | Stop reason: HOSPADM

## 2021-10-11 RX ORDER — ATROPINE SULFATE 0.1 MG/ML
.2-2 INJECTION INTRAVENOUS
Status: DISCONTINUED | OUTPATIENT
Start: 2021-10-11 | End: 2021-10-11 | Stop reason: HOSPADM

## 2021-10-11 RX ORDER — METOPROLOL TARTRATE 1 MG/ML
1-20 INJECTION, SOLUTION INTRAVENOUS
Status: DISCONTINUED | OUTPATIENT
Start: 2021-10-11 | End: 2021-10-11 | Stop reason: HOSPADM

## 2021-10-11 RX ADMIN — SODIUM CHLORIDE 30 ML/HR: 9 INJECTION, SOLUTION INTRAVENOUS at 13:53

## 2021-10-11 RX ADMIN — DOBUTAMINE HYDROCHLORIDE 10 MCG/KG/MIN: 200 INJECTION INTRAVENOUS at 13:55

## 2021-10-11 ASSESSMENT — MIFFLIN-ST. JEOR: SCORE: 1078.24

## 2021-10-11 NOTE — PROGRESS NOTES
Care Suites Admission Nursing Note    Patient Information  Name: Debra Denise  Age: 80 year old  Reason for admission: Dobutamine GXT  Care Suites arrival time: 1320    Patient Admission/Assessment   Pre-procedure assessment complete: Yes  If abnormal assessment/labs, provider notified: N/A  10/1 COVID negative screen  NPO: N/A  Medications held per instructions/orders: Yes  Consent: deferred  Patient oriented to room: Yes  Education/questions answered: Yes  Plan/other: Dobutamine GXT echo    1415- Dobutamine Echo completed.  Headache gone  VSS  Return to SR in the 60's   1425 Discharged to Door #2     Discharge Planning  Discharge name/phone number: Ike Veras  [phone]  Overnight post sedation caregiver:    Discharge location: home    Bennie Swift RN

## 2021-10-24 ENCOUNTER — HEALTH MAINTENANCE LETTER (OUTPATIENT)
Age: 81
End: 2021-10-24

## 2021-10-30 NOTE — PROGRESS NOTES
HPI:    Last visit with me 2021. We reviewed her recent dobutamine stress test. She states she walks up to 40 minutes w/o chest pain. She gets a little SOB when climbing stairs. She states very rare chest pain when worrying bout her . She has chronic back pain and is trying to find a suitable PT provider. No other HEENT, cardiopulmonary, abdominal, , GYN, neurological, systemic, psychiatric, lymphatic, endocrine complaints.     Past Medical History:   Diagnosis Date     Hypertension      Polymyalgia rheumatica (H)      Past Surgical History:   Procedure Laterality Date     COLONOSCOPY  2014    Procedure: COMBINED COLONOSCOPY, SINGLE BIOPSY/POLYPECTOMY BY BIOPSY;  COLONOSCOPY;  Surgeon: Carol Ann Plasencia MD;  Location:  GI     ENT SURGERY      tonsilectomy, adenoidectomy     GYN SURGERY  ,     x 2     ORTHOPEDIC SURGERY  2004    (R) shoulder surgery for frozen shoulder     ZAC BSO      for fibroids     PE:    Vitals noted, gen, nad, cooperative, alert, neck supple nl rom, lungs with good air movement, RRR, S1, S2, no MRG, abdomen, no acute findings. Grossly normal neurological exam. Minimal tenderness to palpation lower back.     A/P:    1. Immunizations; she has gotten 3 does of Pfizer COVID vaccination. She has completed the Shingrix vaccination series. Influenza vaccine completed   2. Mammogram 2020 in Care Everywhere  3. Dobutamine stress test 10/11/2021 negative for stress-induced WMA's. EKG stress was abnormal with ST segment depressions. False positive in females and NOT exercise. No significant valvular dysfunction. She is able to exercise w/o problems we discussed CTA but she declines. Also looked at CT chest from 3/30/2020 and no overt cardiac calcium.   4. Inflammatory bowel disease/ulcerative proctitis. See GI note from 8/10/2021. She had flex sig with Dr. Villanueva 10/4/2021; bx. Mild active colitis. She is on Rowasa enemas PRN   5. HTN; on  Valstartan/HcTz; electrolytes and Creatinine stable 8/12/2021.   6. Lipid panel 6/23/2021; LDL 89 and HDL 47  7. She remains on Vitamin D  8. Back pain; she had repeat Lumbar MRI scan 4/6/2021 and saw Dr. Pimentel, ortho spine 6/8/2021; she will continue with PT     40 minutes spent on the date of the encounter doing chart review, history and exam, documentation and further activities as noted above

## 2021-11-01 ENCOUNTER — OFFICE VISIT (OUTPATIENT)
Dept: INTERNAL MEDICINE | Facility: CLINIC | Age: 81
End: 2021-11-01
Payer: COMMERCIAL

## 2021-11-01 VITALS
SYSTOLIC BLOOD PRESSURE: 149 MMHG | HEART RATE: 48 BPM | DIASTOLIC BLOOD PRESSURE: 68 MMHG | BODY MASS INDEX: 23.19 KG/M2 | OXYGEN SATURATION: 99 % | HEIGHT: 65 IN | WEIGHT: 139.2 LBS

## 2021-11-01 DIAGNOSIS — G89.29 OTHER CHRONIC BACK PAIN: Primary | ICD-10-CM

## 2021-11-01 DIAGNOSIS — M54.89 OTHER CHRONIC BACK PAIN: Primary | ICD-10-CM

## 2021-11-01 PROCEDURE — 99215 OFFICE O/P EST HI 40 MIN: CPT | Performed by: INTERNAL MEDICINE

## 2021-11-01 ASSESSMENT — PAIN SCALES - GENERAL: PAINLEVEL: NO PAIN (0)

## 2021-11-01 ASSESSMENT — MIFFLIN-ST. JEOR: SCORE: 1088.32

## 2021-11-01 NOTE — NURSING NOTE
Debra Denise is a 81 year old female patient that presents today in clinic for the following:    Chief Complaint   Patient presents with     RECHECK     sigmoidocopy     The patient's allergies and medications were reviewed as noted. A set of vitals were recorded as noted without incident. The patient does not have any other questions for the provider.    Tien Talley, EMT at 9:46 AM on 11/1/2021

## 2021-11-04 ENCOUNTER — TRANSFERRED RECORDS (OUTPATIENT)
Dept: HEALTH INFORMATION MANAGEMENT | Facility: CLINIC | Age: 81
End: 2021-11-04
Payer: COMMERCIAL

## 2021-11-15 DIAGNOSIS — K62.89 PROCTITIS: ICD-10-CM

## 2021-11-16 RX ORDER — MESALAMINE 1000 MG/1
1000 SUPPOSITORY RECTAL AT BEDTIME
Qty: 30 SUPPOSITORY | Refills: 2 | Status: SHIPPED | OUTPATIENT
Start: 2021-11-16 | End: 2022-09-19

## 2021-11-16 NOTE — TELEPHONE ENCOUNTER
Mesalamine Rectal Suppository 1000 MG  30 Sups, 2 Refills sent to pharm   Last visit:  8/10/54228    Martha Casanova RN  Central Triage Red Flags/Med Refills    Warnings Override History for mesalamine (CANASA) 1000 MG suppository [[id number]]    Overridden by Carol Ann Willoughby RN on Aug 5, 2021 1:33 PM   Allergy/Contraindication   1. IBUPROFEN [Level: Cross-sensitive Class Match] [Reason: Will monitor drug levels/drug effects ]   2. NAPROXEN [Level: Cross-sensitive Class Match] [Reason: Will monitor drug levels/drug effects ]

## 2022-01-22 DIAGNOSIS — I10 BENIGN ESSENTIAL HYPERTENSION: ICD-10-CM

## 2022-01-22 RX ORDER — GABAPENTIN 100 MG/1
CAPSULE ORAL
Qty: 180 CAPSULE | Refills: 0 | OUTPATIENT
Start: 2022-01-22

## 2022-01-26 DIAGNOSIS — I10 BENIGN ESSENTIAL HYPERTENSION: ICD-10-CM

## 2022-01-27 RX ORDER — GABAPENTIN 100 MG/1
CAPSULE ORAL
Qty: 180 CAPSULE | OUTPATIENT
Start: 2022-01-27

## 2022-01-27 NOTE — TELEPHONE ENCOUNTER
gabapentin (NEURONTIN) 100 MG capsule  Last Written Prescription Date:  4/29/21  Last Fill Quantity: 180,   # refills: 3  Last Office Visit : 11/1/21  Future Office visit:  none    Routing refill request to provider for review/approval because: not on protocol

## 2022-01-28 DIAGNOSIS — I10 BENIGN ESSENTIAL HYPERTENSION: ICD-10-CM

## 2022-01-31 RX ORDER — GABAPENTIN 100 MG/1
CAPSULE ORAL
Qty: 180 CAPSULE | Refills: 0 | Status: SHIPPED | OUTPATIENT
Start: 2022-01-31 | End: 2022-03-23

## 2022-02-07 ENCOUNTER — TELEPHONE (OUTPATIENT)
Dept: INTERNAL MEDICINE | Facility: CLINIC | Age: 82
End: 2022-02-07
Payer: COMMERCIAL

## 2022-02-07 DIAGNOSIS — K51.211 ULCERATIVE PROCTITIS WITH RECTAL BLEEDING (H): ICD-10-CM

## 2022-02-07 DIAGNOSIS — Z00.00 ROUTINE ADULT HEALTH MAINTENANCE: ICD-10-CM

## 2022-02-07 DIAGNOSIS — I10 BENIGN ESSENTIAL HYPERTENSION: ICD-10-CM

## 2022-02-07 DIAGNOSIS — E78.00 HIGH BLOOD CHOLESTEROL: ICD-10-CM

## 2022-02-07 DIAGNOSIS — E55.9 VITAMIN D DEFICIENCY: Primary | ICD-10-CM

## 2022-02-07 NOTE — TELEPHONE ENCOUNTER
Called patient of routine labs signed.  Patient would like to request a fasting lipid, sed rate, and CPR.  Call patient if provider declined.  Patient may get labs done anytime.          Reinaldo Newell CMA ([facility]) at 11:25 AM on 2/7/2022

## 2022-02-07 NOTE — TELEPHONE ENCOUNTER
[facility] Call Center    Phone Message    May a detailed message be left on voicemail: yes     Reason for Call: Order(s): Other:   Reason for requested: lab tests, pt would like to have some labwork done prior to seeing Dr. Zimmerman on 3/10  Date needed: 2/7/22  Provider name: Dr. Zimmerman      Action Taken: Message routed to:  [facility]

## 2022-02-11 ENCOUNTER — LAB (OUTPATIENT)
Dept: LAB | Facility: CLINIC | Age: 82
End: 2022-02-11
Payer: COMMERCIAL

## 2022-02-11 DIAGNOSIS — Z00.00 ROUTINE ADULT HEALTH MAINTENANCE: ICD-10-CM

## 2022-02-11 DIAGNOSIS — I10 BENIGN ESSENTIAL HYPERTENSION: ICD-10-CM

## 2022-02-11 DIAGNOSIS — E55.9 VITAMIN D DEFICIENCY: ICD-10-CM

## 2022-02-11 DIAGNOSIS — E78.00 HIGH BLOOD CHOLESTEROL: ICD-10-CM

## 2022-02-11 DIAGNOSIS — K51.211 ULCERATIVE PROCTITIS WITH RECTAL BLEEDING (H): ICD-10-CM

## 2022-02-11 LAB
ALBUMIN SERPL-MCNC: 3.6 G/DL (ref 3.4–5)
ALP SERPL-CCNC: 68 U/L (ref 40–150)
ALT SERPL W P-5'-P-CCNC: 27 U/L (ref 0–50)
ANION GAP SERPL CALCULATED.3IONS-SCNC: 1 MMOL/L (ref 3–14)
AST SERPL W P-5'-P-CCNC: 15 U/L (ref 0–45)
BASOPHILS # BLD AUTO: 0 10E3/UL (ref 0–0.2)
BASOPHILS NFR BLD AUTO: 0 %
BILIRUB SERPL-MCNC: 0.6 MG/DL (ref 0.2–1.3)
BUN SERPL-MCNC: 15 MG/DL (ref 7–30)
CALCIUM SERPL-MCNC: 9 MG/DL (ref 8.5–10.1)
CHLORIDE BLD-SCNC: 108 MMOL/L (ref 94–109)
CHOLEST SERPL-MCNC: 135 MG/DL
CO2 SERPL-SCNC: 31 MMOL/L (ref 20–32)
CREAT SERPL-MCNC: 0.76 MG/DL (ref 0.52–1.04)
CRP SERPL-MCNC: 5.4 MG/L (ref 0–8)
DEPRECATED CALCIDIOL+CALCIFEROL SERPL-MC: 57 UG/L (ref 20–75)
EOSINOPHIL # BLD AUTO: 0.3 10E3/UL (ref 0–0.7)
EOSINOPHIL NFR BLD AUTO: 4 %
ERYTHROCYTE [DISTWIDTH] IN BLOOD BY AUTOMATED COUNT: 13.5 % (ref 10–15)
ERYTHROCYTE [SEDIMENTATION RATE] IN BLOOD BY WESTERGREN METHOD: 8 MM/HR (ref 0–30)
FASTING STATUS PATIENT QL REPORTED: YES
GFR SERPL CREATININE-BSD FRML MDRD: 78 ML/MIN/1.73M2
GLUCOSE BLD-MCNC: 90 MG/DL (ref 70–99)
HCT VFR BLD AUTO: 42.5 % (ref 35–47)
HDLC SERPL-MCNC: 47 MG/DL
HGB BLD-MCNC: 13.9 G/DL (ref 11.7–15.7)
LDLC SERPL CALC-MCNC: 63 MG/DL
LYMPHOCYTES # BLD AUTO: 1.9 10E3/UL (ref 0.8–5.3)
LYMPHOCYTES NFR BLD AUTO: 23 %
MCH RBC QN AUTO: 30.2 PG (ref 26.5–33)
MCHC RBC AUTO-ENTMCNC: 32.7 G/DL (ref 31.5–36.5)
MCV RBC AUTO: 92 FL (ref 78–100)
MONOCYTES # BLD AUTO: 0.8 10E3/UL (ref 0–1.3)
MONOCYTES NFR BLD AUTO: 10 %
NEUTROPHILS # BLD AUTO: 5.2 10E3/UL (ref 1.6–8.3)
NEUTROPHILS NFR BLD AUTO: 63 %
NONHDLC SERPL-MCNC: 88 MG/DL
PLATELET # BLD AUTO: 401 10E3/UL (ref 150–450)
POTASSIUM BLD-SCNC: 3.7 MMOL/L (ref 3.4–5.3)
PROT SERPL-MCNC: 7.2 G/DL (ref 6.8–8.8)
RBC # BLD AUTO: 4.61 10E6/UL (ref 3.8–5.2)
SODIUM SERPL-SCNC: 140 MMOL/L (ref 133–144)
TRIGL SERPL-MCNC: 126 MG/DL
WBC # BLD AUTO: 8.2 10E3/UL (ref 4–11)

## 2022-02-11 PROCEDURE — 80053 COMPREHEN METABOLIC PANEL: CPT

## 2022-02-11 PROCEDURE — 85025 COMPLETE CBC W/AUTO DIFF WBC: CPT

## 2022-02-11 PROCEDURE — 80061 LIPID PANEL: CPT

## 2022-02-11 PROCEDURE — 36415 COLL VENOUS BLD VENIPUNCTURE: CPT

## 2022-02-11 PROCEDURE — 82306 VITAMIN D 25 HYDROXY: CPT

## 2022-02-11 PROCEDURE — 85652 RBC SED RATE AUTOMATED: CPT

## 2022-02-11 PROCEDURE — 86140 C-REACTIVE PROTEIN: CPT

## 2022-02-17 ENCOUNTER — TRANSFERRED RECORDS (OUTPATIENT)
Dept: HEALTH INFORMATION MANAGEMENT | Facility: CLINIC | Age: 82
End: 2022-02-17
Payer: COMMERCIAL

## 2022-02-20 NOTE — PROGRESS NOTES
HPI:    Ms. Denise comes in for follow up today. She changed her diet to an elimination diet and she states her inflammatory bowel/proctisis  symptoms are much less. However, over she has had a few episodes of mid central abdominal pain (she feels is gastritis). She has take TUMS for this and her sxs. Are less. No further rectal bleeding. No vomiting.  She still has a good appetite. She still has back pain and will follow at the [facility], ortho/sports medicine clinic (she does not have an appt. Yet). Otherwise, no additional HEENT, cardiopulmonary, abdominal, , GYN, neurological, systemic, psychiatric, lymphatic, endocrine, vascular complaints.     Past Medical History:   Diagnosis Date     Hypertension      Polymyalgia rheumatica (H)      Past Surgical History:   Procedure Laterality Date     COLONOSCOPY  2014    Procedure: COMBINED COLONOSCOPY, SINGLE BIOPSY/POLYPECTOMY BY BIOPSY;  COLONOSCOPY;  Surgeon: Carol Ann Plasencia MD;  Location:  GI     ENT SURGERY      tonsilectomy, adenoidectomy     GYN SURGERY  ,     x 2     ORTHOPEDIC SURGERY  2004    (R) shoulder surgery for frozen shoulder     ZAC BSO      for fibroids     PE:    Vitals noted, gen, nad, cooperative, alert, neck supple nl rom, lungs with good air movement, RRR, S1, S2, no MRG, abdomen, positive bowel sounds, no rebound no masses. Grossly normal neurological exam.     A/P:    1. Immunizations; COVID Pfizer vaccine x 3. She has completed the Shingrix vaccine series. Td 2018. Prevnar 13 done 2016. Pneumococcal 23 done 10/18/2011. Influenza vaccine done 10/21/2021.  2. Back pain; MRI Lumbar spine done 2021 and she was seen Sven Fox 2021 and 2021. She is on gabapentin and this helps her sleep. As noted above she will be seen at [facility] in [place].   3. Mammogram; 2021 in Care Everywhere  4. Inflammatory bowel disease; she had Flex Sigmoidoscopy 10/4/2021 and  she is on Rowasa. She saw Ms. Pang, GI 8/10/2021  5. HTN on Valsartan/HcTz; Electrolytes and Creatinine checked 2/11/2022 as normal.   6. Lipids done 2/11/2022 with TG's 126, HDL 47 and LDL 63  7. Vitamin D 57 on 2/11/2022  8. 11/14/2021; seen outside Rheumatology, Dr. Damian for PMR  9. Dermatology; she follows up as needed.   10. She is on an Elimination diet and this working is well.   11. Abdominal complaints (gastritis?); labs were normal 2 weeks 2/11/2022. If persists, would get abdominal CT scan, H. Pylori, and if worse EGD.    I will see her back in about 6 weeks.     30 minutes spent on the date of the encounter doing chart review, history and exam, documentation and further activities as noted above

## 2022-02-21 ENCOUNTER — OFFICE VISIT (OUTPATIENT)
Dept: INTERNAL MEDICINE | Facility: CLINIC | Age: 82
End: 2022-02-21
Payer: COMMERCIAL

## 2022-02-21 VITALS
HEIGHT: 65 IN | HEART RATE: 54 BPM | DIASTOLIC BLOOD PRESSURE: 69 MMHG | SYSTOLIC BLOOD PRESSURE: 151 MMHG | RESPIRATION RATE: 16 BRPM | OXYGEN SATURATION: 98 % | BODY MASS INDEX: 22.81 KG/M2 | WEIGHT: 136.9 LBS

## 2022-02-21 DIAGNOSIS — K29.00 ACUTE SUPERFICIAL GASTRITIS WITHOUT HEMORRHAGE: Primary | ICD-10-CM

## 2022-02-21 PROCEDURE — 99214 OFFICE O/P EST MOD 30 MIN: CPT | Performed by: INTERNAL MEDICINE

## 2022-02-21 NOTE — NURSING NOTE
"Debra Denise is a 81 year old female patient that presents today in clinic for the following:    Chief Complaint   Patient presents with     RECHECK     Follow-up     The patient's allergies and medications were reviewed as noted. A set of vitals were recorded as noted without incident: BP (!) 151/69 (BP Location: Right arm, Patient Position: Sitting, Cuff Size: Adult Regular)   Pulse 54   Resp 16   Ht 1.65 m (5' 4.96\")   Wt 62.1 kg (136 lb 14.4 oz)   SpO2 98%   Breastfeeding No   BMI 22.81 kg/m  . The patient was asked if they had any of the following symptoms in the last forty-eight hours: (1) fever or chills, (2) cough, (3) shortness of breath or difficulty breathing, (4) fatigue, (5) muscle or body aches, (6) headache, (7) new loss of taste or smell, (8) sore throat, (9) congestion or runny nose, (10) nausea or vomiting, and (11) diarrhea. Debra Denise denies having any of the following symptoms in the last forty-eight hours: (1) fever or chills, (2) cough, (3) shortness of breath or difficulty breathing, (4) fatigue, (5) muscle or body aches, (6) headache, (7) new loss of taste or smell, (8) sore throat, (9) congestion or runny nose, (10) nausea or vomiting, and (11) diarrhea. The patient does not have any other questions for the provider.    Prince Ndiaye, EMT at 11:03 AM on 2/21/2022  Primary [facility] Center  [facility]  "

## 2022-03-14 DIAGNOSIS — I10 BENIGN ESSENTIAL HYPERTENSION: ICD-10-CM

## 2022-03-18 RX ORDER — VALSARTAN AND HYDROCHLOROTHIAZIDE 160; 25 MG/1; MG/1
1 TABLET ORAL DAILY
Qty: 90 TABLET | Refills: 3 | Status: SHIPPED | OUTPATIENT
Start: 2022-03-18 | End: 2023-03-15

## 2022-03-18 NOTE — TELEPHONE ENCOUNTER
Last Clinic Visit: 2/21/2022  [facility] [place]  HTN noted at clinic visit - no medication changes made.

## 2022-03-20 DIAGNOSIS — I10 BENIGN ESSENTIAL HYPERTENSION: ICD-10-CM

## 2022-03-20 NOTE — TELEPHONE ENCOUNTER
gabapentin (NEURONTIN) 100 MG capsule     Last Written Prescription Date: 1/31/22  Qty:  180  Rfs: 0  Last Office Visit :   2/21/22  Future Office visit:   3/28/22      Routing refill request to provider for review/approval because: not on protocol

## 2022-03-21 RX ORDER — GABAPENTIN 100 MG/1
CAPSULE ORAL
Qty: 180 CAPSULE | Refills: 0 | OUTPATIENT
Start: 2022-03-21

## 2022-03-23 ENCOUNTER — TELEPHONE (OUTPATIENT)
Dept: INTERNAL MEDICINE | Facility: CLINIC | Age: 82
End: 2022-03-23
Payer: COMMERCIAL

## 2022-03-23 DIAGNOSIS — I10 BENIGN ESSENTIAL HYPERTENSION: ICD-10-CM

## 2022-03-23 RX ORDER — GABAPENTIN 100 MG/1
CAPSULE ORAL
Qty: 180 CAPSULE | Refills: 3 | Status: SHIPPED | OUTPATIENT
Start: 2022-03-23 | End: 2023-02-04

## 2022-03-23 NOTE — TELEPHONE ENCOUNTER
Called and offered 3/25 9am slot to pt, but she stated she will be going our of town until 4/4 so will be unable to take that appt. Would like to be called again if other appts open between 4/5-4/27

## 2022-03-23 NOTE — TELEPHONE ENCOUNTER
[facility] Call Center    Phone Message    May a detailed message be left on voicemail: yes     Reason for Call: Medication Refill Request    Has the patient contacted the pharmacy for the refill? Yes   Name of medication being requested: gabapentin (NEURONTIN) 100 MG capsule  Provider who prescribed the medication: Wolfgang Zimmerman MD   Pharmacy: [id number] - [place], [place] - [address]  Date medication is needed: 03/25/2022    Patient's  Aguilar calling to request meds are filled. Patient will be leaving town on Friday 03/25/2022 for two weeks.      Action Taken: Message routed to:  [facility]    Travel Screening: Not Applicable

## 2022-03-23 NOTE — TELEPHONE ENCOUNTER
Please call to offer pt appt time that opened up for 9am on 3/25/22. If pt is not able to take that appointment, pt will need to wait until currently scheduled appt time or can try to call back at a later time to see if something has opened up. Danitza Erickson LPN 3/23/2022 3:09 PM

## 2022-03-23 NOTE — TELEPHONE ENCOUNTER
[facility] Call Center    Phone Message    May a detailed message be left on voicemail: yes     Reason for Call: Other: Patient's  Aguilar is requesting sooner time than 05/02/2022 appt. Appt was already added to the wait list. Patient is available any time after 04/03/2022 since she will be out of town until then.      Please call back to discuss.    Action Taken: Message routed to:  [facility]    Travel Screening: Not Applicable

## 2022-04-24 ASSESSMENT — ENCOUNTER SYMPTOMS
EYE IRRITATION: 1
EYE PAIN: 1
DOUBLE VISION: 0
EYE WATERING: 0
EYE REDNESS: 0

## 2022-04-24 ASSESSMENT — ACTIVITIES OF DAILY LIVING (ADL)
IN_THE_PAST_7_DAYS,_DID_YOU_NEED_HELP_FROM_OTHERS_TO_TAKE_CARE_OF_THINGS_SUCH_AS_LAUNDRY_AND_HOUSEKEEPING,_BANKING,_SHOPPING,_USING_THE_TELEPHONE,_FOOD_PREPARATION,_TRANSPORTATION,_OR_TAKING_YOUR_OWN_MEDICATIONS?: Y
IN_THE_PAST_7_DAYS,_DID_YOU_NEED_HELP_FROM_OTHERS_TO_PERFORM_EVERYDAY_ACTIVITIES_SUCH_AS_EATING,_GETTING_DRESSED,_GROOMING,_BATHING,_WALKING,_OR_USING_THE_TOILET: N

## 2022-04-26 NOTE — PROGRESS NOTES
HPI:    Ms. Denise comes in for a physical today. She still has back and L hip/groin pain. She has been doing PT but can't quite get back to her baseline so she can walk and exercise more. She saw both Dr. Thien gardner (for hip) and Dr. Margarito gardner (for back pain) and had MRI lumbar and MRI hip imaging. Also plain lumbar and pelvic X-rays. She takes low dose Gabapentin at night. She would like further evaluation for her sxs. She has loose stools as well and has a h/o proctitis and treats with diet and mesalamine. No other HEENT, cardiopulmonary, abdominal, , GYN, neurological, systemic, psychiatric, lymphatic, endocrine, vascular complaints.     Past Medical History:   Diagnosis Date     Hypertension      Polymyalgia rheumatica (H)      Past Medical History:   Diagnosis Date     Hypertension      Polymyalgia rheumatica (H)      PE:    Vitals noted, gen, nad, cooperative, alert, neck supple nl rom, lungs with good air movement, RRR, S1, S2, no MRG, abdomen, no acute findings. Grossly normal neurological exam.     A/P:    1. Immunizations; COVID Pfizer vaccine x 4. She has completed the Shingrix vaccine series. Pneumococcal 23 done 10/18/2011. Prevnar 13 done 1/13/2016. Td 1/31/2018  2. 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 She was seen by Ms. Pang, GI 8/10/2021. Flex Sig done 10/4/2021  3. Chronic pain on night time Gabapentin; seen Dr. Pimentel, orthopedics 6/8/2021 and Dr. Neumann 4/29/2021 Mr. Neumann. Placed PMR referral for her back and hip complaints.   4. HTN; on Valsartan/HcTz; Electrolytes and Creatine checked 2/11/2022  5. Mammogram; 11/17/2021 in [facility] Everywhere  6. Lipids; 2/11/2022; HDL 47 and LdL 63. Probably not indicated because of PMR   7. Dermatology; scanned in [facility] 2/17/2022  8. Vitamin D normal at 57 on 2/11/2022. DEXA scan in chart from 7/19/2021 and most negative T score -2.3. Placed endocrinology referral today.   9. Outside Rheumatology for  PMR, Dr. Damian 11/14/2021. She is not on prednisone currently

## 2022-04-27 ENCOUNTER — OFFICE VISIT (OUTPATIENT)
Dept: INTERNAL MEDICINE | Facility: CLINIC | Age: 82
End: 2022-04-27
Payer: COMMERCIAL

## 2022-04-27 ENCOUNTER — TELEPHONE (OUTPATIENT)
Dept: INTERNAL MEDICINE | Facility: CLINIC | Age: 82
End: 2022-04-27

## 2022-04-27 VITALS
HEART RATE: 56 BPM | OXYGEN SATURATION: 97 % | WEIGHT: 136 LBS | RESPIRATION RATE: 16 BRPM | BODY MASS INDEX: 22.66 KG/M2 | SYSTOLIC BLOOD PRESSURE: 125 MMHG | DIASTOLIC BLOOD PRESSURE: 58 MMHG | HEIGHT: 65 IN

## 2022-04-27 DIAGNOSIS — Z91.89 AT RISK FOR DECREASED BONE DENSITY: Primary | ICD-10-CM

## 2022-04-27 PROCEDURE — 99397 PER PM REEVAL EST PAT 65+ YR: CPT | Performed by: INTERNAL MEDICINE

## 2022-04-27 ASSESSMENT — PAIN SCALES - GENERAL: PAINLEVEL: NO PAIN (0)

## 2022-04-27 NOTE — NURSING NOTE
Debra Denise is a 81 year old female patient that presents today in clinic for the following:    Chief Complaint   Patient presents with     Physical     The patient's allergies and medications were reviewed as noted. A set of vitals were recorded as noted without incident. The patient does not have any other questions for the provider.    Audi Calhoun, EMT at 10:19 AM on 4/27/2022

## 2022-04-27 NOTE — TELEPHONE ENCOUNTER
[facility] Call Center    Phone Message    May a detailed message be left on voicemail: yes     Reason for Call: Other: Per call from PT saw Dr Zimmerman, he mentioned that he has a specific provider for ENDO and PMR that she needs to see. Pt requesting a call back for name of the providers before scheduling.     Action Taken: Message routed to:  [facility]    Travel Screening: Not Applicable

## 2022-05-03 NOTE — TELEPHONE ENCOUNTER
Patient scheduled.      Reinaldo Newell CMA ([facility]) at 7:30 AM on 5/3/2022

## 2022-05-23 DIAGNOSIS — K62.89 PROCTITIS: ICD-10-CM

## 2022-05-26 NOTE — TELEPHONE ENCOUNTER
mesalamine (CANASA) 1000 MG suppository      Last Written Prescription Date:  11-16-21  Last Fill Quantity: 30,   # refills: 2  Last Office Visit : 8-10-21  Future Office visit:  none    Routing refill request to provider for review/approval because:  Gap in RF

## 2022-05-27 DIAGNOSIS — K62.89 PROCTITIS: ICD-10-CM

## 2022-05-27 RX ORDER — MESALAMINE 1000 MG/1
1000 SUPPOSITORY RECTAL AT BEDTIME
Qty: 30 SUPPOSITORY | Status: CANCELLED | OUTPATIENT
Start: 2022-05-27

## 2022-05-27 NOTE — TELEPHONE ENCOUNTER
"     mesalamine (CANASA) 1000 MG suppository   Last  Written Prescription Date:  11/16/21  Last Fill Quantity: 30,   # refills: 2  Last Office Visit : 8/10/21  Future Office visit:  None    \"  Return to clinic in 2 months:    Scheduling has been notified to contact the pt for appointment.        routed because:  Request per pt call  \" She returned to the Canasa suppositories but felt they were not as easy to use as the Rowasa enemas so return to the Rowasa enemas as of a few days ago\".still using med?  Past due for appt,  "

## 2022-05-27 NOTE — TELEPHONE ENCOUNTER
[facility] Call Center    Phone Message    May a detailed message be left on voicemail: yes     Reason for Call: Medication Refill Request    Has the patient contacted the pharmacy for the refill? Yes   Name of medication being requested: mesalamine (CANASA)   Provider who prescribed the medication: pt  states Dr Zimmerman  However in the chart it states Dr Villanueva  Pharmacy: [id number] - [place], [place] - [address]    Date medication is needed: ASAP         Action Taken: Message routed to:  [facility]    Travel Screening: Not Applicable

## 2022-05-29 NOTE — TELEPHONE ENCOUNTER
Mesalamine-Cleanser (ROWASA) 4 g KIT  Last Written Prescription Date:  2/3/21  Last Fill Quantity: 6 kit,   # refills: 3  Last Office Visit :4/27/22  Future Office visit: 8/15/22      Routing refill request to provider for review/approval because: not on protocol

## 2022-05-31 ENCOUNTER — PATIENT OUTREACH (OUTPATIENT)
Dept: GASTROENTEROLOGY | Facility: CLINIC | Age: 82
End: 2022-05-31
Payer: COMMERCIAL

## 2022-05-31 RX ORDER — MESALAMINE 4 G/60ML
1 SUSPENSION RECTAL
Qty: 6 KIT | Refills: 3 | Status: SHIPPED | OUTPATIENT
Start: 2022-06-01 | End: 2022-09-08

## 2022-05-31 RX ORDER — MESALAMINE 1000 MG/1
1000 SUPPOSITORY RECTAL AT BEDTIME
Qty: 30 SUPPOSITORY | Refills: 2 | OUTPATIENT
Start: 2022-05-31

## 2022-05-31 NOTE — TELEPHONE ENCOUNTER
Contacted patient to discuss need for an appointment with a GI provider  Patient states she has a request for Dr Zimmerman to fill as he has filled in the past.

## 2022-06-23 ENCOUNTER — VIRTUAL VISIT (OUTPATIENT)
Dept: GASTROENTEROLOGY | Facility: CLINIC | Age: 82
End: 2022-06-23
Payer: COMMERCIAL

## 2022-06-23 DIAGNOSIS — K51.211 ULCERATIVE PROCTITIS WITH RECTAL BLEEDING (H): Primary | ICD-10-CM

## 2022-06-23 PROCEDURE — 99215 OFFICE O/P EST HI 40 MIN: CPT | Mod: 95 | Performed by: PHYSICIAN ASSISTANT

## 2022-06-23 NOTE — PROGRESS NOTES
Debra is a 81 year old who is being evaluated via a billable video visit.      How would you like to obtain your AVS? MyChart  If the video visit is dropped, the invitation should be resent by: Text to cell phone: [phone]  Will anyone else be joining your video visit? No        Video-Visit Details    Video Start Time: 0814 AM    Type of service:  Video Visit    Video End Time:8:47 AM    Originating Location (pt. Location): Home    Distant Location (provider location):  [facility] [place]     Platform used for Video Visit: [facility]     IBD CLINIC VISIT     CC/REFERRING MD:  No ref. provider found  REASON FOR CONSULTATION: Ulcerative protcitis    ASSESSMENT/PLAN  81-year-old female with history of ulcerative proctitis here for follow-up.  Recently had a flareup and used her as needed mesalamine enemas with significant relief, now getting back to baseline.    1. Ulcerative proctitis  Current medication: rowasa enemas + canasa supp  Current clinical disease activity: Driver 0  Current endoscopic disease activity: last flex sig 10/2021 with mild proctitis.      Over the last year, patient has experienced a bout of recurrent blood and mucus in her stool with evidence of mild proctitis on flexible sigmoidoscopy in October 2022.  I have encouraged daily use of Canasa suppositories to best allow for ongoing remission.  Although she would prefer to use the suppositories on an as-needed basis, we discussed the importance of maintaining remission and this seems to be best achieved by daily use of Canasa suppositories.  The patient has been instructed to restrict her diet based on some laboratory testing she received in [place].  I do not have access to these lab results, I have proceeded with testing for celiac markers although she did have these obtained in 2008 and they were negative.  I encouraged the patient to meet with our IBD dietitian for more specific recommendations regarding or  diet during times of active symptoms and remission.  -- Canasa suppositories on a nightly basis  -- Laboratory studies were completed in February to include a normal creatinine  -- Celiac markers to be obtained   -- fecal calprotectin to be done as a baseline, then repeat in 6 months  --Nutritional referral placed, although insurance may not cover.  Patient will think about this    2. IBD dysplasia surveillance:     -Not required    Return to clinic in 3-6 months or earlier if worsening symptoms.     Thank you for this consultation.  43 minutes spent on the date of the encounter doing chart review, history and exam, documentation and further activities as noted above.  It was a pleasure to participate in the care of this patient; please contact us with any further questions.      Lawrence Pang PA-C  Division of Gastroenterology, Hepatology and Nutrition  [facility]    IBD HISTORY  Age at diagnosis: 63  Extent of endoscopic disease:Rectum to distal sigmoid (16cm). Apparently on initial colonoscopy there was some question of patch of inflammation at hepatic flexure but I can find no actual record of this. Last colonoscopy in 2014 shows normal colon apart from diverticula ; last flex sig showed proctitis (Driver 1), with normal sigmoid and descending colon  Extent of histologic disease: proctitis  Prior UC surgeries: None  Prior IBD Medications: Mesalamine    DRUG MONITORING  TPMT enzyme activity: n/a  6-TGN/6-MMPN levels: n/a  Biologic concentration: n/a    HPI:   Ms. Debra Denise is a 80 year old woman with history of UC proctitis , HTN, here for follow-up of UC proctitis.     Over the last year, she has had some breakthrough of urgency and blood in the stool on occasion.  We proceeded with a flexible sigmoidoscopy in October last year that showed mild to moderate proctitis again.  Biopsy showed mild chronic active proctitis.  Beginning April of this year she had more frequent blood and mucus in her  stool prompting her to alternate Rowasa enemas and Canasa suppositories.  As of the last 2 weeks she has had resolution of the blood in her stool.  She is quite bothered by the idea of using a rectal therapy every single day.    Currently having 2 BMs per day.  Since April she has had return of blood in the stool and mucus. She has been doing either rowasa or canasa daily, which has allowed for resolution of blood in the stool. She has been extremely restricted in her diet after seeing a function med provider who told her to avoid gluten amongst other things.     Driver score:   Stool freq: 0 (baseline stools frequency)  Rectal bleedin (None)  PGA: 1 (Mild)  Endoscopy: Not done    Remission: <3   Mild disease: 3-5  Moderate disease: 6-10  Severe disease: >10    Extra intestinal manifestations of IBD:  No uveitis/episcleritis  No aphthous ulcers   No arthritis   No erythema nodosum/pyoderma gangrenosum.     sIBDQ:  IBDQ Score Date IBDQ - Total Score  (Higher score better)   2019 51   2017 64        Last endoscopic activity:  Last histologic activity:      Pertinent labs / imaging:     ROS:    Constitutional, HEENT, cardiovascular, pulmonary, GI, , musculoskeletal, neuro, skin, endocrine and psych systems are negative, except as otherwise noted.    PHYSICAL EXAMINATION:  Video Visit     PERTINENT PAST MEDICAL HISTORY:  Past Medical History:   Diagnosis Date     Hypertension      Polymyalgia rheumatica (H)        PREVIOUS SURGERIES:  Past Surgical History:   Procedure Laterality Date     COLONOSCOPY  2014    Procedure: COMBINED COLONOSCOPY, SINGLE BIOPSY/POLYPECTOMY BY BIOPSY;  COLONOSCOPY;  Surgeon: Carol Ann Plasencia MD;  Location:  GI     ENT SURGERY      tonsilectomy, adenoidectomy     GYN SURGERY  ,     x 2     ORTHOPEDIC SURGERY  2004    (R) shoulder surgery for frozen shoulder     ZAC BSO      for fibroids       ALLERGIES:     Allergies   Allergen Reactions      Atenolol      Other reaction(s): Intolerance-Can't Take  Fatigue     Ibuprofen      Other reaction(s): Stomach Upset  4-9-13 tele encounter     Naproxen      Other reaction(s): Stomach Upset  4-9-13 tele encounter     Ramipril Cough     Other reaction(s): Intolerance-Can't Take     Sulfa Drugs      Codeine Nausea     Fentanyl Nausea       PERTINENT MEDICATIONS:    Current Outpatient Medications:      Cholecalciferol (VITAMIN D3 PO), Take 2,000 Units by mouth daily , Disp: , Rfl:      gabapentin (NEURONTIN) 100 MG capsule, Take 2 tablets at bedtime, Disp: 180 capsule, Rfl: 3     mesalamine (CANASA) 1000 MG suppository, Place 1 suppository (1,000 mg) rectally At Bedtime, Disp: 30 suppository, Rfl: 2     Mesalamine-Cleanser (ROWASA) 4 g KIT, Place 1 enema rectally three times a week, Disp: 6 kit, Rfl: 3     Nutritional Supplements (NUTRITIONAL SUPPLEMENT PO), Relief Factor: Fish oil and Tumeric.  Take 1 capsule daily, Disp: , Rfl:      Probiotic Product (PROBIOTIC PO), Take by mouth daily, Disp: , Rfl:      valsartan-hydrochlorothiazide (DIOVAN HCT) 160-25 MG tablet, Take 1 tablet by mouth daily, Disp: 90 tablet, Rfl: 3     BIOTIN PO, Take 1,000 mg by mouth daily , Disp: , Rfl:     SOCIAL HISTORY:  Social History     Socioeconomic History     Marital status:      Spouse name: Not on file     Number of children: Not on file     Years of education: Not on file     Highest education level: Not on file   Occupational History     Not on file   Tobacco Use     Smoking status: Never Smoker     Smokeless tobacco: Never Used   Substance and Sexual Activity     Alcohol use: No     Drug use: No     Sexual activity: Not Currently   Other Topics Concern     Parent/sibling w/ CABG, MI or angioplasty before 65F 55M? Not Asked   Social History Narrative     Not on file     Social Determinants of Health     Financial Resource Strain: Not on file   Food Insecurity: Not on file   Transportation Needs: Not on file   Physical  Activity: Not on file   Stress: Not on file   Social Connections: Not on file   Intimate Partner Violence: Not on file   Housing Stability: Not on file       FAMILY HISTORY:  Family History   Problem Relation Age of Onset     Cancer - colorectal Father      Lung Cancer Father      Macular Degeneration Father      Multiple myeloma Brother      Pacemaker Brother      Hypertension Mother      Cerebrovascular Disease Mother         temporal arteritis     Suicide Sister      Hypertension Maternal Grandmother      Cerebrovascular Disease Maternal Grandmother        Past/family/social history reviewed and no changes

## 2022-06-23 NOTE — LETTER
6/23/2022         RE: Debra Denise  [address]  [place] [address]        Dear Colleague,    Thank you for referring your patient, Debra Denise, to the [facility] [place]. Please see a copy of my visit note below.    IBD CLINIC VISIT     CC/REFERRING MD:  No ref. provider found  REASON FOR CONSULTATION: Ulcerative protcitis    ASSESSMENT/PLAN  81-year-old female with history of ulcerative proctitis here for follow-up.  Recently had a flareup and used her as needed mesalamine enemas with significant relief, now getting back to baseline.    1. Ulcerative proctitis  Current medication: rowasa enemas + canasa supp  Current clinical disease activity: Driver 0  Current endoscopic disease activity: last flex sig 10/2021 with mild proctitis.      Over the last year, patient has experienced a bout of recurrent blood and mucus in her stool with evidence of mild proctitis on flexible sigmoidoscopy in October 2022.  I have encouraged daily use of Canasa suppositories to best allow for ongoing remission.  Although she would prefer to use the suppositories on an as-needed basis, we discussed the importance of maintaining remission and this seems to be best achieved by daily use of Canasa suppositories.  The patient has been instructed to restrict her diet based on some laboratory testing she received in [place].  I do not have access to these lab results, I have proceeded with testing for celiac markers although she did have these obtained in 2008 and they were negative.  I encouraged the patient to meet with our IBD dietitian for more specific recommendations regarding or diet during times of active symptoms and remission.  -- Canasa suppositories on a nightly basis  -- Laboratory studies were completed in February to include a normal creatinine  -- Celiac markers to be obtained   -- fecal calprotectin to be done as a baseline, then repeat in 6  months  --Nutritional referral placed, although insurance may not cover.  Patient will think about this    2. IBD dysplasia surveillance:     -Not required    Return to clinic in 3-6 months or earlier if worsening symptoms.     Thank you for this consultation.  43 minutes spent on the date of the encounter doing chart review, history and exam, documentation and further activities as noted above.  It was a pleasure to participate in the care of this patient; please contact us with any further questions.      Lawrence Pang PA-C  Division of Gastroenterology, Hepatology and Nutrition  [facility]    IBD HISTORY  Age at diagnosis: 63  Extent of endoscopic disease:Rectum to distal sigmoid (16cm). Apparently on initial colonoscopy there was some question of patch of inflammation at hepatic flexure but I can find no actual record of this. Last colonoscopy in 2014 shows normal colon apart from diverticula ; last flex sig showed proctitis (Driver 1), with normal sigmoid and descending colon  Extent of histologic disease: proctitis  Prior UC surgeries: None  Prior IBD Medications: Mesalamine    DRUG MONITORING  TPMT enzyme activity: n/a  6-TGN/6-MMPN levels: n/a  Biologic concentration: n/a    HPI:   Ms. Debra Denise is a 80 year old woman with history of UC proctitis , HTN, here for follow-up of UC proctitis.     Over the last year, she has had some breakthrough of urgency and blood in the stool on occasion.  We proceeded with a flexible sigmoidoscopy in October last year that showed mild to moderate proctitis again.  Biopsy showed mild chronic active proctitis.  Beginning April of this year she had more frequent blood and mucus in her stool prompting her to alternate Rowasa enemas and Canasa suppositories.  As of the last 2 weeks she has had resolution of the blood in her stool.  She is quite bothered by the idea of using a rectal therapy every single day.    Currently having 2 BMs per day.  Since April she  has had return of blood in the stool and mucus. She has been doing either rowasa or canasa daily, which has allowed for resolution of blood in the stool. She has been extremely restricted in her diet after seeing a function med provider who told her to avoid gluten amongst other things.     Driver score:   Stool freq: 0 (baseline stools frequency)  Rectal bleedin (None)  PGA: 1 (Mild)  Endoscopy: Not done    Remission: <3   Mild disease: 3-5  Moderate disease: 6-10  Severe disease: >10    Extra intestinal manifestations of IBD:  No uveitis/episcleritis  No aphthous ulcers   No arthritis   No erythema nodosum/pyoderma gangrenosum.     sIBDQ:  IBDQ Score Date IBDQ - Total Score  (Higher score better)   2019 51   2017 64        Last endoscopic activity:  Last histologic activity:      Pertinent labs / imaging:     ROS:    Constitutional, HEENT, cardiovascular, pulmonary, GI, , musculoskeletal, neuro, skin, endocrine and psych systems are negative, except as otherwise noted.    PHYSICAL EXAMINATION:  Video Visit     PERTINENT PAST MEDICAL HISTORY:  Past Medical History:   Diagnosis Date     Hypertension      Polymyalgia rheumatica (H)        PREVIOUS SURGERIES:  Past Surgical History:   Procedure Laterality Date     COLONOSCOPY  2014    Procedure: COMBINED COLONOSCOPY, SINGLE BIOPSY/POLYPECTOMY BY BIOPSY;  COLONOSCOPY;  Surgeon: Carol Ann Plasencia MD;  Location:  GI     ENT SURGERY      tonsilectomy, adenoidectomy     GYN SURGERY  ,     x 2     ORTHOPEDIC SURGERY  2004    (R) shoulder surgery for frozen shoulder     ZAC BSO      for fibroids       ALLERGIES:     Allergies   Allergen Reactions     Atenolol      Other reaction(s): Intolerance-Can't Take  Fatigue     Ibuprofen      Other reaction(s): Stomach Upset  4-9-13 tele encounter     Naproxen      Other reaction(s): Stomach Upset  4-9-13 tele encounter     Ramipril Cough     Other reaction(s): Intolerance-Can't Take      Sulfa Drugs      Codeine Nausea     Fentanyl Nausea       PERTINENT MEDICATIONS:    Current Outpatient Medications:      Cholecalciferol (VITAMIN D3 PO), Take 2,000 Units by mouth daily , Disp: , Rfl:      gabapentin (NEURONTIN) 100 MG capsule, Take 2 tablets at bedtime, Disp: 180 capsule, Rfl: 3     mesalamine (CANASA) 1000 MG suppository, Place 1 suppository (1,000 mg) rectally At Bedtime, Disp: 30 suppository, Rfl: 2     Mesalamine-Cleanser (ROWASA) 4 g KIT, Place 1 enema rectally three times a week, Disp: 6 kit, Rfl: 3     Nutritional Supplements (NUTRITIONAL SUPPLEMENT PO), Relief Factor: Fish oil and Tumeric.  Take 1 capsule daily, Disp: , Rfl:      Probiotic Product (PROBIOTIC PO), Take by mouth daily, Disp: , Rfl:      valsartan-hydrochlorothiazide (DIOVAN HCT) 160-25 MG tablet, Take 1 tablet by mouth daily, Disp: 90 tablet, Rfl: 3     BIOTIN PO, Take 1,000 mg by mouth daily , Disp: , Rfl:     SOCIAL HISTORY:  Social History     Socioeconomic History     Marital status:      Spouse name: Not on file     Number of children: Not on file     Years of education: Not on file     Highest education level: Not on file   Occupational History     Not on file   Tobacco Use     Smoking status: Never Smoker     Smokeless tobacco: Never Used   Substance and Sexual Activity     Alcohol use: No     Drug use: No     Sexual activity: Not Currently   Other Topics Concern     Parent/sibling w/ CABG, MI or angioplasty before 65F 55M? Not Asked   Social History Narrative     Not on file     Social Determinants of Health     Financial Resource Strain: Not on file   Food Insecurity: Not on file   Transportation Needs: Not on file   Physical Activity: Not on file   Stress: Not on file   Social Connections: Not on file   Intimate Partner Violence: Not on file   Housing Stability: Not on file       FAMILY HISTORY:  Family History   Problem Relation Age of Onset     Cancer - colorectal Father      Lung Cancer Father      Macular  Degeneration Father      Multiple myeloma Brother      Pacemaker Brother      Hypertension Mother      Cerebrovascular Disease Mother         temporal arteritis     Suicide Sister      Hypertension Maternal Grandmother      Cerebrovascular Disease Maternal Grandmother        Past/family/social history reviewed and no changes        Sincerely,    Lawrence Pang PA-C

## 2022-06-24 ENCOUNTER — TELEPHONE (OUTPATIENT)
Dept: GASTROENTEROLOGY | Facility: CLINIC | Age: 82
End: 2022-06-24

## 2022-06-24 NOTE — TELEPHONE ENCOUNTER
Called to discuss diet questions. Patient states that daughter-in-law is a functional medicine practitioner in [place] who conducted food sensitivity testing and provided restrictive diet recommendations back in December including restricting processed foods, added sugars, and gluten.     Was recently eating a lot of roughage and salads in order to accommodate the diet restrictions, but feels that her diet is overly restrictive.    Primary symptoms of concern are loose stool and blood in stool.    Diet Recall:  Morning: Oatmeal, blueberries, and bananas and weak coffee  Lunch: Gluten free PB and jelly  Supper: NA    Reviewed basics of IBD diet recommendations

## 2022-07-08 ENCOUNTER — LAB (OUTPATIENT)
Dept: LAB | Facility: CLINIC | Age: 82
End: 2022-07-08
Payer: COMMERCIAL

## 2022-07-08 DIAGNOSIS — K51.211 ULCERATIVE PROCTITIS WITH RECTAL BLEEDING (H): ICD-10-CM

## 2022-07-08 PROCEDURE — 82784 ASSAY IGA/IGD/IGG/IGM EACH: CPT

## 2022-07-08 PROCEDURE — 36415 COLL VENOUS BLD VENIPUNCTURE: CPT

## 2022-07-08 PROCEDURE — 86364 TISS TRNSGLTMNASE EA IG CLAS: CPT

## 2022-07-11 ENCOUNTER — APPOINTMENT (OUTPATIENT)
Dept: LAB | Facility: CLINIC | Age: 82
End: 2022-07-11
Payer: COMMERCIAL

## 2022-07-11 LAB
IGA SERPL-MCNC: 192 MG/DL (ref 84–499)
TTG IGA SER-ACNC: 0.5 U/ML
TTG IGG SER-ACNC: 0.9 U/ML

## 2022-07-11 PROCEDURE — 83993 ASSAY FOR CALPROTECTIN FECAL: CPT

## 2022-07-13 LAB — CALPROTECTIN STL-MCNT: 93.9 MG/KG (ref 0–49.9)

## 2022-07-18 ENCOUNTER — TELEPHONE (OUTPATIENT)
Dept: CARDIOLOGY | Facility: CLINIC | Age: 82
End: 2022-07-18

## 2022-07-18 DIAGNOSIS — R00.2 PALPITATIONS: Primary | ICD-10-CM

## 2022-07-18 NOTE — TELEPHONE ENCOUNTER
Occupational Therapy     Referred by: Doron Underwood PA-C; Medical Diagnosis (from order):    Diagnosis Information      Diagnosis    726.32 (ICD-9-CM) - M77.12 (ICD-10-CM) - Lateral epicondylitis of left elbow                Daily Treatment Note    Visit:  8   Patient alert and oriented X3.    SUBJECTIVE                                                                                                               Patient has seem Ortho since last therapy session.   Feels RIght elbow pain is 2/10 but feeling stronger  Pain at Left is 6-7/10 currently.     OBJECTIVE                                                                                                                        TREATMENT                                                                                                                  Therapeutic Exercise:    Wrist AROM Flexion Extension - x10 at each UE.    Elbow Flexion Extension AROM Left x 5  With extension holds  Bilateral Wrist flexion and wrist extension stretches - with elbow extended at left and elbow flexed at right  Left  Bicep stretch with hold    Complete active wrist flexion stretch x 3- right and Passive wrist flexion stretch left x2 with 30 sec hold - followed by Left Active wrist flexion stretch  Bilateral wrist extension stretch    AROM wrist flexion and extension bilateally  Green putty Putty: [place] at Right Upper Extremity (only) With shoulder extended to 90 position and elbow extended (noted increased discomfort immediately following)    Bilateral AROM supination/pronation with stretches/hold for suponation    Left Upper Extremity Seated scapular stabilization exercises- x 10 circles each direction, and x 5 scapular retraction with 15 sec holds. Bilateral: seated at G-H at 0 ER/IR AROM x 10.      Manual Therapy:  Soft tissue mobilization at right lateral epicondyle/wrist extensor wad, with cross friction at tendon origin. IASTM also right.   Left: cryomassage as well as Scar  Spoke with Debra and she is agreeable to coming in and having a Ziopatch placed.   tissue mobilization following Exs/stretches    Skilled input: verbal instruction/cues, facilitation and posture correction    Writer verbally educated and received verbal consent for hand placement, positioning of patient, and techniques to be performed today from patient for hand placement and palpation for techniques, therapist position for techniques and modality application as described above and how they are pertinent to the patient's plan of care.    Home Exercise Program/Education Materials: Access Code: [email or web address]  URL: [email or web address]  Date: 09/22/2021  Prepared by: Arlen Caba    Exercises  Seated Wrist Flexion Stretch - 5 x daily - 7 x weekly - 3 reps - 20 hold  Wrist AROM Flexion Extension - 3 x daily - 7 x weekly - 10 reps  Seated Forearm Pronation and Supination AROM - 3 x daily - 7 x weekly - 10 reps  Standing Elbow Flexion Extension AROM - 5 x daily - 7 x weekly - 10 reps    Green KARENSO exs right (hand out provided)  Scapular stabilization circles    Iontophoresis (33712)  Number of applications: 6 of up to: 12  Location: Right lateral epicondyle/wrist ext wad  IontoPatch STAT   1.0 mL dexamethasone  Dosage: 80 mA-Minutes  Wear Time: Approximately 4 hours  Instruction: remove after stated wear time; monitor any skin reaction  Moist Heat (88079)  Location: Left elbow  Position: sitting  Temperature: 163° F  Duration: 15 minutes  Un-attended Electrical Stimulation (05642/): Interferential Current  Location: Right lateral elbow  Position: sitting  Pulse Rate: high low sweep  Intensity: patient tolerance  Lateral epicondyle X postioning  In conjunction with: moist hot pack  Duration: 15 minutes  Ultrasound (66962)  Location: right lateral epicondyle/wrist extensor wad  Duty Cycle: 100%  Frequency: 1 Mhz  Intensity (w/cm2): 1.0  Duration: 8 minutes  Results: decreased pain  Reaction: no adverse reaction to treatment      ASSESSMENT                                                                                                              Patient continues to have pain bilaterally but notes that Right Upper Extremity is feeling stronger overall. Progressed Right Upper Extremity HEP with vending Green putty for progression of BELSO exs. This session also concluded Right Upper Extremity with IFC for further pain management and healing, followed by  IONTO application. During session Left Upper Extremity pain slightly improved at end of session: srted session with moist heat application (15 min), followed by exs/stretching and ending with cryomassage and scar mobilization. Continue treatment.     Patient Education:   Results of above outlined education: Verbalizes understanding, Demonstrates understanding and Needs reinforcement      PLAN                                                                                                                           Suggestions for next session as indicated: Assess percieved effetiveness of IFC following therapy session, continue with Left Upper Extremity progression/strengthening per protocol. Add eccentric right wrist strengthening         Therapy procedure time and total treatment time can be found documented on the Time Entry flowsheet

## 2022-07-22 ENCOUNTER — HOSPITAL ENCOUNTER (OUTPATIENT)
Dept: CARDIOLOGY | Facility: CLINIC | Age: 82
Discharge: HOME | End: 2022-07-22
Attending: INTERNAL MEDICINE | Admitting: INTERNAL MEDICINE
Payer: COMMERCIAL

## 2022-07-22 ENCOUNTER — OFFICE VISIT (OUTPATIENT)
Dept: INTERNAL MEDICINE | Facility: CLINIC | Age: 82
End: 2022-07-22
Payer: COMMERCIAL

## 2022-07-22 VITALS
SYSTOLIC BLOOD PRESSURE: 137 MMHG | DIASTOLIC BLOOD PRESSURE: 72 MMHG | OXYGEN SATURATION: 98 % | WEIGHT: 139 LBS | HEIGHT: 65 IN | HEART RATE: 55 BPM | RESPIRATION RATE: 16 BRPM | BODY MASS INDEX: 23.16 KG/M2

## 2022-07-22 DIAGNOSIS — G89.29 OTHER CHRONIC BACK PAIN: ICD-10-CM

## 2022-07-22 DIAGNOSIS — M54.89 OTHER CHRONIC BACK PAIN: ICD-10-CM

## 2022-07-22 DIAGNOSIS — R00.2 PALPITATIONS: ICD-10-CM

## 2022-07-22 DIAGNOSIS — I20.89 OTHER FORMS OF ANGINA PECTORIS (H): Primary | ICD-10-CM

## 2022-07-22 PROCEDURE — 99215 OFFICE O/P EST HI 40 MIN: CPT | Performed by: INTERNAL MEDICINE

## 2022-07-22 PROCEDURE — 93246 EXT ECG>7D<15D RECORDING: CPT

## 2022-07-22 PROCEDURE — 93248 EXT ECG>7D<15D REV&INTERPJ: CPT | Performed by: INTERNAL MEDICINE

## 2022-07-22 RX ORDER — CHLORAL HYDRATE 500 MG
2 CAPSULE ORAL DAILY
COMMUNITY
End: 2023-04-14

## 2022-07-22 ASSESSMENT — PAIN SCALES - GENERAL: PAINLEVEL: NO PAIN (0)

## 2022-07-22 NOTE — NURSING NOTE
Chief Complaint   Patient presents with     Follow Up       Angela Cuevas RN at 11:36 AM on 7/22/2022.

## 2022-07-22 NOTE — PROGRESS NOTES
HPI:    Last visit with us 2022. She comes in today for several issues. She states back pain is less and she is getting PT. She states some atypical chest pain especially when she is stressed. She had COVID about 2 weeks ago and is feeling better and did not take Paxlovid. Otherwise, no additional HEENT, cardiopulmonary, abdominal, , GYN, neurological, systemic, psychiatric, lymphatic, endocrine, vascular complaints.,     Past Medical History:   Diagnosis Date     Hypertension      Osteopenia      Polymyalgia rheumatica (H)      Past Surgical History:   Procedure Laterality Date     COLONOSCOPY  2014    Procedure: COMBINED COLONOSCOPY, SINGLE BIOPSY/POLYPECTOMY BY BIOPSY;  COLONOSCOPY;  Surgeon: Carol Ann Plasencia MD;  Location:  GI     ENT SURGERY      tonsilectomy, adenoidectomy     GYN SURGERY  ,     x 2     ORTHOPEDIC SURGERY  2004    (R) shoulder surgery for frozen shoulder     ZAC BSO      for fibroids     PE:    Vitals noted, gen, nad, cooperative, alert, neck supple nl rom, lungs with good air movement, She has Ziopatch L upper chest area, RRR, S1, S2, no MRG, abdomen, no acute findings. Grossly normal neurological exam.     A/P:    1. Immunizations; COVID Pfizer vaccine x 4. She has completed the Shingrix vaccine series. Pneumococcal 23 done 10/18/2011. Prevnar 13 done 2016. Td 2018  2. 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 She was seen by Ms. Pang, GI 8/10/2021 and 2022.  Flex Sig done 10/4/2021. Calprotectin Feces elevated at 93.9  3. Chronic pain on night time Gabapentin; seen Dr. Pimentel, orthopedics 2021 and Dr. Neumann 2021 Mr. Neumann. Placed PMR referral for her back and hip complaints. She has PMR appt. With Dr. Corona 2022   4. HTN; on Valsartan/HcTz; Electrolytes and Creatine checked 2022  5. Mammogram; 2021 in Care Everywhere  6. Lipids; 2022; HDL 47 and LDL 63.  7. Dermatology; scanned in  [facility] 2/17/2022  8. Vitamin D normal at 57 on 2/11/2022. DEXA scan in chart from 7/19/2021 and most negative T score -2.3. Placed endocrinology referral today. She has endocrinology appt. 8/22/2022  9. Outside Rheumatology for PMR, Dr. Damian 11/14/2021. She is not on prednisone currently   10. Palpitations; she has Ziopatch placement today 7/22/2022. Normal dobutamine stress echo 10/11/2021. CXR 9/23/2021 normal For atypical CP, ordered CTA coronary angiogram.   11. Had COVID about 2 weeks ago; better now; did not take Paxlovid     I will follow up with her in about one month to review all above testing.     40 minutes spent on the date of the encounter doing chart review, history and exam, documentation and further activities as noted above exclusive of procedures and other billable interpretations

## 2022-07-22 NOTE — PATIENT INSTRUCTIONS
To schedule your CTA with imaging, please call [phone].      To make a follow up with Dr. Zimmerman, please call [phone]

## 2022-07-31 ENCOUNTER — HEALTH MAINTENANCE LETTER (OUTPATIENT)
Age: 82
End: 2022-07-31

## 2022-08-08 ENCOUNTER — TELEPHONE (OUTPATIENT)
Dept: CARDIOLOGY | Facility: CLINIC | Age: 82
End: 2022-08-08

## 2022-08-08 NOTE — TELEPHONE ENCOUNTER
[facility] Call Center    Phone Message    May a detailed message be left on voicemail: no     Reason for Call: Other: Martita just received a notice from her insurance company regarding the CTA ANGIOGRAM CORONARY ARTERY scheduled for 8/10/2022. Martita said the letter stated only part of this procedure was approved, #[id number]. The Coronary fractional flow reserve and the Coronary fractional flow reserve data were both denied. Please reach out to the Pt to clarify if she needs to re-schedule this procedure or contact Dr. Wolfgang Zimmerman if a different test needs to be ordered. Dr. Zimmerman can be reached at [phone].     Action Taken: Other: [facility]    Travel Screening: Not Applicable

## 2022-08-09 ENCOUNTER — TELEPHONE (OUTPATIENT)
Dept: INTERNAL MEDICINE | Facility: CLINIC | Age: 82
End: 2022-08-09

## 2022-08-09 NOTE — TELEPHONE ENCOUNTER
[facility] Call Center    Phone Message    May a detailed message be left on voicemail: yes     Reason for Call: Order(s): Other: computed tomography (CT)  Reason for requested: insurance coverage  Date needed: today  Provider name: Dr. Zimmerman    High priority. X-ray scheduled for tomorrow 08/10/2022.      Initially, Dr. Zimmerman wanted patient to take CTA angiogram coronary artery and he ordered it. Insurance is denying that particular item.    Instead, insurance agreed to a computed tomography (CT) which will also take a picture of her heart with contrast dye. Needing this approved today unless he thinks otherwise.    Please call patient s mobile to confirm order placed.    Mobile #: [phone]      Action Taken: Message routed to:  [facility]    Travel Screening: Not Applicable

## 2022-08-09 NOTE — TELEPHONE ENCOUNTER
Patient calling back in - she hasn't heard back regarding her insurance denial for her CT angiogram. She is uncertain if she should still come to her visit. Please return her call asap.

## 2022-08-10 ENCOUNTER — HOSPITAL ENCOUNTER (OUTPATIENT)
Dept: CARDIOLOGY | Facility: CLINIC | Age: 82
Discharge: HOME | End: 2022-08-10
Attending: INTERNAL MEDICINE
Payer: COMMERCIAL

## 2022-08-10 VITALS — HEART RATE: 54 BPM | SYSTOLIC BLOOD PRESSURE: 131 MMHG | DIASTOLIC BLOOD PRESSURE: 76 MMHG

## 2022-08-10 DIAGNOSIS — I20.89 OTHER FORMS OF ANGINA PECTORIS (H): ICD-10-CM

## 2022-08-10 LAB
CREAT BLD-MCNC: 0.8 MG/DL (ref 0.5–1)
GFR SERPL CREATININE-BSD FRML MDRD: >60 ML/MIN/1.73M2

## 2022-08-10 PROCEDURE — 250N000013 HC RX MED GY IP 250 OP 250 PS 637: Performed by: INTERNAL MEDICINE

## 2022-08-10 PROCEDURE — 75574 CT ANGIO HRT W/3D IMAGE: CPT | Mod: 26 | Performed by: INTERNAL MEDICINE

## 2022-08-10 PROCEDURE — 75574 CT ANGIO HRT W/3D IMAGE: CPT

## 2022-08-10 PROCEDURE — 250N000011 HC RX IP 250 OP 636: Performed by: INTERNAL MEDICINE

## 2022-08-10 PROCEDURE — 82565 ASSAY OF CREATININE: CPT

## 2022-08-10 RX ORDER — METHYLPREDNISOLONE SODIUM SUCCINATE 125 MG/2ML
125 INJECTION, POWDER, LYOPHILIZED, FOR SOLUTION INTRAMUSCULAR; INTRAVENOUS
Status: DISCONTINUED | OUTPATIENT
Start: 2022-08-10 | End: 2022-08-11 | Stop reason: HOSPADM

## 2022-08-10 RX ORDER — IOPAMIDOL 755 MG/ML
110 INJECTION, SOLUTION INTRAVASCULAR ONCE
Status: COMPLETED | OUTPATIENT
Start: 2022-08-10 | End: 2022-08-10

## 2022-08-10 RX ORDER — IVABRADINE 5 MG/1
5-15 TABLET, FILM COATED ORAL
Status: DISCONTINUED | OUTPATIENT
Start: 2022-08-10 | End: 2022-08-11 | Stop reason: HOSPADM

## 2022-08-10 RX ORDER — NITROGLYCERIN 0.4 MG/1
0.4 TABLET SUBLINGUAL
Status: DISCONTINUED | OUTPATIENT
Start: 2022-08-10 | End: 2022-08-11 | Stop reason: HOSPADM

## 2022-08-10 RX ORDER — ACYCLOVIR 200 MG/1
0-1 CAPSULE ORAL
Status: DISCONTINUED | OUTPATIENT
Start: 2022-08-10 | End: 2022-08-11 | Stop reason: HOSPADM

## 2022-08-10 RX ORDER — DIPHENHYDRAMINE HCL 25 MG
25 CAPSULE ORAL
Status: DISCONTINUED | OUTPATIENT
Start: 2022-08-10 | End: 2022-08-11 | Stop reason: HOSPADM

## 2022-08-10 RX ORDER — DILTIAZEM HYDROCHLORIDE 5 MG/ML
10-15 INJECTION INTRAVENOUS
Status: DISCONTINUED | OUTPATIENT
Start: 2022-08-10 | End: 2022-08-11 | Stop reason: HOSPADM

## 2022-08-10 RX ORDER — DIPHENHYDRAMINE HYDROCHLORIDE 50 MG/ML
25-50 INJECTION INTRAMUSCULAR; INTRAVENOUS
Status: DISCONTINUED | OUTPATIENT
Start: 2022-08-10 | End: 2022-08-11 | Stop reason: HOSPADM

## 2022-08-10 RX ORDER — DILTIAZEM HCL 60 MG
120 TABLET ORAL
Status: DISCONTINUED | OUTPATIENT
Start: 2022-08-10 | End: 2022-08-11 | Stop reason: HOSPADM

## 2022-08-10 RX ORDER — METOPROLOL TARTRATE 25 MG/1
25-100 TABLET, FILM COATED ORAL
Status: DISCONTINUED | OUTPATIENT
Start: 2022-08-10 | End: 2022-08-11 | Stop reason: HOSPADM

## 2022-08-10 RX ORDER — ONDANSETRON 2 MG/ML
4 INJECTION INTRAMUSCULAR; INTRAVENOUS
Status: DISCONTINUED | OUTPATIENT
Start: 2022-08-10 | End: 2022-08-11 | Stop reason: HOSPADM

## 2022-08-10 RX ORDER — METOPROLOL TARTRATE 1 MG/ML
5-15 INJECTION, SOLUTION INTRAVENOUS
Status: DISCONTINUED | OUTPATIENT
Start: 2022-08-10 | End: 2022-08-11 | Stop reason: HOSPADM

## 2022-08-10 RX ADMIN — NITROGLYCERIN 0.4 MG: 0.4 TABLET SUBLINGUAL at 13:51

## 2022-08-10 RX ADMIN — IOPAMIDOL 110 ML: 755 INJECTION, SOLUTION INTRAVENOUS at 14:09

## 2022-08-10 NOTE — TELEPHONE ENCOUNTER
Debra called to update her care team that she finally got the approval from her insurance company for all parts to the CTA coronary artery angiogram imaging appointment. She will be coming to this appointment today at 1:00. Thank you!

## 2022-08-10 NOTE — TELEPHONE ENCOUNTER
Spoke to patient to relay that Dr. Zimmerman said to hold the CT since it was not covered by insurance. Patient stated that she received a message/call from insurance that is was covered. Pt was also talking to Abimbola Pizano and they were not sure if it was covered or not per pt.     Advised patient to call her insurance company to see if it was actually covered or not. If it is not, pt should wait to discuss it with Dr. Zimmerman at her next appointment. Danitza Erickson LPN 8/10/2022 9:03 AM

## 2022-08-10 NOTE — TELEPHONE ENCOUNTER
[facility] Call Center    Phone Message    May a detailed message be left on voicemail: yes     Reason for Call: Other: Patient wants a call regarding the testing that she was supposed to have that the insurance denied. Please call to discuss further.  She has the appt today and needs to talk to someone today before the appt.  Action Taken: Message routed to:  [facility]    Travel Screening: Not Applicable

## 2022-08-11 DIAGNOSIS — E78.5 HYPERLIPIDEMIA WITH TARGET LOW DENSITY LIPOPROTEIN (LDL) CHOLESTEROL LESS THAN 70 MG/DL: Primary | ICD-10-CM

## 2022-08-11 DIAGNOSIS — R00.2 PALPITATIONS: ICD-10-CM

## 2022-08-11 DIAGNOSIS — R07.89 ATYPICAL CHEST PAIN: ICD-10-CM

## 2022-08-11 DIAGNOSIS — I20.89 OTHER FORMS OF ANGINA PECTORIS (H): ICD-10-CM

## 2022-08-11 DIAGNOSIS — I10 HTN (HYPERTENSION): ICD-10-CM

## 2022-08-11 RX ORDER — ASPIRIN 81 MG/1
243 TABLET, CHEWABLE ORAL ONCE
Status: CANCELLED | OUTPATIENT
Start: 2022-08-11

## 2022-08-11 RX ORDER — ASPIRIN 325 MG
325 TABLET ORAL ONCE
Status: CANCELLED | OUTPATIENT
Start: 2022-08-11 | End: 2022-08-11

## 2022-08-11 RX ORDER — POTASSIUM CHLORIDE 1500 MG/1
40 TABLET, EXTENDED RELEASE ORAL
Status: CANCELLED | OUTPATIENT
Start: 2022-08-11

## 2022-08-11 RX ORDER — LIDOCAINE 40 MG/G
CREAM TOPICAL
Status: CANCELLED | OUTPATIENT
Start: 2022-08-11

## 2022-08-11 RX ORDER — SODIUM CHLORIDE 9 MG/ML
INJECTION, SOLUTION INTRAVENOUS CONTINUOUS
Status: CANCELLED | OUTPATIENT
Start: 2022-08-11

## 2022-08-11 RX ORDER — POTASSIUM CHLORIDE 1500 MG/1
20 TABLET, EXTENDED RELEASE ORAL
Status: CANCELLED | OUTPATIENT
Start: 2022-08-11

## 2022-08-11 NOTE — TELEPHONE ENCOUNTER
You have been scheduled for a coronary angiogram on Tuesday, August 30  Please arrive at the [facility] Waiting Room at 11:30 am.   [facility], [place]  [address] [place] [address]     You will need to take an at home COVID test 1-2 days prior to the procedure and bring a picture of the results to the testing that day.     Due to COVID only one visitor is allowed per patient.        Please do not eat or drink anything after midnight. You should take all your morning medications as prescribed with sips of water.   Please take a baby aspirin (81 mg) the day before and the day of the test.    When you arrive you will be escorted back to the pre procedure area called 2A. Here they will insert an IV, draw labs, and obtain a short medical history. Please bring an updated list of your current medications.    A physician will come and talk with you about the procedure and obtain consent.    A nurse from the Cardiac Catheterization Lab will then escort you to the procedure area. You will be receiving sedation during the procedure so you will need someone to drive you to and from the hospital.    After the procedure you will recover for a short period (2 - 6 hours) on 6B. You will be discharged with instructions for post procedure care.  However, depending on what the angiogram shows you may have to have stents placed and this might require an overnight stay. We ask that you bring a small bag of necessities for your comfort if you would need to stay overnight. DO NOT BRING ANY VALUABLES!

## 2022-08-16 ENCOUNTER — TELEPHONE (OUTPATIENT)
Dept: CARDIOLOGY | Facility: CLINIC | Age: 82
End: 2022-08-16

## 2022-08-16 NOTE — TELEPHONE ENCOUNTER
[facility] Call Center    Phone Message    May a detailed message be left on voicemail: no     Reason for Call: Other: Aguilar called to speak with Мария Flores RN, please reach back out to him at [phone].     Action Taken: Message routed to:  [facility] Cardiology    Travel Screening: Not Applicable

## 2022-08-16 NOTE — TELEPHONE ENCOUNTER
Called Aguilar back. Pt's spouse Aguilar is concerned about waiting for his wife to get her angiogram for two weeks. Explained that this is first available at this time. Explained that if patient has CP episodes, to of course seek medical attention sooner. Also told Aguilar that if there was an opening earlier, will keep Debra in mind. Aguilar was appreciative of call back.

## 2022-08-22 ENCOUNTER — VIRTUAL VISIT (OUTPATIENT)
Dept: ENDOCRINOLOGY | Facility: CLINIC | Age: 82
End: 2022-08-22
Payer: COMMERCIAL

## 2022-08-22 VITALS — HEIGHT: 65 IN | WEIGHT: 137 LBS | BODY MASS INDEX: 22.82 KG/M2

## 2022-08-22 DIAGNOSIS — Z87.39 HISTORY OF POLYMYALGIA RHEUMATICA: ICD-10-CM

## 2022-08-22 DIAGNOSIS — M85.89 OSTEOPENIA OF MULTIPLE SITES: Primary | ICD-10-CM

## 2022-08-22 PROCEDURE — 99204 OFFICE O/P NEW MOD 45 MIN: CPT | Mod: 95 | Performed by: INTERNAL MEDICINE

## 2022-08-22 ASSESSMENT — PAIN SCALES - GENERAL: PAINLEVEL: NO PAIN (0)

## 2022-08-22 NOTE — LETTER
"    8/22/2022         RE: Debra Denise  [id number]  [place] [address]        Dear Colleague,    Thank you for referring your patient, Debra Denise, to the [facility]. Please see a copy of my visit note below.    Patient is being evaluated via a billable video visit.      How would you like to obtain your AVS? Reviewed verbally  If the video visit is dropped, the invitation should be resent by cell phone  Will anyone else be joining your video visit? no      Video Start Time:  2:05 pm    Video-Visit Details    Type of service:  Video Visit    Video End Time:  2:26 pm    Originating Location (pt. Location): home    Distant Location (provider location):  [facility]/home    Platform used for Video Visit:  NewRiver        Name: Debra Denise is a 81 year old woman, seen at the request of Dr. Wolfgang Zimmerman for evaluation of     Chief Complaint   Patient presents with     New Patient     Osteopenia       HPI:  Recent issues:  Here for evaluation of osteopenia  Reviewed medical history from patient and Epic chart record        ~2012. History of polymyalgia rheumatic (PMR)   Rheumatology evaluations with Dr. Sulema Damian/EDDIE in [place]  PMR treatment with Prednisone then dose taper  Routine lab testing and rheum evaluations since that time  Patient recalls comment from rheumatologist about osteopenia treatment, possible use of \"infusions\" treatment  Details of the rheumatology evaluation    She recalls having several previous DEXA scans in the past, specific details not available  7/19/21 DEXA:   L1-4   T-score: -1.1   Right fem neck: T-score: -2.1   Left fem neck:  T-score: -2.3    Previous [facility] labs include:     Lab Test 02/11/22  0940 08/12/21  1246 01/28/21  1133 09/14/20  0839 03/25/20  1101 01/29/20  1108 05/14/19  1518 11/06/18  1655   PRASHANTH 9.0 9.4 9.2 9.7 9.2 9.1   < > 8.7   VITDT 57  --  54  --   " --  52  --  59    < > = values in this interval not displayed.     Osteoporosis med use:   Fosamax  Treatment for a few years, nearly 20 years ago (per patient)    Health history includes:  Menopause:   Total hysterectomy age 47   Bone fractures:  none  Vit D deficiency:  Yes, several years ago    Kidney stones:  Yes, also had lithotripsy  Steroid med use:  Yes, with PMR several years ago  Supplements:    MVI daily     Dairy intake:   None, lactose intolerant    FamHx osteoporosis:  None known    Recent [facility] labs include:  Lab Results   Component Value Date     2022    POTASSIUM 3.7 2022    CHLORIDE 108 2022    CO2 31 2022    ANIONGAP 1 (L) 2022    GLC 90 2022    BUN 15 2022    CR 0.8 08/10/2022    GFRESTIMATED >60 08/10/2022    GFRESTBLACK 85 2021    PRASHANTH 9.0 2022    TSH 0.66 2021    VITDT 57 2022         Lives in [place], [place]  Sees Dr. Wolfgang Adame/[facility] for general medicine evaluations.  Also sees Dr. Sulema Damian/Rheumatology    PMH/PSH:  Past Medical History:   Diagnosis Date     Dupuytren contracture     finger     Hypertension      Osteopenia      Polymyalgia rheumatica (H)      Proctitis      Past Surgical History:   Procedure Laterality Date     COLONOSCOPY  2014    Procedure: COMBINED COLONOSCOPY, SINGLE BIOPSY/POLYPECTOMY BY BIOPSY;  COLONOSCOPY;  Surgeon: Carol Ann Plasencia MD;  Location:  GI     ENT SURGERY      tonsilectomy, adenoidectomy     GYN SURGERY  ,     x 2     ORTHOPEDIC SURGERY  2004    (R) shoulder surgery for frozen shoulder     ZAC BSO      for fibroids       Family Hx:  Family History   Problem Relation Age of Onset     Cancer - colorectal Father      Lung Cancer Father      Macular Degeneration Father      Multiple myeloma Brother      Pacemaker Brother      Hypertension Mother      Cerebrovascular Disease Mother         temporal arteritis     Suicide Sister       Hypertension Maternal Grandmother      Cerebrovascular Disease Maternal Grandmother          Social Hx:  Social History     Socioeconomic History     Marital status:      Spouse name: Not on file     Number of children: Not on file     Years of education: Not on file     Highest education level: Not on file   Occupational History     Not on file   Tobacco Use     Smoking status: Never Smoker     Smokeless tobacco: Never Used   Substance and Sexual Activity     Alcohol use: No     Drug use: No     Sexual activity: Not Currently   Other Topics Concern     Parent/sibling w/ CABG, MI or angioplasty before 65F 55M? Not Asked   Social History Narrative     Not on file     Social Determinants of Health     Financial Resource Strain: Not on file   Food Insecurity: Not on file   Transportation Needs: Not on file   Physical Activity: Not on file   Stress: Not on file   Social Connections: Not on file   Intimate Partner Violence: Not on file   Housing Stability: Not on file          MEDICATIONS:  has a current medication list which includes the following prescription(s): cholecalciferol, gabapentin, mesalamine, rowasa, nutritional supplements, probiotic product, valsartan-hydrochlorothiazide, biotin, fish oil-omega-3 fatty acids, and turmeric.    ROS:     ROS: 10 point ROS neg other than the symptoms noted above in the HPI.    GENERAL: some fatigue, wt stable; denies fevers, chills, night sweats.    HEENT: no dysphagia, odonophagia, diplopia, neck pain  THYROID:  no apparent hyper or hypothyroid symptoms  CV: no chest pain, pressure, palpitations  LUNGS: no SOB, BUTTERFIELD, cough, wheezing   ABDOMEN: upset stomach, mid abdomen pains, blood in BM's; denies constipation  EXTREMITIES: no rashes, ulcers, edema  NEUROLOGY: no headaches, denies changes in vision, tingling, extremitiy numbness   MSK: back pains; denies muscle weakness  SKIN: no rashes or lesions  : no menses following hysterectomy  PSYCH:  stable mood, no  significant anxiety or depression  ENDOCRINE: no heat or cold intolerance    Physical Exam (visual exam)  VS:  no vital signs taken for video visit  CONSTITUTIONAL: healthy, alert and NAD, well dressed, answering questions appropriately  ENT: no nose swelling or nasal discharge, mouth redness or gum changes.  EYES: eyes grossly normal to inspection, conjunctivae and sclerae normal, no exophthalmos or proptosis  THYROID:  no apparent nodules or goiter  LUNGS: no audible wheeze, cough or visible cyanosis, no visible retractions or increased work of breathing  ABDOMEN: abdomen not evaluated  EXTREMITIES: no hand tremors, limited exam  NEUROLOGY: CN grossly intact, mentation intact and speech normal   SKIN:  no apparent skin lesions, rash, or edema with visualized skin appearance  PSYCH: mentation appears normal, affect normal/bright, judgement and insight intact,   normal speech and appearance well groomed      LABS:    All pertinent notes, labs, and images personally reviewed by me.     A/P:  Encounter Diagnoses   Name Primary?     Osteopenia of multiple sites Yes     History of polymyalgia rheumatica        Comments:  Reviewed health history and osteopenia issues.  Difficult to assess chronologic history of BMD without more DEXA report information    Plan:  Discussed general issues with the osteoporosis diagnosis and management  Reviewed concept of using the DEXA scan imaging to assess bone mineral density (BMD)  Discussed terminology of the T-score   We discussed lab tests to assess for secondary causes of bone thinning  Reviewed osteopenia medication treatment options including oral and IV bisphosphonate medications (IV Boniva, IV Reclast)    Recommend:  Reasonable to consider medication treatment for osteopenia  Since previous DEXA scan monitoring and infusion treatment comment by rheumatologist, I suggest she meet with rheumatologist for further discussion   If consideration if IV Boniva or IV Reclast, no  significant concerns of intestinal side effects, should not affect proctitis  No lab tests ordered at this time  Continue calcium and vitamin D supplement use  Avoid heavy lifting and fall injuries  Consider a repeat DEXA scan in 7/2023 for comparison    Advised more focussed evaluation in the proctitis and GI symptoms, results of prior colonoscopy imaging  Addressed patient questions today    There are no Patient Instructions on file for this visit.    Future labs ordered today: No orders of the defined types were placed in this encounter.    Radiology/Consults ordered today: None    Total time spent in with the patient evaluation:  21 min  Additional time spent reviewing pertinent lab tests and chart notes, and documentation:  15 min    Follow-up:  larisa Simon MD, MS  Endocrinology  [facility]    CC: Wolfgang Zimmerman       Again, thank you for allowing me to participate in the care of your patient.        Sincerely,        Flaquito Simon MD

## 2022-08-22 NOTE — PROGRESS NOTES
"Patient is being evaluated via a billable video visit.      How would you like to obtain your AVS? Reviewed verbally  If the video visit is dropped, the invitation should be resent by cell phone  Will anyone else be joining your video visit? no      Video Start Time:  2:05 pm    Video-Visit Details    Type of service:  Video Visit    Video End Time:  2:26 pm    Originating Location (pt. Location): [place]    Distant Location (provider location):  [facility]    Platform used for Video Visit:  Audiosocket        Name: Debra Denise is a 81 year old woman, seen at the request of Dr. Wolfgang Zimmerman for evaluation of     Chief Complaint   Patient presents with     New Patient     Osteopenia       HPI:  Recent issues:  Here for evaluation of osteopenia  Reviewed medical history from patient and Epic chart record        ~2012. History of polymyalgia rheumatic (PMR)   Rheumatology evaluations with Dr. Sulema Damian/EDDIE in [place]  PMR treatment with Prednisone then dose taper  Routine lab testing and rheum evaluations since that time  Patient recalls comment from rheumatologist about osteopenia treatment, possible use of \"infusions\" treatment  Details of the rheumatology evaluation    She recalls having several previous DEXA scans in the past, specific details not available  7/19/21 DEXA:   L1-4   T-score: -1.1   Right fem neck: T-score: -2.1   Left fem neck:  T-score: -2.3    Previous [facility] labs include:     Lab Test 02/11/22  0940 08/12/21  1246 01/28/21  1133 09/14/20  0839 03/25/20  1101 01/29/20  1108 05/14/19  1518 11/06/18  1655   PRASHANTH 9.0 9.4 9.2 9.7 9.2 9.1   < > 8.7   VITDT 57  --  54  --   --  52  --  59    < > = values in this interval not displayed.     Osteoporosis med use:   Fosamax  Treatment for a few years, nearly 20 years ago (per patient)    Health history includes:  Menopause:   Total hysterectomy age 47   Bone fractures:  none  Vit D deficiency:  Yes, several " years ago    Kidney stones:  Yes, also had lithotripsy  Steroid med use:  Yes, with PMR several years ago  Supplements:    MVI daily     Dairy intake:   None, lactose intolerant    FamHx osteoporosis:  None known    Recent [facility] labs include:  Lab Results   Component Value Date     2022    POTASSIUM 3.7 2022    CHLORIDE 108 2022    CO2 31 2022    ANIONGAP 1 (L) 2022    GLC 90 2022    BUN 15 2022    CR 0.8 08/10/2022    GFRESTIMATED >60 08/10/2022    GFRESTBLACK 85 2021    PRASHANTH 9.0 2022    TSH 0.66 2021    VITDT 57 2022         Lives in [place], [place]  Sees Dr. Wolfgang Adame/[facility] for general medicine evaluations.  Also sees Dr. Sulema Damian/Rheumatology    PMH/PSH:  Past Medical History:   Diagnosis Date     Dupuytren contracture     finger     Hypertension      Osteopenia      Polymyalgia rheumatica (H)      Proctitis      Past Surgical History:   Procedure Laterality Date     COLONOSCOPY  2014    Procedure: COMBINED COLONOSCOPY, SINGLE BIOPSY/POLYPECTOMY BY BIOPSY;  COLONOSCOPY;  Surgeon: Carol Ann Plasencia MD;  Location:  GI     ENT SURGERY      tonsilectomy, adenoidectomy     GYN SURGERY  ,     x 2     ORTHOPEDIC SURGERY  2004    (R) shoulder surgery for frozen shoulder     ZAC BSO      for fibroids       Family Hx:  Family History   Problem Relation Age of Onset     Cancer - colorectal Father      Lung Cancer Father      Macular Degeneration Father      Multiple myeloma Brother      Pacemaker Brother      Hypertension Mother      Cerebrovascular Disease Mother         temporal arteritis     Suicide Sister      Hypertension Maternal Grandmother      Cerebrovascular Disease Maternal Grandmother          Social Hx:  Social History     Socioeconomic History     Marital status:      Spouse name: Not on file     Number of children: Not on file     Years of education: Not on file     Highest  education level: Not on file   Occupational History     Not on file   Tobacco Use     Smoking status: Never Smoker     Smokeless tobacco: Never Used   Substance and Sexual Activity     Alcohol use: No     Drug use: No     Sexual activity: Not Currently   Other Topics Concern     Parent/sibling w/ CABG, MI or angioplasty before 65F 55M? Not Asked   Social History Narrative     Not on file     Social Determinants of Health     Financial Resource Strain: Not on file   Food Insecurity: Not on file   Transportation Needs: Not on file   Physical Activity: Not on file   Stress: Not on file   Social Connections: Not on file   Intimate Partner Violence: Not on file   Housing Stability: Not on file          MEDICATIONS:  has a current medication list which includes the following prescription(s): cholecalciferol, gabapentin, mesalamine, rowasa, nutritional supplements, probiotic product, valsartan-hydrochlorothiazide, biotin, fish oil-omega-3 fatty acids, and turmeric.    ROS:     ROS: 10 point ROS neg other than the symptoms noted above in the HPI.    GENERAL: some fatigue, wt stable; denies fevers, chills, night sweats.    HEENT: no dysphagia, odonophagia, diplopia, neck pain  THYROID:  no apparent hyper or hypothyroid symptoms  CV: no chest pain, pressure, palpitations  LUNGS: no SOB, BUTTERFIELD, cough, wheezing   ABDOMEN: upset stomach, mid abdomen pains, blood in BM's; denies constipation  EXTREMITIES: no rashes, ulcers, edema  NEUROLOGY: no headaches, denies changes in vision, tingling, extremitiy numbness   MSK: back pains; denies muscle weakness  SKIN: no rashes or lesions  : no menses following hysterectomy  PSYCH:  stable mood, no significant anxiety or depression  ENDOCRINE: no heat or cold intolerance    Physical Exam (visual exam)  VS:  no vital signs taken for video visit  CONSTITUTIONAL: healthy, alert and NAD, well dressed, answering questions appropriately  ENT: no nose swelling or nasal discharge, mouth redness or  gum changes.  EYES: eyes grossly normal to inspection, conjunctivae and sclerae normal, no exophthalmos or proptosis  THYROID:  no apparent nodules or goiter  LUNGS: no audible wheeze, cough or visible cyanosis, no visible retractions or increased work of breathing  ABDOMEN: abdomen not evaluated  EXTREMITIES: no hand tremors, limited exam  NEUROLOGY: CN grossly intact, mentation intact and speech normal   SKIN:  no apparent skin lesions, rash, or edema with visualized skin appearance  PSYCH: mentation appears normal, affect normal/bright, judgement and insight intact,   normal speech and appearance well groomed      LABS:    All pertinent notes, labs, and images personally reviewed by me.     A/P:  Encounter Diagnoses   Name Primary?     Osteopenia of multiple sites Yes     History of polymyalgia rheumatica        Comments:  Reviewed health history and osteopenia issues.  Difficult to assess chronologic history of BMD without more DEXA report information    Plan:  Discussed general issues with the osteoporosis diagnosis and management  Reviewed concept of using the DEXA scan imaging to assess bone mineral density (BMD)  Discussed terminology of the T-score   We discussed lab tests to assess for secondary causes of bone thinning  Reviewed osteopenia medication treatment options including oral and IV bisphosphonate medications (IV Boniva, IV Reclast)    Recommend:  Reasonable to consider medication treatment for osteopenia  Since previous DEXA scan monitoring and infusion treatment comment by rheumatologist, I suggest she meet with rheumatologist for further discussion   If consideration if IV Boniva or IV Reclast, no significant concerns of intestinal side effects, should not affect proctitis  No lab tests ordered at this time  Continue calcium and vitamin D supplement use  Avoid heavy lifting and fall injuries  Consider a repeat DEXA scan in 7/2023 for comparison    Advised more focussed evaluation in the proctitis  and GI symptoms, results of prior colonoscopy imaging  Addressed patient questions today    There are no Patient Instructions on file for this visit.    Future labs ordered today: No orders of the defined types were placed in this encounter.    Radiology/Consults ordered today: None    Total time spent in with the patient evaluation:  21 min  Additional time spent reviewing pertinent lab tests and chart notes, and documentation:  15 min    Follow-up:  larisa Simon MD, MS  [facility]    CC: Wolfgang Zimmerman

## 2022-08-22 NOTE — NURSING NOTE
"Chief Complaint   Patient presents with     New Patient     Osteopenia       Vitals:    08/22/22 1346   Weight: 62.1 kg (137 lb)   Height: 1.651 m (5' 5\")       Body mass index is 22.8 kg/m .    Cindy Cabral, ICVF    "

## 2022-08-23 ENCOUNTER — OFFICE VISIT (OUTPATIENT)
Dept: INTERNAL MEDICINE | Facility: CLINIC | Age: 82
End: 2022-08-23
Payer: COMMERCIAL

## 2022-08-23 ENCOUNTER — TRANSFERRED RECORDS (OUTPATIENT)
Dept: INTERNAL MEDICINE | Facility: CLINIC | Age: 82
End: 2022-08-23

## 2022-08-23 VITALS
BODY MASS INDEX: 23.22 KG/M2 | DIASTOLIC BLOOD PRESSURE: 62 MMHG | WEIGHT: 139.4 LBS | HEART RATE: 54 BPM | OXYGEN SATURATION: 99 % | SYSTOLIC BLOOD PRESSURE: 128 MMHG | RESPIRATION RATE: 16 BRPM | HEIGHT: 65 IN

## 2022-08-23 DIAGNOSIS — K62.5 RECTAL BLEEDING: Primary | ICD-10-CM

## 2022-08-23 PROCEDURE — 99214 OFFICE O/P EST MOD 30 MIN: CPT | Performed by: INTERNAL MEDICINE

## 2022-08-23 NOTE — PROGRESS NOTES
HPI:    Ms. Denise comes in for follow up today. Overall stable. She states less palpitations. She has atypical chest pain when she gets anxious. This has been getting worse over the last several months. She has continued rectal bright red bleeding. No significant abdominal complaints. No other HEENT, cardiopulmonary, abdominal, , GYN, neurological, systemic, psychiatric, lymphatic, endocrine, vascular complaints.     Past Medical History:   Diagnosis Date     Dupuytren contracture     finger     Hypertension      Osteopenia      Polymyalgia rheumatica (H)      Proctitis      Past Surgical History:   Procedure Laterality Date     COLONOSCOPY  2014    Procedure: COMBINED COLONOSCOPY, SINGLE BIOPSY/POLYPECTOMY BY BIOPSY;  COLONOSCOPY;  Surgeon: Carol Ann Plasencia MD;  Location:  GI     ENT SURGERY      tonsilectomy, adenoidectomy     GYN SURGERY  ,     x 2     ORTHOPEDIC SURGERY  2004    (R) shoulder surgery for frozen shoulder     ZAC BSO      for fibroids     PE:    Vitals noted, gen, nad, cooperative, alert, neck supple nl rom, lungs with good air movement, RRR, S1, S2, no MRG, abdomen, no acute findings. Grossly normal neurological exam.     A/P:    1. Immunizations; COVID Pfizer vaccine x 4. She has completed the Shingrix vaccine series. Pneumococcal 23 done 10/18/2011. Prevnar 13 done 2016. Td 2018  2. 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 She was seen by Ms. Pang, GI 8/10/2021 and 2022.  Flex Sig done 10/4/2021. Calprotectin Feces elevated at 93.9  3. Chronic pain on night time Gabapentin; seen Dr. Pimentel, orthopedics 2021 and Dr. Neumann 2021 Mr. eNumann. Placed PMR referral for her back and hip complaints. She cancelled her  PMR appt. With Dr. Corona 2022   4. HTN; on Valsartan/HcTz; Electrolytes and Creatine checked 2022  5. Mammogram; 2021 in Care Everywhere  6. Lipids; 2022; HDL 47 and LDL 63.  7.  Dermatology; scanned in outside [facility] 2/17/2022  8. Vitamin D normal at 57 on 2/11/2022. DEXA scan in chart from 7/19/2021 and most negative T score -2.3. Placed endocrinology referral today. She saw Dr. Simon endocrinology yesterday,  8/22/2022  9. Outside Rheumatology for PMR, Dr. Damian 11/14/2021. She is not on prednisone currently   10. Palpitations; she has Ziopatch placement today 7/22/2022. Normal dobutamine stress echo 10/11/2021. CXR 9/23/2021 normal For atypical CP, CTA coronary angiogram done 8/10/2022 with possible severe mid LAD stenosis. She is scheduled for coronary angiogram on 8/30/2022. Ziopatch monitor 7/22/2022 w/o significant findings.   11. Placed colorectal referral to assess rectal outlet bleeding unrelated to her inflammatory bowel disease (hemmorriods?).             30 minutes spent on the date of the encounter doing chart review, history and exam, documentation and further activities as noted above exclusive of procedures and other billable interpretations

## 2022-08-23 NOTE — NURSING NOTE
"Debra Denise is a 81 year old female patient that presents today in clinic for the following:    Chief Complaint   Patient presents with     Follow Up     One month follow-up     Results     Cardiac monitor, CTA     The patient's allergies and medications were reviewed as noted. A set of vitals were recorded as noted without incident: /62 (BP Location: Right arm, Patient Position: Sitting, Cuff Size: Adult Regular)   Pulse 54   Resp 16   Ht 1.64 m (5' 4.57\")   Wt 63.2 kg (139 lb 6.4 oz)   SpO2 99%   Breastfeeding No   BMI 23.51 kg/m  . The patient does not have any other questions for the provider.    Prince Ndiaye, EMT at 1:14 PM on 8/23/2022  "

## 2022-08-29 ENCOUNTER — TELEPHONE (OUTPATIENT)
Dept: CARDIOLOGY | Facility: CLINIC | Age: 82
End: 2022-08-29

## 2022-08-29 NOTE — TELEPHONE ENCOUNTER
Left voicemail for patient to complete Travel Screen for Cardiac Cath Lab appointment on 8/30 and inform patient of updated Visitor Policy.     Informed pt on message of need for covid test 1-2 days prior and to take picture of results.

## 2022-08-30 ENCOUNTER — APPOINTMENT (OUTPATIENT)
Dept: LAB | Facility: CLINIC | Age: 82
End: 2022-08-30
Attending: INTERNAL MEDICINE
Payer: COMMERCIAL

## 2022-08-30 ENCOUNTER — APPOINTMENT (OUTPATIENT)
Dept: MEDSURG UNIT | Facility: CLINIC | Age: 82
End: 2022-08-30
Attending: INTERNAL MEDICINE
Payer: COMMERCIAL

## 2022-08-30 ENCOUNTER — HOSPITAL ENCOUNTER (OUTPATIENT)
Facility: CLINIC | Age: 82
Discharge: HOME | End: 2022-08-30
Attending: INTERNAL MEDICINE | Admitting: INTERNAL MEDICINE
Payer: COMMERCIAL

## 2022-08-30 VITALS
DIASTOLIC BLOOD PRESSURE: 79 MMHG | BODY MASS INDEX: 22.73 KG/M2 | HEART RATE: 52 BPM | OXYGEN SATURATION: 97 % | WEIGHT: 136.4 LBS | TEMPERATURE: 97.6 F | SYSTOLIC BLOOD PRESSURE: 157 MMHG | HEIGHT: 65 IN | RESPIRATION RATE: 18 BRPM

## 2022-08-30 DIAGNOSIS — E78.5 HYPERLIPIDEMIA WITH TARGET LOW DENSITY LIPOPROTEIN (LDL) CHOLESTEROL LESS THAN 70 MG/DL: ICD-10-CM

## 2022-08-30 DIAGNOSIS — R00.2 PALPITATIONS: ICD-10-CM

## 2022-08-30 DIAGNOSIS — I20.89 OTHER FORMS OF ANGINA PECTORIS (H): ICD-10-CM

## 2022-08-30 DIAGNOSIS — R07.89 ATYPICAL CHEST PAIN: ICD-10-CM

## 2022-08-30 DIAGNOSIS — I10 HTN (HYPERTENSION): ICD-10-CM

## 2022-08-30 PROBLEM — Z98.890 STATUS POST CORONARY ANGIOGRAM: Status: ACTIVE | Noted: 2022-08-30

## 2022-08-30 LAB
ANION GAP SERPL CALCULATED.3IONS-SCNC: 10 MMOL/L (ref 7–15)
APTT PPP: 32 SECONDS (ref 22–38)
BUN SERPL-MCNC: 15.5 MG/DL (ref 8–23)
CALCIUM SERPL-MCNC: 9.5 MG/DL (ref 8.8–10.2)
CHLORIDE SERPL-SCNC: 102 MMOL/L (ref 98–107)
CREAT SERPL-MCNC: 0.82 MG/DL (ref 0.51–0.95)
DEPRECATED HCO3 PLAS-SCNC: 29 MMOL/L (ref 22–29)
ERYTHROCYTE [DISTWIDTH] IN BLOOD BY AUTOMATED COUNT: 13.3 % (ref 10–15)
GFR SERPL CREATININE-BSD FRML MDRD: 71 ML/MIN/1.73M2
GLUCOSE SERPL-MCNC: 95 MG/DL (ref 70–99)
HCG INTACT+B SERPL-ACNC: 1 MIU/ML
HCT VFR BLD AUTO: 43.9 % (ref 35–47)
HGB BLD-MCNC: 14.4 G/DL (ref 11.7–15.7)
INR PPP: 1.18 (ref 0.85–1.15)
MCH RBC QN AUTO: 29.7 PG (ref 26.5–33)
MCHC RBC AUTO-ENTMCNC: 32.8 G/DL (ref 31.5–36.5)
MCV RBC AUTO: 91 FL (ref 78–100)
PLATELET # BLD AUTO: 435 10E3/UL (ref 150–450)
POTASSIUM SERPL-SCNC: 3.8 MMOL/L (ref 3.4–5.3)
RBC # BLD AUTO: 4.85 10E6/UL (ref 3.8–5.2)
SODIUM SERPL-SCNC: 141 MMOL/L (ref 136–145)
WBC # BLD AUTO: 10.6 10E3/UL (ref 4–11)

## 2022-08-30 PROCEDURE — 250N000011 HC RX IP 250 OP 636: Performed by: INTERNAL MEDICINE

## 2022-08-30 PROCEDURE — 93454 CORONARY ARTERY ANGIO S&I: CPT | Performed by: INTERNAL MEDICINE

## 2022-08-30 PROCEDURE — 272N000001 HC OR GENERAL SUPPLY STERILE: Performed by: INTERNAL MEDICINE

## 2022-08-30 PROCEDURE — 85610 PROTHROMBIN TIME: CPT | Performed by: INTERNAL MEDICINE

## 2022-08-30 PROCEDURE — 36415 COLL VENOUS BLD VENIPUNCTURE: CPT | Performed by: INTERNAL MEDICINE

## 2022-08-30 PROCEDURE — 93010 ELECTROCARDIOGRAM REPORT: CPT | Performed by: INTERNAL MEDICINE

## 2022-08-30 PROCEDURE — C1887 CATHETER, GUIDING: HCPCS | Performed by: INTERNAL MEDICINE

## 2022-08-30 PROCEDURE — 99152 MOD SED SAME PHYS/QHP 5/>YRS: CPT | Performed by: INTERNAL MEDICINE

## 2022-08-30 PROCEDURE — 80048 BASIC METABOLIC PNL TOTAL CA: CPT | Performed by: INTERNAL MEDICINE

## 2022-08-30 PROCEDURE — 93005 ELECTROCARDIOGRAM TRACING: CPT

## 2022-08-30 PROCEDURE — 250N000009 HC RX 250: Performed by: INTERNAL MEDICINE

## 2022-08-30 PROCEDURE — 85014 HEMATOCRIT: CPT | Performed by: INTERNAL MEDICINE

## 2022-08-30 PROCEDURE — 85730 THROMBOPLASTIN TIME PARTIAL: CPT | Performed by: INTERNAL MEDICINE

## 2022-08-30 PROCEDURE — 84702 CHORIONIC GONADOTROPIN TEST: CPT | Performed by: INTERNAL MEDICINE

## 2022-08-30 PROCEDURE — 999N000142 HC STATISTIC PROCEDURE PREP ONLY

## 2022-08-30 PROCEDURE — C1894 INTRO/SHEATH, NON-LASER: HCPCS | Performed by: INTERNAL MEDICINE

## 2022-08-30 PROCEDURE — 999N000134 HC STATISTIC PP CARE STAGE 3

## 2022-08-30 PROCEDURE — 250N000013 HC RX MED GY IP 250 OP 250 PS 637: Performed by: INTERNAL MEDICINE

## 2022-08-30 PROCEDURE — 93454 CORONARY ARTERY ANGIO S&I: CPT | Mod: 26 | Performed by: INTERNAL MEDICINE

## 2022-08-30 PROCEDURE — 999N000054 HC STATISTIC EKG NON-CHARGEABLE

## 2022-08-30 RX ORDER — ASPIRIN 81 MG/1
243 TABLET, CHEWABLE ORAL ONCE
Status: COMPLETED | OUTPATIENT
Start: 2022-08-30 | End: 2022-08-30

## 2022-08-30 RX ORDER — POTASSIUM CHLORIDE 750 MG/1
20 TABLET, EXTENDED RELEASE ORAL
Status: DISCONTINUED | OUTPATIENT
Start: 2022-08-30 | End: 2022-08-30 | Stop reason: HOSPADM

## 2022-08-30 RX ORDER — NITROGLYCERIN 5 MG/ML
VIAL (ML) INTRAVENOUS
Status: DISCONTINUED | OUTPATIENT
Start: 2022-08-30 | End: 2022-08-30 | Stop reason: HOSPADM

## 2022-08-30 RX ORDER — NICARDIPINE HYDROCHLORIDE 2.5 MG/ML
INJECTION INTRAVENOUS
Status: DISCONTINUED | OUTPATIENT
Start: 2022-08-30 | End: 2022-08-30 | Stop reason: HOSPADM

## 2022-08-30 RX ORDER — LIDOCAINE 40 MG/G
CREAM TOPICAL
Status: DISCONTINUED | OUTPATIENT
Start: 2022-08-30 | End: 2022-08-30 | Stop reason: HOSPADM

## 2022-08-30 RX ORDER — IOPAMIDOL 755 MG/ML
INJECTION, SOLUTION INTRAVASCULAR
Status: DISCONTINUED | OUTPATIENT
Start: 2022-08-30 | End: 2022-08-30 | Stop reason: HOSPADM

## 2022-08-30 RX ORDER — ASPIRIN 325 MG
325 TABLET ORAL ONCE
Status: COMPLETED | OUTPATIENT
Start: 2022-08-30 | End: 2022-08-30

## 2022-08-30 RX ORDER — ONDANSETRON 2 MG/ML
INJECTION INTRAMUSCULAR; INTRAVENOUS
Status: DISCONTINUED | OUTPATIENT
Start: 2022-08-30 | End: 2022-08-30 | Stop reason: HOSPADM

## 2022-08-30 RX ORDER — SODIUM CHLORIDE 9 MG/ML
INJECTION, SOLUTION INTRAVENOUS CONTINUOUS
Status: DISCONTINUED | OUTPATIENT
Start: 2022-08-30 | End: 2022-08-30 | Stop reason: HOSPADM

## 2022-08-30 RX ORDER — ACETAMINOPHEN 325 MG/1
650 TABLET ORAL EVERY 4 HOURS PRN
Status: DISCONTINUED | OUTPATIENT
Start: 2022-08-30 | End: 2022-08-30 | Stop reason: HOSPADM

## 2022-08-30 RX ORDER — HEPARIN SODIUM 1000 [USP'U]/ML
INJECTION, SOLUTION INTRAVENOUS; SUBCUTANEOUS
Status: DISCONTINUED | OUTPATIENT
Start: 2022-08-30 | End: 2022-08-30 | Stop reason: HOSPADM

## 2022-08-30 RX ORDER — POTASSIUM CHLORIDE 750 MG/1
40 TABLET, EXTENDED RELEASE ORAL
Status: DISCONTINUED | OUTPATIENT
Start: 2022-08-30 | End: 2022-08-30 | Stop reason: HOSPADM

## 2022-08-30 RX ADMIN — ASPIRIN 81 MG CHEWABLE TABLET 243 MG: 81 TABLET CHEWABLE at 12:29

## 2022-08-30 ASSESSMENT — ACTIVITIES OF DAILY LIVING (ADL)
ADLS_ACUITY_SCORE: 35

## 2022-08-30 NOTE — PROGRESS NOTES
Arrived from home for a CORS.  VSS.  Denies pain.  PIV placed and labs sent.  H&P current.  Needs consent.

## 2022-08-30 NOTE — DISCHARGE INSTRUCTIONS
Going Home after an Angiogram  ______________________________________________    After you go home:  Have an adult stay with you for 24 hours.  Drink plenty of fluids.  You may eat your normal diet, unless your doctor tells you otherwise.  For 24 hours:  Relax and take it easy.  Do NOT smoke.  Do NOT make any important or legal decisions.  Do NOT drive or operate machines at home or at work.  Do NOT drink alcohol.  Remove the Band-Aid after 24 hours. If there is minor oozing, apply another Band-aid and remove it after 12 hours.  For 2 days, do NOT have sex or do any heavy exercise.  Do NOT take a bath, or use a hot tub or pool for at least 3 days. You may shower.    Care of wrist or arm site  It is normal to have soreness at the puncture site and mild tingling in your hand for up to 3 days.  For 2 days, do not use your hand or arm to support your weight (such as rising from a chair) or bend your wrist (such as lifting a garage door).  For 2 days, do not lift more than 5 pounds or exercise your arm (tennis, golf or bowling).    If you start bleeding from the site in your arm:  Sit down and press firmly on the site with your fingers for 10 minutes. Call your doctor as soon as you can.  If the bleeding stops, sit still and keep your wrist straight for 2 hours.    Medicines  If you have started taking Plavix or Effient, do not stop taking it until you talk to your heart doctor (cardiologist).  If you are on metformin (Glucophage), do not restart it until you have blood tests (within 2 to 3 days after discharge). When your doctor tells you it is safe, you may restart the metformin.  If you have stopped any other medicines, check with your nurse or provider about when to restart them.    Call 911 right away if you have bleeding that is heavy or does not stop.    Call your doctor if:  You have a large or growing hard lump around the site.  The site is red, swollen, hot or tender.  Blood or fluid is draining from the  site.  You have chills or a fever greater than 101 F (38 C).  Your leg or arm feels numb or cool.  You have hives, a rash or unusual itching.    [facility] [place]:  [phone] (7 days a week)

## 2022-08-30 NOTE — PROGRESS NOTES
D/I/A: Pt roomed on 3C in [address].  Arrived via litter and accompanied by CCL RN On/Off: Off monitor.  VSSA.  Rhythm upon arrival SR-Sinus bradycardic on monitor.  Denies pain or sob.  Reviewed activity restrictions and when to notify RN, ie-changes to breathing or increased chest pressure or chest pain.  CCL access:  R radial artery with TR band with 12cc air, no bleeding or hematoma.  P: Continue to monitor status.  Discharge to home once meeting criteria.

## 2022-08-31 LAB
ATRIAL RATE - MUSE: 55 BPM
DIASTOLIC BLOOD PRESSURE - MUSE: NORMAL MMHG
INTERPRETATION ECG - MUSE: NORMAL
P AXIS - MUSE: 55 DEGREES
PR INTERVAL - MUSE: 144 MS
QRS DURATION - MUSE: 72 MS
QT - MUSE: 474 MS
QTC - MUSE: 453 MS
R AXIS - MUSE: -19 DEGREES
SYSTOLIC BLOOD PRESSURE - MUSE: NORMAL MMHG
T AXIS - MUSE: 44 DEGREES
VENTRICULAR RATE- MUSE: 55 BPM

## 2022-08-31 NOTE — PROGRESS NOTES
D/I/A:  Patient is tolerating liquids and foods, ambulating, urinating, puncture sites are stable ( no bleeding and no hematoma) and patient has a .  A+O x4 and making needs known.  CCL access sites C/D/I; no bleeding or hematoma; CMS intact.  VSSA.  SR/Sinus bradycardic on monitor.  IV access removed.  Education completed and outlined in AVS or handout: medications reviewed with patient.  Questions answered prior to discharge.  Belongings returned to patient at discharge.    P: Discharged to self care.  Patient to follow up with appts as per discharge instruction.

## 2022-09-01 ENCOUNTER — TELEPHONE (OUTPATIENT)
Dept: CARDIOLOGY | Facility: CLINIC | Age: 82
End: 2022-09-01

## 2022-09-01 NOTE — TELEPHONE ENCOUNTER
S-(situation): patient had coronary angiogram on 8/30 revealing angiographically normal coronary arteries.  Her right wrist is flat and dry but bruised and tender.  Reviewed activity restrictions.      B-(background): An 81 year old woman with no significant cardiac history and a history of atypical chest pain who underwent a coronary CT which showed a CAC of 77, but unfortunately could not be accurately read to evaluate the LAD.    A-(assessment): stable post cath     R-(recommendations): follow up with [facility] and Dr. Zimmerman

## 2022-09-04 DIAGNOSIS — K62.89 PROCTITIS: ICD-10-CM

## 2022-09-06 ENCOUNTER — TELEPHONE (OUTPATIENT)
Dept: CARDIOLOGY | Facility: CLINIC | Age: 82
End: 2022-09-06

## 2022-09-06 NOTE — TELEPHONE ENCOUNTER
[facility] Call Center    Phone Message    May a detailed message be left on voicemail: yes     Reason for Call: Other: Debra called to speak with Deann about still having pain in her right wrist after her angiogram procedure. She is concerned that she maybe shouldn't be having pain anymore. Please give her a call back at [phone] or [phone] to discuss. Thank you!    Action Taken: Other: Cardiology    Travel Screening: Not Applicable

## 2022-09-06 NOTE — TELEPHONE ENCOUNTER
Patient states that she has a small bump on her right  arm very near to the perc site. It is tender to touch, dry, no bigger than a dime. No bruising noted and it has not gotten bigger with time. She had her angiogram on 8/30 and was found to have no obstructive disease. Will continue to monitor. Reassured that this is normal .Recommended icing arm and taking tylenol for pain relief.

## 2022-09-07 NOTE — TELEPHONE ENCOUNTER
Mesalamine Rectal Enema 4 GM  Last Written Prescription Date:  6/1/2022  Last Fill Quantity: 6,   # refills: 3  Last Office Visit :  8/23/2022  Future Office visit:   10/11/2022    Routing refill request to provider for review/approval because:  How many refills would Provider like for this order.   Last order was ordered by [facility].   This time it is ordered by [facility].  Please review and send new order per Providers recommendations for Pt care.       Martha Casanova RN  Central Triage Red Flags/Med Refills

## 2022-09-08 RX ORDER — MESALAMINE 4 G/60ML
1 SUSPENSION RECTAL
Qty: 6 KIT | Refills: 3 | Status: SHIPPED | OUTPATIENT
Start: 2022-09-09 | End: 2023-02-17

## 2022-09-08 RX ORDER — MESALAMINE 4 G/60ML
SUSPENSION RECTAL
Qty: 420 ML | OUTPATIENT
Start: 2022-09-08

## 2022-09-15 DIAGNOSIS — K62.89 PROCTITIS: ICD-10-CM

## 2022-09-19 RX ORDER — MESALAMINE 1000 MG/1
1000 SUPPOSITORY RECTAL AT BEDTIME
Qty: 30 SUPPOSITORY | Refills: 8 | Status: SHIPPED | OUTPATIENT
Start: 2022-09-19 | End: 2023-04-14 | Stop reason: ALTCHOICE

## 2022-10-10 NOTE — PROGRESS NOTES
HPI:    She feels her mesalamine for proctitis causes her nausea and decreased appetite. She was seen about one month ago at [facility] and they recommended colonoscopy, but she has not scheduled yet. She has R wrist/hand pain and weakness since coronary angiogram (R radial artery access). No other HEENT, cardiopulmonary, abdominal, , GYN, neurological, systemic, psychiatric, lymphatic, endocrine complaints.     Past Medical History:   Diagnosis Date     Dupuytren contracture     finger     Hypertension      Osteopenia      Polymyalgia rheumatica (H)      Proctitis      Past Surgical History:   Procedure Laterality Date     COLONOSCOPY  2014    Procedure: COMBINED COLONOSCOPY, SINGLE BIOPSY/POLYPECTOMY BY BIOPSY;  COLONOSCOPY;  Surgeon: Carol Ann Plasencia MD;  Location:  GI     CV CORONARY ANGIOGRAM N/A 2022    Procedure: Coronary Angiogram;  Surgeon: Trevin Hawk MD;  Location:  [facility]ATH LAB     ENT SURGERY      tonsilectomy, adenoidectomy     GYN SURGERY  ,     x 2     ORTHOPEDIC SURGERY  2004    (R) shoulder surgery for frozen shoulder     ZAC BSO      for fibroids     PE:    Vitals noted, gen, nad, cooperative, alert, neck supple nl rom, lungs with good air movement, RRR, S1, S2, no MRG, abdomen, no acute findings. Grossly normal neurological exam. R wrist with palpable R radial pulse, no obvious masses. No tenderness. L palmar hand with possible trigger finger and tendon thickening.     A/P:    1. Immunizations; COVID Pfizer vaccine x 4. She has completed the Shingrix vaccine series. Pneumococcal 23 done 10/18/2011. Prevnar 13 done 2016. Td 2018. Prevnar 20 and influenza vaccine today 10/11/2022  2. 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 She was seen by Ms. Pang, GI 8/10/2021 and 2022.  Flex Sig done 10/4/2021. Calprotectin Feces elevated at 93.9. She was seen outside [facility] and recommended colonoscopy and she will  schedule.   3. Chronic pain on night time Gabapentin; seen Dr. Pimentel, orthopedics 6/8/2021 and Dr. Neumann 4/29/2021 Mr. Neumann. Placed PMR 4/27/2022 referral for her back and hip complaints. She cancelled her  PMR appt. With Dr. Corona 8/18/2022   4. HTN; on Valsartan/HcTz; Electrolytes and Creatine checked 8/30/2022  5. Mammogram; 11/17/2021 in [facility]  6. Lipids; 2/11/2022; HDL 47 and LDL 63.  7. Dermatology; scanned in outside [facility] 2/17/2022  8. Vitamin D normal at 57 on 2/11/2022. DEXA scan in chart from 7/19/2021 and most negative T score -2.3.  She saw Dr. Simon endocrinology   8/22/2022  9. Outside Rheumatology for PMR, Dr. Damian 11/14/2021. She is not on prednisone currently   10. Palpitations; she has Ziopatch placement today 7/22/2022. Normal dobutamine stress echo 10/11/2021. CXR 9/23/2021 normal For atypical CP, CTA coronary angiogram done 8/10/2022 with possible severe mid LAD stenosis. Coronary angiogram on 8/30/2022 with normal coronary arteries.  Ziopatch monitor 7/22/2022 w/o significant findings.   11. Hand complaints. She states since coronary angiogram (used R radial artery) she has R wrist and R hand weakness. Offered U/S to assess for pseudoaneurysm and/or MRI R hand/wrist. She wants to wait on this and she could do hand therapy. She has L hand index trigger finger. She is seeing outside orthopedic hand            30 minutes spent on the date of the encounter doing chart review, history and exam, documentation and further activities as noted above exclusive of procedures and other billable interpretations

## 2022-10-11 ENCOUNTER — OFFICE VISIT (OUTPATIENT)
Dept: INTERNAL MEDICINE | Facility: CLINIC | Age: 82
End: 2022-10-11
Payer: COMMERCIAL

## 2022-10-11 VITALS
HEIGHT: 65 IN | OXYGEN SATURATION: 99 % | BODY MASS INDEX: 23.28 KG/M2 | HEART RATE: 53 BPM | WEIGHT: 139.7 LBS | SYSTOLIC BLOOD PRESSURE: 146 MMHG | DIASTOLIC BLOOD PRESSURE: 70 MMHG

## 2022-10-11 DIAGNOSIS — Z23 NEED FOR INFLUENZA VACCINATION: ICD-10-CM

## 2022-10-11 DIAGNOSIS — R82.90 ABNORMAL URINE: Primary | ICD-10-CM

## 2022-10-11 LAB
ALBUMIN UR-MCNC: NEGATIVE MG/DL
APPEARANCE UR: CLEAR
BILIRUB UR QL STRIP: NEGATIVE
COLOR UR AUTO: YELLOW
GLUCOSE UR STRIP-MCNC: NEGATIVE MG/DL
HGB UR QL STRIP: NEGATIVE
KETONES UR STRIP-MCNC: NEGATIVE MG/DL
LEUKOCYTE ESTERASE UR QL STRIP: NEGATIVE
NITRATE UR QL: NEGATIVE
PH UR STRIP: 6.5 [PH] (ref 5–7)
RBC URINE: <1 /HPF
SP GR UR STRIP: 1.01 (ref 1–1.03)
UROBILINOGEN UR STRIP-MCNC: NORMAL MG/DL
WBC URINE: 1 /HPF

## 2022-10-11 PROCEDURE — 99215 OFFICE O/P EST HI 40 MIN: CPT | Mod: 25 | Performed by: INTERNAL MEDICINE

## 2022-10-11 PROCEDURE — 81001 URINALYSIS AUTO W/SCOPE: CPT | Performed by: PATHOLOGY

## 2022-10-11 PROCEDURE — 90662 IIV NO PRSV INCREASED AG IM: CPT | Performed by: INTERNAL MEDICINE

## 2022-10-11 PROCEDURE — G0008 ADMIN INFLUENZA VIRUS VAC: HCPCS | Performed by: INTERNAL MEDICINE

## 2022-10-11 NOTE — NURSING NOTE
Debra Denise is a 81 year old female patient that presents today in clinic for the following:    Chief Complaint   Patient presents with     Follow Up     The patient's allergies and medications were reviewed as noted. A set of vitals were recorded as noted without incident. The patient does not have any other questions for the provider.    Amie Kaplan, EMT at 1:27 PM on 10/11/2022

## 2022-10-11 NOTE — NURSING NOTE
Debra Denise received the high dose influenza vaccine in clinic today at the request of Dr. Zimmerman. The immunization site was cleaned with an alcohol prep wipe. The immunization was given without incident--see immunization list for administration details. No swelling or redness was observed at the site of injection after the immunization was given. Pt has no history of reaction to vaccines.     Amie Kaplan, EMT at 2:24 PM on 10/11/2022

## 2022-10-13 ENCOUNTER — TELEPHONE (OUTPATIENT)
Dept: INTERNAL MEDICINE | Facility: CLINIC | Age: 82
End: 2022-10-13

## 2022-10-13 DIAGNOSIS — K62.89 RECTAL PAIN: Primary | ICD-10-CM

## 2022-10-13 NOTE — TELEPHONE ENCOUNTER
[facility] Call Center    Phone Message    May a detailed message be left on voicemail: yes     Reason for Call: Other: Other: Per call from patient, can't get in with [facility] for colonoscopy until the end of January to February due to her age and it needs to be done in a hospital setting. Patient wondering if Dr Zimmerman can get her in sooner and if she should also schedule an EGD as well. Patient requesting call back.      Action Taken: Message routed to:  [facility]    Travel Screening: Not Applicable

## 2022-10-14 NOTE — TELEPHONE ENCOUNTER
Spoke to patient.  Patient report Dr. Zimmerman told patient she can get the colonoscopy done anywhere as long as patient can get the colonoscopy done soon.  Patient said patients starting age at 82 will need a hospital setting for colonoscopy procedure at [facility].  They don't really have providers who does this so the next available provider is booked out in January-February.    Patient was wondering if she can get colonoscopy done sooner at [facility].    Patient want to include EGD test for GERD with abdominal cramping.       Reinaldo Newell CMA ([facility]) at 10:27 AM on 10/14/2022

## 2022-10-17 ENCOUNTER — TELEPHONE (OUTPATIENT)
Dept: CARDIOLOGY | Facility: CLINIC | Age: 82
End: 2022-10-17

## 2022-10-17 NOTE — TELEPHONE ENCOUNTER
[facility] Call Center    Phone Message    May a detailed message be left on voicemail: yes     Reason for Call: Other: Pt stated she would like a call back from NELLA Zacarias to discuss her wrist.  Pt stated she is still having issues with this area, per previous discussion. Please call back on home number first, then mobile if no answer.       Action Taken: Message routed to:  [facility] cardio    Travel Screening: Not Applicable

## 2022-10-18 NOTE — TELEPHONE ENCOUNTER
S-(situation): Patient had angiogram in August of this year. Her right radial artery was used for access. She states that she is continuing to have a dull pain in her wrist.Some slight swelling at times. She is quite active and this has impeded some of her activities (baking and gardening). She does believe that it has gotten better over time. Saw Dr. Zimmerman the other day and he offered her an ultrasound for to assess for an aneurysm. She declined.  Does see [facility] for left hand trigger finger, contemplating surgical intervention for that.    B-(background): An 81 year old woman with no significant cardiac history and a history of atypical chest pain who underwent a coronary CT which showed a CAC of 77, but unfortunately could not be accurately read to evaluate the LAD.    A-(assessment): right radial hand pain    R-(recommendations): continue to monitor. Offer hand therapy?

## 2022-10-19 ENCOUNTER — HOSPITAL ENCOUNTER (OUTPATIENT)
Facility: CLINIC | Age: 82
End: 2022-10-19
Attending: INTERNAL MEDICINE | Admitting: INTERNAL MEDICINE
Payer: COMMERCIAL

## 2022-10-19 ENCOUNTER — TELEPHONE (OUTPATIENT)
Dept: GASTROENTEROLOGY | Facility: CLINIC | Age: 82
End: 2022-10-19

## 2022-10-19 NOTE — TELEPHONE ENCOUNTER
Screening Questions  BLUE  KIND OF PREP RED  LOCATION [review exclusion criteria] GREEN  SEDATION TYPE        Y AND SEND LETTER TOO Are you active on mychart?       Wolfgang Zimmerman MD in [facility] Ordering/Referring Provider?        BCBS What type of coverage do you have?      N Have you had a positive covid test in the last 90 days?     23.9 1. BMI  [BMI 40+ - review exclusion criteria]    Y  2. Are you able to give consent for your medical care? [IF NO,RN REVIEW]        N  3. Are you taking any prescription pain medications on a routine schedule?      N  3a. EXTENDED PREP What kind of prescription?     N 4. Do you have any chemical dependencies such as alcohol, street drugs, or methadone?    N 5. Do you have any history of post-traumatic stress syndrome, severe anxiety or history of psychosis?      **If yes 3- 5 , please schedule with MAC sedation.**          IF YES TO ANY 6 - 10 - HOSPITAL SETTING ONLY.     N 6.   Do you need assistance transferring?     N 7.   Have you had a heart or lung transplant?    N 8.   Are you currently on dialysis?   N 9.   Do you use daily home oxygen?   N 10. Do you take nitroglycerin?   10a. N If yes, how often?     11. [FEMALES]  N Are you currently pregnant?    11a. N If yes, how many weeks? [ Greater than 12 weeks, OR NEEDED]    N 12. Do you have Pulmonary Hypertension? *NEED PAC APPT AT [facility]*     N 13. [review exclusion criteria]  Do you have any implantable devices in your body (pacemaker, defib, LVAD)?    N 14. In the past 6 months, have you had any heart related issues including cardiomyopathy or heart attack?     14a. N If yes, did it require cardiac stenting if so when?     N 15. Have you had a stroke or Transient ischemic attack (TIA - aka  mini stroke ) within 6 months?      N 16. Do you have mod to severe Obstructive Sleep Apnea?  [Hospital only - Ok at [facility]]    N 17. Do you have SEVERE AND UNCONTROLLED asthma? *NEED PAC APPT AT [facility]*     N 18.  "Are you currently taking any blood thinners?     18a. If yes, inform patient to \"follow up w/ ordering provider for bridging instructions.\"    N 19. Do you take the medication Phentermine?    19a. If yes, \"Hold for 7 days before procedure.  Please consult your prescribing provider if you have questions about holding this medication.\"     N  20. Do you have chronic kidney disease?      N  21. Do you have a diagnosis of diabetes?     N  22. On a regular basis do you go 3-5 days between bowel movements?      23. Preferred LOCAL Pharmacy for Pre Prescription    [ LIST ONLY ONE PHARMACY]     [id number] - [place], [place] - [address]    - CLOSING REMINDERS -    Informed patient they will need an adult    Cannot take any type of public or medical transportation alone    Conscious Sedation- Needs  for 6 hours after the procedure       MAC/General-Needs  for 24 hours after procedure    Pre-Procedure Covid test to be completed [[facility] PCR Testing Required]    Confirmed Nurse will call to complete assessment       - SCHEDULING DETAILS -     [facility]  Surgeon    12/15  Date of Procedure  Upper and Lower Endoscopy [EGD and Colonoscopy]  Type of Procedure Scheduled    Location  [facility] PREP-If you answer yes to questions #8, #20, #21Which Colonoscopy Prep was Sent?     Moderate sedation  Sedation Type     n PAC / Pre-op Required       Additional comments:  pt elected [facility] instead of [facility]. Pt will want to discuss prep options.         "

## 2022-10-19 NOTE — TELEPHONE ENCOUNTER
"Per Dr. Hawk, \"I would recommend an ultrasound to make sure we don't miss anything. If that is normal, we need to tell her that whatever her pain is it is not related to the angiogram and she should look for other causes.\"        Offered Dia ultrasound of right wrist and she stated that since it is getting better she prefers to wait one week and if it is still bothering her she will call me and we can order an ultrasound at that time.  "

## 2022-10-26 ENCOUNTER — TELEPHONE (OUTPATIENT)
Dept: GASTROENTEROLOGY | Facility: CLINIC | Age: 82
End: 2022-10-26

## 2022-10-26 NOTE — TELEPHONE ENCOUNTER
Caller: NAME    Procedure: COLON/EGD    Date, Location, and Surgeon of Procedure Cancelled: 12/15, , [facility]    Ordering Provider:ULI    Reason for cancel (please be detailed, any staff messages or encounters to note?): SCHEDULE CONFLICT        Rescheduled: Y     If rescheduled:    Date: 1/2   Location:    Prep Resent: GPREP(changes to prep?)   Covid Test Rescheduled:    Note any change or update to original order/sedation:       PATIENT CAN'T TOLERATE STANDARD PREP, LAST PREP MADE HER FAINT. SHE WILL DISCUSS WITH RN DURING PA CALL.

## 2022-10-27 ENCOUNTER — TELEPHONE (OUTPATIENT)
Dept: CARDIOLOGY | Facility: CLINIC | Age: 82
End: 2022-10-27

## 2022-10-27 DIAGNOSIS — M79.631 PAIN OF RIGHT FOREARM: Primary | ICD-10-CM

## 2022-10-27 DIAGNOSIS — S55.901A: ICD-10-CM

## 2022-10-27 NOTE — TELEPHONE ENCOUNTER
[facility] Call Center    Phone Message    May a detailed message be left on voicemail: yes     Reason for Call: Other: Pt requesting a call back to discuss wrist pain after the angiogram. Pt states she is going into a meeting today from 1:00-3:30pm today but you can call anytine after to discuss. Please return call to advise.      Action Taken: Other: Cardiology    Travel Screening: Not Applicable           Thank you!  [facility]

## 2022-10-27 NOTE — TELEPHONE ENCOUNTER
Patient had an angiogram at the end of August. Since then her right arm has been bothering her with tenderness and weakness. She was hoping it would get better on it's own but it continues. An ultrasound was offered and she initially refused, she is now interested. Ultrasound ordered and scheduled

## 2022-11-01 ENCOUNTER — TELEPHONE (OUTPATIENT)
Dept: GASTROENTEROLOGY | Facility: CLINIC | Age: 82
End: 2022-11-01

## 2022-11-01 NOTE — TELEPHONE ENCOUNTER
Caller: Aguilar    Procedure: Colon/EGD    Date, Location, and Surgeon of Procedure Cancelled: 1/2/23 ANDREW Alexander    Ordering Provider:Elsie    Reason for cancel (please be detailed, any staff messages or encounters to note?): Patient found a sooner date outside [place]        Rescheduled: No     If rescheduled:    Date:    Location:    Prep Resent: (changes to prep?)   Covid Test Rescheduled:    Note any change or update to original order/sedation:

## 2022-11-02 NOTE — TELEPHONE ENCOUNTER
----- Message from Sheila Elizabeth RN sent at 11/2/2022  8:28 AM CDT -----  Regarding: call to chat with patient  Sixto Hernández    This patient's PCP, Dr Zimmerman, dropped by to chat about Debra.    Pt of Dr Hernandez/Poly, she has bad proctitis. Was scheduled for colonoscopy with [facility] but they cancelled is since she's over 79- looks like she was scheduled for procedure with [facility] in Jan but that looks cancelled to.    She's scheduled to see Dr Zimmerman in a few weeks and can chat with him then about what to do- but Dr Zimmerman asking if we can reach out to patient to check on her symptoms and see how she's doing.    Can you give her a call and check in?    Thanks!    Sheila

## 2022-11-02 NOTE — TELEPHONE ENCOUNTER
November 2, 2022    Called Shayna Richardson message and contact info to return call regarding: symptom check

## 2022-11-03 NOTE — TELEPHONE ENCOUNTER
Spoke with Debra, she states that she was able to get in with [facility] and had a colonoscopy at [facility] yesterday. They found inflammation.  She is doing well and will continue to follow up with [facility].

## 2022-11-04 ENCOUNTER — HOSPITAL ENCOUNTER (OUTPATIENT)
Dept: ULTRASOUND IMAGING | Facility: CLINIC | Age: 82
Discharge: HOME | End: 2022-11-04
Attending: INTERNAL MEDICINE | Admitting: INTERNAL MEDICINE
Payer: COMMERCIAL

## 2022-11-04 DIAGNOSIS — M79.631 PAIN OF RIGHT FOREARM: ICD-10-CM

## 2022-11-04 DIAGNOSIS — S55.901A: ICD-10-CM

## 2022-11-04 PROCEDURE — 93931 UPPER EXTREMITY STUDY: CPT | Mod: RT

## 2022-11-10 ENCOUNTER — TRANSFERRED RECORDS (OUTPATIENT)
Dept: HEALTH INFORMATION MANAGEMENT | Facility: CLINIC | Age: 82
End: 2022-11-10

## 2022-11-14 NOTE — PROGRESS NOTES
HPI:    Last visit with us 10/11/2022 and additional details in that note. She has less rectal bleeding. She had a colonoscopy at [facility] ([facility] provider) 2022. She is trying to get into see Dr. Garcia [facility] but maybe not until 2023? She remains on mesalamine. She has some nausea and had H. Pylori in the past. Still recommend EGD but she wants to wait on this for a month or so. She has some dizziness (possibly vertigo). She did have a Brain MRI imaging 2020. No acute neurological sxs. No other HEENT, cardiopulmonary, abdominal, , GYN, neurological, systemic, psychiatric, lymphatic, endocrine, vascular complaints.       Past Medical History:   Diagnosis Date     Dupuytren contracture     finger     Hypertension      Osteopenia      Polymyalgia rheumatica (H)      Proctitis      Past Surgical History:   Procedure Laterality Date     COLONOSCOPY  2014    Procedure: COMBINED COLONOSCOPY, SINGLE BIOPSY/POLYPECTOMY BY BIOPSY;  COLONOSCOPY;  Surgeon: Carol Ann Plasencia MD;  Location:  GI     CV CORONARY ANGIOGRAM N/A 2022    Procedure: Coronary Angiogram;  Surgeon: Trevin Hawk MD;  Location:  HEART CARDIAC CATH LAB     ENT SURGERY      tonsilectomy, adenoidectomy     GYN SURGERY  ,     x 2     ORTHOPEDIC SURGERY  2004    (R) shoulder surgery for frozen shoulder     ZAC BSO      for fibroids     PE:    Vitals noted, gen, nad, cooperative, alert, neck supple nl  Rom, lungs with good air movement, RRR, S1, S2, no MRG, abdomen,. No acute findings. Grossly normal neurological exam. R wrist with adequate R radial pulse. No obvious R wrist joint findings.     Colonoscopy 2022:    Procedure:           Colonoscopy   Indications:         Screening for colorectal malignant neoplasm, Screening                        in patient at increased risk: Family history of                        1st-degree relative with colorectal cancer   Patient Profile:      Last Colonoscopy: 10 years ago.   Providers:           Ulises Cope MD (Doctor) [address] [place] [address]   Referring Provider   Jermain Luque (Referring MD)   Requesting Provider:   Medicines:           Midazolam 1 mg IV, Fentanyl 50 micrograms IV   Complications:       No immediate complications.   Procedure:           Pre-Anesthesia Assessment:                        - This assessment was completed at 09:01 AM, prior to                        the administration of sedation.                        - Mental Status Examination: alert and oriented. Airway                        Examination: normal oropharyngeal airway and neck                        mobility. Respiratory Examination: clear to                        auscultation. CV Examination: normal. Abdominal                        Examination: bowel sounds present, abdomen soft and                        non-tender, no masses or organomegaly noted.                        - ASA Grade Assessment: II - A patient with mild                        systemic disease.                        - After reviewing the risks and benefits, the patient                        was deemed in satisfactory condition to undergo the                        procedure.                        - The anesthesia plan was to use moderate                        sedation/analgesia (conscious sedation).                        - Immediately prior to administration of medications,                        the patient was re-assessed for adequacy to receive                        sedatives.                        - Sedation was administered by the endoscopist. The                        sedation level attained was moderate.                        - The heart rate, respiratory rate, oxygen saturations,                        blood pressure, adequacy of pulmonary ventilation, and                        response to care were monitored throughout the  procedure.                        - The physical status of the patient was re-assessed                        after the procedure.                        - Patient was seen and evaluated during COVID-19                        pandemic. Current hospital procedure room guidelines                        specific to COVID-19 will be followed as indicated.                        After I obtained informed consent, the scope was passed                        under direct vision. Throughout the procedure, the                        patient's blood pressure, pulse, and oxygen saturations                        were monitored continuously. The Colonoscope was                        introduced through the anus and advanced to the terminal                        ileum. The colonoscopy was performed without difficulty.                        The patient tolerated the procedure well. The quality of                        the bowel preparation was good.   Findings:        The perianal and digital rectal examinations were normal.        Localized mild inflammation characterized by congestion (edema),        erythema, friability and granularity was found in the mid sigmoid colon.        Biopsies were taken with a cold forceps for histology. Verification of        patient identification for the specimen was done. Estimated blood loss        was minimal.        Localized minimal and chronic appearing inflammation characterized by        loss of vascularity was found in the rectum. Biopsies were taken with a        cold forceps for histology.        The terminal ileum appeared normal.        The exam was otherwise without abnormality.   Impression:          - Localized mild inflammation was found in the mid                        sigmoid colon. Biopsied.                        - Localized mild inflammation was found in the rectum.                        Biopsied.                        - The examined portion of the ileum was normal.                         - The examination was otherwise normal.   Recommendation:      - Await pathology results.                        - Repeat colonoscopy PRN for surveillance.       Biopsy results 11/2/2022:    Diagnosis:   A. Colon, sigmoid, endoscopic biopsy - Moderate chronic active colitis,   see comment.   B.Colon, rectum, endoscopic biopsy - Active colitis, see comment   BAT  11/04/2022  1438 Local     Microscopic:                                                    .   A. Sections show colonic mucosa with active inflammation, cryptitis and   crypt abscesses, crypt architectural distortion and basal   lymphoplasmacytosis.  No infectious organisms are seen.  Granulomas are   not identified.  No ischemic changes are seen.  No dysplasia is seen.   B.Sections show colonic mucosa with active inflammation.  No diagnostic   changes of chronic inflammatory bowel disease are seen.  No granulomas or   infectious organisms are seen.  No ischemic changes are seen    A/P:    1. Immunizations; COVID Pfizer vaccine x 4. She has completed the Shingrix vaccine series. Pneumococcal 23 done 10/18/2011. Prevnar 13 done 1/13/2016. Td 1/31/2018. Prevnar 20 today 11/15/2022 and influenza vaccine 10/11/2022  2. 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 She was seen by Ms. Pang, GI 8/10/2021 and 6/23/2022.  Flex Sig done 10/4/2021. Calprotectin Feces elevated at 93.9. She was seen outside [facility] and recommended colonoscopy. She had a colonoscopy at [facility] 11/2/2022 Results in Care Everywhere. Biopsy results as above She will follow at [facility]. She still needs EGD for reflux. Discussed H. Pylori testing but she wants to wait. She will try to get into [facility] for her inflammatory proctitis. Discussed she could be seen here as well.   3. Chronic pain on night time Gabapentin; seen Dr. Pimentel, orthopedics 6/8/2021 and Dr. Neumann 4/29/2021 Mr. Neumann. Placed PMR 4/27/2022 referral for her back and hip complaints. She cancelled  her  PMR appt. With Dr. Corona 8/18/2022   4. HTN; on Valsartan/HcTz; Electrolytes and Creatine checked 8/30/2022  5. Mammogram; 11/17/2021 in Care Everywhere  6. Lipids; 2/11/2022; HDL 47 and LDL 63.  7. Dermatology; scanned in outside note [facility] 2/17/2022. Care Everywhere dermatology note 10/12/2022.   8. Vitamin D normal at 57 on 2/11/2022. DEXA scan in chart from 7/19/2021 and most negative T score -2.3.  She saw Dr. Simon endocrinology   8/22/2022  9. Outside Rheumatology for PMR, Dr. Damian 11/14/2021. She is not on prednisone currently. Ordered ESR/Crp/CBC today 11/15/2022  10. Palpitations; she has Ziopatch placement today 7/22/2022. Normal dobutamine stress echo 10/11/2021. CXR 9/23/2021 normal For atypical CP, CTA coronary angiogram done 8/10/2022 with possible severe mid LAD stenosis. Coronary angiogram on 8/30/2022 with normal coronary arteries.  Ziopatch monitor 7/22/2022 w/o significant findings.   11. Hand complaints. She states since coronary angiogram (used R radial artery) she has R wrist and R hand weakness. She had an R upper extremity arterial U/S 11/4/2022 no abnormal findings. Overall sxs. Less.   12. Dizziness/veritgo? Sxs. No prominent and she wants to wait on any further evaluation and/or treatment.        40 minutes spent on the date of the encounter doing chart review, history and exam, documentation and further activities as noted above exclusive of procedures and other billable interpretations

## 2022-11-15 ENCOUNTER — OFFICE VISIT (OUTPATIENT)
Dept: INTERNAL MEDICINE | Facility: CLINIC | Age: 82
End: 2022-11-15
Payer: COMMERCIAL

## 2022-11-15 ENCOUNTER — LAB (OUTPATIENT)
Dept: LAB | Facility: CLINIC | Age: 82
End: 2022-11-15
Payer: COMMERCIAL

## 2022-11-15 VITALS
OXYGEN SATURATION: 98 % | BODY MASS INDEX: 23.01 KG/M2 | HEART RATE: 55 BPM | WEIGHT: 138.1 LBS | SYSTOLIC BLOOD PRESSURE: 145 MMHG | HEIGHT: 65 IN | DIASTOLIC BLOOD PRESSURE: 61 MMHG

## 2022-11-15 DIAGNOSIS — R53.83 OTHER FATIGUE: Primary | ICD-10-CM

## 2022-11-15 DIAGNOSIS — Z23 NEED FOR VACCINATION: ICD-10-CM

## 2022-11-15 DIAGNOSIS — R53.83 OTHER FATIGUE: ICD-10-CM

## 2022-11-15 LAB
BASOPHILS # BLD AUTO: 0.1 10E3/UL (ref 0–0.2)
BASOPHILS NFR BLD AUTO: 1 %
CRP SERPL-MCNC: <3 MG/L
EOSINOPHIL # BLD AUTO: 0.4 10E3/UL (ref 0–0.7)
EOSINOPHIL NFR BLD AUTO: 4 %
ERYTHROCYTE [DISTWIDTH] IN BLOOD BY AUTOMATED COUNT: 12.9 % (ref 10–15)
ERYTHROCYTE [SEDIMENTATION RATE] IN BLOOD BY WESTERGREN METHOD: 13 MM/HR (ref 0–30)
HCT VFR BLD AUTO: 43.2 % (ref 35–47)
HGB BLD-MCNC: 14.2 G/DL (ref 11.7–15.7)
IMM GRANULOCYTES # BLD: 0.1 10E3/UL
IMM GRANULOCYTES NFR BLD: 1 %
LYMPHOCYTES # BLD AUTO: 2.3 10E3/UL (ref 0.8–5.3)
LYMPHOCYTES NFR BLD AUTO: 22 %
MCH RBC QN AUTO: 30 PG (ref 26.5–33)
MCHC RBC AUTO-ENTMCNC: 32.9 G/DL (ref 31.5–36.5)
MCV RBC AUTO: 91 FL (ref 78–100)
MONOCYTES # BLD AUTO: 1.1 10E3/UL (ref 0–1.3)
MONOCYTES NFR BLD AUTO: 10 %
NEUTROPHILS # BLD AUTO: 6.7 10E3/UL (ref 1.6–8.3)
NEUTROPHILS NFR BLD AUTO: 62 %
NRBC # BLD AUTO: 0 10E3/UL
NRBC BLD AUTO-RTO: 0 /100
PLATELET # BLD AUTO: 420 10E3/UL (ref 150–450)
RBC # BLD AUTO: 4.74 10E6/UL (ref 3.8–5.2)
WBC # BLD AUTO: 10.6 10E3/UL (ref 4–11)

## 2022-11-15 PROCEDURE — 99215 OFFICE O/P EST HI 40 MIN: CPT | Mod: 25 | Performed by: INTERNAL MEDICINE

## 2022-11-15 PROCEDURE — 85025 COMPLETE CBC W/AUTO DIFF WBC: CPT | Performed by: PATHOLOGY

## 2022-11-15 PROCEDURE — 86140 C-REACTIVE PROTEIN: CPT | Performed by: PATHOLOGY

## 2022-11-15 PROCEDURE — 85652 RBC SED RATE AUTOMATED: CPT | Performed by: PATHOLOGY

## 2022-11-15 PROCEDURE — 36415 COLL VENOUS BLD VENIPUNCTURE: CPT | Performed by: PATHOLOGY

## 2022-11-15 PROCEDURE — 90677 PCV20 VACCINE IM: CPT | Performed by: INTERNAL MEDICINE

## 2022-11-15 PROCEDURE — G0009 ADMIN PNEUMOCOCCAL VACCINE: HCPCS | Performed by: INTERNAL MEDICINE

## 2022-11-15 NOTE — NURSING NOTE
Debra Denise received the pneumococcal vaccine (PREVNAR 20) in clinic today at the request of Dr. Zimmerman. The immunization site was cleaned with an alcohol prep wipe. The immunization was given without incident--see immunization list for administration details. No swelling or redness was observed at the site of injection after the immunization was given. Pt has no history of reaction to vaccines. Pt remained in [facility] for 15 minutes in case of an adverse reaction.     Mikaela Ortiz, EMT at 1:24 PM on 11/15/2022

## 2022-11-17 ENCOUNTER — TRANSFERRED RECORDS (OUTPATIENT)
Dept: HEALTH INFORMATION MANAGEMENT | Facility: CLINIC | Age: 82
End: 2022-11-17

## 2022-12-13 ENCOUNTER — TRANSFERRED RECORDS (OUTPATIENT)
Dept: HEALTH INFORMATION MANAGEMENT | Facility: CLINIC | Age: 82
End: 2022-12-13

## 2022-12-13 LAB
CREATININE (EXTERNAL): 0.64 MG/DL (ref 0.57–1)
GFR ESTIMATED (EXTERNAL): 88 ML/MIN/1.73

## 2023-01-28 DIAGNOSIS — I10 BENIGN ESSENTIAL HYPERTENSION: ICD-10-CM

## 2023-01-30 ENCOUNTER — TELEPHONE (OUTPATIENT)
Dept: INTERNAL MEDICINE | Facility: CLINIC | Age: 83
End: 2023-01-30
Payer: COMMERCIAL

## 2023-01-30 DIAGNOSIS — M25.539 WRIST PAIN: Primary | ICD-10-CM

## 2023-01-30 NOTE — TELEPHONE ENCOUNTER
[facility] Call Center    Phone Message    May a detailed message be left on voicemail: yes     Reason for Call: Order(s): Home Care Orders: Physical Therapy (PT): PT orders: for the wrist orders: Needs orders before tomorrow.  Her appointment is tomorrow with PT.   You can fax orders to [phone]    Action Taken: Message routed to:  [facility]    Travel Screening: Not Applicable

## 2023-01-30 NOTE — TELEPHONE ENCOUNTER
Spoke to [facility] to relay that we do not have an order for  PT for patients wrist.     Will send to provider to advise if agreeable to physical therapy order. Danitza Erickson LPN 1/30/2023 10:37 AM

## 2023-01-31 ENCOUNTER — TRANSFERRED RECORDS (OUTPATIENT)
Dept: HEALTH INFORMATION MANAGEMENT | Facility: CLINIC | Age: 83
End: 2023-01-31
Payer: COMMERCIAL

## 2023-01-31 ENCOUNTER — TELEPHONE (OUTPATIENT)
Dept: INTERNAL MEDICINE | Facility: CLINIC | Age: 83
End: 2023-01-31
Payer: COMMERCIAL

## 2023-01-31 NOTE — TELEPHONE ENCOUNTER
[facility] Call Center    Phone Message    May a detailed message be left on voicemail: yes     Reason for Call: Other: Patient calling stating that she has a PT appointment at noon today for her wrist, but they have not received her referral from Dr. Zimmerman. It is to [facility], fax number is [phone].      Action Taken: Message routed to:  [facility] p[facility]    Travel Screening: Not Applicable

## 2023-02-01 ENCOUNTER — TRANSFERRED RECORDS (OUTPATIENT)
Dept: HEALTH INFORMATION MANAGEMENT | Facility: CLINIC | Age: 83
End: 2023-02-01
Payer: COMMERCIAL

## 2023-02-01 DIAGNOSIS — I10 BENIGN ESSENTIAL HYPERTENSION: ICD-10-CM

## 2023-02-01 RX ORDER — GABAPENTIN 100 MG/1
200 CAPSULE ORAL AT BEDTIME
Qty: 180 CAPSULE | OUTPATIENT
Start: 2023-02-01

## 2023-02-01 NOTE — TELEPHONE ENCOUNTER
Pt was last seen in clinic on 11/15/2022. Pt last had gabapentin (NEURONTIN) 100 MG capsule refilled on 3/23/22 for a 360 day supply by PCP. Due for refill 3/23/23. Pt should have refills on file, refused.     LIZABETH IBARRA RN on 2/1/2023 at 1:38 PM

## 2023-02-01 NOTE — TELEPHONE ENCOUNTER
Gabapentin Oral Capsule 100 MG  Last Written Prescription Date:  3/23/2022  Last Fill Quantity: 180,   # refills: 3  Last Office Visit :  11/15/2022  Future Office visit:  None    Routing refill request to provider for review/approval because:  Drug not on the [facility] P or [facility] refill protocol or controlled substance      Martha Casanova RN  Central Triage Red Flags/Med Refills

## 2023-02-02 ENCOUNTER — DOCUMENTATION ONLY (OUTPATIENT)
Dept: INTERNAL MEDICINE | Facility: CLINIC | Age: 83
End: 2023-02-02
Payer: COMMERCIAL

## 2023-02-02 DIAGNOSIS — I10 BENIGN ESSENTIAL HYPERTENSION: ICD-10-CM

## 2023-02-02 RX ORDER — GABAPENTIN 100 MG/1
CAPSULE ORAL
Qty: 180 CAPSULE | OUTPATIENT
Start: 2023-02-02

## 2023-02-02 NOTE — PROGRESS NOTES
Type of Form Received: POC    Form Received (Date) 2/2/23   Form Filled out Yes 2/6/23   Placed in provider folder Yes

## 2023-02-02 NOTE — TELEPHONE ENCOUNTER
gabapentin (NEURONTIN) 100 MG capsule       Last Written Prescription Date:  03-  Last Fill Quantity: 180,   # refills: 3  Last Office Visit : 11-  Future Office visit:  Not on file    Routing refill request to provider for review/approval because:  Not on protocol

## 2023-02-04 ENCOUNTER — TELEPHONE (OUTPATIENT)
Dept: INTERNAL MEDICINE | Facility: CLINIC | Age: 83
End: 2023-02-04

## 2023-02-04 DIAGNOSIS — M79.643 PAIN OF HAND, UNSPECIFIED LATERALITY: Primary | ICD-10-CM

## 2023-02-04 RX ORDER — GABAPENTIN 100 MG/1
CAPSULE ORAL
Qty: 180 CAPSULE | Refills: 1 | Status: SHIPPED | OUTPATIENT
Start: 2023-02-04 | End: 2023-08-04

## 2023-02-04 NOTE — TELEPHONE ENCOUNTER
----- Message from Valerie Portillo sent at 2/1/2023 11:36 AM CST -----  Regarding: physical therapy order  Hello,     You entered an order for this patient to see a hand therapist. Our hand therapists are all Occupational Therapists. Please enter a new order for Occupational Therapy and select Hand Therapy under Specialty Services    Thank you,   [facility]hab Outpatient Central Scheduling  [phone]

## 2023-02-04 NOTE — TELEPHONE ENCOUNTER
gabapentin (NEURONTIN) 100 MG capsule  Dr. Zimmerman,  Several request for this med.   Please do not refuse as Pt does not have any refills left.   Last filled 3/28/2022.   Pt is COMpletely out of refills.  Med not on protocol to fill.   Please send new updated order for Pt care.  Thank you Dr. Wolfgang Casanova RN  Central Triage Red Flags/Med Refills

## 2023-02-10 ENCOUNTER — APPOINTMENT (OUTPATIENT)
Dept: CT IMAGING | Facility: CLINIC | Age: 83
End: 2023-02-10
Attending: EMERGENCY MEDICINE
Payer: COMMERCIAL

## 2023-02-10 ENCOUNTER — HOSPITAL ENCOUNTER (EMERGENCY)
Facility: CLINIC | Age: 83
Discharge: HOME | End: 2023-02-10
Attending: EMERGENCY MEDICINE | Admitting: EMERGENCY MEDICINE
Payer: COMMERCIAL

## 2023-02-10 ENCOUNTER — NURSE TRIAGE (OUTPATIENT)
Dept: NURSING | Facility: CLINIC | Age: 83
End: 2023-02-10

## 2023-02-10 VITALS
DIASTOLIC BLOOD PRESSURE: 67 MMHG | OXYGEN SATURATION: 99 % | HEART RATE: 57 BPM | SYSTOLIC BLOOD PRESSURE: 146 MMHG | WEIGHT: 134 LBS | RESPIRATION RATE: 16 BRPM | BODY MASS INDEX: 22.33 KG/M2 | TEMPERATURE: 97.6 F | HEIGHT: 65 IN

## 2023-02-10 DIAGNOSIS — K51.311 CHRONIC ULCERATIVE RECTOSIGMOIDITIS WITH RECTAL BLEEDING (H): ICD-10-CM

## 2023-02-10 LAB
ALBUMIN SERPL BCG-MCNC: 4.5 G/DL (ref 3.5–5.2)
ALBUMIN UR-MCNC: NEGATIVE MG/DL
ALP SERPL-CCNC: 68 U/L (ref 35–104)
ALT SERPL W P-5'-P-CCNC: 13 U/L (ref 10–35)
ANION GAP SERPL CALCULATED.3IONS-SCNC: 8 MMOL/L (ref 7–15)
APPEARANCE UR: CLEAR
AST SERPL W P-5'-P-CCNC: 18 U/L (ref 10–35)
BASOPHILS # BLD AUTO: 0 10E3/UL (ref 0–0.2)
BASOPHILS NFR BLD AUTO: 0 %
BILIRUB DIRECT SERPL-MCNC: <0.2 MG/DL (ref 0–0.3)
BILIRUB SERPL-MCNC: 0.5 MG/DL
BILIRUB UR QL STRIP: NEGATIVE
BUN SERPL-MCNC: 14.1 MG/DL (ref 8–23)
CALCIUM SERPL-MCNC: 9.6 MG/DL (ref 8.8–10.2)
CHLORIDE SERPL-SCNC: 102 MMOL/L (ref 98–107)
COLOR UR AUTO: NORMAL
CREAT SERPL-MCNC: 0.76 MG/DL (ref 0.51–0.95)
DEPRECATED HCO3 PLAS-SCNC: 31 MMOL/L (ref 22–29)
EOSINOPHIL # BLD AUTO: 0.5 10E3/UL (ref 0–0.7)
EOSINOPHIL NFR BLD AUTO: 5 %
ERYTHROCYTE [DISTWIDTH] IN BLOOD BY AUTOMATED COUNT: 13.1 % (ref 10–15)
GFR SERPL CREATININE-BSD FRML MDRD: 77 ML/MIN/1.73M2
GLUCOSE SERPL-MCNC: 96 MG/DL (ref 70–99)
GLUCOSE UR STRIP-MCNC: NEGATIVE MG/DL
HCT VFR BLD AUTO: 41.2 % (ref 35–47)
HGB BLD-MCNC: 14.2 G/DL (ref 11.7–15.7)
HGB UR QL STRIP: NEGATIVE
HOLD SPECIMEN: NORMAL
IMM GRANULOCYTES # BLD: 0.1 10E3/UL
IMM GRANULOCYTES NFR BLD: 1 %
KETONES UR STRIP-MCNC: NEGATIVE MG/DL
LEUKOCYTE ESTERASE UR QL STRIP: NEGATIVE
LIPASE SERPL-CCNC: 23 U/L (ref 13–60)
LYMPHOCYTES # BLD AUTO: 2.1 10E3/UL (ref 0.8–5.3)
LYMPHOCYTES NFR BLD AUTO: 22 %
MCH RBC QN AUTO: 30.9 PG (ref 26.5–33)
MCHC RBC AUTO-ENTMCNC: 34.5 G/DL (ref 31.5–36.5)
MCV RBC AUTO: 90 FL (ref 78–100)
MONOCYTES # BLD AUTO: 0.9 10E3/UL (ref 0–1.3)
MONOCYTES NFR BLD AUTO: 9 %
NEUTROPHILS # BLD AUTO: 6.1 10E3/UL (ref 1.6–8.3)
NEUTROPHILS NFR BLD AUTO: 63 %
NITRATE UR QL: NEGATIVE
NRBC # BLD AUTO: 0 10E3/UL
NRBC BLD AUTO-RTO: 0 /100
PH UR STRIP: 7 [PH] (ref 5–7)
PLATELET # BLD AUTO: 450 10E3/UL (ref 150–450)
POTASSIUM SERPL-SCNC: 3.6 MMOL/L (ref 3.4–5.3)
PROT SERPL-MCNC: 7 G/DL (ref 6.4–8.3)
RBC # BLD AUTO: 4.59 10E6/UL (ref 3.8–5.2)
RBC URINE: 0 /HPF
SODIUM SERPL-SCNC: 141 MMOL/L (ref 136–145)
SP GR UR STRIP: 1.01 (ref 1–1.03)
UROBILINOGEN UR STRIP-MCNC: NORMAL MG/DL
WBC # BLD AUTO: 9.6 10E3/UL (ref 4–11)
WBC URINE: <1 /HPF

## 2023-02-10 PROCEDURE — 36415 COLL VENOUS BLD VENIPUNCTURE: CPT | Performed by: EMERGENCY MEDICINE

## 2023-02-10 PROCEDURE — 250N000009 HC RX 250: Performed by: EMERGENCY MEDICINE

## 2023-02-10 PROCEDURE — 80053 COMPREHEN METABOLIC PANEL: CPT | Performed by: EMERGENCY MEDICINE

## 2023-02-10 PROCEDURE — 82248 BILIRUBIN DIRECT: CPT | Performed by: EMERGENCY MEDICINE

## 2023-02-10 PROCEDURE — 85004 AUTOMATED DIFF WBC COUNT: CPT | Performed by: EMERGENCY MEDICINE

## 2023-02-10 PROCEDURE — 83690 ASSAY OF LIPASE: CPT | Performed by: EMERGENCY MEDICINE

## 2023-02-10 PROCEDURE — 99285 EMERGENCY DEPT VISIT HI MDM: CPT | Mod: 25

## 2023-02-10 PROCEDURE — 81001 URINALYSIS AUTO W/SCOPE: CPT | Performed by: EMERGENCY MEDICINE

## 2023-02-10 PROCEDURE — 74177 CT ABD & PELVIS W/CONTRAST: CPT

## 2023-02-10 PROCEDURE — 250N000011 HC RX IP 250 OP 636: Performed by: EMERGENCY MEDICINE

## 2023-02-10 RX ORDER — IOPAMIDOL 755 MG/ML
68 INJECTION, SOLUTION INTRAVASCULAR ONCE
Status: COMPLETED | OUTPATIENT
Start: 2023-02-10 | End: 2023-02-10

## 2023-02-10 RX ADMIN — SODIUM CHLORIDE 60 ML: 900 INJECTION INTRAVENOUS at 10:11

## 2023-02-10 RX ADMIN — IOPAMIDOL 68 ML: 755 INJECTION, SOLUTION INTRAVENOUS at 10:10

## 2023-02-10 ASSESSMENT — ENCOUNTER SYMPTOMS
ABDOMINAL PAIN: 1
DYSURIA: 0
ABDOMINAL DISTENTION: 0
VOMITING: 0
CHILLS: 1
HEMATURIA: 0
BLOOD IN STOOL: 1
UNEXPECTED WEIGHT CHANGE: 1
NAUSEA: 1
DIARRHEA: 0
FEVER: 0

## 2023-02-10 ASSESSMENT — ACTIVITIES OF DAILY LIVING (ADL)
ADLS_ACUITY_SCORE: 35
ADLS_ACUITY_SCORE: 35

## 2023-02-10 NOTE — ED PROVIDER NOTES
"  History     Chief Complaint:  Abdominal Pain       The history is provided by the patient.      Debra Denise is a 82 year old female with a history of ulcerative sigmoidrectal ulcerative proctitis, hypertension, hyperlipidemia, and CAD who presents with abdominal pain, which she describes as \"burning pressure.\"  She reports history of proctitis and chronic colitis. She has had difficulty adjusting her medications for the past year and has had worsening symptoms over this period of time. She reports waking up at 0300 today, approximately 6.5 hours ago with burning lower abdominal pain and pressure.  Patient was previously on Rowasa enemas which has not needed recently.  Her symptoms over the last few days are worse at night and often wake her up at night.  She notes that she had bloody stools at baseline but recently, she has been passing mucous and blood clots without stool. She also endorses some chills without fever and occasional nausea without emesis. She has been trying to manage her symptoms with her diet, but she reports losing about 12 lbs in 1 year due to diet changes. She denies any worsening of symptoms when she eats. She denies history of Crohn's disease and ulcerative colitis. She denies use of blood thinners and aspirin. She denies abdominal distention, hematuria, dysuria, and diarrhea. She otherwise feels well.       Independent Historian:   None - Patient Only    Review of External Notes: I reviewed the patient's GI notes from [facility].    ROS:  Review of Systems   Constitutional: Positive for chills and unexpected weight change. Negative for fever.   Gastrointestinal: Positive for abdominal pain, blood in stool and nausea. Negative for abdominal distention, diarrhea and vomiting.   Genitourinary: Negative for dysuria and hematuria.   All other systems reviewed and are negative.      Allergies:  Sulfacetamide  Adhesive Tape  Atenolol  Ibuprofen  Naproxen  Ramipril  Sulfa " "Drugs  Codeine  Fentanyl     Medications:    Gabapentin    Valsartan  Clonidine   Amlodipine     Past Medical History:    Hypertension  Polymyalgia rheumatica   CAD  Hyperlipidemia     Past Surgical History:    Colonoscopy  Coronary angiogram  Tonsillectomy  Adenoidectomy    section x2  ZAC  BSO   Subacromial decompression, lap  Endovenous Ablation    Family History:     Colorectal cancer  Hypertension  Lung cancer  Macular degeneration  Multiple myeloma  Suicide     Social History:  The patient presents with her   PCP: Wolfgang Zimmerman     Physical Exam     Patient Vitals for the past 24 hrs:   BP Temp Temp src Pulse Resp SpO2 Height Weight   02/10/23 0858 (!) 146/67 -- -- 57 -- 99 % -- --   02/10/23 0842 (!) 161/53 97.6  F (36.4  C) Temporal 54 16 99 % 1.651 m (5' 5\") 60.8 kg (134 lb)        Physical Exam  General: Well appearing, nontoxic. Resting comfortably  Head:  Scalp, face, and head appear normal  Eyes:  Pupils are equal, round    Conjunctivae non-injected and sclerae white  ENT:    The external nose is normal    Pinnae are normal  Neck:  Normal range of motion    There is no rigidity noted    Trachea is in the midline  CV:  Regular rate and rhythm     Normal S1/S2, no S3/S4    No murmur or rub. Radial pulses 2+ bilaterally.  Resp:  Lungs are clear and equal bilaterally  There is no tachypnea    No increased work of breathing    No rales, wheezing, or rhonchi  GI:  Abdomen is soft, no rigidity or guarding    No distension, or mass    No tenderness or rebound tenderness   MS:  Normal muscular tone    Symmetric motor strength    No lower extremity edema  Skin:  No rash or acute skin lesions noted  Neuro: Awake and alert  Speech is normal and fluent  Moves all extremities spontaneously  Psych:  Normal affect. Appropriate interactions.      Emergency Department Course     Imaging:  CT Abdomen Pelvis w Contrast   Final Result   IMPRESSION:    1.  No acute findings in the abdomen and pelvis.   2.  " Colonic diverticulosis.      INOCENCIO GAFFNEY MD            SYSTEM ID:  [id number]         Report per radiology    Laboratory:  Labs Ordered and Resulted from Time of ED Arrival to Time of ED Departure   BASIC METABOLIC PANEL - Abnormal       Result Value    Sodium 141      Potassium 3.6      Chloride 102      Carbon Dioxide (CO2) 31 (*)     Anion Gap 8      Urea Nitrogen 14.1      Creatinine 0.76      Calcium 9.6      Glucose 96      GFR Estimate 77     HEPATIC FUNCTION PANEL - Normal    Protein Total 7.0      Albumin 4.5      Bilirubin Total 0.5      Alkaline Phosphatase 68      AST 18      ALT 13      Bilirubin Direct <0.20     LIPASE - Normal    Lipase 23     ROUTINE UA WITH MICROSCOPIC REFLEX TO CULTURE - Normal    Color Urine Straw      Appearance Urine Clear      Glucose Urine Negative      Bilirubin Urine Negative      Ketones Urine Negative      Specific Gravity Urine 1.006      Blood Urine Negative      pH Urine 7.0      Protein Albumin Urine Negative      Urobilinogen Urine Normal      Nitrite Urine Negative      Leukocyte Esterase Urine Negative      RBC Urine 0      WBC Urine <1     CBC WITH PLATELETS AND DIFFERENTIAL    WBC Count 9.6      RBC Count 4.59      Hemoglobin 14.2      Hematocrit 41.2      MCV 90      MCH 30.9      MCHC 34.5      RDW 13.1      Platelet Count 450      % Neutrophils 63      % Lymphocytes 22      % Monocytes 9      % Eosinophils 5      % Basophils 0      % Immature Granulocytes 1      NRBCs per 100 WBC 0      Absolute Neutrophils 6.1      Absolute Lymphocytes 2.1      Absolute Monocytes 0.9      Absolute Eosinophils 0.5      Absolute Basophils 0.0      Absolute Immature Granulocytes 0.1      Absolute NRBCs 0.0          Emergency Department Course & Assessments:    Interventions:  Medications   iopamidol (ISOVUE-370) solution 68 mL (68 mLs Intravenous Given 2/10/23 1010)   Saline Flush (60 mLs Intravenous Given 2/10/23 1011)        Independent Interpretation (X-rays, CTs, rhythm  strip):  Consultations/Discussion of Management or Tests/Assessments:    ED Course as of 02/10/23 1225   Fri Feb 10, 2023   3303 I gathered history and examined the patient as noted above.     1051 I rechecked the patient and explained findings. I believe that they are safe for discharge at this time.        Social Determinants of Health affecting care:   None    Disposition:  The patient was discharged to home.     Impression & Plan      Medical Decision Making:  Debra Denise is a 82 year old female with a history of chronic ulcerative rectosigmoiditis and proctitis who follows with [facility] who presents with episodic lower abdominal burning and increased mucus and blood per rectum.  No large-volume hemorrhage.  On my evaluation she is well-appearing, hemodynamically stable and afebrile.  Abdominal exam is benign without evidence of peritonitis or acute surgical emergency.  She denies any abdominal pain currently.  Work-up in the emergency department thankfully reassuring.  Hemoglobin is normal.  Patient is not anticoagulated.  CMP and lipase are reassuring.  No leukocytosis.  No fevers.  Urinalysis is unremarkable.  CT scan of the abdomen and pelvis was obtained which does not reveal any evidence of acute bowel pathology.  No volvulus, bowel obstruction, abscess, perforation or any other acute intra-abdominal findings.  Kidney stones are seen.  Patient is aware of these.  These are felt to be unrelated to her presentation today.  Findings and symptoms are consistent with a flare of her chronic sigmoiditis and proctitis.  I recommended that she restart her Rowasa enemas.  She should follow-up closely with her GI specialist.  No indication for admission to the hospital or antibiotics at this time.  Patient is agreeable to plan of care.  Close return precautions are provided and she was discharged in stable condition.      Diagnosis:    ICD-10-CM    1. Chronic ulcerative rectosigmoiditis with rectal bleeding (H)   K51.311            Discharge Medications:  Discharge Medication List as of 2/10/2023 11:34 AM             Scribe Disclosure:  I, Connieabhishek Davidson, am serving as a scribe at 8:57 AM on 2/10/2023 to document services personally performed by Nicolas Cruz MD based on my observations and the provider's statements to me.     2/10/2023   Nicolas Cruz MD Daro, Ryan Clay, MD  02/10/23 1227

## 2023-02-10 NOTE — ED TRIAGE NOTES
abd pain - burning feeling - across lower area waking pt up at 0300 - occurs during the night last 3 nights   Hx of colitis   Denies nausea vomiting or diarrhea   Denies any urinating symptoms        Triage Assessment     Row Name 02/10/23 7577       Triage Assessment (Adult)    Airway WDL WDL       Respiratory WDL    Respiratory WDL WDL       Cardiac WDL    Cardiac WDL WDL       Cognitive/Neuro/Behavioral WDL    Cognitive/Neuro/Behavioral WDL WDL

## 2023-02-10 NOTE — TELEPHONE ENCOUNTER
"Nurse Triage SBAR    Is this a 2nd Level Triage? NO    Situation: Patient calling with reports of worsening abdominal pain and rectal bleeding    Background: No consent on file- able to speak to patient  Patient has a significant GI history - [facility]- Ulcerative Sigmoid Proctitis     Assessment: States the last 3 nights she has had pain and rectal bleeding- 2-3 am - episodes last hours  Pressure and burning  8/10 last night 2/10 currently -states pain has not been like this in the past   Off all medications \"to see how she does\"   Rectal bleeding is intermittent- states its more clots now- states that this varies- states that she will have clots without stool- half an inch long mixed with mucous  Burning and pressure like pain keeps her up at night- whole lower abdomen   States pain was constant overnight- states with BM the pain improved  States she still has her appendix   States some pain into her low back as well  Nothing makes pain worse    Protocol Recommended Disposition:   Go to ED Now    Recommendation: Advised patient to be seen in the ER today and then follow up with [facility] after she is seen. Patient agrees to be seen and will go to [facility] ER now. Declines additional questions.      ED Now    Does the patient meet one of the following criteria for ADS visit consideration? 16+ years old, with an [facility] PCP       Reason for Disposition    Bloody, black, or tarry bowel movements  (Exception: Chronic-unchanged black-grey bowel movements and is taking iron pills or Pepto-Bismol.)    Additional Information    Negative: Passed out (i.e., fainted, collapsed and was not responding)    Negative: Shock suspected (e.g., cold/pale/clammy skin, too weak to stand, low BP, rapid pulse)    Negative: Sounds like a life-threatening emergency to the triager    Negative: Chest pain    Negative: Pain is mainly in upper abdomen (if needed ask: 'is it mainly above the belly button?')    Negative: Abdominal pain and pregnant < 20 " weeks    Negative: Abdominal pain and pregnant 20 or more weeks    Negative: SEVERE abdominal pain (e.g., excruciating)    Negative: Vomiting red blood or black (coffee ground) material    Protocols used: ABDOMINAL PAIN - FEMALE-A-OH

## 2023-02-14 DIAGNOSIS — K62.89 PROCTITIS: ICD-10-CM

## 2023-02-17 RX ORDER — MESALAMINE 4 G/60ML
SUSPENSION RECTAL
Qty: 420 ML | Refills: 1 | Status: SHIPPED | OUTPATIENT
Start: 2023-02-17 | End: 2023-03-24

## 2023-02-17 NOTE — TELEPHONE ENCOUNTER
Mesalamine Rectal Enema 4 GM  Last Written Prescription Date:   9/9/2022  Last Fill Quantity: 6,   # refills: 3  Last Office Visit :  11/15/2022  Future Office visit:   4/7/2023    Routing refill request to provider for review/approval because:  Last ordered in [place].    This time ordering in [place].  How many mL's would the Provider like for Pt care.       Martha Casanova RN  Central Triage Red Flags/Med Refills

## 2023-03-12 DIAGNOSIS — I10 BENIGN ESSENTIAL HYPERTENSION: ICD-10-CM

## 2023-03-15 RX ORDER — VALSARTAN AND HYDROCHLOROTHIAZIDE 160; 25 MG/1; MG/1
1 TABLET ORAL DAILY
Qty: 90 TABLET | Refills: 0 | Status: SHIPPED | OUTPATIENT
Start: 2023-03-15 | End: 2023-06-20

## 2023-03-15 NOTE — TELEPHONE ENCOUNTER
Valsartan-hydroCHLOROthiazide Oral Tablet 160-25 MG      Last Written Prescription Date:  3/18/22  Last Fill Quantity: 90,   # refills: 3  Last Office Visit : 11/15/22  Future Office visit:  4/7/23    Routing refill request to provider for review/approval because:  Blood pressure out of range       90d golden refill sent, routed to clinic staff for follow up

## 2023-03-24 DIAGNOSIS — K62.89 PROCTITIS: ICD-10-CM

## 2023-03-24 RX ORDER — MESALAMINE 4 G/60ML
SUSPENSION RECTAL
Qty: 420 ML | Refills: 4 | Status: SHIPPED | OUTPATIENT
Start: 2023-03-24 | End: 2023-04-14 | Stop reason: ALTCHOICE

## 2023-04-05 ENCOUNTER — TRANSFERRED RECORDS (OUTPATIENT)
Dept: HEALTH INFORMATION MANAGEMENT | Facility: CLINIC | Age: 83
End: 2023-04-05
Payer: COMMERCIAL

## 2023-04-14 ENCOUNTER — OFFICE VISIT (OUTPATIENT)
Dept: INTERNAL MEDICINE | Facility: CLINIC | Age: 83
End: 2023-04-14
Payer: COMMERCIAL

## 2023-04-14 ENCOUNTER — LAB (OUTPATIENT)
Dept: LAB | Facility: CLINIC | Age: 83
End: 2023-04-14
Payer: COMMERCIAL

## 2023-04-14 ENCOUNTER — TELEPHONE (OUTPATIENT)
Dept: INTERNAL MEDICINE | Facility: CLINIC | Age: 83
End: 2023-04-14

## 2023-04-14 VITALS
OXYGEN SATURATION: 98 % | SYSTOLIC BLOOD PRESSURE: 131 MMHG | HEART RATE: 53 BPM | WEIGHT: 132.2 LBS | HEIGHT: 65 IN | DIASTOLIC BLOOD PRESSURE: 66 MMHG | BODY MASS INDEX: 22.02 KG/M2

## 2023-04-14 DIAGNOSIS — R53.1 WEAKNESS: ICD-10-CM

## 2023-04-14 DIAGNOSIS — R53.83 OTHER FATIGUE: ICD-10-CM

## 2023-04-14 DIAGNOSIS — R53.83 OTHER FATIGUE: Primary | ICD-10-CM

## 2023-04-14 DIAGNOSIS — K62.5 RECTAL BLEEDING: ICD-10-CM

## 2023-04-14 DIAGNOSIS — K62.5 RECTAL BLEEDING: Primary | ICD-10-CM

## 2023-04-14 LAB
ALBUMIN SERPL BCG-MCNC: 4.2 G/DL (ref 3.5–5.2)
ALP SERPL-CCNC: 59 U/L (ref 35–104)
ALT SERPL W P-5'-P-CCNC: 10 U/L (ref 10–35)
ANION GAP SERPL CALCULATED.3IONS-SCNC: 8 MMOL/L (ref 7–15)
AST SERPL W P-5'-P-CCNC: 15 U/L (ref 10–35)
BASOPHILS # BLD AUTO: 0 10E3/UL (ref 0–0.2)
BASOPHILS NFR BLD AUTO: 0 %
BILIRUB SERPL-MCNC: 0.4 MG/DL
BUN SERPL-MCNC: 16.7 MG/DL (ref 8–23)
CALCIUM SERPL-MCNC: 9.9 MG/DL (ref 8.8–10.2)
CHLORIDE SERPL-SCNC: 100 MMOL/L (ref 98–107)
CREAT SERPL-MCNC: 0.85 MG/DL (ref 0.51–0.95)
CRP SERPL-MCNC: <3 MG/L
DEPRECATED HCO3 PLAS-SCNC: 33 MMOL/L (ref 22–29)
EOSINOPHIL # BLD AUTO: 0.2 10E3/UL (ref 0–0.7)
EOSINOPHIL NFR BLD AUTO: 1 %
ERYTHROCYTE [DISTWIDTH] IN BLOOD BY AUTOMATED COUNT: 13.2 % (ref 10–15)
GFR SERPL CREATININE-BSD FRML MDRD: 68 ML/MIN/1.73M2
GLUCOSE SERPL-MCNC: 107 MG/DL (ref 70–99)
HCT VFR BLD AUTO: 41.9 % (ref 35–47)
HGB BLD-MCNC: 14.1 G/DL (ref 11.7–15.7)
IMM GRANULOCYTES # BLD: 0.1 10E3/UL
IMM GRANULOCYTES NFR BLD: 1 %
LYMPHOCYTES # BLD AUTO: 3 10E3/UL (ref 0.8–5.3)
LYMPHOCYTES NFR BLD AUTO: 23 %
MCH RBC QN AUTO: 30.3 PG (ref 26.5–33)
MCHC RBC AUTO-ENTMCNC: 33.7 G/DL (ref 31.5–36.5)
MCV RBC AUTO: 90 FL (ref 78–100)
MONOCYTES # BLD AUTO: 1.2 10E3/UL (ref 0–1.3)
MONOCYTES NFR BLD AUTO: 10 %
NEUTROPHILS # BLD AUTO: 8.6 10E3/UL (ref 1.6–8.3)
NEUTROPHILS NFR BLD AUTO: 65 %
NRBC # BLD AUTO: 0 10E3/UL
NRBC BLD AUTO-RTO: 0 /100
PLATELET # BLD AUTO: 491 10E3/UL (ref 150–450)
POTASSIUM SERPL-SCNC: 4 MMOL/L (ref 3.4–5.3)
PROT SERPL-MCNC: 6.8 G/DL (ref 6.4–8.3)
RBC # BLD AUTO: 4.66 10E6/UL (ref 3.8–5.2)
SODIUM SERPL-SCNC: 141 MMOL/L (ref 136–145)
TSH SERPL DL<=0.005 MIU/L-ACNC: 0.46 UIU/ML (ref 0.3–4.2)
WBC # BLD AUTO: 13.1 10E3/UL (ref 4–11)

## 2023-04-14 PROCEDURE — 85025 COMPLETE CBC W/AUTO DIFF WBC: CPT | Performed by: PATHOLOGY

## 2023-04-14 PROCEDURE — 80053 COMPREHEN METABOLIC PANEL: CPT | Performed by: PATHOLOGY

## 2023-04-14 PROCEDURE — 36415 COLL VENOUS BLD VENIPUNCTURE: CPT | Performed by: PATHOLOGY

## 2023-04-14 PROCEDURE — 86140 C-REACTIVE PROTEIN: CPT | Performed by: PATHOLOGY

## 2023-04-14 PROCEDURE — 84443 ASSAY THYROID STIM HORMONE: CPT | Performed by: PATHOLOGY

## 2023-04-14 PROCEDURE — 99213 OFFICE O/P EST LOW 20 MIN: CPT | Performed by: HOSPITALIST

## 2023-04-14 RX ORDER — BUDESONIDE 3 MG/1
9 CAPSULE, COATED PELLETS ORAL
COMMUNITY
Start: 2023-04-10 | End: 2023-07-03

## 2023-04-14 ASSESSMENT — ENCOUNTER SYMPTOMS
SLEEP DISTURBANCE: 1
DIARRHEA: 0
WEAKNESS: 1
DYSURIA: 0
ABDOMINAL PAIN: 1
ARTHRALGIAS: 0
FEVER: 0
FREQUENCY: 0
CONSTIPATION: 0
MYALGIAS: 0
CHILLS: 0
HEMATOCHEZIA: 1
FATIGUE: 1
SHORTNESS OF BREATH: 0

## 2023-04-14 NOTE — TELEPHONE ENCOUNTER
[facility] Call Center    Phone Message    May a detailed message be left on voicemail: yes     Reason for Call: Other: Pt's spouse Aguilar requesting and urgent call back from the care team, pt is not feeling well and Aguilar would like to discuss getting some bloodwork done today

## 2023-04-14 NOTE — ASSESSMENT & PLAN NOTE
- Currently not anemic. WBC was mildly elevated at 13.1 possibly elevated due to rectal steroids. CRP is low. Fatigue possibly from poor sleep due to abdominal cramping.  - Will add labs CMP, TSH.   - Continue current medications.   - May take tylenol 650mg at bedtime.   - Warm pad to abdomen for non medication suggestion for cramping when they happen.  - May discuss anti spasm with GI, patient would like to hold on medication for now.  - Patient continues to work with GI on proctitis.

## 2023-04-14 NOTE — TELEPHONE ENCOUNTER
Called patients - patient is continuing to having blood with bm's, (which is known?) Does not endorse worsening symptoms but like to have labs assessed. RN recommend in person eval.     Cristian See RN on 4/14/2023 at 9:19 AM

## 2023-04-14 NOTE — PROGRESS NOTES
Assessment/Plan  Problem List Items Addressed This Visit        Other    Weakness    Relevant Orders    Comprehensive metabolic panel (BMP + Alb, Alk Phos, ALT, AST, Total. Bili, TP)    Other fatigue - Primary     - Currently not anemic. WBC was mildly elevated at 13.1 possibly elevated due to rectal steroids. CRP is low. Fatigue possibly from poor sleep due to abdominal cramping.  - Will add labs CMP, TSH.   - Continue current medications.   - May take tylenol 650mg at bedtime.   - Warm pad to abdomen for non medication suggestion for cramping when they happen.  - May discuss anti spasm with GI, patient would like to hold on medication for now.  - Patient continues to work with GI on proctitis.          Relevant Orders    Comprehensive metabolic panel (BMP + Alb, Alk Phos, ALT, AST, Total. Bili, TP)    TSH with free T4 reflex       Results for orders placed or performed in visit on 04/14/23   CRP, inflammation     Status: Normal   Result Value Ref Range    CRP Inflammation <3.00 <5.00 mg/L   CBC with platelets and differential     Status: Abnormal   Result Value Ref Range    WBC Count 13.1 (H) 4.0 - 11.0 10e3/uL    RBC Count 4.66 3.80 - 5.20 10e6/uL    Hemoglobin 14.1 11.7 - 15.7 g/dL    Hematocrit 41.9 35.0 - 47.0 %    MCV 90 78 - 100 fL    MCH 30.3 26.5 - 33.0 pg    MCHC 33.7 31.5 - 36.5 g/dL    RDW 13.2 10.0 - 15.0 %    Platelet Count 491 (H) 150 - 450 10e3/uL    % Neutrophils 65 %    % Lymphocytes 23 %    % Monocytes 10 %    % Eosinophils 1 %    % Basophils 0 %    % Immature Granulocytes 1 %    NRBCs per 100 WBC 0 <1 /100    Absolute Neutrophils 8.6 (H) 1.6 - 8.3 10e3/uL    Absolute Lymphocytes 3.0 0.8 - 5.3 10e3/uL    Absolute Monocytes 1.2 0.0 - 1.3 10e3/uL    Absolute Eosinophils 0.2 0.0 - 0.7 10e3/uL    Absolute Basophils 0.0 0.0 - 0.2 10e3/uL    Absolute Immature Granulocytes 0.1 <=0.4 10e3/uL    Absolute NRBCs 0.0 10e3/uL   CBC with platelets and differential     Status: Abnormal    Narrative    The  following orders were created for panel order CBC with platelets and differential.  Procedure                               Abnormality         Status                     ---------                               -----------         ------                     CBC with platelets and d...[[id number]]  Abnormal            Final result                 Please view results for these tests on the individual orders.       Health Maintenance Due   Topic Date Due     ADVANCE CARE PLANNING  Never done     DTAP/TDAP/TD IMMUNIZATION (1 - Tdap) 02/01/2018     FALL RISK ASSESSMENT  01/29/2021     COVID-19 Vaccine (5 - Booster for Pfizer series) 06/02/2022     MEDICARE ANNUAL WELLNESS VISIT  04/27/2023         Subjective  Poor sleep due to abdominal cramping, ranges from 3-7 hours. Feeling weak and fatigue. She does take tylenol 2 tablets during the day. Mentions she was tried on mesalamine (both suppository and enema), then balsalazide with helped for a few weeks but then worsened again (enema and suppository) and was unable to take after a while. Most recently she was started no hydrocortisone enema and budesonide which she is about to start. No fevers or chills. Has continued dripping from rectum of blood. Mentions she will be having a repeat EGD and sigmoidoscopy on 4/24/23 with Dr. Bear with [facility]. Has also been working with [facility]. No problems urinating. Has been staying on a low fiber diet and worries about her nutrition.       Review of Systems   Constitutional: Positive for fatigue. Negative for chills and fever.   Respiratory: Negative for shortness of breath.    Cardiovascular: Negative for chest pain and peripheral edema.   Gastrointestinal: Positive for abdominal pain and hematochezia. Negative for constipation and diarrhea.   Genitourinary: Negative for dysuria and frequency.   Musculoskeletal: Negative for arthralgias and myalgias.   Neurological: Positive for weakness.   Psychiatric/Behavioral: Positive for sleep  "disturbance.       History  Past Medical History:   Diagnosis Date     Dupuytren contracture     finger     Hypertension      Osteopenia      Polymyalgia rheumatica (H)      Proctitis        Past Surgical History:   Procedure Laterality Date     COLONOSCOPY  2014    Procedure: COMBINED COLONOSCOPY, SINGLE BIOPSY/POLYPECTOMY BY BIOPSY;  COLONOSCOPY;  Surgeon: Carol Ann Plasencia MD;  Location:  GI     CV CORONARY ANGIOGRAM N/A 2022    Procedure: Coronary Angiogram;  Surgeon: Trevin Hawk MD;  Location:  HEART CARDIAC CATH LAB     ENT SURGERY      tonsilectomy, adenoidectomy     GYN SURGERY  ,     x 2     ORTHOPEDIC SURGERY  2004    (R) shoulder surgery for frozen shoulder     ZAC BSO      for fibroids       Family History   Problem Relation Age of Onset     Cancer - colorectal Father      Lung Cancer Father      Macular Degeneration Father      Multiple myeloma Brother      Pacemaker Brother      Hypertension Mother      Cerebrovascular Disease Mother         temporal arteritis     Suicide Sister      Hypertension Maternal Grandmother      Cerebrovascular Disease Maternal Grandmother        Social History     Tobacco Use     Smoking status: Never     Smokeless tobacco: Never   Vaping Use     Vaping status: Not on file   Substance Use Topics     Alcohol use: No        Objective  /66 (BP Location: Right arm, Patient Position: Sitting, Cuff Size: Adult Small)   Pulse 53   Ht 1.651 m (5' 5\")   Wt 60 kg (132 lb 3.2 oz)   SpO2 98%   BMI 22.00 kg/m    Vitals taken by Delmar Kerr MD    Physical Exam  Constitutional:       General: She is not in acute distress.     Appearance: She is not ill-appearing or toxic-appearing.   HENT:      Head: Normocephalic.   Eyes:      Conjunctiva/sclera: Conjunctivae normal.   Pulmonary:      Effort: Pulmonary effort is normal.   Abdominal:      General: Bowel sounds are normal. There is no distension.      " Palpations: Abdomen is soft. There is no mass.      Tenderness: There is no abdominal tenderness.      Hernia: No hernia is present.   Musculoskeletal:      Right lower leg: No edema.      Left lower leg: No edema.   Skin:     Findings: No rash.   Neurological:      Mental Status: She is alert.   Psychiatric:         Mood and Affect: Mood normal.         Thought Content: Thought content normal.         25 minutes spent on the date of the encounter doing chart review, history and exam, documentation and further activities per the note.      Return in about 5 days (around 4/19/2023).      Delmar Kerr MD  [facility] INTERNAL MEDICINE [place]

## 2023-04-14 NOTE — TELEPHONE ENCOUNTER
Placed labs for CBC and CRP.     Delmar Kerr MD  Internal Medicine  Primary Care Clinic, [place], [place]

## 2023-04-14 NOTE — NURSING NOTE
"Debra Denise is a 82 year old female patient that presents today in clinic for the following:    Chief Complaint   Patient presents with     Rectal Problem     Pt reports on-going rectal bleeding (over the last year), suspects colitis     Results     Pt would like to discuss labs results, has some questions     The patient's allergies and medications were reviewed as noted. A set of vitals were recorded as noted without incident: /66 (BP Location: Right arm, Patient Position: Sitting, Cuff Size: Adult Small)   Pulse 53   Ht 1.651 m (5' 5\")   Wt 60 kg (132 lb 3.2 oz)   SpO2 98%   BMI 22.00 kg/m  . The patient does not have any other questions for the provider.    Nish Ward, EMT 1:46 PM on 4/14/2023   "

## 2023-04-14 NOTE — PATIENT INSTRUCTIONS
- Currently not anemic. CRP is low.   - Will add labs CMP, TSH.   - Continue current medications.   - May take tylenol 650mg at bedtime.   - May discuss anti spasm with GI.     Keep preop follow up with Dr. Zimmerman on 4/19/23.

## 2023-04-19 ENCOUNTER — OFFICE VISIT (OUTPATIENT)
Dept: INTERNAL MEDICINE | Facility: CLINIC | Age: 83
End: 2023-04-19
Payer: COMMERCIAL

## 2023-04-19 VITALS
OXYGEN SATURATION: 97 % | DIASTOLIC BLOOD PRESSURE: 70 MMHG | HEART RATE: 53 BPM | BODY MASS INDEX: 21.37 KG/M2 | WEIGHT: 128.4 LBS | SYSTOLIC BLOOD PRESSURE: 125 MMHG

## 2023-04-19 DIAGNOSIS — R11.0 NAUSEA: ICD-10-CM

## 2023-04-19 DIAGNOSIS — I10 BENIGN ESSENTIAL HYPERTENSION: Primary | ICD-10-CM

## 2023-04-19 PROCEDURE — 99214 OFFICE O/P EST MOD 30 MIN: CPT | Mod: 25 | Performed by: INTERNAL MEDICINE

## 2023-04-19 PROCEDURE — 93000 ELECTROCARDIOGRAM COMPLETE: CPT | Performed by: INTERNAL MEDICINE

## 2023-04-19 RX ORDER — ONDANSETRON 4 MG/1
4 TABLET, ORALLY DISINTEGRATING ORAL EVERY 8 HOURS PRN
Qty: 12 TABLET | Refills: 1 | Status: SHIPPED | OUTPATIENT
Start: 2023-04-19 | End: 2024-06-18

## 2023-04-19 NOTE — NURSING NOTE
Debra Denise is a 82 year old female that presents in clinic today for the following:     Chief Complaint   Patient presents with     Pre-Op Exam     Pt here for pre-op exam and wants to discuss weakness and weight loss       The patient's allergies and medications were reviewed. The patient's vitals were obtained without incident. The patient does not have any other questions for the provider.     Bg Moon, EMT at 1:52 PM on 4/19/2023.  Primary Care Clinic: [phone]

## 2023-04-19 NOTE — PROGRESS NOTES
HPI:    Ms. Denise comes in for preoperative evaluation for EGD and flexible sigmoidoscopy  with Dr. Lara on 2023. She has continued rectal bleeding, upper abdominal complaints and anorexia. Last colonoscopy [facility] 2022. She had abdominal/pelvic CT imagining 2/10/2023. She states insensitivity to fentanyl. She has nausea. She has lost about 10 lbs over the last few months. She denies any rest/exertional CP/SOB. No other HEENT, cardiopulmonary, gastrointestinal, , GYN, neurological, systemic, psychiatric, lymphatic, endocrine, vascular complaints.     Past Medical History:   Diagnosis Date     Dupuytren contracture     finger     Hypertension      Osteopenia      Polymyalgia rheumatica (H)      Proctitis      Past Surgical History:   Procedure Laterality Date     COLONOSCOPY  2014    Procedure: COMBINED COLONOSCOPY, SINGLE BIOPSY/POLYPECTOMY BY BIOPSY;  COLONOSCOPY;  Surgeon: Carol Ann Plasencia MD;  Location:  GI     CV CORONARY ANGIOGRAM N/A 2022    Procedure: Coronary Angiogram;  Surgeon: Trevin Hawk MD;  Location:  HEART CARDIAC CATH LAB     ENT SURGERY      tonsilectomy, adenoidectomy     GYN SURGERY  ,     x 2     ORTHOPEDIC SURGERY  2004    (R) shoulder surgery for frozen shoulder     ZAC BSO      for fibroids     PE:    Vitals noted, gen, nad, cooperative, alert, neck supple nl rom, lungs with good air movement, RRR, S1, S2, no MRG, abdomen, positive bowel sounds, no rebound, Grossly normal neurological exam.     EKG: SR at 53. No acute findings. No change from 2022    Coronary angiogram 2022;    Angiographically normal coronary arteries.     Dobutamine stress echo 10/11/2021:    Procedure  Dobutamine stress echo with two dimensional color and spectral Doppler  performed.    Interpretation Summary     Dobutamine stress echocardiogram negative for stress-induced wall motion  abnormalities.  The EKG component of  the exam was abnormal.  Coronary CTA could be considered for further evaluation, if clinically  appropriate.     Normal left ventricular function and wall motion at rest and post-stress. The  visual ejection fraction is >70%.  At target heart rate with Dobutamine, global left ventricular function  augments.  At target heart rate with Dobutamine left ventricular size decreases.  Sensitivity reduced due to baseline hyperdynamic LV function.  This was an abnormal stress EKG. There were 1.5mm horizontal ST segment  depressions in leads II, III, aVF, and V4-6, resolving by 11:50 into Recovery.     No arrhythmia during Stress, occasional PVCs in Recovery.       Results for orders placed or performed in visit on 04/19/23 (from the past 48 hour(s))   EKG 12-lead complete w/read - Clinics   Result Value Ref Range    Systolic Blood Pressure  mmHg    Diastolic Blood Pressure  mmHg    Ventricular Rate 53 BPM    Atrial Rate 53 BPM    NM Interval 140 ms    QRS Duration 88 ms     ms    QTc 442 ms    P Axis 48 degrees    R AXIS -8 degrees    T Axis 51 degrees    Interpretation ECG       Sinus bradycardia  Possible Left atrial enlargement  Borderline ECG  When compared with ECG of 30-AUG-2022 12:44,  No significant change was found       Lab on 04/14/2023   Component Date Value Ref Range Status     CRP Inflammation 04/14/2023 <3.00  <5.00 mg/L Final     WBC Count 04/14/2023 13.1 (H)  4.0 - 11.0 10e3/uL Final     RBC Count 04/14/2023 4.66  3.80 - 5.20 10e6/uL Final     Hemoglobin 04/14/2023 14.1  11.7 - 15.7 g/dL Final     Hematocrit 04/14/2023 41.9  35.0 - 47.0 % Final     MCV 04/14/2023 90  78 - 100 fL Final     MCH 04/14/2023 30.3  26.5 - 33.0 pg Final     MCHC 04/14/2023 33.7  31.5 - 36.5 g/dL Final     RDW 04/14/2023 13.2  10.0 - 15.0 % Final     Platelet Count 04/14/2023 491 (H)  150 - 450 10e3/uL Final     % Neutrophils 04/14/2023 65  % Final     % Lymphocytes 04/14/2023 23  % Final     % Monocytes 04/14/2023 10  % Final      % Eosinophils 04/14/2023 1  % Final     % Basophils 04/14/2023 0  % Final     % Immature Granulocytes 04/14/2023 1  % Final     NRBCs per 100 WBC 04/14/2023 0  <1 /100 Final     Absolute Neutrophils 04/14/2023 8.6 (H)  1.6 - 8.3 10e3/uL Final     Absolute Lymphocytes 04/14/2023 3.0  0.8 - 5.3 10e3/uL Final     Absolute Monocytes 04/14/2023 1.2  0.0 - 1.3 10e3/uL Final     Absolute Eosinophils 04/14/2023 0.2  0.0 - 0.7 10e3/uL Final     Absolute Basophils 04/14/2023 0.0  0.0 - 0.2 10e3/uL Final     Absolute Immature Granulocytes 04/14/2023 0.1  <=0.4 10e3/uL Final     Absolute NRBCs 04/14/2023 0.0  10e3/uL Final     Sodium 04/14/2023 141  136 - 145 mmol/L Final     Potassium 04/14/2023 4.0  3.4 - 5.3 mmol/L Final     Chloride 04/14/2023 100  98 - 107 mmol/L Final     Carbon Dioxide (CO2) 04/14/2023 33 (H)  22 - 29 mmol/L Final     Anion Gap 04/14/2023 8  7 - 15 mmol/L Final     Urea Nitrogen 04/14/2023 16.7  8.0 - 23.0 mg/dL Final     Creatinine 04/14/2023 0.85  0.51 - 0.95 mg/dL Final     Calcium 04/14/2023 9.9  8.8 - 10.2 mg/dL Final     Glucose 04/14/2023 107 (H)  70 - 99 mg/dL Final     Alkaline Phosphatase 04/14/2023 59  35 - 104 U/L Final     AST 04/14/2023 15  10 - 35 U/L Final     ALT 04/14/2023 10  10 - 35 U/L Final     Protein Total 04/14/2023 6.8  6.4 - 8.3 g/dL Final     Albumin 04/14/2023 4.2  3.5 - 5.2 g/dL Final     Bilirubin Total 04/14/2023 0.4  <=1.2 mg/dL Final     GFR Estimate 04/14/2023 68  >60 mL/min/1.73m2 Final    eGFR calculated using 2021 CKD-EPI equation.     TSH 04/14/2023 0.46  0.30 - 4.20 uIU/mL Final           A/P:    Low risk for planned GI endoscopic procedures. She denies any cardiopulmonary complaints. She has unremarkable non-invasive and invasive cardiac evaluations.      1. Avoid ASA/NSAIDS before procedure  2. She has insensitivity  to fentanyl and would avoid this medication  3. Sent in prescription for Zofran for nausea   4. Laboratory test reviewed from 4/14/2023 and no  acute findings.   5. Preoperative COVID testing. She does not have any current infectious/pulmonary symptoms.   6. She can hold her valsartan/HcTz day of procedure. Her electrolytes and creatinine were unremarkable on 4/14/2023.       Total time spent today with this patient including chart review, exam time with patient and documentation : 30 minutes.  This time was exclusive of the EKG interpretation

## 2023-04-20 LAB
ATRIAL RATE - MUSE: 53 BPM
DIASTOLIC BLOOD PRESSURE - MUSE: NORMAL MMHG
INTERPRETATION ECG - MUSE: NORMAL
P AXIS - MUSE: 48 DEGREES
PR INTERVAL - MUSE: 140 MS
QRS DURATION - MUSE: 88 MS
QT - MUSE: 472 MS
QTC - MUSE: 442 MS
R AXIS - MUSE: -8 DEGREES
SYSTOLIC BLOOD PRESSURE - MUSE: NORMAL MMHG
T AXIS - MUSE: 51 DEGREES
VENTRICULAR RATE- MUSE: 53 BPM

## 2023-04-21 ENCOUNTER — TRANSFERRED RECORDS (OUTPATIENT)
Dept: HEALTH INFORMATION MANAGEMENT | Facility: CLINIC | Age: 83
End: 2023-04-21
Payer: COMMERCIAL

## 2023-04-24 ENCOUNTER — HOSPITAL ENCOUNTER (OUTPATIENT)
Facility: CLINIC | Age: 83
Discharge: HOME | End: 2023-04-24
Attending: INTERNAL MEDICINE | Admitting: INTERNAL MEDICINE
Payer: COMMERCIAL

## 2023-04-24 ENCOUNTER — ANESTHESIA EVENT (OUTPATIENT)
Dept: GASTROENTEROLOGY | Facility: CLINIC | Age: 83
End: 2023-04-24
Payer: COMMERCIAL

## 2023-04-24 ENCOUNTER — ANESTHESIA (OUTPATIENT)
Dept: GASTROENTEROLOGY | Facility: CLINIC | Age: 83
End: 2023-04-24
Payer: COMMERCIAL

## 2023-04-24 VITALS
HEART RATE: 55 BPM | OXYGEN SATURATION: 97 % | SYSTOLIC BLOOD PRESSURE: 147 MMHG | BODY MASS INDEX: 21.33 KG/M2 | DIASTOLIC BLOOD PRESSURE: 66 MMHG | RESPIRATION RATE: 20 BRPM | WEIGHT: 128 LBS | HEIGHT: 65 IN

## 2023-04-24 LAB
FLEXIBLE SIGMOIDOSCOPY: NORMAL
UPPER GI ENDOSCOPY: NORMAL

## 2023-04-24 PROCEDURE — 250N000009 HC RX 250: Performed by: ANESTHESIOLOGY

## 2023-04-24 PROCEDURE — 43239 EGD BIOPSY SINGLE/MULTIPLE: CPT | Performed by: INTERNAL MEDICINE

## 2023-04-24 PROCEDURE — 258N000003 HC RX IP 258 OP 636: Performed by: ANESTHESIOLOGY

## 2023-04-24 PROCEDURE — 250N000011 HC RX IP 250 OP 636: Performed by: ANESTHESIOLOGY

## 2023-04-24 PROCEDURE — 88305 TISSUE EXAM BY PATHOLOGIST: CPT | Mod: TC | Performed by: INTERNAL MEDICINE

## 2023-04-24 PROCEDURE — 45331 SIGMOIDOSCOPY AND BIOPSY: CPT | Performed by: INTERNAL MEDICINE

## 2023-04-24 PROCEDURE — 370N000017 HC ANESTHESIA TECHNICAL FEE, PER MIN: Performed by: INTERNAL MEDICINE

## 2023-04-24 PROCEDURE — 999N000010 HC STATISTIC ANES STAT CODE-CRNA PER MINUTE: Performed by: INTERNAL MEDICINE

## 2023-04-24 RX ORDER — ONDANSETRON 2 MG/ML
4 INJECTION INTRAMUSCULAR; INTRAVENOUS
Status: CANCELLED | OUTPATIENT
Start: 2023-04-24

## 2023-04-24 RX ORDER — PROPOFOL 10 MG/ML
INJECTION, EMULSION INTRAVENOUS PRN
Status: DISCONTINUED | OUTPATIENT
Start: 2023-04-24 | End: 2023-04-24

## 2023-04-24 RX ORDER — PROCHLORPERAZINE MALEATE 5 MG
5 TABLET ORAL EVERY 6 HOURS PRN
Status: CANCELLED | OUTPATIENT
Start: 2023-04-24

## 2023-04-24 RX ORDER — NALOXONE HYDROCHLORIDE 0.4 MG/ML
0.4 INJECTION, SOLUTION INTRAMUSCULAR; INTRAVENOUS; SUBCUTANEOUS
Status: CANCELLED | OUTPATIENT
Start: 2023-04-24

## 2023-04-24 RX ORDER — SODIUM CHLORIDE, SODIUM LACTATE, POTASSIUM CHLORIDE, CALCIUM CHLORIDE 600; 310; 30; 20 MG/100ML; MG/100ML; MG/100ML; MG/100ML
INJECTION, SOLUTION INTRAVENOUS CONTINUOUS PRN
Status: DISCONTINUED | OUTPATIENT
Start: 2023-04-24 | End: 2023-04-24

## 2023-04-24 RX ORDER — PROPOFOL 10 MG/ML
INJECTION, EMULSION INTRAVENOUS CONTINUOUS PRN
Status: DISCONTINUED | OUTPATIENT
Start: 2023-04-24 | End: 2023-04-24

## 2023-04-24 RX ORDER — LIDOCAINE 40 MG/G
CREAM TOPICAL
Status: CANCELLED | OUTPATIENT
Start: 2023-04-24

## 2023-04-24 RX ORDER — LIDOCAINE HYDROCHLORIDE 20 MG/ML
INJECTION, SOLUTION INFILTRATION; PERINEURAL PRN
Status: DISCONTINUED | OUTPATIENT
Start: 2023-04-24 | End: 2023-04-24

## 2023-04-24 RX ORDER — GLYCOPYRROLATE 0.2 MG/ML
INJECTION, SOLUTION INTRAMUSCULAR; INTRAVENOUS PRN
Status: DISCONTINUED | OUTPATIENT
Start: 2023-04-24 | End: 2023-04-24

## 2023-04-24 RX ORDER — NALOXONE HYDROCHLORIDE 0.4 MG/ML
0.2 INJECTION, SOLUTION INTRAMUSCULAR; INTRAVENOUS; SUBCUTANEOUS
Status: CANCELLED | OUTPATIENT
Start: 2023-04-24

## 2023-04-24 RX ORDER — ONDANSETRON 2 MG/ML
4 INJECTION INTRAMUSCULAR; INTRAVENOUS EVERY 6 HOURS PRN
Status: CANCELLED | OUTPATIENT
Start: 2023-04-24

## 2023-04-24 RX ORDER — ONDANSETRON 4 MG/1
4 TABLET, ORALLY DISINTEGRATING ORAL EVERY 6 HOURS PRN
Status: CANCELLED | OUTPATIENT
Start: 2023-04-24

## 2023-04-24 RX ORDER — FLUMAZENIL 0.1 MG/ML
0.2 INJECTION, SOLUTION INTRAVENOUS
Status: CANCELLED | OUTPATIENT
Start: 2023-04-24 | End: 2023-04-25

## 2023-04-24 RX ADMIN — GLYCOPYRROLATE 0.2 MG: 0.2 INJECTION, SOLUTION INTRAMUSCULAR; INTRAVENOUS at 12:26

## 2023-04-24 RX ADMIN — LIDOCAINE HYDROCHLORIDE 40 MG: 20 INJECTION, SOLUTION INFILTRATION; PERINEURAL at 12:14

## 2023-04-24 RX ADMIN — PROPOFOL 20 MG: 10 INJECTION, EMULSION INTRAVENOUS at 12:13

## 2023-04-24 RX ADMIN — PROPOFOL 125 MCG/KG/MIN: 10 INJECTION, EMULSION INTRAVENOUS at 12:11

## 2023-04-24 RX ADMIN — TOPICAL ANESTHETIC 1 EACH: 200 SPRAY DENTAL; PERIODONTAL at 12:11

## 2023-04-24 RX ADMIN — SODIUM CHLORIDE, POTASSIUM CHLORIDE, SODIUM LACTATE AND CALCIUM CHLORIDE: 600; 310; 30; 20 INJECTION, SOLUTION INTRAVENOUS at 12:11

## 2023-04-24 ASSESSMENT — ENCOUNTER SYMPTOMS
SEIZURES: 0
DYSRHYTHMIAS: 0

## 2023-04-24 ASSESSMENT — LIFESTYLE VARIABLES: TOBACCO_USE: 0

## 2023-04-24 ASSESSMENT — ACTIVITIES OF DAILY LIVING (ADL)
ADLS_ACUITY_SCORE: 35
ADLS_ACUITY_SCORE: 35

## 2023-04-24 NOTE — ANESTHESIA CARE TRANSFER NOTE
Patient: Debra Denise    Procedure: Procedure(s):  ESOPHAGOGASTRODUODENOSCOPY, WITH BIOPSY  SIGMOIDOSCOPY, FLEXIBLE, WITH BIOPSY       Diagnosis: Dyspepsia [R10.13]  Diagnosis Additional Information: No value filed.    Anesthesia Type:   MAC     Note:    Oropharynx: oropharynx clear of all foreign objects and spontaneously breathing  Level of Consciousness: awake and drowsy  Oxygen Supplementation: room air    Independent Airway: airway patency satisfactory and stable  Dentition: dentition unchanged  Vital Signs Stable: post-procedure vital signs reviewed and stable  Report to RN Given: handoff report given  Patient transferred to: PACU  Comments: At end of procedure, spontaneous respirations, patient alert to voice, able to follow commands. Patient breathing room air at room air to PACU. SpO2, NiBP, and EKG monitors and alarms on and functioning, report on patient's clinical status given to PACU RN, RN questions answered.  Handoff Report: Identifed the Patient, Identified the Reponsible Provider, Reviewed the pertinent medical history, Discussed the surgical course, Reviewed Intra-OP anesthesia mangement and issues during anesthesia, Set expectations for post-procedure period and Allowed opportunity for questions and acknowledgement of understanding      Vitals:  Vitals Value Taken Time   BP 89/48 04/24/23 1249   Temp     Pulse 56 04/24/23 1252   Resp 6 04/24/23 1252   SpO2 98 % 04/24/23 1252   Vitals shown include unvalidated device data.    Electronically Signed By: OJNNY Gamble CRNA  April 24, 2023  12:53 PM

## 2023-04-24 NOTE — ANESTHESIA POSTPROCEDURE EVALUATION
Patient: Debra Denise    Procedure: Procedure(s):  ESOPHAGOGASTRODUODENOSCOPY, WITH BIOPSY  SIGMOIDOSCOPY, FLEXIBLE, WITH BIOPSY       Anesthesia Type:  MAC    Note:  Disposition: Outpatient   Postop Pain Control: Uneventful            Sign Out: Well controlled pain   PONV: No   Neuro/Psych: Uneventful            Sign Out: Acceptable/Baseline neuro status   Airway/Respiratory: Uneventful            Sign Out: Acceptable/Baseline resp. status   CV/Hemodynamics: Uneventful            Sign Out: Acceptable CV status; No obvious hypovolemia; No obvious fluid overload   Other NRE: NONE   DID A NON-ROUTINE EVENT OCCUR? No           Last vitals:  Vitals Value Taken Time   /66 04/24/23 1340   Temp     Pulse 55 04/24/23 1345   Resp 19 04/24/23 1345   SpO2 97 % 04/24/23 1345   Vitals shown include unvalidated device data.    Electronically Signed By: Chrissy Mayfield MD  April 24, 2023  2:18 PM

## 2023-04-24 NOTE — ANESTHESIA PREPROCEDURE EVALUATION
Anesthesia Pre-Procedure Evaluation    Patient: Debra Denise   MRN: [id number] : 1940        Procedure : Procedure(s):  ESOPHAGOGASTRODUODENOSCOPY  flexible Sigmoidoscopy          Past Medical History:   Diagnosis Date     Dupuytren contracture     finger     Hypertension      Osteopenia      Polymyalgia rheumatica (H)      PONV (postoperative nausea and vomiting)      Proctitis       Past Surgical History:   Procedure Laterality Date     COLONOSCOPY  2014    Procedure: COMBINED COLONOSCOPY, SINGLE BIOPSY/POLYPECTOMY BY BIOPSY;  COLONOSCOPY;  Surgeon: Carol Ann Plasencia MD;  Location:  GI     CV CORONARY ANGIOGRAM N/A 2022    Procedure: Coronary Angiogram;  Surgeon: Trevin Hawk MD;  Location:  [facility]     ENT SURGERY      tonsilectomy, adenoidectomy     GYN SURGERY  ,     x 2     HYSTERECTOMY       ORTHOPEDIC SURGERY  2004    (R) shoulder surgery for frozen shoulder     ZAC BSO      for fibroids      Allergies   Allergen Reactions     Sulfacetamide Hives     Adhesive Tape Rash     Atenolol      Other reaction(s): Intolerance-Can't Take  Fatigue     Ibuprofen      Other reaction(s): Stomach Upset  4-9-13 tele encounter     Naproxen      Other reaction(s): Stomach Upset  4-9-13 tele encounter     Ramipril Cough     Other reaction(s): Intolerance-Can't Take     Sulfa Drugs      Codeine Nausea     Fentanyl Nausea      Social History     Tobacco Use     Smoking status: Never     Smokeless tobacco: Never   Vaping Use     Vaping status: Never Used   Substance Use Topics     Alcohol use: No      Wt Readings from Last 1 Encounters:   23 58.1 kg (128 lb)        Anesthesia Evaluation   Pt has had prior anesthetic. Type of anesthetic: Nausea with fentanyl, codeine. Denies anesthesia issues.     No history of anesthetic complications       ROS/MED HX  ENT/Pulmonary:    (-) tobacco use, asthma, sleep apnea and recent URI    Neurologic:    (-) no seizures, no CVA and no TIA   Cardiovascular:     (+) hypertension-----Previous cardiac testing   Echo: Date: Results:  Stress echo 10/2021:  Normal left ventricular function and wall motion at rest and post-stress. The  visual ejection fraction is >70%.  At target heart rate with Dobutamine, global left ventricular function  augments.  At target heart rate with Dobutamine left ventricular size decreases.  Sensitivity reduced due to baseline hyperdynamic LV function.  This was an abnormal stress EKG. There were 1.5mm horizontal ST segment  depressions in leads II, III, aVF, and V4-6, resolving by 11:50 into Recovery.  Stress Test: Date: Results:    ECG Reviewed: Date: Results:    Cath: Date: Results:  6 months ago. Normal coronaries.    (-) CAD and arrhythmias   METS/Exercise Tolerance: >4 METS    Hematologic:       Musculoskeletal: Comment: Polymyalgia rheumatica        GI/Hepatic: Comment: rectal bleeding, upper abdominal complaints and anorexia  Proctitis   (-) GERD and liver disease   Renal/Genitourinary:    (-) renal disease   Endo:    (-) Type II DM and obesity   Psychiatric/Substance Use:       Infectious Disease:    (-) Recent Fever   Malignancy:       Other:            Physical Exam    Airway        Mallampati: I   TM distance: > 3 FB   Neck ROM: full   Mouth opening: > 3 cm    Respiratory Devices and Support         Dental       (+) Completely normal teeth      Cardiovascular          Rhythm and rate: regular and normal     Pulmonary           breath sounds clear to auscultation           OUTSIDE LABS:  CBC:   Lab Results   Component Value Date    WBC 13.1 (H) 04/14/2023    WBC 9.6 02/10/2023    HGB 14.1 04/14/2023    HGB 14.2 02/10/2023    HCT 41.9 04/14/2023    HCT 41.2 02/10/2023     (H) 04/14/2023     02/10/2023     BMP:   Lab Results   Component Value Date     04/14/2023     02/10/2023    POTASSIUM 4.0 04/14/2023    POTASSIUM 3.6 02/10/2023    CHLORIDE  100 04/14/2023    CHLORIDE 102 02/10/2023    CO2 33 (H) 04/14/2023    CO2 31 (H) 02/10/2023    BUN 16.7 04/14/2023    BUN 14.1 02/10/2023    CR 0.85 04/14/2023    CR 0.76 02/10/2023     (H) 04/14/2023    GLC 96 02/10/2023     COAGS:   Lab Results   Component Value Date    PTT 32 08/30/2022    INR 1.18 (H) 08/30/2022     POC: No results found for: BGM, HCG, HCGS  HEPATIC:   Lab Results   Component Value Date    ALBUMIN 4.2 04/14/2023    PROTTOTAL 6.8 04/14/2023    ALT 10 04/14/2023    AST 15 04/14/2023    ALKPHOS 59 04/14/2023    BILITOTAL 0.4 04/14/2023     OTHER:   Lab Results   Component Value Date    PRASHANTH 9.9 04/14/2023    LIPASE 23 02/10/2023    TSH 0.46 04/14/2023    CRP 5.4 02/11/2022    SED 13 11/15/2022       Anesthesia Plan    ASA Status:  2   NPO Status:  NPO Appropriate    Anesthesia Type: MAC.     - Reason for MAC: straight local not clinically adequate      Maintenance: TIVA.        Consents    Anesthesia Plan(s) and associated risks, benefits, and realistic alternatives discussed. Questions answered and patient/representative(s) expressed understanding.     - Discussed: Risks, Benefits and Alternatives for the PROCEDURE were discussed     - Discussed with:  Patient         Postoperative Care       PONV prophylaxis: Ondansetron (or other 5HT-3)     Comments:                Chrissy Mayfield MD

## 2023-04-26 ENCOUNTER — TRANSFERRED RECORDS (OUTPATIENT)
Dept: HEALTH INFORMATION MANAGEMENT | Facility: CLINIC | Age: 83
End: 2023-04-26

## 2023-04-26 PROCEDURE — 88305 TISSUE EXAM BY PATHOLOGIST: CPT | Mod: 26 | Performed by: PATHOLOGY

## 2023-04-26 PROCEDURE — 88342 IMHCHEM/IMCYTCHM 1ST ANTB: CPT | Mod: 26 | Performed by: PATHOLOGY

## 2023-05-02 ENCOUNTER — MEDICAL CORRESPONDENCE (OUTPATIENT)
Dept: HEALTH INFORMATION MANAGEMENT | Facility: CLINIC | Age: 83
End: 2023-05-02
Payer: COMMERCIAL

## 2023-05-04 ENCOUNTER — TRANSFERRED RECORDS (OUTPATIENT)
Dept: HEALTH INFORMATION MANAGEMENT | Facility: CLINIC | Age: 83
End: 2023-05-04
Payer: COMMERCIAL

## 2023-05-24 ENCOUNTER — TRANSFERRED RECORDS (OUTPATIENT)
Dept: HEALTH INFORMATION MANAGEMENT | Facility: CLINIC | Age: 83
End: 2023-05-24
Payer: COMMERCIAL

## 2023-06-01 ENCOUNTER — HEALTH MAINTENANCE LETTER (OUTPATIENT)
Age: 83
End: 2023-06-01

## 2023-06-02 ENCOUNTER — TELEPHONE (OUTPATIENT)
Dept: INTERNAL MEDICINE | Facility: CLINIC | Age: 83
End: 2023-06-02
Payer: COMMERCIAL

## 2023-06-02 NOTE — TELEPHONE ENCOUNTER
[facility] Call Center    Phone Message    May a detailed message be left on voicemail: yes     Reason for Call: Other: Patients  would like a call back, he is trying to schedule his wifes physical and didnt like the June 5th opening that I offered him, the schedule rhonda went to September and he says he knows Dr Zimmerman would like to see her sooner and he would not schedule wiyh any other provider, please call.       Action Taken: Other: PCC    Travel Screening: Not Applicable

## 2023-06-08 ENCOUNTER — TRANSFERRED RECORDS (OUTPATIENT)
Dept: HEALTH INFORMATION MANAGEMENT | Facility: CLINIC | Age: 83
End: 2023-06-08
Payer: COMMERCIAL

## 2023-06-12 ENCOUNTER — TRANSFERRED RECORDS (OUTPATIENT)
Dept: HEALTH INFORMATION MANAGEMENT | Facility: CLINIC | Age: 83
End: 2023-06-12
Payer: COMMERCIAL

## 2023-06-12 ENCOUNTER — DOCUMENTATION ONLY (OUTPATIENT)
Dept: INTERNAL MEDICINE | Facility: CLINIC | Age: 83
End: 2023-06-12
Payer: COMMERCIAL

## 2023-06-12 NOTE — PROGRESS NOTES
Type of Form Received:     Form Received (Date) 6/12/23   Form Filled out Yes, 06/18/23   Placed in provider folder Yes

## 2023-06-15 ENCOUNTER — TRANSFERRED RECORDS (OUTPATIENT)
Dept: HEALTH INFORMATION MANAGEMENT | Facility: CLINIC | Age: 83
End: 2023-06-15
Payer: COMMERCIAL

## 2023-06-15 DIAGNOSIS — I10 BENIGN ESSENTIAL HYPERTENSION: ICD-10-CM

## 2023-06-20 RX ORDER — VALSARTAN AND HYDROCHLOROTHIAZIDE 160; 25 MG/1; MG/1
1 TABLET ORAL DAILY
Qty: 90 TABLET | Refills: 0 | Status: SHIPPED | OUTPATIENT
Start: 2023-06-20 | End: 2023-07-27

## 2023-06-20 NOTE — TELEPHONE ENCOUNTER
4/19/2023  [facility] Internal Medicine Wolfgang Senior MD  Internal Medicine    Can refill x 3 months if BP greater than or equal to 140/90, and refer to PCP for follow up.    JODY 9/13/23

## 2023-07-02 ASSESSMENT — ENCOUNTER SYMPTOMS
CONSTIPATION: 0
BOWEL INCONTINENCE: 0
NAUSEA: 1
HEARTBURN: 0
VOMITING: 0
RECTAL PAIN: 0
BLOOD IN STOOL: 1
DIARRHEA: 0
BLOATING: 0
ABDOMINAL PAIN: 1
JAUNDICE: 0

## 2023-07-03 ENCOUNTER — OFFICE VISIT (OUTPATIENT)
Dept: INTERNAL MEDICINE | Facility: CLINIC | Age: 83
End: 2023-07-03
Payer: COMMERCIAL

## 2023-07-03 ENCOUNTER — LAB (OUTPATIENT)
Dept: LAB | Facility: CLINIC | Age: 83
End: 2023-07-03
Payer: COMMERCIAL

## 2023-07-03 VITALS
SYSTOLIC BLOOD PRESSURE: 126 MMHG | DIASTOLIC BLOOD PRESSURE: 64 MMHG | HEART RATE: 57 BPM | BODY MASS INDEX: 22.74 KG/M2 | HEIGHT: 65 IN | WEIGHT: 136.5 LBS | OXYGEN SATURATION: 96 %

## 2023-07-03 DIAGNOSIS — R73.09 INCREASED GLUCOSE LEVEL: ICD-10-CM

## 2023-07-03 DIAGNOSIS — E78.00 HIGH BLOOD CHOLESTEROL: ICD-10-CM

## 2023-07-03 DIAGNOSIS — R42 DIZZINESS: ICD-10-CM

## 2023-07-03 DIAGNOSIS — R42 DIZZINESS: Primary | ICD-10-CM

## 2023-07-03 LAB
ALBUMIN SERPL BCG-MCNC: 4.3 G/DL (ref 3.5–5.2)
ALBUMIN UR-MCNC: NEGATIVE MG/DL
ALP SERPL-CCNC: 74 U/L (ref 35–104)
ALT SERPL W P-5'-P-CCNC: 11 U/L (ref 0–50)
ANION GAP SERPL CALCULATED.3IONS-SCNC: 8 MMOL/L (ref 7–15)
APPEARANCE UR: CLEAR
AST SERPL W P-5'-P-CCNC: 15 U/L (ref 0–45)
BASOPHILS # BLD AUTO: 0.1 10E3/UL (ref 0–0.2)
BASOPHILS NFR BLD AUTO: 1 %
BILIRUB SERPL-MCNC: 0.2 MG/DL
BILIRUB UR QL STRIP: NEGATIVE
BUN SERPL-MCNC: 19.1 MG/DL (ref 8–23)
CALCIUM SERPL-MCNC: 9.3 MG/DL (ref 8.8–10.2)
CHLORIDE SERPL-SCNC: 102 MMOL/L (ref 98–107)
COLOR UR AUTO: ABNORMAL
CREAT SERPL-MCNC: 0.79 MG/DL (ref 0.51–0.95)
DEPRECATED HCO3 PLAS-SCNC: 31 MMOL/L (ref 22–29)
EOSINOPHIL # BLD AUTO: 0.4 10E3/UL (ref 0–0.7)
EOSINOPHIL NFR BLD AUTO: 4 %
ERYTHROCYTE [DISTWIDTH] IN BLOOD BY AUTOMATED COUNT: 13.1 % (ref 10–15)
GFR SERPL CREATININE-BSD FRML MDRD: 74 ML/MIN/1.73M2
GLUCOSE SERPL-MCNC: 117 MG/DL (ref 70–99)
GLUCOSE UR STRIP-MCNC: NEGATIVE MG/DL
HCT VFR BLD AUTO: 43.4 % (ref 35–47)
HGB BLD-MCNC: 14.1 G/DL (ref 11.7–15.7)
HGB UR QL STRIP: NEGATIVE
IMM GRANULOCYTES # BLD: 0.1 10E3/UL
IMM GRANULOCYTES NFR BLD: 1 %
KETONES UR STRIP-MCNC: NEGATIVE MG/DL
LEUKOCYTE ESTERASE UR QL STRIP: NEGATIVE
LYMPHOCYTES # BLD AUTO: 2.4 10E3/UL (ref 0.8–5.3)
LYMPHOCYTES NFR BLD AUTO: 23 %
MCH RBC QN AUTO: 30.2 PG (ref 26.5–33)
MCHC RBC AUTO-ENTMCNC: 32.5 G/DL (ref 31.5–36.5)
MCV RBC AUTO: 93 FL (ref 78–100)
MONOCYTES # BLD AUTO: 1 10E3/UL (ref 0–1.3)
MONOCYTES NFR BLD AUTO: 10 %
MUCOUS THREADS #/AREA URNS LPF: PRESENT /LPF
NEUTROPHILS # BLD AUTO: 6.3 10E3/UL (ref 1.6–8.3)
NEUTROPHILS NFR BLD AUTO: 61 %
NITRATE UR QL: NEGATIVE
NRBC # BLD AUTO: 0 10E3/UL
NRBC BLD AUTO-RTO: 0 /100
PH UR STRIP: 5 [PH] (ref 5–7)
PLATELET # BLD AUTO: 439 10E3/UL (ref 150–450)
POTASSIUM SERPL-SCNC: 3.6 MMOL/L (ref 3.4–5.3)
PROT SERPL-MCNC: 7 G/DL (ref 6.4–8.3)
RBC # BLD AUTO: 4.67 10E6/UL (ref 3.8–5.2)
RBC URINE: 1 /HPF
SODIUM SERPL-SCNC: 141 MMOL/L (ref 136–145)
SP GR UR STRIP: 1.01 (ref 1–1.03)
SQUAMOUS EPITHELIAL: <1 /HPF
UROBILINOGEN UR STRIP-MCNC: NORMAL MG/DL
WBC # BLD AUTO: 10.2 10E3/UL (ref 4–11)
WBC URINE: <1 /HPF

## 2023-07-03 PROCEDURE — 83036 HEMOGLOBIN GLYCOSYLATED A1C: CPT | Performed by: INTERNAL MEDICINE

## 2023-07-03 PROCEDURE — 81001 URINALYSIS AUTO W/SCOPE: CPT | Performed by: PATHOLOGY

## 2023-07-03 PROCEDURE — 85025 COMPLETE CBC W/AUTO DIFF WBC: CPT | Performed by: PATHOLOGY

## 2023-07-03 PROCEDURE — 80053 COMPREHEN METABOLIC PANEL: CPT | Performed by: PATHOLOGY

## 2023-07-03 PROCEDURE — 99397 PER PM REEVAL EST PAT 65+ YR: CPT | Performed by: INTERNAL MEDICINE

## 2023-07-03 PROCEDURE — 99000 SPECIMEN HANDLING OFFICE-LAB: CPT | Performed by: PATHOLOGY

## 2023-07-03 PROCEDURE — 36415 COLL VENOUS BLD VENIPUNCTURE: CPT | Performed by: PATHOLOGY

## 2023-07-03 RX ORDER — MULTIVITAMIN/IRON/FOLIC ACID 18MG-0.4MG
1 TABLET ORAL DAILY
COMMUNITY

## 2023-07-03 RX ORDER — FLUOCINONIDE 0.5 MG/G
CREAM TOPICAL
COMMUNITY
Start: 2023-06-08

## 2023-07-03 NOTE — PROGRESS NOTES
HPI:    Ms. Denise comes in for a physical today. She would like her labs checked including urinalysis and lipids. She has chronic dizziness (not quite vertigo) and sometimes nausea possibly associated with her dizziness. No palpitations. Her dizziness is not always associated with head movement. She states these sxs. For more than one year and getting a little worse. She R ear pain as well. She has not noticed any hearing issues. She does not feel these sxs. Are related to new medications.  She states her inflammatory bowel disease is now well controlled and she is of steroids. She denies any additional HEENT, cardiopulmonary, abdominal, , GYN, neurological, systemic, psychiatric, lymphatic, endocrine, vascular complaints.     Past Medical History:   Diagnosis Date     Dupuytren contracture     finger     Hypertension      Osteopenia      Polymyalgia rheumatica (H)      PONV (postoperative nausea and vomiting)      Proctitis      Past Surgical History:   Procedure Laterality Date     COLONOSCOPY  2014    Procedure: COMBINED COLONOSCOPY, SINGLE BIOPSY/POLYPECTOMY BY BIOPSY;  COLONOSCOPY;  Surgeon: Carol Ann Plasencia MD;  Location: [facility]     CV CORONARY ANGIOGRAM N/A 2022    Procedure: Coronary Angiogram;  Surgeon: Trevin Hawk MD;  Location:  [facility]ARDI[facility] CATH LAB     ENT SURGERY      tonsilectomy, adenoidectomy     ESOPHAGOSCOPY, GASTROSCOPY, DUODENOSCOPY (EGD), COMBINED N/A 2023    Procedure: ESOPHAGOGASTRODUODENOSCOPY, WITH BIOPSY;  Surgeon: Angel Lara MD;  Location:  GI     GYN SURGERY  ,     x 2     HYSTERECTOMY       ORTHOPEDIC SURGERY  2004    (R) shoulder surgery for frozen shoulder     ZAC BSO      for fibroids     PE:    Vitals noted, gen, nad, cooperative, alert, neck supple nl rom, R ear with some mild erytheam,  lungs with good air movement, no B carotid bruits, RRR, S1, S2, no MRG, abdomen, no acute findings. L hand with  palmar contracture. Neurologically; normal gait and strength.     A/P:    1. Immunizations; COVID Pfizer vaccine x 4. She has completed the Shingrix vaccine series. Pneumococcal 23 done 10/18/2011. Prevnar 13 done 1/13/2016. Td 1/31/2018. Prevnar 20 done 11/15/2022.   2. 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 She was seen by Ms. Pang, GI 8/10/2021 and 6/23/2022.  Flex Sig done 10/4/2021. Calprotectin Feces elevated at 93.9. She had a colonoscopy at [facility] 11/2/2022 Results in Care Everywhere. Biopsy results as above She will follow at [facility]. She still needs EGD for reflux.  She was seen [facility], Dr. Garcia 6/12/2023 and has gotten vecloizumab. See [facility] GI note from 4/19/2023.   3. Chronic pain on night time Gabapentin; seen Dr. Pimentel, orthopedics 6/8/2021 and Dr. Neumann 4/29/2021 Mr. Neumann. Placed PMR 4/27/2022 referral for her back and hip complaints. She cancelled her  PMR appt. With Dr. Corona 8/18/2022. [facility]   4. HTN; on Valsartan/HcTz; Electrolytes and Creatine checked 8/30/2022. Ordered labs today  5. Mammogram; 12/6/2022 in Care Everywhere  6. Lipids; 2/11/2022; HDL 47 and LDL 63.  7. Dermatology; scanned in outside note from [facility] 2/17/2022. Care Everywhere dermatology note 10/12/2022.   8. Vitamin D normal at 57 on 2/11/2022. DEXA scan in chart from 7/19/2021 and most negative T score -2.3.  She saw Dr. Simon endocrinology   8/22/2022  9. Outside Rheumatology for PMR, Dr. Damian scanned in note from 5/24/2023. She is not on prednisone currently.   10. Palpitations;  Normal dobutamine stress echo 10/11/2021. CXR 9/23/2021 normal For atypical CP, CTA coronary angiogram done 8/10/2022 with possible severe mid LAD stenosis. Coronary angiogram on 8/30/2022 with normal coronary arteries.  Ziopatch monitor 7/22/2022 w/o significant findings.   11. Hand complaints. She states since coronary angiogram (used R radial artery)  she has R wrist and R hand weakness. She had an R upper extremity arterial U/S 11/4/2022 no abnormal findings. Overall sxs. Less.   12. L hand palmar with contracture  and she has gotten injections.   13. Dizziness, (and sometimes nausea), R ear pain. Ordered Brain MRI (last study 9/24/2020). Placed ENT referral for evaluation of R ear and probable hearing testing. Does not seem to be cardiac in nature and she has had a detailed past evaluation. If sxs. Persist or worsen, may hafe to have her seen in Neurology.

## 2023-07-03 NOTE — NURSING NOTE
"Debra Denise is a 82 year old female patient that presents today in clinic for the following:    Chief Complaint   Patient presents with     Physical     Nausea and dizziness.     The patient's allergies and medications were reviewed as noted. A set of vitals were recorded as noted without incident: /64 (BP Location: Right arm, Patient Position: Sitting, Cuff Size: Adult Regular)   Pulse 57   Ht 1.64 m (5' 4.57\")   Wt 61.9 kg (136 lb 8 oz)   SpO2 96%   BMI 23.02 kg/m  . The patient does not have any other questions for the provider.    Papa Mott, EMT at 1:34 PM on 7/3/2023.  Primary care clinic: [phone]  "

## 2023-07-04 ENCOUNTER — TELEPHONE (OUTPATIENT)
Dept: INTERNAL MEDICINE | Facility: CLINIC | Age: 83
End: 2023-07-04

## 2023-07-04 DIAGNOSIS — R73.09 INCREASED GLUCOSE LEVEL: Primary | ICD-10-CM

## 2023-07-04 LAB — HBA1C MFR BLD: 6 %

## 2023-07-04 NOTE — TELEPHONE ENCOUNTER
Added A1c on    Dear Debra;    Your laboratory tests are stable. Somewhat elevated glucose and I added other testing onto your labs.    RUTH Zimmerman

## 2023-07-07 ENCOUNTER — TELEPHONE (OUTPATIENT)
Dept: OTOLARYNGOLOGY | Facility: CLINIC | Age: 83
End: 2023-07-07

## 2023-07-07 NOTE — TELEPHONE ENCOUNTER
[facility] Call Center    Phone Message    May a detailed message be left on voicemail: yes     Reason for Call: Other: Per protocols and diagnosis Dizziness ref'd by Wolfgang Zimmerman MD, pt wants to be seen at [facility] location. Please call to discuss. Thank you     Action Taken: Message routed to:  [facility] ENT    Travel Screening: Not Applicable

## 2023-07-10 ENCOUNTER — TELEPHONE (OUTPATIENT)
Dept: OTOLARYNGOLOGY | Facility: CLINIC | Age: 83
End: 2023-07-10
Payer: COMMERCIAL

## 2023-07-10 NOTE — TELEPHONE ENCOUNTER
1. Have you noticed any changes in hearing? No  2. Do you have ringing, buzzing, or other sounds in your ears or head, this is also referred to as Tinnitus? No  3. When and where was your last hearing test? Possibly in [place] at an ENT. Doesn't know  4. Do you feel lightheaded or foggy? No  5. Do you have a spinning sensation? No  6. Is there any specific position that can bring on dizziness? random  7. Does looking up cause dizziness? No  8. Does getting in and our of bed cause dizziness? No  9. Does turning over in bed increase or cause dizziness? No  10. Does bending over cause dizziness? No  11. Is there anything that you can do to prevent the dizziness? no  12. Has the dizziness gotten better with time? Yes  13. Have you seen Physical Therapy for dizziness? (Please indicate clinic and as much of the location as possible): No  14. Are you being referred to a specific physician? No  15. Have you been evaluated/treated for your dizziness at any other location?  (If yes,obtian as much clinic/provider/locaiton as possible) Yes. (If yes answer the following questions:)   Have you seen any ENT, Neurology, or other providers for these symptoms?             Yes, If yes, where? ENT in [place] (doesn't know when or where though) if yes, who?    Have you had any balance or Audiology testing? No Have you had an MRI or CT scan of your head or neck? Yes, If yes, where? [facility] if yes, who?     Would you like to receive your Release of Information by mail or e-mail?  mail

## 2023-07-11 ENCOUNTER — APPOINTMENT (OUTPATIENT)
Dept: MRI IMAGING | Facility: CLINIC | Age: 83
End: 2023-07-11
Attending: EMERGENCY MEDICINE
Payer: COMMERCIAL

## 2023-07-11 ENCOUNTER — APPOINTMENT (OUTPATIENT)
Dept: GENERAL RADIOLOGY | Facility: CLINIC | Age: 83
End: 2023-07-11
Attending: EMERGENCY MEDICINE
Payer: COMMERCIAL

## 2023-07-11 ENCOUNTER — HOSPITAL ENCOUNTER (OUTPATIENT)
Facility: CLINIC | Age: 83
Setting detail: OBSERVATION
Discharge: HOSPICE | End: 2023-07-12
Attending: EMERGENCY MEDICINE | Admitting: INTERNAL MEDICINE
Payer: COMMERCIAL

## 2023-07-11 DIAGNOSIS — G45.9 TIA (TRANSIENT ISCHEMIC ATTACK): ICD-10-CM

## 2023-07-11 DIAGNOSIS — R07.9 CHEST PAIN, UNSPECIFIED TYPE: ICD-10-CM

## 2023-07-11 LAB
ALBUMIN SERPL BCG-MCNC: 4 G/DL (ref 3.5–5.2)
ALP SERPL-CCNC: 67 U/L (ref 35–104)
ALT SERPL W P-5'-P-CCNC: 13 U/L (ref 0–50)
ANION GAP SERPL CALCULATED.3IONS-SCNC: 10 MMOL/L (ref 7–15)
AST SERPL W P-5'-P-CCNC: 13 U/L (ref 0–45)
ATRIAL RATE - MUSE: 50 BPM
BASOPHILS # BLD AUTO: 0 10E3/UL (ref 0–0.2)
BASOPHILS NFR BLD AUTO: 0 %
BILIRUB SERPL-MCNC: 0.4 MG/DL
BUN SERPL-MCNC: 13.7 MG/DL (ref 8–23)
CALCIUM SERPL-MCNC: 9.2 MG/DL (ref 8.8–10.2)
CHLORIDE SERPL-SCNC: 105 MMOL/L (ref 98–107)
CREAT SERPL-MCNC: 0.76 MG/DL (ref 0.51–0.95)
D DIMER PPP FEU-MCNC: 0.3 UG/ML FEU (ref 0–0.5)
DEPRECATED HCO3 PLAS-SCNC: 26 MMOL/L (ref 22–29)
DIASTOLIC BLOOD PRESSURE - MUSE: NORMAL MMHG
EOSINOPHIL # BLD AUTO: 0.3 10E3/UL (ref 0–0.7)
EOSINOPHIL NFR BLD AUTO: 3 %
ERYTHROCYTE [DISTWIDTH] IN BLOOD BY AUTOMATED COUNT: 12.9 % (ref 10–15)
GFR SERPL CREATININE-BSD FRML MDRD: 77 ML/MIN/1.73M2
GLUCOSE SERPL-MCNC: 117 MG/DL (ref 70–99)
HBA1C MFR BLD: 6.1 %
HCT VFR BLD AUTO: 42.9 % (ref 35–47)
HGB BLD-MCNC: 13.9 G/DL (ref 11.7–15.7)
HOLD SPECIMEN: NORMAL
IMM GRANULOCYTES # BLD: 0.1 10E3/UL
IMM GRANULOCYTES NFR BLD: 1 %
INTERPRETATION ECG - MUSE: NORMAL
LYMPHOCYTES # BLD AUTO: 1.7 10E3/UL (ref 0.8–5.3)
LYMPHOCYTES NFR BLD AUTO: 15 %
MCH RBC QN AUTO: 29.7 PG (ref 26.5–33)
MCHC RBC AUTO-ENTMCNC: 32.4 G/DL (ref 31.5–36.5)
MCV RBC AUTO: 92 FL (ref 78–100)
MONOCYTES # BLD AUTO: 1 10E3/UL (ref 0–1.3)
MONOCYTES NFR BLD AUTO: 9 %
NEUTROPHILS # BLD AUTO: 8 10E3/UL (ref 1.6–8.3)
NEUTROPHILS NFR BLD AUTO: 72 %
NRBC # BLD AUTO: 0 10E3/UL
NRBC BLD AUTO-RTO: 0 /100
P AXIS - MUSE: 51 DEGREES
PLATELET # BLD AUTO: 438 10E3/UL (ref 150–450)
POTASSIUM SERPL-SCNC: 3.8 MMOL/L (ref 3.4–5.3)
PR INTERVAL - MUSE: 156 MS
PROT SERPL-MCNC: 6.7 G/DL (ref 6.4–8.3)
QRS DURATION - MUSE: 82 MS
QT - MUSE: 486 MS
QTC - MUSE: 443 MS
R AXIS - MUSE: -21 DEGREES
RBC # BLD AUTO: 4.68 10E6/UL (ref 3.8–5.2)
SODIUM SERPL-SCNC: 141 MMOL/L (ref 136–145)
SYSTOLIC BLOOD PRESSURE - MUSE: NORMAL MMHG
T AXIS - MUSE: 62 DEGREES
TROPONIN T SERPL HS-MCNC: 9 NG/L
TROPONIN T SERPL HS-MCNC: 9 NG/L
VENTRICULAR RATE- MUSE: 50 BPM
WBC # BLD AUTO: 11 10E3/UL (ref 4–11)

## 2023-07-11 PROCEDURE — 84484 ASSAY OF TROPONIN QUANT: CPT | Performed by: INTERNAL MEDICINE

## 2023-07-11 PROCEDURE — 80061 LIPID PANEL: CPT | Performed by: INTERNAL MEDICINE

## 2023-07-11 PROCEDURE — 93005 ELECTROCARDIOGRAM TRACING: CPT

## 2023-07-11 PROCEDURE — 99223 1ST HOSP IP/OBS HIGH 75: CPT | Performed by: INTERNAL MEDICINE

## 2023-07-11 PROCEDURE — 255N000002 HC RX 255 OP 636: Performed by: EMERGENCY MEDICINE

## 2023-07-11 PROCEDURE — 70553 MRI BRAIN STEM W/O & W/DYE: CPT

## 2023-07-11 PROCEDURE — 85025 COMPLETE CBC W/AUTO DIFF WBC: CPT | Performed by: EMERGENCY MEDICINE

## 2023-07-11 PROCEDURE — 250N000013 HC RX MED GY IP 250 OP 250 PS 637: Performed by: INTERNAL MEDICINE

## 2023-07-11 PROCEDURE — 71046 X-RAY EXAM CHEST 2 VIEWS: CPT

## 2023-07-11 PROCEDURE — 99285 EMERGENCY DEPT VISIT HI MDM: CPT | Mod: 25

## 2023-07-11 PROCEDURE — 84484 ASSAY OF TROPONIN QUANT: CPT | Performed by: EMERGENCY MEDICINE

## 2023-07-11 PROCEDURE — 70544 MR ANGIOGRAPHY HEAD W/O DYE: CPT | Mod: XU

## 2023-07-11 PROCEDURE — 80053 COMPREHEN METABOLIC PANEL: CPT | Performed by: EMERGENCY MEDICINE

## 2023-07-11 PROCEDURE — A9585 GADOBUTROL INJECTION: HCPCS | Performed by: EMERGENCY MEDICINE

## 2023-07-11 PROCEDURE — G0378 HOSPITAL OBSERVATION PER HR: HCPCS

## 2023-07-11 PROCEDURE — 36415 COLL VENOUS BLD VENIPUNCTURE: CPT | Performed by: EMERGENCY MEDICINE

## 2023-07-11 PROCEDURE — 83036 HEMOGLOBIN GLYCOSYLATED A1C: CPT | Performed by: INTERNAL MEDICINE

## 2023-07-11 PROCEDURE — 70549 MR ANGIOGRAPH NECK W/O&W/DYE: CPT

## 2023-07-11 PROCEDURE — 85379 FIBRIN DEGRADATION QUANT: CPT | Performed by: EMERGENCY MEDICINE

## 2023-07-11 PROCEDURE — 36415 COLL VENOUS BLD VENIPUNCTURE: CPT | Performed by: INTERNAL MEDICINE

## 2023-07-11 RX ORDER — ASPIRIN 81 MG/1
81 TABLET ORAL DAILY
Status: DISCONTINUED | OUTPATIENT
Start: 2023-07-12 | End: 2023-07-12

## 2023-07-11 RX ORDER — ACETAMINOPHEN 325 MG/1
650 TABLET ORAL EVERY 6 HOURS PRN
Status: DISCONTINUED | OUTPATIENT
Start: 2023-07-11 | End: 2023-07-12 | Stop reason: HOSPADM

## 2023-07-11 RX ORDER — ASPIRIN 81 MG/1
81 TABLET ORAL ONCE
Status: DISCONTINUED | OUTPATIENT
Start: 2023-07-12 | End: 2023-07-12

## 2023-07-11 RX ORDER — GABAPENTIN 100 MG/1
200 CAPSULE ORAL AT BEDTIME
Status: DISCONTINUED | OUTPATIENT
Start: 2023-07-11 | End: 2023-07-12 | Stop reason: HOSPADM

## 2023-07-11 RX ORDER — CLOPIDOGREL BISULFATE 75 MG/1
300 TABLET ORAL ONCE
Status: DISCONTINUED | OUTPATIENT
Start: 2023-07-12 | End: 2023-07-12

## 2023-07-11 RX ORDER — LIDOCAINE 40 MG/G
CREAM TOPICAL
Status: DISCONTINUED | OUTPATIENT
Start: 2023-07-11 | End: 2023-07-12

## 2023-07-11 RX ORDER — GADOBUTROL 604.72 MG/ML
10 INJECTION INTRAVENOUS ONCE
Status: COMPLETED | OUTPATIENT
Start: 2023-07-11 | End: 2023-07-11

## 2023-07-11 RX ORDER — CLOPIDOGREL BISULFATE 75 MG/1
75 TABLET ORAL DAILY
Status: DISCONTINUED | OUTPATIENT
Start: 2023-07-12 | End: 2023-07-12

## 2023-07-11 RX ORDER — ASPIRIN 81 MG/1
81 TABLET ORAL DAILY
Status: DISCONTINUED | OUTPATIENT
Start: 2023-07-12 | End: 2023-07-11

## 2023-07-11 RX ADMIN — GADOBUTROL 10 ML: 604.72 INJECTION INTRAVENOUS at 11:58

## 2023-07-11 RX ADMIN — GABAPENTIN 200 MG: 100 CAPSULE ORAL at 21:37

## 2023-07-11 ASSESSMENT — ACTIVITIES OF DAILY LIVING (ADL)
ADLS_ACUITY_SCORE: 18
ADLS_ACUITY_SCORE: 35
ADLS_ACUITY_SCORE: 18
ADLS_ACUITY_SCORE: 35
ADLS_ACUITY_SCORE: 18
ADLS_ACUITY_SCORE: 35
ADLS_ACUITY_SCORE: 18

## 2023-07-11 NOTE — PHARMACY-ADMISSION MEDICATION HISTORY
Pharmacy Intern Admission Medication History    Admission medication history is complete. The information provided in this note is only as accurate as the sources available at the time of the update.    Medication reconciliation/reorder completed by provider prior to medication history? No    Information Source(s): Patient and CareEverywhere/SureScripts via in-person    Pertinent Information: None    Changes made to PTA medication list:    Added: None    Deleted: None    Changed: None       Allergies reviewed with patient and updates made in EHR: yes    Medication History Completed By: Mehdi Banda 7/11/2023 2:00 PM    Prior to Admission medications    Medication Sig Last Dose Taking? Auth Provider Long Term End Date   Cholecalciferol (VITAMIN D3 PO) Take 2,000 Units by mouth daily  7/10/2023 at a, Yes Reported, Patient     fluocinonide (LIDEX) 0.05 % external cream Apply to the affected area once to twice daily as needed for flares.* prn Yes Reported, Patient     gabapentin (NEURONTIN) 100 MG capsule Take 2 tablets at bedtime 7/10/2023 at pm Yes Wolfgang Zimmerman MD Yes    multivitamin w/minerals (CENTRUM ADULTS) tablet Take 1 tablet by mouth daily 7/10/2023 at am Yes Reported, Patient     ondansetron (ZOFRAN ODT) 4 MG ODT tab Take 1 tablet (4 mg) by mouth every 8 hours as needed for nausea prn Yes Wolfgang Zimmerman MD     Probiotic Product (PROBIOTIC PO) Take by mouth daily 7/10/2023 at am Yes Reported, Patient     sodium chloride 0.9 % SOLN 250 mL with vedolizumab 60 MG/ML SOLR 300 mg infusionn Inject 300 mg into the vein every 2 months Past Month Yes Reported, Patient     valsartan-hydrochlorothiazide (DIOVAN HCT) 160-25 MG tablet Take 1 tablet by mouth daily 7/11/2023 at am Yes Wolfgang Zimmerman MD Yes    Folic Acid-Cholecalciferol 1-500 MG-UNIT TABS  Not Taking  Reported, Patient

## 2023-07-11 NOTE — PLAN OF CARE
Goal Outcome Evaluation:      Plan of Care Reviewed With: patient, spouse    Orientation/Cognitive: A&Ox4 and neuros intact  Observation Goals (Met/ Not Met): Not met  Mobility Level/Assist Equipment: Independent  Fall Risk (Y/N): No  Behavior Concerns: None  Pain Management: Denies pain  Tele/VS/O2: VSS on RA ex htn. Tele:  ABNL Lab/BG:   Diet: Vegetarian diet  Bowel/Bladder: Continent  Skin Concerns: None  Drains/Devices: IV SL  Tests/Procedures for next shift: Echo, Neuro and SLP consult  Anticipated DC date & active delays: TBD  Patient Stated Goal for Today:

## 2023-07-11 NOTE — ED TRIAGE NOTES
Had an episode of left arm weakness yesterday afternoon that lasted 20-30 seconds. Resolved today no issues with the arm. Has also had lightheadedness on and off for 2 years. Stroke screen negative in triage     Triage Assessment     Row Name 07/11/23 0830       Triage Assessment (Adult)    Airway WDL WDL       Respiratory WDL    Respiratory WDL WDL       Skin Circulation/Temperature WDL    Skin Circulation/Temperature WDL WDL       Cardiac WDL    Cardiac WDL WDL       Peripheral/Neurovascular WDL    Peripheral Neurovascular WDL WDL       Cognitive/Neuro/Behavioral WDL    Cognitive/Neuro/Behavioral WDL WDL

## 2023-07-11 NOTE — ED NOTES
"Yesterday about 3:30pm was carrying groceries into her home and had about 30 seconds of weakness, \"floppy, limpness\" of right arm which resolved spontaneously.  Denies headache, acute vision change, chest pain, loss of balance/coordination.  History of dizziness and nausea, chronic visual blurriness, ulcerative colitis with Entivia infusions.   "

## 2023-07-11 NOTE — CONSULTS
"  [facility]    Stroke Telephone Note    I was called by Asim Cooper on 07/11/23 regarding patient Debra Denise. The patient is a 82 year old female with h/o HTN, ulcerative colitis, who was apparently well earlier this morning and had carried a load of groceries and put them down, when she had a sudden onset of right upper extremity weakness wherein her arm felt \"floppy\". It resolved in 30 seconds.     BP (!) 142/65 (BP Location: Left arm)   Pulse 53   Temp 98  F (36.7  C) (Oral)   Resp 15   Ht 1.651 m (5' 5\")   Wt 61.9 kg (136 lb 7.4 oz)   SpO2 99%   BMI 22.71 kg/m       Imaging Findings  CT head: No acute ischemia/hemorrhage  MRA head/neck: No LVO. Mild/mod narrowing of the R mid P2  segment of the right posterior cerebral artery  MRI Brain: No e/o acute stroke    Impression  Transient R arm weakness - possible TIA  TIAs are typically not as short lived in duration so unclear whether this was a true CVA event. However, with her ABCD2 score of 4, it would be prudent to treat it as a TIA and work up for risk factors and institute secondary prevention.    Recommendations  - Use orderset: \"Ischemic Stroke Routine Admission\" or \"Ischemic Stroke No Thrombolytics/No Thrombectomy ICU Admission\"  - Neurochecks and Vital Signs every 4 hrs   - Permissive HTN; goal SBP < 220 mmHg  - Dual antiplatelet therapy: Aspirin 81 mg daily plus Plavix 300 mg load followed by daily Plavix 75 mg for 3 weeks and then switch to antiplatelet monotherapy  - Statin: per Lipid profile studies (LDL goal <70)  - Telemetry, EKG  - ECHO  - Bedside Glucose Monitoring  - A1c, Lipid Panel, Troponin x 3  - PT/OT/SLP  - Stroke Education  - Euthermia, Euglycemia    Case discussed with vascular neurology attending Dr. Balbuena    My recommendations are based on the information provided over the phone by Debra Denise's in-person providers. They are not intended to replace the clinical judgment of her " "in-person providers. I was not requested to personally see or examine the patient at this time.    The Stroke Staff is Dr. Balbuena.    Tresa Hopper MD  Vascular Neurology Fellow    To page me or covering stroke neurology team member, click here: [email or web address]  Choose \"On Call\" tab at top, then select \"NEUROLOGY/ALL SITES\" from middle drop-down box, press Enter, then look for \"stroke\" or \"telestroke\" for your site.      "

## 2023-07-11 NOTE — PROGRESS NOTES
RECEIVING UNIT ED HANDOFF REVIEW    ED Nurse Handoff Report was reviewed by: Agnes Quintana RN on July 11, 2023 at 3:12 PM

## 2023-07-11 NOTE — ED NOTES
[facility]  ED Nurse Handoff Report    ED Chief complaint: Extremity Weakness      ED Diagnosis:   Final diagnoses:   TIA (transient ischemic attack)   Chest pain, unspecified type       Code Status: Full Code    Allergies:   Allergies   Allergen Reactions     Sulfacetamide Hives     Adhesive Tape Rash     Atenolol      Other reaction(s): Intolerance-Can't Take  Fatigue     Ibuprofen      Other reaction(s): Stomach Upset  4-9-13 tele encounter     Naproxen      Other reaction(s): Stomach Upset  4-9-13 tele encounter     Ramipril Cough     Other reaction(s): Intolerance-Can't Take     Sulfa Antibiotics      Codeine Nausea     Fentanyl Nausea       Patient Story:   82 year old female with a history of hypertension and ulcerative colitis who presents with extremity weakness. Patient reports yesterday around 3 PM she was carrying heavy groceries and after putting the bags down she says her left arm went limp for 30 seconds. She reports after her arm fully recovered and it has not occurred since then. Patient reports her left arm was weak and she had no control. Patient denies her arm being painful and any other symptoms. Patient denies slurred speech, headache, neck pain, numbness and shortness of breath    Focused Assessment:    Neuro: Alert, oriented x 4  Respiratory:Clear lung sounds, on room air   Cardiology:  No CP, bradycardic in 50's no symptoms   Gastrointestinal: soft, non tender, non distended   Genitourinary/Renal:    Musculoskeletal: moves all extremities   Skin: Intact skin   Lines: 20 right forearm    Labs Ordered and Resulted from Time of ED Arrival to Time of ED Departure   COMPREHENSIVE METABOLIC PANEL - Abnormal       Result Value    Sodium 141      Potassium 3.8      Chloride 105      Carbon Dioxide (CO2) 26      Anion Gap 10      Urea Nitrogen 13.7      Creatinine 0.76      Calcium 9.2      Glucose 117 (*)     Alkaline Phosphatase 67      AST 13      ALT 13      Protein Total 6.7       Albumin 4.0      Bilirubin Total 0.4      GFR Estimate 77     TROPONIN T, HIGH SENSITIVITY - Normal    Troponin T, High Sensitivity 9     D DIMER QUANTITATIVE - Normal    D-Dimer Quantitative 0.30     CBC WITH PLATELETS AND DIFFERENTIAL    WBC Count 11.0      RBC Count 4.68      Hemoglobin 13.9      Hematocrit 42.9      MCV 92      MCH 29.7      MCHC 32.4      RDW 12.9      Platelet Count 438      % Neutrophils 72      % Lymphocytes 15      % Monocytes 9      % Eosinophils 3      % Basophils 0      % Immature Granulocytes 1      NRBCs per 100 WBC 0      Absolute Neutrophils 8.0      Absolute Lymphocytes 1.7      Absolute Monocytes 1.0      Absolute Eosinophils 0.3      Absolute Basophils 0.0      Absolute Immature Granulocytes 0.1      Absolute NRBCs 0.0          MR Brain w/o & w Contrast   Final Result   IMPRESSION: Diffuse cerebral volume loss and cerebral white matter   changes consistent with chronic small vessel ischemic disease. No   evidence for acute intracranial pathology.       CYNDI MOLINA MD            SYSTEM ID:  [id number]      MRA Brain ([place]) wo Contrast   Final Result   IMPRESSION:   1. Mild-moderate presumed atherosclerotic narrowing of the mid P2   segment of the right posterior cerebral artery.   2. Otherwise, normal [place] MRA.      CYNDI MOLINA MD            SYSTEM ID:  [id number]      MRA Neck (Carotids) wo & w Contrast   Final Result   IMPRESSION: Normal MR angiogram of the neck.       CYNDI MOLINA MD            SYSTEM ID:  [id number]      Chest XR,  PA & LAT    (Results Pending)         Treatments and/or interventions provided:      Medications   gadobutrol (GADAVIST) injection 10 mL (10 mLs Intravenous $Given 7/11/23 1158)   sodium chloride (PF) 0.9% PF flush 100 mL (100 mLs Intravenous $Given 7/11/23 1158)        Patient's response to treatments and/or interventions:   Resting comfortably    To be done/followed up on inpatient unit:    See any in-patient  "orders    Does this patient have any cognitive concerns?: NA    Activity level - Baseline/Home:    Independent    Activity Level - Current:     Independent    Patient's Preferred language: English     Needed?: No    Isolation: None  Infection: Not Applicable  Patient tested for COVID 19 prior to admission: NO    Bariatric?: No    Vital Signs:   Vitals:    07/11/23 0849 07/11/23 1034 07/11/23 1036 07/11/23 1200   BP: (!) 172/60 (!) 151/62  (!) 149/80   Pulse: 58 52     Resp: 16      Temp: 97.9  F (36.6  C)      TempSrc: Temporal      SpO2: 97%  97%    Weight: 61.2 kg (135 lb)      Height: 1.651 m (5' 5\")          Cardiac Rhythm:     Was the PSS-3 completed:   Yes  What interventions are required if any?               Family Comments: spouse at bedside  OBS brochure/video discussed/provided to patient/family: Yes              Name of person given brochure if not patient:              Relationship to patient:    For the majority of the shift this patient's behavior was [place].   Behavioral interventions performed were     ED NURSE PHONE NUMBER: *[phone]          "

## 2023-07-11 NOTE — TELEPHONE ENCOUNTER
FUTURE VISIT INFORMATION:      FUTURE VISIT INFORMATION:    Date: 10/31/23    Time: 2 PM    Location: [facility]  REFERRAL INFORMATION:    Referring provider: Wolfgang Zimmerman M    [place]erring providers clinic[place] [facility]    Reason for visit/diagnosis:  Vertigo - Referred by Wolfgang Zimmerman MD in [facility]    RECORDS REQUESTED FROM:       Clinic name Comments Records Status Imaging Status   [facility] 7/3/23 OV with Wolfgang Zimmerman M [facility]RA - MR brain 7/11/23  MR brain 9/24/20 [facility]   [facility] 8/18/2020 OV with Dr Francisco Javier Thomas  8/18/2020 audiogram Send to scan 7/19/23 July 19, 2023 at 12:09 PM - called and spoke to the patient re: outside ENT records. Patient think she was seen at [facility] in [place]. Send request for recs -Beth  July 19, 2023 at 2:50 PM - Received recs from [facility] and send to scan -Beth  July 19, 2023 2:51 PM - Forward to audiology to review -Beth

## 2023-07-11 NOTE — H&P
"[facility]    History and Physical - Hospitalist Service       Date of Admission:  7/11/2023    Assessment & Plan      Debra Denise is a 82 year old female with a history of ulcerative colitis, PMR, hypertension, dupuytren contracture,  admitted on 7/11/2023 with left arm numbness on day prior to admission    1. TIA/Stroke evaluation:     Patient presented to the emergency room with an episode of left arm numbness that lasted < 1 minute at 3 PM on day prior to admission.    Patient has remained asymptomatic subsequently.  She became worried she may have had a stroke (worried she would have another event)     She was unsure if she had been holding the groceries in her left arm for any extended period of time    Presented to the emergency room.    7/11 MRI brain: Diffuse cerebral volume loss and cerebral white matter changes consistent with chronic small vessel ischemic disease.    7/11 MR angio of head mild to moderate presumed atherosclerotic narrowing of the mid P2 segment of right posterior cerebral artery.    7/11 MRI neck normal    Neurology stroke consulting    ECHO ordered.    Stroke routines.     Nonfocal on admission with normal neuro examination     2. Chest burning:     Patient described burning in her chest but not \"pain \".  Did wake her up from sleep and lasted an hour but spontaneously resolved  (no associated diaphoresis arm radiation or other symptoms)     Troponin X 1 negative.  EKG unchanged (sinus bradycardia, possible left atrial enlargement no change from prior)     Echo ordered for stroke evaluation    8/30/2022 patient did have a coronary angiogram showing normal coronary arteries. (8/10/2022 CTA showing a possible severe mid LAD stenosis due to calcific plaque)     Trend troponins >>> monitor on telemetry    Addendum: ddimer negative     3. Hypertension:     Continue on Diovan HCTZ ordered in a.m. 160 mg/25    4. PMR     Previous diagnosis completed course of " steroids and is not required any additional treatment    5. Ulcerative colitis    Refractory to multiple treatments    Recent completed budesonide oral     Started on vedolizumab       6. CODE STATUS full  7. History of dupuytren contracture    Patient wondered if anything to do with her left arm numbness (not suspected)      Diet:   regular   DVT Prophylaxis: pneumoboots. Ambulate   Beltre Catheter: Not present  Lines: None     Cardiac Monitoring: None  Code Status:   full     Clinically Significant Risk Factors Present on Admission                  # Hypertension: Noted on problem list               Disposition Plan      Expected Discharge Date: 07/12/2023,  6:00 PM           I have personally reviewed the following data over the past 24 hrs:    11.0  \   13.9   / 438     141 105 13.7 /  117 (H)   3.8 26 0.76 \       ALT: 13 AST: 13 AP: 67 TBILI: 0.4   ALB: 4.0 TOT PROTEIN: 6.7 LIPASE: N/A       Trop: 9 BNP: N/A       INR:  N/A PTT:  N/A   D-dimer:  0.30 Fibrinogen:  N/A       Imaging results reviewed over the past 24 hrs:   Recent Results (from the past 24 hour(s))   MR Brain w/o & w Contrast    Narrative    MRI OF THE BRAIN WITHOUT AND WITH CONTRAST 7/11/2023 11:46 AM     COMPARISON: None.    HISTORY: Yesterday at 3 PM patient experienced 30 seconds of left  upper extremity weakness, no other symptoms, weakness did not recur.     TECHNIQUE: Axial diffusion-weighted with ADC map, axial T2-weighted  with fat saturation, axial T1-weighted, axial turboFLAIR and coronal  T1-weighted images of the brain were acquired without intravenous  contrast. Following intravenous administration of gadolinium (10mL  Gadavist), axial T1-weighted images of the brain were acquired.     FINDINGS: There is mild diffuse cerebral volume loss. There are  minimal patchy periventricular areas of abnormal T2 signal  hyperintensity in the cerebral white matter bilaterally that are  consistent with sequela of chronic small vessel ischemic  disease.     The ventricles and basal cisterns are within normal limits in  configuration given the degree of cerebral volume loss. There is no  midline shift. There are no extra-axial fluid collections. There is no  evidence for stroke or acute intracranial hemorrhage. There is no  abnormal contrast enhancement in the brain or its coverings.     There is no sinusitis or mastoiditis.      Impression    IMPRESSION: Diffuse cerebral volume loss and cerebral white matter  changes consistent with chronic small vessel ischemic disease. No  evidence for acute intracranial pathology.     CYNDI MOLINA MD         SYSTEM ID:  [id number]   MRA Brain ([place]) wo Contrast    Narrative    MR ANGIOGRAM OF THE HEAD WITHOUT CONTRAST   7/11/2023 11:46 AM     COMPARISON: None    HISTORY: Yesterday at 3 PM patient experienced 30 seconds of left  upper extremity weakness, no other symptoms, weakness did not recur.    TECHNIQUE:  3D time-of-flight MR angiogram of the head without  contrast. MIP reconstruction of all MR angiographic data was  performed.    FINDINGS:  There is focal mild-moderate narrowing of the mid P2  segment of the right posterior cerebral artery that could be  atherosclerotic in nature. The bilateral distal internal carotid,  basilar, bilateral anterior cerebral, bilateral middle cerebral and  bilateral posterior cerebral arteries are otherwise patent and  unremarkable with no evidence for cerebral artery stenosis or  aneurysm.        Impression    IMPRESSION:  1. Mild-moderate presumed atherosclerotic narrowing of the mid P2  segment of the right posterior cerebral artery.  2. Otherwise, normal [place] MRA.    CYNDI MOLINA MD         SYSTEM ID:  [id number]   MRA Neck (Carotids) wo & w Contrast    Narrative    MRA NECK WITHOUT AND WITH CONTRAST 7/11/2023 11:46 AM     COMPARISON: None    HISTORY: Yesterday at 3 PM patient experienced 30 seconds of left  upper extremity weakness, no other symptoms,  weakness did not recur.    TECHNIQUE: 2D time-of-flight MR angiogram of the neck without contrast  and 3D MR angiogram of the neck with 10mL Gadavist gadolinium IV  contrast. MIP reconstruction of all MR angiographic data was  performed. Estimates of carotid stenoses are made relative to the  distal internal carotid artery diameters except as noted.    FINDINGS:   Carotids: The common carotid arteries bilaterally are widely patent.  The cervical internal carotid arteries bilaterally are patent without  stenosis.    Vertebrals: The vertebral arteries bilaterally are patent without  stenosis and demonstrate antegrade flow.    Aortic arch: The arteries as they arise from the aortic arch are  normally arranged with no evidence for stenosis.      Impression    IMPRESSION: Normal MR angiogram of the neck.     CYNDI MOLINA MD         SYSTEM ID:  [id number]   Chest XR,  PA & LAT    Narrative    CHEST TWO VIEWS  July 11, 2023 12:46 PM     HISTORY: Chest pain episode several days ago for 30 minutes, did not  recur.    COMPARISON: Chest x-ray on 9/23/2021.      Impression    IMPRESSION: PA and lateral views of the chest were obtained.  Cardiomediastinal silhouette is within normal limits. No suspicious  focal pulmonary opacities. No significant pleural effusion or  pneumothorax.    TRISH GARCES MD         SYSTEM ID:  [id number]          SUZETTE VITAL MD  Hospitalist Service  [facility]  Securely message with [employer] (more info)  Text page via [facility] Paging/Directory     ______________________________________________________________________     Chief Complaint   Left arm numbness   History is obtained from the patient and her      History of Present Illness   Debra Denise is a 82 year old female with history of hypertension, PMR, ulcerative colitis, osteopenia, dupuytren contracture,  who presents with arm numbness that occurred on the day prior to admission at 3 PM lasting an hour.  "    Yesterday came in with groceries in from the car and realized her left arm felt \"limp\" and weak. Last only 30-40 seconds. She felt a little strange. \"not my normal self\" not sure if panic or something else. No visual changes, no leg problems, No right arm problems. No speech problems. No other symptoms and resolved in < 1 minute.      She has had a lot of dizziness as well. \"had it for years\". Her primary provider has wanted her to have an MRI. She has dizziness years ago. Noticed it more in past with moving her eyes. Last month on steroids for colitis. She was getting nausea as well.      Woke up this morning and decided she did not want to worry about her arm numbness. She was concerned that she would keep worrying about it. Decided to come to the ER and have evaluation.     In addition, about 3-4 nights prior to this event she had episode of burning in her chest. Occurred in the middle of the night. She got up. Drank water.  It woke her up out of sleep. It lasted about an hour. It was not pain but burning. Has not recurred.     She had COVID and reported this affected her heart. She developed skipped beats after her COVID. Her primary care provider wanted her to  get it checked out.  She was not having chest pain but did undergo a CTA which showed possible severe mid LAD stenosis due to calcific plaque.  Subsequently had coronary angiogram 8/30/2022 which showed normal coronaries..  She has been very active and does not have chest pain. Goes up and down stairs. Does house work/cleaning with no symptoms. Grocery shops with no symptoms      Past Medical History    Past Medical History:   Diagnosis Date     Dupuytren contracture     finger     Hypertension      Osteopenia      Polymyalgia rheumatica (H)      PONV (postoperative nausea and vomiting)      Proctitis        Past Surgical History   Past Surgical History:   Procedure Laterality Date     COLONOSCOPY  04/08/2014    Procedure: COMBINED COLONOSCOPY, " SINGLE BIOPSY/POLYPECTOMY BY BIOPSY;  COLONOSCOPY;  Surgeon: Carol Ann Plasencia MD;  Location:  GI     CV CORONARY ANGIOGRAM N/A 2022    Procedure: Coronary Angiogram;  Surgeon: Trevin Hawk MD;  Location:  HEART CARDIAC CATH LAB     ENT SURGERY      tonsilectomy, adenoidectomy     ESOPHAGOSCOPY, GASTROSCOPY, DUODENOSCOPY (EGD), COMBINED N/A 2023    Procedure: ESOPHAGOGASTRODUODENOSCOPY, WITH BIOPSY;  Surgeon: Angel Lara MD;  Location:  GI     GYN SURGERY  ,     x 2     HYSTERECTOMY       ORTHOPEDIC SURGERY  2004    (R) shoulder surgery for frozen shoulder     ZAC BSO      for fibroids       Prior to Admission Medications   Prior to Admission Medications   Prescriptions Last Dose Informant Patient Reported? Taking?   Cholecalciferol (VITAMIN D3 PO)  Self Yes No   Sig: Take 2,000 Units by mouth daily    Folic Acid-Cholecalciferol 1-500 MG-UNIT TABS   Yes No   Patient not taking: Reported on 7/3/2023   Probiotic Product (PROBIOTIC PO)  Self Yes No   Sig: Take by mouth daily   fluocinonide (LIDEX) 0.05 % external cream   Yes No   Sig: Apply to the affected area once to twice daily as needed for flares.*   gabapentin (NEURONTIN) 100 MG capsule   No No   Sig: Take 2 tablets at bedtime   multivitamin w/minerals (CENTRUM ADULTS) tablet   Yes No   Sig: Take 1 tablet by mouth daily   ondansetron (ZOFRAN ODT) 4 MG ODT tab   No No   Sig: Take 1 tablet (4 mg) by mouth every 8 hours as needed for nausea   sodium chloride 0.9 % SOLN 250 mL with vedolizumab 60 MG/ML SOLR 300 mg infusionn   Yes No   Sig: Inject 300 mg into the vein every 2 months   valsartan-hydrochlorothiazide (DIOVAN HCT) 160-25 MG tablet   No No   Sig: Take 1 tablet by mouth daily      Facility-Administered Medications: None        Review of Systems    The 10 point Review of Systems is negative other than noted in the HPI or here.      Physical Exam   Vital Signs: Temp: 97.9  F (36.6  C)  Temp src: Temporal BP: (!) 149/80 Pulse: 52   Resp: 16 SpO2: 97 % O2 Device: None (Room air)    Weight: 135 lbs 0 oz    General Appearance: Patient is awake and alert  Eyes: Pupils equally round reactive: TMs are clear with no impaction  Oropharynx clear  Respiratory: Clear to auscultation bilaterally with no wheezes or rhonchi  Cardiovascular: Regular rate and rhythm without gallop rub normal S1-S2  GI: Positive bowel sounds soft no rebound guarding or tenderness  Skin: Bilateral lower extremity varicose veins  Musculoskeletal: Patient is able to move all of her extremities equal and symmetric with normal motor strength  Neurologic: Alert and oriented with cranial nerves intact      Medical Decision Making       60 MINUTES SPENT BY ME on the date of service doing chart review, history, exam, documentation & further activities per the note.  ------------------ MEDICAL DECISION MAKING ------------------------------------------------------------------------------------------------------      Data     I have personally reviewed the following data over the past 24 hrs:    11.0  \   13.9   / 438     141 105 13.7 /  117 (H)   3.8 26 0.76 \       ALT: 13 AST: 13 AP: 67 TBILI: 0.4   ALB: 4.0 TOT PROTEIN: 6.7 LIPASE: N/A       Trop: 9 BNP: N/A       INR:  N/A PTT:  N/A   D-dimer:  0.30 Fibrinogen:  N/A

## 2023-07-11 NOTE — ED PROVIDER NOTES
History     Chief Complaint:  Extremity Weakness       The history is provided by the patient.      Debra Denise is a 82 year old female with a history of hypertension and ulcerative colitis who presents with extremity weakness. Patient reports yesterday around 3 PM she was carrying heavy groceries and after putting the bags down she says her left arm went limp for 30 seconds. She reports after her arm fully recovered and it has not occurred since then. Patient reports her left arm was weak and she had no control. Patient denies her arm being painful and any other symptoms. Patient denies slurred speech, headache, neck pain, numbness and shortness of breath. She denies cough, black or bloody stool, fever and history of blood clots. She denies being on blood thinners and any recent travel. Patient also notes vision changes recently with blurriness in both eyes that are on and off but specifically denies any happening yesterday.     Patient also reports experiencing burning in her chest 4 days ago that lasted for an hour. She denies experiencing this again. She also denies episodic chest pain. Patient reports ongoing dizziness and nausea. She says her Internist Doctor wants her to have an MRI because of her symptoms. Patent also notes being prescribed Entyvio. Patient notes she is treated for high blood pressure. She denies having high cholesterol. She denies chiropractic adjustments of the head or neck. She denies smoking or being diabetic. She denies siblings with heart disease as well. She denies any past history of heart disease. Patient notes history of angiogram in 2022 because she had heart irregularities.     Independent Historian:   None - Patient Only    Review of External Notes:   None    Medications:    Cholecalciferol   Neurontin   Norvasc  Crestor    Past Medical History:    Hypertension   Osteopenia   PONV   Dupuytren contracture  Proctitis    Past Surgical History:    Colonoscopy  EGD  "  Tonsillectomy, adenectomy  Hysterectomy   Orthopedic surgery         Physical Exam     Patient Vitals for the past 24 hrs:   BP Temp Temp src Pulse Resp SpO2 Height Weight   07/11/23 1200 (!) 149/80 -- -- -- -- -- -- --   07/11/23 1036 -- -- -- -- -- 97 % -- --   07/11/23 1034 (!) 151/62 -- -- 52 -- -- -- --   07/11/23 0849 (!) 172/60 97.9  F (36.6  C) Temporal 58 16 97 % 1.651 m (5' 5\") 61.2 kg (135 lb)      Physical Exam  SKIN:  Warm, dry.  HEMATOLOGIC/IMMUNOLOGIC/LYMPHATIC:  No pallor.  No lower extremity edema.  HENT: Painless active range of motion of head and neck.  EYES:  Conjunctivae normal.  Normal extraocular motion.  Pupils equal round and reactive to light.  Visual fields intact.  No nystagmus.  CARDIOVASCULAR:  Regular rate and rhythm.  No murmur.  No carotid bruit.  Equal palpable bilateral radial pulses.  RESPIRATORY:  No respiratory distress, breath sounds equal and normal.  GASTROINTESTINAL:  Soft, nontender abdomen.  MUSCULOSKELETAL: Normal body habitus.  Full active range of motion of the upper and lower extremities.  NEUROLOGIC:  Alert, conversant.  Oriented to self place and time.  No aphasia.  No dysarthria.  No gross motor or tactile sensory deficit of the face or limbs.  No limb ataxia.  PSYCHIATRIC:  Normal mood.    Emergency Department Course     ECG results from 07/11/23   EKG 12 lead     Value    Systolic Blood Pressure     Diastolic Blood Pressure     Ventricular Rate 50    Atrial Rate 50    [place] Interval 156    QRS Duration 82        QTc 443    P Axis 51    R AXIS -21    T Axis 62    Interpretation ECG      Sinus bradycardia  Possible Left atrial enlargement  Borderline ECG  When compared with ECG of 19-APR-2023 14:11,  No significant change was found         Imaging:  Chest XR,  PA & LAT   Final Result   IMPRESSION: PA and lateral views of the chest were obtained.   Cardiomediastinal silhouette is within normal limits. No suspicious   focal pulmonary opacities. No significant " pleural effusion or   pneumothorax.      TRISH GARCES MD            SYSTEM ID:  [id number]      MR Brain w/o & w Contrast   Final Result   IMPRESSION: Diffuse cerebral volume loss and cerebral white matter   changes consistent with chronic small vessel ischemic disease. No   evidence for acute intracranial pathology.       CYNDI MOLINA MD            SYSTEM ID:  [id number]      MRA Brain ([place]) wo Contrast   Final Result   IMPRESSION:   1. Mild-moderate presumed atherosclerotic narrowing of the mid P2   segment of the right posterior cerebral artery.   2. Otherwise, normal [place] MRA.      CYNDI MOLINA MD            SYSTEM ID:  [id number]      MRA Neck (Carot[place]) wo & w Contrast   Final Result   IMPRESSION: Normal MR angiogram of the neck.       CYNDI MOLINA MD            SYSTEM ID:  [id number]         Report per radiology    Laboratory:  Labs Ordered and Resulted from Time of ED Arrival to Time of ED Departure   COMPREHENSIVE METABOLIC PANEL - Abnormal       Result Value    Sodium 141      Potassium 3.8      Chloride 105      Carbon Dioxide (CO2) 26      Anion Gap 10      Urea Nitrogen 13.7      Creatinine 0.76      Calcium 9.2      Glucose 117 (*)     Alkaline Phosphatase 67      AST 13      ALT 13      Protein Total 6.7      Albumin 4.0      Bilirubin Total 0.4      GFR Estimate 77     TROPONIN T, HIGH SENSITIVITY - Normal    Troponin T, High Sensitivity 9     D DIMER QUANTITATIVE - Normal    D-Dimer Quantitative 0.30     CBC WITH PLATELETS AND DIFFERENTIAL    WBC Count 11.0      RBC Count 4.68      Hemoglobin 13.9      Hematocrit 42.9      MCV 92      MCH 29.7      MCHC 32.4      RDW 12.9      Platelet Count 438      % Neutrophils 72      % Lymphocytes 15      % Monocytes 9      % Eosinophils 3      % Basophils 0      % Immature Granulocytes 1      NRBCs per 100 WBC 0      Absolute Neutrophils 8.0      Absolute Lymphocytes 1.7      Absolute Monocytes 1.0      Absolute Eosinophils 0.3       Absolute Basophils 0.0      Absolute Immature Granulocytes 0.1      Absolute NRBCs 0.0          Emergency Department Course & Assessments:         Interventions:  Medications   gadobutrol (GADAVIST) injection 10 mL (10 mLs Intravenous $Given 7/11/23 1158)   sodium chloride (PF) 0.9% PF flush 100 mL (100 mLs Intravenous $Given 7/11/23 1158)        Assessments:  0930 I obtained history and examined the patient as noted above.   1337 I rechecked and updated the patient.       Independent Interpretation (X-rays, CTs, rhythm strip):  Chest x-ray: No pleural effusion, infiltrate or pneumothorax.  Normal cardiac silhouette.    Consultations/Discussion of Management or Tests:  1004 I spoke with Neuro stroke, regarding the patient's history and presentation in the emergency department today.   1313 I spoke with Dr. Reddy of the hospitalist team regarding the patient, who accepted the patient for admission.     Social Determinants of Health affecting care:   None    Disposition:  The patient was admitted to the hospital under the care of Dr. Reddy.     Impression & Plan    Medical Decision Making:  This patient presents after concern of a brief episode of weakness of the left upper limb yesterday.  She has not experienced a recurrence of this particular symptom.  Denies other associated acute neurologic symptoms.  Examination reassuring.  After neuro stroke consultation the above evaluation was recommended regarding her neurologic symptoms.  Gladly MRI MRA was negative for any acute concerning findings.  Certainly may have experienced a transient ischemic attack.  The consulted neuro stroke providers recommended observation admission for further evaluation and initiated aspirin and Plavix.  Regarding the episode of chest discomfort several days ago testing in that regard was negative.  This included EKG and lab work.  Considered pulmonary embolism given her inflammatory bowel disease and her age and this episode but  D-dimer was negative so this was not further pursued.       Diagnosis:    ICD-10-CM    1. TIA (transient ischemic attack)  G45.9       2. Chest pain, unspecified type  R07.9              Scribe Disclosure:  CHAVEZ SYLVESTER PRINCESS, am serving as a scribe at 9:29 AM on 7/11/2023 to document services personally performed by Asim Cooper MD based on my observations and the provider's statements to me.     7/11/2023   Asim Cooper MD Moe, James Thomas, MD  07/11/23 2401

## 2023-07-11 NOTE — PROGRESS NOTES
PRIMARY DIAGNOSIS: GENERALIZED WEAKNESS    OUTPATIENT/OBSERVATION GOALS TO BE MET BEFORE DISCHARGE  1. Orthostatic performed: N/A    2. Tolerating PO medications: Yes    3. Return to near baseline physical activity: Yes    4. Cleared for discharge by consultants (if involved): No    Discharge Planner Nurse   Safe discharge environment identified: No  Barriers to discharge: Yes       Entered by: Agnes Quintana RN 07/11/2023 4:52 PM     Please review provider order for any additional goals.   Nurse to notify provider when observation goals have been met and patient is ready for discharge.

## 2023-07-12 ENCOUNTER — APPOINTMENT (OUTPATIENT)
Dept: CARDIOLOGY | Facility: CLINIC | Age: 83
End: 2023-07-12
Attending: PHYSICIAN ASSISTANT
Payer: COMMERCIAL

## 2023-07-12 ENCOUNTER — APPOINTMENT (OUTPATIENT)
Dept: CARDIOLOGY | Facility: CLINIC | Age: 83
End: 2023-07-12
Attending: INTERNAL MEDICINE
Payer: COMMERCIAL

## 2023-07-12 VITALS
HEART RATE: 57 BPM | RESPIRATION RATE: 16 BRPM | WEIGHT: 138.99 LBS | BODY MASS INDEX: 23.16 KG/M2 | OXYGEN SATURATION: 98 % | SYSTOLIC BLOOD PRESSURE: 124 MMHG | TEMPERATURE: 97.9 F | DIASTOLIC BLOOD PRESSURE: 58 MMHG | HEIGHT: 65 IN

## 2023-07-12 LAB
ANION GAP SERPL CALCULATED.3IONS-SCNC: 8 MMOL/L (ref 7–15)
BUN SERPL-MCNC: 11.9 MG/DL (ref 8–23)
CALCIUM SERPL-MCNC: 9.1 MG/DL (ref 8.8–10.2)
CHLORIDE SERPL-SCNC: 104 MMOL/L (ref 98–107)
CHOLEST SERPL-MCNC: 160 MG/DL
CREAT SERPL-MCNC: 0.77 MG/DL (ref 0.51–0.95)
DEPRECATED HCO3 PLAS-SCNC: 29 MMOL/L (ref 22–29)
ERYTHROCYTE [DISTWIDTH] IN BLOOD BY AUTOMATED COUNT: 12.8 % (ref 10–15)
GFR SERPL CREATININE-BSD FRML MDRD: 76 ML/MIN/1.73M2
GLUCOSE BLDC GLUCOMTR-MCNC: 87 MG/DL (ref 70–99)
GLUCOSE BLDC GLUCOMTR-MCNC: 95 MG/DL (ref 70–99)
GLUCOSE BLDC GLUCOMTR-MCNC: 97 MG/DL (ref 70–99)
GLUCOSE SERPL-MCNC: 87 MG/DL (ref 70–99)
HCT VFR BLD AUTO: 41.9 % (ref 35–47)
HDLC SERPL-MCNC: 46 MG/DL
HGB BLD-MCNC: 13.7 G/DL (ref 11.7–15.7)
LDLC SERPL CALC-MCNC: 93 MG/DL
LVEF ECHO: NORMAL
MCH RBC QN AUTO: 30.1 PG (ref 26.5–33)
MCHC RBC AUTO-ENTMCNC: 32.7 G/DL (ref 31.5–36.5)
MCV RBC AUTO: 92 FL (ref 78–100)
NONHDLC SERPL-MCNC: 114 MG/DL
PLATELET # BLD AUTO: 404 10E3/UL (ref 150–450)
POTASSIUM SERPL-SCNC: 3.6 MMOL/L (ref 3.4–5.3)
RBC # BLD AUTO: 4.55 10E6/UL (ref 3.8–5.2)
SODIUM SERPL-SCNC: 141 MMOL/L (ref 136–145)
TRIGL SERPL-MCNC: 106 MG/DL
WBC # BLD AUTO: 9.8 10E3/UL (ref 4–11)

## 2023-07-12 PROCEDURE — G0378 HOSPITAL OBSERVATION PER HR: HCPCS

## 2023-07-12 PROCEDURE — 93272 ECG/REVIEW INTERPRET ONLY: CPT | Performed by: INTERNAL MEDICINE

## 2023-07-12 PROCEDURE — 999N000111 HC STATISTIC OT IP EVAL DEFER: Performed by: OCCUPATIONAL THERAPIST

## 2023-07-12 PROCEDURE — 99223 1ST HOSP IP/OBS HIGH 75: CPT | Mod: FS | Performed by: PHYSICIAN ASSISTANT

## 2023-07-12 PROCEDURE — 85027 COMPLETE CBC AUTOMATED: CPT | Performed by: INTERNAL MEDICINE

## 2023-07-12 PROCEDURE — 80048 BASIC METABOLIC PNL TOTAL CA: CPT | Performed by: INTERNAL MEDICINE

## 2023-07-12 PROCEDURE — 82962 GLUCOSE BLOOD TEST: CPT

## 2023-07-12 PROCEDURE — 93270 REMOTE 30 DAY ECG REV/REPORT: CPT

## 2023-07-12 PROCEDURE — 93306 TTE W/DOPPLER COMPLETE: CPT | Mod: 26 | Performed by: INTERNAL MEDICINE

## 2023-07-12 PROCEDURE — 250N000013 HC RX MED GY IP 250 OP 250 PS 637: Performed by: INTERNAL MEDICINE

## 2023-07-12 PROCEDURE — 999N000208 ECHOCARDIOGRAM COMPLETE

## 2023-07-12 PROCEDURE — 36415 COLL VENOUS BLD VENIPUNCTURE: CPT | Performed by: INTERNAL MEDICINE

## 2023-07-12 PROCEDURE — 99239 HOSP IP/OBS DSCHRG MGMT >30: CPT | Performed by: PHYSICIAN ASSISTANT

## 2023-07-12 PROCEDURE — 255N000002 HC RX 255 OP 636: Performed by: INTERNAL MEDICINE

## 2023-07-12 PROCEDURE — 999N000226 HC STATISTIC SLP IP EVAL DEFER: Performed by: SPEECH-LANGUAGE PATHOLOGIST

## 2023-07-12 PROCEDURE — 99418 PROLNG IP/OBS E/M EA 15 MIN: CPT | Mod: FS | Performed by: PHYSICIAN ASSISTANT

## 2023-07-12 PROCEDURE — 999N000147 HC STATISTIC PT IP EVAL DEFER

## 2023-07-12 RX ORDER — CLOPIDOGREL BISULFATE 75 MG/1
75 TABLET ORAL DAILY
Qty: 20 TABLET | Refills: 0 | Status: SHIPPED | OUTPATIENT
Start: 2023-07-13 | End: 2023-07-12

## 2023-07-12 RX ORDER — CLOPIDOGREL BISULFATE 75 MG/1
75 TABLET ORAL DAILY
Status: DISCONTINUED | OUTPATIENT
Start: 2023-07-12 | End: 2023-07-12 | Stop reason: HOSPADM

## 2023-07-12 RX ORDER — LIDOCAINE 40 MG/G
CREAM TOPICAL
Status: DISCONTINUED | OUTPATIENT
Start: 2023-07-12 | End: 2023-07-12 | Stop reason: HOSPADM

## 2023-07-12 RX ORDER — CLOPIDOGREL BISULFATE 75 MG/1
75 TABLET ORAL DAILY
Qty: 30 TABLET | Refills: 3 | Status: SHIPPED | OUTPATIENT
Start: 2023-07-13 | End: 2023-07-27

## 2023-07-12 RX ORDER — ATORVASTATIN CALCIUM 40 MG/1
40 TABLET, FILM COATED ORAL EVERY EVENING
Qty: 30 TABLET | Refills: 3 | Status: SHIPPED | OUTPATIENT
Start: 2023-07-12 | End: 2023-07-27

## 2023-07-12 RX ADMIN — HUMAN ALBUMIN MICROSPHERES AND PERFLUTREN 9 ML: 10; .22 INJECTION, SOLUTION INTRAVENOUS at 11:30

## 2023-07-12 RX ADMIN — HYDROCHLOROTHIAZIDE: 25 TABLET ORAL at 08:40

## 2023-07-12 RX ADMIN — CLOPIDOGREL BISULFATE 75 MG: 75 TABLET ORAL at 11:51

## 2023-07-12 ASSESSMENT — ACTIVITIES OF DAILY LIVING (ADL)
ADLS_ACUITY_SCORE: 18
DEPENDENT_IADLS:: INDEPENDENT
ADLS_ACUITY_SCORE: 18

## 2023-07-12 NOTE — PROGRESS NOTES
Observation goals  PRIOR TO DISCHARGE        Comments: List all goals to be met before discharge home       Free from ACUTE neuro deficits  Met   Testing complete   Met  Other:   Nurse to notify provider when observation goals have been met and patient is ready for discharge.

## 2023-07-12 NOTE — PROVIDER NOTIFICATION
MD Notification    Notified Person: PA    Notified Person Name: Ana Maria Mills    Notification Date/Time: 7/12/23 5040     Notification Interaction: [employer] messaging    Purpose of Notification:ECHO results are now in. Thank you.     Orders Received:Pending    Comments:

## 2023-07-12 NOTE — PLAN OF CARE
Physical Therapy: Orders received. Chart reviewed and discussed with care team.? Physical Therapy not indicated due to patient feeling that they are at their baseline. Declining inpatient physical therapy services at this time. Patient has been independent within there room and feels she has no residual numbness and/or weakness. Patient's mobility feels baseline according to self report.? Defer discharge recommendations to care team.? Will complete orders.

## 2023-07-12 NOTE — DISCHARGE SUMMARY
[facility]  Hospitalist Discharge Summary      Date of Admission:  7/11/2023  Date of Discharge:  7/12/2023  Discharging Provider: Ana Maria Mills PA-C  Discharge Service: Hospitalist Service    Discharge Diagnoses   Transient left upper extremity weakness, suspected TIA  Chest burning, resolved    Clinically Significant Risk Factors          Follow-ups Needed After Discharge   Follow-up Appointments     Follow-up and recommended labs and tests       Follow up with primary care provider, Wolfgang Zimmerman, within 7 days   for hospital follow- up.  The following labs/tests are recommended: repeat   lipid panel and liver panel in 2-3 months.  - Neurochecks and Vital Signs   every 4 hrs   - Goal normotension. Long term outpatient BP goal <130/80.  Follow-up in 6-8 weeks with general neurology ([phone]) (ordered)            Discharge Disposition   Discharged to home  Condition at discharge: Stable    Hospital Course   Debra Denise is a 82 year old female with a history of ulcerative colitis, PMR, hypertension, dupuytren contracture,  admitted on 7/11/2023 with left arm numbness on day prior to admission.  For full HPI please see admission H&P from Dr. Marcie Reddy dated 7/11/2023.     Transient left upper extremity weakness, suspected TIA  Patient presented to the emergency room with an episode of left arm numbness that lasted < 1 minute at 3 PM on day prior to admission. Patient has remained asymptomatic subsequently.  She became worried she may have had a stroke (worried she would have another event) She was unsure if she had been holding the groceries in her left arm for any extended period of time. Presented to the emergency room.  * 7/11 MRI brain: Diffuse cerebral volume loss and cerebral white matter changes consistent with chronic small vessel ischemic disease.  * 7/11 MR angio of head mild to moderate presumed atherosclerotic narrowing of the mid P2 segment of right  "posterior cerebral artery.  * 7/11 MRI neck normal  FLP , HDL 46, LDL 93, triglycerides 109  Patient was registered to observation status neuro stroke was consulted.  An echo was completed which was negative bubble study, normal EF, no significant valvular abnormalities.  Patient was monitored on telemetry and with neurochecks and stayed neurologically and vitally stable.  She is nonfocal on admission and throughout her stay.  She was discharged with atorvastatin 40 mg daily and clopidogrel 75 mg daily indefinitely as she declined DAPT due to intolerance of aspirin with her ulcerative colitis.  See below for further details on this.  She will follow up with primary care in 1 to 2 weeks.  She will have a 30-day heart monitor on discharge and follow-up with neurology in 6 to 8 weeks.  Day of discharge she is feeling well, cleared by stroke neurology team, and all questions were answered to the best of my ability.     2. Chest burning, resolved  * Patient described burning in her chest but not \"pain \". Did wake her up from sleep and lasted an hour but spontaneously resolved (no associated diaphoresis arm radiation or other symptoms)   * Troponin x2 negative. EKG unchanged (sinus bradycardia, possible left atrial enlargement no change from prior). Ddimer negative.  Echo and telemetry unremarkable. 8/30/2022 patient did have a coronary angiogram showing normal coronary arteries. (8/10/2022 CTA showing a possible severe mid LAD stenosis due to calcific plaque)      3. Hypertension: Continue on Diovan HCTZ 160 mg/25 daily     4. PMR     Previous diagnosis completed course of steroids and is not required any additional treatment     5. Ulcerative colitis    Refractory to multiple treatments    Recent completed budesonide oral     Started on vedolizumab    [facility] was consulted due to patient's concern of not wanting aspirin for DAPT therapy in the setting of ulcerative colitis.  See GI note who felt that " clopidogrel would be reasonable.  Due to this the patient will be on clopidogrel indefinitely.     6. History of dupuytren contracture    Patient wondered if anything to do with her left arm numbness (not suspected)        Consultations This Hospital Stay   NEUROLOGY IP STROKE CONSULT  SPEECH LANGUAGE PATH ADULT IP CONSULT  SMOKING CESSATION PROGRAM IP CONSULT  PHYSICAL THERAPY ADULT IP CONSULT  OCCUPATIONAL THERAPY ADULT IP CONSULT  GASTROENTEROLOGY IP CONSULT  PATIENT [facility] IP CONSULT  NEUROLOGY IP STROKE CONSULT  SPEECH LANGUAGE PATH ADULT IP CONSULT  PHARMACY IP CONSULT  PHARMACY IP CONSULT  PHARMACY IP CONSULT  PHYSICAL THERAPY ADULT IP CONSULT  OCCUPATIONAL THERAPY ADULT IP CONSULT  REHAB ADMISSIONS LIAISON IP CONSULT  CARE MANAGEMENT / SOCIAL WORK IP CONSULT  SMOKING CESSATION PROGRAM IP CONSULT    Code Status   Full Code    Time Spent on this Encounter   I, Ana Maria Mills PA-C, personally saw the patient today and spent greater than 30 minutes discharging this patient.       Ana Maria Mills PA-C  [facility] EXTENDED RECOVERY AND SHORT STAY  [address] [address]  Phone: [phone]  ______________________________________________________________________    Physical Exam   Vital Signs: Temp: 97.9  F (36.6  C) Temp src: Oral BP: 124/58 Pulse: 57   Resp: 16 SpO2: 98 % O2 Device: None (Room air)    Weight: 138 lbs 15.84 oz    Physical Exam    General: Awake, alert, very pleasant woman who appears stated age. Looks comfortable sitting up in bed. No acute distress.  HEENT: Normocephalic, atraumatic. Extraocular movements intact.   Respiratory: Clear to auscultation bilaterally, no rales, wheezing, or rhonchi.  Cardiovascular: Regular rate and rhythm, +S1 and S2, no murmur auscultated. No peripheral edema.   Gastrointestinal: Soft, non-tender, non-distended. Bowel sounds present.  Skin: Warm, dry. No obvious rashes or lesions on exposed skin. Dorsalis  pedis pulses palpable bilaterally.  Musculoskeletal: No joint swelling, erythema or tenderness. Moves all extremities equally.  Neurologic: AAO x3. Cranial nerves 2-12 grossly intact, normal strength and sensation. No pronator drift. FTN intact bilaterally.   Psychiatric: Appropriate mood and affect. No obvious anxiety or depression.       Primary Care Physician   Wolfgang Zimmerman    Discharge Orders      Reason for your hospital stay    You were admitted with possible TIA or mini stroke. You saw the neurology team and had several tests done.     Follow-up and recommended labs and tests     Follow up with primary care provider, Wolfgang Zimmerman, within 7 days for hospital follow- up.  The following labs/tests are recommended: repeat lipid panel and liver panel in 2-3 months.  - Neurochecks and Vital Signs every 4 hrs   - Goal normotension. Long term outpatient BP goal <130/80.  Follow-up in 6-8 weeks with general neurology ([phone]) (ordered)     Activity    Your activity upon discharge: activity as tolerated     Discharge Instructions    Take your clopidogrel daily  Statin at before bed     Diet    Follow this diet upon discharge: Orders Placed This Encounter      Vegetarian Diet     Stroke Hospital Follow Up (for neurologist use only)    [facility] will call you to coordinate care as prescribed by your provider. If you don t hear from a representative within 2 business days, please call [phone].         Significant Results and Procedures Home  Results for orders placed or performed during the hospital encounter of 07/11/23   MR Brain w/o & w Contrast    Narrative    MRI OF THE BRAIN WITHOUT AND WITH CONTRAST 7/11/2023 11:46 AM     COMPARISON: None.    HISTORY: Yesterday at 3 PM patient experienced 30 seconds of left  upper extremity weakness, no other symptoms, weakness did not recur.     TECHNIQUE: Axial diffusion-weighted with ADC map, axial T2-weighted  with fat saturation, axial  T1-weighted, axial turboFLAIR and coronal  T1-weighted images of the brain were acquired without intravenous  contrast. Following intravenous administration of gadolinium (10mL  Gadavist), axial T1-weighted images of the brain were acquired.     FINDINGS: There is mild diffuse cerebral volume loss. There are  minimal patchy periventricular areas of abnormal T2 signal  hyperintensity in the cerebral white matter bilaterally that are  consistent with sequela of chronic small vessel ischemic disease.     The ventricles and basal cisterns are within normal limits in  configuration given the degree of cerebral volume loss. There is no  midline shift. There are no extra-axial fluid collections. There is no  evidence for stroke or acute intracranial hemorrhage. There is no  abnormal contrast enhancement in the brain or its coverings.     There is no sinusitis or mastoiditis.      Impression    IMPRESSION: Diffuse cerebral volume loss and cerebral white matter  changes consistent with chronic small vessel ischemic disease. No  evidence for acute intracranial pathology.     CYNDI MOLINA MD         SYSTEM ID:  [id number]   MRA Brain ([place]) wo Contrast    Narrative    MR ANGIOGRAM OF THE HEAD WITHOUT CONTRAST   7/11/2023 11:46 AM     COMPARISON: None    HISTORY: Yesterday at 3 PM patient experienced 30 seconds of left  upper extremity weakness, no other symptoms, weakness did not recur.    TECHNIQUE:  3D time-of-flight MR angiogram of the head without  contrast. MIP reconstruction of all MR angiographic data was  performed.    FINDINGS:  There is focal mild-moderate narrowing of the mid P2  segment of the right posterior cerebral artery that could be  atherosclerotic in nature. The bilateral distal internal carotid,  basilar, bilateral anterior cerebral, bilateral middle cerebral and  bilateral posterior cerebral arteries are otherwise patent and  unremarkable with no evidence for cerebral artery stenosis  or  aneurysm.        Impression    IMPRESSION:  1. Mild-moderate presumed atherosclerotic narrowing of the mid P2  segment of the right posterior cerebral artery.  2. Otherwise, normal [place] MRA.    CYNDI MOLINA MD         SYSTEM ID:  [id number]   MRA Neck (Carotids) wo & w Contrast    Narrative    MRA NECK WITHOUT AND WITH CONTRAST 7/11/2023 11:46 AM     COMPARISON: None    HISTORY: Yesterday at 3 PM patient experienced 30 seconds of left  upper extremity weakness, no other symptoms, weakness did not recur.    TECHNIQUE: 2D time-of-flight MR angiogram of the neck without contrast  and 3D MR angiogram of the neck with 10mL Gadavist gadolinium IV  contrast. MIP reconstruction of all MR angiographic data was  performed. Estimates of carotid stenoses are made relative to the  distal internal carotid artery diameters except as noted.    FINDINGS:   Carotids: The common carotid arteries bilaterally are widely patent.  The cervical internal carotid arteries bilaterally are patent without  stenosis.    Vertebrals: The vertebral arteries bilaterally are patent without  stenosis and demonstrate antegrade flow.    Aortic arch: The arteries as they arise from the aortic arch are  normally arranged with no evidence for stenosis.      Impression    IMPRESSION: Normal MR angiogram of the neck.     CYNDI MOLINA MD         SYSTEM ID:  [id number]   Chest XR,  PA & LAT    Narrative    CHEST TWO VIEWS  July 11, 2023 12:46 PM     HISTORY: Chest pain episode several days ago for 30 minutes, did not  recur.    COMPARISON: Chest x-ray on 9/23/2021.      Impression    IMPRESSION: PA and lateral views of the chest were obtained.  Cardiomediastinal silhouette is within normal limits. No suspicious  focal pulmonary opacities. No significant pleural effusion or  pneumothorax.    TRISH GARCES MD         SYSTEM ID:  [id number]   Echocardiogram Complete     Value    LVEF  60-65%    Narrative     [id number]  [id number]  [id number]  [id number]^L     [facility]  Echocardiography Laboratory  [address] [place] [address]     Name: YOLANDA MCCAULEY  MRN: [id number]  : 1940  Study Date: 2023 11:03 AM  Age: 82 yrs  Gender: Female  Patient Location: [facility]  Reason For Study: TIA  Ordering Physician: SUZETTE VITAL  Referring Physician: Wolfgang Zimmerman  Performed By: Gilda Calles     BSA: 1.7 m2  Height: 65 in  Weight: 138 lb  HR: 58  BP: 123/63 mmHg  ______________________________________________________________________________  Procedure  Complete Portable Bubble Echo Adult. Optison (NDC #[id number]) given  intravenously.  ______________________________________________________________________________  Interpretation Summary     The visual ejection fraction is 60-65%.  The right ventricle is normal in size and function.  No hemodynamically significant valvular aortic stenosis.  The inferior vena cava was normal in size with preserved respiratory  variability.  A contrast injection (Bubble Study) was performed that was negative for flow  across the interatrial septum.  ______________________________________________________________________________  Left Ventricle  The left ventricle is normal in size. There is normal left ventricular wall  thickness. There is concentric remodeling present. The visual ejection  fraction is 60-65%. Diastolic Doppler findings (E/E' ratio and/or other  parameters) suggest left ventricular filling pressures are indeterminate. No  regional wall motion abnormalities noted.     Right Ventricle  The right ventricle is normal in size and function.     Atria  Normal left atrial size. Right atrial size is normal. A contrast injection  (Bubble Study) was performed that was negative for flow across the interatrial  septum.     Mitral Valve  The mitral valve is normal in structure and function. There is physiologic  mitral regurgitation. There  is no mitral valve stenosis.     Tricuspid Valve  The tricuspid valve is not well visualized, but is grossly normal. The right  ventricular systolic pressure is approximated at 22.9 mmHg plus the right  atrial pressure. There is trace to mild tricuspid regurgitation.     Aortic Valve  The aortic valve is trileaflet with aortic valve sclerosis. There is trace  aortic regurgitation. No hemodynamically significant valvular aortic stenosis.     Pulmonic Valve  The pulmonic valve is not well visualized. There is mild (1+) pulmonic  valvular regurgitation.     Vessels  The aortic root is normal size. The inferior vena cava was normal in size with  preserved respiratory variability.     Pericardium  There is no pericardial effusion.     ______________________________________________________________________________  MMode/2D Measurements & Calculations  IVSd: 1.1 cm  LVIDd: 3.8 cm  LVIDs: 2.6 cm  LVPWd: 1.1 cm  FS: 32.1 %  LV mass(C)d: 129.5 grams  LV mass(C)dI: 76.6 grams/m2     Ao root diam: 3.1 cm  LA dimension: 3.6 cm  asc Aorta Diam: 3.9 cm  LA/Ao: 1.2  LVOT diam: 1.9 cm  LVOT area: 2.8 cm2  LA Volume (BP): 30.6 ml  LA Volume Index (BP): 18.1 ml/m2  RWT: 0.56  TAPSE: 2.4 cm     Doppler Measurements & Calculations  MV E max timothy: 62.6 cm/sec  MV A max timothy: 81.4 cm/sec  MV E/A: 0.77     MV dec time: 0.26 sec  Ao V2 max: 130.0 cm/sec  Ao max P.0 mmHg  Ao V2 mean: 88.7 cm/sec  Ao mean P.0 mmHg  Ao V2 VTI: 34.2 cm  NISSA(I,D): 2.3 cm2  NISSA(V,D): 2.4 cm2  LV V1 max P.1 mmHg  LV V1 max: 113.0 cm/sec  LV V1 VTI: 28.4 cm  SV(LVOT): 79.7 ml  SI(LVOT): 47.2 ml/m2  PA acc time: 0.15 sec  TR max timothy: 238.7 cm/sec  TR max P.9 mmHg  AV Timothy Ratio (DI): 0.87  NISSA Index (cm2/m2): 1.4  E/E' av.9  Lateral E/e': 9.5  Medial E/e': 14.3  RV S Timothy: 11.5 cm/sec     ______________________________________________________________________________  Report approved by: Markus Chung 2023 03:47 PM               Discharge  Medications   Current Discharge Medication List      START taking these medications    Details   atorvastatin (LIPITOR) 40 MG tablet Take 1 tablet (40 mg) by mouth every evening  Qty: 30 tablet, Refills: 3    Comments: Future refills by PCP Dr. Wolfgang Zimmerman with phone number [phone].  Associated Diagnoses: TIA (transient ischemic attack)      clopidogrel (PLAVIX) 75 MG tablet Take 1 tablet (75 mg) by mouth daily  Qty: 30 tablet, Refills: 3    Comments: Future refills by PCP Dr. Wolfgang Zimmerman with phone number [phone].  Associated Diagnoses: TIA (transient ischemic attack)         CONTINUE these medications which have NOT CHANGED    Details   Cholecalciferol (VITAMIN D3 PO) Take 2,000 Units by mouth daily       fluocinonide (LIDEX) 0.05 % external cream Apply to the affected area once to twice daily as needed for flares.*      gabapentin (NEURONTIN) 100 MG capsule Take 2 tablets at bedtime  Qty: 180 capsule, Refills: 1    Associated Diagnoses: Benign essential hypertension      multivitamin w/minerals (CENTRUM ADULTS) tablet Take 1 tablet by mouth daily      ondansetron (ZOFRAN ODT) 4 MG ODT tab Take 1 tablet (4 mg) by mouth every 8 hours as needed for nausea  Qty: 12 tablet, Refills: 1    Associated Diagnoses: Benign essential hypertension; Nausea      Probiotic Product (PROBIOTIC PO) Take 1 capsule by mouth daily      sodium chloride 0.9 % SOLN 250 mL with vedolizumab 60 MG/ML SOLR 300 mg infusionn Inject 300 mg into the vein every 2 months      valsartan-hydrochlorothiazide (DIOVAN HCT) 160-25 MG tablet Take 1 tablet by mouth daily  Qty: 90 tablet, Refills: 0    Associated Diagnoses: Benign essential hypertension           Allergies   Allergies   Allergen Reactions     Sulfacetamide Hives     Adhesive Tape Rash     Atenolol      Other reaction(s): Intolerance-Can't Take  Fatigue     Ibuprofen      Other reaction(s): Stomach Upset  4-9-13 tele encounter     Naproxen      Other reaction(s):  Stomach Upset  4-9-13 tele encounter     Ramipril Cough     Other reaction(s): Intolerance-Can't Take     Sulfa Antibiotics      Codeine Nausea     Fentanyl Nausea

## 2023-07-12 NOTE — PLAN OF CARE
SLP: Orders received. Chart reviewed and discussed with care team.  SLP not indicated due to passing the swallow screen and tolerating a regular diet and thin liquids. Speech/language intact and MRI negative for a stroke. Defer discharge recommendations to the medical team. Will complete orders.

## 2023-07-12 NOTE — PLAN OF CARE
Orientation/Cognitive: A&Ox4; neuros intact  Observation Goals (Met/ Not Met): Not met  Mobility Level/Assist Equipment: Ind  Fall Risk (Y/N): No  Behavior Concerns: Green  Pain Management: Denies pain  Tele/VS/O2: Tele: Sinus Michael, VSS on RA, except bradycardia  ABNL Lab/BG: see chart  Diet: Vegetarian, no eggs/onions/lactose  Bowel/Bladder: Cont to B/B  Skin Concerns: WDL  Drains/Devices: PIV SL  Tests/Procedures for next shift: Neuro/GI/PT/OT consults  Anticipated DC date & active delays: TBD, pending consults  Patient Stated Goal for Today: To sleep and go home

## 2023-07-12 NOTE — PROGRESS NOTES
"      [facility]    Stroke Progress Note    Interval EventsTTE reviewed: The visual ejection fraction is 60-65%.  The right ventricle is normal in size and function.  No hemodynamically significant valvular aortic stenosis.  The inferior vena cava was normal in size with preserved respiratory variability.  A contrast injection (Bubble Study) was performed that was negative for flow  across the interatrial septum.    OK for discharge from a stroke neuro standpoint. 30 day cardiac monitor ordered. Please see consult note from earlier today for detailed assessment and plan. To clarify the Plavix monotherapy with be continued indefinitely since she has a contraindication to aspirin.    Rahel Hagan PA-C  Vascular Neurology    To page me or covering stroke neurology team member, click here: AMCOM   Choose \"On Call\" tab at top, then search dropdown box for \"Neurology Adult\", select location, press Enter, then look for stroke/neuro ICU/telestroke.      "

## 2023-07-12 NOTE — PLAN OF CARE
"  [facility]    Stroke Telephone Note    I was called by Marcie Reddy on 07/12/23 regarding patient Debra Denise. The patient is a 82 year old female with past medical history of HTN, ulcerative colitis who is being evaluated for surgical therapy right upper extremity weakness resulting in 30 seconds.  Suspecting TIA however so short-lived that seems unlikely.  Neurology called to clarify dual antiplatelet regimen.  Every 8 patient is ulcerative colitis with prior intolerance with NSAIDs discussion was made with hospitalist on the side for now we will hold off of aspirin and give him Plavix 75 mg daily.    /63 (BP Location: Left arm, Patient Position: Supine)   Pulse (!) 47   Temp 97.5  F (36.4  C) (Oral)   Resp 15   Ht 1.651 m (5' 5\")   Wt 61.9 kg (136 lb 7.4 oz)   SpO2 95%   BMI 22.71 kg/m       Case discussed with vascular neurology attending Dr. DR Balbuena`    My recommendations are based on the information provided over the phone by Debra Denise's in-person providers. They are not intended to replace the clinical judgment of her in-person providers. I was not requested to personally see or examine the patient at this time.    The Stroke Staff is Dr. Balbuena.    Sydnie Duckworth MD  Vascular Neurology Fellow    To page me or covering stroke neurology team member, click here: AMCOM  Choose \"On Call\" tab at top, then select \"NEUROLOGY/ALL SITES\" from middle drop-down box, press Enter, then look for \"stroke\" or \"telestroke\" for your site.      "

## 2023-07-12 NOTE — CONSULTS
"[facility]    Stroke Consult Note    Reason for Consult:  Possible TIA    Chief Complaint: Extremity Weakness       HPI  Debra Denise is a 82 year old female with PMH HTN, ulcerative colitis, presenting 7/11 with an episode of acute onset transient LUE weakness lasting 30 seconds. Describes during the time the arm was \"limp\".  She did not notice any numbness, tingling, pain, speech difficulty, vision disturbance or weakness of any other extremity.  Presenting /60.    Today she is feeling well without recurrence of any focal neuro symptoms since admission. She denies any similar symptoms in the past.She does report a recent significant flair of her ulcerative colitis for several months with significant rectal bleeding during the flair. She states things have just improved over the past month since starting budesonide and Entyvio. She states that aspirin has been a trigger for her colitis in the past and is very concerned about starting aspirin. After discussion with [facility] provider she feels comfortable with starting Plavix.      TIA Evaluation Summarized    MRI and/or Head CT MRI: no acute stroke, minimal chronic small vessel disease   Intracranial Vasculature MRA head: R P2 stenosis   Cervical Vasculature MRA neck: normal     Echocardiogram pending   EKG/Telemetry Sinus carol   Other Testing Not Applicable     LDL 7/11/2023: 93 mg/dL   A1C 7/3/2023: 6.0 %  7/11/2023: 6.1 %       ABCD2 Patients Score   Age ? 60 years 1 point 1   Blood Pressure    SBP ? 140 or DBP ?  90    1 point 1   Clinical Features    - Unilateral weakness    - Speech disturbance w/o weakness    - Other    2 points  1 point    0 points 2   Duration of symptoms    ? 60 minutes    10-59 minutes    < 10 minutes   2 points  1 point  0 points 0   Diabetes  1 point 0   Patient s ABCD2 Score (0-7) = 4       Impression  Transient LUE weakness - suspected TIA, though the very short duration and isolated " "involvement of the L arm only is somewhat atypical. Without other clear cause for her symptoms would treat as TIA.     Recommendations   - Neurochecks and Vital Signs every 4 hrs   - Goal normotension. Long term outpatient BP goal <130/80.  - Discussed recommendation for DAPT x 21 days. She remains very concerned about staring aspirin as she states she's had problems with colitis when on aspirin in the past. Given these concerns it is reasonable to treat with Plavix monotherapy.   - Statin: atorvastatin 40 mg, titrate to LDL goal 40-70  - Telemetry, EKG  - Discharge with 30 day cardiac monitor to eval for atrial fibrillation (ordered)  - ECHO pending  - Bedside Glucose Monitoring  - PT/OT/SLP  - Stroke Education  - Euthermia, Euglycemia      Patient Follow-up    - in 6-8 weeks with general neurology ([phone]) (ordered)    Thank you for this consult. Will follow for echo result.     Rahel Hagan PA-C  Vascular Neurology    To page me or covering stroke neurology team member, click here: [facility]  Choose \"On Call\" tab at top, then select \"NEUROLOGY/ALL SITES\" from middle drop-down box, press Enter, then look for \"stroke\" or \"telestroke\" for your site.    _____________________________________________________    Clinically Significant Risk Factors Present on Admission                  # Hypertension: Noted on problem list             Past Medical History    Past Medical History:   Diagnosis Date     Dupuytren contracture     finger     Hypertension      Osteopenia      Polymyalgia rheumatica (H)      PONV (postoperative nausea and vomiting)      Proctitis      Medications   Home Meds  Prior to Admission medications    Medication Sig Start Date End Date Taking? Authorizing Provider   Cholecalciferol (VITAMIN D3 PO) Take 2,000 Units by mouth daily    Yes Reported, Patient   fluocinonide (LIDEX) 0.05 % external cream Apply to the affected area once to twice daily as needed for flares.* 6/8/23  Yes Reported, Patient "   gabapentin (NEURONTIN) 100 MG capsule Take 2 tablets at bedtime 2/4/23  Yes Wolfgang Zimmerman MD   multivitamin w/minerals (CENTRUM ADULTS) tablet Take 1 tablet by mouth daily   Yes Reported, Patient   ondansetron (ZOFRAN ODT) 4 MG ODT tab Take 1 tablet (4 mg) by mouth every 8 hours as needed for nausea 4/19/23  Yes Wolfgang Zimmerman MD   Probiotic Product (PROBIOTIC PO) Take 1 capsule by mouth daily   Yes Reported, Patient   sodium chloride 0.9 % SOLN 250 mL with vedolizumab 60 MG/ML SOLR 300 mg infusionn Inject 300 mg into the vein every 2 months   Yes Reported, Patient   valsartan-hydrochlorothiazide (DIOVAN HCT) 160-25 MG tablet Take 1 tablet by mouth daily 6/20/23  Yes Wolfgang Zimmerman MD       Scheduled Meds    clopidogrel  75 mg Oral Daily     gabapentin  200 mg Oral At Bedtime     sodium chloride (PF)  3 mL Intracatheter Q8H     valsartan-hydrochlorothiazide (DIOVAN HCT) 160-25 mg combo dose   Oral Daily       Infusion Meds      Allergies   Allergies   Allergen Reactions     Sulfacetamide Hives     Adhesive Tape Rash     Atenolol      Other reaction(s): Intolerance-Can't Take  Fatigue     Ibuprofen      Other reaction(s): Stomach Upset  4-9-13 tele encounter     Naproxen      Other reaction(s): Stomach Upset  4-9-13 tele encounter     Ramipril Cough     Other reaction(s): Intolerance-Can't Take     Sulfa Antibiotics      Codeine Nausea     Fentanyl Nausea          PHYSICAL EXAMINATION   Temp:  [97.3  F (36.3  C)-98  F (36.7  C)] 97.5  F (36.4  C)  Pulse:  [47-58] 47  Resp:  [15-16] 15  BP: (123-172)/(60-80) 123/63  SpO2:  [95 %-99 %] 95 %    General Exam  General:  Sitting a the edge of the bed without any acute distress    HEENT:  normocephalic/atraumatic  Pulmonary:  no respiratory distress  Extremities:  no edema  Skin:  intact     Neuro Exam  Mental Status:  alert, oriented x 3, follows commands, speech clear and fluent, naming and repetition normal  Cranial Nerves:  visual fields intact, PERRL,  EOMI with normal smooth pursuit, facial sensation intact and symmetric, facial movements symmetric, hearing not formally tested but intact to conversation, palate elevation symmetric and uvula midline, no dysarthria, tongue protrusion midline  Motor:  normal muscle tone and bulk, no abnormal movements, able to move all limbs spontaneously, strength 5/5 throughout upper and lower extremities, no pronator drift  Sensory:  light touch sensation intact and symmetric throughout upper and lower extremities, no extinction on double simultaneous stimulation   Coordination:  normal finger-to-nose and heel-to-shin bilaterally without dysmetria, rapid alternating movements symmetric  Station/Gait:  deferred    Stroke Scales    NIHSS  1a. Level of Consciousness 0-->Alert, keenly responsive   1b. LOC Questions 0-->Answers both questions correctly   1c. LOC Commands 0-->Performs both tasks correctly   2.   Best Gaze 0-->Normal   3.   Visual 0-->No visual loss   4.   Facial Palsy 0-->Normal symmetrical movements   5a. Motor Arm, Left 0-->No drift, limb holds 90 (or 45) degrees for full 10 secs   5b. Motor Arm, Right 0-->No drift, limb holds 90 (or 45) degrees for full 10 secs   6a. Motor Leg, Left 0-->No drift, leg holds 30 degree position for full 5 secs   6b. Motor Leg, right 0-->No drift, leg holds 30 degree position for full 5 secs   7.   Limb Ataxia 0-->Absent   8.   Sensory 0-->Normal, no sensory loss   9.   Best Language 0-->No aphasia, normal   10. Dysarthria 0-->Normal   11. Extinction and Inattention  0-->No abnormality   Total 0 (07/12/23 1120)       Imaging  I personally reviewed all imaging; relevant findings per HPI.    Labs Data   CBC  Recent Labs   Lab 07/12/23  0615 07/11/23  0855   WBC 9.8 11.0   RBC 4.55 4.68   HGB 13.7 13.9   HCT 41.9 42.9    438     Basic Metabolic Panel   Recent Labs   Lab 07/12/23  0615 07/12/23  0008 07/11/23  0855     --  141   POTASSIUM 3.6  --  3.8   CHLORIDE 104  --  105    CO2 29  --  26   BUN 11.9  --  13.7   CR 0.77  --  0.76   GLC 87 87 117*   PRASHANTH 9.1  --  9.2     Liver Panel  Recent Labs   Lab 07/11/23  0855   PROTTOTAL 6.7   ALBUMIN 4.0   BILITOTAL 0.4   ALKPHOS 67   AST 13   ALT 13     INR    Recent Labs   Lab Test 08/30/22  1140 04/10/20  0825   INR 1.18* 1.13           Stroke Consult Data Data   This was a non-emergent, non-telestroke consult.  I have personally spent a total of 60 minutes providing care today, time spent in reviewing medical records and reviewing tests, examining the patient and obtaining history, coordination of care, and discussion with the patient and/or family regarding diagnostic results, prognosis, symptom management, risks and benefits of management options, and development of plan of care. Greater than 50% was spent in counseling and coordination of care.

## 2023-07-12 NOTE — CONSULTS
Care Management Initial Consult    General Information  Assessment completed with: Debra Henao  Type of CM/SW Visit: Initial Assessment    Primary Care Provider verified and updated as needed: Yes (Dr. Wolfgang Zimmerman [facility])   Readmission within the last 30 days: no previous admission in last 30 days      Reason for Consult: discharge planning  Advance Care Planning:            Communication Assessment  Patient's communication style: spoken language (English or Bilingual)    Hearing Difficulty or Deaf: no   Wear Glasses or Blind: no    Cognitive  Cognitive/Neuro/Behavioral: WDL                      Living Environment:   People in home: spouse     Current living Arrangements: apartment      Able to return to prior arrangements: yes       Family/Social Support:  Care provided by: self  Provides care for:    Marital Status:              Description of Support System:           Current Resources:   Patient receiving home care services: No     Community Resources: None  Equipment currently used at home: none  Supplies currently used at home: None    Employment/Financial:  Employment Status: retired        Financial Concerns: No concerns identified           Does the patient's insurance plan have a 3 day qualifying hospital stay waiver?  No    Lifestyle & Psychosocial Needs:  Social Determinants of Health     Tobacco Use: Low Risk  (7/3/2023)    Patient History      Smoking Tobacco Use: Never      Smokeless Tobacco Use: Never      Passive Exposure: Not on file   Alcohol Use: Not on file   Financial Resource Strain: Not on file   Food Insecurity: Not on file   Transportation Needs: Not on file   Physical Activity: Not on file   Stress: Not on file   Social Connections: Not on file   Intimate Partner Violence: Not on file   Depression: Not at risk (4/14/2023)    PHQ-2      PHQ-2 Score: 0   Housing Stability: Not on file       Functional Status:  Prior to admission patient needed  assistance:   Dependent ADLs:: Independent  Dependent IADLs:: Independent       Mental Health Status:          Chemical Dependency Status:                Values/Beliefs:  Spiritual, Cultural Beliefs, Mandaeism Practices, Values that affect care: no               Additional Information:  Met with patient. Went over Medicare Outpatient Observation  Notice. Patient is grateful for being here and getting good care. She hopes to leave today by about noon.   Will follow for any discharge planning.    Ivette Kerr RN

## 2023-07-12 NOTE — PROGRESS NOTES
Pt discharged VSS with education provided and all belongings. Pt discharged with medications from pharmacy and event monitor.

## 2023-07-12 NOTE — PROGRESS NOTES
Observation goals  PRIOR TO DISCHARGE        Comments: List all goals to be met before discharge home       Free from ACUTE neuro deficits met  Testing complete not met  Other:   Nurse to notify provider when observation goals have been met and patient is ready for discharge.

## 2023-07-12 NOTE — PROGRESS NOTES
Observation goals  PRIOR TO DISCHARGE        Comments: List all goals to be met before discharge home       Free from ACUTE neuro deficits: met  Testing complete: partially met (awaiting echo results)  Nurse to notify provider when observation goals have been met and patient is ready for discharge.     Orientation/Cognitive: A&Ox4  Observation Goals (Met/ Not Met): partially met (awaiting echo results)  Mobility Level/Assist Equipment: independent  Fall Risk (Y/N): Y  Behavior Concerns: none  Pain Management: denies pain  Tele/VS/O2: sinus bradycardia  ABNL Lab/BG: BG 97, 95  Diet: regular  Bowel/Bladder: continent  Skin Concerns: none  Drains/Devices: PIV right forearm   Anticipated DC date & active delays: 07/12/2023, echo results  Patient Stated Goal for Today: discharge

## 2023-07-12 NOTE — PLAN OF CARE
OT order received and chart reviewed.  Per evaluating PT, no skilled OT needs identified. Patient is back at her baseline and independent with transfers/ambulation in room.  Will complete orders.

## 2023-07-12 NOTE — CONSULTS
[facility] GASTROENTEROLOGY CONSULTATION     Debra Denise  [address]  [place] [address]  82 year old female    Admission Date/Time: 7/11/2023  Primary Care Provider: Wolfgang Zimmerman    We were asked to see the patient in consultation by Dr. Mills for evaluation of Plavix in the setting of ulcerative colitis.        HPI:  Debra Denise is a 82 year old female who was admitted to [facility] 7/11/23 after an episode of left arm weakness that lasted about 30 seconds. On 7/10/12 the patient was carrying in a bag of heavy groceries. She set it down and for about 30 seconds was unable to use her left arm. It then returned to full function, no other symptoms.    After thinking about it the following day she presented to the ER and was admitted.  She has been seen by Neurology who recommended Plavix daily for possible TIA.    The patient has a history of left sided ulcerative colitis, follows with Dr. Garcia at [facility].  She had been on Canasa suppositories and Rowasa enemas for many years. She tried oral Balsalazide with only minimal improvement and oral mesalamine caused cramping. She was seen at [facility] and started on Budesonide and Entyvio was recommended. She has now had 3 Entyvio infusions and states it is working well. For the first time in years she is able to broaden her diet. No diarrhea or bleeding. However, she is very worried about starting Plavix given her history.    ROS: A comprehensive ten point review of systems was negative aside from those in mentioned in the HPI.      MEDICATIONS: No current facility-administered medications on file prior to encounter.  Cholecalciferol (VITAMIN D3 PO), Take 2,000 Units by mouth daily   fluocinonide (LIDEX) 0.05 % external cream, Apply to the affected area once to twice daily as needed for flares.*  gabapentin (NEURONTIN) 100 MG capsule, Take 2 tablets at bedtime  multivitamin w/minerals (CENTRUM ADULTS) tablet, Take 1 tablet by  mouth daily  ondansetron (ZOFRAN ODT) 4 MG ODT tab, Take 1 tablet (4 mg) by mouth every 8 hours as needed for nausea  Probiotic Product (PROBIOTIC PO), Take 1 capsule by mouth daily  sodium chloride 0.9 % SOLN 250 mL with vedolizumab 60 MG/ML SOLR 300 mg infusionn, Inject 300 mg into the vein every 2 months  valsartan-hydrochlorothiazide (DIOVAN HCT) 160-25 MG tablet, Take 1 tablet by mouth daily        ALLERGIES:   Allergies   Allergen Reactions     Sulfacetamide Hives     Adhesive Tape Rash     Atenolol      Other reaction(s): Intolerance-Can't Take  Fatigue     Ibuprofen      Other reaction(s): Stomach Upset  13 tele encounter     Naproxen      Other reaction(s): Stomach Upset  13 tele encounter     Ramipril Cough     Other reaction(s): Intolerance-Can't Take     Sulfa Antibiotics      Codeine Nausea     Fentanyl Nausea       Past Medical History:   Diagnosis Date     Dupuytren contracture     finger     Hypertension      Osteopenia      Polymyalgia rheumatica (H)      PONV (postoperative nausea and vomiting)      Proctitis        Past Surgical History:   Procedure Laterality Date     COLONOSCOPY  2014    Procedure: COMBINED COLONOSCOPY, SINGLE BIOPSY/POLYPECTOMY BY BIOPSY;  COLONOSCOPY;  Surgeon: Carol Ann Plasencia MD;  Location:  GI     CV CORONARY ANGIOGRAM N/A 2022    Procedure: Coronary Angiogram;  Surgeon: Trevin Hawk MD;  Location:  HEART CARDIAC CATH LAB     ENT SURGERY      tonsilectomy, adenoidectomy     ESOPHAGOSCOPY, GASTROSCOPY, DUODENOSCOPY (EGD), COMBINED N/A 2023    Procedure: ESOPHAGOGASTRODUODENOSCOPY, WITH BIOPSY;  Surgeon: Angle Lara MD;  Location:  GI     GYN SURGERY  ,     x 2     HYSTERECTOMY       ORTHOPEDIC SURGERY  2004    (R) shoulder surgery for frozen shoulder     ZAC BSO      for fibroids         SOCIAL HISTORY:  Social History     Tobacco Use     Smoking status: Never     Smokeless tobacco: Never  "  Vaping Use     Vaping Use: Never used   Substance Use Topics     Alcohol use: No     Drug use: No       FAMILY HISTORY:  No known family history of GI disease.    PHYSICAL EXAM:   /73 (BP Location: Left arm)   Pulse 50   Temp 98  F (36.7  C) (Oral)   Resp 15   Ht 1.651 m (5' 5\")   Wt 63 kg (138 lb 15.8 oz)   SpO2 95%   BMI 23.13 kg/m      Constitutional: NAD, comfortable  Respiratory: breathing comfortably  Psychiatric: mentation appears normal and affect normal/bright  Head: Normocephalic. Atraumatic.    Eyes:  no icterus  ENT: hearing adequate  NEURO: grossly negative  SKIN: no suspicious lesions or rashes            ADDITIONAL COMMENTS:   I reviewed the patient's new clinical lab test results.   Recent Labs   Lab Test 07/12/23  0615 07/11/23  0855 07/03/23  1441 11/15/22  1333 08/30/22  1140 09/14/20  0839 04/10/20  0825   WBC 9.8 11.0 10.2   < > 10.6   < >  --    HGB 13.7 13.9 14.1   < > 14.4   < >  --    MCV 92 92 93   < > 91   < >  --     438 439   < > 435   < >  --    INR  --   --   --   --  1.18*  --  1.13    < > = values in this interval not displayed.     Recent Labs   Lab Test 07/12/23  0756 07/12/23  0615 07/12/23  0008 07/11/23  0855 07/03/23  1441   NA  --  141  --  141 141   POTASSIUM  --  3.6  --  3.8 3.6   CHLORIDE  --  104  --  105 102   CO2  --  29  --  26 31*   BUN  --  11.9  --  13.7 19.1   CR  --  0.77  --  0.76 0.79   ANIONGAP  --  8  --  10 8   PRASHANTH  --  9.1  --  9.2 9.3   GLC 97 87 87 117* 117*     Recent Labs   Lab Test 07/11/23  0855 07/03/23  1445 07/03/23  1441 04/14/23  1309 02/10/23  0949 02/10/23  0907 10/11/22  1431 02/11/22  0940   ALBUMIN 4.0  --  4.3 4.2  --  4.5  --    < >   BILITOTAL 0.4  --  0.2 0.4  --  0.5  --   --    ALT 13  --  11 10  --  13  --   --    AST 13  --  15 15  --  18  --   --    ALKPHOS 67  --  74 59  --  68  --   --    PROTEIN  --  Negative  --   --  Negative  --  Negative  --    LIPASE  --   --   --   --   --  23  --   --     < > = values " in this interval not displayed.             .    CONSULTATION ASSESSMENT AND PLAN:    Principal Problem:    Active Problems:    TIA (transient ischemic attack)    Assessment: Patient admitted after possible TIA on 7/10/23 involving a 30 second weakness of her left arm.  Symptoms fully resolved, MRI brain and MRA brain and neck negative.  Daily Plavix recommended. Patient has left sided ulcerative colitis that has been difficult to control. Most recently, she was started on Entyvio infusions and has been feeling very well.    We discussed that if she were to have a flare of her UC, the Plavix could potentially cause an increase in bleeding. However, the plavix should not cause any worsening of the ulcerative colitis.  I think the Plavix is safe for her to use.  She is due for repeat flex sig in about 6 months and has follow up as well with Dr. Garcia.    I encouraged her to call with any questions.      Plan:   - discussed Plavix, should be safe with her UC, patient agreeable to Plavix    Follow up with Dr. Garcia as outpatient as scheduled.          Мария Dumont MD  [facility]  Office:  [phone]    Approximately 30 minutes of total time was spent providing patient care, including patient evaluation, reviewing documentation/test results, and .

## 2023-07-12 NOTE — PROGRESS NOTES
Neurology recommended aspirin 81 mg and loading dose of Plavix 300 mg given history of potential TIA.  Medication was not yet ordered in the ER.  Discussion on arrival to the floor regarding her antiplatelet therapy.    Patient became very concerned as she has a known history of ulcerative colitis and has significant history of bleeding with her ulcerative colitis.  She has tried multiple medications for her ulcerative colitis and is worried that she would bleed if on this antiplatelet therapy.  At this time she wants to discuss it further with her GI physician and neurology.  She does understand the risk of potentially not treating a TIA with antiplatelet therapy.  Dr. Kennedy discussed with neurology again and patient could start Plavix 75 mg daily and monitor for bleeding but she still declines given her concern of bleeding  Will need to have the neurology team and GI team assess her risk/benefit with DAPT and recommendations going forward.   Patient does understand the potential risk of holding antiplatelet therapy at this time    Dr Wendy Reddy MD

## 2023-07-12 NOTE — PROGRESS NOTES
Observation goals  PRIOR TO DISCHARGE        Comments: List all goals to be met before discharge home       Free from ACUTE neuro deficits: met  Testing complete: not met  Nurse to notify provider when observation goals have been met and patient is ready for discharge.

## 2023-07-13 ENCOUNTER — TELEPHONE (OUTPATIENT)
Dept: INTERNAL MEDICINE | Facility: CLINIC | Age: 83
End: 2023-07-13

## 2023-07-13 NOTE — TELEPHONE ENCOUNTER
How about I see her 7/27/2023?    Thanks, RUTH MOLINA [facility] Call Center    Phone Message    May a detailed message be left on voicemail: yes     Reason for Call: Other: Pt's spouse Aguilar requesting call back to discuss a hospital f/u appt with Dr Zimmerman. Pt was in the hospital yesterday and is needing to see Dr. Zimmerman and only him within 7 days

## 2023-07-17 ENCOUNTER — TELEPHONE (OUTPATIENT)
Dept: INTERNAL MEDICINE | Facility: CLINIC | Age: 83
End: 2023-07-17
Payer: COMMERCIAL

## 2023-07-17 NOTE — TELEPHONE ENCOUNTER
[facility] Call Center    Phone Message    May a detailed message be left on voicemail: yes     Reason for Call: Other: Pt's spouse Aguilar requesting call back to discuss getting a referral to Neurology for the pt

## 2023-07-27 ENCOUNTER — OFFICE VISIT (OUTPATIENT)
Dept: INTERNAL MEDICINE | Facility: CLINIC | Age: 83
End: 2023-07-27
Payer: COMMERCIAL

## 2023-07-27 VITALS
WEIGHT: 136 LBS | BODY MASS INDEX: 22.66 KG/M2 | OXYGEN SATURATION: 99 % | HEIGHT: 65 IN | DIASTOLIC BLOOD PRESSURE: 67 MMHG | HEART RATE: 65 BPM | SYSTOLIC BLOOD PRESSURE: 130 MMHG

## 2023-07-27 DIAGNOSIS — G45.9 TIA (TRANSIENT ISCHEMIC ATTACK): ICD-10-CM

## 2023-07-27 DIAGNOSIS — I10 BENIGN ESSENTIAL HYPERTENSION: ICD-10-CM

## 2023-07-27 PROCEDURE — 99215 OFFICE O/P EST HI 40 MIN: CPT | Performed by: INTERNAL MEDICINE

## 2023-07-27 RX ORDER — ATORVASTATIN CALCIUM 40 MG/1
40 TABLET, FILM COATED ORAL EVERY EVENING
Qty: 30 TABLET | Refills: 3 | Status: SHIPPED | OUTPATIENT
Start: 2023-07-27 | End: 2023-10-31

## 2023-07-27 RX ORDER — CLOPIDOGREL BISULFATE 75 MG/1
75 TABLET ORAL DAILY
Qty: 30 TABLET | Refills: 3 | Status: SHIPPED | OUTPATIENT
Start: 2023-07-27 | End: 2023-12-26

## 2023-07-27 RX ORDER — VALSARTAN AND HYDROCHLOROTHIAZIDE 160; 25 MG/1; MG/1
1 TABLET ORAL DAILY
Qty: 90 TABLET | Refills: 3 | Status: SHIPPED | OUTPATIENT
Start: 2023-07-27 | End: 2024-05-08

## 2023-07-27 NOTE — PROGRESS NOTES
HPI:    Ms. Denise was discharged from the hospital 2023 for possible TIA. She presented to the ED with L arm numbness. She was discharged on Atorvastatin 40 mg and Plavix (she did not want DAPT due to ASA intolerance). She had MRI/MRA brain imaging on 2023; no acute findings. She had a resting echo with bubble study (negative). She had EKG monitoring in the hospital. CXR on 2023; no acute findings. Her  Aguilar is with her today. Her clinical presentation was she was carrying a heavy bag of groceries in her L hand and after putting it down had about 30-40 seconds of L arm weakness/numbness. She has not had any sxs return. She went to sleep that night and then woke up (again no return of sxs) and thought she should go to the ED. No other HEENT, cardiopulmonary, abdominal, , GYN, neurological systemic, psychiatric, lymphatic, endocrine, vascular complaints.     Past Medical History:   Diagnosis Date    Dupuytren contracture     finger    Hypertension     Osteopenia     Polymyalgia rheumatica (H)     PONV (postoperative nausea and vomiting)     Proctitis      Past Surgical History:   Procedure Laterality Date    COLONOSCOPY  2014    Procedure: COMBINED COLONOSCOPY, SINGLE BIOPSY/POLYPECTOMY BY BIOPSY;  COLONOSCOPY;  Surgeon: Carol Ann Plasencia MD;  Location: [facility]    CV CORONARY ANGIOGRAM N/A 2022    Procedure: Coronary Angiogram;  Surgeon: Trevin Hawk MD;  Location:  HE[facility]ARD[facility] CATH LAB    ENT SURGERY      tonsilectomy, adenoidectomy    ESOPHAGOSCOPY, GASTROSCOPY, DUODENOSCOPY (EGD), COMBINED N/A 2023    Procedure: ESOPHAGOGASTRODUODENOSCOPY, WITH BIOPSY;  Surgeon: Angel Lara MD;  Location:  GI    GYN SURGERY  ,     x 2    HYSTERECTOMY      ORTHOPEDIC SURGERY  2004    (R) shoulder surgery for frozen shoulder    ZAC BSO      for fibroids     PE:    Vitals noted, gen, nad, cooperative, alert, neck supple nl rom, no B  carotid bruits, lungs with good air movement, RRR, S1, S2, no MRG, she has Ziopatch attached in L upper chest, no acute abdominal symptoms. Grossly normal neurological exam.     A/P:    1. Immunizations; COVID Pfizer vaccine x 4. She has completed the Shingrix vaccine series. Pneumococcal 23 done 10/18/2011. Prevnar 13 done 1/13/2016. Td 1/31/2018. Prevnar 20 done 11/15/2022.   2. 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 She was seen by Ms. Pang, GI 8/10/2021 and 6/23/2022.  Flex Sig done 10/4/2021. Calprotectin Feces elevated at 93.9. She had a colonoscopy at [facility] 11/2/2022 Results in Care Everywhere. Biopsy results as above She will follow at [facility]. She still needs EGD for reflux.  She was seen [facility], Dr. Garcia 6/12/2023 and has gotten vecloizumab. See [facility] GI note from 4/19/2023.   3. Chronic pain on night time Gabapentin; seen Dr. Pimentel, orthopedics 6/8/2021 and Dr. Neumann 4/29/2021 Mr. Neumann. Placed PMR 4/27/2022 referral for her back and hip complaints. She cancelled her  PMR appt. With Dr. Corona 8/18/2022. [facility].   4. HTN; on Valsartan/HcTz.   5. Mammogram; 12/6/2022 in Care Everywhere  6. Lipids; 7/11/2023; HDL 46, LGL 93 and TG''s 106  7. Dermatology; scanned in outside note [facility] 2/17/2022. Care Everywhere dermatology note 10/12/2022.   8. Vitamin D normal at 57 on 2/11/2022. DEXA scan in chart from 7/19/2021 and most negative T score -2.3.  She saw Dr. Simon endocrinology   8/22/2022  9. Outside Rheumatology for PMR, Dr. Damian scanned in note from 5/24/2023. She is not on prednisone currently.   10. Palpitations;  Normal dobutamine stress echo 10/11/2021. CXR 9/23/2021 normal For atypical CP, CTA coronary angiogram done 8/10/2022 with possible severe mid LAD stenosis. Coronary angiogram on 8/30/2022 with normal coronary arteries.  Ziopatch monitor 7/22/2022 w/o significant findings.   11. Hand complaints.  She states since coronary angiogram (used R radial artery) she has R wrist and R hand weakness. She had an R upper extremity arterial U/S 11/4/2022 no abnormal findings. Overall sxs. Less.   12. L hand palmar with contracture  and she has gotten injections.   13. Recent hospital discharged 7/12/2023 for possible TIA sxs with negative evaluation. Transient  L arm complaints.  No recurrence of sxs. She has 9/2023 [facility] appt. And is wearing a 30 day Ziopatch monitor. She is on Plavix and Atorvastatin.   14. A1c 6.1% on 7/11/2023. Will follow.        40 minutes spent on the date of the encounter doing chart review, history and exam, documentation and further activities as noted above exclusive of procedures and other billable interpretations

## 2023-07-27 NOTE — NURSING NOTE
Debra Denise is a 82 year old female that presents in clinic today for the following:     Chief Complaint   Patient presents with    Hospital F/U     Pt here following up for a possible TIA and Hospital visit       The patient's allergies and medications were reviewed. The patient's vitals were obtained without incident. The patient does not have any other questions for the provider.     BRITTANY Bragg at 12:55 PM on 7/27/2023.  Primary Care Clinic: [phone]

## 2023-08-01 DIAGNOSIS — I10 BENIGN ESSENTIAL HYPERTENSION: ICD-10-CM

## 2023-08-03 DIAGNOSIS — R42 DIZZINESS: Primary | ICD-10-CM

## 2023-08-03 NOTE — TELEPHONE ENCOUNTER
Requesting orders for referral to vestibular physical therapy, hearing test and VNG prior to patient's appointment with Dr. Rick Nissen on 10/31/2023.    Per record review: Patient reports constant dizziness for more than one year which has gradually worsened. Reports R ear pain. Symptoms can sometimes be provoked by positional changes. Patient discharged from the hospital 7/12/2023 for possible TIA. She presented to the ED with L arm numbness. She had a resting echo with bubble study (negative). She had EKG monitoring in the hospital. CXR on 7/11/2023; no acute findings.      MRA brain imaging on 7/11/2023: IMPRESSION:  1. Mild-moderate presumed atherosclerotic narrowing of the mid P2  segment of the right posterior cerebral artery.  2. Otherwise, normal [place] MRA.    MR Brain 7/11/2023: IMPRESSION: Diffuse cerebral volume loss and cerebral white matter  changes consistent with chronic small vessel ischemic disease. No  evidence for acute intracranial pathology.      MRA Neck 7/11/2023: unremarkable.     Previous outside audio 8/8/2020: Normal sloping to moderately severe hearing loss bilaterally.    Basim Greene CCC-A  Vestibular Audiologist   [place] #[id number]

## 2023-08-04 ENCOUNTER — TELEPHONE (OUTPATIENT)
Dept: AUDIOLOGY | Facility: CLINIC | Age: 83
End: 2023-08-04

## 2023-08-04 ENCOUNTER — TELEPHONE (OUTPATIENT)
Dept: AUDIOLOGY | Facility: CLINIC | Age: 83
End: 2023-08-04
Payer: COMMERCIAL

## 2023-08-04 RX ORDER — GABAPENTIN 100 MG/1
CAPSULE ORAL
Qty: 180 CAPSULE | Refills: 0 | Status: SHIPPED | OUTPATIENT
Start: 2023-08-04 | End: 2023-10-31

## 2023-08-04 NOTE — TELEPHONE ENCOUNTER
gabapentin (NEURONTIN) 100 MG capsule  180 capsule 1 2/4/2023 7/27/2023  [facility]     Wolfgang Zimmerman MD  Internal Medicine    Routed because: not on protocol

## 2023-08-04 NOTE — TELEPHONE ENCOUNTER
[facility] Call Center    Phone Message    May a detailed message be left on voicemail: yes     Reason for Call: Other: Pt called back from voicemail that was left to schedule VNG testing prior to her vestibular appt in October. Please call back to schedule     Action Taken: Other: [facility]    Travel Screening: Not Applicable

## 2023-08-10 ENCOUNTER — TRANSFERRED RECORDS (OUTPATIENT)
Dept: HEALTH INFORMATION MANAGEMENT | Facility: CLINIC | Age: 83
End: 2023-08-10
Payer: COMMERCIAL

## 2023-08-11 ENCOUNTER — TELEPHONE (OUTPATIENT)
Dept: OTOLARYNGOLOGY | Facility: CLINIC | Age: 83
End: 2023-08-11
Payer: COMMERCIAL

## 2023-08-11 NOTE — TELEPHONE ENCOUNTER
[facility] Call Center    Phone Message    May a detailed message be left on voicemail: yes     Reason for Call: Other: patient has questions regarding medications and vestibular appt as well as general questions about the testing performed. Please reach out to assist. Thank you      Action Taken: Other: ENT    Travel Screening: Not Applicable

## 2023-08-14 NOTE — TELEPHONE ENCOUNTER
Had a question if PT before the appt was necessary. It is to evaluate for upcoming appt with Dr Nissen. No further questions.    Devi Jacobs LPN

## 2023-08-22 ENCOUNTER — THERAPY VISIT (OUTPATIENT)
Dept: PHYSICAL THERAPY | Facility: CLINIC | Age: 83
End: 2023-08-22
Attending: OTOLARYNGOLOGY
Payer: COMMERCIAL

## 2023-08-22 DIAGNOSIS — R26.89 IMPAIRMENT OF BALANCE: Primary | ICD-10-CM

## 2023-08-22 DIAGNOSIS — R42 DIZZINESS: ICD-10-CM

## 2023-08-22 PROCEDURE — 97112 NEUROMUSCULAR REEDUCATION: CPT | Mod: GP | Performed by: PHYSICAL THERAPIST

## 2023-08-22 PROCEDURE — 97162 PT EVAL MOD COMPLEX 30 MIN: CPT | Mod: GP | Performed by: PHYSICAL THERAPIST

## 2023-08-22 NOTE — PROGRESS NOTES
PHYSICAL THERAPY EVALUATION  Type of Visit: Evaluation    See electronic medical record for Abuse and Falls Screening details.    Subjective       Presenting condition or subjective complaint:  Per chart review: Patient reports constant dizziness (she indicates it comes and goes) for more than one year which has gradually worsened. Reports R ear pain. Symptoms can sometimes be provoked by positional changes. Patient discharged from the hospital 7/12/2023 for possible TIA. She presented to the ED with transient L arm numbness after carrying groceries. She had a resting echo with bubble study (negative). She had EKG monitoring in the hospital. CXR on 7/11/2023; no acute findings.  MRI/MRA negative for acute findings.  Has scheduled appt with Dr. Nissen (ENT) on 10/31/23, referrals for Vestibular PT, VNG, and audiogram prior to then.  She indicates initial onset of dizziness issues when she was in her 20's - she indicates work-up indicating some possible inner ear damage (describes hx of ear wax removal causing significant pain and subsequent dizziness).  She describes her symptoms as lightheadedness with associated nausea, denies vertigo.  Also feels her balance has declined.  Date of onset: 08/08/23 (date of referral used, onset vague x years)    Relevant medical history:     Past Medical History:   Diagnosis Date    Dupuytren contracture     finger    Hypertension     Osteopenia     Polymyalgia rheumatica (H)     PONV (postoperative nausea and vomiting)     Proctitis        Dates & types of surgery:      Prior diagnostic imaging/testing results:       MRA brain imaging on 7/11/2023: IMPRESSION:  1. Mild-moderate presumed atherosclerotic narrowing of the mid P2  segment of the right posterior cerebral artery.  2. Otherwise, normal [place] MRA.     MR Brain 7/11/2023: IMPRESSION: Diffuse cerebral volume loss and cerebral white matter  changes consistent with chronic small vessel ischemic disease. No  evidence  for acute intracranial pathology.      MRA Neck 7/11/2023: unremarkable.      Prior therapy history for the same diagnosis, illness or injury:        Prior Level of Function  Transfers: Independent  Ambulation: Independent  ADL: Independent    Living Environment  Social support:   spouse  Type of home: Apartment/condo   Stairs to enter the home: Yes   Is there a railing: Yes   Ramp:     Stairs inside the home:   14     Help at home:    Equipment owned:       Employment: No retired  Hobbies/Interests: walking, housework    Patient goals for therapy:      Pain assessment: Pain denied     Objective   Cognitive Status Examination  Orientation: Oriented to person, place and time     INTEGUMENTARY: Intact  POSTURE:   RANGE OF MOTION: LE ROM WFL  UE ROM WFL  STRENGTH: LE Strength WFL  UE Strength WFL  Mild strength deficit noted L shoulder - patient indicates chronic shoulder issue/pain.  Noting proximal hip weakness, symmetrical, consistent with age related decline.    BED MOBILITY:     TRANSFERS: Independent    GAIT:   Level of Effie: Independent  Assistive Device(s): None  Gait Deviations:  slowed gait speed, impaired path negotiation with dynamic head motions  Gait Distance: functional community distances    BALANCE: WFL  Mild dynamic gait instability with head motions, mildly elevated sway noted with sensory integration testing, see CTSIB testing below    SPECIAL TESTS  Functional Gait Assessment (FGA)      10 Meter Walk Test (Comfortable)     10 Meter Walk Test (Fast)     6 Minute Walk Test (6MWT)           Watson Balance Scale (BBS)     5 Times Sit-to-Stand (5TSTS)       Dynamic Gait Index (DGI)  10/12 4-item DGI, mild path deviation with head turns/nods, no LOB   Timed Up and Go (TUG) - sec    Single Leg Stance Right (sec)    Single Leg Stance Left (sec)    Modified CTSIB Conditions (sec) Cond 1: 30  Cond 2: 30  Cond 4: 30  Cond 5 : 30  WNL testing, moderate sway condition 5   Romberg  (sec)    Sharpened  Romberg (sec)    30 Second Sit to Stand (reps/height)            SENSATION: UE Sensation WNL, LE Sensation WNL    REFLEXES:   COORDINATION: normal WILBERT and coordination testing BUEs and BLEs  MUSCLE TONE:        VESTIBULAR EVALUATION  ADDITIONAL HISTORY:  Description of symptoms: Off balance; Nausea or vomiting; Light-headedness  Dizzy attacks:   Start: past few years   Last attack: almost daily   Frequency of occurrences: periodically   Length of attack:    Difficulty hearing:    Noise in ears? No    Alleviates symptoms:    Worsens symptoms:    Activities that bring on symptoms:         Pertinent visual history: no acute change reported  Pertinent history of current vestibular problem:   DHI: Total Score: 22    Cervicogenic Screen    Neck ROM Reduced AROM about neck, ~50% AROM rotation and extension - chronic tightness reported   Vertebral Artery Test    Alar Ligament Test    Transverse Ligament Test    Distraction    Neck Torsion Test (head still, body rotating)    Neck Torsion Test (head and body rotating)         Oculomotor Screen    Ocular ROM Normal   Smooth Pursuit Normal   Saccades Normal   VOR Normal   VOR Cancellation Normal   Head Impulse Test Normal   Convergence Testing Limited vergence  bilaterally, vergence point reported at ~6 inches        Infrared Goggle Exam Vestibular Suppressant in Last 24 Hours? No  Exam Completed With: Infrared goggles   Spontaneous Nystagmus Horizontal L   Gaze Evoked Nystagmus Horizontal L, mild 2nd degree nystagmus   Head Shake Horizontal Nystagmus Horizontal L, mild dizziness reported   Positional Testing Asymptomatic   Supine Head-Hanging Test     Left Right   Vega-Hallpike Negative, mild L beating spontaneous nystagmus, asymptomatic Negative   Sidelying Test     HSCC Supine Roll Test Negative, mild L beating spontaneous nystagmus, asymptomatic Negative, mild L beating spontaneous nystagmus, asymptomatic   HSCC Forward Roll Test     [place] and Lean Test -  Sitting Erect    [place]  and Lean Test - Seated, Head Bent 60 Degrees Forward    [place] and Lean Test - Seated, Head Bent Backwards       BPPV Canal(s):   BPPV Type: No indication of BPPV    Dynamic Visual Acuity (DVA)    Static Acuity (LogMar) 0.0   Horizontal Head Movement at 1 Hz (LogMar)    Horizontal Head Movement at 2 Hz (LogMar) 0.5   5-line degredation, seated, 9' distance, large chart, no glasses       Assessment & Plan   CLINICAL IMPRESSIONS  Medical Diagnosis: Dizziness (R42)    Treatment Diagnosis: Dizziness with Impaired Balance   Impression/Assessment: Patient is a 82 year old female with dizziness complaints.  The following significant findings have been identified: Decreased ROM/flexibility, Decreased strength, Impaired balance, Impaired gait, Instability, Dizziness, and Disequilibrium . These impairments interfere with their ability to perform self care tasks, recreational activities, driving , household mobility, and community mobility as compared to previous level of function. Vestibular assessment reveals evidence consistent with unilateral vestibular hypofunction. No evidence of BPPV.  Mild dynamic balance impairment noted, impaired DVA.  Given duration of symptoms has been years, guarded prognosis for significant changes/improvement in pure vestibular function, rather will focus on VOR training, compensatory training, and balance/gait functional strength training to optimize safety and activity tolerance.  Will continue to reassess as indicated with noted plan for ongoing work-up including VNG, audiogram, and ENT.    Clinical Decision Making (Complexity):  Clinical Presentation: Evolving/Changing  Clinical Presentation Rationale: based on medical and personal factors listed in PT evaluation  Clinical Decision Making (Complexity): Moderate complexity    PLAN OF CARE  Treatment Interventions:  Interventions: Gait Training, Manual Therapy, Neuromuscular Re-education, Therapeutic Exercise, Canalith Repositioning    Long Term  Goals     PT Goal 1  Goal Identifier: DVA  Goal Description: Patient will demonstrate <4 line degredation with DVA testing indicating improvement in function/compensation of vestibular function necessary for improved tolerance with dynamic activities and reduced dizziness symptoms.  Rationale: to maximize safety and independence with performance of ADLs and functional tasks;to maximize safety and independence within the home;to maximize safety and independence within the community  Goal Progress: Baseline 5-line degredation  Target Date: 11/19/23  PT Goal 2  Goal Identifier: DHI  Goal Description: Patient will score <10/100 on the DHI indicating significant improvement in dizziness symptoms and improved QOL, safety, and tolerance with daily activities.  Rationale: to maximize safety and independence with performance of ADLs and functional tasks  Goal Progress: Baseline score 22/100  Target Date: 11/19/23  PT Goal 3  Goal Identifier: Balance  Goal Description: Patient will score 20/24 on the DGI or 23/30 or greater on the FGA indicating improvement in dynamic gait stability and reduced risk for falling with home and community mobility needs.  Rationale: to maximize safety and independence with performance of ADLs and functional tasks;to maximize safety and independence within the home;to maximize safety and independence within the community  Goal Progress: 4-item DGI score 10/12 indicating mild impairment.  Target Date: 11/19/23  PT Goal 4  Goal Identifier: HEP  Goal Description: Patient to demonstrate independent carryover of longterm HEP to manage condition and prevent further decline in functional status.  Rationale: to maximize safety and independence with performance of ADLs and functional tasks;to maximize safety and independence within the home;to maximize safety and independence within the community  Target Date: 11/19/23      Frequency of Treatment: e/o wk  Duration of Treatment: up to 90 days    Recommended  Referrals to Other Professionals:   Education Assessment:   Learner/Method: Patient;Demonstration;Pictures/Video;No Barriers to Learning;Listening  Education Comments: Eval findings, POC, HEP    Risks and benefits of evaluation/treatment have been explained.   Patient/Family/caregiver agrees with Plan of Care.     Evaluation Time:     PT Brock, Moderate Complexity Minutes (95147): 36       Signing Clinician: ANDRESSA [facility]                                                                                   OUTPATIENT PHYSICAL THERAPY      PLAN OF TREATMENT FOR OUTPATIENT REHABILITATION   Patient's Last Name, First Name, M.Debra Flores YOB: 1940   Provider's Name   [facility]   Medical Record No.  [id number]     Onset Date: 08/08/23 (date of referral used, onset vague x years)  Start of Care Date: 08/22/23     Medical Diagnosis:  Dizziness (R42)      PT Treatment Diagnosis:  Dizziness with Impaired Balance Plan of Treatment  Frequency/Duration: e/o wk/ up to 90 days    Certification date from 08/22/23 to 11/19/23         See note for plan of treatment details and functional goals     Rogerio Patricio, PT                         I CERTIFY THE NEED FOR THESE SERVICES FURNISHED UNDER        THIS PLAN OF TREATMENT AND WHILE UNDER MY CARE     (Physician attestation of this document indicates review and certification of the therapy plan).                Referring Provider:  Rick L Nissen      Initial Assessment  See Epic Evaluation- Start of Care Date: 08/22/23

## 2023-08-23 ENCOUNTER — TELEPHONE (OUTPATIENT)
Dept: AUDIOLOGY | Facility: CLINIC | Age: 83
End: 2023-08-23

## 2023-08-23 NOTE — TELEPHONE ENCOUNTER
[facility] Call Center    Phone Message    May a detailed message be left on voicemail: yes     Reason for Call: Other: patient is wanting to see if she can move her balance testing to the week after what it is currently scheduled for. Please reach out to discuss. Thank you      Action Taken: Other: ENT    Travel Screening: Not Applicable

## 2023-09-03 NOTE — PROGRESS NOTES
HPI:      Last visit with us 2023 and additional information in that note. Overall stable. She has not had ANY further dizziness sxs. No sxs. That could be related to previous possible TIA sxs. She remains on Plavix 75 mg every other day. She discontinued 40 mg of Atorvastatin. She has a h/o PMR (no current sxs) and would like her Crp and ESR checked. Her GI sxs are stable. No other HEENT, cardiopulmonary, abdominal, , GYN, neurological, systemic, psychiatric, lymphatic, endocrine, vascular complaints.     Past Medical History:   Diagnosis Date    Dupuytren contracture     finger    Hypertension     Osteopenia     Polymyalgia rheumatica (H)     PONV (postoperative nausea and vomiting)     Proctitis      Past Surgical History:   Procedure Laterality Date    COLONOSCOPY  2014    Procedure: COMBINED COLONOSCOPY, SINGLE BIOPSY/POLYPECTOMY BY BIOPSY;  COLONOSCOPY;  Surgeon: Carol Ann Plasencia MD;  Location: [facility]    CV CORONARY ANGIOGRAM N/A 2022    Procedure: Coronary Angiogram;  Surgeon: Trevin Hawk MD;  Location:  [facility]    ENT SURGERY      tonsilectomy, adenoidectomy    ESOPHAGOSCOPY, GASTROSCOPY, DUODENOSCOPY (EGD), COMBINED N/A 2023    Procedure: ESOPHAGOGASTRODUODENOSCOPY, WITH BIOPSY;  Surgeon: Angel Lara MD;  Location:  GI    GYN SURGERY  ,     x 2    HYSTERECTOMY      ORTHOPEDIC SURGERY  2004    (R) shoulder surgery for frozen shoulder    ZAC BSO      for fibroids     PE:    Vitals noted, gen, nad, cooperative, alert, neck supple nl rom, lungs with good air movement, RRR, S1, S2, no MRG, abdomen, no acute findings. Grossly normal neurological exam.     Results for orders placed or performed in visit on 23   ESR: Erythrocyte sedimentation rate     Status: Normal   Result Value Ref Range    Erythrocyte Sedimentation Rate 7 0 - 30 mm/hr   CRP, inflammation     Status: Normal   Result Value Ref Range    CRP  Inflammation <3.00 <5.00 mg/L   CBC with platelets and differential     Status: Abnormal   Result Value Ref Range    WBC Count 10.3 4.0 - 11.0 10e3/uL    RBC Count 4.86 3.80 - 5.20 10e6/uL    Hemoglobin 14.3 11.7 - 15.7 g/dL    Hematocrit 44.0 35.0 - 47.0 %    MCV 91 78 - 100 fL    MCH 29.4 26.5 - 33.0 pg    MCHC 32.5 31.5 - 36.5 g/dL    RDW 13.1 10.0 - 15.0 %    Platelet Count 462 (H) 150 - 450 10e3/uL    % Neutrophils 66 %    % Lymphocytes 20 %    % Monocytes 10 %    % Eosinophils 3 %    % Basophils 0 %    % Immature Granulocytes 1 %    NRBCs per 100 WBC 0 <1 /100    Absolute Neutrophils 6.9 1.6 - 8.3 10e3/uL    Absolute Lymphocytes 2.0 0.8 - 5.3 10e3/uL    Absolute Monocytes 1.0 0.0 - 1.3 10e3/uL    Absolute Eosinophils 0.3 0.0 - 0.7 10e3/uL    Absolute Basophils 0.0 0.0 - 0.2 10e3/uL    Absolute Immature Granulocytes 0.1 <=0.4 10e3/uL    Absolute NRBCs 0.0 10e3/uL   CBC with platelets and differential     Status: Abnormal    Narrative    The following orders were created for panel order CBC with platelets and differential.  Procedure                               Abnormality         Status                     ---------                               -----------         ------                     CBC with platelets and d...[[id number]]  Abnormal            Final result                 Please view results for these tests on the individual orders.     A/P:    1. Immunizations; COVID Pfizer vaccine x 4. She has completed the Shingrix vaccine series. Pneumococcal 23 done 10/18/2011. Prevnar 13 done 1/13/2016. Td 1/31/2018. Prevnar 20 done 11/15/2022.   2. 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 She was seen by Ms. Pang, GI 8/10/2021 and 6/23/2022.  Flex Sig done 10/4/2021. Calprotectin Feces elevated at 93.9. She had a colonoscopy at [facility] 11/2/2022 Results in Care Everywhere. Biopsy results as above She will follow at [facility]. She still needs EGD for reflux.  She was seen [facility], Dr. Garcia 6/12/2023  and has gotten vecloizumab. See [facility] GI note from 4/19/2023.   3. Chronic pain on night time Gabapentin; seen Dr. Pimentel, orthopedics 6/8/2021 and Dr. Neumann 4/29/2021 Mr. Neumann. Placed PMR 4/27/2022 referral for her back and hip complaints. She cancelled her  PMR appt. With Dr. Corona 8/18/2022. Scanned in PT note from [facility] 6/8/2023.   4. HTN; on Valsartan/HcTz.   5. Mammogram; 12/6/2022 in Care Everywhere  6. Lipids; 7/11/2023; HDL 46, LGL 93 and TG''s 106. Sent back in Rx. For 10 mg of Atorvastatin   7. Dermatology; scanned in outside note [facility] 2/17/2022. Care Everywhere dermatology note 10/12/2022.   8. Vitamin D normal at 57 on 2/11/2022. DEXA scan in chart from 7/19/2021 and most negative T score -2.3.  She saw Dr. Simon endocrinology   8/22/2022  9. Outside Rheumatology for PMR, Dr. Damian scanned in note from 5/24/2023. She is not on prednisone currently. Ordered CBC, ESR, and Crp today 9/5/2023.   10. Palpitations;  Normal dobutamine stress echo 10/11/2021. CXR 9/23/2021 normal For atypical CP, CTA coronary angiogram done 8/10/2022 with possible severe mid LAD stenosis. Coronary angiogram on 8/30/2022 with normal coronary arteries.  Ziopatch monitor 7/22/2022 w/o significant findings.   11. Hand complaints. She states since coronary angiogram (used R radial artery) she has R wrist and R hand weakness. She had an R upper extremity arterial U/S 11/4/2022 no abnormal findings. Overall sxs. Less.   12. L hand palmar with contracture  and she has gotten injections.   13. Past hospital discharged 7/12/2023 for possible TIA sxs with negative evaluation. Transient  L arm complaints.  No recurrence of sxs. She has 9/6/2023 (tomorrow) [place] [facility] appt. 7/11/2023  30 day Ziopatch monitor no AF. She is on Plavix and Atorvastatin.   14. A1c 6.1% on 7/11/2023. Will follow.   15. She has a vestibular appt. 9/6/2023 and ENT follow up with Dr. Nissen 10/31/2023.         40  minutes spent on the date of the encounter doing chart review, history and exam, documentation and further activities as noted above exclusive of procedures and other billable interpretations

## 2023-09-05 ENCOUNTER — OFFICE VISIT (OUTPATIENT)
Dept: INTERNAL MEDICINE | Facility: CLINIC | Age: 83
End: 2023-09-05
Payer: COMMERCIAL

## 2023-09-05 ENCOUNTER — LAB (OUTPATIENT)
Dept: LAB | Facility: CLINIC | Age: 83
End: 2023-09-05
Payer: COMMERCIAL

## 2023-09-05 VITALS
HEIGHT: 65 IN | DIASTOLIC BLOOD PRESSURE: 68 MMHG | OXYGEN SATURATION: 98 % | BODY MASS INDEX: 22.79 KG/M2 | SYSTOLIC BLOOD PRESSURE: 146 MMHG | WEIGHT: 136.8 LBS | HEART RATE: 61 BPM

## 2023-09-05 DIAGNOSIS — M35.3 PMR (POLYMYALGIA RHEUMATICA) (H): ICD-10-CM

## 2023-09-05 DIAGNOSIS — E78.00 HIGH BLOOD CHOLESTEROL: ICD-10-CM

## 2023-09-05 DIAGNOSIS — E78.00 HIGH BLOOD CHOLESTEROL: Primary | ICD-10-CM

## 2023-09-05 LAB
BASOPHILS # BLD AUTO: 0 10E3/UL (ref 0–0.2)
BASOPHILS NFR BLD AUTO: 0 %
CRP SERPL-MCNC: <3 MG/L
EOSINOPHIL # BLD AUTO: 0.3 10E3/UL (ref 0–0.7)
EOSINOPHIL NFR BLD AUTO: 3 %
ERYTHROCYTE [DISTWIDTH] IN BLOOD BY AUTOMATED COUNT: 13.1 % (ref 10–15)
ERYTHROCYTE [SEDIMENTATION RATE] IN BLOOD BY WESTERGREN METHOD: 7 MM/HR (ref 0–30)
HCT VFR BLD AUTO: 44 % (ref 35–47)
HGB BLD-MCNC: 14.3 G/DL (ref 11.7–15.7)
IMM GRANULOCYTES # BLD: 0.1 10E3/UL
IMM GRANULOCYTES NFR BLD: 1 %
LYMPHOCYTES # BLD AUTO: 2 10E3/UL (ref 0.8–5.3)
LYMPHOCYTES NFR BLD AUTO: 20 %
MCH RBC QN AUTO: 29.4 PG (ref 26.5–33)
MCHC RBC AUTO-ENTMCNC: 32.5 G/DL (ref 31.5–36.5)
MCV RBC AUTO: 91 FL (ref 78–100)
MONOCYTES # BLD AUTO: 1 10E3/UL (ref 0–1.3)
MONOCYTES NFR BLD AUTO: 10 %
NEUTROPHILS # BLD AUTO: 6.9 10E3/UL (ref 1.6–8.3)
NEUTROPHILS NFR BLD AUTO: 66 %
NRBC # BLD AUTO: 0 10E3/UL
NRBC BLD AUTO-RTO: 0 /100
PLATELET # BLD AUTO: 462 10E3/UL (ref 150–450)
RBC # BLD AUTO: 4.86 10E6/UL (ref 3.8–5.2)
WBC # BLD AUTO: 10.3 10E3/UL (ref 4–11)

## 2023-09-05 PROCEDURE — 86140 C-REACTIVE PROTEIN: CPT | Performed by: PATHOLOGY

## 2023-09-05 PROCEDURE — 99215 OFFICE O/P EST HI 40 MIN: CPT | Performed by: INTERNAL MEDICINE

## 2023-09-05 PROCEDURE — 85652 RBC SED RATE AUTOMATED: CPT | Performed by: PATHOLOGY

## 2023-09-05 PROCEDURE — 85025 COMPLETE CBC W/AUTO DIFF WBC: CPT | Performed by: PATHOLOGY

## 2023-09-05 PROCEDURE — 36415 COLL VENOUS BLD VENIPUNCTURE: CPT | Performed by: PATHOLOGY

## 2023-09-05 RX ORDER — ATORVASTATIN CALCIUM 10 MG/1
10 TABLET, FILM COATED ORAL DAILY
Qty: 90 TABLET | Refills: 1 | Status: SHIPPED | OUTPATIENT
Start: 2023-09-05 | End: 2024-07-02

## 2023-09-05 NOTE — NURSING NOTE
Debra Denise is a 82 year old female patient that presents today in clinic for the following:    Chief Complaint   Patient presents with    Follow Up     Pt would like to discuss heart monitor results and follow up on possible TIA, dizziness, and lightheadedness     The patient's allergies and medications were reviewed as noted. A set of vitals were recorded as noted without incident. The patient does not have any other questions for the provider.    Amie Kaplan, EMT at 3:40 PM on 9/5/2023

## 2023-09-06 ENCOUNTER — THERAPY VISIT (OUTPATIENT)
Dept: PHYSICAL THERAPY | Facility: CLINIC | Age: 83
End: 2023-09-06
Payer: COMMERCIAL

## 2023-09-06 DIAGNOSIS — R42 DIZZINESS: Primary | ICD-10-CM

## 2023-09-06 DIAGNOSIS — R26.89 IMPAIRMENT OF BALANCE: ICD-10-CM

## 2023-09-06 PROCEDURE — 97112 NEUROMUSCULAR REEDUCATION: CPT | Mod: GP | Performed by: PHYSICAL THERAPIST

## 2023-09-06 PROCEDURE — 97164 PT RE-EVAL EST PLAN CARE: CPT | Mod: GP | Performed by: PHYSICAL THERAPIST

## 2023-09-09 ENCOUNTER — TELEPHONE (OUTPATIENT)
Dept: INTERNAL MEDICINE | Facility: CLINIC | Age: 83
End: 2023-09-09

## 2023-09-09 DIAGNOSIS — R79.89 ELEVATED PLATELET COUNT: Primary | ICD-10-CM

## 2023-09-09 NOTE — TELEPHONE ENCOUNTER
Placed future orders this encounter      Dear Dia;     Your laboratory tests are mostly stable. Sedimentation rate and crp are normal. Slight increase in platelets and I recommend (no rush) some additional laboratory testing. I placed orders for these today and you can call [phone] to schedule a laboratory appointment.    RUTH Zimmerman MD

## 2023-09-11 ENCOUNTER — LAB (OUTPATIENT)
Dept: LAB | Facility: CLINIC | Age: 83
End: 2023-09-11
Payer: COMMERCIAL

## 2023-09-11 DIAGNOSIS — R79.89 ELEVATED PLATELET COUNT: ICD-10-CM

## 2023-09-11 LAB
BASOPHILS # BLD AUTO: 0 10E3/UL (ref 0–0.2)
BASOPHILS NFR BLD AUTO: 0 %
EOSINOPHIL # BLD AUTO: 0.2 10E3/UL (ref 0–0.7)
EOSINOPHIL NFR BLD AUTO: 3 %
ERYTHROCYTE [DISTWIDTH] IN BLOOD BY AUTOMATED COUNT: 13.3 % (ref 10–15)
HCT VFR BLD AUTO: 41 % (ref 35–47)
HGB BLD-MCNC: 13.9 G/DL (ref 11.7–15.7)
IMM GRANULOCYTES # BLD: 0 10E3/UL
IMM GRANULOCYTES NFR BLD: 0 %
INTERPRETATION: NORMAL
LYMPHOCYTES # BLD AUTO: 2.4 10E3/UL (ref 0.8–5.3)
LYMPHOCYTES NFR BLD AUTO: 25 %
MCH RBC QN AUTO: 30.4 PG (ref 26.5–33)
MCHC RBC AUTO-ENTMCNC: 33.9 G/DL (ref 31.5–36.5)
MCV RBC AUTO: 90 FL (ref 78–100)
MONOCYTES # BLD AUTO: 0.9 10E3/UL (ref 0–1.3)
MONOCYTES NFR BLD AUTO: 10 %
NEUTROPHILS # BLD AUTO: 5.8 10E3/UL (ref 1.6–8.3)
NEUTROPHILS NFR BLD AUTO: 62 %
NRBC # BLD AUTO: 0 10E3/UL
NRBC BLD AUTO-RTO: 0 /100
PLATELET # BLD AUTO: 450 10E3/UL (ref 150–450)
RBC # BLD AUTO: 4.57 10E6/UL (ref 3.8–5.2)
RETICS # AUTO: 0.06 10E6/UL (ref 0.03–0.1)
RETICS/RBC NFR AUTO: 1.3 % (ref 0.5–2)
SIGNIFICANT RESULTS: NORMAL
SPECIMEN DESCRIPTION: NORMAL
TEST DETAILS, MDL: NORMAL
WBC # BLD AUTO: 9.5 10E3/UL (ref 4–11)

## 2023-09-11 PROCEDURE — 85045 AUTOMATED RETICULOCYTE COUNT: CPT | Performed by: PATHOLOGY

## 2023-09-11 PROCEDURE — 81339 MPL GENE SEQ ALYS EXON 10: CPT | Performed by: INTERNAL MEDICINE

## 2023-09-11 PROCEDURE — 99207 BLOOD MORPHOLOGY PATHOLOGIST REVIEW: CPT | Performed by: STUDENT IN AN ORGANIZED HEALTH CARE EDUCATION/TRAINING PROGRAM

## 2023-09-11 PROCEDURE — G0452 MOLECULAR PATHOLOGY INTERPR: HCPCS | Mod: 26 | Performed by: PATHOLOGY

## 2023-09-11 PROCEDURE — 36415 COLL VENOUS BLD VENIPUNCTURE: CPT | Performed by: PATHOLOGY

## 2023-09-11 PROCEDURE — 81270 JAK2 GENE: CPT | Performed by: INTERNAL MEDICINE

## 2023-09-11 PROCEDURE — 85025 COMPLETE CBC W/AUTO DIFF WBC: CPT | Performed by: PATHOLOGY

## 2023-09-12 LAB
PATH REPORT.COMMENTS IMP SPEC: NORMAL
PATH REPORT.COMMENTS IMP SPEC: NORMAL
PATH REPORT.FINAL DX SPEC: NORMAL
PATH REPORT.MICROSCOPIC SPEC OTHER STN: NORMAL
PATH REPORT.MICROSCOPIC SPEC OTHER STN: NORMAL
PATH REPORT.RELEVANT HX SPEC: NORMAL

## 2023-09-19 ENCOUNTER — TRANSFERRED RECORDS (OUTPATIENT)
Dept: HEALTH INFORMATION MANAGEMENT | Facility: CLINIC | Age: 83
End: 2023-09-19
Payer: COMMERCIAL

## 2023-09-20 ENCOUNTER — DOCUMENTATION ONLY (OUTPATIENT)
Dept: INTERNAL MEDICINE | Facility: CLINIC | Age: 83
End: 2023-09-20
Payer: COMMERCIAL

## 2023-09-20 NOTE — PROGRESS NOTES
Type of Form Received:     Form Received (Date) 9/20/23   Form Filled out Yes, faxed 9/25   Placed in provider folder Yes

## 2023-09-28 ENCOUNTER — THERAPY VISIT (OUTPATIENT)
Dept: PHYSICAL THERAPY | Facility: CLINIC | Age: 83
End: 2023-09-28
Payer: COMMERCIAL

## 2023-09-28 DIAGNOSIS — R42 DIZZINESS: Primary | ICD-10-CM

## 2023-09-28 DIAGNOSIS — R26.89 IMPAIRMENT OF BALANCE: ICD-10-CM

## 2023-09-28 PROCEDURE — 97750 PHYSICAL PERFORMANCE TEST: CPT | Mod: GP | Performed by: PHYSICAL THERAPIST

## 2023-09-28 PROCEDURE — 97112 NEUROMUSCULAR REEDUCATION: CPT | Mod: GP | Performed by: PHYSICAL THERAPIST

## 2023-09-28 NOTE — PROGRESS NOTES
09/28/23 1200   Signing Clinician's Name / Credentials   Signing clinician's name / credentials Rogerio Patricio DPT   Dynamic Gait Index ([employer]ilo and [place], 1995)   Gait Level Surface 3   Change in Gait Speed 3   Gait and Horizontal Head Turns 2   Gait with Vertical Head Turns 3   Gait and Pivot Turns 3   Step Over Obstacle 3   Step Around Obstacles 3   Steps 3   Total Dynamic Gait Index Score  (A score of 19 or less has been correlated to an increased risk of falls in community dwelling older adults, patients with vestibular disorders, and patients with MS.)   Total Score (out of 24) 23     Dynamic Gait Index (DGI):The DGI is a measure of balance during gait that is reliable and valid for the elderly and individuals with Parkinson's disease, MS, vestibular disorders, or s/p stroke. Gait assistive device used: none     Patient score: 23/24  Scores ?19/24 indicate an increased risk for falls according to Lulu et al 2000  Minimal Detectable Change = 2.9 in community dwelling elderly according to Benz et al 2011    Assessment (rationale for performing, application to patient s function & care plan): DGI score of 23/24 indicates low fall risk and normalization of dynamic gait stability.  Mild underlying instability with horizontal head turns, although mild, and is consistent with chronic vestibular hypofunction.  Stable gait and balance status currently, will continue with established HEP and has scheduled testing with audiology and [facility].  Minutes billed as physical performance test: 12

## 2023-09-29 NOTE — TELEPHONE ENCOUNTER
Health Call Center    Phone Message    May a detailed message be left on voicemail: yes      Reason for Call: Medication Question or concern regarding medication   Prescription Clarification  Name of Medication: LORazepam (ATIVAN) 0.5 MG tablet   Prescribing Provider: Wolfgang Zimmerman   Pharmacy: [id number] - [place], [place] - [address] [place] [address]    What on the order needs clarification? Patient states she is having a MRI done next week on Thursday 09/24/2020. Patient is requesting for a refill for this medication to take before her MRI. Patient would like to  this medication by this week.    Action Taken: Message routed to:  [facility]    Travel Screening: Not Applicable    [facility] MEDICINE PROGRESS NOTE    Patient: Skyla Mariee Today's Date: 9/29/2023   YOB: 1947 Admission Date: 9/24/2023  5:36 PM   MRN: [id number] Inpatient LOS: 1 day(s)   Room:  [facility]/ Hospital Day:  Hospital Day: 6       History and Subjective complaints       Interval history and Overnight events:    Patient seems more alert and oriented today.  Sister at the bedside.  Patient is up in the chair.  Worked with therapy today.  Still noted orthostatic.  Patient has been getting low-dose of midodrine.  Consulted GI for anemia.  Hemoglobin trending down to 7.3.    Hospital Course  Patients interval history reviewed/EHR notes reviewed.   Skyla Mraiee is a 76 year old female who presented on 9/24/2023 with complaints of Weakness and Syncope     Patient is a 76 year old  female past medical history significant for hypertension, diabetes mellitus, hyperlipidemia, B12 deficiency, dementia, remote history of DVT, gastric bypass in the past, hyperlipidemia presents to ER after unresponsive episode.  Patient was seen and evaluated in ER this morning for elevated blood sugar of 196.  Workup at that time was negative except for creatinine of 1.42, BNP of 1400, UA was negative.  He was then discharged to assisted living      Daughter was getting patient ready for bed states she did not feel out after going to the toilet.  While on the commode noticed that her eyes rolled back and was pale, diaphoretic in brief episode of unresponsiveness.  Lasting for about a minute.  Patient did not fall, did not hit her head.  No bowel or bladder incontinence was noted no tongue bite or seizures were noted.       Arrival in ED blood pressure noted to be 149/67, heart rate of 108 oxygen saturations of 98.  Basic labs indicate creatinine of 1.35.  Troponin of 7 9.  Procalcitonin negative.  UA was negative from previous evaluation in ER.  D-dimer is negative.  Head CT is done which is negative  for any acute etiology indicates atrophy.  Patient was admitted for further evaluation of unresponsive episode.           Reviewed Pertinent Histories: Medical History, Surgical History, Social History, Family History,     ROS: Pertinent systems negative except as above.    Medications: Reviewed     Scheduled Medications:    electrolyte/PEG 3350, 4,000 mL, Once  midodrine, 2.5 mg, TID AC  ferrous sulfate, 325 mg, Daily with breakfast  cefTRIAXone, 2,000 mg, Daily  sodium chloride, 2 mL, 2 times per day  Potassium Standard Replacement Protocol (Levels 3.5 and lower), , See Admin Instructions  Magnesium Standard Replacement Protocol, , See Admin Instructions  allopurinol, 100 mg, QAM  [Held by provider] aspirin, 81 mg, Once per day on Mon Wed Fri  levothyroxine, 88 mcg, QAM AC  [Held by provider] losartan, 100 mg, QAM  metoPROLOL succinate, 50 mg, QAM  rosuvastatin, 5 mg, Daily  insulin lispro, , TID WC  insulin lispro, , Nightly  heparin (porcine), 5,000 Units, 3 times per day      Continuous Infusions:    Current Facility-Administered Medications   Medication Dose Route Frequency Provider Last Rate Last Admin   • [START ON 9/30/2023] lactated ringers infusion   Intravenous Continuous June Sandoval, RONY       • sodium chloride 0.9% infusion   Intravenous Continuous Sudhakar Jameson MD 75 mL/hr at 09/29/23 1402 New Bag at 09/29/23 1402     PRN Medications:    Current Facility-Administered Medications   Medication Dose Route Frequency Provider Last Rate Last Admin   • sodium chloride (NORMAL SALINE) 0.9 % bolus 500 mL  500 mL Intravenous PRN Tyshawn Palmer MD       • sodium chloride 0.9 % flush bag 25 mL  25 mL Intravenous PRN Tyshawn Palmer MD       • ondansetron (ZOFRAN) injection 4 mg  4 mg Intravenous BID PRN Tyshawn Palmer MD       • acetaminophen (TYLENOL) tablet 650 mg  650 mg Oral Q4H PRN Tyshawn Palmer MD       • traMADol (ULTRAM) tablet 50 mg  50 mg Oral Q6H PRN Tyshawn Palmer MD       • polyethylene glycol  (MIRALAX) packet 17 g  17 g Oral Daily PRN Tyshawn Palmer MD       • aluminum-magnesium hydroxide-simethicone (MAALOX) 200-200-20 MG/5ML suspension 30 mL  30 mL Oral Q4H PRN Tyshawn Palmer MD       • dextrose 50 % injection 25 g  25 g Intravenous PRN Tyshawn Palmer MD       • dextrose 50 % injection 12.5 g  12.5 g Intravenous PRN Tyshawn Palmer MD       • glucagon (GLUCAGEN) injection 1 mg  1 mg Intramuscular PRN Tyshawn Palmer MD       • dextrose (GLUTOSE) 40 % gel 15 g  15 g Oral PRN Tyshawn Palmer MD       • hydrALAZINE (APRESOLINE) injection 5 mg  5 mg Intravenous Q6H PRN Tyshawn Palmer MD   5 mg at 09/26/23 2223         Physical Examination       Vital 24 Hour Range Most Recent Value   Temperature Temp  Min: 97.3 °F (36.3 °C)  Max: 99 °F (37.2 °C) 98.8 °F (37.1 °C)   Pulse Pulse  Min: 84  Max: 87 86   Respiratory Resp  Min: 18  Max: 20 18   Blood Pressure BP  Min: 73/47  Max: 141/73 (!) 141/73   Pulse Oximetry SpO2  Min: 96 %  Max: 98 % 97 %   Arterial BP No data recorded     O2 No data recorded       Intake and Output:      Intake/Output Summary (Last 24 hours) at 9/29/2023 1409  Last data filed at 9/29/2023 1115  Gross per 24 hour   Intake 1510.25 ml   Output 300 ml   Net 1210.25 ml       Last Stool Occurrence:  1 (09/29/23 1002)    Vital Most Recent Value First Value   Weight 80.5 kg (177 lb 7.5 oz) Weight: 77.1 kg (170 lb)   Height 5' 1\" (154.9 cm) Height: 5' 1\" (154.9 cm)   BMI 33.53 N/A     General: Looks well, confused, oriented to person no apparent distress  CV: regular rate and rhythm  Resp: clear to auscultation bilaterally  Abd: soft and nontender  Ext: pitting edema present bilateral lower extremities  Skin: no rashes, lesions, or ulcers noted, left chest bruise/ecchymosis  Neuro: no focal deficits noted  Psych: normal mood and affect some confusion    Test Results     Labs: The Laboratory values listed below have been reviewed and pertinent findings discussed in the Assessment and Plan.    Laboratory values:    Recent Labs   Lab 09/29/23  0437 09/28/23  0452 09/27/23  0444   WBC 7.8 7.7 6.3   HGB 7.3* 8.1* 8.6*   HCT 21.8* 23.8* 25.9*    153 150       Recent Labs   Lab 09/29/23  0437 09/28/23  0452 09/27/23  0444   SODIUM 131* 134* 133*   POTASSIUM 3.6 3.8 3.7   CHLORIDE 100 101 102   CO2 24 24 25   CALCIUM 7.9* 7.8* 8.1*   GLUCOSE 117* 164* 110*   BUN 21* 17 18   CREATININE 0.83 0.91 0.87   MG 1.9 1.8 1.7          Radiology: Imaging studies have been reviewed and pertinent findings discussed in the Assessment and Plan.    No results found for any visits on 09/24/23 (from the past 48 hour(s)).    Tubes, Devices, Monitoring     Telemetry: On      [place]: No    Assessment and Plan     Skyla Mariee is a 76 year old female with past medical history significant for hypertension, diabetes mellitus, dementia, activated POA, anxiety, depression, gout, hypertension, B12 deficiency presents to ER after unresponsive episode.       Unresponsive/syncopal episode   Elevated blood sugars, on pioglitazone   Acute renal insufficiency   Dementia with activated POA   Altered mental status due to UTI in the setting of underlying dementia-metabolic encephalopathy  Diabetes mellitus   Hypertension   Anxiety and depression   Hypothyroidism  Gout  B12 deficiency   Anemia of chronic disease    Head CT is negative, no neuro deficits.  Carotid Dopplers without severe stenosis. Monitor serial cardiac enzymes and telemetry. Positive orthostatics. Attributed to dysautonomia. Giving hydration. If low blood pressure persists, may require Midodrine.  Pt seen by neurology.  Possible autonomic neuropathy given diabetes.  outpt neuro f/u for EMG.   midodrine with parameters for  orthostatic and consulted Cardiology for persistent orthostatic hypotension.  Encourage p.o. intake  Continue gentle iv fluid.    PT and OT evaluated initially recommended home therapy now recommending rehab.   aware.  POA agreeable    Unresponsive episode,  history of dementia. No evidence of bowel or bladder incontinence.     Acute renal insufficiency, creatinine of 1.4 compared to baseline of 0.5.   creatinine improved.  DC fluid  Continue losartan.       Noted UTI.  Urine culture grew Escherichia coli.  Rocephin initiated     Continue home dose of metoprolol.     Sliding scale insulin, A1c 7.8     Anemia of chronic disease.  Reviewed iron, ferritin and TIBC and stool occult blood.  Resume iron.  H&H trending down.  Consulted GI for further management.  Plan for EGD and colonoscopy  Last colonoscopy in 2020.     TSH normal.     Restart home medications     GI/DVT Prophylaxis:  Ppi/Lovenox/SCDs     consult for discharge planning.  Pending placement.          Consults:    IP CONSULT TO NEUROLOGY  IP CONSULT TO CARDIOLOGY  IP CONSULT TO GI    Diet:  Liquid Clear Diet  Therapy Orders:    PT and OT Orders Placed this Encounter   Procedures   • Occupational Therapy   • Physical Therapy       Smoking status- non smoker    Nutrition status- appropriate  Body mass index is 33.53 kg/m². - Obese (BMI 30-39 without a comorbid condition or 30-34 with a comorbid condition)  DVT Prophylaxis - Heparin 5000 units sub q tid  Limited English proficiency with :  No          Advanced Directives     Code Status: Full Resuscitation            Discharge Plan     The patients treatment plans were discussed with patient and family, RN and consultant(s).     Recommendations for Discharge   SW 24 Hour assist, Assisted living   PT     OT     SLP       Anticipated discharge destination: Skilled Nursing Facility SNF          Barriers to Discharge: Patient is not medically ready and needs to remain in the hospital today due to dizziness and positive orthostatics rehab, orthostatic , egd/colonoscopy          Sudhakar Jameson MD  Hospitalist  9/29/2023  2:09 PM    (Contact by secure chat)

## 2023-10-09 ENCOUNTER — TRANSFERRED RECORDS (OUTPATIENT)
Dept: HEALTH INFORMATION MANAGEMENT | Facility: CLINIC | Age: 83
End: 2023-10-09

## 2023-10-15 ENCOUNTER — TELEPHONE (OUTPATIENT)
Dept: INTERNAL MEDICINE | Facility: CLINIC | Age: 83
End: 2023-10-15
Payer: COMMERCIAL

## 2023-10-15 DIAGNOSIS — R79.89 ELEVATED PLATELET COUNT: Primary | ICD-10-CM

## 2023-10-15 NOTE — TELEPHONE ENCOUNTER
Dear Reinaldo;    I spoke with Ms. Denise today and placed a hematology referral. Please help schedule.    Thanks, RUTH Zimmerman      Dear Debra;    As we discussed on the phone today, I recommend you see the Hematologist and I placed a referral. Our nursing staff will reach out to you to help set this up.    RUTH Zimmerman MD

## 2023-10-16 ENCOUNTER — PATIENT OUTREACH (OUTPATIENT)
Dept: ONCOLOGY | Facility: CLINIC | Age: 83
End: 2023-10-16
Payer: COMMERCIAL

## 2023-10-16 NOTE — TELEPHONE ENCOUNTER
Patient will be contacted within 2 business day to schedule.        Reinaldo Newell CMA ([facility]) at 7:01 AM on 10/16/2023

## 2023-10-16 NOTE — PROGRESS NOTES
Hematology referral reviewed for [facility], see below.    Referral reason: Positive JAK2 with mild elevation to platelets (462K on 9/5/23) not sustained, current labs are all WNL with exception of positive JAK2    Current abnormal labs: Available in Chart Review    Outreach: Gonzalez sent to patient    Plan: Triage instructions updated and sent to NPS for completion.

## 2023-10-25 ENCOUNTER — OFFICE VISIT (OUTPATIENT)
Dept: AUDIOLOGY | Facility: CLINIC | Age: 83
End: 2023-10-25
Attending: OTOLARYNGOLOGY
Payer: COMMERCIAL

## 2023-10-25 DIAGNOSIS — R42 DIZZINESS AND GIDDINESS: Primary | ICD-10-CM

## 2023-10-25 PROCEDURE — 92541 SPONTANEOUS NYSTAGMUS TEST: CPT | Mod: 59 | Performed by: AUDIOLOGIST

## 2023-10-25 PROCEDURE — 92545 OSCILLATING TRACKING TEST: CPT | Mod: XU | Performed by: AUDIOLOGIST

## 2023-10-25 PROCEDURE — 92537 CALORIC VSTBLR TEST W/REC: CPT | Performed by: AUDIOLOGIST

## 2023-10-25 PROCEDURE — 92542 POSITIONAL NYSTAGMUS TEST: CPT | Mod: XU | Performed by: AUDIOLOGIST

## 2023-10-25 PROCEDURE — 92567 TYMPANOMETRY: CPT | Performed by: AUDIOLOGIST

## 2023-10-25 NOTE — PROGRESS NOTES
AUDIOLOGY REPORT-BALANCE ASSESSMENT    SUBJECTIVE: Debra Denise, 83 year old, was seen in Audiology at the [facility] on 10/25/2023, for videonystagmography (VNG), referred by Rick Nissen, M.D.    The patient reports imbalance and lightheadedness starting about 2 to 3 years ago. Patient reports symptoms have been getting worse. Patient reports there has good and bad days with her symptoms. Patient has most recently noticed dizziness after scrolling on the phone and while looking up/reaching up where she has felt lightheaded. Of note, patient reports she had Covid 2-3 years ago after which she had some heart issues (skipped beats) and increased in colitis symptoms which she is currently receiving Entyvio infusions. Patient reports similar symptoms of dizziness occurring earlier in her life after an ear cleaning that caused significant ear pain, dizziness, and nausea. Patient reports a recent event that was determined to be a possible TIA where she experienced left arm numbness for about 30 seconds after carrying in groceries from the car. Patient went to the emergency room to be evaluated the following day. With the possible TIA, she is currently on blood thinners and a statin medication. Patient reports she feels her balance is okay. She has worked with physical therapy recently for the dizziness. Patient reports one fall 2 to 3 years ago after she tripped over her husbands leg, she did not receive any injuries from the fall. Patient denies increase in symptoms while rolling over in bed bending down. Patient denies dizziness with coughing, sneezing, blowing her nose, lifting heavy things or bearing down. Patient denies sensation of motion while sitting still, or motion intolerance in the car.    Patient denies concerns with hearing or fluctuations with her symptoms. Hearing evaluations have revealed normal sloping to moderately-severe sensorineural hearing loss bilaterally.  "Patient reports some ear pain starting about two years ago she feels is more from her jaw from a crown. When the crown was initially placed she reported she could hear a \"bug-like\" noise which has since gone away. Patient denies tinnitus, drainage, aural fullness, history of ear surgeries, or ear infections.     Patient denies headaches or migraines, but does report some visual auras periodically. Patient has not had any auras recently. Patient reports some recent sensitivity to loud sounds, and increased sensitivity to lights since her cataract surgery. Patient reports some vision concerns as she has had some blurry vision and eye pain recently. Patient had eye surgery years ago for suspected narrow angle closure glaucoma. Patient denies history of head injuries or concussions.The patient reports lower back issues related to herniated disc in her 20s. The patient also reports neck stiffness and a right shoulder surgery for a frozen shoulder.The patient reports possible history of melanoma on her leg that was fully removed, which she did not receive chemotherapy. Patient denies family history of hearing loss, migraines, and vertigo.Debra has not taken any antivestibular medications in the past 48 hours.    OBJECTIVE:  Abuse Screening:  Do you feel unsafe at home or work/school? No  Do you feel threatened by someone? No  Does anyone try to keep you from having contact with others, or doing things outside of your home? No  Physical signs of abuse present? No    Dizziness Handicap Inventory (DHI): 16/100; mild perceived impairment    Videonystagmography (VNG) testing:  Prescreening:  Tympanograms: Normal eardrum mobility bilaterally. Note: this test is completed to determine the status of the middle ear before irrigations are completed.  Ocular range of motion and ocular counter roll: Normal  Cross/cover: Normal  Head Thrust: Negative     Nystagmus Tests:  Gaze-Horizontal with fixation: Essentially normal; " microsaccadic intrusions present intermittently throughout    Center: Normal   Right: Normal   Left: Normal  Gaze-Vertical with fixation: Essentially normal; microsaccadic intrusions present intermittently throughout    Up: Normal   Down: Normal  Gaze with fixation denied:   Center: 1 degree/second left beating nystagmus   Right: Several beat of 2 degrees/second right beating nystagmus initially (likely endpoint/non-significant)   Left: 3 degrees/second left beating nystagmus (possible endpoint)   Up: Intermittent mild up beats (3-4 degrees/second). Possible endpoint  High Frequency Headshake:  Horizontal: Negative 1 degree/second left beating nystagmus (no change from spontaneous), no symptoms  Vertical: Negative 1 degree/second left beating nystagmus (no change from spontaneous), no symptoms    Parlin-Hallpike Head Right: Negative for PC-BPPV. No nystagmus or symptoms   Parlin-Hallpike Head Left: Negative for PC-BPPV. No nystagmus or symptoms   Roll Test Head Right: Negative for HC-BPPV. No nystagmus or symptoms   Roll Test Head Left: Negative for HC-BPPV. No nystagmus or symptoms     Positional Testing:  Positionals: Supine: Normal  Positionals: Body Right: Normal  Positionals: Body Left:  2-3 degrees/second left beating nystagmus, partially suppresses with fixation (reduced to 1-2 degrees/second). No symptoms.  Positionals: Pre-Caloric: Normal    Oculomotor Tests:  Saccades: Normal  Anti-saccades: Patient able to perform task  Pursuit: Abnormal: Reduced gain and poor morphology (saccadic pursuit) worse in the high frequencies, repeatable.    Calorics :  (Tested at 44 degrees and 30 degrees Celsius for 30 seconds for warm and cool water, respectively):  Right Warm Eye Speed: 21 degrees per second right beating  Left Warm Eye Speed: 23 degrees per second left beating  Right Cool Eye Speed: 11 degrees per second left beating  Left Cool Eye Speed: 9 degrees per second right beating  Difference between ear: 0%  hypofunction. (Greater than 25% considered clinically significant.)  Fixation Index: Normal  Overall caloric test: Normal    Post-Calorics Otoscopy: Normal    ASSESSMENT:  1. Indications of central vestibular system involvement noted on today's exam were as follows:   -Mild up beating nystagmus present during Up Gaze without fixation  -Abnormal Pursuit; repeatable **   ** Cannot rule out the effects of age and vision    2. There were no significant indications of peripheral vestibular system involvement noted on today's exam.     3. Non-localizing findings notes on today's exam were as follows.  - Mild spontaneous left beating nystagmus evident throughout testing with vision denied   **In absence of significant peripheral findings, likely central in nature    PLAN:  Follow-up with Dr. Nissen regarding today's results and for medical management.  Please call this clinic at [phone] with questions regarding these results or recommendations.       HAYDEE Adams.   Audiology Doctoral Student   [place] #[id number]      I was present with the patient for the entire Audiology appointment including all procedures/testing performed by the AuD student, and agree with the assessment and plan as documented.    Gokul Avila.  Licensed Audiologist  [place] # [id number]

## 2023-10-28 DIAGNOSIS — I10 BENIGN ESSENTIAL HYPERTENSION: ICD-10-CM

## 2023-10-31 ENCOUNTER — OFFICE VISIT (OUTPATIENT)
Dept: OTOLARYNGOLOGY | Facility: CLINIC | Age: 83
End: 2023-10-31
Attending: INTERNAL MEDICINE
Payer: COMMERCIAL

## 2023-10-31 ENCOUNTER — OFFICE VISIT (OUTPATIENT)
Dept: AUDIOLOGY | Facility: CLINIC | Age: 83
End: 2023-10-31
Payer: COMMERCIAL

## 2023-10-31 ENCOUNTER — PRE VISIT (OUTPATIENT)
Dept: OTOLARYNGOLOGY | Facility: CLINIC | Age: 83
End: 2023-10-31

## 2023-10-31 VITALS
WEIGHT: 137 LBS | TEMPERATURE: 97.8 F | SYSTOLIC BLOOD PRESSURE: 145 MMHG | HEIGHT: 65 IN | BODY MASS INDEX: 22.82 KG/M2 | DIASTOLIC BLOOD PRESSURE: 65 MMHG | HEART RATE: 58 BPM | OXYGEN SATURATION: 96 %

## 2023-10-31 DIAGNOSIS — R42 LIGHTHEADEDNESS: ICD-10-CM

## 2023-10-31 DIAGNOSIS — H90.3 BILATERAL SENSORINEURAL HEARING LOSS: Primary | ICD-10-CM

## 2023-10-31 DIAGNOSIS — H61.23 EXCESSIVE CERUMEN IN BOTH EAR CANALS: ICD-10-CM

## 2023-10-31 DIAGNOSIS — R42 DIZZINESS: ICD-10-CM

## 2023-10-31 DIAGNOSIS — H90.3 SENSORINEURAL HEARING LOSS, BILATERAL: Primary | ICD-10-CM

## 2023-10-31 PROCEDURE — 99204 OFFICE O/P NEW MOD 45 MIN: CPT | Performed by: OTOLARYNGOLOGY

## 2023-10-31 PROCEDURE — 92565 STENGER TEST PURE TONE: CPT | Performed by: AUDIOLOGIST

## 2023-10-31 PROCEDURE — 92557 COMPREHENSIVE HEARING TEST: CPT | Performed by: AUDIOLOGIST

## 2023-10-31 PROCEDURE — 92550 TYMPANOMETRY & REFLEX THRESH: CPT | Performed by: AUDIOLOGIST

## 2023-10-31 RX ORDER — GABAPENTIN 100 MG/1
CAPSULE ORAL
Qty: 180 CAPSULE | Refills: 1 | Status: SHIPPED | OUTPATIENT
Start: 2023-10-31 | End: 2024-05-08

## 2023-10-31 ASSESSMENT — PAIN SCALES - GENERAL: PAINLEVEL: NO PAIN (0)

## 2023-10-31 NOTE — TELEPHONE ENCOUNTER
Gabapentin 100 mg capsule last dispensed 8/6/23 for #180, a 90-day supply. Gabapentin refilled per clinic protocol.    Kasey Dailey RN ([place])

## 2023-10-31 NOTE — NURSING NOTE
"Chief Complaint   Patient presents with    Consult     vertigo     Blood pressure (!) 145/65, pulse 58, temperature 97.8  F (36.6  C), height 1.651 m (5' 5\"), weight 62.1 kg (137 lb), SpO2 96%, not currently breastfeeding.  Margarito Mohamud LPN    "

## 2023-10-31 NOTE — LETTER
10/31/2023       RE: Debra Denise  [id number]  [place] [address]     Dear Colleague,    Thank you for referring your patient, Debra Denise, to the [facility] [place] [facility]. Please see a copy of my visit note below.    Dear Wolfgang Oliveros:    I had the pleasure of meeting Debra Denise in consultation today at the [facility] at your request.    CHIEF COMPLAINT: Dizziness    HISTORY OF PRESENT ILLNESS: Patient is an 83-year-old in today for consultation on dizziness and her ears referred from her family physician Dr. Payne.  She reports she has had some dizziness issues for about 2 years.  She notices it when she looks up also when she gets up.  Usually just watching light activity such as TV or her iPhone can set it off.  She describes as a lightheadedness.  Happens more at night again after she been watching TV.  She able to function, able to move around the house without issues.  If is during daytime she can drive.  She did have a similar incident occur years ago that developed after she had her ears worse at her internist.  That lasted again for several months and finally cleared up.  She has had MRI which was negative and also an MRA of the brain and neck, again negative.  She been through 4 sessions of rehab.  She says she does have some visual issues, has not seen an eye doctor.    ALLERGIES:    Allergies   Allergen Reactions     Sulfacetamide Hives     Adhesive Tape Rash     Atenolol      Other reaction(s): Intolerance-Can't Take  Fatigue, GI intolerance     Ibuprofen      Other reaction(s): Stomach Upset  4-9-13 tele encounter  Other Reaction(s): GI intolerance        Naproxen      Other reaction(s): Stomach Upset  4-9-13 tele encounter  Other Reaction(s): GI intolerance     Ramipril Cough     Other reaction(s):  Intolerance-Can't Take  Other Reaction(s): GI intolerance     Sulfa Antibiotics      Codeine Nausea     Fentanyl Nausea       HABITS: Social History    Substance and Sexual Activity      Alcohol use: No     History   Smoking Status     Never   Smokeless Tobacco     Never         PAST MEDICAL HISTORY: Please see today's intake form (for the remainder of the PMH) which I reviewed and signed.  Past Medical History:   Diagnosis Date     Autoimmune disease (H24) approx. 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 30 years ago     Osteopenia      Polymyalgia rheumatica (H24)      PONV (postoperative nausea and vomiting)      Proctitis      Reduced vision Sometime is a little blurry       FAMILY HISTORY/SOCIAL HISTORY:   Family History   Problem Relation Age of Onset     Cancer - colorectal Father      Lung Cancer Father      Macular Degeneration Father      Multiple myeloma Brother      Pacemaker Brother      Hypertension Mother      Cerebrovascular Disease Mother         from misdiagnosis of temporal arteritis     Suicide Sister      Hypertension Maternal Grandmother      Cerebrovascular Disease Maternal Grandmother      Diabetes Maternal Grandmother       Social History     Socioeconomic History     Marital status:      Spouse name: Not on file     Number of children: Not on file     Years of education: Not on file     Highest education level: Not on file   Occupational History     Not on file   Tobacco Use     Smoking status: Never     Smokeless tobacco: Never     Tobacco comments:     None   Vaping Use     Vaping Use: Never used   Substance and Sexual Activity     Alcohol use: No     Drug use: No     Sexual activity: Not Currently     Partners: Male     Birth control/protection: None   Other Topics Concern     Parent/sibling w/ CABG, MI or angioplasty before 65F 55M? Not Asked   Social History Narrative      Not on file     Social Determinants of Health     Financial Resource Strain: Not on file   Food Insecurity: Not on file   Transportation Needs: Not on file   Physical Activity: Not on file   Stress: Not on file   Social Connections: Not on file   Interpersonal Safety: Not on file   Housing Stability: Not on file       REVIEW OF SYSTEMS: Patient Supplied Answers to Review of Systems      10/31/2023    11:22 AM    ENT ROS   Neurology Dizzy spells   Musculoskeletal Sore or stiff joints    Back pain    Neck pain            The remainder of the 10 point ROS is negative    PHYSICIAL EXAMINATION:  Constitutional: The patient was well-groomed and in no acute distress.   Skin: Warm and pink.  Psychiatric: The patient's affect was calm, cooperative, and appropriate.   Respiratory: Breathing comfortably without stridor or exertion of accessory muscles.  Eyes: Pupils were equal and reactive. Extraocular movement intact.   Head: Normocephalic and atraumatic. No lesions or scars.  Ears: Patient placed under the microscope for microscopic evaluation and cleaning of cerumen which was obscuring full visualization and complete assessment of both TMs. Under high power magnification, the right ear was examined and cleaned of cerumen using instruments.  After cleaning, TM is fully visualized and has normal position with normal middle ear aeration. The left ear was then cleaned and inspected using microscope, instruments and similar techniques. After cleaning of cerumen, the TM has normal position with normal aeration to middle ear.  Nose: Sinuses were nontender. Anterior rhinoscopy revealed midline septum and absence of purulence or polyps.  Oral Cavity: Normal tongue, floor of mouth, buccal mucosa, and palate. No lesions or masses on inspection or palpation. No abnormal lymph tissue audiogram performed shows in the oropharynx.   Neck: The parotid is soft without masses. Supple with normal laryngeal and tracheal landmarks.    Lymphatic: There is no palpable lymphadenopathy or other masses in the neck.   Neurologic: Alert and oriented x 3. Cranial nerves III-XI within normal limits. Voice quality normal.  Cerebellar Function Tests:  Grossly normal    Audiogram: Audiogram performed shows a bilateral moderate high-frequency sensorineural loss, fairly symmetrical.  This is from 1000 Hz.  She has 100% discrimination in each ear.  Also has normal bilateral type A tympanograms.  Tuning forks are normal.    MRI: Performed with contrast.  Internal auditory canal and cerebellar pontine angle is normal bilaterally.  Agree with report    MRA brain/neck: Normal    Rehab visits: Reviewed      IMPRESSION AND PLAN:   Dizziness: Suspect postural hypotension with a lightheadedness when she gets up quickly.  We discussed this in detail.  She been to physical therapy and apparently was given some exercises to do which she has been doing at home.  Ears tested normally as far as all vestibular testing today.  Normal exam.  She wonders about seeing an eye doctor with visual concerns and changes, she will follow through with that at her convenience.  Bilateral sensorineural hearing loss: Discussed with her hearing aid option.  She is not interested in that at this time, will pursue at her discretion place location.  Lightheadedness: Again feel likely postural hypotension, treatment as above.  Excessive cerumen: Cleaned today with instruments microscope as above.  No further treatment needed, monitor.    Thank you very much for the opportunity to participate in the care of your patient.    Rick L Nissen MD        Again, thank you for allowing me to participate in the care of your patient.      Sincerely,    Rick L. Nissen, MD

## 2023-10-31 NOTE — PROGRESS NOTES
AUDIOLOGY REPORT    SUMMARY: Audiology visit completed. See audiogram for results.      RECOMMENDATIONS: Follow-up with ENT.    Basim Shepard, [facility]  Licensed Audiologist  [place] #[id number]

## 2023-10-31 NOTE — PATIENT INSTRUCTIONS
1. You were seen in the ENT Clinic today by Dr. Nissen.  If you have any questions or concerns after your appointment, please call   - Option 1: ENT Clinic: [phone]   - Option 2: Devi (Dr.Nissen's Nurse): [phone]       Frances (Dr. Nissen's Nurse): [phone]    2.   Plan to return to clinic as needed    How to Contact Us:  Send a [employer] message to your provider. Our team will respond to you via [employer]. Occasionally, we will need to call you to get further information.  For urgent matters (Monday-Friday), call the ENT Clinic: [phone] and speak with a call center team member - they will route your call appropriately.   If you'd like to speak directly with a nurse, please find our contact information below. We do our best to check voicemail frequently throughout the day, and will work to call you back within 1-2 days. For urgent matters, please use the general clinic phone numbers listed above.      Devi IQBAL LPN  ealth - Otolaryngology

## 2023-10-31 NOTE — PROGRESS NOTES
Dear Wolfgang Oliveros:    I had the pleasure of meeting Debra Denise in consultation today at the [facility] at your request.    CHIEF COMPLAINT: Dizziness    HISTORY OF PRESENT ILLNESS: Patient is an 83-year-old in today for consultation on dizziness and her ears referred from her family physician Dr. Payne.  She reports she has had some dizziness issues for about 2 years.  She notices it when she looks up also when she gets up.  Usually just watching light activity such as TV or her iPhone can set it off.  She describes as a lightheadedness.  Happens more at night again after she been watching TV.  She able to function, able to move around the house without issues.  If is during daytime she can drive.  She did have a similar incident occur years ago that developed after she had her ears worse at her internist.  That lasted again for several months and finally cleared up.  She has had MRI which was negative and also an MRA of the brain and neck, again negative.  She been through 4 sessions of rehab.  She says she does have some visual issues, has not seen an eye doctor.    ALLERGIES:    Allergies   Allergen Reactions    Sulfacetamide Hives    Adhesive Tape Rash    Atenolol      Other reaction(s): Intolerance-Can't Take  Fatigue, GI intolerance    Ibuprofen      Other reaction(s): Stomach Upset  4-9-13 tele encounter  Other Reaction(s): GI intolerance       Naproxen      Other reaction(s): Stomach Upset  4-9-13 tele encounter  Other Reaction(s): GI intolerance    Ramipril Cough     Other reaction(s): Intolerance-Can't Take  Other Reaction(s): GI intolerance    Sulfa Antibiotics     Codeine Nausea    Fentanyl Nausea       HABITS: Social History    Substance and Sexual Activity      Alcohol use: No     History   Smoking Status    Never   Smokeless Tobacco    Never         PAST MEDICAL HISTORY: Please see today's intake form (for the remainder of the PMH) which I  reviewed and signed.  Past Medical History:   Diagnosis Date    Autoimmune disease (H24) approx. 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 30 years ago    Osteopenia     Polymyalgia rheumatica (H24)     PONV (postoperative nausea and vomiting)     Proctitis     Reduced vision Sometime is a little blurry       FAMILY HISTORY/SOCIAL HISTORY:   Family History   Problem Relation Age of Onset    Cancer - colorectal Father     Lung Cancer Father     Macular Degeneration Father     Multiple myeloma Brother     Pacemaker Brother     Hypertension Mother     Cerebrovascular Disease Mother         from misdiagnosis of temporal arteritis    Suicide Sister     Hypertension Maternal Grandmother     Cerebrovascular Disease Maternal Grandmother     Diabetes Maternal Grandmother       Social History     Socioeconomic History    Marital status:      Spouse name: Not on file    Number of children: Not on file    Years of education: Not on file    Highest education level: Not on file   Occupational History    Not on file   Tobacco Use    Smoking status: Never    Smokeless tobacco: Never    Tobacco comments:     None   Vaping Use    Vaping Use: Never used   Substance and Sexual Activity    Alcohol use: No    Drug use: No    Sexual activity: Not Currently     Partners: Male     Birth control/protection: None   Other Topics Concern    Parent/sibling w/ CABG, MI or angioplasty before 65F 55M? Not Asked   Social History Narrative    Not on file     Social Determinants of Health     Financial Resource Strain: Not on file   Food Insecurity: Not on file   Transportation Needs: Not on file   Physical Activity: Not on file   Stress: Not on file   Social Connections: Not on file   Interpersonal Safety: Not on file   Housing Stability: Not on file       REVIEW OF SYSTEMS: Patient Supplied Answers to Review of Systems      10/31/2023     11:22 AM    ENT ROS   Neurology Dizzy spells   Musculoskeletal Sore or stiff joints    Back pain    Neck pain            The remainder of the 10 point ROS is negative    PHYSICIAL EXAMINATION:  Constitutional: The patient was well-groomed and in no acute distress.   Skin: Warm and pink.  Psychiatric: The patient's affect was calm, cooperative, and appropriate.   Respiratory: Breathing comfortably without stridor or exertion of accessory muscles.  Eyes: Pupils were equal and reactive. Extraocular movement intact.   Head: Normocephalic and atraumatic. No lesions or scars.  Ears: Patient placed under the microscope for microscopic evaluation and cleaning of cerumen which was obscuring full visualization and complete assessment of both TMs. Under high power magnification, the right ear was examined and cleaned of cerumen using instruments.  After cleaning, TM is fully visualized and has normal position with normal middle ear aeration. The left ear was then cleaned and inspected using microscope, instruments and similar techniques. After cleaning of cerumen, the TM has normal position with normal aeration to middle ear.  Nose: Sinuses were nontender. Anterior rhinoscopy revealed midline septum and absence of purulence or polyps.  Oral Cavity: Normal tongue, floor of mouth, buccal mucosa, and palate. No lesions or masses on inspection or palpation. No abnormal lymph tissue audiogram performed shows in the oropharynx.   Neck: The parotid is soft without masses. Supple with normal laryngeal and tracheal landmarks.   Lymphatic: There is no palpable lymphadenopathy or other masses in the neck.   Neurologic: Alert and oriented x 3. Cranial nerves III-XI within normal limits. Voice quality normal.  Cerebellar Function Tests:  Grossly normal    Audiogram: Audiogram performed shows a bilateral moderate high-frequency sensorineural loss, fairly symmetrical.  This is from 1000 Hz.  She has 100% discrimination in each ear.  Also  has normal bilateral type A tympanograms.  Tuning forks are normal.    MRI: Performed with contrast.  Internal auditory canal and cerebellar pontine angle is normal bilaterally.  Agree with report    MRA brain/neck: Normal    Rehab visits: Reviewed      IMPRESSION AND PLAN:   Dizziness: Suspect postural hypotension with a lightheadedness when she gets up quickly.  We discussed this in detail.  She been to physical therapy and apparently was given some exercises to do which she has been doing at home.  Ears tested normally as far as all vestibular testing today.  Normal exam.  She wonders about seeing an eye doctor with visual concerns and changes, she will follow through with that at her convenience.  Bilateral sensorineural hearing loss: Discussed with her hearing aid option.  She is not interested in that at this time, will pursue at her discretion place location.  Lightheadedness: Again feel likely postural hypotension, treatment as above.  Excessive cerumen: Cleaned today with instruments microscope as above.  No further treatment needed, monitor.    Thank you very much for the opportunity to participate in the care of your patient.    Rick L Nissen MD

## 2023-10-31 NOTE — TELEPHONE ENCOUNTER
gabapentin (NEURONTIN) 100 MG capsule 180 capsule 0 8/4/2023    Last Office Visit : 9/5/2023   Future Office visit:  0    Routing refill request to provider for review/approval because:  Drug not on the [facility], P or [facility] refill protocol or controlled substance

## 2023-11-16 NOTE — TELEPHONE ENCOUNTER
RECORDS STATUS - ALL OTHER DIAGNOSIS      RECORDS RECEIVED FROM: [facility] - Internal Records   DATE RECEIVED: 11/16

## 2023-12-04 ENCOUNTER — TRANSFERRED RECORDS (OUTPATIENT)
Dept: HEALTH INFORMATION MANAGEMENT | Facility: CLINIC | Age: 83
End: 2023-12-04
Payer: COMMERCIAL

## 2023-12-06 ENCOUNTER — TRANSFERRED RECORDS (OUTPATIENT)
Dept: HEALTH INFORMATION MANAGEMENT | Facility: CLINIC | Age: 83
End: 2023-12-06
Payer: COMMERCIAL

## 2023-12-07 ENCOUNTER — PRE VISIT (OUTPATIENT)
Dept: TRANSPLANT | Facility: CLINIC | Age: 83
End: 2023-12-07
Payer: COMMERCIAL

## 2023-12-07 ENCOUNTER — APPOINTMENT (OUTPATIENT)
Dept: LAB | Facility: CLINIC | Age: 83
End: 2023-12-07
Attending: INTERNAL MEDICINE
Payer: COMMERCIAL

## 2023-12-07 ENCOUNTER — ONCOLOGY VISIT (OUTPATIENT)
Dept: TRANSPLANT | Facility: CLINIC | Age: 83
End: 2023-12-07
Attending: INTERNAL MEDICINE
Payer: COMMERCIAL

## 2023-12-07 VITALS
HEART RATE: 64 BPM | TEMPERATURE: 98.3 F | DIASTOLIC BLOOD PRESSURE: 67 MMHG | BODY MASS INDEX: 22.64 KG/M2 | OXYGEN SATURATION: 98 % | HEIGHT: 65 IN | RESPIRATION RATE: 18 BRPM | WEIGHT: 135.9 LBS | SYSTOLIC BLOOD PRESSURE: 129 MMHG

## 2023-12-07 DIAGNOSIS — R79.89 ELEVATED PLATELET COUNT: Primary | ICD-10-CM

## 2023-12-07 LAB
ALBUMIN SERPL BCG-MCNC: 4.1 G/DL (ref 3.5–5.2)
ALP SERPL-CCNC: 74 U/L (ref 40–150)
ALT SERPL W P-5'-P-CCNC: 10 U/L (ref 0–50)
ANION GAP SERPL CALCULATED.3IONS-SCNC: 8 MMOL/L (ref 7–15)
AST SERPL W P-5'-P-CCNC: 21 U/L (ref 0–45)
BASOPHILS # BLD AUTO: 0.1 10E3/UL (ref 0–0.2)
BASOPHILS NFR BLD AUTO: 1 %
BILIRUB SERPL-MCNC: 0.3 MG/DL
BUN SERPL-MCNC: 18.3 MG/DL (ref 8–23)
CALCIUM SERPL-MCNC: 9.4 MG/DL (ref 8.8–10.2)
CHLORIDE SERPL-SCNC: 101 MMOL/L (ref 98–107)
CREAT SERPL-MCNC: 0.86 MG/DL (ref 0.51–0.95)
DEPRECATED HCO3 PLAS-SCNC: 31 MMOL/L (ref 22–29)
EGFRCR SERPLBLD CKD-EPI 2021: 66 ML/MIN/1.73M2
EOSINOPHIL # BLD AUTO: 0.2 10E3/UL (ref 0–0.7)
EOSINOPHIL NFR BLD AUTO: 2 %
ERYTHROCYTE [DISTWIDTH] IN BLOOD BY AUTOMATED COUNT: 13.4 % (ref 10–15)
FIBRINOGEN PPP-MCNC: 413 MG/DL (ref 170–490)
GLUCOSE SERPL-MCNC: 119 MG/DL (ref 70–99)
HCT VFR BLD AUTO: 43.3 % (ref 35–47)
HGB BLD-MCNC: 14.5 G/DL (ref 11.7–15.7)
HOLD SPECIMEN: NORMAL
IMM GRANULOCYTES # BLD: 0.1 10E3/UL
IMM GRANULOCYTES NFR BLD: 1 %
INR PPP: 1.29 (ref 0.85–1.15)
LYMPHOCYTES # BLD AUTO: 2.1 10E3/UL (ref 0.8–5.3)
LYMPHOCYTES NFR BLD AUTO: 19 %
MCH RBC QN AUTO: 30.5 PG (ref 26.5–33)
MCHC RBC AUTO-ENTMCNC: 33.5 G/DL (ref 31.5–36.5)
MCV RBC AUTO: 91 FL (ref 78–100)
MONOCYTES # BLD AUTO: 1 10E3/UL (ref 0–1.3)
MONOCYTES NFR BLD AUTO: 9 %
NEUTROPHILS # BLD AUTO: 7.7 10E3/UL (ref 1.6–8.3)
NEUTROPHILS NFR BLD AUTO: 68 %
NRBC # BLD AUTO: 0 10E3/UL
NRBC BLD AUTO-RTO: 0 /100
PLATELET # BLD AUTO: 513 10E3/UL (ref 150–450)
POTASSIUM SERPL-SCNC: 3.4 MMOL/L (ref 3.4–5.3)
PROT SERPL-MCNC: 7 G/DL (ref 6.4–8.3)
RBC # BLD AUTO: 4.76 10E6/UL (ref 3.8–5.2)
RETICS # AUTO: 0.06 10E6/UL (ref 0.03–0.1)
RETICS/RBC NFR AUTO: 1.3 % (ref 0.5–2)
SODIUM SERPL-SCNC: 140 MMOL/L (ref 135–145)
WBC # BLD AUTO: 11 10E3/UL (ref 4–11)

## 2023-12-07 PROCEDURE — 85384 FIBRINOGEN ACTIVITY: CPT | Performed by: INTERNAL MEDICINE

## 2023-12-07 PROCEDURE — 85610 PROTHROMBIN TIME: CPT | Performed by: INTERNAL MEDICINE

## 2023-12-07 PROCEDURE — G0463 HOSPITAL OUTPT CLINIC VISIT: HCPCS | Performed by: INTERNAL MEDICINE

## 2023-12-07 PROCEDURE — 36415 COLL VENOUS BLD VENIPUNCTURE: CPT | Performed by: INTERNAL MEDICINE

## 2023-12-07 PROCEDURE — 99205 OFFICE O/P NEW HI 60 MIN: CPT | Mod: GC | Performed by: INTERNAL MEDICINE

## 2023-12-07 PROCEDURE — 85025 COMPLETE CBC W/AUTO DIFF WBC: CPT | Performed by: INTERNAL MEDICINE

## 2023-12-07 PROCEDURE — 80053 COMPREHEN METABOLIC PANEL: CPT | Performed by: INTERNAL MEDICINE

## 2023-12-07 PROCEDURE — 85045 AUTOMATED RETICULOCYTE COUNT: CPT | Performed by: INTERNAL MEDICINE

## 2023-12-07 PROCEDURE — 83615 LACTATE (LD) (LDH) ENZYME: CPT | Performed by: INTERNAL MEDICINE

## 2023-12-07 RX ORDER — TRIAMCINOLONE ACETONIDE 0.1 %
PASTE (GRAM) DENTAL
COMMUNITY
Start: 2023-02-07 | End: 2023-12-26

## 2023-12-07 RX ORDER — HYDROCORTISONE 100 MG/60ML
SUSPENSION RECTAL
COMMUNITY
Start: 2023-03-31 | End: 2023-12-26

## 2023-12-07 ASSESSMENT — PAIN SCALES - GENERAL: PAINLEVEL: MODERATE PAIN (5)

## 2023-12-07 NOTE — LETTER
"    2023         RE: Debra Denise  [address] [facility] [address] [facility] [id number]  [place] [address]        Dear Colleague,    Thank you for referring your patient, Debra Denise, to the [facility]ARR[facility] [place]. Please see a copy of my visit note below.        [facility]  Classical Hematology  Initial Visit Note      PATIENT NAME: Debra Denise  MRN: [id number]  : 1940  ENCOUNTER DATE: 2023     REFERRING PROVIDER: Wolfgang Peck MD    REASON FOR CURRENT VISIT: CHANDNI-2 positive thrombocytosis, concern for myeloproliferative neoplasm     HISTORY OF PRESENTING ILLNESS:      Ms. Debra Denise is a 83 year old  female referred by Dr. Peck (primary care physician) for a concern for myeloproliferative neoplasm. She is here today with her  Aguilar.     Ms. Denise's main complaint is persistent dizziness for the past 1.5-2 years that is becoming progressively more noticeable. She reports that her dizziness is non-positional, intermittent and accompanied by nausea (no vomiting). She has also noticed it at night. She has been seeing ENT for this, BPPV was reportedly ruled out, no improvement with Epley maneuvers and was thought to have a component of postural hypotension. She also reports mild, intermittent headaches and mild fatigue over the past year. She is normally a very active person and so has felt this fatigue more keenly in the past year, has not felt \"completely normal\" during this time. Of note, in early July, patient experienced sudden-onset left upper extremity weakness that lasted for 15 seconds and resolved completely. She was admitted to the hospital 2023 for this with a concern for TIA.  She had extensive imaging of her brain, both CT and MRI/MRA which did not demonstrate cerebrovascular disease.  Cardiac echo did not demonstrate any cardiac etiology. Subsequent ziopath assessment did not demonstrate " any cardiac arrhythmia.   She is currently on Plavix and atorvastatin for the same. She has had a prior cardiac cath in  which was negative for CAD. A CBC in Sept ordered by PCP showed elevated plt count of 461k. 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 She was therefore referred to Hematology for further evaluation in light of her symptoms.     She denies any diplopia, specific visual changes (noted occasional, mild blurring symptoms), prior TIA/stroke/VTE symptoms, erythromelalgia, pruritus, fevers, chills, weight loss, appetite loss.   Her main medical issue currently is her ulcerative proctitis which has been active since 2y ago and under recent good control (based on improvement of GI symptoms, improvement in inflammatory markers to normal) since starting vedolizumab (Entyvio) since May of this year, gets this every 2 months, had latest injection on 23.     Review of Systems:  A full 14 point ROS was negative other than the symptoms noted above in the HPI.      PAST MEDICAL HISTORY     Chronic Ulcerative Proctitis, on Mesalamine and vedolizumab ([id number], since May, last dose 23)   Polymyalgia Rheumatica, currently in remission  HLD  HTN  TIA  Osteoarthritis of bilateral hips  Nephrolithiasis     PAST SURGICAL HISTORY:      Tonsillectomy/Adenoidectomy -    x 2 - , ;   ZAC and BSO -   Right Shoulder surgery for repair of adhesive capsulitis (frozen shoulder)    SOCIAL HISTORY:     Currently enjoying long-term. Various roles during employment, used to work as a medical technologist at one time. She lives with her     FAMILY HISTORY:     Lung Ca in Father - smoker  Multiple myeloma in brother  Temporal arteritis and CVD in Mother  HTN, CVD in maternal grandmother     ALLEGIES:   Allergies reviewed under allergy tab.     CURRENT MEDICATIONS:      Current Outpatient Medications:     atorvastatin (LIPITOR)  "10 MG tablet, Take 1 tablet (10 mg) by mouth daily, Disp: 90 tablet, Rfl: 1    Cholecalciferol (VITAMIN D3 PO), Take 2,000 Units by mouth daily , Disp: , Rfl:     clopidogrel (PLAVIX) 75 MG tablet, Take 1 tablet (75 mg) by mouth daily (Patient taking differently: Take 75 mg by mouth daily Pt takes every other day), Disp: 30 tablet, Rfl: 3    fluocinonide (LIDEX) 0.05 % external cream, Apply to the affected area once to twice daily as needed for flares.*, Disp: , Rfl:     gabapentin (NEURONTIN) 100 MG capsule, TAKE 2 CAPSULES BY MOUTH AT BEDTIME, Disp: 180 capsule, Rfl: 1    multivitamin w/minerals (CENTRUM ADULTS) tablet, Take 1 tablet by mouth daily, Disp: , Rfl:     ondansetron (ZOFRAN ODT) 4 MG ODT tab, Take 1 tablet (4 mg) by mouth every 8 hours as needed for nausea, Disp: 12 tablet, Rfl: 1    Probiotic Product (PROBIOTIC PO), Take 1 capsule by mouth daily, Disp: , Rfl:     sodium chloride 0.9 % SOLN 250 mL with vedolizumab 60 MG/ML SOLR 300 mg infusionn, Inject 300 mg into the vein every 2 months, Disp: , Rfl:     valsartan-hydrochlorothiazide (DIOVAN HCT) 160-25 MG tablet, Take 1 tablet by mouth daily, Disp: 90 tablet, Rfl: 3    PHYSICAL EXAMINATION:      Vital signs: /67 (BP Location: Right arm, Patient Position: Sitting, Cuff Size: Adult Regular)   Pulse 64   Temp 98.3  F (36.8  C) (Oral)   Resp 18   Ht 1.64 m (5' 4.57\")   Wt 61.6 kg (135 lb 14.4 oz)   SpO2 98%   BMI 22.92 kg/m    ECOG performance status of 0.   GENERAL: Well-nourished healthy-appearing, seated in chair, no acute distress.   HEENT: No icterus, no pallor. Mild vertical nystagmus. PERRLA; EOMI; Moist mucous membranes. Oropharynx is clear.   NECK: Supple, no JVD/LAD.  LUNGS: Clear to ausculation bilaterally, normal work of breathing.   CARDIOVASCULAR: Regular rate and rhythm, no murmurs, gallops or rubs.   ABDOMEN: Soft, non-tender and non-distended, no palpable masses, bowel sounds present. No hepatosplenomegaly.   EXTREMITIES: No " cyanosis, no clubbing, no edema. No livedo reticularis. No petechiae/purpura.   NEUROLOGIC: Alert and oriented. No focal deficits.  LYMPH NODE EXAM: No palpable adenopathy - cervical, axillary or inguinal.     LABS:       11/15/22 13:33 02/10/23 09:07 04/14/23 13:09 07/03/23 14:41 07/11/23 08:55 07/12/23 06:15 09/05/23 16:22 09/11/23 13:06 12/07/23 15:22   WBC 10.6 9.6 13.1 (H) 10.2 11.0 9.8 10.3 9.5 11.0   Hemoglobin 14.2 14.2 14.1 14.1 13.9 13.7 14.3 13.9 14.5   Hematocrit 43.2 41.2 41.9 43.4 42.9 41.9 44.0 41.0 43.3   Platelet Count 420 450 491 (H) 439 438 404 462 (H) 450 513 (H)       Recent Labs   Lab Test 07/12/23  1133 07/12/23  0756 07/12/23  0615 07/12/23  0008 07/11/23  0855 07/03/23  1441   NA  --   --  141  --  141 141   POTASSIUM  --   --  3.6  --  3.8 3.6   CHLORIDE  --   --  104  --  105 102   CO2  --   --  29  --  26 31*   ANIONGAP  --   --  8  --  10 8   GLC 95 97 87   < > 117* 117*   BUN  --   --  11.9  --  13.7 19.1   CR  --   --  0.77  --  0.76 0.79   PRASHANTH  --   --  9.1  --  9.2 9.3    < > = values in this interval not displayed.     Recent Labs   Lab Test 07/11/23  0855 07/03/23  1441 04/14/23  1309   BILITOTAL 0.4 0.2 0.4   ALKPHOS 67 74 59   AST 13 15 15   ALT 13 11 10   PROTTOTAL 6.7 7.0 6.8   ALBUMIN 4.0 4.3 4.2       INR 1.29 (today)     Recent Labs   Lab Test 03/25/20  1101      URIC 6.8*     IMAGING      Most recent imaging of abdomen was from 2/2023, CT Abdomen and Pelvis, official report below:  FINDINGS:   LOWER CHEST: Stable small pericardial effusion. Small hiatal hernia.   HEPATOBILIARY: Stable small hepatic cysts.  PANCREAS: Normal.SPLEEN: Normal. ADRENAL GLANDS: Stable subcentimeter small bilateral adrenal nodules,  too small to adequately characterize but likely adenomas. KIDNEYS/BLADDER: Stable nonobstructing left renal calculi. Stable 1.2cm hyperdense lesion in the left kidney lower pole, previous study characterized as a cyst containing hemorrhagic material. No new  suspicious lesions. 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 No small bowel or colonic obstruction or inflammatory changes. Normal appendix.PELVIC ORGANS: Hysterectomy.ADDITIONAL FINDINGS: No lymphadenopathy. No abdominal aortic aneurysm.  No free fluid or fluid collections. No free air. MUSCULOSKELETAL: Unremarkable                                                                 IMPRESSION:   1.  No acute findings in the abdomen and pelvis.  2.  Colonic diverticulosis    MRA Brain (7/11/23):  IMPRESSION:  1. Mild-moderate presumed atherosclerotic narrowing of the mid P2  segment of the right posterior cerebral artery.  2. Otherwise, normal [place] MRA.    MRA Neck (7/11/23):  MPRESSION: Normal MR angiogram of the neck     PATHOLOGY:      Peripheral Blood Smear (9/11/23):  Final Diagnosis  Peripheral Blood Smear:  -Normal hemogram and normal white blood cell differential  -See comment  Comment   The patient's current platelet count is normal. Platelets show overall normal morphology.    CHANDNI 2 mutation testing by NGS (9/11/23):  Significant Results   Detected Alterations of Known or Potential Pathogenicity: JAK2 V617F  TMB Score: None  Interpretation   A pathogenic mutation was identified in JAK2: p.V617F.  JAK2 mutations, either the V617F mutation or functionally similar mutations in exon 12, occur in a significant majority of patients with polycythemia vera. However, the V617F JAK2 mutation is not specific for polycythemia vera and can occur in other myeloproliferative neoplasms and occasionally in other myeloid neoplasms. JAK2 mutations are found in approximately 40-50% of essential thrombocythemia and primary myelofibrosis cases [1,2]. Correlation with clinical findings, morphology, and other laboratory results is indicated.    ASSESSMENT AND PLAN:      Ms. Brook Denise is a  "82yo M with a PMH of autoimmune disorders (chronic ulcerative colitis, polymyalgia rheumatica) who presented with incidentally noted mild thrombocytosis (plt ct 461k) that has been consistently above normal range over past 4 months and subsequent positive JAK2 V617F mutation in peripheral blood. She does have a h/o suspected arterial thrombosis (TIA in July 2023) but no other MPN-type vasomotor symptoms, other thromboses, bleeding manifestations, erythromelalgia or splenomegaly. She does not have features suggestive of leukemic transformation. She meets 2 criteria for ET ( plt count > 450k and positive JAK2 V617 mutation) thus far but has not had a bone marrow examination yet. Beyond this, her vague symptoms of intermittent headaches, dizziness and nausea seem consistent with ET- associated hyperviscosity despite somewhat normal range plt count during that time.     We had an extensive discussion today with patient and her  regarding our concern for an MPN like essential thrombocytosis (ET) , the natural history of disease, and complications, need and rationale for therapy. Based on age >60 and presence of characteristic JAK2 mutation with a possible h/o thrombosis (?TIA) she would be considered \"high-risk\" per MPN-ISS risk stratification. Our next step would be to schedule a bone marrow biopsy to confirm diagnosis of essential thrombocytosis (ET), rule out CML and other ET mimics, rule out leukemia and to obtain additional molecular testing (including BCR-ABL) on bone marrow sample. We would additionally recommend that she start ASA (aspirin) at dose of 81mg BID for her hyperviscosity symptoms and for prevention of arterial thromboses. Once we have confirmation of diagnosis, we will consider additional therapy for platelet cytoreduction such as hydroxyurea (discussed administration, side effects, benefits) and peginterferon. They are planning a short trip to [place] in the coming week, so it is reasonable to " schedule BMBx after they return but she should start antiplatelet therapy now. They are in agreement with above plan. They had several insightful questions which we answered to the best of our knowledge.     Plan:  Schedule Bone Marrow Biopsy, will obtain morphology, cytogenetics, NGS for myeloid as well as MPN panel, including testing for BCR-ABL;   Will repeat Coags with PT mixing studies at next lab draw  Ok to switch to ASA 81mg BID after she discusses and gets approval from her GI provider. Until then she can take her PTA clopidogrel (plavix). If GI happens to disapprove of ASA, we will have to consider anagrelide.   RTC after BMBx to discuss next steps and consider disease-modifying therapy.     The patient was seen and discussed with attending physician Dr. Amos Milner MD who agreed with the above history, physical and assessment/plan.     Sd Toussaint   PGY-6 Fellow, Division of Hematology, Oncology and Transplantation  [facility]     Physician Attestation  I, Amos Milner MD, saw this patient with the resident/fellow and agree with their findings and plan of care as documented in the note.      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 Rule out CML and other potential malignancies.   High risk because of age, possible thrombosis, and JAK2 mutation, depending on risk stratification used.  ASA BID has more of a track record for this disease compared to clopidogrel. She is going to check with her GI if this is ok. Otherwise continue the clopidogrel and we can try to consider other options.  Because of high risk disease it would be good to have a cytoreductive agent as well. We discussed HU and briefly also interferon.   Will do BMBx first (going away for the holidays so has to be January) and have her return end of January to come up with a plan. Morph, flow, cyto, FISH, comp myeloid NGS, BCR-ABL, SNP microarray.   Labs at time of BMBX: CBCd, retic, CMP, coags, PT mix, Lupus AC,  LDH    I spent 70 minutes on the date of service reviewing medical records from the referring provider, reviewing previous lab and imaging results as summarized above, obtaining and reviewing records from [facility] as summarized above, obtaining a history from the patient, performing a physical exam, counseling and educating the patient on the diagnosis and treatment, entering orders for tests, communication with the referring provider, setting up a bone marrow biopsy, evaluating a potentially life or organ threatening problem, intensively monitoring treatments with high risk of toxicity, coordinating care, and documenting in the electronic medical record.    Thank you for allowing me to participate in the care of this patient. Please do not hesitate to contact me if there are any concerns or questions.     Amos Milner MD   of Medicine  Classical Hematology and Blood and Marrow Transplantation  Division of Hematology, Oncology, and Transplantation  [facility] [phone]

## 2023-12-07 NOTE — PROGRESS NOTES
12/5/23 at [facility]    WBC 11.5  PMN 8.0    Hgb 14.5  Plt 525k       Enytvio since May, every 2 months;     Dizziness x 1y, accompanied by nausea;     Cardiac cath 1.5y ago -> clean;   Ziopatch -     TIA - left hand went limp; takes plavix, rectal bleeding.

## 2023-12-07 NOTE — PROGRESS NOTES
"    [facility]  Classical Hematology  Initial Visit Note      PATIENT NAME: Debra Denise  MRN: [id number]  : 1940  ENCOUNTER DATE: 2023     REFERRING PROVIDER: Wolfgang Peck MD    REASON FOR CURRENT VISIT: CHANDNI-2 positive thrombocytosis, concern for myeloproliferative neoplasm     HISTORY OF PRESENTING ILLNESS:      Ms. Debra Denise is a 83 year old  female referred by Dr. Peck (primary care physician) for a concern for myeloproliferative neoplasm. She is here today with her  Aguilar.     Ms. Denise's main complaint is persistent dizziness for the past 1.5-2 years that is becoming progressively more noticeable. She reports that her dizziness is non-positional, intermittent and accompanied by nausea (no vomiting). She has also noticed it at night. She has been seeing ENT for this, BPPV was reportedly ruled out, no improvement with Epley maneuvers and was thought to have a component of postural hypotension. She also reports mild, intermittent headaches and mild fatigue over the past year. She is normally a very active person and so has felt this fatigue more keenly in the past year, has not felt \"completely normal\" during this time. Of note, in early July, patient experienced sudden-onset left upper extremity weakness that lasted for 15 seconds and resolved completely. She was admitted to the hospital 2023 for this with a concern for TIA.  She had extensive imaging of her brain, both CT and MRI/MRA which did not demonstrate cerebrovascular disease.  Cardiac echo did not demonstrate any cardiac etiology. Subsequent ziopath assessment did not demonstrate any cardiac arrhythmia.   She is currently on Plavix and atorvastatin for the same. She has had a prior cardiac cath in  which was negative for CAD. A CBC in Sept ordered by PCP showed elevated plt count of 461k. This was followed by JAK2 testing on peripheral blood which was positive for JAK2 V617F mutation " (VAF 18%). She was therefore referred to Hematology for further evaluation in light of her symptoms.     She denies any diplopia, specific visual changes (noted occasional, mild blurring symptoms), prior TIA/stroke/VTE symptoms, erythromelalgia, pruritus, fevers, chills, weight loss, appetite loss.   Her main medical issue currently is her ulcerative proctitis which has been active since 2y ago and under recent good control (based on improvement of GI symptoms, improvement in inflammatory markers to normal) since starting vedolizumab (Entyvio) since May of this year, gets this every 2 months, had latest injection on 23.     Review of Systems:  A full 14 point ROS was negative other than the symptoms noted above in the HPI.      PAST MEDICAL HISTORY     Chronic Ulcerative Proctitis, on Mesalamine and vedolizumab ([id number], since May, last dose 23)   Polymyalgia Rheumatica, currently in remission  HLD  HTN  TIA  Osteoarthritis of bilateral hips  Nephrolithiasis     PAST SURGICAL HISTORY:      Tonsillectomy/Adenoidectomy -    x 2 - , ;   ZAC and BSO -   Right Shoulder surgery for repair of adhesive capsulitis (frozen shoulder)    SOCIAL HISTORY:     Currently enjoying jail. Various roles during employment, used to work as a medical technologist at one time. She lives with her     FAMILY HISTORY:     Lung Ca in Father - smoker  Multiple myeloma in brother  Temporal arteritis and CVD in Mother  HTN, CVD in maternal grandmother     ALLEGIES:   Allergies reviewed under allergy tab.     CURRENT MEDICATIONS:      Current Outpatient Medications:     atorvastatin (LIPITOR) 10 MG tablet, Take 1 tablet (10 mg) by mouth daily, Disp: 90 tablet, Rfl: 1    Cholecalciferol (VITAMIN D3 PO), Take 2,000 Units by mouth daily , Disp: , Rfl:     clopidogrel (PLAVIX) 75 MG tablet, Take 1 tablet (75 mg) by mouth daily (Patient taking differently: Take 75 mg by mouth daily Pt takes every other  "day), Disp: 30 tablet, Rfl: 3    fluocinonide (LIDEX) 0.05 % external cream, Apply to the affected area once to twice daily as needed for flares.*, Disp: , Rfl:     gabapentin (NEURONTIN) 100 MG capsule, TAKE 2 CAPSULES BY MOUTH AT BEDTIME, Disp: 180 capsule, Rfl: 1    multivitamin w/minerals (CENTRUM ADULTS) tablet, Take 1 tablet by mouth daily, Disp: , Rfl:     ondansetron (ZOFRAN ODT) 4 MG ODT tab, Take 1 tablet (4 mg) by mouth every 8 hours as needed for nausea, Disp: 12 tablet, Rfl: 1    Probiotic Product (PROBIOTIC PO), Take 1 capsule by mouth daily, Disp: , Rfl:     sodium chloride 0.9 % SOLN 250 mL with vedolizumab 60 MG/ML SOLR 300 mg infusionn, Inject 300 mg into the vein every 2 months, Disp: , Rfl:     valsartan-hydrochlorothiazide (DIOVAN HCT) 160-25 MG tablet, Take 1 tablet by mouth daily, Disp: 90 tablet, Rfl: 3    PHYSICAL EXAMINATION:      Vital signs: /67 (BP Location: Right arm, Patient Position: Sitting, Cuff Size: Adult Regular)   Pulse 64   Temp 98.3  F (36.8  C) (Oral)   Resp 18   Ht 1.64 m (5' 4.57\")   Wt 61.6 kg (135 lb 14.4 oz)   SpO2 98%   BMI 22.92 kg/m    ECOG performance status of 0.   GENERAL: Well-nourished healthy-appearing, seated in chair, no acute distress.   HEENT: No icterus, no pallor. Mild vertical nystagmus. PERRLA; EOMI; Moist mucous membranes. Oropharynx is clear.   NECK: Supple, no JVD/LAD.  LUNGS: Clear to ausculation bilaterally, normal work of breathing.   CARDIOVASCULAR: Regular rate and rhythm, no murmurs, gallops or rubs.   ABDOMEN: Soft, non-tender and non-distended, no palpable masses, bowel sounds present. No hepatosplenomegaly.   EXTREMITIES: No cyanosis, no clubbing, no edema. No livedo reticularis. No petechiae/purpura.   NEUROLOGIC: Alert and oriented. No focal deficits.  LYMPH NODE EXAM: No palpable adenopathy - cervical, axillary or inguinal.     LABS:       11/15/22 13:33 02/10/23 09:07 04/14/23 13:09 07/03/23 14:41 07/11/23 08:55 07/12/23 " 06:15 09/05/23 16:22 09/11/23 13:06 12/07/23 15:22   WBC 10.6 9.6 13.1 (H) 10.2 11.0 9.8 10.3 9.5 11.0   Hemoglobin 14.2 14.2 14.1 14.1 13.9 13.7 14.3 13.9 14.5   Hematocrit 43.2 41.2 41.9 43.4 42.9 41.9 44.0 41.0 43.3   Platelet Count 420 450 491 (H) 439 438 404 462 (H) 450 513 (H)       Recent Labs   Lab Test 07/12/23  1133 07/12/23  0756 07/12/23  0615 07/12/23  0008 07/11/23  0855 07/03/23  1441   NA  --   --  141  --  141 141   POTASSIUM  --   --  3.6  --  3.8 3.6   CHLORIDE  --   --  104  --  105 102   CO2  --   --  29  --  26 31*   ANIONGAP  --   --  8  --  10 8   GLC 95 97 87   < > 117* 117*   BUN  --   --  11.9  --  13.7 19.1   CR  --   --  0.77  --  0.76 0.79   PRASHANTH  --   --  9.1  --  9.2 9.3    < > = values in this interval not displayed.     Recent Labs   Lab Test 07/11/23  0855 07/03/23  1441 04/14/23  1309   BILITOTAL 0.4 0.2 0.4   ALKPHOS 67 74 59   AST 13 15 15   ALT 13 11 10   PROTTOTAL 6.7 7.0 6.8   ALBUMIN 4.0 4.3 4.2       INR 1.29 (today)     Recent Labs   Lab Test 03/25/20  1101      URIC 6.8*     IMAGING      Most recent imaging of abdomen was from 2/2023, CT Abdomen and Pelvis, official report below:  FINDINGS:   LOWER CHEST: Stable small pericardial effusion. Small hiatal hernia.   HEPATOBILIARY: Stable small hepatic cysts.  PANCREAS: Normal.SPLEEN: Normal. ADRENAL GLANDS: Stable subcentimeter small bilateral adrenal nodules,  too small to adequately characterize but likely adenomas. KIDNEYS/BLADDER: Stable nonobstructing left renal calculi. Stable 1.2cm hyperdense lesion in the left kidney lower pole, previous study characterized as a cyst containing hemorrhagic material. No new suspicious lesions. 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 No small bowel or colonic obstruction or inflammatory changes. Normal appendix.PELVIC ORGANS: Hysterectomy.ADDITIONAL  FINDINGS: No lymphadenopathy. No abdominal aortic aneurysm.  No free fluid or fluid collections. No free air. MUSCULOSKELETAL: Unremarkable                                                                 IMPRESSION:   1.  No acute findings in the abdomen and pelvis.  2.  Colonic diverticulosis    MRA Brain (7/11/23):  IMPRESSION:  1. Mild-moderate presumed atherosclerotic narrowing of the mid P2  segment of the right posterior cerebral artery.  2. Otherwise, normal [place] MRA.    MRA Neck (7/11/23):  MPRESSION: Normal MR angiogram of the neck     PATHOLOGY:      Peripheral Blood Smear (9/11/23):  Final Diagnosis  Peripheral Blood Smear:  -Normal hemogram and normal white blood cell differential  -See comment  Comment   The patient's current platelet count is normal. Platelets show overall normal morphology.    CHANDNI 2 mutation testing by NGS (9/11/23):  Significant Results   Detected Alterations of Known or Potential Pathogenicity: JAK2 V617F  TMB Score: None  Interpretation   A pathogenic mutation was identified in JAK2: p.V617F.  JAK2 mutations, either the V617F mutation or functionally similar mutations in exon 12, occur in a significant majority of patients with polycythemia vera. However, the V617F JAK2 mutation is not specific for polycythemia vera and can occur in other myeloproliferative neoplasms and occasionally in other myeloid neoplasms. JAK2 mutations are found in approximately 40-50% of essential thrombocythemia and primary myelofibrosis cases [1,2]. Correlation with clinical findings, morphology, and other laboratory results is indicated.    ASSESSMENT AND PLAN:      Ms. Brook Denise is a 84yo M with a PMH of autoimmune disorders (chronic ulcerative colitis, polymyalgia rheumatica) who presented with incidentally noted mild thrombocytosis (plt ct 461k) that has been consistently above normal range over past 4 months and subsequent positive JAK2 V617F mutation in peripheral blood. She does  "have a h/o suspected arterial thrombosis (TIA in July 2023) but no other MPN-type vasomotor symptoms, other thromboses, bleeding manifestations, erythromelalgia or splenomegaly. She does not have features suggestive of leukemic transformation. She meets 2 criteria for ET ( plt count > 450k and positive JAK2 V617 mutation) thus far but has not had a bone marrow examination yet. Beyond this, her vague symptoms of intermittent headaches, dizziness and nausea seem consistent with ET- associated hyperviscosity despite somewhat normal range plt count during that time.     We had an extensive discussion today with patient and her  regarding our concern for an MPN like essential thrombocytosis (ET) , the natural history of disease, and complications, need and rationale for therapy. Based on age >60 and presence of characteristic JAK2 mutation with a possible h/o thrombosis (?TIA) she would be considered \"high-risk\" per MPN-ISS risk stratification. Our next step would be to schedule a bone marrow biopsy to confirm diagnosis of essential thrombocytosis (ET), rule out CML and other ET mimics, rule out leukemia and to obtain additional molecular testing (including BCR-ABL) on bone marrow sample. We would additionally recommend that she start ASA (aspirin) at dose of 81mg BID for her hyperviscosity symptoms and for prevention of arterial thromboses. Once we have confirmation of diagnosis, we will consider additional therapy for platelet cytoreduction such as hydroxyurea (discussed administration, side effects, benefits) and peginterferon. They are planning a short trip to [place] in the coming week, so it is reasonable to schedule BMBx after they return but she should start antiplatelet therapy now. They are in agreement with above plan. They had several insightful questions which we answered to the best of our knowledge.     Plan:  Schedule Bone Marrow Biopsy, will obtain morphology, cytogenetics, NGS for myeloid as well as " MPN panel, including testing for BCR-ABL;   Will repeat Coags with PT mixing studies at next lab draw  Ok to switch to ASA 81mg BID after she discusses and gets approval from her GI provider. Until then she can take her PTA clopidogrel (plavix). If GI happens to disapprove of ASA, we will have to consider anagrelide.   RTC after BMBx to discuss next steps and consider disease-modifying therapy.     The patient was seen and discussed with attending physician Dr. Amos Milner MD who agreed with the above history, physical and assessment/plan.     Sd Toussaint   PGY-6 Fellow, Division of Hematology, Oncology and Transplantation  [facility]     Physician Attestation   I, Amos Milner MD, saw this patient with the resident/fellow and agree with their findings and plan of care as documented in the note.      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 Rule out CML and other potential malignancies.   High risk because of age, possible thrombosis, and JAK2 mutation, depending on risk stratification used.  ASA BID has more of a track record for this disease compared to clopidogrel. She is going to check with her GI if this is ok. Otherwise continue the clopidogrel and we can try to consider other options.  Because of high risk disease it would be good to have a cytoreductive agent as well. We discussed HU and briefly also interferon.   Will do BMBx first (going away for the holidays so has to be January) and have her return end of January to come up with a plan. Morph, flow, cyto, FISH, comp myeloid NGS, BCR-ABL, SNP microarray.   Labs at time of BMBX: CBCd, retic, CMP, coags, PT mix, Lupus AC, LDH    I spent 70 minutes on the date of service reviewing medical records from the referring provider, reviewing previous lab and imaging results as summarized above, obtaining and reviewing records from [facility] as summarized above, obtaining a history from the patient, performing a physical exam,  counseling and educating the patient on the diagnosis and treatment, entering orders for tests, communication with the referring provider, setting up a bone marrow biopsy, evaluating a potentially life or organ threatening problem, intensively monitoring treatments with high risk of toxicity, coordinating care, and documenting in the electronic medical record.    Thank you for allowing me to participate in the care of this patient. Please do not hesitate to contact me if there are any concerns or questions.     Amos Milner MD   of Medicine  Classical Hematology and Blood and Marrow Transplantation  Division of Hematology, Oncology, and Transplantation  [facility] [phone]

## 2023-12-07 NOTE — NURSING NOTE
"Oncology Rooming Note    December 7, 2023 3:59 PM   Debra Denise is a 83 year old female who presents for:    Chief Complaint   Patient presents with    Blood Draw     Labs drawn with  by RN. Vitals taken. Pt checked into next appointment.      Oncology Clinic Visit     Elevated platelet count     Initial Vitals: /67 (BP Location: Right arm, Patient Position: Sitting, Cuff Size: Adult Regular)   Pulse 64   Temp 98.3  F (36.8  C) (Oral)   Resp 18   Ht 1.64 m (5' 4.57\")   Wt 61.6 kg (135 lb 14.4 oz)   SpO2 98%   BMI 22.92 kg/m   Estimated body mass index is 22.92 kg/m  as calculated from the following:    Height as of this encounter: 1.64 m (5' 4.57\").    Weight as of this encounter: 61.6 kg (135 lb 14.4 oz). Body surface area is 1.68 meters squared.  Moderate Pain (5) Comment: Data Unavailable   No LMP recorded. Patient has had a hysterectomy.  Allergies reviewed: Yes  Medications reviewed: Yes    Medications: Medication refills not needed today.  Pharmacy name entered into [facility]: [id number] - [place], [place] - [address]    Clinical concerns:  none      Jalyn Baird              "

## 2023-12-07 NOTE — NURSING NOTE
Chief Complaint   Patient presents with    Blood Draw     Labs drawn with  by RN. Vitals taken. Pt checked into next appointment.     No labs ordered. Haider rainbow tubes and notified Dr. Milner and clinic staff.    Ana Maria De Guzman RN

## 2023-12-08 LAB — LDH SERPL L TO P-CCNC: 192 U/L (ref 0–250)

## 2023-12-26 ENCOUNTER — OFFICE VISIT (OUTPATIENT)
Dept: INTERNAL MEDICINE | Facility: CLINIC | Age: 83
End: 2023-12-26
Payer: COMMERCIAL

## 2023-12-26 ENCOUNTER — LAB (OUTPATIENT)
Dept: LAB | Facility: CLINIC | Age: 83
End: 2023-12-26
Payer: COMMERCIAL

## 2023-12-26 VITALS
OXYGEN SATURATION: 99 % | WEIGHT: 134.9 LBS | HEIGHT: 65 IN | SYSTOLIC BLOOD PRESSURE: 148 MMHG | HEART RATE: 51 BPM | BODY MASS INDEX: 22.48 KG/M2 | DIASTOLIC BLOOD PRESSURE: 62 MMHG

## 2023-12-26 DIAGNOSIS — Z01.818 PREOPERATIVE EXAMINATION: ICD-10-CM

## 2023-12-26 DIAGNOSIS — R82.90 ABNORMAL URINE: ICD-10-CM

## 2023-12-26 DIAGNOSIS — Z01.818 PREOPERATIVE EXAMINATION: Primary | ICD-10-CM

## 2023-12-26 DIAGNOSIS — R79.89 ELEVATED PLATELET COUNT: ICD-10-CM

## 2023-12-26 DIAGNOSIS — G45.9 TIA (TRANSIENT ISCHEMIC ATTACK): ICD-10-CM

## 2023-12-26 LAB
ALBUMIN UR-MCNC: NEGATIVE MG/DL
APPEARANCE UR: CLEAR
BASOPHILS # BLD AUTO: 0.1 10E3/UL (ref 0–0.2)
BASOPHILS NFR BLD AUTO: 0 %
BILIRUB UR QL STRIP: NEGATIVE
COLOR UR AUTO: NORMAL
EOSINOPHIL # BLD AUTO: 0.4 10E3/UL (ref 0–0.7)
EOSINOPHIL NFR BLD AUTO: 2 %
ERYTHROCYTE [DISTWIDTH] IN BLOOD BY AUTOMATED COUNT: 13.1 % (ref 10–15)
GLUCOSE UR STRIP-MCNC: NEGATIVE MG/DL
HCT VFR BLD AUTO: 42.5 % (ref 35–47)
HGB BLD-MCNC: 14.2 G/DL (ref 11.7–15.7)
HGB UR QL STRIP: NEGATIVE
IMM GRANULOCYTES # BLD: 0.1 10E3/UL
IMM GRANULOCYTES NFR BLD: 1 %
KETONES UR STRIP-MCNC: NEGATIVE MG/DL
LEUKOCYTE ESTERASE UR QL STRIP: NEGATIVE
LYMPHOCYTES # BLD AUTO: 2 10E3/UL (ref 0.8–5.3)
LYMPHOCYTES NFR BLD AUTO: 13 %
MCH RBC QN AUTO: 30 PG (ref 26.5–33)
MCHC RBC AUTO-ENTMCNC: 33.4 G/DL (ref 31.5–36.5)
MCV RBC AUTO: 90 FL (ref 78–100)
MONOCYTES # BLD AUTO: 1.4 10E3/UL (ref 0–1.3)
MONOCYTES NFR BLD AUTO: 9 %
NEUTROPHILS # BLD AUTO: 11.8 10E3/UL (ref 1.6–8.3)
NEUTROPHILS NFR BLD AUTO: 75 %
NITRATE UR QL: NEGATIVE
NRBC # BLD AUTO: 0 10E3/UL
NRBC BLD AUTO-RTO: 0 /100
PH UR STRIP: 6 [PH] (ref 5–7)
PLATELET # BLD AUTO: 473 10E3/UL (ref 150–450)
RBC # BLD AUTO: 4.73 10E6/UL (ref 3.8–5.2)
RBC URINE: 0 /HPF
RETIC HEMOGLOBIN: 33.9 PG (ref 28.2–35.7)
RETICS # AUTO: 0.06 10E6/UL (ref 0.03–0.1)
RETICS/RBC NFR AUTO: 1.2 % (ref 0.5–2)
SP GR UR STRIP: 1 (ref 1–1.03)
UROBILINOGEN UR STRIP-MCNC: NORMAL MG/DL
WBC # BLD AUTO: 15.7 10E3/UL (ref 4–11)
WBC URINE: <1 /HPF

## 2023-12-26 PROCEDURE — 99214 OFFICE O/P EST MOD 30 MIN: CPT | Mod: GC

## 2023-12-26 PROCEDURE — 85025 COMPLETE CBC W/AUTO DIFF WBC: CPT | Performed by: PATHOLOGY

## 2023-12-26 PROCEDURE — 36415 COLL VENOUS BLD VENIPUNCTURE: CPT | Performed by: PATHOLOGY

## 2023-12-26 PROCEDURE — 81001 URINALYSIS AUTO W/SCOPE: CPT | Performed by: PATHOLOGY

## 2023-12-26 PROCEDURE — 85046 RETICYTE/HGB CONCENTRATE: CPT | Performed by: PATHOLOGY

## 2023-12-26 RX ORDER — RESPIRATORY SYNCYTIAL VIRUS VACCINE 120MCG/0.5
0.5 KIT INTRAMUSCULAR ONCE
Qty: 1 EACH | Refills: 0 | Status: CANCELLED | OUTPATIENT
Start: 2023-12-26 | End: 2023-12-26

## 2023-12-26 RX ORDER — ASPIRIN 81 MG/1
81 TABLET, CHEWABLE ORAL 2 TIMES DAILY
Status: ON HOLD | COMMUNITY
End: 2023-12-29

## 2023-12-26 RX ORDER — BUDESONIDE 3 MG/1
9 CAPSULE, COATED PELLETS ORAL EVERY 24 HOURS
COMMUNITY
Start: 2023-12-13 | End: 2024-05-08

## 2023-12-26 NOTE — PROGRESS NOTES
Debra is a 83 year old that presents in clinic today for the following:     Chief Complaint   Patient presents with    Pre-Op Exam     Pt here for pre-op for FLEXIBLE SIGMOIDOSCOPY DIAGNOSTIC ib 12/28/2023 with [facility] with Kim Betancourt MD            12/26/2023     2:01 PM   Additional Questions   Roomed by Mikaela Ortiz   Accompanied by N/A       Screenings from encounters over the past 10 days    No data recorded       Mikaela Ortiz at 2:08 PM on 12/26/2023

## 2023-12-26 NOTE — PROGRESS NOTES
Surgeon: Kim Betancourt MD   Fax number for Preop Evaluation: [phone]   Location of Surgery: [facility]  Date of Surgery:   Procedure: FLEXIBLE SIGMOIDOSCOPY DIAGNOSTIC  History of reaction to anesthesia? unknown    Mikaela Ortiz, EMT at 2:00 PM on 12/26/2023

## 2023-12-26 NOTE — PROGRESS NOTES
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  PCP: Wolfgang Zimmerman     Subjective     PREOPERATIVE EVALUATION:  Today's date: 12/25/23    Surgical Information (as known today):  Surgeon: Kim Betancourt MD   Fax number for Preop Evaluation: [phone]   Location of Surgery: [facility]  Date of Surgery:   Procedure: FLEXIBLE SIGMOIDOSCOPY DIAGNOSTIC  History of reaction to anesthesia? unknown      HPI related to upcoming procedure:  Flares of ulcerative proctosigmoiditis with bleeding per rectum      Had history of Palpitations;  Normal dobutamine stress echo 10/11/2021.For atypical CP, Coronary angiogram on 8/30/2022 with normal coronary arteries.  Ziopatch monitor 7/22/2022 w/o significant findings.     Possible TIA 7/2023 was started on Plavix and Atorvastatin, but due to concerns with essential thrombocytosis, oncology stopped plavix and patient is currently on aspirin          12/19/2023     8:21 PM   Preop Questions   1. Have you ever had a heart attack or stroke? UNKNOWN - possible TIA, negative workup (MRI brain and neck, echo, and cardiac event monitor)   2. Have you ever had surgery on your heart or blood vessels, such as a stent placement, a coronary artery bypass, or surgery on an artery in your head, neck, heart, or legs? No   3. Do you have chest pain with activity? No   4. Do you have a history of  heart failure? No   5. Do you currently have a cold, bronchitis or symptoms of other infection? No   6. Do you have a cough, shortness of breath, or wheezing? No   7. Do you or anyone in your family have previous history of blood clots? No   8. Do you or does anyone in your family have a serious bleeding problem such as prolonged bleeding following surgeries or cuts? No    9. Have you ever had problems with anemia or been told to take iron pills? No   10. Have you had any abnormal blood loss such as black, tarry or bloody stools, or abnormal vaginal bleeding? No   11. Have you ever had a blood transfusion? No   12. Are you willing to have a blood transfusion if it is medically needed before, during, or after your surgery? Yes   13. Have you or any of your relatives ever had problems with anesthesia? UNKNOWN - nausea after anesthesia   14. Do you have sleep apnea, excessive snoring or daytime drowsiness? No   15. Do you have any artifical heart valves or other implanted medical devices like a pacemaker, defibrillator, or continuous glucose monitor? No   16. Do you have artificial joints? No   17. Are you allergic to latex? No       Risk Assessment  Physical activity:        - able to go up flights of stairs with no chest pain/SOB    Respiratory concerns:  No history of lung disease    Clotting concerns:  No history of clotting, no leg swelling    Other chronic medical concerns    Preoperative Review of :   reviewed - no record of controlled substances prescribed.      Allergies   Allergen Reactions    Sulfacetamide Hives    Adhesive Tape Rash    Aspirin      Other Reaction(s): Stomach issues    Atenolol      Other reaction(s): Intolerance-Can't Take  Fatigue, GI intolerance    Ibuprofen      Other reaction(s): Stomach Upset  4-9-13 tele encounter  Other Reaction(s): GI intolerance       Naproxen      Other reaction(s): Stomach Upset  4-9-13 tele encounter  Other Reaction(s): GI intolerance    Ramipril Cough     Other reaction(s): Intolerance-Can't Take  Other Reaction(s): GI intolerance    Sulfa Antibiotics     Codeine Nausea    Fentanyl Nausea         Review of Systems    As part of this encounter, I reviewed (and updated as able) the past medical, family, and social history.  (Please see past histories (etc) appended to this note at the bottom)    Objective     BP (!) 148/62  "(BP Location: Right arm, Patient Position: Sitting, Cuff Size: Adult Small)   Pulse 51   Ht 1.645 m (5' 4.76\")   Wt 61.2 kg (134 lb 14.4 oz)   SpO2 99%   BMI 22.61 kg/m      Physical Exam  Constitutional:       Appearance: Normal appearance.   Cardiovascular:      Rate and Rhythm: Normal rate and regular rhythm.      Pulses: Normal pulses.      Heart sounds: Normal heart sounds.   Pulmonary:      Effort: Pulmonary effort is normal.      Breath sounds: Normal breath sounds. No wheezing.   Musculoskeletal:      Right lower leg: No edema.      Left lower leg: No edema.   Neurological:      General: No focal deficit present.      Mental Status: She is alert.        No EKG required for low risk surgery (cataract, skin procedure, breast biopsy, etc).      Assessment & Plan     Preoperative Assessment and recommendation    Appreciate the opportunity to be involved in this patient's care.    Revised Cardiac Risk Index (RCRI):  The patient has the following serious cardiovascular risks for perioperative complications:   - Cerebrovascular Disease (TIA or CVA) = 1 point   RCRI Interpretation: 0 points: Class I (very low risk - 0.4% complication rate)    Procedure risk. My understanding is that the described procedure is considered LOW risk.      Overall, there are no apparent contraindications to planned sigmoidoscopy procedure.      Otherwise, patient appears to be medically optimized for upcoming procedure, assuming appropriate medical supervision onsite for anesthesia or other developments.    Antiplatelet or Anticoagulation Medication Instructions:   - aspirin: Bleeding risk is low for this procedure and daily aspirin may be continued without modification.     Additional Medication Instructions:  Patient can hold antihypertensive on the morning of surgery.      Additional medical comments    Patient requested CBC (to monitor platelet count) and UA (history of UTI in November, still having some abnormal pressure " sensation).      Patient was seen and plan of care was discussed with Dr. Nilam Santos MD   12/25/23 10:24 PM   [facility] [place]   Clinic phone [phone]    Attending Addendum:  Patient seen and examined with resident in clinic today.  Pertinent portions of history and exam were independently verified by myself.  I agree with the exam and plan as outlined above with the following modifications: none.  Nilam Oliver MD  Internal Medicine      Addendum: Past histories included in Epic as of this documentation      Patient Active Problem List   Diagnosis    Pain in joint, multiple sites    Nephrolithiasis    Idiopathic stabbing headache    Hyperlipidemia with target low density lipoprotein (LDL) cholesterol less than 70 mg/dL    HTN (hypertension)    Family history of malignant neoplasm of gastrointestinal tract    Dyslipidemia    Chronic ulcerative proctitis without complications (H)    Lumbar pain    Abdominal pain, epigastric    PMR (polymyalgia rheumatica) (H24)    Low bone mass    Hoarse    Status post coronary angiogram    Weakness    Other fatigue    TIA (transient ischemic attack)    Chest pain, unspecified type       Past Medical History:   Diagnosis Date    Autoimmune disease (H24) approx. 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 30 years ago    Osteopenia     Polymyalgia rheumatica (H24)     PONV (postoperative nausea and vomiting)     Proctitis     Reduced vision Sometime is a little blurry       Past Surgical History:   Procedure Laterality Date    COLONOSCOPY  04/08/2014    Procedure: COMBINED COLONOSCOPY, SINGLE BIOPSY/POLYPECTOMY BY BIOPSY;  COLONOSCOPY;  Surgeon: Carol Ann Plasencia MD;  Location:  GI    CV CORONARY ANGIOGRAM N/A 08/30/2022    Procedure: Coronary Angiogram;  Surgeon: Trevin Hawk MD;   Location:  HEART CARDIAC CATH LAB    ENT SURGERY      tonsilectomy, adenoidectomy    ESOPHAGOSCOPY, GASTROSCOPY, DUODENOSCOPY (EGD), COMBINED N/A 2023    Procedure: ESOPHAGOGASTRODUODENOSCOPY, WITH BIOPSY;  Surgeon: Angel Lara MD;  Location:  GI    GYN SURGERY  ,     x 2    HYSTERECTOMY      ORTHOPEDIC SURGERY  2004    (R) shoulder surgery for frozen shoulder    ZAC BSO      for fibroids    TONSILLECTOMY  About 50 years ago       Current Outpatient Medications   Medication Sig Dispense Refill    atorvastatin (LIPITOR) 10 MG tablet Take 1 tablet (10 mg) by mouth daily 90 tablet 1    Cholecalciferol (VITAMIN D3 PO) Take 2,000 Units by mouth daily       clopidogrel (PLAVIX) 75 MG tablet Take 1 tablet (75 mg) by mouth daily (Patient taking differently: Take 75 mg by mouth daily Pt takes every other day) 30 tablet 3    Ferrous Sulfate (IRON PO) Take 1 tablet by mouth daily (Patient not taking: Reported on 2023)      fluocinonide (LIDEX) 0.05 % external cream Apply to the affected area once to twice daily as needed for flares.*      gabapentin (NEURONTIN) 100 MG capsule TAKE 2 CAPSULES BY MOUTH AT BEDTIME 180 capsule 1    hydrocortisone (CORTENEMA) 100 MG/60ML enema Insert 60 mL (100 mg total) into the rectum at bedtime for 14 days. Use as directed      multivitamin w/minerals (CENTRUM ADULTS) tablet Take 1 tablet by mouth daily      ondansetron (ZOFRAN ODT) 4 MG ODT tab Take 1 tablet (4 mg) by mouth every 8 hours as needed for nausea 12 tablet 1    Probiotic Product (PROBIOTIC PO) Take 1 capsule by mouth daily      sodium chloride 0.9 % SOLN 250 mL with vedolizumab 60 MG/ML SOLR 300 mg infusionn Inject 300 mg into the vein every 2 months      triamcinolone (KENALOG) 0.1 % paste apply by topical route 4 times every day a small amount to the mouth sores after meals      valsartan-hydrochlorothiazide (DIOVAN HCT) 160-25 MG tablet Take 1 tablet by mouth daily 90 tablet 3        Family History   Problem Relation Age of Onset    Cancer - colorectal Father     Lung Cancer Father     Macular Degeneration Father     Multiple myeloma Brother     Pacemaker Brother     Hypertension Mother     Cerebrovascular Disease Mother         from misdiagnosis of temporal arteritis    Suicide Sister     Hypertension Maternal Grandmother     Cerebrovascular Disease Maternal Grandmother     Diabetes Maternal Grandmother        Social History     Social History Narrative    Not on file       Tobacco Use    Smoking status: Never    Smokeless tobacco: Never    Tobacco comments:     None   Vaping Use    Vaping Use: Never used   Substance and Sexual Activity    Alcohol use: No    Drug use: No    Sexual activity: Not Currently     Partners: Male     Birth control/protection: None

## 2023-12-27 NOTE — PROVIDER NOTIFICATION
MD Notification    Notified Person: MD    Notified Person Name: Rahel Hagan    Notification Date/Time: 7/12/23 7714    Notification Interaction:[employer]    Purpose of Notification: Ana Maria Mills PA requested I page neuro on the Pt in SS RM [id number] HB. She has new results in and is possibly discharging home.    Orders Received:Pending    Comments:       No

## 2023-12-28 ENCOUNTER — HOSPITAL ENCOUNTER (OUTPATIENT)
Facility: CLINIC | Age: 83
Setting detail: OBSERVATION
Discharge: HOME | End: 2023-12-29
Attending: STUDENT IN AN ORGANIZED HEALTH CARE EDUCATION/TRAINING PROGRAM | Admitting: STUDENT IN AN ORGANIZED HEALTH CARE EDUCATION/TRAINING PROGRAM
Payer: COMMERCIAL

## 2023-12-28 DIAGNOSIS — K51.20 CHRONIC ULCERATIVE PROCTITIS WITHOUT COMPLICATIONS (H): Primary | ICD-10-CM

## 2023-12-28 DIAGNOSIS — K92.2 LOWER GI BLEED: ICD-10-CM

## 2023-12-28 LAB
ABO/RH(D): NORMAL
ANION GAP SERPL CALCULATED.3IONS-SCNC: 10 MMOL/L (ref 7–15)
ANTIBODY SCREEN: NEGATIVE
BASOPHILS # BLD AUTO: 0.1 10E3/UL (ref 0–0.2)
BASOPHILS NFR BLD AUTO: 0 %
BUN SERPL-MCNC: 18.3 MG/DL (ref 8–23)
CALCIUM SERPL-MCNC: 8.8 MG/DL (ref 8.8–10.2)
CHLORIDE SERPL-SCNC: 102 MMOL/L (ref 98–107)
CREAT SERPL-MCNC: 0.67 MG/DL (ref 0.51–0.95)
DEPRECATED HCO3 PLAS-SCNC: 26 MMOL/L (ref 22–29)
EGFRCR SERPLBLD CKD-EPI 2021: 86 ML/MIN/1.73M2
EOSINOPHIL # BLD AUTO: 0.4 10E3/UL (ref 0–0.7)
EOSINOPHIL NFR BLD AUTO: 2 %
ERYTHROCYTE [DISTWIDTH] IN BLOOD BY AUTOMATED COUNT: 13.2 % (ref 10–15)
GLUCOSE SERPL-MCNC: 105 MG/DL (ref 70–99)
HCT VFR BLD AUTO: 41.5 % (ref 35–47)
HGB BLD-MCNC: 13.7 G/DL (ref 11.7–15.7)
HOLD SPECIMEN: NORMAL
IMM GRANULOCYTES # BLD: 0.1 10E3/UL
IMM GRANULOCYTES NFR BLD: 1 %
LYMPHOCYTES # BLD AUTO: 2.3 10E3/UL (ref 0.8–5.3)
LYMPHOCYTES NFR BLD AUTO: 14 %
MCH RBC QN AUTO: 29 PG (ref 26.5–33)
MCHC RBC AUTO-ENTMCNC: 33 G/DL (ref 31.5–36.5)
MCV RBC AUTO: 88 FL (ref 78–100)
MONOCYTES # BLD AUTO: 1.6 10E3/UL (ref 0–1.3)
MONOCYTES NFR BLD AUTO: 10 %
NEUTROPHILS # BLD AUTO: 11.6 10E3/UL (ref 1.6–8.3)
NEUTROPHILS NFR BLD AUTO: 73 %
NRBC # BLD AUTO: 0 10E3/UL
NRBC BLD AUTO-RTO: 0 /100
PLATELET # BLD AUTO: 511 10E3/UL (ref 150–450)
POTASSIUM SERPL-SCNC: 3.8 MMOL/L (ref 3.4–5.3)
RBC # BLD AUTO: 4.73 10E6/UL (ref 3.8–5.2)
SODIUM SERPL-SCNC: 138 MMOL/L (ref 135–145)
SPECIMEN EXPIRATION DATE: NORMAL
WBC # BLD AUTO: 16 10E3/UL (ref 4–11)

## 2023-12-28 PROCEDURE — 86900 BLOOD TYPING SEROLOGIC ABO: CPT | Performed by: STUDENT IN AN ORGANIZED HEALTH CARE EDUCATION/TRAINING PROGRAM

## 2023-12-28 PROCEDURE — 96360 HYDRATION IV INFUSION INIT: CPT

## 2023-12-28 PROCEDURE — 258N000003 HC RX IP 258 OP 636: Performed by: STUDENT IN AN ORGANIZED HEALTH CARE EDUCATION/TRAINING PROGRAM

## 2023-12-28 PROCEDURE — 80048 BASIC METABOLIC PNL TOTAL CA: CPT | Performed by: STUDENT IN AN ORGANIZED HEALTH CARE EDUCATION/TRAINING PROGRAM

## 2023-12-28 PROCEDURE — G0378 HOSPITAL OBSERVATION PER HR: HCPCS

## 2023-12-28 PROCEDURE — 99285 EMERGENCY DEPT VISIT HI MDM: CPT | Mod: 25

## 2023-12-28 PROCEDURE — 99222 1ST HOSP IP/OBS MODERATE 55: CPT | Performed by: PHYSICIAN ASSISTANT

## 2023-12-28 PROCEDURE — 96361 HYDRATE IV INFUSION ADD-ON: CPT

## 2023-12-28 PROCEDURE — 250N000013 HC RX MED GY IP 250 OP 250 PS 637: Performed by: PHYSICIAN ASSISTANT

## 2023-12-28 PROCEDURE — 258N000003 HC RX IP 258 OP 636: Performed by: PHYSICIAN ASSISTANT

## 2023-12-28 PROCEDURE — 85025 COMPLETE CBC W/AUTO DIFF WBC: CPT | Performed by: STUDENT IN AN ORGANIZED HEALTH CARE EDUCATION/TRAINING PROGRAM

## 2023-12-28 PROCEDURE — 36415 COLL VENOUS BLD VENIPUNCTURE: CPT | Performed by: STUDENT IN AN ORGANIZED HEALTH CARE EDUCATION/TRAINING PROGRAM

## 2023-12-28 RX ORDER — ONDANSETRON 2 MG/ML
4 INJECTION INTRAMUSCULAR; INTRAVENOUS EVERY 6 HOURS PRN
Status: DISCONTINUED | OUTPATIENT
Start: 2023-12-28 | End: 2023-12-29 | Stop reason: HOSPADM

## 2023-12-28 RX ORDER — NALOXONE HYDROCHLORIDE 0.4 MG/ML
0.2 INJECTION, SOLUTION INTRAMUSCULAR; INTRAVENOUS; SUBCUTANEOUS
Status: DISCONTINUED | OUTPATIENT
Start: 2023-12-28 | End: 2023-12-29 | Stop reason: HOSPADM

## 2023-12-28 RX ORDER — HYDROCHLOROTHIAZIDE 25 MG/1
25 TABLET ORAL DAILY
Status: DISCONTINUED | OUTPATIENT
Start: 2023-12-29 | End: 2023-12-29 | Stop reason: HOSPADM

## 2023-12-28 RX ORDER — ACETAMINOPHEN 650 MG/1
650 SUPPOSITORY RECTAL EVERY 4 HOURS PRN
Status: DISCONTINUED | OUTPATIENT
Start: 2023-12-28 | End: 2023-12-29 | Stop reason: HOSPADM

## 2023-12-28 RX ORDER — OXYCODONE HYDROCHLORIDE 5 MG/1
5 TABLET ORAL EVERY 4 HOURS PRN
Status: DISCONTINUED | OUTPATIENT
Start: 2023-12-28 | End: 2023-12-29 | Stop reason: HOSPADM

## 2023-12-28 RX ORDER — PROCHLORPERAZINE MALEATE 5 MG
5 TABLET ORAL EVERY 6 HOURS PRN
Status: DISCONTINUED | OUTPATIENT
Start: 2023-12-28 | End: 2023-12-29 | Stop reason: HOSPADM

## 2023-12-28 RX ORDER — HYDROMORPHONE HCL IN WATER/PF 6 MG/30 ML
0.2 PATIENT CONTROLLED ANALGESIA SYRINGE INTRAVENOUS
Status: DISCONTINUED | OUTPATIENT
Start: 2023-12-28 | End: 2023-12-29 | Stop reason: HOSPADM

## 2023-12-28 RX ORDER — HYDRALAZINE HYDROCHLORIDE 20 MG/ML
10 INJECTION INTRAMUSCULAR; INTRAVENOUS EVERY 4 HOURS PRN
Status: DISCONTINUED | OUTPATIENT
Start: 2023-12-28 | End: 2023-12-29 | Stop reason: HOSPADM

## 2023-12-28 RX ORDER — AMOXICILLIN 250 MG
1 CAPSULE ORAL 2 TIMES DAILY PRN
Status: DISCONTINUED | OUTPATIENT
Start: 2023-12-28 | End: 2023-12-29 | Stop reason: HOSPADM

## 2023-12-28 RX ORDER — AMOXICILLIN 250 MG
2 CAPSULE ORAL 2 TIMES DAILY PRN
Status: DISCONTINUED | OUTPATIENT
Start: 2023-12-28 | End: 2023-12-29 | Stop reason: HOSPADM

## 2023-12-28 RX ORDER — ACETAMINOPHEN 325 MG/1
650 TABLET ORAL EVERY 4 HOURS PRN
Status: DISCONTINUED | OUTPATIENT
Start: 2023-12-28 | End: 2023-12-29 | Stop reason: HOSPADM

## 2023-12-28 RX ORDER — GABAPENTIN 100 MG/1
200 CAPSULE ORAL AT BEDTIME
Status: DISCONTINUED | OUTPATIENT
Start: 2023-12-28 | End: 2023-12-29 | Stop reason: HOSPADM

## 2023-12-28 RX ORDER — ONDANSETRON 4 MG/1
4 TABLET, ORALLY DISINTEGRATING ORAL EVERY 6 HOURS PRN
Status: DISCONTINUED | OUTPATIENT
Start: 2023-12-28 | End: 2023-12-29 | Stop reason: HOSPADM

## 2023-12-28 RX ORDER — LIDOCAINE 40 MG/G
CREAM TOPICAL
Status: DISCONTINUED | OUTPATIENT
Start: 2023-12-28 | End: 2023-12-29 | Stop reason: HOSPADM

## 2023-12-28 RX ORDER — SODIUM CHLORIDE, SODIUM LACTATE, POTASSIUM CHLORIDE, CALCIUM CHLORIDE 600; 310; 30; 20 MG/100ML; MG/100ML; MG/100ML; MG/100ML
INJECTION, SOLUTION INTRAVENOUS CONTINUOUS
Status: DISCONTINUED | OUTPATIENT
Start: 2023-12-28 | End: 2023-12-29 | Stop reason: HOSPADM

## 2023-12-28 RX ORDER — VALSARTAN 160 MG/1
160 TABLET ORAL DAILY
Status: DISCONTINUED | OUTPATIENT
Start: 2023-12-29 | End: 2023-12-29 | Stop reason: HOSPADM

## 2023-12-28 RX ORDER — HYDROMORPHONE HCL IN WATER/PF 6 MG/30 ML
0.4 PATIENT CONTROLLED ANALGESIA SYRINGE INTRAVENOUS
Status: DISCONTINUED | OUTPATIENT
Start: 2023-12-28 | End: 2023-12-29 | Stop reason: HOSPADM

## 2023-12-28 RX ORDER — NALOXONE HYDROCHLORIDE 0.4 MG/ML
0.4 INJECTION, SOLUTION INTRAMUSCULAR; INTRAVENOUS; SUBCUTANEOUS
Status: DISCONTINUED | OUTPATIENT
Start: 2023-12-28 | End: 2023-12-29 | Stop reason: HOSPADM

## 2023-12-28 RX ORDER — HYDRALAZINE HYDROCHLORIDE 10 MG/1
10 TABLET, FILM COATED ORAL EVERY 4 HOURS PRN
Status: DISCONTINUED | OUTPATIENT
Start: 2023-12-28 | End: 2023-12-29 | Stop reason: HOSPADM

## 2023-12-28 RX ORDER — LABETALOL HYDROCHLORIDE 5 MG/ML
10 INJECTION, SOLUTION INTRAVENOUS
Status: DISCONTINUED | OUTPATIENT
Start: 2023-12-28 | End: 2023-12-29 | Stop reason: HOSPADM

## 2023-12-28 RX ORDER — BISACODYL 10 MG
10 SUPPOSITORY, RECTAL RECTAL DAILY PRN
Status: DISCONTINUED | OUTPATIENT
Start: 2023-12-28 | End: 2023-12-29 | Stop reason: HOSPADM

## 2023-12-28 RX ORDER — POLYETHYLENE GLYCOL 3350 17 G/17G
17 POWDER, FOR SOLUTION ORAL 2 TIMES DAILY PRN
Status: DISCONTINUED | OUTPATIENT
Start: 2023-12-28 | End: 2023-12-29 | Stop reason: HOSPADM

## 2023-12-28 RX ORDER — PROCHLORPERAZINE 25 MG
12.5 SUPPOSITORY, RECTAL RECTAL EVERY 12 HOURS PRN
Status: DISCONTINUED | OUTPATIENT
Start: 2023-12-28 | End: 2023-12-29 | Stop reason: HOSPADM

## 2023-12-28 RX ORDER — BUDESONIDE 3 MG/1
9 CAPSULE, COATED PELLETS ORAL EVERY 24 HOURS
Status: DISCONTINUED | OUTPATIENT
Start: 2023-12-28 | End: 2023-12-29 | Stop reason: HOSPADM

## 2023-12-28 RX ORDER — ASPIRIN 81 MG/1
81 TABLET, CHEWABLE ORAL 2 TIMES DAILY
Status: DISCONTINUED | OUTPATIENT
Start: 2023-12-28 | End: 2023-12-29 | Stop reason: HOSPADM

## 2023-12-28 RX ORDER — VALSARTAN 40 MG/1
40 TABLET ORAL ONCE
Status: COMPLETED | OUTPATIENT
Start: 2023-12-28 | End: 2023-12-29

## 2023-12-28 RX ADMIN — GABAPENTIN 200 MG: 100 CAPSULE ORAL at 23:08

## 2023-12-28 RX ADMIN — SODIUM CHLORIDE, POTASSIUM CHLORIDE, SODIUM LACTATE AND CALCIUM CHLORIDE: 600; 310; 30; 20 INJECTION, SOLUTION INTRAVENOUS at 23:08

## 2023-12-28 RX ADMIN — SODIUM CHLORIDE 500 ML: 9 INJECTION, SOLUTION INTRAVENOUS at 20:51

## 2023-12-28 ASSESSMENT — ACTIVITIES OF DAILY LIVING (ADL)
ADLS_ACUITY_SCORE: 35

## 2023-12-29 VITALS
WEIGHT: 131 LBS | TEMPERATURE: 97.8 F | HEART RATE: 51 BPM | DIASTOLIC BLOOD PRESSURE: 56 MMHG | RESPIRATION RATE: 16 BRPM | OXYGEN SATURATION: 97 % | BODY MASS INDEX: 21.83 KG/M2 | SYSTOLIC BLOOD PRESSURE: 131 MMHG | HEIGHT: 65 IN

## 2023-12-29 LAB
ALBUMIN SERPL BCG-MCNC: 3.5 G/DL (ref 3.5–5.2)
ALP SERPL-CCNC: 62 U/L (ref 40–150)
ALT SERPL W P-5'-P-CCNC: 16 U/L (ref 0–50)
ANION GAP SERPL CALCULATED.3IONS-SCNC: 6 MMOL/L (ref 7–15)
AST SERPL W P-5'-P-CCNC: 14 U/L (ref 0–45)
BILIRUB SERPL-MCNC: 0.8 MG/DL
BUN SERPL-MCNC: 13.3 MG/DL (ref 8–23)
CALCIUM SERPL-MCNC: 8.6 MG/DL (ref 8.8–10.2)
CHLORIDE SERPL-SCNC: 106 MMOL/L (ref 98–107)
CREAT SERPL-MCNC: 0.67 MG/DL (ref 0.51–0.95)
DEPRECATED HCO3 PLAS-SCNC: 29 MMOL/L (ref 22–29)
EGFRCR SERPLBLD CKD-EPI 2021: 86 ML/MIN/1.73M2
ERYTHROCYTE [DISTWIDTH] IN BLOOD BY AUTOMATED COUNT: 13.2 % (ref 10–15)
GLUCOSE SERPL-MCNC: 81 MG/DL (ref 70–99)
HCT VFR BLD AUTO: 37.6 % (ref 35–47)
HGB BLD-MCNC: 12.3 G/DL (ref 11.7–15.7)
HGB BLD-MCNC: 12.3 G/DL (ref 11.7–15.7)
HGB BLD-MCNC: 12.8 G/DL (ref 11.7–15.7)
HGB BLD-MCNC: 12.9 G/DL (ref 11.7–15.7)
MCH RBC QN AUTO: 29.2 PG (ref 26.5–33)
MCHC RBC AUTO-ENTMCNC: 32.7 G/DL (ref 31.5–36.5)
MCV RBC AUTO: 89 FL (ref 78–100)
PLATELET # BLD AUTO: 430 10E3/UL (ref 150–450)
POTASSIUM SERPL-SCNC: 3.7 MMOL/L (ref 3.4–5.3)
PROT SERPL-MCNC: 5.7 G/DL (ref 6.4–8.3)
RBC # BLD AUTO: 4.21 10E6/UL (ref 3.8–5.2)
SODIUM SERPL-SCNC: 141 MMOL/L (ref 135–145)
WBC # BLD AUTO: 12 10E3/UL (ref 4–11)

## 2023-12-29 PROCEDURE — 82040 ASSAY OF SERUM ALBUMIN: CPT | Performed by: PHYSICIAN ASSISTANT

## 2023-12-29 PROCEDURE — 96361 HYDRATE IV INFUSION ADD-ON: CPT

## 2023-12-29 PROCEDURE — 36415 COLL VENOUS BLD VENIPUNCTURE: CPT | Performed by: PHYSICIAN ASSISTANT

## 2023-12-29 PROCEDURE — 99239 HOSP IP/OBS DSCHRG MGMT >30: CPT

## 2023-12-29 PROCEDURE — 85018 HEMOGLOBIN: CPT | Performed by: PHYSICIAN ASSISTANT

## 2023-12-29 PROCEDURE — 250N000013 HC RX MED GY IP 250 OP 250 PS 637: Performed by: PHYSICIAN ASSISTANT

## 2023-12-29 PROCEDURE — G0378 HOSPITAL OBSERVATION PER HR: HCPCS

## 2023-12-29 PROCEDURE — 258N000003 HC RX IP 258 OP 636: Performed by: PHYSICIAN ASSISTANT

## 2023-12-29 PROCEDURE — 85027 COMPLETE CBC AUTOMATED: CPT | Performed by: PHYSICIAN ASSISTANT

## 2023-12-29 PROCEDURE — 250N000013 HC RX MED GY IP 250 OP 250 PS 637

## 2023-12-29 RX ORDER — MESALAMINE 1000 MG/1
1000 SUPPOSITORY RECTAL AT BEDTIME
Qty: 30 SUPPOSITORY | Refills: 0 | Status: SHIPPED | OUTPATIENT
Start: 2023-12-30 | End: 2024-05-08

## 2023-12-29 RX ORDER — MESALAMINE 1000 MG/1
1000 SUPPOSITORY RECTAL AT BEDTIME
Status: DISCONTINUED | OUTPATIENT
Start: 2023-12-30 | End: 2023-12-29 | Stop reason: HOSPADM

## 2023-12-29 RX ORDER — MESALAMINE 1000 MG/1
1000 SUPPOSITORY RECTAL 2 TIMES DAILY
Status: DISCONTINUED | OUTPATIENT
Start: 2023-12-29 | End: 2023-12-29

## 2023-12-29 RX ADMIN — MESALAMINE 1000 MG: 1000 SUPPOSITORY RECTAL at 14:13

## 2023-12-29 RX ADMIN — VALSARTAN 160 MG: 160 TABLET, FILM COATED ORAL at 09:07

## 2023-12-29 RX ADMIN — SODIUM CHLORIDE, POTASSIUM CHLORIDE, SODIUM LACTATE AND CALCIUM CHLORIDE: 600; 310; 30; 20 INJECTION, SOLUTION INTRAVENOUS at 12:27

## 2023-12-29 RX ADMIN — VALSARTAN 40 MG: 40 TABLET, FILM COATED ORAL at 01:24

## 2023-12-29 RX ADMIN — BUDESONIDE 9 MG: 3 CAPSULE, COATED PELLETS ORAL at 09:06

## 2023-12-29 RX ADMIN — HYDROCHLOROTHIAZIDE 25 MG: 25 TABLET ORAL at 09:07

## 2023-12-29 ASSESSMENT — ACTIVITIES OF DAILY LIVING (ADL)
ADLS_ACUITY_SCORE: 35

## 2023-12-29 NOTE — PROGRESS NOTES
Patient transferred from ED @ 2250. A&O x4. On tele. Up independent in room, LR started @ 75 mL/ hr. Denies chest pain or SOB or pain in general. NPO except ice chips. Hemoglobin check every 6 hours.

## 2023-12-29 NOTE — PROGRESS NOTES
PRIOR TO DISCHARGE        Comments: -diagnostic tests and consults completed and resulted: Partially Met  -vital signs normal or at patient baseline: Met  -tolerating oral intake to maintain hydration: Not met  Nurse to notify provider when observation goals have been met and patient is ready for discharge.

## 2023-12-29 NOTE — CONSULTS
[facility]  Gastroenterology Consultation    Debra Denise  [address]  [place] [address]  83 year old female    Admission Date/Time: 12/28/2023  Primary Care Provider: Wolfgang Zimmerman    We were asked to see the patient in consultation by Dr. Cabral for evaluation of post-sigmoidoscopy bleeding.        HPI:  Debra Denise is a 83 year old female who has a past medical history of ulcerative sigmoid proctitis, hypertension, TIA who presented yesterday with bright red blood per rectum after sigmoidoscopy.  Patient has been holding Plavix for >2 weeks.  After sigmoidoscopy, she noted bright red blood and passage of large clots.  No fever or chills.  No abdominal pain.      WBC 16.0 (in setting of budesonide use), platelets 511.  Hemoglobin 12.8 from 14.2 on 12/26.     Flexible sigmoidoscopy was completed yesterday for history of UC and symptoms.  The patient was found to have erythema in the rectum and biopsies were taken.  Diverticulosis was noted in the sigmoid, descending and transverse colon.  Internal hemorrhoids were also seen.    Patient with some bright red blood per rectum this am but just went to the bathroom with no blood in the toilet.  She did have some red blood on the toilet paper on wiping.    ROS: A comprehensive ten point review of systems was negative aside from those in mentioned in the HPI.      MEDICATIONS: No current facility-administered medications on file prior to encounter.  aspirin (ASA) 81 MG chewable tablet, Take 81 mg by mouth 2 times daily  budesonide (ENTOCORT EC) 3 MG EC capsule, Take 9 mg by mouth every 24 hours  Cholecalciferol (VITAMIN D3 PO), Take 2,000 Units by mouth daily   gabapentin (NEURONTIN) 100 MG capsule, TAKE 2 CAPSULES BY MOUTH AT BEDTIME  multivitamin w/minerals (CENTRUM ADULTS) tablet, Take 1 tablet by mouth daily  Probiotic Product (PROBIOTIC PO), Take 1 capsule by mouth daily  valsartan-hydrochlorothiazide  (DIOVAN HCT) 160-25 MG tablet, Take 1 tablet by mouth daily  atorvastatin (LIPITOR) 10 MG tablet, Take 1 tablet (10 mg) by mouth daily (Patient taking differently: Take 10 mg by mouth daily Pt taking every other day)  fluocinonide (LIDEX) 0.05 % external cream, Apply to the affected area once to twice daily as needed for flares.*  ondansetron (ZOFRAN ODT) 4 MG ODT tab, Take 1 tablet (4 mg) by mouth every 8 hours as needed for nausea  sodium chloride 0.9 % SOLN 250 mL with vedolizumab 60 MG/ML SOLR 300 mg infusionn, Inject 300 mg into the vein once Every 8 weeks        ALLERGIES:   Allergies   Allergen Reactions    Sulfacetamide Hives    Adhesive Tape Rash    Aspirin      Other Reaction(s): Stomach issues    Atenolol      Other reaction(s): Intolerance-Can't Take  Fatigue, GI intolerance    Ibuprofen      Other reaction(s): Stomach Upset  4-9-13 tele encounter  Other Reaction(s): GI intolerance       Naproxen      Other reaction(s): Stomach Upset  4-9-13 tele encounter  Other Reaction(s): GI intolerance    Ramipril Cough     Other reaction(s): Intolerance-Can't Take  Other Reaction(s): GI intolerance    Sulfa Antibiotics     Codeine Nausea    Fentanyl Nausea       Past Medical History:   Diagnosis Date    Autoimmune disease (H24) approx. 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 30 years ago    Osteopenia     Polymyalgia rheumatica (H24)     PONV (postoperative nausea and vomiting)     Proctitis     Reduced vision Sometime is a little blurry       Past Surgical History:   Procedure Laterality Date    COLONOSCOPY  04/08/2014    Procedure: COMBINED COLONOSCOPY, SINGLE BIOPSY/POLYPECTOMY BY BIOPSY;  COLONOSCOPY;  Surgeon: Carol Ann Plasencia MD;  Location:  GI    CV CORONARY ANGIOGRAM N/A 08/30/2022    Procedure: Coronary Angiogram;  Surgeon: Trevin Hawk MD;  Location:  HEART CARDIAC CATH LAB  "   ENT SURGERY      tonsilectomy, adenoidectomy    ESOPHAGOSCOPY, GASTROSCOPY, DUODENOSCOPY (EGD), COMBINED N/A 2023    Procedure: ESOPHAGOGASTRODUODENOSCOPY, WITH BIOPSY;  Surgeon: Angel Lara MD;  Location:  GI    GYN SURGERY  ,     x 2    HYSTERECTOMY      ORTHOPEDIC SURGERY  2004    (R) shoulder surgery for frozen shoulder    ZAC BSO      for fibroids    TONSILLECTOMY  About 50 years ago         SOCIAL HISTORY:  Social History     Tobacco Use    Smoking status: Never    Smokeless tobacco: Never    Tobacco comments:     None   Vaping Use    Vaping Use: Never used   Substance Use Topics    Alcohol use: No    Drug use: No       FAMILY HISTORY:  Family History   Problem Relation Age of Onset    Cancer - colorectal Father     Lung Cancer Father     Macular Degeneration Father     Multiple myeloma Brother     Pacemaker Brother     Hypertension Mother     Cerebrovascular Disease Mother         from misdiagnosis of temporal arteritis    Suicide Sister     Hypertension Maternal Grandmother     Cerebrovascular Disease Maternal Grandmother     Diabetes Maternal Grandmother        PHYSICAL EXAM:   /48 (BP Location: Left arm, Cuff Size: Adult Regular)   Pulse (!) 49   Temp 97.9  F (36.6  C) (Oral)   Resp 16   Ht 1.651 m (5' 5\")   Wt 59.4 kg (131 lb)   SpO2 97%   BMI 21.80 kg/m      Constitutional: NAD, comfortable  Cardiovascular: RRR, normal S1 and S2, no r/c/g/m  Respiratory: CTAB  Psychiatric: mentation appears normal and affect normal  Head: Normocephalic. Atraumatic.    Neck: Neck supple. No adenopathy. Thyroid symmetric, normal size, trachea midline  Eyes:  PERRL, no icterus  ENT: Hearing adequate, pharynx normal without erythema or exudate  Abdomen:   Auscultation: + BS  Appearance: non-distended  Palpation: non-tender  NEURO: grossly negative  SKIN: no suspicious lesions or rashes  LYMPH:   anterior cervical: no adenopathy  posterior cervical: no " adenopathy  supraclavicular: no adenopathy          ADDITIONAL COMMENTS:   I reviewed the patient's new clinical lab test results.   Recent Labs   Lab Test 12/29/23  0011 12/28/23  1812 12/26/23  1522 12/07/23  1522 11/15/22  1333 08/30/22  1140 09/14/20  0839 04/10/20  0825   WBC  --  16.0* 15.7* 11.0   < > 10.6   < >  --    HGB 12.8 13.7 14.2 14.5   < > 14.4   < >  --    MCV  --  88 90 91   < > 91   < >  --    PLT  --  511* 473* 513*   < > 435   < >  --    INR  --   --   --  1.29*  --  1.18*  --  1.13    < > = values in this interval not displayed.     Recent Labs   Lab Test 12/28/23 1812 12/07/23  1522 07/12/23  1133 07/12/23  0756 07/12/23  0615    140  --   --  141   POTASSIUM 3.8 3.4  --   --  3.6   CHLORIDE 102 101  --   --  104   CO2 26 31*  --   --  29   BUN 18.3 18.3  --   --  11.9   CR 0.67 0.86  --   --  0.77   ANIONGAP 10 8  --   --  8   PRASHANTH 8.8 9.4  --   --  9.1   * 119* 95   < > 87    < > = values in this interval not displayed.     Recent Labs   Lab Test 12/26/23  1528 12/07/23  1522 07/11/23  0855 07/03/23  1445 07/03/23  1441 04/14/23  1309 02/10/23  0949 02/10/23  0907 02/11/22  0940 08/12/21  1246 01/29/20  1108 05/14/19  1518   ALBUMIN  --  4.1 4.0  --  4.3   < >  --  4.5   < > 3.5   < > 3.7   BILITOTAL  --  0.3 0.4  --  0.2   < >  --  0.5   < > 0.5   < > 0.3   DBIL  --   --   --   --   --   --   --  <0.20  --  <0.1  --  0.1   ALT  --  10 13  --  11   < >  --  13   < > 21   < > 21   AST  --  21 13  --  15   < >  --  18   < > 18   < > 13   ALKPHOS  --  74 67  --  74   < >  --  68   < > 56   < > 65   PROTEIN Negative  --   --  Negative  --   --  Negative  --    < >  --    < >  --    LIPASE  --   --   --   --   --   --   --  23  --   --   --   --     < > = values in this interval not displayed.             .    CONSULTATION ASSESSMENT AND PLAN:      84 yo female with history of ulcerative proctosigmoiditis currently maintained on budesonide therapy who underwent a flexible  sigmoidoscopy yesterday with Dr. Betancourt.  After the procedure, the patient had continued significant bright red blood per rectum and passage of large clots.  She is not moving significant amounts of stool.  Hemoglobin is still within normal range but has dropped from 14.2 on 12/26 to 12.8 today.    -  Monitor for further bleeding.  -  Monitor H/H; transfuse prn.  -  Continue budesonide.  -  Bleeding appears to be slowing down.  No high risk biopsies during procedure.  -  Recommend adding mesalamine suppository for treatment of proctitis.  -  OK to discharge to home when stable.  No further GI intervention planned.  Follow up in [facility] clinic as planned.    Total Time Spent: 40 minutes        I discussed the patient's findings and plan with Dr. Kingsley.          Cici Greenfield, [facility]  Cell:  [phone], not available after 11:30AM at this number  Dr. Kingsley is covering the afternoon.  Dr. Leal will be available over the weekend.

## 2023-12-29 NOTE — ED PROVIDER NOTES
Rapid Assessment Note    History:   Debra Denise is a 83 year old female history of ulcerative sigmoid proctitis, hypertension, TIA, not anticoagulated, and has not been on Plavix for over 2 weeks, presents after she had a sigmoidoscopy today at 11 AM and since that time she has noted bright red blood and passing large clots of blood.  She states is mostly blood and not much stool.  No fevers, chills, lightheadedness, fatigue, or abdominal pain.  She otherwise feels well.  She called her surgeon and they told her to get checked.    Exam:   General:  Alert, interactive  Cardiovascular:  Well perfused  Lungs:  No respiratory distress, no accessory muscle use  Neuro:  Moving all 4 extremities  Skin:  Warm, dry  Psych:  Normal affect  Abdomen: Soft and nontender.    Plan of Care:   I evaluated the patient and developed an initial plan of care. I discussed this plan and explained that I, or one of my partners, would be returning to complete the evaluation.       Patient with blood in stool after sigmoidoscopy.  Benign abdominal exam.  Ordered basic labs.  Doubt perforation with no abdominal pain and benign abdominal exam.  She did have biopsies taken which could be causing bleeding.  She also has diverticula which also could cause bleeding.    12/28/2023  [employer]    Portions of this medical record were completed by a scribe. UPON MY REVIEW AND AUTHENTICATION BY ELECTRONIC SIGNATURE, this confirms (a) I performed the applicable clinical services, and (b) the record is accurate.        Tony Hernandez MD  12/28/23 4536

## 2023-12-29 NOTE — PROGRESS NOTES
RECEIVING UNIT ED HANDOFF REVIEW    ED Nurse Handoff Report was reviewed by: Olga Lidia Nguyễn RN on December 28, 2023 at 9:34 PM

## 2023-12-29 NOTE — DISCHARGE SUMMARY
[facility]  Hospitalist Discharge Summary      Date of Admission:  12/28/2023  Date of Discharge:  No discharge date for patient encounter.  Discharging Provider: Shira Klein PA-C  Discharge Service: Hospitalist Service    Discharge Diagnoses   Lower GI Bleed   Ulcerative Sigmoid Proctitis   Post-Sigmoidoscopy Bleed  Acute Blood Loss Anemia    Leukocytosis, steroid induced   Benign essential hypertension  Dyslipidemia  Thrombocytosis  History of TIA 7/2023  JAK2 V617F mutation  PMR    Clinically Significant Risk Factors        Follow-ups Needed After Discharge   Follow-up Appointments     Follow-up and recommended labs and tests       Follow up with primary care provider, Wolfgang Zimmerman, within 7 days   for hospital follow- up.  The following labs/tests are recommended: CBC to   evaluate for worsening acute blood loss anemia and a BMP to further   monitor your electrolytes and kidney function following your symptoms this   hospitalization.    Follow-up with [facility], next week as scheduled.      Discharge Disposition   Discharged to home  Condition at discharge: Stable    Hospital Course   Debra Denise is a 83 year old female past medical history significant for ulcerative sigmoid proctitis, hypertension, hyperlipidemia, suspected TIA (07/2023) who was registered to observation on 12/28/2023 with bright red blood per rectum following sigmoidoscopy.     Lower GI bleed  Ulcerative sigmoid proctitis  Post-Sigmoidoscopy Bleed   - Follows with Dr. Garcia with [facility]. Ulcerative proctosigmoiditis had been under good control since May 2023 (vedolizumab injections at that time) until most recent flare. Last vedolizumab (Entyvio) injection 12/04/2023. Patient has been on budesonide since mid-December, per patient.   - Sigmoidoscopy took place 12/28/2023 and was performed by Dr. Betancourt. Reviewed patient paperwork in hospital as could not find record in patient's chart.  Per procedure summary, the perianal and digital rectal examinations were normal. A localized area of mildly erythematous mucosa was found in the rectum. Biopsies were taken with a cold forceps for histology. Estimated blood loss was minimal. Scattered small and large-mouth diverticula were found in the sigmoid colon, descending colon and transverse colon. Internal hemorrhoids were found during retroflexion. The hemorrhoids were small. No complications noted.   - Per patient, she has not been taking her Plavix for over 2 weeks. Since her sigmoidoscopy she has noted bright red blood and passing of large clots, reports mostly blood and not much stool.  She states this started immediately following the sigmoidoscopy and monitored initially prior to leaving the endoscopy center. No fevers, chills, chest pain, shortness of breath, lightheadedness, or dizziness at this time. No fatigue, abdominal pain or rectal pain. She does endorse intermittent rectal pressure. She is otherwise feeling well. Patient called her gastroenterologist who instructed her to come to the emergency department. In the ED 12/28 she is hemodynamically stable, although hypertensive. Leukocytosis consistent with steroid use (WBC 16). Hemoglobin initially close to baseline at 13.7, previously 14.2 prior to sigmoidoscopy.    -- Registered to observation.  -- Monitored vitals closely.    -- Trended hemoglobin Q 6 hours.  14.2 12/26 ---> 13.7 12/28 ---> 12.0 12/29 ---> 12.9 12/29.   -- Patient consented for blood. No need for blood infusion as hemoglobins remained stable.  -- Initially NPO. Started clear solids and patient tolerating prior to discharge. IV lactated ringers started upon arrival and continued until prior to discharge.   -- Initially passing large clots of blood and BRBPR upon arrival and through morning 12/29. No abdominal pain. No fever or chills. Bleeding slowed and patient having minimal bleeding afternoon of discharge.   -- Held PTA ASA,  NSAIDs, and all anticoagulation.  -- GI consulted. Given bleeding slowed down throughout afternoon, felt OK to discharge home. Per [facility] recommendations, directed patient to take mesalamine suppository (1 suppository) rectally at bedtime, budesonide 9 mg by mouth daily, metamucil 1 tablespoon twice daily and previously prescribed medications other than ASA 81 mg and Plavix. Recommended holding these medications until follow-up with Dr. Garcia completed. Follow-up with Dr. Garcia was recommended as previously scheduled next week.     Leukocytosis  - WBC 16 12/28 ---> 12 12/29, previously 15.7.  Likely steroid-induced as patient on budesonide. Patient afebrile with no infectious symptoms upon arrival.      Benign essential hypertension  - PTA regimen: valsartan/hydrochlorothiazide 160/25mg/d.  -- Continued PTA antihypertensives with hold parameters.      Thrombocytosis  JAK2 V617F mutation  - PLTs have been elevated intermittently throughout the year in the 400s-500s range. 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    -- Continue to follow up outpatient .     History of TIA 7/2023  - Patient was started on Plavix and atorvastatin at time of TIA. Due to concerns of essential thrombocytosis, hematology recently stopped Plavix (12/07/2023) and patient has been maintained on aspirin 81 mg.   - Held PTA ASA     Chronic stable diagnoses and other pertinent medical history: Appropriate PTA medications were resumed. Nonessential medications were held given patient observation status.   PMR  Dyslipidemia    Consultations This Hospital Stay   GASTROENTEROLOGY IP CONSULT    Code Status   Full Code    Time Spent on this Encounter   I, Shira Klein PA-C, personally saw the patient today and spent greater than 30 minutes discharging this patient. I discussed this patient with the attending physician Dr. Sonia Pimentel M.D.      Shira Klein,  [facility]TEND[facility]OV[facility] AND SHORT STAY  [address] [address]  Phone: [phone]  ______________________________________________________________________    Physical Exam   Vital Signs: Temp: 97.8  F (36.6  C) Temp src: Oral BP: 131/56 Pulse: 51   Resp: 16 SpO2: 97 % O2 Device: None (Room air)    Weight: 131 lbs 0 oz  GENERAL:  Pleasant, cooperative, alert. Sitting in chair comfortably in no distress with  at bedside.   HEENT: Normocephalic, atraumatic.  Extra occular mm intact.  Sclera clear. PERRL.  Mucous membranes moist. No lymphadenopathy.   PULMONOLOGY: Clear to auscultation bilaterally with good exchange at the bases and no wheeze or crackle.  CARDIAC: Regular rate and rhythm.  No appreciated murmur.   ABDOMEN: Soft, nontender non distended.   MUSCULOSKELETAL:  Moving x 4 spontaneously.  Normal bulk and tone.  No LE edema.  Radial pulses 2+ bilaterally.    NEURO: Alert and oriented x3.  Nonfocal exam.  SKIN: Large ecchymosis right forearm (appears previous IV infiltrated).     Primary Care Physician   Wolfgang Zimmerman    Discharge Orders      Reason for your hospital stay    You were hospitalized for further evaluation of a GI bleed following your flexible sigmoidoscopy 12/28.     Follow-up and recommended labs and tests     Follow up with primary care provider, Wolfgang Zimmerman, within 7 days for hospital follow- up.  The following labs/tests are recommended: CBC to evaluate for worsening acute blood loss anemia and a BMP to further monitor your electrolytes and kidney function following your symptoms this hospitalization.    Follow-up with Dr. Garcia, [facility], next week as scheduled.     Activity    Your activity upon discharge: activity as tolerated and no driving for today     Discharge Instructions    Per [facility] recommendations, begin taking mesalamine suppository (1 suppository) rectally at bedtime. Continue taking budesonide 9 mg by mouth daily  and your other previously prescribed medications, however, stop taking ASA 81 mg and continue holding Plavix until you follow-up with Dr. Garcia following discharge. Start taking metamucil 1 tablespoon twice daily. Follow-up with Dr. Garcia with [facility] next week for hospital follow-up.     Diet    Follow this diet upon discharge: Orders Placed This Encounter    Advance Diet as Tolerated: You may start clear liquid diet now. Advance to soft solids this evening (12/29). If well-tolerated, advance to a regular diet tomorrow 12/30. Should you not tolerate oral intake, contact [facility] for further evaluation.       Significant Results and Procedures   Most Recent 3 CBC's:  Recent Labs   Lab Test 12/29/23  1449 12/29/23  0731 12/29/23  0011 12/28/23  1812 12/26/23  1522   WBC  --  12.0*  --  16.0* 15.7*   HGB 12.9 12.3  12.3 12.8 13.7 14.2   MCV  --  89  --  88 90   PLT  --  430  --  511* 473*     Most Recent 3 BMP's:  Recent Labs   Lab Test 12/29/23  0731 12/28/23 1812 12/07/23  1522    138 140   POTASSIUM 3.7 3.8 3.4   CHLORIDE 106 102 101   CO2 29 26 31*   BUN 13.3 18.3 18.3   CR 0.67 0.67 0.86   ANIONGAP 6* 10 8   PRASHANTH 8.6* 8.8 9.4   GLC 81 105* 119*     Most Recent 3 Hemoglobins:  Recent Labs   Lab Test 12/29/23  1449 12/29/23  0731 12/29/23  0011   HGB 12.9 12.3  12.3 12.8     Most Recent Anemia Panel:  Recent Labs   Lab Test 12/29/23  1449 12/29/23  0731 12/28/23  1812 12/26/23  1522 11/06/18  1655 09/20/16  1324   WBC  --  12.0*   < > 15.7*   < > 8.1   HGB 12.9 12.3  12.3   < > 14.2   < > 13.7   HCT  --  37.6   < > 42.5   < > 41.4   MCV  --  89   < > 90   < > 90   PLT  --  430   < > 473*   < > 336   IRON  --   --   --   --   --  95   IRONSAT  --   --   --   --   --  26   RETICABSCT  --   --   --  0.055   < >  --    RETP  --   --   --  1.2   < >  --    FEB  --   --   --   --   --  362   HANNAH  --   --   --   --   --  56   B12  --   --   --   --   --  883   FOLIC  --   --   --   --   --  64.6    < > = values in  this interval not displayed.     Discharge Medications   Current Discharge Medication List        START taking these medications    Details   mesalamine (CANASA) 1000 MG suppository Place 1 suppository (1,000 mg) rectally at bedtime  Qty: 30 suppository, Refills: 0    Associated Diagnoses: Chronic ulcerative proctitis without complications (H)           CONTINUE these medications which have NOT CHANGED    Details   budesonide (ENTOCORT EC) 3 MG EC capsule Take 9 mg by mouth every 24 hours      Cholecalciferol (VITAMIN D3 PO) Take 2,000 Units by mouth daily       gabapentin (NEURONTIN) 100 MG capsule TAKE 2 CAPSULES BY MOUTH AT BEDTIME  Qty: 180 capsule, Refills: 1    Associated Diagnoses: Benign essential hypertension      multivitamin w/minerals (CENTRUM ADULTS) tablet Take 1 tablet by mouth daily      Probiotic Product (PROBIOTIC PO) Take 1 capsule by mouth daily      valsartan-hydrochlorothiazide (DIOVAN HCT) 160-25 MG tablet Take 1 tablet by mouth daily  Qty: 90 tablet, Refills: 3    Associated Diagnoses: Benign essential hypertension      atorvastatin (LIPITOR) 10 MG tablet Take 1 tablet (10 mg) by mouth daily  Qty: 90 tablet, Refills: 1    Associated Diagnoses: High blood cholesterol      fluocinonide (LIDEX) 0.05 % external cream Apply to the affected area once to twice daily as needed for flares.*      ondansetron (ZOFRAN ODT) 4 MG ODT tab Take 1 tablet (4 mg) by mouth every 8 hours as needed for nausea  Qty: 12 tablet, Refills: 1    Associated Diagnoses: Benign essential hypertension; Nausea      sodium chloride 0.9 % SOLN 250 mL with vedolizumab 60 MG/ML SOLR 300 mg infusionn Inject 300 mg into the vein once Every 8 weeks           STOP taking these medications       aspirin (ASA) 81 MG chewable tablet Comments:   Reason for Stopping:             Allergies   Allergies   Allergen Reactions    Sulfacetamide Hives    Adhesive Tape Rash    Aspirin      Other Reaction(s): Stomach issues    Atenolol      Other  reaction(s): Intolerance-Can't Take  Fatigue, GI intolerance    Ibuprofen      Other reaction(s): Stomach Upset  4-9-13 tele encounter  Other Reaction(s): GI intolerance       Naproxen      Other reaction(s): Stomach Upset  4-9-13 tele encounter  Other Reaction(s): GI intolerance    Ramipril Cough     Other reaction(s): Intolerance-Can't Take  Other Reaction(s): GI intolerance    Sulfa Antibiotics     Codeine Nausea    Fentanyl Nausea

## 2023-12-29 NOTE — PLAN OF CARE
Summary:  83 year old presenting with bright red blood per rectum after sigmoidoscopy. Hx of ulcerative sigmoid proctitis and HTN  Orientation: AxOx4  Observation Goals (met & not met): not met   Activity Level: IND  Fall Risk: no  Behavior & Aggression Tool Color: green  Pain Management: denies   ABNL VS/O2: VSS  ABNL Lab/BG: WBC: 16  Diet: NPO  Bowel/Bladder: continent, No BM this shift but continues to have bleeding out of rectum   Drains/Devices: PIV infusing   Tests/Procedures for next shift: hgb checks Q6hrs  Anticipated DC date: TBC pending improvement   Other Important Info: [facility] consultation for AM

## 2023-12-29 NOTE — H&P
[facility]    History and Physical - Hospitalist Service       Date of Admission:  12/28/2023    Assessment & Plan   Debra Denise is a 83 year old female past medical history significant for ulcerative sigmoid proctitis, hypertension, TIA who was registered to observation on 12/28/2023 with bright red blood per rectum after sigmoidoscopy.    Lower GI bleed  Ulcerative sigmoid proctitis  Presented after sigmoidoscopy earlier day of admission.  Has been off of Plavix for over 2 weeks.  Since her sigmoidoscopy she is noted bright red blood and passing of large clots, reports mostly blood and not much stool.  No fevers, chills, lightheadedness, or dizziness.  No fatigue or abdominal pain.  Otherwise feeling well.  Patient called her gastroenterologist who instructed her to come to the emergency department.  In the ED she is hemodynamically stable, hypertensive.  Leukocytosis consistent with steroid use.  Hemoglobin close to baseline at 13.7, previously 14 range.  - Registered to observation  - Monitor vitals closely  - Trend hemoglobin Q 6 hours  - NPO, MIVF  - Hold PTA ASA, NSAIDs, and all anticoagulation  - [facility] consultation  - Consented for blood, Transfuse for Hgb <7 or symptomatic anemia, conditional orders placed  - Continue PTA budesonide  - Repeat basic labs in a.m.    Leukocytosis  WBC 16, previously 15.7.  Likely steroid-induced  - Monitor vitals and repeat WBC in a.m.    Benign essential hypertension  PTA regimen: valsartan/hydrochlorothiazide 160/25mg/d  - Continue PTA antihypertensives with hold parameters  - PRN IV labetalol/hydralazine available for SBP >180  - Monitor BP trend and need for medication adjustments    Thrombocytosis  Has been elevated intermittently throughout the year 400s-500s range.  - Follow up outpatient    History of TIA 7/2023: Was started on Plavix and atorvastatin at that time, due to concerns of essential thrombocytosis, oncology stopped  Plavix and patient has been maintained on aspirin  - Hold PTA ASA    Chronic stable diagnoses and other pertinent medical history: Appropriate PTA medications will be resumed. Nonessential medications will be held if patient is observation status.   PMR  Dyslipidemia        Diet:  N.p.o.  DVT Prophylaxis: Pneumatic Compression Devices  Beltre Catheter: Not present  Lines: None     Cardiac Monitoring: None  Code Status:  Full code    Clinically Significant Risk Factors Present on Admission                # Drug Induced Platelet Defect: home medication list includes an antiplatelet medication   # Hypertension: Noted on problem list                 Disposition Plan      Expected Discharge Date: 12/29/2023                The patient's care was discussed with the Attending Physician, Dr. Cabral and Patient.    Ana Maria Mills PA-C  Hospitalist Service  [facility]  Securely message with [employer] (more info)  Text page via [facility] Paging/Directory     ______________________________________________________________________    Chief Complaint   Rectal bleeding    History is obtained from the patient, electronic health record, and emergency department physician    History of Present Illness   Debra Denise is a 83 year old female past medical history significant for ulcerative sigmoid proctitis, hypertension, TIA who was registered to observation on 12/28/2023 with bright red blood per rectum after sigmoidoscopy. Presented after sigmoidoscopy earlier day of admission.  Has been off of Plavix for over 2 weeks.  Since her sigmoidoscopy she is noted bright red blood and passing of large clots, reports mostly blood and not much stool.  No fevers, chills, lightheadedness, or dizziness.  No fatigue or abdominal pain.  Otherwise feeling well.  Patient called her gastroenterologist who instructed her to come to the emergency department.      In the ED she is hemodynamically stable, hypertensive.   Leukocytosis consistent with steroid use.  Hemoglobin close to baseline at 13.7, previously 14 range.  Blood noted at the rectal vault per ED exam. Chronically elevated platelets.  [facility] called from the ED who recommended admission overnight, trending hemoglobin, and they will see patient in the morning.  At the time of my visit the patient was hemodynamically stable, hypertensive, and denied lightheadedness, dizziness, nausea, vomiting.      Past Medical History    Past Medical History:   Diagnosis Date    Autoimmune disease (H24) approx.     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 30 years ago    Osteopenia     Polymyalgia rheumatica (H24)     PONV (postoperative nausea and vomiting)     Proctitis     Reduced vision Sometime is a little blurry       Past Surgical History   Past Surgical History:   Procedure Laterality Date    COLONOSCOPY  2014    Procedure: COMBINED COLONOSCOPY, SINGLE BIOPSY/POLYPECTOMY BY BIOPSY;  COLONOSCOPY;  Surgeon: Carol Ann Plasencia MD;  Location:  GI    CV CORONARY ANGIOGRAM N/A 2022    Procedure: Coronary Angiogram;  Surgeon: Trevin Hawk MD;  Location:  HEART CARDIAC CATH LAB    ENT SURGERY      tonsilectomy, adenoidectomy    ESOPHAGOSCOPY, GASTROSCOPY, DUODENOSCOPY (EGD), COMBINED N/A 2023    Procedure: ESOPHAGOGASTRODUODENOSCOPY, WITH BIOPSY;  Surgeon: Angel Lara MD;  Location:  GI    GYN SURGERY  ,     x 2    HYSTERECTOMY      ORTHOPEDIC SURGERY  2004    (R) shoulder surgery for frozen shoulder    ZAC BSO      for fibroids    TONSILLECTOMY  About 50 years ago       Prior to Admission Medications   Prior to Admission Medications   Prescriptions Last Dose Informant Patient Reported? Taking?   Cholecalciferol (VITAMIN D3 PO) 2023 at am Self Yes Yes   Sig: Take 2,000 Units by mouth daily     Probiotic Product (PROBIOTIC PO) 12/27/2023 at am Self Yes Yes   Sig: Take 1 capsule by mouth daily   aspirin (ASA) 81 MG chewable tablet 12/27/2023 at am  Yes Yes   Sig: Take 81 mg by mouth 2 times daily   atorvastatin (LIPITOR) 10 MG tablet 12/26/2023 at pm  No No   Sig: Take 1 tablet (10 mg) by mouth daily   Patient taking differently: Take 10 mg by mouth daily Pt taking every other day   budesonide (ENTOCORT EC) 3 MG EC capsule 12/27/2023 at am  Yes Yes   Sig: Take 9 mg by mouth every 24 hours   fluocinonide (LIDEX) 0.05 % external cream PRN  Yes No   Sig: Apply to the affected area once to twice daily as needed for flares.*   gabapentin (NEURONTIN) 100 MG capsule 12/27/2023 at pm  No Yes   Sig: TAKE 2 CAPSULES BY MOUTH AT BEDTIME   multivitamin w/minerals (CENTRUM ADULTS) tablet 12/27/2023 at am  Yes Yes   Sig: Take 1 tablet by mouth daily   ondansetron (ZOFRAN ODT) 4 MG ODT tab PRN  No No   Sig: Take 1 tablet (4 mg) by mouth every 8 hours as needed for nausea   sodium chloride 0.9 % SOLN 250 mL with vedolizumab 60 MG/ML SOLR 300 mg infusionn 11/1/2023  Yes No   Sig: Inject 300 mg into the vein once Every 8 weeks   valsartan-hydrochlorothiazide (DIOVAN HCT) 160-25 MG tablet 12/27/2023 at am  No Yes   Sig: Take 1 tablet by mouth daily      Facility-Administered Medications: None        Review of Systems    The 10 point Review of Systems is negative other than noted in the HPI or here.     Social History   I have reviewed this patient's social history and updated it with pertinent information if needed.  Social History     Tobacco Use    Smoking status: Never    Smokeless tobacco: Never    Tobacco comments:     None   Vaping Use    Vaping Use: Never used   Substance Use Topics    Alcohol use: No    Drug use: No         Allergies   Allergies   Allergen Reactions    Sulfacetamide Hives    Adhesive Tape Rash    Aspirin      Other Reaction(s): Stomach issues    Atenolol      Other reaction(s): Intolerance-Can't  Take  Fatigue, GI intolerance    Ibuprofen      Other reaction(s): Stomach Upset  4-9-13 tele encounter  Other Reaction(s): GI intolerance       Naproxen      Other reaction(s): Stomach Upset  4-9-13 tele encounter  Other Reaction(s): GI intolerance    Ramipril Cough     Other reaction(s): Intolerance-Can't Take  Other Reaction(s): GI intolerance    Sulfa Antibiotics     Codeine Nausea    Fentanyl Nausea        Physical Exam   Vital Signs: Temp: 98.3  F (36.8  C) Temp src: Temporal BP: (!) 176/73 Pulse: 57   Resp: 20 SpO2: 97 % O2 Device: None (Room air)    Weight: 131 lbs 0 oz    Physical Exam    General: Awake, alert, very pleasant woman who appears younger than stated age. Looks comfortable sitting up in bed. No acute distress.  HEENT: Normocephalic, atraumatic. Extraocular movements intact.   Respiratory: Clear to auscultation bilaterally, no rales, wheezing, or rhonchi.  Cardiovascular: Regular rate and rhythm, +S1 and S2, no murmur auscultated. No peripheral edema.   Gastrointestinal: Soft, non-tender, non-distended. Bowel sounds present.  Skin: Warm, dry. No obvious rashes or lesions on exposed skin. Dorsalis pedis pulses palpable bilaterally.  Musculoskeletal: No joint swelling, erythema or tenderness. Moves all extremities equally.  Neurologic: AAO x3.   Psychiatric: Appropriate mood and affect. No obvious anxiety or depression.      Medical Decision Making       60 MINUTES SPENT BY ME on the date of service doing chart review, history, exam, documentation & further activities per the note.      Data     I have personally reviewed the following data over the past 24 hrs:    16.0 (H)  \   13.7   / 511 (H)     138 102 18.3 /  105 (H)   3.8 26 0.67 \       Imaging results reviewed over the past 24 hrs:   No results found for this or any previous visit (from the past 24 hour(s)).

## 2023-12-29 NOTE — UTILIZATION REVIEW
Concurrent stay review; Secondary Review Determination     [facility]          Under the authority of the Utilization Management Committee, the utilization review process indicated a secondary review on the above patient.  The review outcome is based on review of the medical records, discussions with staff, and applying clinical experience noted on the date of the review.          (x) Observation Status Appropriate - Concurrent stay review    RATIONALE FOR DETERMINATION          The Patient is 82 y/o  woman,  with PMHx significant for  ulcerative sigmoid proctitis, hypertension, TIA, not anticoagulated, and has not been on Plavix for over 2 weeks, presents after she had a sigmoidoscopy and since that time she has noted bright red blood and passing large clots of blood.  She states is mostly blood and not much stool.  No fevers, chills, lightheadedness, fatigue, or abdominal pain.   Gastroenterology consult today felt that the bleeding will likely resolve on its own (no more episodes since a.m.), and likely from biopsy spots which were in the area of active proctitis.  It was recommended starting mesalamine suppository.  Vital signs have been stable.  Hemoglobin is slightly trending down, but the last hemoglobin in the chart was 12.3.  Patient has mild leukocytosis of 12-16, but no fever and it is most likely secondary to steroids.  GI is okay with today discharge and follow-up with GI clinic.      Patient is clinically improving and there is no clear indication to change patient's status to inpatient. The severity of illness, intensity of service provided, expected LOS and risk for adverse outcome make the care appropriate for observation.      This document was produced using voice recognition software       The information on this document is developed by the utilization review team in order for the business office to ensure compliance.  This only denotes the appropriateness of proper  admission status and does not reflect the quality of care rendered.         The definitions of Inpatient Status and Observation Status used in making the determination above are those provided in the CMS Coverage Manual, Chapter 1 and Chapter 6, section 70.4.      Sincerely,     DEN NATION MD   Utilization Review  Physician Advisor  [facility]

## 2023-12-29 NOTE — ED PROVIDER NOTES
"  History     Chief Complaint:  Post Op Complications       HPI   Debra Denise is a 83 year old female history of ulcerative sigmoid proctitis, hypertension, TIA, not anticoagulated, and has not been on Plavix for over 2 weeks, presents after she had a sigmoidoscopy today at 11 AM and since that time she has noted bright red blood and passing large clots of blood.  She states is mostly blood and not much stool.  No fevers, chills, lightheadedness, fatigue, or abdominal pain.  She otherwise feels well.  She called her surgeon and they told her to get checked.       Independent Historian:    none    Review of External Notes:  Reviewed patient's flex sigmoidoscopy paper results from today.  Reviewed office visit note from today.  Allergies:  Sulfacetamide  Adhesive Tape  Aspirin  Atenolol  Ibuprofen  Naproxen  Ramipril  Sulfa Antibiotics  Codeine  Fentanyl     Physical Exam   Patient Vitals for the past 24 hrs:   BP Temp Temp src Pulse Resp SpO2 Height Weight   12/28/23 1804 (!) 176/73 98.3  F (36.8  C) Temporal 57 20 97 % 1.651 m (5' 5\") 59.4 kg (131 lb)        Physical Exam  GENERAL: Patient well-appearing  HEAD: Atraumatic.  NECK: No rigidity  CV: RRR, no murmurs rubs or gallops  PULM: CTAB with good aeration; no retractions, rales, rhonchi, or wheezing  ABD: Soft, nontender, nondistended, no guarding  DERM: No rash. Skin warm and dry  EXTREMITY: Moving all extremities without difficulty. No calf tenderness or peripheral edema  VASCULAR: Symmetric pulses bilaterally  Rectal: Female chaperone present-maroon-colored blood in vault.  Dried blood around anus.  No hemorrhage.  No mass.  No melena.    Emergency Department Course     Laboratory: Imaging:   Labs Ordered and Resulted from Time of ED Arrival to Time of ED Departure   BASIC METABOLIC PANEL - Abnormal       Result Value    Sodium 138      Potassium 3.8      Chloride 102      Carbon Dioxide (CO2) 26      Anion Gap 10      Urea Nitrogen 18.3      " Creatinine 0.67      GFR Estimate 86      Calcium 8.8      Glucose 105 (*)    CBC WITH PLATELETS AND DIFFERENTIAL - Abnormal    WBC Count 16.0 (*)     RBC Count 4.73      Hemoglobin 13.7      Hematocrit 41.5      MCV 88      MCH 29.0      MCHC 33.0      RDW 13.2      Platelet Count 511 (*)     % Neutrophils 73      % Lymphocytes 14      % Monocytes 10      % Eosinophils 2      % Basophils 0      % Immature Granulocytes 1      NRBCs per 100 WBC 0      Absolute Neutrophils 11.6 (*)     Absolute Lymphocytes 2.3      Absolute Monocytes 1.6 (*)     Absolute Eosinophils 0.4      Absolute Basophils 0.1      Absolute Immature Granulocytes 0.1      Absolute NRBCs 0.0     TYPE AND SCREEN, ADULT   ABO/RH TYPE AND SCREEN     No orders to display             Emergency Department Course & Assessments:             Interventions:  Medications   sodium chloride 0.9% BOLUS 500 mL (has no administration in time range)        Assessments, Independent Interpretation, Consult/Discussion of ManagementTests:   Discussed with Dr. Tarik GARCIA regarding recommendations.  I discussed admission and the plan of care with the Hospitalist Yasmeen Mills      Social Determinants of Health affecting care:  None    Disposition:  The patient was admitted to the hospital under the care of Dr. Cabral.     Impression & Plan         Medical Decision Making:  Symptoms concerning for lower GI bleed.     Chronic conditions complicating -ulcerative proctitis with flex sig today.    DDx considered esophageal rupture, upper vs lower GI bleed, coagulopathy.    Labs -leukocytosis of 16 but she is on steroids which likely is the cause of this.    Type and screened     Not anticoagulated.  Transfusion not indicated.  Patient asymptomatic.    Discussed with GI  recommends admission for observation.  Observation.    Discussed with hospitalist     Patient to be admitted with plan for observation.       Diagnosis:    ICD-10-CM    1. Lower GI bleed  K92.2             Discharge Medications:  New Prescriptions    No medications on file          12/28/2023   Tony Hernandez MD Foss, Kevin, MD  12/28/23 1959

## 2023-12-29 NOTE — ED NOTES
"[facility]  ED Nurse Handoff Report    ED Chief complaint: Post Op Complications      ED Diagnosis:   Final diagnoses:   Lower GI bleed       Code Status: Full Code    Allergies:   Allergies   Allergen Reactions    Sulfacetamide Hives    Adhesive Tape Rash    Aspirin      Other Reaction(s): Stomach issues    Atenolol      Other reaction(s): Intolerance-Can't Take  Fatigue, GI intolerance    Ibuprofen      Other reaction(s): Stomach Upset  4-9-13 tele encounter  Other Reaction(s): GI intolerance       Naproxen      Other reaction(s): Stomach Upset  4-9-13 tele encounter  Other Reaction(s): GI intolerance    Ramipril Cough     Other reaction(s): Intolerance-Can't Take  Other Reaction(s): GI intolerance    Sulfa Antibiotics     Codeine Nausea    Fentanyl Nausea       Patient Story: Pt had a sigmoidoscopy today and now having a lot of clotting and red bleeding. Pian 2/10 in rectum   Focused Assessment:  Pt. Is alert and orient. Times 3, pleasant, denies CP or SOB. Pt. Is having intermittant rectal bleeding of bright red bleed with occasional clots. Denies lightheadedness.     Treatments and/or interventions provided: NaCl 500 ml bolus IV.  Patient's response to treatments and/or interventions: Pt. Is resting in room watching TV.    To be done/followed up on inpatient unit:  Nothing    Does this patient have any cognitive concerns?: Nine    Activity level - Baseline/Home:  Independent  Activity Level - Current:   Independent    Patient's Preferred language: English   Needed?: No    Isolation: None  Infection: Not Applicable  Patient tested for COVID 19 prior to admission: NO  Bariatric?: No    Vital Signs:   Vitals:    12/28/23 1804 12/28/23 2024 12/28/23 2054   BP: (!) 176/73 (!) 178/70 (!) 184/76   Pulse: 57 55    Resp: 20     Temp: 98.3  F (36.8  C)     TempSrc: Temporal     SpO2: 97% 99% 98%   Weight: 59.4 kg (131 lb)     Height: 1.651 m (5' 5\")         Cardiac Rhythm:     Was the PSS-3 " completed:   Yes  What interventions are required if any?               Family Comments: None Present  OBS brochure/video discussed/provided to patient/family: Yes              Name of person given brochure if not patient:                Relationship to patient:      For the majority of the shift this patient's behavior was Green.   Behavioral interventions performed were None.    ED NURSE PHONE NUMBER: [phone]

## 2023-12-29 NOTE — PLAN OF CARE
Orientation:A&O X 4    Vitals/Tele:VSS, SB    IV Access/drains:PIV,LR infusing at 75 ml/hr    Diet;NPO,except ice chips    Mobility:Independent    GI/:continent,No BM or bleeding this shift (blood noted only upon wiping); Hgb Q6    Wound/Skin:WDL except scattered bruising    Consults;GI    Discharge Plan;Pending clinical improvement      See Flow sheets for assessment

## 2023-12-29 NOTE — PHARMACY-ADMISSION MEDICATION HISTORY
Pharmacist Admission Medication History    Admission medication history is complete. The information provided in this note is only as accurate as the sources available at the time of the update.    Information Source(s): Patient and CareEverywhere/SureScripts via in-person    Pertinent Information: Patent states clopidogrel has been switched to aspirin about 2 weeks ago    Changes made to PTA medication list:  Added: None  Deleted: None  Changed: None    Medication Affordability:  Not including over the counter (OTC) medications, was there a time in the past 3 months when you did not take your medications as prescribed because of cost?: No    Allergies reviewed with patient and updates made in EHR: no assessed by RN    Medication History Completed By: Valerie Mcmillan [facility] 12/28/2023 8:41 PM    Prior to Admission medications    Medication Sig Last Dose Taking? Auth Provider Long Term End Date   aspirin (ASA) 81 MG chewable tablet Take 81 mg by mouth 2 times daily 12/27/2023 at am Yes Reported, Patient No    budesonide (ENTOCORT EC) 3 MG EC capsule Take 9 mg by mouth every 24 hours 12/27/2023 at am Yes Reported, Patient No    Cholecalciferol (VITAMIN D3 PO) Take 2,000 Units by mouth daily  12/27/2023 at am Yes Reported, Patient     gabapentin (NEURONTIN) 100 MG capsule TAKE 2 CAPSULES BY MOUTH AT BEDTIME 12/27/2023 at pm Yes Wolfgang Zimmerman MD Yes    multivitamin w/minerals (CENTRUM ADULTS) tablet Take 1 tablet by mouth daily 12/27/2023 at am Yes Reported, Patient     Probiotic Product (PROBIOTIC PO) Take 1 capsule by mouth daily 12/27/2023 at am Yes Reported, Patient     valsartan-hydrochlorothiazide (DIOVAN HCT) 160-25 MG tablet Take 1 tablet by mouth daily 12/27/2023 at am Yes Wolfgang Zimmerman MD Yes    atorvastatin (LIPITOR) 10 MG tablet Take 1 tablet (10 mg) by mouth daily  Patient taking differently: Take 10 mg by mouth daily Pt taking every other day 12/26/2023 at pm  Wolfgang Zimmerman MD Yes    fluocinonide  (LIDEX) 0.05 % external cream Apply to the affected area once to twice daily as needed for flares.* PRN  Reported, Patient     ondansetron (ZOFRAN ODT) 4 MG ODT tab Take 1 tablet (4 mg) by mouth every 8 hours as needed for nausea PRN  Wolfgang Zimmerman MD     sodium chloride 0.9 % SOLN 250 mL with vedolizumab 60 MG/ML SOLR 300 mg infusionn Inject 300 mg into the vein once Every 8 weeks 11/1/2023  Reported, Patient

## 2023-12-29 NOTE — PROGRESS NOTES
PRIOR TO DISCHARGE        Comments: -diagnostic tests and consults completed and resulted: not Met  -vital signs normal or at patient baseline: Met  -tolerating oral intake to maintain hydration: Not met  Nurse to notify provider when observation goals have been met and patient is ready for discharge.

## 2023-12-29 NOTE — ED TRIAGE NOTES
Pt had a sigmoidoscopy today and now having a lot of clotting and red bleeding. Pian 2/10 in rectum

## 2023-12-30 ENCOUNTER — PATIENT OUTREACH (OUTPATIENT)
Dept: CARE COORDINATION | Facility: CLINIC | Age: 83
End: 2023-12-30
Payer: COMMERCIAL

## 2023-12-30 NOTE — PROGRESS NOTES
Pt discharged VSS. Discharge education provided all questions answered. Pt discharged with all Pt belongings.

## 2023-12-30 NOTE — PROGRESS NOTES
"[facility] offered Clinic Care Coordination to an established [facility] eligible patient and patient declined CCC at this time.     Clinic Care Coordination Contact  [facility]: Post-Discharge Note  SITUATION                                                      Admission:    Admission Date: 12/28/23   Reason for Admission: Post-op complications  Discharge:   Discharge Date: 12/29/23  Discharge Diagnosis: Lower GI Bleed, Ulcerative Sigmoid Proctitis, Post-Sigmoidoscopy Bleed, Acute Blood Loss Anemia, Leukocytosis, steroid induced, Benign essential hypertension, Dyslipidemia, Thrombocytosis, History of TIA 7/2023, JAK2 V617F mutation, PMR    BACKGROUND                                                      Per hospital discharge summary and inpatient provider notes:    Debra Denise is a 83 year old female history of ulcerative sigmoid proctitis, hypertension, TIA, not anticoagulated, and has not been on Plavix for over 2 weeks, presents after she had a sigmoidoscopy today at 11 AM and since that time she has noted bright red blood and passing large clots of blood.  She states is mostly blood and not much stool.  No fevers, chills, lightheadedness, fatigue, or abdominal pain.  She otherwise feels well.  She called her surgeon and they told her to get checked.     ASSESSMENT      Discharge Assessment  How are you doing now that you are home?: \"Oh that is so special thank you for calling. I was feeling pretty good until just now and I had my surgery on Thursday and I had bleeding after and I went into the hospital and came home yesterday afternoon. I was doing great until just now I feel like I'm bleeding again...maybe that's not uncommon. But it stopped since last night but now it started again.\"  How are your symptoms? (Red Flag symptoms escalate to triage hotline per guidelines): Improved;Unchanged  Do you feel your condition is stable enough to be safe at home until your provider visit?: Yes  Does the patient have " their discharge instructions? : Yes  Does the patient have questions regarding their discharge instructions? : No  Were you started on any new medications or were there changes to any of your previous medications? : Yes  Does the patient have all of their medications?: Yes  Do you have questions regarding any of your medications? : No  Do you have all of your needed medical supplies or equipment (DME)?  (i.e. oxygen tank, CPAP, cane, etc.): Yes  Discharge follow-up appointment scheduled within 14 calendar days? : No  Is patient agreeable to assistance with scheduling? : No (Pt prefers to contact her PCP on Tuesday to schedule a f/u appt.)    Post-op ([facility] CTA Only)  If the patient had a surgery or procedure, do they have any questions for a nurse?: No    PLAN                                                      Outpatient Plan:     Follow-up and recommended labs and tests    Follow up with primary care provider, Wolfgang Zimmerman, within 7 days  for hospital follow- up. The following labs/tests are recommended: CBC  to evaluate for worsening acute blood loss anemia and a BMP to further  monitor your electrolytes and kidney function following your symptoms  this hospitalization.  Follow-up with RADHA Zhong, next week as scheduled.    Future Appointments   Date Time Provider Department Center   1/9/2024  8:30 AM  [facility]   1/9/2024  9:30 AM Saira Frances APRN CNP [facility]   1/18/2024 10:15 AM Amos Milner MD [facility]         For any urgent concerns, please contact our 24 hour nurse triage line: [phone] ([phone])         ABDOULAYE Enciso  [phone]  [facility]

## 2024-01-01 NOTE — PROGRESS NOTES
Following patient discharge, spoke to Hem/Onc and [facility] ([place]) to confirm when best to resume ASA 81 mg BID given patient's past medical history of thrombocytosis, JAK2 mutation and hx of TIA (07/2023). Per Hem/Onc, recommendation made to resume ASA BID as soon as bleeding risk permits. Discussed with GI (Dr. Leal) 12/30 and given patient's bleed resolving will plan to resume ASA BID 12/30. Called patient 12/30 @ 1100 and confirmed she will resume ASA BID. Patient reports she has had no recurrence of bleeding since evening of 12/29.     Shira Klein PA-C

## 2024-01-07 NOTE — PROGRESS NOTES
HPI:    She was discharged from this hospital 2023 for lower GI bleeding. Her  is present today. She states GI bleeding and sxs. Are less. She has lost weight. She finds it hard to eat during inflammatory bowel flairs. Today no new HEENT, cardiopulmonary, abdominal, , neurological, systemic, psychiatric, lymphatic, endocrine, vascular complaints.     Past Medical History:   Diagnosis Date    Autoimmune disease (H24) approx.     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 30 years ago    Osteopenia     Polymyalgia rheumatica (H24)     PONV (postoperative nausea and vomiting)     Proctitis     Reduced vision Sometime is a little blurry     Past Surgical History:   Procedure Laterality Date    COLONOSCOPY  2014    Procedure: COMBINED COLONOSCOPY, SINGLE BIOPSY/POLYPECTOMY BY BIOPSY;  COLONOSCOPY;  Surgeon: Carol Ann Plasencia MD;  Location: [facility]    CV CORONARY ANGIOGRAM N/A 2022    Procedure: Coronary Angiogram;  Surgeon: Trevin Hawk MD;  Location:  [facility]    ENT SURGERY      tonsilectomy, adenoidectomy    ESOPHAGOSCOPY, GASTROSCOPY, DUODENOSCOPY (EGD), COMBINED N/A 2023    Procedure: ESOPHAGOGASTRODUODENOSCOPY, WITH BIOPSY;  Surgeon: Angel Lara MD;  Location:  GI    GYN SURGERY  ,     x 2    HYSTERECTOMY      ORTHOPEDIC SURGERY  2004    (R) shoulder surgery for frozen shoulder    ZAC BSO      for fibroids    TONSILLECTOMY  About 50 years ago     PE:    Vitals noted, gen nad, cooperative, alert, neck supple nl rom, lungs with good air movement, RRR, S1, S2,. No MRG, abdomen, positive bowel sound, no rebound no tenderness. Grossly normal neurological exam.     A/P:    1. Immunizations; COVID Pfizer vaccine x 4. She has completed the Shingrix vaccine series. Pneumococcal 23 done 10/18/2011. Prevnar 13 done 2016.  Td 1/31/2018. Prevnar 20 done 11/15/2022. Influenza vaccine done 10/30/2023.   2. Inflammatory bowel disease/proctitis. She is followed at [facility] by Dr. Garcia. She had a Flex Sig 12/28/2023.   3. Chronic pain on night time Gabapentin; seen Dr. Pimentel, orthopedics 6/8/2021 and Dr. Neumann 4/29/2021 Mr. Neumann.   4. HTN; on Valsartan/HcTz.   5. Mammogram; 12/6/2022 in Care Everywhere  6. Lipids; 7/11/2023; HDL 46, LGL 93 and TG''s 106. She is on  Atorvastatin   7. Dermatology; scanned in outside note [facility] 2/17/2022. Care Everywhere dermatology note 10/12/2022.   8. Vitamin D normal at 57 on 2/11/2022. DEXA scan in chart from 7/19/2021 and most negative T score -2.3.  She saw Dr. Simon endocrinology   8/22/2022  9. Outside Rheumatology for PMR, Dr. Damian scanned in note from 5/24/2023. She is not on prednisone currently.    10. Palpitations;  Normal dobutamine stress echo 10/11/2021.  For atypical CP, CTA coronary angiogram done 8/10/2022 with possible severe mid LAD stenosis. Coronary angiogram on 8/30/2022 with normal coronary arteries.  Ziopatch monitor 7/22/2022 w/o significant findings.    11. Past hospital discharged 7/12/2023 for possible TIA sxs with negative evaluation. Transient  L arm complaints.  No recurrence of sxs.  12. A1c 6.0% on 7/3/2023. Ordered A1c for tomorrow 1/9/2024.   13. Positive CHANDNI-2 for thrombocytosis. She was seen 12/7/2023 by Dr. Milner, Hematology and has 1/18/2024 follow up appt. Bone marrow biopsy tomorrow 1/9/2024.            30 minutes spent on the date of the encounter doing chart review, history and exam, documentation and further activities as noted above exclusive of procedures and other billable interpretations

## 2024-01-08 ENCOUNTER — OFFICE VISIT (OUTPATIENT)
Dept: INTERNAL MEDICINE | Facility: CLINIC | Age: 84
End: 2024-01-08
Payer: COMMERCIAL

## 2024-01-08 ENCOUNTER — PATIENT OUTREACH (OUTPATIENT)
Dept: ONCOLOGY | Facility: CLINIC | Age: 84
End: 2024-01-08

## 2024-01-08 ENCOUNTER — TELEPHONE (OUTPATIENT)
Dept: ONCOLOGY | Facility: CLINIC | Age: 84
End: 2024-01-08

## 2024-01-08 VITALS
BODY MASS INDEX: 22.13 KG/M2 | DIASTOLIC BLOOD PRESSURE: 64 MMHG | WEIGHT: 133 LBS | SYSTOLIC BLOOD PRESSURE: 147 MMHG | OXYGEN SATURATION: 97 % | HEART RATE: 56 BPM

## 2024-01-08 DIAGNOSIS — R73.09 INCREASED GLUCOSE LEVEL: Primary | ICD-10-CM

## 2024-01-08 DIAGNOSIS — Z29.11 NEED FOR VACCINATION AGAINST RESPIRATORY SYNCYTIAL VIRUS: ICD-10-CM

## 2024-01-08 PROCEDURE — 99214 OFFICE O/P EST MOD 30 MIN: CPT | Performed by: INTERNAL MEDICINE

## 2024-01-08 RX ORDER — RESPIRATORY SYNCYTIAL VIRUS VACCINE 120MCG/0.5
0.5 KIT INTRAMUSCULAR ONCE
Qty: 1 EACH | Refills: 0 | Status: SHIPPED | OUTPATIENT
Start: 2024-01-08 | End: 2024-01-08

## 2024-01-08 RX ORDER — ASPIRIN 81 MG/1
81 TABLET ORAL 2 TIMES DAILY
COMMUNITY
End: 2024-06-18

## 2024-01-08 NOTE — PROGRESS NOTES
[facility]: Cancer Care Plan of Care Education Note                                    Discussion with Patient:                                                      RNCC called patient to complete bmbx teach. RNCC educated patient. Patient is on baby aspirin which does not need to be held, patient understood.        Goals          General     Functional (pt-stated)      Notes - Note created  1/12/2024  9:53 AM by Beatriz Rm RN     Goal Statement: I will use my clinic and care team resources as directed.  Date Goal set: 1/12/2024  Barriers:  None at this time  Strengths: support, health awareness, and involvement with care team  Date to Achieve By: ongoing  Patient expressed understanding of goal: Yes  Action steps to achieve this goal:  I will contact triage with new, worsening or uncontrolled symptoms.   I will contact triage with temperature over 100.4  I will call with difficulties of scheduling and/or transportation.   I will request needed refills when there are 7 days of medication remaining.   I will not send urgent or symptomatic messages through [employer].   I will contact scheduling to arrange or make changes in my appointments.                 Assessment:                                                      Assessment completed with:: Patient    Plan of Care Education   Yearly learning assessment completed?: Yes (see Education tab)  Diagnosis:: Essential thrombocythemia  Does patient understand diagnosis?: Yes  Tx plan/regimen:: BMBX  Does patient understand treatment plan/regimen?: Yes  Preparing for treatment:: Reviewed treatment preparation information with patient (vascular access, day of chemo, visitor policy, what to bring, etc.)  Vascular access education provided for:: Peripheral IV  Side effect education:: Infection  Safety/self care at home reviewed with patient:: Yes  Coping - concerns/fears reviewed with patient:: Yes  When to call provider:: Bleeding;Increased shortness of  breath;Uncontrolled diarrhea/constipation;New/worsening pain;Uncontrolled nausea/vomiting;Shaking chills;Temperature >100.4F  Reasons for deferring treatment reviewed with patient:: Yes  Procedure education provided for: : Bone marrow biopsy    Evaluation of Learning  Patient Education Provided: Yes  Readiness:: Acceptance  Method:: Explanation  Response:: Verbalizes understanding    No assessment indicated    Intervention/Education provided during outreach:                                                       RNCC spoke with patient. Verbal instruction provided re: BMBX using EPIC Reference: Bone Marrow Aspiration and Biopsy.  Educated on reason for bmbx, bmbx process, benefits, risks, and monitoring after. Discussed option for sedative IV with procedure: pt accepts.  Pt understands she will need a  if he opts for sedative.     Patient to follow up as scheduled at next appointment. RNCC educated patient to call with any questions or concerns.     Signature:  Beatriz Rm RN

## 2024-01-08 NOTE — PROGRESS NOTES
BMT ONC Adult Bone Marrow Biopsy Procedure Note  January 9, 2024  /72 (BP Location: Right arm, Patient Position: Sitting, Cuff Size: Adult Regular)   Pulse 52   Temp 98.3  F (36.8  C) (Oral)   Wt 59.8 kg (131 lb 14.4 oz)   SpO2 99%   BMI 21.95 kg/m       Learning needs assessment complete within 12 months? YES    DIAGNOSIS: CHANDNI-2 positive thrombocytosis, concern for myeloproliferative neoplasm     PROCEDURE: Unilateral Bone Marrow Biopsy and Unilateral Aspirate    LOCATION: [facility] 2nd Floor    Patient s identification was positively verified by verbal identification and invasive procedure safety checklist was completed. Informed consent was obtained. Following the administration of 1.5 mg Midazolam as pre-medication, patient was placed in the prone position and prepped and draped in a sterile manner. Approximately 10 cc of 1% Lidocaine was used over the left posterior iliac spine. Following this a 3 mm incision was made. Trephine bone marrow core(s) was (were) obtained from the [facility]. Bone marrow aspirates were obtained from the [facility]. Aspirates were sent for morphology, immunophenotyping, cytogenetics, and molecular diagnostics BCR-ABL and gene rearrangement. A total of approximately 35 ml of marrow was aspirated. Following this procedure a sterile dressing was applied to the bone marrow biopsy site(s). The patient was placed in the supine position to maintain pressure on the biopsy site. Post-procedure wound care instructions were given.     Complications: NO    Pre-procedural pain: 0 out of 10 on the numeric pain rating scale.     Procedural pain: 5 out of 10 on the numeric pain rating scale.     Post-procedural pain assessment: 0 out of 10 on the numeric pain rating scale.     Interventions: NO    Length of procedure:21 minutes to 45 minutes    Procedure performed by: TOSIN Kim

## 2024-01-08 NOTE — PROGRESS NOTES
Debra is a 83 year old that presents in clinic today for the following:     Chief Complaint   Patient presents with    Hospital F/U     Sigmoidoscopy.  Discuss Bone biopsy test.  Discuss labs.           1/8/2024     1:13 PM   Additional Questions   Roomed by KTR   Accompanied by Aguilar ()       Screenings from encounters over the past 10 days    No data recorded       Papa Mott, EMT at 1:20 PM on 1/8/2024

## 2024-01-08 NOTE — TELEPHONE ENCOUNTER
Debra called and stated left vm earlier today but realized needs to give cell phone number for call back as pt may be doing errands all day.    Pt has question about BMBx procedure scheduled for tomorrow 1/9/2024.  Such as if needs to hold baby aspirin etc....    Cell phone for pt: [phone]    Routed high priority to care team.

## 2024-01-09 ENCOUNTER — APPOINTMENT (OUTPATIENT)
Dept: LAB | Facility: CLINIC | Age: 84
End: 2024-01-09
Attending: INTERNAL MEDICINE
Payer: COMMERCIAL

## 2024-01-09 ENCOUNTER — OFFICE VISIT (OUTPATIENT)
Dept: ONCOLOGY | Facility: CLINIC | Age: 84
End: 2024-01-09
Attending: INTERNAL MEDICINE
Payer: COMMERCIAL

## 2024-01-09 VITALS
DIASTOLIC BLOOD PRESSURE: 62 MMHG | TEMPERATURE: 98.3 F | HEART RATE: 52 BPM | BODY MASS INDEX: 21.95 KG/M2 | OXYGEN SATURATION: 99 % | WEIGHT: 131.9 LBS | SYSTOLIC BLOOD PRESSURE: 121 MMHG

## 2024-01-09 DIAGNOSIS — Z29.11 NEED FOR VACCINATION AGAINST RESPIRATORY SYNCYTIAL VIRUS: ICD-10-CM

## 2024-01-09 DIAGNOSIS — R73.09 INCREASED GLUCOSE LEVEL: ICD-10-CM

## 2024-01-09 DIAGNOSIS — D75.839 THROMBOCYTOSIS: Primary | ICD-10-CM

## 2024-01-09 DIAGNOSIS — K92.2 LOWER GI BLEED: ICD-10-CM

## 2024-01-09 DIAGNOSIS — R79.89 ELEVATED PLATELET COUNT: ICD-10-CM

## 2024-01-09 LAB
ALBUMIN SERPL BCG-MCNC: 4.1 G/DL (ref 3.5–5.2)
ALP SERPL-CCNC: 62 U/L (ref 40–150)
ALT SERPL W P-5'-P-CCNC: 18 U/L (ref 0–50)
ANION GAP SERPL CALCULATED.3IONS-SCNC: 10 MMOL/L (ref 7–15)
APTT PPP: 31 SECONDS (ref 22–38)
AST SERPL W P-5'-P-CCNC: 17 U/L (ref 0–45)
BASOPHILS # BLD AUTO: 0.1 10E3/UL (ref 0–0.2)
BASOPHILS NFR BLD AUTO: 1 %
BILIRUB SERPL-MCNC: 0.6 MG/DL
BUN SERPL-MCNC: 14.9 MG/DL (ref 8–23)
CALCIUM SERPL-MCNC: 9.2 MG/DL (ref 8.8–10.2)
CHLORIDE SERPL-SCNC: 102 MMOL/L (ref 98–107)
CHOLEST SERPL-MCNC: 114 MG/DL
CREAT SERPL-MCNC: 0.69 MG/DL (ref 0.51–0.95)
CRP SERPL-MCNC: <3 MG/L
DEPRECATED HCO3 PLAS-SCNC: 27 MMOL/L (ref 22–29)
EGFRCR SERPLBLD CKD-EPI 2021: 85 ML/MIN/1.73M2
EOSINOPHIL # BLD AUTO: 0.4 10E3/UL (ref 0–0.7)
EOSINOPHIL NFR BLD AUTO: 4 %
ERYTHROCYTE [DISTWIDTH] IN BLOOD BY AUTOMATED COUNT: 14.1 % (ref 10–15)
ERYTHROCYTE [SEDIMENTATION RATE] IN BLOOD BY WESTERGREN METHOD: 10 MM/HR (ref 0–30)
FACT VIII ACT/NOR PPP: 103 % (ref 55–200)
FASTING STATUS PATIENT QL REPORTED: YES
FERRITIN SERPL-MCNC: 61 NG/ML (ref 11–328)
FIBRINOGEN PPP-MCNC: 300 MG/DL (ref 170–490)
GLUCOSE SERPL-MCNC: 93 MG/DL (ref 70–99)
HBA1C MFR BLD: 6.1 %
HCT VFR BLD AUTO: 41 % (ref 35–47)
HDLC SERPL-MCNC: 61 MG/DL
HGB BLD-MCNC: 13.8 G/DL (ref 11.7–15.7)
HOLD SPECIMEN: NORMAL
IMM GRANULOCYTES # BLD: 0 10E3/UL
IMM GRANULOCYTES NFR BLD: 0 %
INR PPP: 1.21 (ref 0.85–1.15)
INTERPRETATION: NORMAL
IRON BINDING CAPACITY (ROCHE): 309 UG/DL (ref 240–430)
IRON SATN MFR SERPL: 25 % (ref 15–46)
IRON SERPL-MCNC: 78 UG/DL (ref 37–145)
LAB DIRECTOR COMMENTS: NORMAL
LAB DIRECTOR COMMENTS: NORMAL
LAB DIRECTOR DISCLAIMER: NORMAL
LAB DIRECTOR DISCLAIMER: NORMAL
LAB DIRECTOR INTERPRETATION: NORMAL
LAB DIRECTOR INTERPRETATION: NORMAL
LAB DIRECTOR METHODOLOGY: NORMAL
LAB DIRECTOR METHODOLOGY: NORMAL
LAB DIRECTOR RESULTS: NORMAL
LAB DIRECTOR RESULTS: NORMAL
LDLC SERPL CALC-MCNC: 39 MG/DL
LYMPHOCYTES # BLD AUTO: 2.7 10E3/UL (ref 0.8–5.3)
LYMPHOCYTES NFR BLD AUTO: 26 %
Lab: NORMAL
MCH RBC QN AUTO: 30 PG (ref 26.5–33)
MCHC RBC AUTO-ENTMCNC: 33.7 G/DL (ref 31.5–36.5)
MCV RBC AUTO: 89 FL (ref 78–100)
MONOCYTES # BLD AUTO: 1 10E3/UL (ref 0–1.3)
MONOCYTES NFR BLD AUTO: 10 %
NEUTROPHILS # BLD AUTO: 6.1 10E3/UL (ref 1.6–8.3)
NEUTROPHILS NFR BLD AUTO: 59 %
NONHDLC SERPL-MCNC: 53 MG/DL
NRBC # BLD AUTO: 0 10E3/UL
NRBC BLD AUTO-RTO: 0 /100
PERFORMING LABORATORY: NORMAL
PLATELET # BLD AUTO: 474 10E3/UL (ref 150–450)
POTASSIUM SERPL-SCNC: 3.7 MMOL/L (ref 3.4–5.3)
PROT SERPL-MCNC: 6.7 G/DL (ref 6.4–8.3)
RBC # BLD AUTO: 4.6 10E6/UL (ref 3.8–5.2)
SIGNIFICANT RESULTS: NORMAL
SODIUM SERPL-SCNC: 139 MMOL/L (ref 135–145)
SPECIMEN DESCRIPTION: NORMAL
SPECIMEN STATUS: NORMAL
TEST DETAILS, MDL: NORMAL
TEST NAME: NORMAL
TRIGL SERPL-MCNC: 69 MG/DL
VWF AG ACT/NOR PPP IA: 122 % (ref 50–200)
VWF:AC ACT/NOR PPP IA: 109 % (ref 50–180)
WBC # BLD AUTO: 10.3 10E3/UL (ref 4–11)

## 2024-01-09 PROCEDURE — 85245 CLOT FACTOR VIII VW RISTOCTN: CPT

## 2024-01-09 PROCEDURE — 85247 CLOT FACTOR VIII MULTIMETRIC: CPT

## 2024-01-09 PROCEDURE — 96374 THER/PROPH/DIAG INJ IV PUSH: CPT | Mod: 59

## 2024-01-09 PROCEDURE — G0452 MOLECULAR PATHOLOGY INTERPR: HCPCS | Mod: 26 | Performed by: PATHOLOGY

## 2024-01-09 PROCEDURE — 88311 DECALCIFY TISSUE: CPT | Mod: TC

## 2024-01-09 PROCEDURE — 88189 FLOWCYTOMETRY/READ 16 & >: CPT | Mod: GC | Performed by: STUDENT IN AN ORGANIZED HEALTH CARE EDUCATION/TRAINING PROGRAM

## 2024-01-09 PROCEDURE — 88305 TISSUE EXAM BY PATHOLOGIST: CPT | Mod: 26 | Performed by: STUDENT IN AN ORGANIZED HEALTH CARE EDUCATION/TRAINING PROGRAM

## 2024-01-09 PROCEDURE — 83036 HEMOGLOBIN GLYCOSYLATED A1C: CPT

## 2024-01-09 PROCEDURE — 81207 BCR/ABL1 GENE MINOR BP: CPT

## 2024-01-09 PROCEDURE — 85730 THROMBOPLASTIN TIME PARTIAL: CPT

## 2024-01-09 PROCEDURE — 250N000011 HC RX IP 250 OP 636

## 2024-01-09 PROCEDURE — 88311 DECALCIFY TISSUE: CPT | Mod: 26 | Performed by: STUDENT IN AN ORGANIZED HEALTH CARE EDUCATION/TRAINING PROGRAM

## 2024-01-09 PROCEDURE — 88313 SPECIAL STAINS GROUP 2: CPT | Mod: 26 | Performed by: STUDENT IN AN ORGANIZED HEALTH CARE EDUCATION/TRAINING PROGRAM

## 2024-01-09 PROCEDURE — 38222 DX BONE MARROW BX & ASPIR: CPT

## 2024-01-09 PROCEDURE — 82728 ASSAY OF FERRITIN: CPT

## 2024-01-09 PROCEDURE — 88185 FLOWCYTOMETRY/TC ADD-ON: CPT

## 2024-01-09 PROCEDURE — 88291 CYTO/MOLECULAR REPORT: CPT | Performed by: MEDICAL GENETICS

## 2024-01-09 PROCEDURE — 88341 IMHCHEM/IMCYTCHM EA ADD ANTB: CPT | Mod: 26 | Performed by: STUDENT IN AN ORGANIZED HEALTH CARE EDUCATION/TRAINING PROGRAM

## 2024-01-09 PROCEDURE — 80061 LIPID PANEL: CPT

## 2024-01-09 PROCEDURE — G0452 MOLECULAR PATHOLOGY INTERPR: HCPCS | Mod: 26 | Performed by: STUDENT IN AN ORGANIZED HEALTH CARE EDUCATION/TRAINING PROGRAM

## 2024-01-09 PROCEDURE — 81450 HL NEO GSAP 5-50DNA/DNA&RNA: CPT

## 2024-01-09 PROCEDURE — 85384 FIBRINOGEN ACTIVITY: CPT

## 2024-01-09 PROCEDURE — 85025 COMPLETE CBC W/AUTO DIFF WBC: CPT

## 2024-01-09 PROCEDURE — 85097 BONE MARROW INTERPRETATION: CPT | Mod: GC | Performed by: STUDENT IN AN ORGANIZED HEALTH CARE EDUCATION/TRAINING PROGRAM

## 2024-01-09 PROCEDURE — 36415 COLL VENOUS BLD VENIPUNCTURE: CPT

## 2024-01-09 PROCEDURE — 82668 ASSAY OF ERYTHROPOIETIN: CPT

## 2024-01-09 PROCEDURE — 85610 PROTHROMBIN TIME: CPT

## 2024-01-09 PROCEDURE — 86140 C-REACTIVE PROTEIN: CPT

## 2024-01-09 PROCEDURE — 85246 CLOT FACTOR VIII VW ANTIGEN: CPT

## 2024-01-09 PROCEDURE — 85240 CLOT FACTOR VIII AHG 1 STAGE: CPT

## 2024-01-09 PROCEDURE — 81206 BCR/ABL1 GENE MAJOR BP: CPT | Mod: XU

## 2024-01-09 PROCEDURE — 85390 FIBRINOLYSINS SCREEN I&R: CPT | Mod: 26 | Performed by: PATHOLOGY

## 2024-01-09 PROCEDURE — 88342 IMHCHEM/IMCYTCHM 1ST ANTB: CPT | Mod: 26 | Performed by: STUDENT IN AN ORGANIZED HEALTH CARE EDUCATION/TRAINING PROGRAM

## 2024-01-09 PROCEDURE — 85652 RBC SED RATE AUTOMATED: CPT

## 2024-01-09 PROCEDURE — 88237 TISSUE CULTURE BONE MARROW: CPT

## 2024-01-09 PROCEDURE — 83550 IRON BINDING TEST: CPT

## 2024-01-09 PROCEDURE — 83540 ASSAY OF IRON: CPT

## 2024-01-09 PROCEDURE — 36415 COLL VENOUS BLD VENIPUNCTURE: CPT | Performed by: INTERNAL MEDICINE

## 2024-01-09 PROCEDURE — 80053 COMPREHEN METABOLIC PANEL: CPT

## 2024-01-09 RX ADMIN — MIDAZOLAM 1.5 MG: 1 INJECTION INTRAMUSCULAR; INTRAVENOUS at 09:45

## 2024-01-09 ASSESSMENT — PAIN SCALES - GENERAL: PAINLEVEL: NO PAIN (0)

## 2024-01-09 NOTE — NURSING NOTE
"Oncology Rooming Note    January 9, 2024 9:27 AM   Debra Denise is a 83 year old female who presents for:    Chief Complaint   Patient presents with    Blood Draw     Labs drawn via piv placed by RN in lab. VS taken.      Initial Vitals: /72 (BP Location: Right arm, Patient Position: Sitting, Cuff Size: Adult Regular)   Pulse 52   Temp 98.3  F (36.8  C) (Oral)   Wt 59.8 kg (131 lb 14.4 oz)   SpO2 99%   BMI 21.95 kg/m   Estimated body mass index is 21.95 kg/m  as calculated from the following:    Height as of 12/28/23: 1.651 m (5' 5\").    Weight as of this encounter: 59.8 kg (131 lb 14.4 oz). Body surface area is 1.66 meters squared.  No Pain (0) Comment: Data Unavailable   No LMP recorded. Patient has had a hysterectomy.  Allergies reviewed: Yes  Medications reviewed: Yes    Medications: Medication refills not needed today.  Pharmacy name entered into [employer]: [id number] - [place], [place] - [address] [place] [address]    Frailty Screening:   Is the patient here for a new oncology consult visit in cancer care? 2. No      Clinical concerns: No clinical concerns at this time.      Alyx Dykes RN              "

## 2024-01-09 NOTE — NURSING NOTE
Chief Complaint   Patient presents with    Blood Draw     Labs drawn via piv placed by RN in lab. VS taken.      Labs drawn from PIV placed by RN. Line flushed with saline. Vitals taken. Pt checked in for appointment(s). Provider notified about ABN alert.     Ana Maria Vidales RN

## 2024-01-09 NOTE — LETTER
1/9/2024         RE: Debra Denise  [id number]  [place] [address]        Dear Colleague,    Thank you for referring your patient, Debra Denise, to the [facility]. Please see a copy of my visit note below.    BMT ONC Adult Bone Marrow Biopsy Procedure Note  January 9, 2024  /72 (BP Location: Right arm, Patient Position: Sitting, Cuff Size: Adult Regular)   Pulse 52   Temp 98.3  F (36.8  C) (Oral)   Wt 59.8 kg (131 lb 14.4 oz)   SpO2 99%   BMI 21.95 kg/m       Learning needs assessment complete within 12 months? YES    DIAGNOSIS: CHANDNI-2 positive thrombocytosis, concern for myeloproliferative neoplasm     PROCEDURE: Unilateral Bone Marrow Biopsy and Unilateral Aspirate    LOCATION: [facility] 2nd Floor    Patient s identification was positively verified by verbal identification and invasive procedure safety checklist was completed. Informed consent was obtained. Following the administration of 1.5 mg Midazolam as pre-medication, patient was placed in the prone position and prepped and draped in a sterile manner. Approximately 10 cc of 1% Lidocaine was used over the left posterior iliac spine. Following this a 3 mm incision was made. Trephine bone marrow core(s) was (were) obtained from the [facility]. Bone marrow aspirates were obtained from the [facility]. Aspirates were sent for morphology, immunophenotyping, cytogenetics, and molecular diagnostics BCR-ABL and gene rearrangement. A total of approximately 35 ml of marrow was aspirated. Following this procedure a sterile dressing was applied to the bone marrow biopsy site(s). The patient was placed in the supine position to maintain pressure on the biopsy site. Post-procedure wound care instructions were given.     Complications: NO    Pre-procedural pain: 0 out of 10 on the numeric pain rating scale.     Procedural pain: 5 out of 10 on the numeric pain rating scale.     Post-procedural pain  assessment: 0 out of 10 on the numeric pain rating scale.     Interventions: NO    Length of procedure:21 minutes to 45 minutes    Procedure performed by: TOSIN Kim

## 2024-01-09 NOTE — NURSING NOTE
BMBX Teaching and Assessment       Teaching concerns addressed: Bone marrow biopsy and infection prevention.     Person(s) involved in teaching: Patient  Motivation Level  Asks Questions: Yes  Eager to Learn: Yes  Cooperative: Yes  Receptive (willing/able to accept information): Yes    Patient demonstrates understanding of the following:     Reason for the appointment, diagnosis and treatment plan: Yes  Knowledge of proper use of medications and conditions for which they are ordered (with special attention to potential side effects or drug interactions): Yes  Which situations necessitate calling provider and whom to contact: Yes    Teaching concerns addressed:   Reviewed activity restrictions if received premeds, potential for bleeding and actions to take if develops any of the issues below    Pain management techniques: Yes  Patient instructed on hand hygiene: Yes  How and/when to access community resources: Yes    Infection Control:  Patient demonstrates understanding of the following:   Bone marrow procedure site care taught: Yes  Signs and symptoms of infection taught: Yes       Instructional Materials Used/Given: Pt instructed to keep bmbx site clean and dry for 24hrs. Pt educated to monitor site for signs of infection such as redness, rash, oozing, puss, bleeding, pain, and elevated temp. Pt instructed to go to call the [facility] triage line or go to the ER if any signs of infection should occur. Pt educated to not operate machinery if receiving versed. Pt and spouse verbalize understanding.    Provider order received to administer Versed 1.5mg IVP as premed for BMBX. Procedural consent discussed and pt's signature obtained.  Drug Amount given = 1.5mg  Drug Amount wasted = 0.5mg    Allergies reviewed.  PT currently alert and oriented to plan of care.  Pt lying prone in stretcher.  Call light w/in reach.  Provider and  at bedside.       Pre-procedure labs drawn via PIV. Post procedure: Patient vital signs  stable, ambulating, site is clean, dry and intact prior to discharge and line removed. Pt discharged with  as .

## 2024-01-10 LAB
EPO SERPL-ACNC: 6 MU/ML
PATH REPORT.COMMENTS IMP SPEC: NORMAL
PATH REPORT.FINAL DX SPEC: NORMAL
PATH REPORT.MICROSCOPIC SPEC OTHER STN: NORMAL
PATH REPORT.RELEVANT HX SPEC: NORMAL
VWF MULTIMERS PPP IB: NORMAL

## 2024-01-11 ENCOUNTER — PATIENT OUTREACH (OUTPATIENT)
Dept: ONCOLOGY | Facility: CLINIC | Age: 84
End: 2024-01-11
Payer: COMMERCIAL

## 2024-01-11 LAB
PATH REPORT.COMMENTS IMP SPEC: ABNORMAL
PATH REPORT.COMMENTS IMP SPEC: ABNORMAL
PATH REPORT.COMMENTS IMP SPEC: YES
PATH REPORT.FINAL DX SPEC: ABNORMAL
PATH REPORT.GROSS SPEC: ABNORMAL
PATH REPORT.MICROSCOPIC SPEC OTHER STN: ABNORMAL
PATH REPORT.MICROSCOPIC SPEC OTHER STN: ABNORMAL
PATH REPORT.RELEVANT HX SPEC: ABNORMAL
VWF:RCO ACT/NOR PPP PL AGG: 152 %

## 2024-01-12 ENCOUNTER — ONCOLOGY VISIT (OUTPATIENT)
Dept: TRANSPLANT | Facility: CLINIC | Age: 84
End: 2024-01-12
Attending: INTERNAL MEDICINE
Payer: COMMERCIAL

## 2024-01-12 VITALS
HEART RATE: 60 BPM | SYSTOLIC BLOOD PRESSURE: 138 MMHG | OXYGEN SATURATION: 97 % | RESPIRATION RATE: 16 BRPM | DIASTOLIC BLOOD PRESSURE: 63 MMHG | BODY MASS INDEX: 22.37 KG/M2 | TEMPERATURE: 98.1 F | WEIGHT: 134.4 LBS

## 2024-01-12 DIAGNOSIS — D47.3 ESSENTIAL THROMBOCYTHEMIA (H): Primary | ICD-10-CM

## 2024-01-12 PROCEDURE — G0463 HOSPITAL OUTPT CLINIC VISIT: HCPCS | Performed by: INTERNAL MEDICINE

## 2024-01-12 PROCEDURE — 99215 OFFICE O/P EST HI 40 MIN: CPT | Performed by: INTERNAL MEDICINE

## 2024-01-12 RX ORDER — HYDROXYUREA 500 MG/1
500 CAPSULE ORAL DAILY
Qty: 30 CAPSULE | Refills: 3 | Status: SHIPPED | OUTPATIENT
Start: 2024-01-12 | End: 2024-02-08

## 2024-01-12 ASSESSMENT — PAIN SCALES - GENERAL: PAINLEVEL: NO PAIN (0)

## 2024-01-12 NOTE — PROGRESS NOTES
"[facility]  HEMATOLOGY FOLLOW UP    Debra Denise   : 1940   MRN: [id number]  Date of service: 2024     REASON FOR VISIT: JAK2+ essential thrombocytopenia  Referred by Dr. 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     Per chart review and discussion with the patient,  - [id number]: Persistent progressive dizziness, non-positional, intermittent, accompanied by nausea without vomiting, night time and day time, no improvement with Epley maneuver, and possible component of postural hypotension. Also with mild intermittent headache, mild fatigue over , has not felt \"completely normal\"  - Early 2023: Sudden onset LUE weakness x 15 sec which self resolved. CT, MRI/MRA normal. Echo normal. Ziopatch without arrhythmia. Started on clopidogrel and atorvastatin. Not on ASA because of the ulcerative proctitis. Prior cath  neg for CAD.   - : Prior plt counts normal. 23 491. Plts normal in 7/2023 x 3. Plts 462-450 in 2023. 23 JAK2+ 0.1884 triggering hematology referral. Plts 513 at time of first heme eval on 23. Remainder of CBC normal. No splenomegaly. Her UP has been under control with vedolizumab q2mo since May 2023. CRP and ESR improved to normal.  - 23: Stopped clopidogrel, restarted ASA 81mg BID  - 23: Scheduled sigmoidoscopy showing erythema in rectum, biopsies taken, diverticulosis, and internal hemorrhoids; complicated by rectal bleeding with large clots, requiring overnight stay at [facility]. ASA held. Hgb 14.2->12.8. Mesalamine suppository added. No recurrence of bleeding. Heme onc consult recommended resuming " ASA when bleeding risk permits. GI recommended restarting ASA BID on 12/30.  - 1/9/2024: Disease evaluation - Plt 474, rest of CBC normal. INR 1.21-1.29, otherwise PTT, fibrinogen, F8, VWF panel normal. CMP, LDH (192), Ferritin, iron sat normal. Epo 6. ESR and CRP neg. BMBx with normocellular to slightly hypercellular marrow for age (30%), TLH, no overt dysplasia, 1% blasts, MF-0. BCR-ABL negative. Myeloid NGS pending. So far MIPSS-ET score is 4 (for age, with potentially 2 pts if SRSF2, SF3B1, U2AF1, or [id number]).     INTERVAL HISTORY  She is here with her  Aguilar today.   She has a little more bruising after PIVs. GI bleeding has resolved.   Still has the lightheadedness but that seems to be chronic. More recently she is drinking more water and she is less nauseous. Now it is just a little lightheadedness.  She has had some nystagmus. She tried eye physical therapy.     No TIA/stroke/VTE symptoms, erythromelalgia, pruritus, fevers, chills, weight loss, appetite loss.     REVIEW OF SYSTEMS  A 10 point review of systems was performed and was otherwise negative except as mentioned in the HPI.     PAST MEDICAL HISTORY  Past Medical History:   Diagnosis Date    Autoimmune disease (H24) approx. 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 30 years ago    Osteopenia     Polymyalgia rheumatica (H24)     PONV (postoperative nausea and vomiting)     Proctitis     Reduced vision Sometime is a little blurry     PAST SURGICAL HISTORY  Past Surgical History:   Procedure Laterality Date    COLONOSCOPY  04/08/2014    Procedure: COMBINED COLONOSCOPY, SINGLE BIOPSY/POLYPECTOMY BY BIOPSY;  COLONOSCOPY;  Surgeon: Carol Ann Plasencia MD;  Location:  GI    CV CORONARY ANGIOGRAM N/A 08/30/2022    Procedure: Coronary Angiogram;  Surgeon: Trevin Hawk MD;  Location:  HEART CARDIAC CATH LAB    ENT SURGERY       tonsilectomy, adenoidectomy    ESOPHAGOSCOPY, GASTROSCOPY, DUODENOSCOPY (EGD), COMBINED N/A 2023    Procedure: ESOPHAGOGASTRODUODENOSCOPY, WITH BIOPSY;  Surgeon: Angel Lara MD;  Location:  GI    GYN SURGERY  ,     x 2    HYSTERECTOMY      ORTHOPEDIC SURGERY  2004    (R) shoulder surgery for frozen shoulder    ZAC BSO      for fibroids    TONSILLECTOMY  About 50 years ago     SOCIAL HISTORY  Reviewed, and any changes made accordingly  Social History     Socioeconomic History    Marital status:      Spouse name: Not on file    Number of children: Not on file    Years of education: Not on file    Highest education level: Not on file   Occupational History    Not on file   Tobacco Use    Smoking status: Never    Smokeless tobacco: Never    Tobacco comments:     None   Vaping Use    Vaping Use: Never used   Substance and Sexual Activity    Alcohol use: No    Drug use: No    Sexual activity: Not Currently     Partners: Male     Birth control/protection: None   Other Topics Concern    Parent/sibling w/ CABG, MI or angioplasty before 65F 55M? Not Asked   Social History Narrative    Not on file     Social Determinants of Health     Financial Resource Strain: Low Risk  (2023)    Financial Resource Strain     Within the past 12 months, have you or your family members you live with been unable to get utilities (heat, electricity) when it was really needed?: No   Food Insecurity: Low Risk  (2023)    Food Insecurity     Within the past 12 months, did you worry that your food would run out before you got money to buy more?: No     Within the past 12 months, did the food you bought just not last and you didn t have money to get more?: No   Transportation Needs: Low Risk  (2023)    Transportation Needs     Within the past 12 months, has lack of transportation kept you from medical appointments, getting your medicines, non-medical meetings or appointments, work, or  from getting things that you need?: No   Physical Activity: Not on file   Stress: Not on file   Social Connections: Not on file   Interpersonal Safety: Low Risk  (12/26/2023)    Interpersonal Safety     Do you feel physically and emotionally safe where you currently live?: Yes     Within the past 12 months, have you been hit, slapped, kicked or otherwise physically hurt by someone?: No     Within the past 12 months, have you been humiliated or emotionally abused in other ways by your partner or ex-partner?: No   Housing Stability: Low Risk  (12/19/2023)    Housing Stability     Do you have housing? : Yes     Are you worried about losing your housing?: No     FAMILY HISTORY  Reviewed, and any changes made accordingly  Family History   Problem Relation Age of Onset    Cancer - colorectal Father     Lung Cancer Father     Macular Degeneration Father     Multiple myeloma Brother     Pacemaker Brother     Hypertension Mother     Cerebrovascular Disease Mother         from misdiagnosis of temporal arteritis    Suicide Sister     Hypertension Maternal Grandmother     Cerebrovascular Disease Maternal Grandmother     Diabetes Maternal Grandmother        MEDICATIONS  Current Outpatient Medications   Medication    aspirin 81 MG EC tablet    atorvastatin (LIPITOR) 10 MG tablet    budesonide (ENTOCORT EC) 3 MG EC capsule    Cholecalciferol (VITAMIN D3 PO)    fluocinonide (LIDEX) 0.05 % external cream    gabapentin (NEURONTIN) 100 MG capsule    mesalamine (CANASA) 1000 MG suppository    multivitamin w/minerals (CENTRUM ADULTS) tablet    ondansetron (ZOFRAN ODT) 4 MG ODT tab    Probiotic Product (PROBIOTIC PO)    sodium chloride 0.9 % SOLN 250 mL with vedolizumab 60 MG/ML SOLR 300 mg infusionn    valsartan-hydrochlorothiazide (DIOVAN HCT) 160-25 MG tablet     No current facility-administered medications for this visit.     ALLERGIES  Allergies   Allergen Reactions    Sulfacetamide Hives    Adhesive Tape Rash    Aspirin      Other  Reaction(s): Stomach issues    Atenolol      Other reaction(s): Intolerance-Can't Take  Fatigue, GI intolerance    Ibuprofen      Other reaction(s): Stomach Upset  4-9-13 tele encounter  Other Reaction(s): GI intolerance       Naproxen      Other reaction(s): Stomach Upset  4-9-13 tele encounter  Other Reaction(s): GI intolerance    Ramipril Cough     Other reaction(s): Intolerance-Can't Take  Other Reaction(s): GI intolerance    Sulfa Antibiotics     Codeine Nausea    Fentanyl Nausea       PHYSICAL EXAM  /63   Pulse 60   Temp 98.1  F (36.7  C) (Oral)   Resp 16   Wt 61 kg (134 lb 6.4 oz)   SpO2 97%   BMI 22.37 kg/m     Wt Readings from Last 10 Encounters:   01/12/24 61 kg (134 lb 6.4 oz)   01/09/24 59.8 kg (131 lb 14.4 oz)   01/08/24 60.3 kg (133 lb)   12/28/23 59.4 kg (131 lb)   12/26/23 61.2 kg (134 lb 14.4 oz)   12/07/23 61.6 kg (135 lb 14.4 oz)   10/31/23 62.1 kg (137 lb)   09/05/23 62.1 kg (136 lb 12.8 oz)   07/27/23 61.7 kg (136 lb)   07/12/23 63 kg (138 lb 15.8 oz)     Constitutional: Awake, alert, cooperative, in NAD.  Eyes: EOMI, sclera clear, conjunctiva normal.  ENT: Normocephalic, without obvious abnormality, oral pharynx with moist mucus membranes  Respiratory: Non-labored breathing, good air exchange  Cardiovascular: well perfused  GI: soft, non-distended  Skin: No concerning lesions or rash on exposed areas.  Musculoskeletal: No edema romario LEs.  Neurologic: Awake, alert & oriented x3.  Psych: appropriate affect    LABS  Recent Labs   Lab Test 01/09/24  0908 02/11/22  0940 01/28/21  1133 09/14/20  0839 03/25/20  1101 01/29/20  1108   WBC 10.3   < > 8.2 7.7 8.7 8.8   HGB 13.8   < > 13.8 14.2 14.9 14.2   *   < > 414 364 409 367   MCV 89   < > 93 92 92 95   RDW 14.1   < > 12.5 12.4 12.6 12.6   ANEU  --   --  4.7 4.6 6.0 5.3   ALYM  --   --  2.3 1.9 1.7 2.3   DEANGELO  --   --  0.9 0.9 0.8 0.8   AEOS  --   --  0.2 0.2 0.2 0.3    < > = values in this interval not displayed.     Recent Labs  "  Lab Test 01/09/24  0908 12/28/23  1812 12/07/23  1522 09/14/20  0839 03/25/20  1101      < > 140   < > 141   POTASSIUM 3.7   < > 3.4   < > 3.5   CHLORIDE 102   < > 101   < > 106   CO2 27   < > 31*   < > 32   BUN 14.9   < > 18.3   < > 14   CR 0.69   < > 0.86   < > 0.72   PRASHANTH 9.2   < > 9.4   < > 9.2   LDH  --   --  192  --  146   URIC  --   --   --   --  6.8*    < > = values in this interval not displayed.     Recent Labs   Lab Test 01/09/24  0908 12/29/23  0731 12/07/23  1522 07/11/23  0855   AST 17 14 21 13   ALT 18 16 10 13   ALKPHOS 62 62 74 67   ALBUMIN 4.1 3.5 4.1 4.0   PROTTOTAL 6.7 5.7* 7.0 6.7   BILITOTAL 0.6 0.8 0.3 0.4      Recent Labs   Lab Test 07/08/22  1120 03/25/20  1101    207   IGM  --  74   ELPM  --  0.0   ELPINT  --  Essentially normal electrophoretic pattern.  No obvious monoclonal proteins seen.    Pathologic significance requires clinical correlation.  OSMANY Orr M.D., Ph.D.,   Pathologist ().     IEP  --  No monoclonal protein seen on immunofixation.  Pathological significance requires clinical   correlation.     KAPPAFREELT  --  <2.83*   LAMBDAFREELT  --  1.01   KLR  --  Unable to Calculate      Recent Labs   Lab Test 01/09/24  0908 12/26/23  1522 12/07/23  1522 03/25/20  1101 09/20/16  1324   RETICABSCT  --  0.055 0.061   < >  --    RETP  --  1.2 1.3   < >  --    IRON 78  --   --   --  95   IRONSAT 25  --   --   --  26   HANNAH 61  --   --   --  56     --   --   --  362   B12  --   --   --   --  883   FOLIC  --   --   --   --  64.6   EPOE 6  --   --   --   --    LDH  --   --  192   < >  --     < > = values in this interval not displayed.     No results for input(s): \"MORPH\" in the last 61204 hours.  No results for input(s): \"HCVAB\", \"HCABC\", \"HBCAB\", \"AUSAB\", \"HIV\" in the last 77417 hours.  Recent Labs   Lab Test 01/09/24  0908 12/07/23  1522   INR 1.21* 1.29*   PTT 31  --    FIBR 300 413      Overall, the bone marrow is normocellular with normal myeloid to " erythroid ratio, very slightly increased megakaryocytes, and normal marrow fibrosis. In addition, patient has normal level of LDH (192 U/L) and Erythropoietin (6; range 4-27 mU/mL) and absence of splenomegaly by physical exam.  In the light of the presence of  JAK2 V617F detected on the peripheral blood, these findings could be in keeping with the diagnosis of essential thrombocytocythaemia (ET).  However, only very rare megakaryocytes have deeply lobated nuclei and the overall morphology and histotopography of megakaryocytes are not typical of ET.  In addition, rare intrasinusoidal megakaryocytes are identified which could be suggestive of prefibrotic/early primary myelofibrosis (pre-PMF).       Overall, combining the history of intermittent thrombocytosis with borderline increase in platelet counts and the bone marrow morphologic findings, it appears that the disease is in the very early stage; hence, a close follow-up is warranted to monitor how this MPN with JAK2 V617F progresses.     IMAGING  As mentioned above in HPI.    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 There is no acquired von willebrand disease. Her marrow shows if anything very early disease with only very rare features of ET (megakaryocytes with deeply lobated nuclei) and rare features of pre-fibrotic PMF (intrasinusoidal megakaryocytes). She is at high risk for thrombosis, given that she is >60 and has a JAK2 mutation, and that she probably had a TIA in July 2023.     We discussed that platelet number can be increased but that the platelets themselves are more activated. ASA can help with  decreasing the activity of the platelets. HU decreases both platelet number, decreases the activity of the platelets, and a side benefit is decreasing WBC which can interact with platelets to cause thrombosis/vascular pathophysiology. Hopefully it may even contribute to decreased UP inflammation as well. We discussed that there aren't great treatments for decreasing the VAF of the JAK2+ clone at this point, but that the disease can be well managed with decreased rates of thrombosis, hemorrhage, and vasomotor symptoms for many years with HU and ASA. In some cases, ET can progress to MF, MDS, or AML, but this can take many years, so her counts will need to be monitored regularly.    I wonder if the dizziness is a potential vasomotor symptom. Will need to continue to watch this.    PLAN  - Follow up myeloid NGS panel  - Continue ASA 81mg BID  - Start HU 500mg daily  - Recheck labs in 2 weeks.  - Return with labs in 4 weeks (2/8/24) to discuss increase in HU.     We had a long discussion with the patient and her  about the therapeutic possibilities and necessary follow up. All questions were answered to their satisfaction.    I spent 60 minutes on the date of service reviewing medical records from the referring provider, reviewing previous lab and imaging results as summarized above, obtaining and reviewing records from [facility] as summarized above, obtaining a history from the patient, performing a physical exam, counseling and educating the patient on the diagnosis and treatment, entering orders for tests, evaluating a potentially life or organ threatening problem, intensively monitoring treatments with high risk of toxicity, coordinating care, and documenting in the electronic medical record.    Thank you for allowing me to participate in the care of this patient. Please do not hesitate to contact me if there are any concerns or questions.     Amos Milner MD   of Medicine  Classical  Hematology and Blood and Marrow Transplantation  Division of Hematology, Oncology, and Transplantation  [facility]

## 2024-01-12 NOTE — PROGRESS NOTES
[facility]: Cancer Care                                                                                          RNCC received voicemail from patient stating that she moved her appointment up and wanted to be sure this was okay.   RNCC spoke with Dr. Milner and he stated yes this was appropriate. RNCC called patient back to inform her of this.   Patient stated an understanding and had no other questions.     Signature:  Beatriz Rm RN

## 2024-01-12 NOTE — LETTER
"    2024         RE: Debra Denise  [id number]  [place] [address]        Dear Colleague,    Thank you for referring your patient, Debra Denise, to the [facility] BLOOD AND MARROW TRANSPLANT PROGRAM [place]. Please see a copy of my visit note below.    [facility]  HEMATOLOGY FOLLOW UP    Debar Denise   : 1940   MRN: [id number]  Date of service: 2024     REASON FOR VISIT: JAK2+ essential thrombocytopenia  Referred by Dr. 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     Per chart review and discussion with the patient,  - [id number]: Persistent progressive dizziness, non-positional, intermittent, accompanied by nausea without vomiting, night time and day time, no improvement with Epley maneuver, and possible component of postural hypotension. Also with mild intermittent headache, mild fatigue over , has not felt \"completely normal\"  - Early 2023: Sudden onset LUE weakness x 15 sec which self resolved. CT, MRI/MRA normal. Echo normal. Ziopatch without arrhythmia. Started on clopidogrel and atorvastatin. Not on ASA because of the ulcerative proctitis. Prior cath  neg for CAD.   - : Prior plt counts normal. 23 491. Plts normal in 7/2023 x 3. Plts 462-450 in 2023. 23 JAK2+ 0.1884 triggering hematology referral. Plts 513 at time of first heme eval on 23. Remainder of CBC normal. No splenomegaly. Her UP has been under control with vedolizumab q2mo since May 2023. CRP and ESR improved to normal.  - 23: Stopped clopidogrel, restarted ASA 81mg BID  - 23: " Scheduled sigmoidoscopy showing erythema in rectum, biopsies taken, diverticulosis, and internal hemorrhoids; complicated by rectal bleeding with large clots, requiring overnight stay at [facility]. ASA held. Hgb 14.2->12.8. Mesalamine suppository added. No recurrence of bleeding. Heme onc consult recommended resuming ASA when bleeding risk permits. GI recommended restarting ASA BID on 12/30.  - 1/9/2024: Disease evaluation - Plt 474, rest of CBC normal. INR 1.21-1.29, otherwise PTT, fibrinogen, F8, VWF panel normal. CMP, LDH (192), Ferritin, iron sat normal. Epo 6. ESR and CRP neg. BMBx with normocellular to slightly hypercellular marrow for age (30%), TLH, no overt dysplasia, 1% blasts, MF-0. BCR-ABL negative. Myeloid NGS pending. So far MIPSS-ET score is 4 (for age, with potentially 2 pts if SRSF2, SF3B1, U2AF1, or [id number]).     INTERVAL HISTORY  She is here with her  Aguilar today.   She has a little more bruising after PIVs. GI bleeding has resolved.   Still has the lightheadedness but that seems to be chronic. More recently she is drinking more water and she is less nauseous. Now it is just a little lightheadedness.  She has had some nystagmus. She tried eye physical therapy.     No TIA/stroke/VTE symptoms, erythromelalgia, pruritus, fevers, chills, weight loss, appetite loss.     REVIEW OF SYSTEMS  A 10 point review of systems was performed and was otherwise negative except as mentioned in the HPI.     PAST MEDICAL HISTORY  Past Medical History:   Diagnosis Date    Autoimmune disease (H24) approx. 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 30 years ago    Osteopenia     Polymyalgia rheumatica (H24)     PONV (postoperative nausea and vomiting)     Proctitis     Reduced vision Sometime is a little blurry     PAST SURGICAL HISTORY  Past Surgical History:   Procedure Laterality Date    COLONOSCOPY   2014    Procedure: COMBINED COLONOSCOPY, SINGLE BIOPSY/POLYPECTOMY BY BIOPSY;  COLONOSCOPY;  Surgeon: Carol Ann Plasencia MD;  Location:  GI    CV CORONARY ANGIOGRAM N/A 2022    Procedure: Coronary Angiogram;  Surgeon: Trevin Hawk MD;  Location:  HEART CARDIAC CATH LAB    ENT SURGERY      tonsilectomy, adenoidectomy    ESOPHAGOSCOPY, GASTROSCOPY, DUODENOSCOPY (EGD), COMBINED N/A 2023    Procedure: ESOPHAGOGASTRODUODENOSCOPY, WITH BIOPSY;  Surgeon: Angel Lara MD;  Location:  GI    GYN SURGERY  ,     x 2    HYSTERECTOMY      ORTHOPEDIC SURGERY  2004    (R) shoulder surgery for frozen shoulder    ZAC BSO      for fibroids    TONSILLECTOMY  About 50 years ago     SOCIAL HISTORY  Reviewed, and any changes made accordingly  Social History     Socioeconomic History    Marital status:      Spouse name: Not on file    Number of children: Not on file    Years of education: Not on file    Highest education level: Not on file   Occupational History    Not on file   Tobacco Use    Smoking status: Never    Smokeless tobacco: Never    Tobacco comments:     None   Vaping Use    Vaping Use: Never used   Substance and Sexual Activity    Alcohol use: No    Drug use: No    Sexual activity: Not Currently     Partners: Male     Birth control/protection: None   Other Topics Concern    Parent/sibling w/ CABG, MI or angioplasty before 65F 55M? Not Asked   Social History Narrative    Not on file     Social Determinants of Health     Financial Resource Strain: Low Risk  (2023)    Financial Resource Strain     Within the past 12 months, have you or your family members you live with been unable to get utilities (heat, electricity) when it was really needed?: No   Food Insecurity: Low Risk  (2023)    Food Insecurity     Within the past 12 months, did you worry that your food would run out before you got money to buy more?: No     Within the past 12  months, did the food you bought just not last and you didn t have money to get more?: No   Transportation Needs: Low Risk  (12/19/2023)    Transportation Needs     Within the past 12 months, has lack of transportation kept you from medical appointments, getting your medicines, non-medical meetings or appointments, work, or from getting things that you need?: No   Physical Activity: Not on file   Stress: Not on file   Social Connections: Not on file   Interpersonal Safety: Low Risk  (12/26/2023)    Interpersonal Safety     Do you feel physically and emotionally safe where you currently live?: Yes     Within the past 12 months, have you been hit, slapped, kicked or otherwise physically hurt by someone?: No     Within the past 12 months, have you been humiliated or emotionally abused in other ways by your partner or ex-partner?: No   Housing Stability: Low Risk  (12/19/2023)    Housing Stability     Do you have housing? : Yes     Are you worried about losing your housing?: No     FAMILY HISTORY  Reviewed, and any changes made accordingly  Family History   Problem Relation Age of Onset    Cancer - colorectal Father     Lung Cancer Father     Macular Degeneration Father     Multiple myeloma Brother     Pacemaker Brother     Hypertension Mother     Cerebrovascular Disease Mother         from misdiagnosis of temporal arteritis    Suicide Sister     Hypertension Maternal Grandmother     Cerebrovascular Disease Maternal Grandmother     Diabetes Maternal Grandmother        MEDICATIONS  Current Outpatient Medications   Medication    aspirin 81 MG EC tablet    atorvastatin (LIPITOR) 10 MG tablet    budesonide (ENTOCORT EC) 3 MG EC capsule    Cholecalciferol (VITAMIN D3 PO)    fluocinonide (LIDEX) 0.05 % external cream    gabapentin (NEURONTIN) 100 MG capsule    mesalamine (CANASA) 1000 MG suppository    multivitamin w/minerals (CENTRUM ADULTS) tablet    ondansetron (ZOFRAN ODT) 4 MG ODT tab    Probiotic Product (PROBIOTIC PO)     sodium chloride 0.9 % SOLN 250 mL with vedolizumab 60 MG/ML SOLR 300 mg infusionn    valsartan-hydrochlorothiazide (DIOVAN HCT) 160-25 MG tablet     No current facility-administered medications for this visit.     ALLERGIES  Allergies   Allergen Reactions    Sulfacetamide Hives    Adhesive Tape Rash    Aspirin      Other Reaction(s): Stomach issues    Atenolol      Other reaction(s): Intolerance-Can't Take  Fatigue, GI intolerance    Ibuprofen      Other reaction(s): Stomach Upset  4-9-13 tele encounter  Other Reaction(s): GI intolerance       Naproxen      Other reaction(s): Stomach Upset  4-9-13 tele encounter  Other Reaction(s): GI intolerance    Ramipril Cough     Other reaction(s): Intolerance-Can't Take  Other Reaction(s): GI intolerance    Sulfa Antibiotics     Codeine Nausea    Fentanyl Nausea       PHYSICAL EXAM  /63   Pulse 60   Temp 98.1  F (36.7  C) (Oral)   Resp 16   Wt 61 kg (134 lb 6.4 oz)   SpO2 97%   BMI 22.37 kg/m     Wt Readings from Last 10 Encounters:   01/12/24 61 kg (134 lb 6.4 oz)   01/09/24 59.8 kg (131 lb 14.4 oz)   01/08/24 60.3 kg (133 lb)   12/28/23 59.4 kg (131 lb)   12/26/23 61.2 kg (134 lb 14.4 oz)   12/07/23 61.6 kg (135 lb 14.4 oz)   10/31/23 62.1 kg (137 lb)   09/05/23 62.1 kg (136 lb 12.8 oz)   07/27/23 61.7 kg (136 lb)   07/12/23 63 kg (138 lb 15.8 oz)     Constitutional: Awake, alert, cooperative, in NAD.  Eyes: EOMI, sclera clear, conjunctiva normal.  ENT: Normocephalic, without obvious abnormality, oral pharynx with moist mucus membranes  Respiratory: Non-labored breathing, good air exchange  Cardiovascular: well perfused  GI: soft, non-distended  Skin: No concerning lesions or rash on exposed areas.  Musculoskeletal: No edema romario LEs.  Neurologic: Awake, alert & oriented x3.  Psych: appropriate affect    LABS  Recent Labs   Lab Test 01/09/24  0908 02/11/22  0940 01/28/21  1133 09/14/20  0839 03/25/20  1101 01/29/20  1108   WBC 10.3   < > 8.2 7.7 8.7 8.8   HGB  13.8   < > 13.8 14.2 14.9 14.2   *   < > 414 364 409 367   MCV 89   < > 93 92 92 95   RDW 14.1   < > 12.5 12.4 12.6 12.6   ANEU  --   --  4.7 4.6 6.0 5.3   ALYM  --   --  2.3 1.9 1.7 2.3   DEANGELO  --   --  0.9 0.9 0.8 0.8   AEOS  --   --  0.2 0.2 0.2 0.3    < > = values in this interval not displayed.     Recent Labs   Lab Test 01/09/24  0908 12/28/23  1812 12/07/23  1522 09/14/20  0839 03/25/20  1101      < > 140   < > 141   POTASSIUM 3.7   < > 3.4   < > 3.5   CHLORIDE 102   < > 101   < > 106   CO2 27   < > 31*   < > 32   BUN 14.9   < > 18.3   < > 14   CR 0.69   < > 0.86   < > 0.72   PRASHANTH 9.2   < > 9.4   < > 9.2   LDH  --   --  192  --  146   URIC  --   --   --   --  6.8*    < > = values in this interval not displayed.     Recent Labs   Lab Test 01/09/24  0908 12/29/23  0731 12/07/23  1522 07/11/23  0855   AST 17 14 21 13   ALT 18 16 10 13   ALKPHOS 62 62 74 67   ALBUMIN 4.1 3.5 4.1 4.0   PROTTOTAL 6.7 5.7* 7.0 6.7   BILITOTAL 0.6 0.8 0.3 0.4      Recent Labs   Lab Test 07/08/22  1120 03/25/20  1101    207   IGM  --  74   ELPM  --  0.0   ELPINT  --  Essentially normal electrophoretic pattern.  No obvious monoclonal proteins seen.    Pathologic significance requires clinical correlation.  OSMANY Orr M.D., Ph.D.,   Pathologist ().     IEP  --  No monoclonal protein seen on immunofixation.  Pathological significance requires clinical   correlation.     KAPPAFREELT  --  <2.83*   LAMBDAFREELT  --  1.01   KLR  --  Unable to Calculate      Recent Labs   Lab Test 01/09/24  0908 12/26/23  1522 12/07/23  1522 03/25/20  1101 09/20/16  1324   RETICABSCT  --  0.055 0.061   < >  --    RETP  --  1.2 1.3   < >  --    IRON 78  --   --   --  95   IRONSAT 25  --   --   --  26   HANNAH 61  --   --   --  56     --   --   --  362   B12  --   --   --   --  883   FOLIC  --   --   --   --  64.6   EPOE 6  --   --   --   --    LDH  --   --  192   < >  --     < > = values in this interval not displayed.  "    No results for input(s): \"MORPH\" in the last 98084 hours.  No results for input(s): \"HCVAB\", \"HCABC\", \"HBCAB\", \"AUSAB\", \"HIV\" in the last 97011 hours.  Recent Labs   Lab Test 01/09/24  0908 12/07/23  1522   INR 1.21* 1.29*   PTT 31  --    FIBR 300 413      Overall, the bone marrow is normocellular with normal myeloid to erythroid ratio, very slightly increased megakaryocytes, and normal marrow fibrosis. In addition, patient has normal level of LDH (192 U/L) and Erythropoietin (6; range 4-27 mU/mL) and absence of splenomegaly by physical exam.  In the light of the presence of  JAK2 V617F detected on the peripheral blood, these findings could be in keeping with the diagnosis of essential thrombocytocythaemia (ET).  However, only very rare megakaryocytes have deeply lobated nuclei and the overall morphology and histotopography of megakaryocytes are not typical of ET.  In addition, rare intrasinusoidal megakaryocytes are identified which could be suggestive of prefibrotic/early primary myelofibrosis (pre-PMF).       Overall, combining the history of intermittent thrombocytosis with borderline increase in platelet counts and the bone marrow morphologic findings, it appears that the disease is in the very early stage; hence, a close follow-up is warranted to monitor how this MPN with JAK2 V617F progresses.     IMAGING  As mentioned above in HPI.    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 There is no acquired von willebrand disease. Her marrow shows if anything very early disease with only very rare features of ET (megakaryocytes with deeply lobated nuclei) and " rare features of pre-fibrotic PMF (intrasinusoidal megakaryocytes). She is at high risk for thrombosis, given that she is >60 and has a JAK2 mutation, and that she probably had a TIA in July 2023.     We discussed that platelet number can be increased but that the platelets themselves are more activated. ASA can help with decreasing the activity of the platelets. HU decreases both platelet number, decreases the activity of the platelets, and a side benefit is decreasing WBC which can interact with platelets to cause thrombosis/vascular pathophysiology. Hopefully it may even contribute to decreased UP inflammation as well. We discussed that there aren't great treatments for decreasing the VAF of the JAK2+ clone at this point, but that the disease can be well managed with decreased rates of thrombosis, hemorrhage, and vasomotor symptoms for many years with HU and ASA. In some cases, ET can progress to MF, MDS, or AML, but this can take many years, so her counts will need to be monitored regularly.    I wonder if the dizziness is a potential vasomotor symptom. Will need to continue to watch this.    PLAN  - Follow up myeloid NGS panel  - Continue ASA 81mg BID  - Start HU 500mg daily  - Recheck labs in 2 weeks.  - Return with labs in 4 weeks (2/8/24) to discuss increase in HU.     We had a long discussion with the patient and her  about the therapeutic possibilities and necessary follow up. All questions were answered to their satisfaction.    I spent 60 minutes on the date of service reviewing medical records from the referring provider, reviewing previous lab and imaging results as summarized above, obtaining and reviewing records from [facility] as summarized above, obtaining a history from the patient, performing a physical exam, counseling and educating the patient on the diagnosis and treatment, entering orders for tests, evaluating a potentially life or organ threatening problem, intensively monitoring  treatments with high risk of toxicity, coordinating care, and documenting in the electronic medical record.    Thank you for allowing me to participate in the care of this patient. Please do not hesitate to contact me if there are any concerns or questions.     Amos Milner MD   of Medicine  Classical Hematology and Blood and Marrow Transplantation  Division of Hematology, Oncology, and Transplantation  [facility]

## 2024-01-12 NOTE — NURSING NOTE
"Oncology Rooming Note    January 12, 2024 3:03 PM   Debra Denise is a 83 year old female who presents for:    Chief Complaint   Patient presents with    Oncology Clinic Visit     RTN for Thrombocytosis     Initial Vitals: /63   Pulse 60   Temp 98.1  F (36.7  C) (Oral)   Resp 16   Wt 61 kg (134 lb 6.4 oz)   SpO2 97%   BMI 22.37 kg/m   Estimated body mass index is 22.37 kg/m  as calculated from the following:    Height as of 12/28/23: 1.651 m (5' 5\").    Weight as of this encounter: 61 kg (134 lb 6.4 oz). Body surface area is 1.67 meters squared.  No Pain (0) Comment: Data Unavailable   No LMP recorded. Patient has had a hysterectomy.  Allergies reviewed: Yes  Medications reviewed: Yes    Medications: Medication refills not needed today.  Pharmacy name entered into [facility]: [id number] - [place], [place] - [address] [place] [address]    Frailty Screening:   Is the patient here for a new oncology consult visit in cancer care? 2. No      Clinical concerns: none       Jazmine Andino MA             "

## 2024-01-16 LAB — VWF MULTIMERS PPP QL: NORMAL

## 2024-01-17 LAB
MISCELLANEOUS TEST 1 (ARUP): NORMAL
VON WILLEBRAND EVAL PPP-IMP: NORMAL

## 2024-01-22 ENCOUNTER — DOCUMENTATION ONLY (OUTPATIENT)
Dept: INTERNAL MEDICINE | Facility: CLINIC | Age: 84
End: 2024-01-22
Payer: COMMERCIAL

## 2024-01-22 NOTE — PROGRESS NOTES
Type of Form Received:     Form Received (Date) 1/22/24   Form Filled out Yes 1/29/24   Placed in provider folder Yes

## 2024-01-25 ENCOUNTER — LAB (OUTPATIENT)
Dept: LAB | Facility: CLINIC | Age: 84
End: 2024-01-25
Attending: INTERNAL MEDICINE
Payer: COMMERCIAL

## 2024-01-25 DIAGNOSIS — D47.3 ESSENTIAL THROMBOCYTHEMIA (H): ICD-10-CM

## 2024-01-25 LAB
ALBUMIN SERPL BCG-MCNC: 4.2 G/DL (ref 3.5–5.2)
ALP SERPL-CCNC: 63 U/L (ref 40–150)
ALT SERPL W P-5'-P-CCNC: 17 U/L (ref 0–50)
ANION GAP SERPL CALCULATED.3IONS-SCNC: 6 MMOL/L (ref 7–15)
AST SERPL W P-5'-P-CCNC: 16 U/L (ref 0–45)
BASOPHILS # BLD AUTO: 0 10E3/UL (ref 0–0.2)
BASOPHILS NFR BLD AUTO: 0 %
BILIRUB SERPL-MCNC: 0.4 MG/DL
BUN SERPL-MCNC: 16.6 MG/DL (ref 8–23)
CALCIUM SERPL-MCNC: 9.3 MG/DL (ref 8.8–10.2)
CHLORIDE SERPL-SCNC: 102 MMOL/L (ref 98–107)
CREAT SERPL-MCNC: 0.76 MG/DL (ref 0.51–0.95)
CULTURE HARVEST COMPLETE DATE: NORMAL
DEPRECATED HCO3 PLAS-SCNC: 32 MMOL/L (ref 22–29)
EGFRCR SERPLBLD CKD-EPI 2021: 77 ML/MIN/1.73M2
EOSINOPHIL # BLD AUTO: 0.2 10E3/UL (ref 0–0.7)
EOSINOPHIL NFR BLD AUTO: 2 %
ERYTHROCYTE [DISTWIDTH] IN BLOOD BY AUTOMATED COUNT: 14.7 % (ref 10–15)
GLUCOSE SERPL-MCNC: 116 MG/DL (ref 70–99)
HCT VFR BLD AUTO: 39.9 % (ref 35–47)
HGB BLD-MCNC: 13.2 G/DL (ref 11.7–15.7)
IMM GRANULOCYTES # BLD: 0.1 10E3/UL
IMM GRANULOCYTES NFR BLD: 1 %
INTERPRETATION: NORMAL
ISCN: NORMAL
LYMPHOCYTES # BLD AUTO: 2.5 10E3/UL (ref 0.8–5.3)
LYMPHOCYTES NFR BLD AUTO: 23 %
MCH RBC QN AUTO: 29.9 PG (ref 26.5–33)
MCHC RBC AUTO-ENTMCNC: 33.1 G/DL (ref 31.5–36.5)
MCV RBC AUTO: 91 FL (ref 78–100)
METHODS: NORMAL
MONOCYTES # BLD AUTO: 0.9 10E3/UL (ref 0–1.3)
MONOCYTES NFR BLD AUTO: 9 %
NEUTROPHILS # BLD AUTO: 6.9 10E3/UL (ref 1.6–8.3)
NEUTROPHILS NFR BLD AUTO: 65 %
NRBC # BLD AUTO: 0 10E3/UL
NRBC BLD AUTO-RTO: 0 /100
PLATELET # BLD AUTO: 444 10E3/UL (ref 150–450)
POTASSIUM SERPL-SCNC: 3.4 MMOL/L (ref 3.4–5.3)
PROT SERPL-MCNC: 6.6 G/DL (ref 6.4–8.3)
RBC # BLD AUTO: 4.41 10E6/UL (ref 3.8–5.2)
SODIUM SERPL-SCNC: 140 MMOL/L (ref 135–145)
WBC # BLD AUTO: 10.5 10E3/UL (ref 4–11)

## 2024-01-25 PROCEDURE — 85025 COMPLETE CBC W/AUTO DIFF WBC: CPT

## 2024-01-25 PROCEDURE — 36415 COLL VENOUS BLD VENIPUNCTURE: CPT

## 2024-01-25 PROCEDURE — 80053 COMPREHEN METABOLIC PANEL: CPT

## 2024-01-26 NOTE — TELEPHONE ENCOUNTER
Dear Rachel;    I saw Debra yesterday and reviewed her MRI with her. She still has L hip pain and needs to see orthopedics; specifically orthopedic hip, Dr. Higuera, Dr. Neumann, or Dr. Cooper. Placed ortho referral this encounter. Debra is aware of this referral. Please help coordinate.    RUTH Coates    Pt called and plan of care discussed. Clinic coordinators will assist in scheduling appt.  Clinic numbers given for questions and or concerns.  Rachel Aleman RN 11:38 AM on 3/24/2021.     The patient is Watcher - Medium risk of patient condition declining or worsening    Shift Goals  Clinical Goals: remain off HHFNC, SBP<160  Patient Goals: polo  Family Goals: polo    Progress made toward(s) clinical / shift goals:    Problem: Hemodynamics  Goal: Patient's hemodynamics, fluid balance and neurologic status will be stable or improve  Outcome: Progressing     Problem: Fluid Volume  Goal: Fluid volume balance will be maintained  Outcome: Progressing     Problem: Urinary - Renal Perfusion  Goal: Ability to achieve and maintain adequate renal perfusion and functioning will improve  Outcome: Progressing     Problem: Respiratory  Goal: Patient will achieve/maintain optimum respiratory ventilation and gas exchange  Outcome: Progressing     Problem: Mechanical Ventilation  Goal: Patient will be able to express needs and understand communication  Outcome: Progressing     Problem: Physical Regulation  Goal: Diagnostic test results will improve  Outcome: Progressing     Problem: Physical Regulation  Goal: Signs and symptoms of infection will decrease  Outcome: Progressing     Problem: Knowledge Deficit - Standard  Goal: Patient and family/care givers will demonstrate understanding of plan of care, disease process/condition, diagnostic tests and medications  Outcome: Progressing     Problem: Skin Integrity  Goal: Skin integrity is maintained or improved  Outcome: Progressing     Problem: Fall Risk  Goal: Patient will remain free from falls  Outcome: Progressing     Problem: Safety - Medical Restraint  Goal: Free from restraint(s) (Restraint for Interference with Medical Device)  Outcome: Progressing     Problem: Safety - Medical Restraint  Goal: Remains free of injury from restraints (Restraint for Interference with Medical Device)  Outcome: Progressing     Problem: Pain - Standard  Goal: Alleviation of pain or a reduction in pain to the patient’s comfort goal  Outcome: Progressing       Patient is not progressing  towards the following goals:N/A

## 2024-01-29 ENCOUNTER — TRANSFERRED RECORDS (OUTPATIENT)
Dept: HEALTH INFORMATION MANAGEMENT | Facility: CLINIC | Age: 84
End: 2024-01-29
Payer: COMMERCIAL

## 2024-02-05 NOTE — PROGRESS NOTES
"[facility]  HEMATOLOGY FOLLOW UP    Debra Denise   : 1940   MRN: [id number]  Date of service: 2024     REASON FOR VISIT: JAK2+ essential thrombocytopenia  Referred by Dr. 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     Per chart review and discussion with the patient,  - [id number]: Persistent progressive dizziness, non-positional, intermittent, accompanied by nausea without vomiting, night time and day time, no improvement with Epley maneuver, and possible component of postural hypotension. Also with mild intermittent headache, mild fatigue over , has not felt \"completely normal\"  - Early 2023: Sudden onset LUE weakness x 15 sec which self resolved. CT, MRI/MRA normal. Echo normal. Ziopatch without arrhythmia. Started on clopidogrel and atorvastatin. Not on ASA because of the ulcerative proctitis. Prior cath  neg for CAD.   - : Prior plt counts normal. 23 491. Plts normal in 7/2023 x 3. Plts 462-450 in 2023. 23 JAK2+ 0.1884 triggering hematology referral. Plts 513 at time of first heme eval on 23. Remainder of CBC normal. No splenomegaly. Her UP has been under control with vedolizumab q2mo since May 2023. CRP and ESR improved to normal.  - 23: Stopped clopidogrel, restarted ASA 81mg BID  - 23: Scheduled sigmoidoscopy showing erythema in rectum, biopsies taken, diverticulosis, and internal hemorrhoids; complicated by rectal bleeding with large clots, requiring overnight stay at [facility]. ASA held. Hgb 14.2->12.8. Mesalamine suppository added. No recurrence of bleeding. Heme onc consult recommended resuming " ASA when bleeding risk permits. GI recommended restarting ASA BID on 12/30.  - 1/9/2024: Disease evaluation - Plt 474, rest of CBC normal. INR 1.21-1.29, otherwise PTT, fibrinogen, F8, VWF panel normal. CMP, LDH (192), Ferritin, iron sat normal. Epo 6. ESR and CRP neg. BMBx with normocellular to slightly hypercellular marrow for age (30%), TLH, no overt dysplasia, 1% blasts, MF-0. BCR-ABL negative. Myeloid NGS CHANDNI 11.1%, TET2 5.5%. MIPSS-ET score is 4 (for age alone).     Dates Treatment Response Toxicity   7/2023 Clopidogrel, atorvastatin (for TIA), not on ASA because of ulcerative proctitis (UP) Plts normal 7/2023 x 3,  Plts 462-450 in 9/2023  Plts 513 on 12/7/23 12/7/23 Stop clopidogrel, started ASA 81 BID. Held briefly for sigmoidoscopy 12/28/23. Restarted ASA on 12/30/23 Plts 474 on 1/9/24  BMBx as above.  Rectal bleeding with large clots with sigmoidoscopy, overnight stay needed, but resolved on own.   1/12/23 HU 500mg daily Plts 444 on 1/25/24  Plts 368 on 2/6/24   None known.  HCO3 is a bit high?     INTERVAL HISTORY  She is here with her  Aguilar today.   Lightheadedness and nausea haven't really changed.  Bladder infection prior to starting. It was a typical UTI, but it recurred recently, got another culture. Still having some urinary discomfort. She had a positive cx at [facility] but most recent UA is clear.  She has some occasional bleeding from the proctitis.    No TIA/stroke/VTE symptoms, erythromelalgia, pruritus, fevers, chills, weight loss, appetite loss.     REVIEW OF SYSTEMS  A 10 point review of systems was performed and was otherwise negative except as mentioned in the HPI.     PAST MEDICAL HISTORY  Past Medical History:   Diagnosis Date    Autoimmune disease (H24) approx. 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 30 years ago    Osteopenia     Polymyalgia rheumatica (H24)     PONV  (postoperative nausea and vomiting)     Proctitis     Reduced vision Sometime is a little blurry     PAST SURGICAL HISTORY  Past Surgical History:   Procedure Laterality Date    COLONOSCOPY  2014    Procedure: COMBINED COLONOSCOPY, SINGLE BIOPSY/POLYPECTOMY BY BIOPSY;  COLONOSCOPY;  Surgeon: Carol Ann Plasencia MD;  Location: [facility]    CV CORONARY ANGIOGRAM N/A 2022    Procedure: Coronary Angiogram;  Surgeon: Trevin Hawk MD;  Location:  [facility]    ENT SURGERY      tonsilectomy, adenoidectomy    ESOPHAGOSCOPY, GASTROSCOPY, DUODENOSCOPY (EGD), COMBINED N/A 2023    Procedure: ESOPHAGOGASTRODUODENOSCOPY, WITH BIOPSY;  Surgeon: Angel Lara MD;  Location:  GI    GYN SURGERY  ,     x 2    HYSTERECTOMY      ORTHOPEDIC SURGERY  2004    (R) shoulder surgery for frozen shoulder    ZAC BSO      for fibroids    TONSILLECTOMY  About 50 years ago     SOCIAL HISTORY  Reviewed, and any changes made accordingly  Social History     Socioeconomic History    Marital status:      Spouse name: Not on file    Number of children: Not on file    Years of education: Not on file    Highest education level: Not on file   Occupational History    Not on file   Tobacco Use    Smoking status: Never    Smokeless tobacco: Never    Tobacco comments:     None   Vaping Use    Vaping Use: Never used   Substance and Sexual Activity    Alcohol use: No    Drug use: No    Sexual activity: Not Currently     Partners: Male     Birth control/protection: None   Other Topics Concern    Parent/sibling w/ CABG, MI or angioplasty before 65F 55M? Not Asked   Social History Narrative    Not on file     Social Determinants of Health     Financial Resource Strain: Low Risk  (2023)    Financial Resource Strain     Within the past 12 months, have you or your family members you live with been unable to get utilities (heat, electricity) when it was really needed?: No    Food Insecurity: Low Risk  (12/19/2023)    Food Insecurity     Within the past 12 months, did you worry that your food would run out before you got money to buy more?: No     Within the past 12 months, did the food you bought just not last and you didn t have money to get more?: No   Transportation Needs: Low Risk  (12/19/2023)    Transportation Needs     Within the past 12 months, has lack of transportation kept you from medical appointments, getting your medicines, non-medical meetings or appointments, work, or from getting things that you need?: No   Physical Activity: Not on file   Stress: Not on file   Social Connections: Not on file   Interpersonal Safety: Low Risk  (12/26/2023)    Interpersonal Safety     Do you feel physically and emotionally safe where you currently live?: Yes     Within the past 12 months, have you been hit, slapped, kicked or otherwise physically hurt by someone?: No     Within the past 12 months, have you been humiliated or emotionally abused in other ways by your partner or ex-partner?: No   Housing Stability: Low Risk  (12/19/2023)    Housing Stability     Do you have housing? : Yes     Are you worried about losing your housing?: No     FAMILY HISTORY  Reviewed, and any changes made accordingly  Family History   Problem Relation Age of Onset    Cancer - colorectal Father     Lung Cancer Father     Macular Degeneration Father     Multiple myeloma Brother     Pacemaker Brother     Hypertension Mother     Cerebrovascular Disease Mother         from misdiagnosis of temporal arteritis    Suicide Sister     Hypertension Maternal Grandmother     Cerebrovascular Disease Maternal Grandmother     Diabetes Maternal Grandmother        MEDICATIONS  Current Outpatient Medications   Medication    aspirin 81 MG EC tablet    atorvastatin (LIPITOR) 10 MG tablet    budesonide (ENTOCORT EC) 3 MG EC capsule    Cholecalciferol (VITAMIN D3 PO)    fluocinonide (LIDEX) 0.05 % external cream    gabapentin  (NEURONTIN) 100 MG capsule    hydroxyurea (HYDREA) 500 MG capsule    mesalamine (CANASA) 1000 MG suppository    multivitamin w/minerals (CENTRUM ADULTS) tablet    ondansetron (ZOFRAN ODT) 4 MG ODT tab    Probiotic Product (PROBIOTIC PO)    sodium chloride 0.9 % SOLN 250 mL with vedolizumab 60 MG/ML SOLR 300 mg infusionn    valsartan-hydrochlorothiazide (DIOVAN HCT) 160-25 MG tablet     No current facility-administered medications for this visit.     ALLERGIES  Allergies   Allergen Reactions    Sulfacetamide Hives    Adhesive Tape Rash    Aspirin      Other Reaction(s): Stomach issues    Atenolol      Other reaction(s): Intolerance-Can't Take  Fatigue, GI intolerance    Ibuprofen      Other reaction(s): Stomach Upset  4-9-13 tele encounter  Other Reaction(s): GI intolerance       Naproxen      Other reaction(s): Stomach Upset  4-9-13 tele encounter  Other Reaction(s): GI intolerance    Ramipril Cough     Other reaction(s): Intolerance-Can't Take  Other Reaction(s): GI intolerance    Sulfa Antibiotics     Codeine Nausea    Fentanyl Nausea       PHYSICAL EXAM  There were no vitals taken for this visit.   Wt Readings from Last 10 Encounters:   01/12/24 61 kg (134 lb 6.4 oz)   01/09/24 59.8 kg (131 lb 14.4 oz)   01/08/24 60.3 kg (133 lb)   12/28/23 59.4 kg (131 lb)   12/26/23 61.2 kg (134 lb 14.4 oz)   12/07/23 61.6 kg (135 lb 14.4 oz)   10/31/23 62.1 kg (137 lb)   09/05/23 62.1 kg (136 lb 12.8 oz)   07/27/23 61.7 kg (136 lb)   07/12/23 63 kg (138 lb 15.8 oz)     General: Well appearing.  HEENT: Sclerae anicteric.  Lungs: Breathing comfortably. No cough.  MSK: Grossly normal movement.  Neuro: Grossly non-focal.  Skin/access: Normal skin tone.  Psych: Alert and oriented. No distress.    LABS  Recent Labs   Lab Test 01/25/24  1057 02/11/22  0940 01/28/21  1133 09/14/20  0839 03/25/20  1101 01/29/20  1108   WBC 10.5   < > 8.2 7.7 8.7 8.8   HGB 13.2   < > 13.8 14.2 14.9 14.2      < > 414 364 409 367   MCV 91   < > 93  "92 92 95   RDW 14.7   < > 12.5 12.4 12.6 12.6   ANEU  --   --  4.7 4.6 6.0 5.3   ALYM  --   --  2.3 1.9 1.7 2.3   DEANGELO  --   --  0.9 0.9 0.8 0.8   AEOS  --   --  0.2 0.2 0.2 0.3    < > = values in this interval not displayed.     Recent Labs   Lab Test 01/25/24  1057 12/28/23  1812 12/07/23  1522 09/14/20  0839 03/25/20  1101      < > 140   < > 141   POTASSIUM 3.4   < > 3.4   < > 3.5   CHLORIDE 102   < > 101   < > 106   CO2 32*   < > 31*   < > 32   BUN 16.6   < > 18.3   < > 14   CR 0.76   < > 0.86   < > 0.72   PRASHANTH 9.3   < > 9.4   < > 9.2   LDH  --   --  192  --  146   URIC  --   --   --   --  6.8*    < > = values in this interval not displayed.     Recent Labs   Lab Test 01/25/24  1057 01/09/24  0908 12/29/23  0731 12/07/23  1522   AST 16 17 14 21   ALT 17 18 16 10   ALKPHOS 63 62 62 74   ALBUMIN 4.2 4.1 3.5 4.1   PROTTOTAL 6.6 6.7 5.7* 7.0   BILITOTAL 0.4 0.6 0.8 0.3      Recent Labs   Lab Test 07/08/22  1120 03/25/20  1101    207   IGM  --  74   ELPM  --  0.0   ELPINT  --  Essentially normal electrophoretic pattern.  No obvious monoclonal proteins seen.    Pathologic significance requires clinical correlation.  OSMANY Orr M.D., Ph.D.,   Pathologist ().     IEP  --  No monoclonal protein seen on immunofixation.  Pathological significance requires clinical   correlation.     KAPPAFREELT  --  <2.83*   LAMBDAFREELT  --  1.01   KLR  --  Unable to Calculate      Recent Labs   Lab Test 01/09/24  0908 12/26/23  1522 12/07/23  1522 03/25/20  1101 09/20/16  1324   RETICABSCT  --  0.055 0.061   < >  --    RETP  --  1.2 1.3   < >  --    IRON 78  --   --   --  95   IRONSAT 25  --   --   --  26   HANNAH 61  --   --   --  56     --   --   --  362   B12  --   --   --   --  883   FOLIC  --   --   --   --  64.6   EPOE 6  --   --   --   --    LDH  --   --  192   < >  --     < > = values in this interval not displayed.     No results for input(s): \"MORPH\" in the last 96886 hours.  No results for " "input(s): \"HCVAB\", \"HCABC\", \"HBCAB\", \"AUSAB\", \"HIV\" in the last 94546 hours.  Recent Labs   Lab Test 01/09/24  0908 12/07/23  1522   INR 1.21* 1.29*   PTT 31  --    FIBR 300 413      Overall, the bone marrow is normocellular with normal myeloid to erythroid ratio, very slightly increased megakaryocytes, and normal marrow fibrosis. In addition, patient has normal level of LDH (192 U/L) and Erythropoietin (6; range 4-27 mU/mL) and absence of splenomegaly by physical exam.  In the light of the presence of  JAK2 V617F detected on the peripheral blood, these findings could be in keeping with the diagnosis of essential thrombocytocythaemia (ET).  However, only very rare megakaryocytes have deeply lobated nuclei and the overall morphology and histotopography of megakaryocytes are not typical of ET.  In addition, rare intrasinusoidal megakaryocytes are identified which could be suggestive of prefibrotic/early primary myelofibrosis (pre-PMF).       Overall, combining the history of intermittent thrombocytosis with borderline increase in platelet counts and the bone marrow morphologic findings, it appears that the disease is in the very early stage; hence, a close follow-up is warranted to monitor how this MPN with JAK2 V617F progresses.     IMAGING  As mentioned above in HPI.    ZOEY Alvareszane Denise is a 83 year old woman with a history as above, with the following issues:  JAK2 V617F+ (VAF 9/2023=0.1884) myeloproliferative neoplasm  Very early mild essential thrombocythemia vs very early pre-fibrotic primary myelofibrosis  TIA in July 2023    She has not had MPN-type vasomotor symptoms, other thromboses, bleeding manifestations, erythromelalgia, or splenomegaly. There is no acquired von willebrand disease. Her marrow shows if anything very early disease with only very rare features of ET (megakaryocytes with deeply lobated nuclei) and rare features of pre-fibrotic PMF (intrasinusoidal megakaryocytes). She is at " high risk for thrombosis, given that she is >60 and has a JAK2 mutation, and that she probably had a TIA in July 2023.     We discussed that platelet number can be increased but that the platelets themselves are more activated. ASA can help with decreasing the activity of the platelets. HU decreases both platelet number, decreases the activity of the platelets, and a side benefit is decreasing WBC which can interact with platelets to cause thrombosis/vascular pathophysiology. Hopefully it may even contribute to decreased UP inflammation as well. We discussed that there aren't great treatments for decreasing the VAF of the JAK2+ clone at this point, but that the disease can be well managed with decreased rates of thrombosis, hemorrhage, and vasomotor symptoms for many years with HU and ASA. In some cases, ET can progress to MF, MDS, or AML, but this can take many years, so her counts will need to be monitored regularly.    I wonder if the dizziness is a potential vasomotor symptom. Will need to continue to watch this. She hasn't noted any improvements with the ASA and HU.    PLAN  - Continue ASA 81mg BID  - Increase HU to 1000mg daily  - Recheck labs in 2 weeks, can back off on HU if needed.  - Monitor the metabolic alkalosis for now,may need further workup. Denies COPD or diuretics.  - Return by video 3/7/24 with Kasey Becker with labs 2 days prior to discuss increase in HU to 1500 if needed, but can err on the side of keeping same dose, especially if plts are <400.  - Then recheck labs around 3/21  - Follow up with me 4/4     We had a long discussion with the patient and her  about the therapeutic possibilities and necessary follow up. All questions were answered to their satisfaction.    I spent 40 minutes on the date of service reviewing medical records from the referring provider, reviewing previous lab and imaging results as summarized above, obtaining and reviewing records from [facility] as  summarized above, obtaining a history from the patient, performing a physical exam, counseling and educating the patient on the diagnosis and treatment, entering orders for tests, evaluating a potentially life or organ threatening problem, intensively monitoring treatments with high risk of toxicity, coordinating care, and documenting in the electronic medical record.    Thank you for allowing me to participate in the care of this patient. Please do not hesitate to contact me if there are any concerns or questions.     Amos Milner MD   of Medicine  Classical Hematology and Blood and Marrow Transplantation  Division of Hematology, Oncology, and Transplantation  [facility]

## 2024-02-06 ENCOUNTER — LAB (OUTPATIENT)
Dept: LAB | Facility: CLINIC | Age: 84
End: 2024-02-06
Attending: INTERNAL MEDICINE
Payer: COMMERCIAL

## 2024-02-06 ENCOUNTER — TELEPHONE (OUTPATIENT)
Dept: INTERNAL MEDICINE | Facility: CLINIC | Age: 84
End: 2024-02-06
Payer: COMMERCIAL

## 2024-02-06 DIAGNOSIS — R82.90 ABNORMAL URINE: ICD-10-CM

## 2024-02-06 DIAGNOSIS — R79.89 ELEVATED PLATELET COUNT: ICD-10-CM

## 2024-02-06 DIAGNOSIS — D47.3 ESSENTIAL THROMBOCYTHEMIA (H): ICD-10-CM

## 2024-02-06 DIAGNOSIS — R82.90 ABNORMAL URINE: Primary | ICD-10-CM

## 2024-02-06 LAB
ALBUMIN SERPL BCG-MCNC: 4.2 G/DL (ref 3.5–5.2)
ALBUMIN UR-MCNC: NEGATIVE MG/DL
ALP SERPL-CCNC: 71 U/L (ref 40–150)
ALT SERPL W P-5'-P-CCNC: 20 U/L (ref 0–50)
ANION GAP SERPL CALCULATED.3IONS-SCNC: 6 MMOL/L (ref 7–15)
APPEARANCE UR: CLEAR
AST SERPL W P-5'-P-CCNC: 18 U/L (ref 0–45)
BASOPHILS # BLD AUTO: 0 10E3/UL (ref 0–0.2)
BASOPHILS NFR BLD AUTO: 0 %
BILIRUB SERPL-MCNC: 0.4 MG/DL
BILIRUB UR QL STRIP: NEGATIVE
BUN SERPL-MCNC: 12.2 MG/DL (ref 8–23)
CALCIUM SERPL-MCNC: 9.1 MG/DL (ref 8.8–10.2)
CHLORIDE SERPL-SCNC: 96 MMOL/L (ref 98–107)
COLOR UR AUTO: NORMAL
CREAT SERPL-MCNC: 0.73 MG/DL (ref 0.51–0.95)
DEPRECATED HCO3 PLAS-SCNC: 34 MMOL/L (ref 22–29)
EGFRCR SERPLBLD CKD-EPI 2021: 81 ML/MIN/1.73M2
EOSINOPHIL # BLD AUTO: 0.2 10E3/UL (ref 0–0.7)
EOSINOPHIL NFR BLD AUTO: 2 %
ERYTHROCYTE [DISTWIDTH] IN BLOOD BY AUTOMATED COUNT: 16.1 % (ref 10–15)
GLUCOSE SERPL-MCNC: 69 MG/DL (ref 70–99)
GLUCOSE UR STRIP-MCNC: NEGATIVE MG/DL
HCT VFR BLD AUTO: 39.1 % (ref 35–47)
HGB BLD-MCNC: 13.2 G/DL (ref 11.7–15.7)
HGB UR QL STRIP: NEGATIVE
IMM GRANULOCYTES # BLD: 0 10E3/UL
IMM GRANULOCYTES NFR BLD: 1 %
KETONES UR STRIP-MCNC: NEGATIVE MG/DL
LEUKOCYTE ESTERASE UR QL STRIP: NEGATIVE
LYMPHOCYTES # BLD AUTO: 2.3 10E3/UL (ref 0.8–5.3)
LYMPHOCYTES NFR BLD AUTO: 26 %
MCH RBC QN AUTO: 31 PG (ref 26.5–33)
MCHC RBC AUTO-ENTMCNC: 33.8 G/DL (ref 31.5–36.5)
MCV RBC AUTO: 92 FL (ref 78–100)
MONOCYTES # BLD AUTO: 0.9 10E3/UL (ref 0–1.3)
MONOCYTES NFR BLD AUTO: 11 %
NEUTROPHILS # BLD AUTO: 5.1 10E3/UL (ref 1.6–8.3)
NEUTROPHILS NFR BLD AUTO: 60 %
NITRATE UR QL: NEGATIVE
NRBC # BLD AUTO: 0 10E3/UL
NRBC BLD AUTO-RTO: 0 /100
PH UR STRIP: 6.5 [PH] (ref 5–7)
PLATELET # BLD AUTO: 368 10E3/UL (ref 150–450)
POTASSIUM SERPL-SCNC: 3.6 MMOL/L (ref 3.4–5.3)
PROT SERPL-MCNC: 6.8 G/DL (ref 6.4–8.3)
RBC # BLD AUTO: 4.26 10E6/UL (ref 3.8–5.2)
SODIUM SERPL-SCNC: 136 MMOL/L (ref 135–145)
SP GR UR STRIP: 1 (ref 1–1.03)
UROBILINOGEN UR STRIP-MCNC: NORMAL MG/DL
WBC # BLD AUTO: 8.5 10E3/UL (ref 4–11)

## 2024-02-06 PROCEDURE — 81003 URINALYSIS AUTO W/O SCOPE: CPT

## 2024-02-06 PROCEDURE — 80053 COMPREHEN METABOLIC PANEL: CPT

## 2024-02-06 PROCEDURE — 85025 COMPLETE CBC W/AUTO DIFF WBC: CPT

## 2024-02-06 PROCEDURE — 85390 FIBRINOLYSINS SCREEN I&R: CPT | Mod: 26 | Performed by: PATHOLOGY

## 2024-02-06 PROCEDURE — 36415 COLL VENOUS BLD VENIPUNCTURE: CPT

## 2024-02-06 PROCEDURE — 85613 RUSSELL VIPER VENOM DILUTED: CPT

## 2024-02-06 NOTE — TELEPHONE ENCOUNTER
[facility] Call Center    Phone Message    May a detailed message be left on voicemail: yes     Reason for Call: Symptoms or Concerns     If patient has red-flag symptoms, warm transfer to triage line    Current symptom or concern: bladder infection, pt needing an order for a urinalysis, pt wanting to get it done today pt already has a lab appt scheduled for 11am and would like to have it done then

## 2024-02-06 NOTE — TELEPHONE ENCOUNTER
Called patient and lvm- ordered UA, but needs to schedule a visit with a provider. Put apt on hold with Dr. Olmos at 5pm tomorrow in person.    Cristian See RN on 2/6/2024 at 10:58 AM

## 2024-02-07 ENCOUNTER — OFFICE VISIT (OUTPATIENT)
Dept: FAMILY MEDICINE | Facility: CLINIC | Age: 84
End: 2024-02-07
Payer: COMMERCIAL

## 2024-02-07 VITALS
DIASTOLIC BLOOD PRESSURE: 62 MMHG | SYSTOLIC BLOOD PRESSURE: 146 MMHG | HEART RATE: 70 BPM | WEIGHT: 134.1 LBS | OXYGEN SATURATION: 99 % | BODY MASS INDEX: 22.32 KG/M2

## 2024-02-07 DIAGNOSIS — N30.00 ACUTE CYSTITIS WITHOUT HEMATURIA: Primary | ICD-10-CM

## 2024-02-07 LAB
DRVVT SCREEN RATIO: 0.8
INR PPP: 1.19 (ref 0.85–1.15)
LA PPP-IMP: NEGATIVE
LUPUS INTERPRETATION: ABNORMAL
PTT RATIO: 1.08
THROMBIN TIME: 16.7 SECONDS (ref 13–19)

## 2024-02-07 PROCEDURE — 99212 OFFICE O/P EST SF 10 MIN: CPT | Performed by: FAMILY MEDICINE

## 2024-02-07 RX ORDER — RESPIRATORY SYNCYTIAL VIRUS VACCINE 120MCG/0.5
0.5 KIT INTRAMUSCULAR ONCE
Qty: 1 EACH | Refills: 0 | Status: CANCELLED | OUTPATIENT
Start: 2024-02-07 | End: 2024-02-07

## 2024-02-07 NOTE — PROGRESS NOTES
Assessment & Plan     Acute cystitis without hematuria  The urine analysis yesterday sent for culture is not back yet so it is difficult to know what treatment she might need.  Given her colitis I do not want to just prophylactically give her antibiotics.  She is allergic to sulfa.  Will wait till the culture comes back and then make a decision about whether she needs further antibiotics.  Her symptoms at this time are minimal.    F/up when culture comes back       Time note (e2, 10'): The total of my time (on the date of service) for this service was 16 minutes, including discussion/face-to-face, chart review, interpretation not otherwise reported, documentation, and updating of the computerized record.        Dax Richardson is a 83 year old, presenting for the following health issues:  Follow Up and Results (Pt following on UA and labs completed yesterday)      2/7/2024     1:34 PM   Additional Questions   Roomed by YUKI NICHOLSON this is a 83-year-old female who comes in regarding a urine infection.  About 6 weeks ago she had a UTI which was treated with Keflex.  Then about 5 days ago she started getting more symptoms of urgency and burning and was seen at [facility].  The urine looked infected, they did a culture and when they called her they said that it was not the usual gram-negative but it was rather gram-positive and she should see her physician.  She had a visit at oncology yesterday and at that time she then had a repeat UA, It did look clear but they went ahead and cultured it.  The culture is not back yet.  She has been drinking lots of water.  She still has some mild urgency and a little burning.      I do not have access to the original culture from [facility].  She is allergic to sulfa.  She also has colitis and does not like to take antibiotics.        Review of Systems  Constitutional, neuro, ENT, endocrine, pulmonary, cardiac, gastrointestinal, genitourinary, musculoskeletal, integument and psychiatric  systems are negative, except as otherwise noted.      Objective    BP (!) 146/62 (BP Location: Right arm, Patient Position: Sitting, Cuff Size: Adult Regular)   Pulse 70   Wt 60.8 kg (134 lb 1.6 oz)   SpO2 99%   BMI 22.32 kg/m    Body mass index is 22.32 kg/m .  Physical Exam   GENERAL: alert and no distress        Component  Ref Range & Units 1 d ago  (2/6/24) 1 mo ago  (12/26/23) 7 mo ago  (7/3/23) 12 mo ago  (2/10/23) 1 yr ago  (10/11/22) 3 yr ago  (1/28/21) 3 yr ago  (9/14/20)     Color Urine  Colorless, Straw, Light Yellow, Yellow Straw Straw Light Yellow Straw Yellow Straw R Yellow R    Appearance Urine  Clear Clear Clear Clear Clear Clear Clear R Clear R    Glucose Urine  Negative mg/dL Negative Negative Negative Negative Negative Negative R Negative R    Bilirubin Urine  Negative Negative Negative Negative Negative Negative Negative R Negative R    Ketones Urine  Negative mg/dL Negative Negative Negative Negative Negative Negative R Negative R    Specific Gravity Urine  1.003 - 1.035 1.003 1.005 1.014 1.006 1.010 1.004 1.005    Blood Urine  Negative Negative Negative Negative Negative Negative Negative R Negative R    pH Urine  5.0 - 7.0 6.5 6.0 5.0 7.0 6.5 7.0 R 7.0 R    Protein Albumin Urine  Negative mg/dL Negative Negative Negative Negative Negative Negative R Negative R    Urobilinogen Urine  Normal, 2.0 mg/dL Normal Normal Normal Normal Normal 0.0 R 0.0 R    Nitrite Urine  Negative Negative Negative Negative Negative Negative Negative R Negative R    Leukocyte Esterase Urine  Negative                  Signed Electronically by: Emy lOmos MD PhD

## 2024-02-07 NOTE — PROGRESS NOTES
Debra is a 83 year old that presents in clinic today for the following:     Chief Complaint   Patient presents with    Follow Up    Results     Pt following on UA and labs completed yesterday           2/7/2024     1:34 PM   Additional Questions   Roomed by YUKI Ventura as of today     PHQ-2 Total Score (Adult) - Positive if 3 or more points; Administer   PHQ-9 if positive 0        Nish Ward at 1:40 PM on 2/7/2024

## 2024-02-08 ENCOUNTER — TELEPHONE (OUTPATIENT)
Dept: INTERNAL MEDICINE | Facility: CLINIC | Age: 84
End: 2024-02-08

## 2024-02-08 ENCOUNTER — VIRTUAL VISIT (OUTPATIENT)
Dept: TRANSPLANT | Facility: CLINIC | Age: 84
End: 2024-02-08
Attending: INTERNAL MEDICINE
Payer: COMMERCIAL

## 2024-02-08 DIAGNOSIS — D47.3 ESSENTIAL THROMBOCYTHEMIA (H): ICD-10-CM

## 2024-02-08 PROCEDURE — 99215 OFFICE O/P EST HI 40 MIN: CPT | Mod: 95 | Performed by: INTERNAL MEDICINE

## 2024-02-08 RX ORDER — HYDROXYUREA 500 MG/1
1000 CAPSULE ORAL DAILY
Qty: 60 CAPSULE | Refills: 4 | Status: SHIPPED | OUTPATIENT
Start: 2024-02-08 | End: 2024-04-04

## 2024-02-08 ASSESSMENT — PAIN SCALES - GENERAL: PAINLEVEL: NO PAIN (0)

## 2024-02-08 NOTE — LETTER
"    2024         RE: Debra Denise  [id number]  [place] [facility] [place] [address]        Dear Colleague,    Thank you for referring your patient, Debra Denise, to the [facility]LOOD [facility] MARROW [facility]ANSPL[facility]. Please see a copy of my visit note below.    [facility]  HEMATOLOGY FOLLOW UP    Debra Denise   : 1940   MRN: [id number]  Date of service: 2024     REASON FOR VISIT: JAK2+ essential thrombocytopenia  Referred by Dr. 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     Per chart review and discussion with the patient,  - [id number]: Persistent progressive dizziness, non-positional, intermittent, accompanied by nausea without vomiting, night time and day time, no improvement with Epley maneuver, and possible component of postural hypotension. Also with mild intermittent headache, mild fatigue over , has not felt \"completely normal\"  - Early 2023: Sudden onset LUE weakness x 15 sec which self resolved. CT, MRI/MRA normal. Echo normal. Ziopatch without arrhythmia. Started on clopidogrel and atorvastatin. Not on ASA because of the ulcerative proctitis. Prior cath  neg for CAD.   - : Prior plt counts normal. 23 491. Plts normal in 7/2023 x 3. Plts 462-450 in 2023. 23 JAK2+ 0.1884 triggering hematology referral. Plts 513 at time of first heme eval on 23. Remainder of CBC normal. No splenomegaly. Her UP has been under control with vedolizumab q2mo since May 2023. CRP and ESR improved to normal.  - 23: Stopped clopidogrel, restarted ASA 81mg BID  - 23: Scheduled " sigmoidoscopy showing erythema in rectum, biopsies taken, diverticulosis, and internal hemorrhoids; complicated by rectal bleeding with large clots, requiring overnight stay at [facility]. ASA held. Hgb 14.2->12.8. Mesalamine suppository added. No recurrence of bleeding. Heme onc consult recommended resuming ASA when bleeding risk permits. GI recommended restarting ASA BID on 12/30.  - 1/9/2024: Disease evaluation - Plt 474, rest of CBC normal. INR 1.21-1.29, otherwise PTT, fibrinogen, F8, VWF panel normal. CMP, LDH (192), Ferritin, iron sat normal. Epo 6. ESR and CRP neg. BMBx with normocellular to slightly hypercellular marrow for age (30%), TLH, no overt dysplasia, 1% blasts, MF-0. BCR-ABL negative. Myeloid NGS CHANDNI 11.1%, TET2 5.5%. MIPSS-ET score is 4 (for age alone).     Dates Treatment Response Toxicity   7/2023 Clopidogrel, atorvastatin (for TIA), not on ASA because of ulcerative proctitis (UP) Plts normal 7/2023 x 3,  Plts 462-450 in 9/2023  Plts 513 on 12/7/23 12/7/23 Stop clopidogrel, started ASA 81 BID. Held briefly for sigmoidoscopy 12/28/23. Restarted ASA on 12/30/23 Plts 474 on 1/9/24  BMBx as above.  Rectal bleeding with large clots with sigmoidoscopy, overnight stay needed, but resolved on own.   1/12/23 HU 500mg daily Plts 444 on 1/25/24  Plts 368 on 2/6/24   None known.  HCO3 is a bit high?     INTERVAL HISTORY  She is here with her  Aguilar today.   Lightheadedness and nausea haven't really changed.  Bladder infection prior to starting. It was a typical UTI, but it recurred recently, got another culture. Still having some urinary discomfort. [facility] but most recent UA is clear.  She has some occasional bleeding from the proctitis.    No TIA/stroke/VTE symptoms, erythromelalgia, pruritus, fevers, chills, weight loss, appetite loss.     REVIEW OF SYSTEMS  A 10 point review of systems was performed and was otherwise negative except as mentioned in the HPI.     PAST MEDICAL  HISTORY  Past Medical History:   Diagnosis Date    Autoimmune disease (H24) approx.     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 30 years ago    Osteopenia     Polymyalgia rheumatica (H24)     PONV (postoperative nausea and vomiting)     Proctitis     Reduced vision Sometime is a little blurry     PAST SURGICAL HISTORY  Past Surgical History:   Procedure Laterality Date    COLONOSCOPY  2014    Procedure: COMBINED COLONOSCOPY, SINGLE BIOPSY/POLYPECTOMY BY BIOPSY;  COLONOSCOPY;  Surgeon: Carol Ann Plasencia MD;  Location:  GI    CV CORONARY ANGIOGRAM N/A 2022    Procedure: Coronary Angiogram;  Surgeon: Trevin Hawk MD;  Location:  HEART CARDIAC CATH LAB    ENT SURGERY      tonsilectomy, adenoidectomy    ESOPHAGOSCOPY, GASTROSCOPY, DUODENOSCOPY (EGD), COMBINED N/A 2023    Procedure: ESOPHAGOGASTRODUODENOSCOPY, WITH BIOPSY;  Surgeon: Angel Lara MD;  Location:  GI    GYN SURGERY  ,     x 2    HYSTERECTOMY      ORTHOPEDIC SURGERY  2004    (R) shoulder surgery for frozen shoulder    ZAC BSO      for fibroids    TONSILLECTOMY  About 50 years ago     SOCIAL HISTORY  Reviewed, and any changes made accordingly  Social History     Socioeconomic History    Marital status:      Spouse name: Not on file    Number of children: Not on file    Years of education: Not on file    Highest education level: Not on file   Occupational History    Not on file   Tobacco Use    Smoking status: Never    Smokeless tobacco: Never    Tobacco comments:     None   Vaping Use    Vaping Use: Never used   Substance and Sexual Activity    Alcohol use: No    Drug use: No    Sexual activity: Not Currently     Partners: Male     Birth control/protection: None   Other Topics Concern    Parent/sibling w/ CABG, MI or angioplasty before 65F 55M? Not Asked   Social History  Narrative    Not on file     Social Determinants of Health     Financial Resource Strain: Low Risk  (12/19/2023)    Financial Resource Strain     Within the past 12 months, have you or your family members you live with been unable to get utilities (heat, electricity) when it was really needed?: No   Food Insecurity: Low Risk  (12/19/2023)    Food Insecurity     Within the past 12 months, did you worry that your food would run out before you got money to buy more?: No     Within the past 12 months, did the food you bought just not last and you didn t have money to get more?: No   Transportation Needs: Low Risk  (12/19/2023)    Transportation Needs     Within the past 12 months, has lack of transportation kept you from medical appointments, getting your medicines, non-medical meetings or appointments, work, or from getting things that you need?: No   Physical Activity: Not on file   Stress: Not on file   Social Connections: Not on file   Interpersonal Safety: Low Risk  (12/26/2023)    Interpersonal Safety     Do you feel physically and emotionally safe where you currently live?: Yes     Within the past 12 months, have you been hit, slapped, kicked or otherwise physically hurt by someone?: No     Within the past 12 months, have you been humiliated or emotionally abused in other ways by your partner or ex-partner?: No   Housing Stability: Low Risk  (12/19/2023)    Housing Stability     Do you have housing? : Yes     Are you worried about losing your housing?: No     FAMILY HISTORY  Reviewed, and any changes made accordingly  Family History   Problem Relation Age of Onset    Cancer - colorectal Father     Lung Cancer Father     Macular Degeneration Father     Multiple myeloma Brother     Pacemaker Brother     Hypertension Mother     Cerebrovascular Disease Mother         from misdiagnosis of temporal arteritis    Suicide Sister     Hypertension Maternal Grandmother     Cerebrovascular Disease Maternal Grandmother      Diabetes Maternal Grandmother        MEDICATIONS  Current Outpatient Medications   Medication    aspirin 81 MG EC tablet    atorvastatin (LIPITOR) 10 MG tablet    budesonide (ENTOCORT EC) 3 MG EC capsule    Cholecalciferol (VITAMIN D3 PO)    fluocinonide (LIDEX) 0.05 % external cream    gabapentin (NEURONTIN) 100 MG capsule    hydroxyurea (HYDREA) 500 MG capsule    mesalamine (CANASA) 1000 MG suppository    multivitamin w/minerals (CENTRUM ADULTS) tablet    ondansetron (ZOFRAN ODT) 4 MG ODT tab    Probiotic Product (PROBIOTIC PO)    sodium chloride 0.9 % SOLN 250 mL with vedolizumab 60 MG/ML SOLR 300 mg infusionn    valsartan-hydrochlorothiazide (DIOVAN HCT) 160-25 MG tablet     No current facility-administered medications for this visit.     ALLERGIES  Allergies   Allergen Reactions    Sulfacetamide Hives    Adhesive Tape Rash    Aspirin      Other Reaction(s): Stomach issues    Atenolol      Other reaction(s): Intolerance-Can't Take  Fatigue, GI intolerance    Ibuprofen      Other reaction(s): Stomach Upset  4-9-13 tele encounter  Other Reaction(s): GI intolerance       Naproxen      Other reaction(s): Stomach Upset  4-9-13 tele encounter  Other Reaction(s): GI intolerance    Ramipril Cough     Other reaction(s): Intolerance-Can't Take  Other Reaction(s): GI intolerance    Sulfa Antibiotics     Codeine Nausea    Fentanyl Nausea       PHYSICAL EXAM  There were no vitals taken for this visit.   Wt Readings from Last 10 Encounters:   01/12/24 61 kg (134 lb 6.4 oz)   01/09/24 59.8 kg (131 lb 14.4 oz)   01/08/24 60.3 kg (133 lb)   12/28/23 59.4 kg (131 lb)   12/26/23 61.2 kg (134 lb 14.4 oz)   12/07/23 61.6 kg (135 lb 14.4 oz)   10/31/23 62.1 kg (137 lb)   09/05/23 62.1 kg (136 lb 12.8 oz)   07/27/23 61.7 kg (136 lb)   07/12/23 63 kg (138 lb 15.8 oz)     General: Well appearing.  HEENT: Sclerae anicteric.  Lungs: Breathing comfortably. No cough.  MSK: Grossly normal movement.  Neuro: Grossly non-focal.  Skin/access:  Normal skin tone.  Psych: Alert and oriented. No distress.    LABS  Recent Labs   Lab Test 01/25/24  1057 02/11/22  0940 01/28/21  1133 09/14/20  0839 03/25/20  1101 01/29/20  1108   WBC 10.5   < > 8.2 7.7 8.7 8.8   HGB 13.2   < > 13.8 14.2 14.9 14.2      < > 414 364 409 367   MCV 91   < > 93 92 92 95   RDW 14.7   < > 12.5 12.4 12.6 12.6   ANEU  --   --  4.7 4.6 6.0 5.3   ALYM  --   --  2.3 1.9 1.7 2.3   DEANGELO  --   --  0.9 0.9 0.8 0.8   AEOS  --   --  0.2 0.2 0.2 0.3    < > = values in this interval not displayed.     Recent Labs   Lab Test 01/25/24  1057 12/28/23  1812 12/07/23  1522 09/14/20  0839 03/25/20  1101      < > 140   < > 141   POTASSIUM 3.4   < > 3.4   < > 3.5   CHLORIDE 102   < > 101   < > 106   CO2 32*   < > 31*   < > 32   BUN 16.6   < > 18.3   < > 14   CR 0.76   < > 0.86   < > 0.72   PRASHANTH 9.3   < > 9.4   < > 9.2   LDH  --   --  192  --  146   URIC  --   --   --   --  6.8*    < > = values in this interval not displayed.     Recent Labs   Lab Test 01/25/24  1057 01/09/24  0908 12/29/23  0731 12/07/23  1522   AST 16 17 14 21   ALT 17 18 16 10   ALKPHOS 63 62 62 74   ALBUMIN 4.2 4.1 3.5 4.1   PROTTOTAL 6.6 6.7 5.7* 7.0   BILITOTAL 0.4 0.6 0.8 0.3      Recent Labs   Lab Test 07/08/22  1120 03/25/20  1101    207   IGM  --  74   ELPM  --  0.0   ELPINT  --  Essentially normal electrophoretic pattern.  No obvious monoclonal proteins seen.    Pathologic significance requires clinical correlation.  OSMANY Orr M.D., Ph.D.,   Pathologist ().     IEP  --  No monoclonal protein seen on immunofixation.  Pathological significance requires clinical   correlation.     KAPPAFREELT  --  <2.83*   LAMBDAFREELT  --  1.01   KLR  --  Unable to Calculate      Recent Labs   Lab Test 01/09/24  0908 12/26/23  1522 12/07/23  1522 03/25/20  1101 09/20/16  1324   RETICABSCT  --  0.055 0.061   < >  --    RETP  --  1.2 1.3   < >  --    IRON 78  --   --   --  95   IRONSAT 25  --   --   --  26   HANNAH 61   "--   --   --  56     --   --   --  362   B12  --   --   --   --  883   FOLIC  --   --   --   --  64.6   EPOE 6  --   --   --   --    LDH  --   --  192   < >  --     < > = values in this interval not displayed.     No results for input(s): \"MORPH\" in the last 24182 hours.  No results for input(s): \"HCVAB\", \"HCABC\", \"HBCAB\", \"AUSAB\", \"HIV\" in the last 69340 hours.  Recent Labs   Lab Test 01/09/24  0908 12/07/23  1522   INR 1.21* 1.29*   PTT 31  --    FIBR 300 413      Overall, the bone marrow is normocellular with normal myeloid to erythroid ratio, very slightly increased megakaryocytes, and normal marrow fibrosis. In addition, patient has normal level of LDH (192 U/L) and Erythropoietin (6; range 4-27 mU/mL) and absence of splenomegaly by physical exam.  In the light of the presence of  JAK2 V617F detected on the peripheral blood, these findings could be in keeping with the diagnosis of essential thrombocytocythaemia (ET).  However, only very rare megakaryocytes have deeply lobated nuclei and the overall morphology and histotopography of megakaryocytes are not typical of ET.  In addition, rare intrasinusoidal megakaryocytes are identified which could be suggestive of prefibrotic/early primary myelofibrosis (pre-PMF).       Overall, combining the history of intermittent thrombocytosis with borderline increase in platelet counts and the bone marrow morphologic findings, it appears that the disease is in the very early stage; hence, a close follow-up is warranted to monitor how this MPN with JAK2 V617F progresses.     IMAGING  As mentioned above in HPI.    IMPRESSION  Debra Denise is a 83 year old woman with a history as above, with the following issues:  JAK2 V617F+ (VAF 9/2023=0.1884) myeloproliferative neoplasm  Very early mild essential thrombocythemia vs very early pre-fibrotic primary myelofibrosis  TIA in July 2023    She has not had MPN-type vasomotor symptoms, other thromboses, bleeding " manifestations, erythromelalgia, or splenomegaly. There is no acquired von willebrand disease. Her marrow shows if anything very early disease with only very rare features of ET (megakaryocytes with deeply lobated nuclei) and rare features of pre-fibrotic PMF (intrasinusoidal megakaryocytes). She is at high risk for thrombosis, given that she is >60 and has a JAK2 mutation, and that she probably had a TIA in July 2023.     We discussed that platelet number can be increased but that the platelets themselves are more activated. ASA can help with decreasing the activity of the platelets. HU decreases both platelet number, decreases the activity of the platelets, and a side benefit is decreasing WBC which can interact with platelets to cause thrombosis/vascular pathophysiology. Hopefully it may even contribute to decreased UP inflammation as well. We discussed that there aren't great treatments for decreasing the VAF of the JAK2+ clone at this point, but that the disease can be well managed with decreased rates of thrombosis, hemorrhage, and vasomotor symptoms for many years with HU and ASA. In some cases, ET can progress to MF, MDS, or AML, but this can take many years, so her counts will need to be monitored regularly.    I wonder if the dizziness is a potential vasomotor symptom. Will need to continue to watch this. She hasn't noted any improvements with the ASA and HU.    PLAN  - Continue ASA 81mg BID  - Increase HU to 1000mg daily  - Recheck labs in 2 weeks, can back off on HU if needed.  - Monitor the metabolic alkalosis for now,may need further workup. Denies COPD or diuretics.  - Return by video 3/7/24 with Kasey Becker with labs 2 days prior to discuss increase in HU to 1500 if needed, but can err on the side of keeping same dose, especially if plts are <400.  - Then recheck labs around 3/21  - Follow up with me 4/4     We had a long discussion with the patient and her  about the therapeutic  possibilities and necessary follow up. All questions were answered to their satisfaction.    I spent 40 minutes on the date of service reviewing medical records from the referring provider, reviewing previous lab and imaging results as summarized above, obtaining and reviewing records from [facility] as summarized above, obtaining a history from the patient, performing a physical exam, counseling and educating the patient on the diagnosis and treatment, entering orders for tests, evaluating a potentially life or organ threatening problem, intensively monitoring treatments with high risk of toxicity, coordinating care, and documenting in the electronic medical record.    Thank you for allowing me to participate in the care of this patient. Please do not hesitate to contact me if there are any concerns or questions.     Amos Milner MD   of Medicine  Classical Hematology and Blood and Marrow Transplantation  Division of Hematology, Oncology, and Transplantation  [facility]      Virtual Visit Details    Type of service:  Video Visit   Video Start Time: 2:01 PM  Video End Time:2:24 PM    Originating Location (pt. Location): Home  Distant Location (provider location):  On-site  Platform used for Video Visit: Mirza

## 2024-02-08 NOTE — PROGRESS NOTES
Virtual Visit Details    Type of service:  Video Visit   Video Start Time: 2:01 PM  Video End Time:2:24 PM    Originating Location (pt. Location): Home  Distant Location (provider location):  On-site  Platform used for Video Visit: Mirza

## 2024-02-08 NOTE — NURSING NOTE
Is the patient currently in the state of [place]? YES    Visit mode:VIDEO    If the visit is dropped, the patient can be reconnected by: VIDEO VISIT: Text to cell phone:   Telephone Information:   Mobile [phone]       Will anyone else be joining the visit? NO  (If patient encounters technical issues they should call [phone]    How would you like to obtain your AVS? MyChart    Are changes needed to the allergy or medication list? No    Reason for visit: RECHECK (Follow up)    Tony NAIR

## 2024-02-08 NOTE — TELEPHONE ENCOUNTER
[facility]    Phone Message    May a detailed message be left on voicemail: yes     Reason for Call: Requesting Results     Name/type of test: Urine culture   Date of test: 2/6/24 writer seen notes and let patient know that they will follow up when urine culture comes back. Patient understood and would still like a call back from Dr Steele team because she states they know her history.    Was test done at a location other than [facility] (Please fill in the location if not [facility])?: No    Action Taken: Message routed to:  [facility]    Travel Screening: Not Applicable

## 2024-02-09 NOTE — TELEPHONE ENCOUNTER
Called patient- let her know that UA was clear and was not cultured. She still c/o urgency and some bladder pressure. Otherwise symptoms are normal. She was ok with me accessing minute clinic charts- she said that there was a culture that showed gram + organisms. I do not see this and [facility] UA was clear. We discussed maybe this was a contamination but we are not sure as I cannot see this record. She was disappointed we had not cultured UA, but explained it was normal so did not reflex to culture. She says pressure and urgency could be related to the amount of fluid she is drinking, but will monitor for any new symptoms like burning, fever, discoloration, or odor and let us know or have it re cultured in urgent care.     Cristian See RN on 2/9/2024 at 2:17 PM

## 2024-02-12 ENCOUNTER — TRANSFERRED RECORDS (OUTPATIENT)
Dept: HEALTH INFORMATION MANAGEMENT | Facility: CLINIC | Age: 84
End: 2024-02-12
Payer: COMMERCIAL

## 2024-02-12 NOTE — TELEPHONE ENCOUNTER
[facility]    Phone Message    May a detailed message be left on voicemail: yes     Reason for Call: Other: Per pt would like to ask if a culture is going to be order or not due to [facility]? Per pt currently not on any medication and she is still having problems with the bladder infection. Per pt is not happy that she has to keep going with medication if needed. Per pt would like the team to know if she doesn't hear back she will possibly go to Urgent care to get a culture done. Please call pt back. Thank you.       Action Taken: Message routed to:  [facility]    Travel Screening: Not Applicable

## 2024-02-20 ENCOUNTER — TELEPHONE (OUTPATIENT)
Dept: INTERNAL MEDICINE | Facility: CLINIC | Age: 84
End: 2024-02-20
Payer: COMMERCIAL

## 2024-02-20 NOTE — TELEPHONE ENCOUNTER
[facility]    Phone Message    May a detailed message be left on voicemail: yes     Reason for Call: Requesting Results     Name/type of test: Urine analysis  Date of test: 2/12/2024  Was test done at a location other than [facility] (Please fill in the location if not [facility])?: Yes: Urgent care [facility]  Are her results all clear? Please call back, thank you!  Action Taken: Message routed to:  [facility]    Travel Screening: Not Applicable

## 2024-02-22 ENCOUNTER — LAB (OUTPATIENT)
Dept: LAB | Facility: CLINIC | Age: 84
End: 2024-02-22
Attending: INTERNAL MEDICINE
Payer: COMMERCIAL

## 2024-02-22 ENCOUNTER — PATIENT OUTREACH (OUTPATIENT)
Dept: ONCOLOGY | Facility: CLINIC | Age: 84
End: 2024-02-22

## 2024-02-22 DIAGNOSIS — D47.3 ESSENTIAL THROMBOCYTHEMIA (H): ICD-10-CM

## 2024-02-22 LAB
ALBUMIN SERPL BCG-MCNC: 4.2 G/DL (ref 3.5–5.2)
ALP SERPL-CCNC: 64 U/L (ref 40–150)
ALT SERPL W P-5'-P-CCNC: 17 U/L (ref 0–50)
ANION GAP SERPL CALCULATED.3IONS-SCNC: 7 MMOL/L (ref 7–15)
AST SERPL W P-5'-P-CCNC: 20 U/L (ref 0–45)
BASE EXCESS BLDV CALC-SCNC: 6.1 MMOL/L (ref -3–3)
BASOPHILS # BLD AUTO: 0 10E3/UL (ref 0–0.2)
BASOPHILS NFR BLD AUTO: 0 %
BILIRUB SERPL-MCNC: 0.4 MG/DL
BUN SERPL-MCNC: 16.9 MG/DL (ref 8–23)
CALCIUM SERPL-MCNC: 9.2 MG/DL (ref 8.8–10.2)
CHLORIDE SERPL-SCNC: 101 MMOL/L (ref 98–107)
CREAT SERPL-MCNC: 0.8 MG/DL (ref 0.51–0.95)
DEPRECATED HCO3 PLAS-SCNC: 31 MMOL/L (ref 22–29)
EGFRCR SERPLBLD CKD-EPI 2021: 72 ML/MIN/1.73M2
EOSINOPHIL # BLD AUTO: 0.1 10E3/UL (ref 0–0.7)
EOSINOPHIL NFR BLD AUTO: 1 %
ERYTHROCYTE [DISTWIDTH] IN BLOOD BY AUTOMATED COUNT: 17.7 % (ref 10–15)
GLUCOSE SERPL-MCNC: 96 MG/DL (ref 70–99)
HCO3 BLDV-SCNC: 33 MMOL/L (ref 21–28)
HCT VFR BLD AUTO: 41.8 % (ref 35–47)
HGB BLD-MCNC: 13.8 G/DL (ref 11.7–15.7)
IMM GRANULOCYTES # BLD: 0 10E3/UL
IMM GRANULOCYTES NFR BLD: 0 %
LYMPHOCYTES # BLD AUTO: 2 10E3/UL (ref 0.8–5.3)
LYMPHOCYTES NFR BLD AUTO: 26 %
MCH RBC QN AUTO: 31.5 PG (ref 26.5–33)
MCHC RBC AUTO-ENTMCNC: 33 G/DL (ref 31.5–36.5)
MCV RBC AUTO: 95 FL (ref 78–100)
MONOCYTES # BLD AUTO: 0.7 10E3/UL (ref 0–1.3)
MONOCYTES NFR BLD AUTO: 10 %
NEUTROPHILS # BLD AUTO: 4.8 10E3/UL (ref 1.6–8.3)
NEUTROPHILS NFR BLD AUTO: 63 %
NRBC # BLD AUTO: 0 10E3/UL
NRBC BLD AUTO-RTO: 0 /100
O2/TOTAL GAS SETTING VFR VENT: 21 %
OXYHGB MFR BLDV: 31 % (ref 70–75)
PCO2 BLDV: 58 MM HG (ref 40–50)
PH BLDV: 7.37 [PH] (ref 7.32–7.43)
PLATELET # BLD AUTO: 245 10E3/UL (ref 150–450)
PO2 BLDV: 22 MM HG (ref 25–47)
POTASSIUM SERPL-SCNC: 4 MMOL/L (ref 3.4–5.3)
PROT SERPL-MCNC: 6.8 G/DL (ref 6.4–8.3)
RBC # BLD AUTO: 4.38 10E6/UL (ref 3.8–5.2)
SAO2 % BLDV: 31.7 % (ref 70–75)
SODIUM SERPL-SCNC: 139 MMOL/L (ref 135–145)
WBC # BLD AUTO: 7.6 10E3/UL (ref 4–11)

## 2024-02-22 PROCEDURE — 82805 BLOOD GASES W/O2 SATURATION: CPT

## 2024-02-22 PROCEDURE — 80053 COMPREHEN METABOLIC PANEL: CPT

## 2024-02-22 PROCEDURE — 36415 COLL VENOUS BLD VENIPUNCTURE: CPT

## 2024-02-22 PROCEDURE — 85025 COMPLETE CBC W/AUTO DIFF WBC: CPT

## 2024-02-22 NOTE — PROGRESS NOTES
"Abnormal labs     [facility]                                                                                        Patient calling concerned regarding her labs this afternoon and abnormal blood gasses.  RNCC spoke with patient. Patient would like  to review. RNCC epic chatted with Dr. Milner.    RNCC called patient back to discuss 's lab review. Per  \"She has a respiratory acidosis. But it is well compensated with a metabolic alkalosis\" No changes at this time or concerns. Patient stated an understanding.     Reviewed when to call triage and triage phone number. Reviewed  not using RNCC voicemail or my chart for any urgent items. Patient stated an understanding of this information and did not have any other questions.      Signature:  Beatriz Rm RN  "

## 2024-03-05 ENCOUNTER — LAB (OUTPATIENT)
Dept: LAB | Facility: CLINIC | Age: 84
End: 2024-03-05
Attending: INTERNAL MEDICINE
Payer: COMMERCIAL

## 2024-03-05 DIAGNOSIS — D47.3 ESSENTIAL THROMBOCYTHEMIA (H): ICD-10-CM

## 2024-03-05 LAB
ALBUMIN SERPL BCG-MCNC: 4.1 G/DL (ref 3.5–5.2)
ALP SERPL-CCNC: 69 U/L (ref 40–150)
ALT SERPL W P-5'-P-CCNC: 16 U/L (ref 0–50)
ANION GAP SERPL CALCULATED.3IONS-SCNC: 9 MMOL/L (ref 7–15)
AST SERPL W P-5'-P-CCNC: 19 U/L (ref 0–45)
BASOPHILS # BLD AUTO: 0 10E3/UL (ref 0–0.2)
BASOPHILS NFR BLD AUTO: 0 %
BILIRUB SERPL-MCNC: 0.4 MG/DL
BUN SERPL-MCNC: 14.5 MG/DL (ref 8–23)
CALCIUM SERPL-MCNC: 9.7 MG/DL (ref 8.8–10.2)
CHLORIDE SERPL-SCNC: 101 MMOL/L (ref 98–107)
CREAT SERPL-MCNC: 0.79 MG/DL (ref 0.51–0.95)
DEPRECATED HCO3 PLAS-SCNC: 31 MMOL/L (ref 22–29)
EGFRCR SERPLBLD CKD-EPI 2021: 73 ML/MIN/1.73M2
EOSINOPHIL # BLD AUTO: 0.1 10E3/UL (ref 0–0.7)
EOSINOPHIL NFR BLD AUTO: 2 %
ERYTHROCYTE [DISTWIDTH] IN BLOOD BY AUTOMATED COUNT: 18.7 % (ref 10–15)
GLUCOSE SERPL-MCNC: 87 MG/DL (ref 70–99)
HCT VFR BLD AUTO: 40.3 % (ref 35–47)
HGB BLD-MCNC: 13.4 G/DL (ref 11.7–15.7)
IMM GRANULOCYTES # BLD: 0 10E3/UL
IMM GRANULOCYTES NFR BLD: 0 %
LYMPHOCYTES # BLD AUTO: 2 10E3/UL (ref 0.8–5.3)
LYMPHOCYTES NFR BLD AUTO: 27 %
MCH RBC QN AUTO: 32.1 PG (ref 26.5–33)
MCHC RBC AUTO-ENTMCNC: 33.3 G/DL (ref 31.5–36.5)
MCV RBC AUTO: 96 FL (ref 78–100)
MONOCYTES # BLD AUTO: 0.8 10E3/UL (ref 0–1.3)
MONOCYTES NFR BLD AUTO: 11 %
NEUTROPHILS # BLD AUTO: 4.2 10E3/UL (ref 1.6–8.3)
NEUTROPHILS NFR BLD AUTO: 60 %
NRBC # BLD AUTO: 0 10E3/UL
NRBC BLD AUTO-RTO: 0 /100
PLATELET # BLD AUTO: 202 10E3/UL (ref 150–450)
POTASSIUM SERPL-SCNC: 3.8 MMOL/L (ref 3.4–5.3)
PROT SERPL-MCNC: 6.8 G/DL (ref 6.4–8.3)
RBC # BLD AUTO: 4.18 10E6/UL (ref 3.8–5.2)
SODIUM SERPL-SCNC: 141 MMOL/L (ref 135–145)
WBC # BLD AUTO: 7.2 10E3/UL (ref 4–11)

## 2024-03-05 PROCEDURE — 84450 TRANSFERASE (AST) (SGOT): CPT

## 2024-03-05 PROCEDURE — 36415 COLL VENOUS BLD VENIPUNCTURE: CPT

## 2024-03-05 PROCEDURE — 85025 COMPLETE CBC W/AUTO DIFF WBC: CPT

## 2024-03-05 PROCEDURE — 84155 ASSAY OF PROTEIN SERUM: CPT

## 2024-03-06 ENCOUNTER — VIRTUAL VISIT (OUTPATIENT)
Dept: ONCOLOGY | Facility: CLINIC | Age: 84
End: 2024-03-06
Payer: COMMERCIAL

## 2024-03-06 VITALS — HEIGHT: 65 IN | BODY MASS INDEX: 22.16 KG/M2 | WEIGHT: 133 LBS

## 2024-03-06 DIAGNOSIS — D75.839 THROMBOCYTOSIS: Primary | ICD-10-CM

## 2024-03-06 PROCEDURE — 99215 OFFICE O/P EST HI 40 MIN: CPT | Mod: 95

## 2024-03-06 ASSESSMENT — PAIN SCALES - GENERAL: PAINLEVEL: NO PAIN (0)

## 2024-03-06 NOTE — NURSING NOTE
Is the patient currently in the state of [place]? YES    Visit mode:VIDEO    If the visit is dropped, the patient can be reconnected by: VIDEO VISIT: Send to e-mail at: [email or web address]    Will anyone else be joining the visit? NO  (If patient encounters technical issues they should call [phone]    How would you like to obtain your AVS? MyChart    Are changes needed to the allergy or medication list? Pt stated no changes to allergies and flagged medications for removal    Reason for visit: ROSENDA Brandt LPN

## 2024-03-06 NOTE — PROGRESS NOTES
"Virtual Visit Details    Type of service:  Video Visit   Video Start Time:  1:31 PM  Video End Time: 1:59 PM    Originating Location (pt. Location): Home    Distant Location (provider location):  Off-site  Platform used for Video Visit: [facility]  HEMATOLOGY FOLLOW UP    Debra Denise   : 1940   MRN: [id number]  Date of service: Mar 6, 2024     REASON FOR VISIT: JAK2+ essential thrombocytopenia  Referred by Dr. 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     Per chart review and discussion with the patient,  - [id number]: Persistent progressive dizziness, non-positional, intermittent, accompanied by nausea without vomiting, night time and day time, no improvement with Epley maneuver, and possible component of postural hypotension. Also with mild intermittent headache, mild fatigue over , has not felt \"completely normal\"  - Early 2023: Sudden onset LUE weakness x 15 sec which self resolved. CT, MRI/MRA normal. Echo normal. Ziopatch without arrhythmia. Started on clopidogrel and atorvastatin. Not on ASA because of the ulcerative proctitis. Prior cath  neg for CAD.   - : Prior plt counts normal. 23 491. Plts normal in 7/2023 x 3. Plts 462-450 in 2023. 23 JAK2+ 0.1884 triggering hematology referral. Plts 513 at time of first heme eval on 23. Remainder of CBC normal. No splenomegaly. Her UP has been under control with vedolizumab q2mo since May 2023. CRP and ESR improved to normal.  - 23: Stopped clopidogrel, restarted ASA 81mg BID  - 23: Scheduled sigmoidoscopy showing erythema in rectum, biopsies taken, " diverticulosis, and internal hemorrhoids; complicated by rectal bleeding with large clots, requiring overnight stay at [facility]. ASA held. Hgb 14.2->12.8. Mesalamine suppository added. No recurrence of bleeding. Heme onc consult recommended resuming ASA when bleeding risk permits. GI recommended restarting ASA BID on 12/30.  - 1/9/2024: Disease evaluation - Plt 474, rest of CBC normal. INR 1.21-1.29, otherwise PTT, fibrinogen, F8, VWF panel normal. CMP, LDH (192), Ferritin, iron sat normal. Epo 6. ESR and CRP neg. BMBx with normocellular to slightly hypercellular marrow for age (30%), TLH, no overt dysplasia, 1% blasts, MF-0. BCR-ABL negative. Myeloid NGS CHANDNI 11.1%, TET2 5.5%. MIPSS-ET score is 4 (for age alone).     Dates Treatment Response Toxicity   7/2023 Clopidogrel, atorvastatin (for TIA), not on ASA because of ulcerative proctitis (UP) Plts normal 7/2023 x 3,  Plts 462-450 in 9/2023  Plts 513 on 12/7/23 12/7/23 Stop clopidogrel, started ASA 81 BID. Held briefly for sigmoidoscopy 12/28/23. Restarted ASA on 12/30/23 Plts 474 on 1/9/24  BMBx as above.  Rectal bleeding with large clots with sigmoidoscopy, overnight stay needed, but resolved on own.   1/12/23 HU 500mg daily Plts 444 on 1/25/24  Plts 368 on 2/6/24   None known.  HCO3 is a bit high?   2/08/23 HU 1,000 mg daily Plts 245 on 2/22/24  Plts 202 on 3/5/24  HCO3 is stable. ?low back pain.      INTERVAL HISTORY  Debra is seen today virtually with her  Aguilar. She is feeling well today. She follows with Dr. Zimmerman. She has discovered a leak in her house and mold was found. She wonders if this is contributing to her symptoms of nasal drip and sneezing. She reports her lightheadedness and nausea is moderately improved today. She has been experiencing low back pain when she wakes up in the AM more persistently over the past week. She is seeing a physical therapist and tries to avoid aggravation. She finds ice to be helpful. No recent illness or  chills. No fevers or chills.     She has occasional bleeding from the proctitis. Reports this is minimal.     No TIA/stroke/VTE symptoms, erythromelalgia, pruritus, fevers, chills, weight loss, appetite loss.     REVIEW OF SYSTEMS  A 10 point review of systems was performed and was otherwise negative except as mentioned in the HPI.     PAST MEDICAL HISTORY  Past Medical History:   Diagnosis Date    Autoimmune disease (H24) approx.     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 30 years ago    Osteopenia     Polymyalgia rheumatica (H24)     PONV (postoperative nausea and vomiting)     Proctitis     Reduced vision Sometime is a little blurry     PAST SURGICAL HISTORY  Past Surgical History:   Procedure Laterality Date    COLONOSCOPY  2014    Procedure: COMBINED COLONOSCOPY, SINGLE BIOPSY/POLYPECTOMY BY BIOPSY;  COLONOSCOPY;  Surgeon: Carol Ann Plasencia MD;  Location: [facility]    CV CORONARY ANGIOGRAM N/A 2022    Procedure: Coronary Angiogram;  Surgeon: Trevin Hawk MD;  Location: [facility] CARDIAC CATH LAB    ENT SURGERY      tonsilectomy, adenoidectomy    ESOPHAGOSCOPY, GASTROSCOPY, DUODENOSCOPY (EGD), COMBINED N/A 2023    Procedure: ESOPHAGOGASTRODUODENOSCOPY, WITH BIOPSY;  Surgeon: Angel Lara MD;  Location:  GI    GYN SURGERY  ,     x 2    HYSTERECTOMY      ORTHOPEDIC SURGERY  2004    (R) shoulder surgery for frozen shoulder    ZAC BSO      for fibroids    TONSILLECTOMY  About 50 years ago     SOCIAL HISTORY  Reviewed, and any changes made accordingly  Social History     Socioeconomic History    Marital status:      Spouse name: Not on file    Number of children: Not on file    Years of education: Not on file    Highest education level: Not on file   Occupational History    Not on file   Tobacco Use    Smoking status: Never    Smokeless  tobacco: Never    Tobacco comments:     None   Vaping Use    Vaping Use: Never used   Substance and Sexual Activity    Alcohol use: No    Drug use: No    Sexual activity: Not Currently     Partners: Male     Birth control/protection: None   Other Topics Concern    Parent/sibling w/ CABG, MI or angioplasty before 65F 55M? Not Asked   Social History Narrative    Not on file     Social Determinants of Health     Financial Resource Strain: Low Risk  (12/19/2023)    Financial Resource Strain     Within the past 12 months, have you or your family members you live with been unable to get utilities (heat, electricity) when it was really needed?: No   Food Insecurity: Low Risk  (12/19/2023)    Food Insecurity     Within the past 12 months, did you worry that your food would run out before you got money to buy more?: No     Within the past 12 months, did the food you bought just not last and you didn t have money to get more?: No   Transportation Needs: Low Risk  (12/19/2023)    Transportation Needs     Within the past 12 months, has lack of transportation kept you from medical appointments, getting your medicines, non-medical meetings or appointments, work, or from getting things that you need?: No   Physical Activity: Not on file   Stress: Not on file   Social Connections: Not on file   Interpersonal Safety: Low Risk  (12/26/2023)    Interpersonal Safety     Do you feel physically and emotionally safe where you currently live?: Yes     Within the past 12 months, have you been hit, slapped, kicked or otherwise physically hurt by someone?: No     Within the past 12 months, have you been humiliated or emotionally abused in other ways by your partner or ex-partner?: No   Housing Stability: Low Risk  (12/19/2023)    Housing Stability     Do you have housing? : Yes     Are you worried about losing your housing?: No     FAMILY HISTORY  Reviewed, and any changes made accordingly  Family History   Problem Relation Age of Onset     Cancer - colorectal Father     Lung Cancer Father     Macular Degeneration Father     Multiple myeloma Brother     Pacemaker Brother     Hypertension Mother     Cerebrovascular Disease Mother         from misdiagnosis of temporal arteritis    Suicide Sister     Hypertension Maternal Grandmother     Cerebrovascular Disease Maternal Grandmother     Diabetes Maternal Grandmother        MEDICATIONS  Current Outpatient Medications   Medication    aspirin 81 MG EC tablet    atorvastatin (LIPITOR) 10 MG tablet    budesonide (ENTOCORT EC) 3 MG EC capsule    Cholecalciferol (VITAMIN D3 PO)    fluocinonide (LIDEX) 0.05 % external cream    gabapentin (NEURONTIN) 100 MG capsule    hydroxyurea (HYDREA) 500 MG capsule    mesalamine (CANASA) 1000 MG suppository    multivitamin w/minerals (CENTRUM ADULTS) tablet    ondansetron (ZOFRAN ODT) 4 MG ODT tab    Probiotic Product (PROBIOTIC PO)    sodium chloride 0.9 % SOLN 250 mL with vedolizumab 60 MG/ML SOLR 300 mg infusionn    valsartan-hydrochlorothiazide (DIOVAN HCT) 160-25 MG tablet     No current facility-administered medications for this visit.     ALLERGIES  Allergies   Allergen Reactions    Sulfacetamide Hives    Adhesive Tape Rash    Aspirin      Other Reaction(s): Stomach issues    Atenolol      Other reaction(s): Intolerance-Can't Take  Fatigue, GI intolerance    Ibuprofen      Other reaction(s): Stomach Upset  4-9-13 tele encounter  Other Reaction(s): GI intolerance       Naproxen      Other reaction(s): Stomach Upset  4-9-13 tele encounter  Other Reaction(s): GI intolerance    Ramipril Cough     Other reaction(s): Intolerance-Can't Take  Other Reaction(s): GI intolerance    Sulfa Antibiotics     Codeine Nausea    Fentanyl Nausea       PHYSICAL EXAM  Video physical exam  General: Patient appears well in no acute distress.   Skin: No visualized rash or lesions on visualized skin  Eyes: EOMI, no erythema, sclera icterus or discharge noted  Resp: Appears to be breathing  comfortably without accessory muscle usage, speaking in full sentences, no cough  MSK: Appears to have normal range of motion based on visualized movements  Neurologic: No apparent tremors, facial movements symmetric  Psych: affect bright, alert and oriented      LABS  Recent Labs   Lab Test 03/05/24  1136 02/11/22  0940 01/28/21  1133 09/14/20  0839 03/25/20  1101 01/29/20  1108   WBC 7.2   < > 8.2 7.7 8.7 8.8   HGB 13.4   < > 13.8 14.2 14.9 14.2      < > 414 364 409 367   MCV 96   < > 93 92 92 95   RDW 18.7*   < > 12.5 12.4 12.6 12.6   ANEU  --   --  4.7 4.6 6.0 5.3   ALYM  --   --  2.3 1.9 1.7 2.3   DEANGELO  --   --  0.9 0.9 0.8 0.8   AEOS  --   --  0.2 0.2 0.2 0.3    < > = values in this interval not displayed.     Recent Labs   Lab Test 03/05/24  1136 12/28/23  1812 12/07/23  1522 09/14/20  0839 03/25/20  1101      < > 140   < > 141   POTASSIUM 3.8   < > 3.4   < > 3.5   CHLORIDE 101   < > 101   < > 106   CO2 31*   < > 31*   < > 32   BUN 14.5   < > 18.3   < > 14   CR 0.79   < > 0.86   < > 0.72   PRASHANTH 9.7   < > 9.4   < > 9.2   LDH  --   --  192  --  146   URIC  --   --   --   --  6.8*    < > = values in this interval not displayed.     Recent Labs   Lab Test 03/05/24  1136 02/22/24  1128 02/06/24  1525 01/25/24  1057   AST 19 20 18 16   ALT 16 17 20 17   ALKPHOS 69 64 71 63   ALBUMIN 4.1 4.2 4.2 4.2   PROTTOTAL 6.8 6.8 6.8 6.6   BILITOTAL 0.4 0.4 0.4 0.4      Recent Labs   Lab Test 07/08/22  1120 03/25/20  1101    207   IGM  --  74   ELPM  --  0.0   ELPINT  --  Essentially normal electrophoretic pattern.  No obvious monoclonal proteins seen.    Pathologic significance requires clinical correlation.  OSMANY Orr M.D., Ph.D.,   Pathologist ().     IEP  --  No monoclonal protein seen on immunofixation.  Pathological significance requires clinical   correlation.     KAPPAFREELT  --  <2.83*   LAMBDAFREELT  --  1.01   KLR  --  Unable to Calculate      Recent Labs   Lab Test 01/09/24  0908  "12/26/23  1522 12/07/23  1522 03/25/20  1101 09/20/16  1324   RETICABSCT  --  0.055 0.061   < >  --    RETP  --  1.2 1.3   < >  --    IRON 78  --   --   --  95   IRONSAT 25  --   --   --  26   HANNAH 61  --   --   --  56     --   --   --  362   B12  --   --   --   --  883   FOLIC  --   --   --   --  64.6   EPOE 6  --   --   --   --    LDH  --   --  192   < >  --     < > = values in this interval not displayed.     No results for input(s): \"MORPH\" in the last 78959 hours.  No results for input(s): \"HCVAB\", \"HCABC\", \"HBCAB\", \"AUSAB\", \"HIV\" in the last 49267 hours.  Recent Labs   Lab Test 02/06/24  1524 01/09/24  0908 12/07/23  1522   INR 1.19* 1.21* 1.29*   PTT  --  31  --    FIBR  --  300 413      Overall, the bone marrow is normocellular with normal myeloid to erythroid ratio, very slightly increased megakaryocytes, and normal marrow fibrosis. In addition, patient has normal level of LDH (192 U/L) and Erythropoietin (6; range 4-27 mU/mL) and absence of splenomegaly by physical exam.  In the light of the presence of  JAK2 V617F detected on the peripheral blood, these findings could be in keeping with the diagnosis of essential thrombocytocythaemia (ET).  However, only very rare megakaryocytes have deeply lobated nuclei and the overall morphology and histotopography of megakaryocytes are not typical of ET.  In addition, rare intrasinusoidal megakaryocytes are identified which could be suggestive of prefibrotic/early primary myelofibrosis (pre-PMF).       Overall, combining the history of intermittent thrombocytosis with borderline increase in platelet counts and the bone marrow morphologic findings, it appears that the disease is in the very early stage; hence, a close follow-up is warranted to monitor how this MPN with JAK2 V617F progresses.     IMAGING  As mentioned above in HPI.    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 There is no acquired von willebrand disease. Her marrow shows if anything very early disease with only very rare features of ET (megakaryocytes with deeply lobated nuclei) and rare features of pre-fibrotic PMF (intrasinusoidal megakaryocytes). She is at high risk for thrombosis, given that she is >60 and has a JAK2 mutation, and that she probably had a TIA in July 2023.     We reviewed again today that platelet number can be increased but that the platelets themselves are more activated. ASA can help with decreasing the activity of the platelets. HU decreases both platelet number, decreases the activity of the platelets, and a side benefit is decreasing WBC which can interact with platelets to cause thrombosis/vascular pathophysiology. Hopefully it may even contribute to decreased UP inflammation as well. At this time, she still has slight intermittent rectal bleeding from her UP. There aren't great treatments for decreasing the VAF of the JAK2+ clone at this point, but that the disease can be well managed with decreased rates of thrombosis, hemorrhage, and vasomotor symptoms for many years with HU and ASA. In some cases, ET can progress to MF, MDS, or AML, but this can take many years, so her counts will need to be monitored regularly.     There is a question of if the dizziness is a potential vasomotor symptom. As of 3/6/24 she notes slight improvement of the dizziness. Will need to continue to watch this.     PLAN  - Continue ASA 81mg BID  - Icontinue HU to 1000mg daily as platelets are <400.   - Recheck labs in 2 weeks, can back off on HU if needed at that time but will keep stable dose today.   - Further metabolic alkalosis follow up with  PCP. Denies COPD or diuretics.  - Monitor low back pain, rare but possible side effect of HU. Continue conservative measures with ice, PT, and lidocaine patches.  - Monitor to see if sinus drainage and sneezing improves with removal of environmental mold.   - Return by video 3/7/24 with Kasey Becker with labs 2 days prior to discuss increase in HU to   - labs around 3/21  - Follow up with Dr. Milner on 4/4     42 minutes spent on the date of the encounter doing chart review, review of test results, interpretation of tests, patient visit, and documentation      Kasey Becker PA-C

## 2024-03-06 NOTE — Clinical Note
"    3/6/2024         RE: Debra Denise  [id number]  [place] [address]        Dear Colleague,    Thank you for referring your patient, Debra Denise, to the [facility]. Please see a copy of my visit note below.    Virtual Visit Details    Type of service:  Video Visit   Video Start Time: {video visit start/end time for provider to select:570606}  Video End Time:{video visit start/end time for provider to select:205181}    Originating Location (pt. Location): {video visit patient location:998358::\"Home\"}  {PROVIDER LOCATION On-site should be selected for visits conducted from your clinic location or adjoining [facility] hospital, academic office, or other nearby [facility] building. Off-site should be selected for all other provider locations, including home:254152}  Distant Location (provider location):  {virtual location provider:526401}  Platform used for Video Visit: {Virtual Visit Platforms:746911::\"[employer]\"}        [facility]  HEMATOLOGY FOLLOW UP    Debra Denise   : 1940   MRN: [id number]  Date of service: Mar 6, 2024     REASON FOR VISIT: JAK2+ essential thrombocytopenia  Referred by Dr. 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     Per chart review and discussion with the patient,  - [id number]: Persistent progressive dizziness, non-positional, intermittent, accompanied by nausea without vomiting, night time and day time, no improvement with Epley maneuver, and possible component of postural hypotension. Also with mild intermittent headache, mild fatigue over , has not felt \"completely " "normal\"  - Early July 2023: Sudden onset LUE weakness x 15 sec which self resolved. CT, MRI/MRA normal. Echo normal. Ziopatch without arrhythmia. Started on clopidogrel and atorvastatin. Not on ASA because of the ulcerative proctitis. Prior cath 2021 neg for CAD.   - 2023: Prior plt counts normal. 4/14/23 491. Plts normal in 7/2023 x 3. Plts 462-450 in 9/2023. 9/11/23 JAK2+ 0.1884 triggering hematology referral. Plts 513 at time of first heme eval on 12/7/23. Remainder of CBC normal. No splenomegaly. Her UP has been under control with vedolizumab q2mo since May 2023. CRP and ESR improved to normal.  - 12/7/23: Stopped clopidogrel, restarted ASA 81mg BID  - 12/28/23: Scheduled sigmoidoscopy showing erythema in rectum, biopsies taken, diverticulosis, and internal hemorrhoids; complicated by rectal bleeding with large clots, requiring overnight stay at [facility]. ASA held. Hgb 14.2->12.8. Mesalamine suppository added. No recurrence of bleeding. Heme onc consult recommended resuming ASA when bleeding risk permits. GI recommended restarting ASA BID on 12/30.  - 1/9/2024: Disease evaluation - Plt 474, rest of CBC normal. INR 1.21-1.29, otherwise PTT, fibrinogen, F8, VWF panel normal. CMP, LDH (192), Ferritin, iron sat normal. Epo 6. ESR and CRP neg. BMBx with normocellular to slightly hypercellular marrow for age (30%), TLH, no overt dysplasia, 1% blasts, MF-0. BCR-ABL negative. Myeloid NGS CHANDNI 11.1%, TET2 5.5%. MIPSS-ET score is 4 (for age alone).     Dates Treatment Response Toxicity   7/2023 Clopidogrel, atorvastatin (for TIA), not on ASA because of ulcerative proctitis (UP) Plts normal 7/2023 x 3,  Plts 462-450 in 9/2023  Plts 513 on 12/7/23 12/7/23 Stop clopidogrel, started ASA 81 BID. Held briefly for sigmoidoscopy 12/28/23. Restarted ASA on 12/30/23 Plts 474 on 1/9/24  BMBx as above.  Rectal bleeding with large clots with sigmoidoscopy, overnight stay needed, but resolved on own.   1/12/23 HU 500mg daily Plts " 444 on 24  Plts 368 on 24   None known.  HCO3 is a bit high?     INTERVAL HISTORY        She is here with her  Aguilar today.   Lightheadedness and nausea haven't really changed.  Bladder infection prior to starting. It was a typical UTI, but it recurred recently, got another culture. Still having some urinary discomfort. She had a positive cx at [facility] but most recent UA is clear.  She has some occasional bleeding from the proctitis.    No TIA/stroke/VTE symptoms, erythromelalgia, pruritus, fevers, chills, weight loss, appetite loss.     REVIEW OF SYSTEMS  A 10 point review of systems was performed and was otherwise negative except as mentioned in the HPI.     PAST MEDICAL HISTORY  Past Medical History:   Diagnosis Date    Autoimmune disease (H24) approx.     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 30 years ago    Osteopenia     Polymyalgia rheumatica (H24)     PONV (postoperative nausea and vomiting)     Proctitis     Reduced vision Sometime is a little blurry     PAST SURGICAL HISTORY  Past Surgical History:   Procedure Laterality Date    COLONOSCOPY  2014    Procedure: COMBINED COLONOSCOPY, SINGLE BIOPSY/POLYPECTOMY BY BIOPSY;  COLONOSCOPY;  Surgeon: Carol Ann Plasencia MD;  Location:  GI    CV CORONARY ANGIOGRAM N/A 2022    Procedure: Coronary Angiogram;  Surgeon: Trevin Hawk MD;  Location:  HEART CARDIAC CATH LAB    ENT SURGERY      tonsilectomy, adenoidectomy    ESOPHAGOSCOPY, GASTROSCOPY, DUODENOSCOPY (EGD), COMBINED N/A 2023    Procedure: ESOPHAGOGASTRODUODENOSCOPY, WITH BIOPSY;  Surgeon: Angel Lara MD;  Location:  GI    GYN SURGERY  ,     x 2    HYSTERECTOMY      ORTHOPEDIC SURGERY  2004    (R) shoulder surgery for frozen shoulder    ZAC BSO      for fibroids    TONSILLECTOMY  About 50 years ago     SOCIAL  HISTORY  Reviewed, and any changes made accordingly  Social History     Socioeconomic History    Marital status:      Spouse name: Not on file    Number of children: Not on file    Years of education: Not on file    Highest education level: Not on file   Occupational History    Not on file   Tobacco Use    Smoking status: Never    Smokeless tobacco: Never    Tobacco comments:     None   Vaping Use    Vaping Use: Never used   Substance and Sexual Activity    Alcohol use: No    Drug use: No    Sexual activity: Not Currently     Partners: Male     Birth control/protection: None   Other Topics Concern    Parent/sibling w/ CABG, MI or angioplasty before 65F 55M? Not Asked   Social History Narrative    Not on file     Social Determinants of Health     Financial Resource Strain: Low Risk  (12/19/2023)    Financial Resource Strain     Within the past 12 months, have you or your family members you live with been unable to get utilities (heat, electricity) when it was really needed?: No   Food Insecurity: Low Risk  (12/19/2023)    Food Insecurity     Within the past 12 months, did you worry that your food would run out before you got money to buy more?: No     Within the past 12 months, did the food you bought just not last and you didn t have money to get more?: No   Transportation Needs: Low Risk  (12/19/2023)    Transportation Needs     Within the past 12 months, has lack of transportation kept you from medical appointments, getting your medicines, non-medical meetings or appointments, work, or from getting things that you need?: No   Physical Activity: Not on file   Stress: Not on file   Social Connections: Not on file   Interpersonal Safety: Low Risk  (12/26/2023)    Interpersonal Safety     Do you feel physically and emotionally safe where you currently live?: Yes     Within the past 12 months, have you been hit, slapped, kicked or otherwise physically hurt by someone?: No     Within the past 12 months, have you  been humiliated or emotionally abused in other ways by your partner or ex-partner?: No   Housing Stability: Low Risk  (12/19/2023)    Housing Stability     Do you have housing? : Yes     Are you worried about losing your housing?: No     FAMILY HISTORY  Reviewed, and any changes made accordingly  Family History   Problem Relation Age of Onset    Cancer - colorectal Father     Lung Cancer Father     Macular Degeneration Father     Multiple myeloma Brother     Pacemaker Brother     Hypertension Mother     Cerebrovascular Disease Mother         from misdiagnosis of temporal arteritis    Suicide Sister     Hypertension Maternal Grandmother     Cerebrovascular Disease Maternal Grandmother     Diabetes Maternal Grandmother        MEDICATIONS  Current Outpatient Medications   Medication    aspirin 81 MG EC tablet    atorvastatin (LIPITOR) 10 MG tablet    budesonide (ENTOCORT EC) 3 MG EC capsule    Cholecalciferol (VITAMIN D3 PO)    fluocinonide (LIDEX) 0.05 % external cream    gabapentin (NEURONTIN) 100 MG capsule    hydroxyurea (HYDREA) 500 MG capsule    mesalamine (CANASA) 1000 MG suppository    multivitamin w/minerals (CENTRUM ADULTS) tablet    ondansetron (ZOFRAN ODT) 4 MG ODT tab    Probiotic Product (PROBIOTIC PO)    sodium chloride 0.9 % SOLN 250 mL with vedolizumab 60 MG/ML SOLR 300 mg infusionn    valsartan-hydrochlorothiazide (DIOVAN HCT) 160-25 MG tablet     No current facility-administered medications for this visit.     ALLERGIES  Allergies   Allergen Reactions    Sulfacetamide Hives    Adhesive Tape Rash    Aspirin      Other Reaction(s): Stomach issues    Atenolol      Other reaction(s): Intolerance-Can't Take  Fatigue, GI intolerance    Ibuprofen      Other reaction(s): Stomach Upset  4-9-13 tele encounter  Other Reaction(s): GI intolerance       Naproxen      Other reaction(s): Stomach Upset  4-9-13 tele encounter  Other Reaction(s): GI intolerance    Ramipril Cough     Other reaction(s): Intolerance-Can't  Take  Other Reaction(s): GI intolerance    Sulfa Antibiotics     Codeine Nausea    Fentanyl Nausea       PHYSICAL EXAM  There were no vitals taken for this visit.   Wt Readings from Last 10 Encounters:   02/07/24 60.8 kg (134 lb 1.6 oz)   01/12/24 61 kg (134 lb 6.4 oz)   01/09/24 59.8 kg (131 lb 14.4 oz)   01/08/24 60.3 kg (133 lb)   12/28/23 59.4 kg (131 lb)   12/26/23 61.2 kg (134 lb 14.4 oz)   12/07/23 61.6 kg (135 lb 14.4 oz)   10/31/23 62.1 kg (137 lb)   09/05/23 62.1 kg (136 lb 12.8 oz)   07/27/23 61.7 kg (136 lb)     General: Well appearing.  HEENT: Sclerae anicteric.  Lungs: Breathing comfortably. No cough.  MSK: Grossly normal movement.  Neuro: Grossly non-focal.  Skin/access: Normal skin tone.  Psych: Alert and oriented. No distress.    LABS  Recent Labs   Lab Test 03/05/24  1136 02/11/22  0940 01/28/21  1133 09/14/20  0839 03/25/20  1101 01/29/20  1108   WBC 7.2   < > 8.2 7.7 8.7 8.8   HGB 13.4   < > 13.8 14.2 14.9 14.2      < > 414 364 409 367   MCV 96   < > 93 92 92 95   RDW 18.7*   < > 12.5 12.4 12.6 12.6   ANEU  --   --  4.7 4.6 6.0 5.3   ALYM  --   --  2.3 1.9 1.7 2.3   DEANGELO  --   --  0.9 0.9 0.8 0.8   AEOS  --   --  0.2 0.2 0.2 0.3    < > = values in this interval not displayed.     Recent Labs   Lab Test 03/05/24  1136 12/28/23  1812 12/07/23  1522 09/14/20  0839 03/25/20  1101      < > 140   < > 141   POTASSIUM 3.8   < > 3.4   < > 3.5   CHLORIDE 101   < > 101   < > 106   CO2 31*   < > 31*   < > 32   BUN 14.5   < > 18.3   < > 14   CR 0.79   < > 0.86   < > 0.72   PRASHANTH 9.7   < > 9.4   < > 9.2   LDH  --   --  192  --  146   URIC  --   --   --   --  6.8*    < > = values in this interval not displayed.     Recent Labs   Lab Test 03/05/24  1136 02/22/24  1128 02/06/24  1525 01/25/24  1057   AST 19 20 18 16   ALT 16 17 20 17   ALKPHOS 69 64 71 63   ALBUMIN 4.1 4.2 4.2 4.2   PROTTOTAL 6.8 6.8 6.8 6.6   BILITOTAL 0.4 0.4 0.4 0.4      Recent Labs   Lab Test 07/08/22  1120 03/25/20  1101     "207   IGM  --  74   ELPM  --  0.0   ELPINT  --  Essentially normal electrophoretic pattern.  No obvious monoclonal proteins seen.    Pathologic significance requires clinical correlation.  OSMANY Orr M.D., Ph.D.,   Pathologist ().     IEP  --  No monoclonal protein seen on immunofixation.  Pathological significance requires clinical   correlation.     KAPPAFREELT  --  <2.83*   LAMBDAFREELT  --  1.01   KLR  --  Unable to Calculate      Recent Labs   Lab Test 01/09/24  0908 12/26/23  1522 12/07/23  1522 03/25/20  1101 09/20/16  1324   RETICABSCT  --  0.055 0.061   < >  --    RETP  --  1.2 1.3   < >  --    IRON 78  --   --   --  95   IRONSAT 25  --   --   --  26   HANNAH 61  --   --   --  56     --   --   --  362   B12  --   --   --   --  883   FOLIC  --   --   --   --  64.6   EPOE 6  --   --   --   --    LDH  --   --  192   < >  --     < > = values in this interval not displayed.     No results for input(s): \"MORPH\" in the last 91394 hours.  No results for input(s): \"HCVAB\", \"HCABC\", \"HBCAB\", \"AUSAB\", \"HIV\" in the last 38278 hours.  Recent Labs   Lab Test 02/06/24  1524 01/09/24  0908 12/07/23  1522   INR 1.19* 1.21* 1.29*   PTT  --  31  --    FIBR  --  300 413      Overall, the bone marrow is normocellular with normal myeloid to erythroid ratio, very slightly increased megakaryocytes, and normal marrow fibrosis. In addition, patient has normal level of LDH (192 U/L) and Erythropoietin (6; range 4-27 mU/mL) and absence of splenomegaly by physical exam.  In the light of the presence of  JAK2 V617F detected on the peripheral blood, these findings could be in keeping with the diagnosis of essential thrombocytocythaemia (ET).  However, only very rare megakaryocytes have deeply lobated nuclei and the overall morphology and histotopography of megakaryocytes are not typical of ET.  In addition, rare intrasinusoidal megakaryocytes are identified which could be suggestive of prefibrotic/early primary " myelofibrosis (pre-PMF).       Overall, combining the history of intermittent thrombocytosis with borderline increase in platelet counts and the bone marrow morphologic findings, it appears that the disease is in the very early stage; hence, a close follow-up is warranted to monitor how this MPN with JAK2 V617F progresses.     IMAGING  As mentioned above in HPI.    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 There is no acquired von willebrand disease. Her marrow shows if anything very early disease with only very rare features of ET (megakaryocytes with deeply lobated nuclei) and rare features of pre-fibrotic PMF (intrasinusoidal megakaryocytes). She is at high risk for thrombosis, given that she is >60 and has a JAK2 mutation, and that she probably had a TIA in July 2023.     We discussed that platelet number can be increased but that the platelets themselves are more activated. ASA can help with decreasing the activity of the platelets. HU decreases both platelet number, decreases the activity of the platelets, and a side benefit is decreasing WBC which can interact with platelets to cause thrombosis/vascular pathophysiology. Hopefully it may even contribute to decreased UP inflammation as well. We discussed that there aren't great treatments for decreasing the VAF of the JAK2+ clone at this point, but that the disease can be well managed with decreased rates of thrombosis, hemorrhage, and vasomotor symptoms for many years with HU and ASA. In some cases, ET can progress to MF, MDS, or AML, but this can take many years, so her counts will need to be monitored  regularly.    I wonder if the dizziness is a potential vasomotor symptom. Will need to continue to watch this. She hasn't noted any improvements with the ASA and HU.    PLAN  - Continue ASA 81mg BID  - Increase HU to 1000mg daily  - Recheck labs in 2 weeks, can back off on HU if needed.  - Monitor the metabolic alkalosis for now,may need further workup. Denies COPD or diuretics.  - Return by video 3/7/24 with Kasey Becker with labs 2 days prior to discuss increase in HU to 1500 if needed, but can err on the side of keeping same dose, especially if plts are <400.  - Then recheck labs around 3/21  - Follow up with me      We had a long discussion with the patient and her  about the therapeutic possibilities and necessary follow up. All questions were answered to their satisfaction.    Virtual Visit Details    Type of service:  Video Visit   Video Start Time:  1:31 PM  Video End Time: 1:59 PM    Originating Location (pt. Location): Home    Distant Location (provider location):  Off-site  Platform used for Video Visit: [facility]  HEMATOLOGY FOLLOW UP    Debra Denise   : 1940   MRN: [id number]  Date of service: Mar 6, 2024     REASON FOR VISIT: JAK2+ essential thrombocytopenia  Referred by Dr. 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     Per chart review and discussion with the patient,  - 3478-1025: Persistent progressive dizziness, non-positional, intermittent, accompanied by nausea without vomiting, night time and day time, no improvement with Epley maneuver, and possible component of postural hypotension. Also with mild intermittent  "headache, mild fatigue over 2023, has not felt \"completely normal\"  - Early July 2023: Sudden onset LUE weakness x 15 sec which self resolved. CT, MRI/MRA normal. Echo normal. Ziopatch without arrhythmia. Started on clopidogrel and atorvastatin. Not on ASA because of the ulcerative proctitis. Prior cath 2021 neg for CAD.   - 2023: Prior plt counts normal. 4/14/23 491. Plts normal in 7/2023 x 3. Plts 462-450 in 9/2023. 9/11/23 JAK2+ 0.1884 triggering hematology referral. Plts 513 at time of first heme eval on 12/7/23. Remainder of CBC normal. No splenomegaly. Her UP has been under control with vedolizumab q2mo since May 2023. CRP and ESR improved to normal.  - 12/7/23: Stopped clopidogrel, restarted ASA 81mg BID  - 12/28/23: Scheduled sigmoidoscopy showing erythema in rectum, biopsies taken, diverticulosis, and internal hemorrhoids; complicated by rectal bleeding with large clots, requiring overnight stay at [facility]. ASA held. Hgb 14.2->12.8. Mesalamine suppository added. No recurrence of bleeding. Heme onc consult recommended resuming ASA when bleeding risk permits. GI recommended restarting ASA BID on 12/30.  - 1/9/2024: Disease evaluation - Plt 474, rest of CBC normal. INR 1.21-1.29, otherwise PTT, fibrinogen, F8, VWF panel normal. CMP, LDH (192), Ferritin, iron sat normal. Epo 6. ESR and CRP neg. BMBx with normocellular to slightly hypercellular marrow for age (30%), TLH, no overt dysplasia, 1% blasts, MF-0. BCR-ABL negative. Myeloid NGS CHANDNI 11.1%, TET2 5.5%. MIPSS-ET score is 4 (for age alone).     Dates Treatment Response Toxicity   7/2023 Clopidogrel, atorvastatin (for TIA), not on ASA because of ulcerative proctitis (UP) Plts normal 7/2023 x 3,  Plts 462-450 in 9/2023  Plts 513 on 12/7/23 12/7/23 Stop clopidogrel, started ASA 81 BID. Held briefly for sigmoidoscopy 12/28/23. Restarted ASA on 12/30/23 Plts 474 on 1/9/24  BMBx as above.  Rectal bleeding with large clots with sigmoidoscopy, overnight stay " needed, but resolved on own.   1/12/23 HU 500mg daily Plts 444 on 1/25/24  Plts 368 on 2/6/24   None known.  HCO3 is a bit high?   2/08/23 HU 1,000 mg daily Plts 245 on 2/22/24  Plts 202 on 3/5/24  HCO3 is stable. ?low back pain.      INTERVAL HISTORY  Debra is seen today virtually with her  Aguilar. She is feeling well today. She follows with Dr. Zimmerman. She has discovered a leak in her house and mold was found. She wonders if this is contributing to her symptoms of nasal drip and sneezing. She reports her lightheadedness and nausea is moderately improved today. She has been experiencing low back pain when she wakes up in the AM more persistently over the past week. She is seeing a physical therapist and tries to avoid aggravation. She finds ice to be helpful. No recent illness or chills. No fevers or chills.     She has occasional bleeding from the proctitis. Reports this is minimal.     No TIA/stroke/VTE symptoms, erythromelalgia, pruritus, fevers, chills, weight loss, appetite loss.     REVIEW OF SYSTEMS  A 10 point review of systems was performed and was otherwise negative except as mentioned in the HPI.     PAST MEDICAL HISTORY  Past Medical History:   Diagnosis Date     Autoimmune disease (H24) approx. 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 30 years ago     Osteopenia      Polymyalgia rheumatica (H24)      PONV (postoperative nausea and vomiting)      Proctitis      Reduced vision Sometime is a little blurry     PAST SURGICAL HISTORY  Past Surgical History:   Procedure Laterality Date     COLONOSCOPY  04/08/2014    Procedure: COMBINED COLONOSCOPY, SINGLE BIOPSY/POLYPECTOMY BY BIOPSY;  COLONOSCOPY;  Surgeon: Carol Ann Plasencia MD;  Location:  GI     CV CORONARY ANGIOGRAM N/A 08/30/2022    Procedure: Coronary Angiogram;  Surgeon: Trevin Hawk MD;  Location: [facility]  CARDIAC CATH LAB     ENT SURGERY      tonsilectomy, adenoidectomy     ESOPHAGOSCOPY, GASTROSCOPY, DUODENOSCOPY (EGD), COMBINED N/A 2023    Procedure: ESOPHAGOGASTRODUODENOSCOPY, WITH BIOPSY;  Surgeon: Angel Lara MD;  Location:  GI     GYN SURGERY  ,     x 2     HYSTERECTOMY       ORTHOPEDIC SURGERY  2004    (R) shoulder surgery for frozen shoulder     ZAC BSO      for fibroids     TONSILLECTOMY  About 50 years ago     SOCIAL HISTORY  Reviewed, and any changes made accordingly  Social History     Socioeconomic History     Marital status:      Spouse name: Not on file     Number of children: Not on file     Years of education: Not on file     Highest education level: Not on file   Occupational History     Not on file   Tobacco Use     Smoking status: Never     Smokeless tobacco: Never     Tobacco comments:     None   Vaping Use     Vaping Use: Never used   Substance and Sexual Activity     Alcohol use: No     Drug use: No     Sexual activity: Not Currently     Partners: Male     Birth control/protection: None   Other Topics Concern     Parent/sibling w/ CABG, MI or angioplasty before 65F 55M? Not Asked   Social History Narrative     Not on file     Social Determinants of Health     Financial Resource Strain: Low Risk  (2023)    Financial Resource Strain      Within the past 12 months, have you or your family members you live with been unable to get utilities (heat, electricity) when it was really needed?: No   Food Insecurity: Low Risk  (2023)    Food Insecurity      Within the past 12 months, did you worry that your food would run out before you got money to buy more?: No      Within the past 12 months, did the food you bought just not last and you didn t have money to get more?: No   Transportation Needs: Low Risk  (2023)    Transportation Needs      Within the past 12 months, has lack of transportation kept you from medical appointments, getting  your medicines, non-medical meetings or appointments, work, or from getting things that you need?: No   Physical Activity: Not on file   Stress: Not on file   Social Connections: Not on file   Interpersonal Safety: Low Risk  (12/26/2023)    Interpersonal Safety      Do you feel physically and emotionally safe where you currently live?: Yes      Within the past 12 months, have you been hit, slapped, kicked or otherwise physically hurt by someone?: No      Within the past 12 months, have you been humiliated or emotionally abused in other ways by your partner or ex-partner?: No   Housing Stability: Low Risk  (12/19/2023)    Housing Stability      Do you have housing? : Yes      Are you worried about losing your housing?: No     FAMILY HISTORY  Reviewed, and any changes made accordingly  Family History   Problem Relation Age of Onset     Cancer - colorectal Father      Lung Cancer Father      Macular Degeneration Father      Multiple myeloma Brother      Pacemaker Brother      Hypertension Mother      Cerebrovascular Disease Mother         from misdiagnosis of temporal arteritis     Suicide Sister      Hypertension Maternal Grandmother      Cerebrovascular Disease Maternal Grandmother      Diabetes Maternal Grandmother        MEDICATIONS  Current Outpatient Medications   Medication     aspirin 81 MG EC tablet     atorvastatin (LIPITOR) 10 MG tablet     budesonide (ENTOCORT EC) 3 MG EC capsule     Cholecalciferol (VITAMIN D3 PO)     fluocinonide (LIDEX) 0.05 % external cream     gabapentin (NEURONTIN) 100 MG capsule     hydroxyurea (HYDREA) 500 MG capsule     mesalamine (CANASA) 1000 MG suppository     multivitamin w/minerals (CENTRUM ADULTS) tablet     ondansetron (ZOFRAN ODT) 4 MG ODT tab     Probiotic Product (PROBIOTIC PO)     sodium chloride 0.9 % SOLN 250 mL with vedolizumab 60 MG/ML SOLR 300 mg infusionn     valsartan-hydrochlorothiazide (DIOVAN HCT) 160-25 MG tablet     No current facility-administered  medications for this visit.     ALLERGIES  Allergies   Allergen Reactions     Sulfacetamide Hives     Adhesive Tape Rash     Aspirin      Other Reaction(s): Stomach issues     Atenolol      Other reaction(s): Intolerance-Can't Take  Fatigue, GI intolerance     Ibuprofen      Other reaction(s): Stomach Upset  4-9-13 tele encounter  Other Reaction(s): GI intolerance        Naproxen      Other reaction(s): Stomach Upset  4-9-13 tele encounter  Other Reaction(s): GI intolerance     Ramipril Cough     Other reaction(s): Intolerance-Can't Take  Other Reaction(s): GI intolerance     Sulfa Antibiotics      Codeine Nausea     Fentanyl Nausea       PHYSICAL EXAM  Video physical exam  General: Patient appears well in no acute distress.   Skin: No visualized rash or lesions on visualized skin  Eyes: EOMI, no erythema, sclera icterus or discharge noted  Resp: Appears to be breathing comfortably without accessory muscle usage, speaking in full sentences, no cough  MSK: Appears to have normal range of motion based on visualized movements  Neurologic: No apparent tremors, facial movements symmetric  Psych: affect bright, alert and oriented      LABS  Recent Labs   Lab Test 03/05/24  1136 02/11/22  0940 01/28/21  1133 09/14/20  0839 03/25/20  1101 01/29/20  1108   WBC 7.2   < > 8.2 7.7 8.7 8.8   HGB 13.4   < > 13.8 14.2 14.9 14.2      < > 414 364 409 367   MCV 96   < > 93 92 92 95   RDW 18.7*   < > 12.5 12.4 12.6 12.6   ANEU  --   --  4.7 4.6 6.0 5.3   ALYM  --   --  2.3 1.9 1.7 2.3   DEANGELO  --   --  0.9 0.9 0.8 0.8   AEOS  --   --  0.2 0.2 0.2 0.3    < > = values in this interval not displayed.     Recent Labs   Lab Test 03/05/24  1136 12/28/23  1812 12/07/23  1522 09/14/20  0839 03/25/20  1101      < > 140   < > 141   POTASSIUM 3.8   < > 3.4   < > 3.5   CHLORIDE 101   < > 101   < > 106   CO2 31*   < > 31*   < > 32   BUN 14.5   < > 18.3   < > 14   CR 0.79   < > 0.86   < > 0.72   PRASHANTH 9.7   < > 9.4   < > 9.2   LDH  --    "--  192  --  146   URIC  --   --   --   --  6.8*    < > = values in this interval not displayed.     Recent Labs   Lab Test 03/05/24  1136 02/22/24  1128 02/06/24  1525 01/25/24  1057   AST 19 20 18 16   ALT 16 17 20 17   ALKPHOS 69 64 71 63   ALBUMIN 4.1 4.2 4.2 4.2   PROTTOTAL 6.8 6.8 6.8 6.6   BILITOTAL 0.4 0.4 0.4 0.4      Recent Labs   Lab Test 07/08/22  1120 03/25/20  1101    207   IGM  --  74   ELPM  --  0.0   ELPINT  --  Essentially normal electrophoretic pattern.  No obvious monoclonal proteins seen.    Pathologic significance requires clinical correlation.  OSMANY Orr M.D., Ph.D.,   Pathologist ().     IEP  --  No monoclonal protein seen on immunofixation.  Pathological significance requires clinical   correlation.     KAPPAFREELT  --  <2.83*   LAMBDAFREELT  --  1.01   KLR  --  Unable to Calculate      Recent Labs   Lab Test 01/09/24  0908 12/26/23  1522 12/07/23  1522 03/25/20  1101 09/20/16  1324   RETICABSCT  --  0.055 0.061   < >  --    RETP  --  1.2 1.3   < >  --    IRON 78  --   --   --  95   IRONSAT 25  --   --   --  26   HANNAH 61  --   --   --  56     --   --   --  362   B12  --   --   --   --  883   FOLIC  --   --   --   --  64.6   EPOE 6  --   --   --   --    LDH  --   --  192   < >  --     < > = values in this interval not displayed.     No results for input(s): \"MORPH\" in the last 38249 hours.  No results for input(s): \"HCVAB\", \"HCABC\", \"HBCAB\", \"AUSAB\", \"HIV\" in the last 27941 hours.  Recent Labs   Lab Test 02/06/24  1524 01/09/24  0908 12/07/23  1522   INR 1.19* 1.21* 1.29*   PTT  --  31  --    FIBR  --  300 413      Overall, the bone marrow is normocellular with normal myeloid to erythroid ratio, very slightly increased megakaryocytes, and normal marrow fibrosis. In addition, patient has normal level of LDH (192 U/L) and Erythropoietin (6; range 4-27 mU/mL) and absence of splenomegaly by physical exam.  In the light of the presence of  JAK2 V617F detected on the " peripheral blood, these findings could be in keeping with the diagnosis of essential thrombocytocythaemia (ET).  However, only very rare megakaryocytes have deeply lobated nuclei and the overall morphology and histotopography of megakaryocytes are not typical of ET.  In addition, rare intrasinusoidal megakaryocytes are identified which could be suggestive of prefibrotic/early primary myelofibrosis (pre-PMF).       Overall, combining the history of intermittent thrombocytosis with borderline increase in platelet counts and the bone marrow morphologic findings, it appears that the disease is in the very early stage; hence, a close follow-up is warranted to monitor how this MPN with JAK2 V617F progresses.     IMAGING  As mentioned above in HPI.    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 There is no acquired von willebrand disease. Her marrow shows if anything very early disease with only very rare features of ET (megakaryocytes with deeply lobated nuclei) and rare features of pre-fibrotic PMF (intrasinusoidal megakaryocytes). She is at high risk for thrombosis, given that she is >60 and has a JAK2 mutation, and that she probably had a TIA in July 2023.     We reviewed again today that platelet number can be increased but that the platelets themselves are more activated. ASA can help with decreasing the activity of the platelets. HU decreases both platelet number, decreases the activity of the platelets, and a side benefit is decreasing WBC which can interact with platelets to cause thrombosis/vascular pathophysiology. Hopefully it may even contribute to  decreased UP inflammation as well. At this time, she still has slight intermittent rectal bleeding from her UP. There aren't great treatments for decreasing the VAF of the JAK2+ clone at this point, but that the disease can be well managed with decreased rates of thrombosis, hemorrhage, and vasomotor symptoms for many years with HU and ASA. In some cases, ET can progress to MF, MDS, or AML, but this can take many years, so her counts will need to be monitored regularly.     There is a question of if the dizziness is a potential vasomotor symptom. As of 3/6/24 she notes slight improvement of the dizziness. Will need to continue to watch this.     PLAN  - Continue ASA 81mg BID  - Icontinue HU to 1000mg daily as platelets are <400.   - Recheck labs in 2 weeks, can back off on HU if needed at that time but will keep stable dose today.   - Further metabolic alkalosis follow up with PCP. Denies COPD or diuretics.  - Monitor low back pain, rare but possible side effect of HU. Continue conservative measures with ice, PT, and lidocaine patches.  - Monitor to see if sinus drainage and sneezing improves with removal of environmental mold.   - Return by video 3/7/24 with Kasey Becker with labs 2 days prior to discuss increase in HU to   - labs around 3/21  - Follow up with Dr. Milner on 4/4     42 minutes spent on the date of the encounter doing chart review, review of test results, interpretation of tests, patient visit, and documentation      Kasey Becker PA-C          Again, thank you for allowing me to participate in the care of your patient.        Sincerely,        Kasey Becker PA-C

## 2024-03-15 ENCOUNTER — LAB (OUTPATIENT)
Dept: LAB | Facility: CLINIC | Age: 84
End: 2024-03-15
Payer: COMMERCIAL

## 2024-03-15 ENCOUNTER — TELEPHONE (OUTPATIENT)
Dept: INTERNAL MEDICINE | Facility: CLINIC | Age: 84
End: 2024-03-15
Payer: COMMERCIAL

## 2024-03-15 ENCOUNTER — NURSE TRIAGE (OUTPATIENT)
Dept: NURSING | Facility: CLINIC | Age: 84
End: 2024-03-15

## 2024-03-15 DIAGNOSIS — J45.991 COUGH VARIANT ASTHMA: ICD-10-CM

## 2024-03-15 DIAGNOSIS — Z77.120 MOLD EXPOSURE: ICD-10-CM

## 2024-03-15 LAB
ALBUMIN SERPL BCG-MCNC: 4.3 G/DL (ref 3.5–5.2)
ALP SERPL-CCNC: 73 U/L (ref 40–150)
ALT SERPL W P-5'-P-CCNC: 17 U/L (ref 0–50)
ANION GAP SERPL CALCULATED.3IONS-SCNC: 9 MMOL/L (ref 7–15)
AST SERPL W P-5'-P-CCNC: 19 U/L (ref 0–45)
BASOPHILS # BLD AUTO: 0 10E3/UL (ref 0–0.2)
BASOPHILS NFR BLD AUTO: 0 %
BILIRUB SERPL-MCNC: 0.9 MG/DL
BUN SERPL-MCNC: 15.3 MG/DL (ref 8–23)
CALCIUM SERPL-MCNC: 9.5 MG/DL (ref 8.8–10.2)
CHLORIDE SERPL-SCNC: 100 MMOL/L (ref 98–107)
CREAT SERPL-MCNC: 0.8 MG/DL (ref 0.51–0.95)
DEPRECATED HCO3 PLAS-SCNC: 31 MMOL/L (ref 22–29)
EGFRCR SERPLBLD CKD-EPI 2021: 72 ML/MIN/1.73M2
EOSINOPHIL # BLD AUTO: 0.2 10E3/UL (ref 0–0.7)
EOSINOPHIL NFR BLD AUTO: 2 %
ERYTHROCYTE [DISTWIDTH] IN BLOOD BY AUTOMATED COUNT: 19.9 % (ref 10–15)
GLUCOSE SERPL-MCNC: 73 MG/DL (ref 70–99)
HCT VFR BLD AUTO: 40.6 % (ref 35–47)
HGB BLD-MCNC: 13.5 G/DL (ref 11.7–15.7)
IMM GRANULOCYTES # BLD: 0 10E3/UL
IMM GRANULOCYTES NFR BLD: 0 %
LYMPHOCYTES # BLD AUTO: 2.3 10E3/UL (ref 0.8–5.3)
LYMPHOCYTES NFR BLD AUTO: 31 %
MCH RBC QN AUTO: 32.9 PG (ref 26.5–33)
MCHC RBC AUTO-ENTMCNC: 33.3 G/DL (ref 31.5–36.5)
MCV RBC AUTO: 99 FL (ref 78–100)
MONOCYTES # BLD AUTO: 0.8 10E3/UL (ref 0–1.3)
MONOCYTES NFR BLD AUTO: 11 %
NEUTROPHILS # BLD AUTO: 4 10E3/UL (ref 1.6–8.3)
NEUTROPHILS NFR BLD AUTO: 56 %
NRBC # BLD AUTO: 0 10E3/UL
NRBC BLD AUTO-RTO: 0 /100
PLATELET # BLD AUTO: 175 10E3/UL (ref 150–450)
POTASSIUM SERPL-SCNC: 3.5 MMOL/L (ref 3.4–5.3)
PROT SERPL-MCNC: 6.9 G/DL (ref 6.4–8.3)
RBC # BLD AUTO: 4.1 10E6/UL (ref 3.8–5.2)
SODIUM SERPL-SCNC: 140 MMOL/L (ref 135–145)
WBC # BLD AUTO: 7.4 10E3/UL (ref 4–11)

## 2024-03-15 PROCEDURE — 80053 COMPREHEN METABOLIC PANEL: CPT

## 2024-03-15 PROCEDURE — 86003 ALLG SPEC IGE CRUDE XTRC EA: CPT

## 2024-03-15 PROCEDURE — 36415 COLL VENOUS BLD VENIPUNCTURE: CPT

## 2024-03-15 PROCEDURE — 85025 COMPLETE CBC W/AUTO DIFF WBC: CPT

## 2024-03-15 NOTE — TELEPHONE ENCOUNTER
Nurse Triage SBAR    Is this a 2nd Level Triage?  Yes    Situation:   Lab orders placed in Computer system for mold testing    Background/Assessment:     Pt reporting,  both her  and Pt are having issues with mold in their apartment.   Wanting orders placed for blood work.    Pt would like to have the lab work done at [facility] today.    Please call Pt for further assistance with lab orders.[phone] or [phone]    Thank you     Martha Casanova RN  Central Triage Red Flags/Med Refills      Protocol Recommended Disposition:   Discuss With PCP And Callback By Nurse Within 1 Hour      Reason for Disposition   Nursing judgment    Additional Information   Negative: Sounds like a life-threatening emergency to the triager   Negative: Information only call about a Well Adult (no illness or injury)   Negative: Caller can't be reached by phone   Negative: Nursing judgment   Negative: Nursing judgment   Negative: Nursing judgment   Negative: Nursing judgment    Protocols used: No Protocol [id number]

## 2024-03-15 NOTE — TELEPHONE ENCOUNTER
Left Voicemail (1st Attempt) for the patient to call back and schedule the following:    Appointment type: LAB  Provider: PCP  Return date: 3/15/24  Specialty phone number: [phone]  Additional appointment(s) needed: -  Additonal Notes: pt requested labs- orders placed- YAHIR Valera # per pt requested location.

## 2024-03-20 LAB — A ALTERNATA IGE QN: <0.1 KU(A)/L

## 2024-03-22 ENCOUNTER — LAB (OUTPATIENT)
Dept: LAB | Facility: CLINIC | Age: 84
End: 2024-03-22
Attending: INTERNAL MEDICINE
Payer: COMMERCIAL

## 2024-03-22 DIAGNOSIS — D47.3 ESSENTIAL THROMBOCYTHEMIA (H): ICD-10-CM

## 2024-03-22 LAB
ALBUMIN SERPL BCG-MCNC: 4 G/DL (ref 3.5–5.2)
ALP SERPL-CCNC: 71 U/L (ref 40–150)
ALT SERPL W P-5'-P-CCNC: 14 U/L (ref 0–50)
ANION GAP SERPL CALCULATED.3IONS-SCNC: 9 MMOL/L (ref 7–15)
AST SERPL W P-5'-P-CCNC: 16 U/L (ref 0–45)
BASOPHILS # BLD AUTO: 0 10E3/UL (ref 0–0.2)
BASOPHILS NFR BLD AUTO: 0 %
BILIRUB SERPL-MCNC: 0.5 MG/DL
BUN SERPL-MCNC: 19.1 MG/DL (ref 8–23)
CALCIUM SERPL-MCNC: 9.4 MG/DL (ref 8.8–10.2)
CHLORIDE SERPL-SCNC: 99 MMOL/L (ref 98–107)
CREAT SERPL-MCNC: 0.82 MG/DL (ref 0.51–0.95)
DEPRECATED HCO3 PLAS-SCNC: 31 MMOL/L (ref 22–29)
EGFRCR SERPLBLD CKD-EPI 2021: 70 ML/MIN/1.73M2
EOSINOPHIL # BLD AUTO: 0.1 10E3/UL (ref 0–0.7)
EOSINOPHIL NFR BLD AUTO: 1 %
ERYTHROCYTE [DISTWIDTH] IN BLOOD BY AUTOMATED COUNT: 20.1 % (ref 10–15)
GLUCOSE SERPL-MCNC: 99 MG/DL (ref 70–99)
HCT VFR BLD AUTO: 37.4 % (ref 35–47)
HGB BLD-MCNC: 12.5 G/DL (ref 11.7–15.7)
IMM GRANULOCYTES # BLD: 0 10E3/UL
IMM GRANULOCYTES NFR BLD: 0 %
LYMPHOCYTES # BLD AUTO: 1.7 10E3/UL (ref 0.8–5.3)
LYMPHOCYTES NFR BLD AUTO: 24 %
MCH RBC QN AUTO: 33.3 PG (ref 26.5–33)
MCHC RBC AUTO-ENTMCNC: 33.4 G/DL (ref 31.5–36.5)
MCV RBC AUTO: 100 FL (ref 78–100)
MONOCYTES # BLD AUTO: 0.7 10E3/UL (ref 0–1.3)
MONOCYTES NFR BLD AUTO: 9 %
NEUTROPHILS # BLD AUTO: 4.6 10E3/UL (ref 1.6–8.3)
NEUTROPHILS NFR BLD AUTO: 66 %
NRBC # BLD AUTO: 0 10E3/UL
NRBC BLD AUTO-RTO: 0 /100
PLATELET # BLD AUTO: 156 10E3/UL (ref 150–450)
POTASSIUM SERPL-SCNC: 3.8 MMOL/L (ref 3.4–5.3)
PROT SERPL-MCNC: 6.6 G/DL (ref 6.4–8.3)
RBC # BLD AUTO: 3.75 10E6/UL (ref 3.8–5.2)
SODIUM SERPL-SCNC: 139 MMOL/L (ref 135–145)
WBC # BLD AUTO: 7.1 10E3/UL (ref 4–11)

## 2024-03-22 PROCEDURE — 80053 COMPREHEN METABOLIC PANEL: CPT

## 2024-03-22 PROCEDURE — 85025 COMPLETE CBC W/AUTO DIFF WBC: CPT

## 2024-03-22 PROCEDURE — 36415 COLL VENOUS BLD VENIPUNCTURE: CPT

## 2024-03-24 ASSESSMENT — ASTHMA QUESTIONNAIRES
QUESTION_2 LAST FOUR WEEKS HOW OFTEN HAVE YOU HAD SHORTNESS OF BREATH: NOT AT ALL
QUESTION_1 LAST FOUR WEEKS HOW MUCH OF THE TIME DID YOUR ASTHMA KEEP YOU FROM GETTING AS MUCH DONE AT WORK, SCHOOL OR AT HOME: NONE OF THE TIME
QUESTION_5 LAST FOUR WEEKS HOW WOULD YOU RATE YOUR ASTHMA CONTROL: COMPLETELY CONTROLLED
ACT_TOTALSCORE: 25
ACT_TOTALSCORE: 25
QUESTION_3 LAST FOUR WEEKS HOW OFTEN DID YOUR ASTHMA SYMPTOMS (WHEEZING, COUGHING, SHORTNESS OF BREATH, CHEST TIGHTNESS OR PAIN) WAKE YOU UP AT NIGHT OR EARLIER THAN USUAL IN THE MORNING: NOT AT ALL
QUESTION_4 LAST FOUR WEEKS HOW OFTEN HAVE YOU USED YOUR RESCUE INHALER OR NEBULIZER MEDICATION (SUCH AS ALBUTEROL): NOT AT ALL

## 2024-03-25 ENCOUNTER — TRANSFERRED RECORDS (OUTPATIENT)
Dept: HEALTH INFORMATION MANAGEMENT | Facility: CLINIC | Age: 84
End: 2024-03-25
Payer: COMMERCIAL

## 2024-03-25 LAB
ALT SERPL-CCNC: 11 IU/L (ref 0–32)
AST SERPL-CCNC: 18 IU/L (ref 0–40)
CREATININE (EXTERNAL): 0.62 MG/DL (ref 0.57–1)
GFR ESTIMATED (EXTERNAL): 88 ML/MIN/1.73

## 2024-03-25 NOTE — PROGRESS NOTES
HPI:    She is now on hydroxyurea. She has some generalized fatigue. She is still following with RADHA Zhong for proctitis. She had urinary complaints. She had a normal UA 2024 and no further complaints. She tries to get some exercise. She is trying to eat healthy. We reviewed many of her laboratory tests today. No other HEENT, cardiopulmonary, abdominal, , GYN, neurological, systemic, psychiatric, lymphatic, endocrine, vascular complaints.     Past Medical History:   Diagnosis Date    Autoimmune disease (H24) approx.     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 30 years ago    Osteopenia     Polymyalgia rheumatica (H24)     PONV (postoperative nausea and vomiting)     Proctitis     Reduced vision Sometime is a little blurry     Past Surgical History:   Procedure Laterality Date    COLONOSCOPY  2014    Procedure: COMBINED COLONOSCOPY, SINGLE BIOPSY/POLYPECTOMY BY BIOPSY;  COLONOSCOPY;  Surgeon: Carol Ann Plasencia MD;  Location: [facility]    CV CORONARY ANGIOGRAM N/A 2022    Procedure: Coronary Angiogram;  Surgeon: Trevin Hawk MD;  Location:  [facility]    ENT SURGERY      tonsilectomy, adenoidectomy    ESOPHAGOSCOPY, GASTROSCOPY, DUODENOSCOPY (EGD), COMBINED N/A 2023    Procedure: ESOPHAGOGASTRODUODENOSCOPY, WITH BIOPSY;  Surgeon: Angel Lara MD;  Location:  GI    GYN SURGERY  ,     x 2    HYSTERECTOMY      ORTHOPEDIC SURGERY  2004    (R) shoulder surgery for frozen shoulder    ZAC BSO      for fibroids    TONSILLECTOMY  About 50 years ago     PE:    Vitals noted, gen, nad, cooperative, alert, neck supple, nl rom, lungs with good air movement, RRR, S1, S2, no MRG, abdomen, no acute findings. Grossly normal neurological exam.     Recent Results (from the past 720 hour(s))   Comprehensive metabolic panel    Collection Time:  03/05/24 11:36 AM   Result Value Ref Range    Sodium 141 135 - 145 mmol/L    Potassium 3.8 3.4 - 5.3 mmol/L    Carbon Dioxide (CO2) 31 (H) 22 - 29 mmol/L    Anion Gap 9 7 - 15 mmol/L    Urea Nitrogen 14.5 8.0 - 23.0 mg/dL    Creatinine 0.79 0.51 - 0.95 mg/dL    GFR Estimate 73 >60 mL/min/1.73m2    Calcium 9.7 8.8 - 10.2 mg/dL    Chloride 101 98 - 107 mmol/L    Glucose 87 70 - 99 mg/dL    Alkaline Phosphatase 69 40 - 150 U/L    AST 19 0 - 45 U/L    ALT 16 0 - 50 U/L    Protein Total 6.8 6.4 - 8.3 g/dL    Albumin 4.1 3.5 - 5.2 g/dL    Bilirubin Total 0.4 <=1.2 mg/dL   CBC with platelets and differential    Collection Time: 03/05/24 11:36 AM   Result Value Ref Range    WBC Count 7.2 4.0 - 11.0 10e3/uL    RBC Count 4.18 3.80 - 5.20 10e6/uL    Hemoglobin 13.4 11.7 - 15.7 g/dL    Hematocrit 40.3 35.0 - 47.0 %    MCV 96 78 - 100 fL    MCH 32.1 26.5 - 33.0 pg    MCHC 33.3 31.5 - 36.5 g/dL    RDW 18.7 (H) 10.0 - 15.0 %    Platelet Count 202 150 - 450 10e3/uL    % Neutrophils 60 %    % Lymphocytes 27 %    % Monocytes 11 %    % Eosinophils 2 %    % Basophils 0 %    % Immature Granulocytes 0 %    NRBCs per 100 WBC 0 <1 /100    Absolute Neutrophils 4.2 1.6 - 8.3 10e3/uL    Absolute Lymphocytes 2.0 0.8 - 5.3 10e3/uL    Absolute Monocytes 0.8 0.0 - 1.3 10e3/uL    Absolute Eosinophils 0.1 0.0 - 0.7 10e3/uL    Absolute Basophils 0.0 0.0 - 0.2 10e3/uL    Absolute Immature Granulocytes 0.0 <=0.4 10e3/uL    Absolute NRBCs 0.0 10e3/uL   Comprehensive metabolic panel    Collection Time: 03/15/24  1:59 PM   Result Value Ref Range    Sodium 140 135 - 145 mmol/L    Potassium 3.5 3.4 - 5.3 mmol/L    Carbon Dioxide (CO2) 31 (H) 22 - 29 mmol/L    Anion Gap 9 7 - 15 mmol/L    Urea Nitrogen 15.3 8.0 - 23.0 mg/dL    Creatinine 0.80 0.51 - 0.95 mg/dL    GFR Estimate 72 >60 mL/min/1.73m2    Calcium 9.5 8.8 - 10.2 mg/dL    Chloride 100 98 - 107 mmol/L    Glucose 73 70 - 99 mg/dL    Alkaline Phosphatase 73 40 - 150 U/L    AST 19 0 - 45 U/L    ALT 17  0 - 50 U/L    Protein Total 6.9 6.4 - 8.3 g/dL    Albumin 4.3 3.5 - 5.2 g/dL    Bilirubin Total 0.9 <=1.2 mg/dL   Allergen alternaria alternata IgE    Collection Time: 03/15/24  1:59 PM   Result Value Ref Range    Alternaria alternata, Mold IgE <0.10 <0.10 KU(A)/L   CBC with platelets and differential    Collection Time: 03/15/24  1:59 PM   Result Value Ref Range    WBC Count 7.4 4.0 - 11.0 10e3/uL    RBC Count 4.10 3.80 - 5.20 10e6/uL    Hemoglobin 13.5 11.7 - 15.7 g/dL    Hematocrit 40.6 35.0 - 47.0 %    MCV 99 78 - 100 fL    MCH 32.9 26.5 - 33.0 pg    MCHC 33.3 31.5 - 36.5 g/dL    RDW 19.9 (H) 10.0 - 15.0 %    Platelet Count 175 150 - 450 10e3/uL    % Neutrophils 56 %    % Lymphocytes 31 %    % Monocytes 11 %    % Eosinophils 2 %    % Basophils 0 %    % Immature Granulocytes 0 %    NRBCs per 100 WBC 0 <1 /100    Absolute Neutrophils 4.0 1.6 - 8.3 10e3/uL    Absolute Lymphocytes 2.3 0.8 - 5.3 10e3/uL    Absolute Monocytes 0.8 0.0 - 1.3 10e3/uL    Absolute Eosinophils 0.2 0.0 - 0.7 10e3/uL    Absolute Basophils 0.0 0.0 - 0.2 10e3/uL    Absolute Immature Granulocytes 0.0 <=0.4 10e3/uL    Absolute NRBCs 0.0 10e3/uL   Comprehensive metabolic panel    Collection Time: 03/22/24 12:06 PM   Result Value Ref Range    Sodium 139 135 - 145 mmol/L    Potassium 3.8 3.4 - 5.3 mmol/L    Carbon Dioxide (CO2) 31 (H) 22 - 29 mmol/L    Anion Gap 9 7 - 15 mmol/L    Urea Nitrogen 19.1 8.0 - 23.0 mg/dL    Creatinine 0.82 0.51 - 0.95 mg/dL    GFR Estimate 70 >60 mL/min/1.73m2    Calcium 9.4 8.8 - 10.2 mg/dL    Chloride 99 98 - 107 mmol/L    Glucose 99 70 - 99 mg/dL    Alkaline Phosphatase 71 40 - 150 U/L    AST 16 0 - 45 U/L    ALT 14 0 - 50 U/L    Protein Total 6.6 6.4 - 8.3 g/dL    Albumin 4.0 3.5 - 5.2 g/dL    Bilirubin Total 0.5 <=1.2 mg/dL   CBC with platelets and differential    Collection Time: 03/22/24 12:06 PM   Result Value Ref Range    WBC Count 7.1 4.0 - 11.0 10e3/uL    RBC Count 3.75 (L) 3.80 - 5.20 10e6/uL    Hemoglobin  12.5 11.7 - 15.7 g/dL    Hematocrit 37.4 35.0 - 47.0 %     78 - 100 fL    MCH 33.3 (H) 26.5 - 33.0 pg    MCHC 33.4 31.5 - 36.5 g/dL    RDW 20.1 (H) 10.0 - 15.0 %    Platelet Count 156 150 - 450 10e3/uL    % Neutrophils 66 %    % Lymphocytes 24 %    % Monocytes 9 %    % Eosinophils 1 %    % Basophils 0 %    % Immature Granulocytes 0 %    NRBCs per 100 WBC 0 <1 /100    Absolute Neutrophils 4.6 1.6 - 8.3 10e3/uL    Absolute Lymphocytes 1.7 0.8 - 5.3 10e3/uL    Absolute Monocytes 0.7 0.0 - 1.3 10e3/uL    Absolute Eosinophils 0.1 0.0 - 0.7 10e3/uL    Absolute Basophils 0.0 0.0 - 0.2 10e3/uL    Absolute Immature Granulocytes 0.0 <=0.4 10e3/uL    Absolute NRBCs 0.0 10e3/uL           A/P:    1. Immunizations; COVID Pfizer vaccine x 4. She has completed the Shingrix vaccine series. Pneumococcal 23 done 10/18/2011. Prevnar 13 done 1/13/2016. Td 1/31/2018. Prevnar 20 done 11/15/2022. Influenza vaccine done 10/30/2023.   2. Inflammatory bowel disease/proctitis. She is followed at [facility] by Dr. Garcia. She had a Flex Sig 12/28/2023. Lab note from Dr. Garcia 2/13/2024.   3. Chronic pain on night time Gabapentin; seen Dr. Pimentel, orthopedics 6/8/2021 and Dr. Neumann 4/29/2021 Mr. Neumann.   4. HTN; on Valsartan/HcTz. Electrolytes and Creatinine checked 3/22/2024.   5. Mammogram; 2/13/2024.   6. Lipids; 1/9/2024; HDL 61, LDL 39, and TG's 69. She is on  Atorvastatin   7. Dermatology; scanned in outside [facility] 2/17/2022. Care Everywhere dermatology note 10/12/2022.   8. Vitamin D normal at 57 on 2/11/2022. DEXA scan in chart from 7/19/2021 and most negative T score -2.3.  She saw Dr. Simon endocrinology   8/22/2022  9. Outside Rheumatology for PMR, Dr. Damian scanned in note from 5/24/2023. She is not on prednisone currently.  Ordered Crp and ESR today 3/27/2024.   10. Palpitations;  Normal dobutamine stress echo 10/11/2021.  For atypical CP, CTA coronary angiogram done 8/10/2022 with possible severe  mid LAD stenosis. Coronary angiogram on 8/30/2022 with normal coronary arteries.  Ziopatch monitor 7/22/2022 w/o significant findings.    11. Past hospital discharged 7/12/2023 for possible TIA sxs with negative evaluation. Transient  L arm complaints.  No recurrence of sxs.  12. A1c 6.0% on 7/3/2023. Ordered A1c for tomorrow 1/9/2024.   13. Positive CHANDNI-2 for thrombocytosis. She was seen 2/8/2024 by Dr. Milner, Hematology and has 4/4/2024. She is on hydroxyurea         30 minutes spent on the date of the encounter doing chart review, history and exam, documentation and further activities as noted above exclusive of procedures and other billable interpretations  30 minutes spent on the date of the encounter doing chart review, history and exam, documentation and further activities as noted above exclusive of procedures and other billable interpretations

## 2024-03-27 ENCOUNTER — OFFICE VISIT (OUTPATIENT)
Dept: INTERNAL MEDICINE | Facility: CLINIC | Age: 84
End: 2024-03-27
Payer: COMMERCIAL

## 2024-03-27 ENCOUNTER — LAB (OUTPATIENT)
Dept: LAB | Facility: CLINIC | Age: 84
End: 2024-03-27
Payer: COMMERCIAL

## 2024-03-27 VITALS
WEIGHT: 135.2 LBS | DIASTOLIC BLOOD PRESSURE: 66 MMHG | SYSTOLIC BLOOD PRESSURE: 137 MMHG | HEART RATE: 57 BPM | HEIGHT: 65 IN | BODY MASS INDEX: 22.53 KG/M2 | OXYGEN SATURATION: 99 %

## 2024-03-27 DIAGNOSIS — R52 PAIN: ICD-10-CM

## 2024-03-27 DIAGNOSIS — R52 PAIN: Primary | ICD-10-CM

## 2024-03-27 LAB
CRP SERPL-MCNC: 5.24 MG/L
ERYTHROCYTE [SEDIMENTATION RATE] IN BLOOD BY WESTERGREN METHOD: 18 MM/HR (ref 0–30)

## 2024-03-27 PROCEDURE — 86140 C-REACTIVE PROTEIN: CPT | Performed by: PATHOLOGY

## 2024-03-27 PROCEDURE — 36415 COLL VENOUS BLD VENIPUNCTURE: CPT | Performed by: PATHOLOGY

## 2024-03-27 PROCEDURE — 85652 RBC SED RATE AUTOMATED: CPT | Performed by: PATHOLOGY

## 2024-03-27 PROCEDURE — 99214 OFFICE O/P EST MOD 30 MIN: CPT | Performed by: INTERNAL MEDICINE

## 2024-03-27 NOTE — PROGRESS NOTES
Debra is a 83 year old that presents in clinic today for the following:     Chief Complaint   Patient presents with    Follow Up           3/27/2024     9:21 AM   Additional Questions   Roomed by BRITTANY STRANGE     Screenings as of today     ACT TOTAL SCORE (Goal Greater than or Equal to 20) 25        BRITTANY Kaufman at 9:22 AM on 3/27/2024

## 2024-04-02 ENCOUNTER — LAB (OUTPATIENT)
Dept: LAB | Facility: CLINIC | Age: 84
End: 2024-04-02
Payer: COMMERCIAL

## 2024-04-02 DIAGNOSIS — D47.3 ESSENTIAL THROMBOCYTHEMIA (H): ICD-10-CM

## 2024-04-02 LAB
ALBUMIN SERPL BCG-MCNC: 3.9 G/DL (ref 3.5–5.2)
ALP SERPL-CCNC: 72 U/L (ref 40–150)
ALT SERPL W P-5'-P-CCNC: 14 U/L (ref 0–50)
ANION GAP SERPL CALCULATED.3IONS-SCNC: 9 MMOL/L (ref 7–15)
AST SERPL W P-5'-P-CCNC: 17 U/L (ref 0–45)
BASOPHILS # BLD AUTO: 0 10E3/UL (ref 0–0.2)
BASOPHILS NFR BLD AUTO: 0 %
BILIRUB SERPL-MCNC: 0.5 MG/DL
BUN SERPL-MCNC: 13.3 MG/DL (ref 8–23)
CALCIUM SERPL-MCNC: 9.4 MG/DL (ref 8.8–10.2)
CHLORIDE SERPL-SCNC: 97 MMOL/L (ref 98–107)
CREAT SERPL-MCNC: 0.78 MG/DL (ref 0.51–0.95)
DEPRECATED HCO3 PLAS-SCNC: 29 MMOL/L (ref 22–29)
EGFRCR SERPLBLD CKD-EPI 2021: 74 ML/MIN/1.73M2
EOSINOPHIL # BLD AUTO: 0.1 10E3/UL (ref 0–0.7)
EOSINOPHIL NFR BLD AUTO: 1 %
ERYTHROCYTE [DISTWIDTH] IN BLOOD BY AUTOMATED COUNT: 20.1 % (ref 10–15)
GLUCOSE SERPL-MCNC: 103 MG/DL (ref 70–99)
HCT VFR BLD AUTO: 35.4 % (ref 35–47)
HGB BLD-MCNC: 12.2 G/DL (ref 11.7–15.7)
IMM GRANULOCYTES # BLD: 0 10E3/UL
IMM GRANULOCYTES NFR BLD: 0 %
LYMPHOCYTES # BLD AUTO: 1.6 10E3/UL (ref 0.8–5.3)
LYMPHOCYTES NFR BLD AUTO: 23 %
MCH RBC QN AUTO: 34.4 PG (ref 26.5–33)
MCHC RBC AUTO-ENTMCNC: 34.5 G/DL (ref 31.5–36.5)
MCV RBC AUTO: 100 FL (ref 78–100)
MONOCYTES # BLD AUTO: 0.7 10E3/UL (ref 0–1.3)
MONOCYTES NFR BLD AUTO: 10 %
NEUTROPHILS # BLD AUTO: 4.5 10E3/UL (ref 1.6–8.3)
NEUTROPHILS NFR BLD AUTO: 66 %
NRBC # BLD AUTO: 0 10E3/UL
NRBC BLD AUTO-RTO: 0 /100
PLATELET # BLD AUTO: 154 10E3/UL (ref 150–450)
POTASSIUM SERPL-SCNC: 3.6 MMOL/L (ref 3.4–5.3)
PROT SERPL-MCNC: 6.8 G/DL (ref 6.4–8.3)
RBC # BLD AUTO: 3.55 10E6/UL (ref 3.8–5.2)
SODIUM SERPL-SCNC: 135 MMOL/L (ref 135–145)
WBC # BLD AUTO: 6.9 10E3/UL (ref 4–11)

## 2024-04-02 PROCEDURE — 36415 COLL VENOUS BLD VENIPUNCTURE: CPT

## 2024-04-02 PROCEDURE — 85025 COMPLETE CBC W/AUTO DIFF WBC: CPT

## 2024-04-02 PROCEDURE — 80053 COMPREHEN METABOLIC PANEL: CPT

## 2024-04-03 NOTE — PROGRESS NOTES
"Virtual Visit Details    Type of service:  Video Visit   Video Start Time: 12:37 PM  Video End Time:12:59 PM    Originating Location (pt. Location): Home  Distant Location (provider location):  On-site  Platform used for Video Visit: [facility]  HEMATOLOGY FOLLOW UP    Debra Denise   : 1940   MRN: [id number]  Date of service: 2024     REASON FOR VISIT: JAK2+ essential thrombocytopenia  Referred by Dr. 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     Per chart review and discussion with the patient,  - [id number]: Persistent progressive dizziness, non-positional, intermittent, accompanied by nausea without vomiting, night time and day time, no improvement with Epley maneuver, and possible component of postural hypotension. Also with mild intermittent headache, mild fatigue over , has not felt \"completely normal\"  - Early 2023: Sudden onset LUE weakness x 15 sec which self resolved. CT, MRI/MRA normal. Echo normal. Ziopatch without arrhythmia. Started on clopidogrel and atorvastatin. Not on ASA because of the ulcerative proctitis. Prior cath  neg for CAD.   - : Prior plt counts normal. 23 491. Plts normal in 7/2023 x 3. Plts 462-450 in 2023. 23 JAK2+ 0.1884 triggering hematology referral. Plts 513 at time of first heme eval on 23. Remainder of CBC normal. No splenomegaly. Her UP has been under control with vedolizumab q2mo since May 2023. CRP and ESR improved to normal.  - 23: Stopped clopidogrel, restarted ASA 81mg BID  - 23: Scheduled sigmoidoscopy showing erythema in rectum, biopsies taken, " diverticulosis, and internal hemorrhoids; complicated by rectal bleeding with large clots, requiring overnight stay at [facility]. ASA held. Hgb 14.2->12.8. Mesalamine suppository added. No recurrence of bleeding. Heme onc consult recommended resuming ASA when bleeding risk permits. GI recommended restarting ASA BID on 12/30.  - 1/9/2024: Disease evaluation - Plt 474, rest of CBC normal. INR 1.21-1.29, otherwise PTT, fibrinogen, F8, VWF panel normal. CMP, LDH (192), Ferritin, iron sat normal. Epo 6. ESR and CRP neg. BMBx with normocellular to slightly hypercellular marrow for age (30%), TLH, no overt dysplasia, 1% blasts, MF-0. BCR-ABL negative. Myeloid NGS CHANDNI 11.1%, TET2 5.5%. MIPSS-ET score is 4 (for age alone).     Dates Treatment Response Toxicity   7/2023 Clopidogrel, atorvastatin (for TIA), not on ASA because of ulcerative proctitis (UP) Plts normal 7/2023 x 3,  Plts 462-450 in 9/2023  Plts 513 on 12/7/23 12/7/23 Stop clopidogrel, started ASA 81 BID. Held briefly for sigmoidoscopy 12/28/23. Restarted ASA on 12/30/23 Plts 474 on 1/9/24  BMBx as above.  Rectal bleeding with large clots with sigmoidoscopy, overnight stay needed, but resolved on own.   1/12/24 HU 500mg daily, ASA 81mg BID Plts 444 on 1/25/24  Plts 368 on 2/6/24   None known.  HCO3 a bit high  Possible urinary discomfort?   2/8/24 HU 1000mg daily, ASA 81mg BID Plts 245 on 2/22  Plts 202 on 3/5  Possible improvement in nausea, lightheadedness  Plts 154 on 4/2 HCO3 stable  Low back pain?    4/2 Worsened fatigue, GI symptoms   4/4/24 HU 500mg daily, ASA 81mg BID         INTERVAL HISTORY  She is here with her  Aguilar today.   She feels pretty tired over the last month after increasing the dose of the HU to 2 pills a day. She is not sure what is from the HU and what is from the Entyvio and the suppositories and a new steroid.  She has gotten 6 entyvio infusions every 2 months. That week after the last infusion she had flu like symptoms. She had  chills and a headache, no fever. She is now about 1.5 week in to it and still has some chills.   She has had more bruising.  For the last couple of years the colitis has been more worse.   She has to eat an flare diet (rice, toast, tea) the last 2 weeks).     She was in the ED last weekend for urinary symptoms. UA with 11-25 WBC and +LE.  Last month she found mold in her house and that might have been contributing to sneezing and lightheadedness.  She has some occasional bleeding from the proctitis.    No TIA/stroke/VTE symptoms, erythromelalgia, pruritus, fevers, chills, weight loss, appetite loss.     REVIEW OF SYSTEMS  A 10 point review of systems was performed and was otherwise negative except as mentioned in the HPI.     PAST MEDICAL HISTORY  Past Medical History:   Diagnosis Date    Autoimmune disease (H24) approx.     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 30 years ago    Osteopenia     Polymyalgia rheumatica (H24)     PONV (postoperative nausea and vomiting)     Proctitis     Reduced vision Sometime is a little blurry     PAST SURGICAL HISTORY  Past Surgical History:   Procedure Laterality Date    COLONOSCOPY  2014    Procedure: COMBINED COLONOSCOPY, SINGLE BIOPSY/POLYPECTOMY BY BIOPSY;  COLONOSCOPY;  Surgeon: Carol Ann Plasencia MD;  Location:  GI    CV CORONARY ANGIOGRAM N/A 2022    Procedure: Coronary Angiogram;  Surgeon: Trevin Hawk MD;  Location:  [facility]ARD[facility]ATH LAB    ENT SURGERY      tonsilectomy, adenoidectomy    ESOPHAGOSCOPY, GASTROSCOPY, DUODENOSCOPY (EGD), COMBINED N/A 2023    Procedure: ESOPHAGOGASTRODUODENOSCOPY, WITH BIOPSY;  Surgeon: Angel Lara MD;  Location:  GI    GYN SURGERY  ,     x 2    HYSTERECTOMY      ORTHOPEDIC SURGERY  2004    (R) shoulder surgery for frozen shoulder    ZAC BSO      for fibroids     TONSILLECTOMY  About 50 years ago     SOCIAL HISTORY  Reviewed, and any changes made accordingly  Social History     Socioeconomic History    Marital status:      Spouse name: Not on file    Number of children: Not on file    Years of education: Not on file    Highest education level: Not on file   Occupational History    Not on file   Tobacco Use    Smoking status: Never    Smokeless tobacco: Never    Tobacco comments:     None   Vaping Use    Vaping Use: Never used   Substance and Sexual Activity    Alcohol use: No    Drug use: No    Sexual activity: Not Currently     Partners: Male     Birth control/protection: None   Other Topics Concern    Parent/sibling w/ CABG, MI or angioplasty before 65F 55M? Not Asked   Social History Narrative    Not on file     Social Determinants of Health     Financial Resource Strain: Low Risk  (12/19/2023)    Financial Resource Strain     Within the past 12 months, have you or your family members you live with been unable to get utilities (heat, electricity) when it was really needed?: No   Food Insecurity: Low Risk  (12/19/2023)    Food Insecurity     Within the past 12 months, did you worry that your food would run out before you got money to buy more?: No     Within the past 12 months, did the food you bought just not last and you didn t have money to get more?: No   Transportation Needs: Low Risk  (12/19/2023)    Transportation Needs     Within the past 12 months, has lack of transportation kept you from medical appointments, getting your medicines, non-medical meetings or appointments, work, or from getting things that you need?: No   Physical Activity: Not on file   Stress: Not on file   Social Connections: Not on file   Interpersonal Safety: Low Risk  (12/26/2023)    Interpersonal Safety     Do you feel physically and emotionally safe where you currently live?: Yes     Within the past 12 months, have you been hit, slapped, kicked or otherwise physically hurt by  someone?: No     Within the past 12 months, have you been humiliated or emotionally abused in other ways by your partner or ex-partner?: No   Housing Stability: Low Risk  (12/19/2023)    Housing Stability     Do you have housing? : Yes     Are you worried about losing your housing?: No     FAMILY HISTORY  Reviewed, and any changes made accordingly  Family History   Problem Relation Age of Onset    Cancer - colorectal Father     Lung Cancer Father     Macular Degeneration Father     Multiple myeloma Brother     Pacemaker Brother     Hypertension Mother     Cerebrovascular Disease Mother         from misdiagnosis of temporal arteritis    Suicide Sister     Hypertension Maternal Grandmother     Cerebrovascular Disease Maternal Grandmother     Diabetes Maternal Grandmother        MEDICATIONS  Current Outpatient Medications   Medication Sig Dispense Refill    aspirin 81 MG EC tablet Take 81 mg by mouth 2 times daily      atorvastatin (LIPITOR) 10 MG tablet Take 1 tablet (10 mg) by mouth daily 90 tablet 1    budesonide (ENTOCORT EC) 3 MG EC capsule Take 9 mg by mouth every 24 hours (Patient not taking: Reported on 2/7/2024)      Cholecalciferol (VITAMIN D3 PO) Take 2,000 Units by mouth daily       fluocinonide (LIDEX) 0.05 % external cream Apply to the affected area once to twice daily as needed for flares.* (Patient not taking: Reported on 1/9/2024)      gabapentin (NEURONTIN) 100 MG capsule TAKE 2 CAPSULES BY MOUTH AT BEDTIME 180 capsule 1    hydroxyurea (HYDREA) 500 MG capsule Take 2 capsules (1,000 mg) by mouth daily 60 capsule 4    mesalamine (CANASA) 1000 MG suppository Place 1 suppository (1,000 mg) rectally at bedtime 30 suppository 0    multivitamin w/minerals (CENTRUM ADULTS) tablet Take 1 tablet by mouth daily      ondansetron (ZOFRAN ODT) 4 MG ODT tab Take 1 tablet (4 mg) by mouth every 8 hours as needed for nausea 12 tablet 1    Probiotic Product (PROBIOTIC PO) Take 1 capsule by mouth daily      sodium  chloride 0.9 % SOLN 250 mL with vedolizumab 60 MG/ML SOLR 300 mg infusionn Inject 300 mg into the vein once Every 8 weeks      valsartan-hydrochlorothiazide (DIOVAN HCT) 160-25 MG tablet Take 1 tablet by mouth daily 90 tablet 3     No current facility-administered medications for this visit.     ALLERGIES  Allergies   Allergen Reactions    Sulfacetamide Hives    Adhesive Tape Rash    Aspirin      Other Reaction(s): Stomach issues    Atenolol      Other reaction(s): Intolerance-Can't Take  Fatigue, GI intolerance    Ibuprofen      Other reaction(s): Stomach Upset  4-9-13 tele encounter  Other Reaction(s): GI intolerance       Naproxen      Other reaction(s): Stomach Upset  4-9-13 tele encounter  Other Reaction(s): GI intolerance    Ramipril Cough     Other reaction(s): Intolerance-Can't Take  Other Reaction(s): GI intolerance    Sulfa Antibiotics     Codeine Nausea    Fentanyl Nausea       PHYSICAL EXAM  There were no vitals taken for this visit.   Wt Readings from Last 10 Encounters:   03/27/24 61.3 kg (135 lb 3.2 oz)   03/06/24 60.3 kg (133 lb)   02/07/24 60.8 kg (134 lb 1.6 oz)   01/12/24 61 kg (134 lb 6.4 oz)   01/09/24 59.8 kg (131 lb 14.4 oz)   01/08/24 60.3 kg (133 lb)   12/28/23 59.4 kg (131 lb)   12/26/23 61.2 kg (134 lb 14.4 oz)   12/07/23 61.6 kg (135 lb 14.4 oz)   10/31/23 62.1 kg (137 lb)     General: Well appearing.  HEENT: Sclerae anicteric.  Lungs: Breathing comfortably. No cough.  MSK: Grossly normal movement.  Neuro: Grossly non-focal.  Skin/access: Normal skin tone.  Psych: Alert and oriented. No distress.    LABS  Recent Labs   Lab Test 04/02/24  1158 02/11/22  0940 01/28/21  1133 09/14/20  0839 03/25/20  1101 01/29/20  1108   WBC 6.9   < > 8.2 7.7 8.7 8.8   HGB 12.2   < > 13.8 14.2 14.9 14.2      < > 414 364 409 367      < > 93 92 92 95   RDW 20.1*   < > 12.5 12.4 12.6 12.6   ANEU  --   --  4.7 4.6 6.0 5.3   ALYM  --   --  2.3 1.9 1.7 2.3   DEANGELO  --   --  0.9 0.9 0.8 0.8   AEOS  --   " --  0.2 0.2 0.2 0.3    < > = values in this interval not displayed.     Recent Labs   Lab Test 04/02/24  1158 12/28/23  1812 12/07/23  1522 09/14/20  0839 03/25/20  1101      < > 140   < > 141   POTASSIUM 3.6   < > 3.4   < > 3.5   CHLORIDE 97*   < > 101   < > 106   CO2 29   < > 31*   < > 32   BUN 13.3   < > 18.3   < > 14   CR 0.78   < > 0.86   < > 0.72   PRASHANTH 9.4   < > 9.4   < > 9.2   LDH  --   --  192  --  146   URIC  --   --   --   --  6.8*    < > = values in this interval not displayed.     Recent Labs   Lab Test 04/02/24  1158 03/22/24  1206 03/15/24  1359 03/05/24  1136   AST 17 16 19 19   ALT 14 14 17 16   ALKPHOS 72 71 73 69   ALBUMIN 3.9 4.0 4.3 4.1   PROTTOTAL 6.8 6.6 6.9 6.8   BILITOTAL 0.5 0.5 0.9 0.4      Recent Labs   Lab Test 07/08/22  1120 03/25/20  1101    207   IGM  --  74   ELPM  --  0.0   ELPINT  --  Essentially normal electrophoretic pattern.  No obvious monoclonal proteins seen.    Pathologic significance requires clinical correlation.  OSMANY Orr M.D., Ph.D.,   Pathologist ().     IEP  --  No monoclonal protein seen on immunofixation.  Pathological significance requires clinical   correlation.     KAPPAFREELT  --  <2.83*   LAMBDAFREELT  --  1.01   KLR  --  Unable to Calculate      Recent Labs   Lab Test 01/09/24  0908 12/26/23  1522 12/07/23  1522 03/25/20  1101 09/20/16  1324   RETICABSCT  --  0.055 0.061   < >  --    RETP  --  1.2 1.3   < >  --    IRON 78  --   --   --  95   IRONSAT 25  --   --   --  26   HANNAH 61  --   --   --  56     --   --   --  362   B12  --   --   --   --  883   FOLIC  --   --   --   --  64.6   EPOE 6  --   --   --   --    LDH  --   --  192   < >  --     < > = values in this interval not displayed.     No results for input(s): \"MORPH\" in the last 28822 hours.  No results for input(s): \"HCVAB\", \"HCABC\", \"HBCAB\", \"AUSAB\", \"HIV\" in the last 89408 hours.  Recent Labs   Lab Test 02/06/24  1524 01/09/24  0908 12/07/23  1522   INR 1.19* 1.21* " 1.29*   PTT  --  31  --    FIBR  --  300 413      Overall, the bone marrow is normocellular with normal myeloid to erythroid ratio, very slightly increased megakaryocytes, and normal marrow fibrosis. In addition, patient has normal level of LDH (192 U/L) and Erythropoietin (6; range 4-27 mU/mL) and absence of splenomegaly by physical exam.  In the light of the presence of  JAK2 V617F detected on the peripheral blood, these findings could be in keeping with the diagnosis of essential thrombocytocythaemia (ET).  However, only very rare megakaryocytes have deeply lobated nuclei and the overall morphology and histotopography of megakaryocytes are not typical of ET.  In addition, rare intrasinusoidal megakaryocytes are identified which could be suggestive of prefibrotic/early primary myelofibrosis (pre-PMF).       Overall, combining the history of intermittent thrombocytosis with borderline increase in platelet counts and the bone marrow morphologic findings, it appears that the disease is in the very early stage; hence, a close follow-up is warranted to monitor how this MPN with JAK2 V617F progresses.     IMAGING  As mentioned above in HPI.    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 There is no acquired von willebrand disease. Her marrow shows if anything very early disease with only very rare features of ET (megakaryocytes with deeply lobated nuclei) and rare features of pre-fibrotic PMF (intrasinusoidal megakaryocytes). She is at high risk for thrombosis, given that she is >60 and has a JAK2 mutation, and that she probably had a TIA in July 2023.     We  previously discussed that platelet number can be increased but that the platelets themselves are more activated. ASA can help with decreasing the activity of the platelets. HU decreases both platelet number, decreases the activity of the platelets, and a side benefit is decreasing WBC which can interact with platelets to cause thrombosis/vascular pathophysiology. We discussed that there aren't great treatments for decreasing the VAF of the JAK2+ clone at this point, but that the disease can be well managed with decreased rates of thrombosis, hemorrhage, and vasomotor symptoms for many years with HU and ASA. In some cases, ET can progress to MF, MDS, or AML, but this can take many years, so her counts will need to be monitored regularly.    There is a question of if the dizziness is a potential vasomotor symptom. Last month there was slight improvement of the dizziness, however this month her overall health feels worse, especially over the last 1.5 weeks.    We discussed that the lower blood counts that we see are part of how hydroxyurea works, and that the change in hgb from 14 to 12 likely does not explain the increased fatigue or chills that she has. HU can have some GI symptoms, so it is possible that the HU is interacting with her colitis/proctitis to make her GI symptoms worse. Given that her platelet count is down to 150s at last check, I would feel comfortable backing off on the HU to see if this helps her symptoms. I would keep the ASA in the meantime.    PLAN  - Continue ASA 81mg BID  - Decrease HU 500mg daily  - Recheck labs in 2 weeks and 4 weeks  - Return by video May 2 at 12:30 PM.  - Metabolic alkalosis improved on recent labs. I reassured her about this.    We had a long discussion with the patient and her  about the therapeutic possibilities and necessary follow up. All questions were answered to their satisfaction.    I spent 40 minutes on the date of service reviewing medical records from  the referring provider, reviewing previous lab and imaging results as summarized above, obtaining and reviewing records from [facility] as summarized above, obtaining a history from the patient, performing a physical exam, counseling and educating the patient on the diagnosis and treatment, entering orders for tests, evaluating a potentially life or organ threatening problem, intensively monitoring treatments with high risk of toxicity, coordinating care, and documenting in the electronic medical record.    Thank you for allowing me to participate in the care of this patient. Please do not hesitate to contact me if there are any concerns or questions.     Amos Milner MD   of Medicine  Classical Hematology and Blood and Marrow Transplantation  Division of Hematology, Oncology, and Transplantation  [facility]

## 2024-04-04 ENCOUNTER — VIRTUAL VISIT (OUTPATIENT)
Dept: TRANSPLANT | Facility: CLINIC | Age: 84
End: 2024-04-04
Attending: INTERNAL MEDICINE
Payer: COMMERCIAL

## 2024-04-04 VITALS — BODY MASS INDEX: 21.99 KG/M2 | HEIGHT: 65 IN | WEIGHT: 132 LBS

## 2024-04-04 DIAGNOSIS — D47.3 ESSENTIAL THROMBOCYTHEMIA (H): ICD-10-CM

## 2024-04-04 PROCEDURE — 99215 OFFICE O/P EST HI 40 MIN: CPT | Mod: 95 | Performed by: INTERNAL MEDICINE

## 2024-04-04 RX ORDER — HYDROXYUREA 500 MG/1
500 CAPSULE ORAL DAILY
Qty: 30 CAPSULE | Refills: 4 | Status: SHIPPED | OUTPATIENT
Start: 2024-04-04

## 2024-04-04 ASSESSMENT — PAIN SCALES - GENERAL: PAINLEVEL: NO PAIN (0)

## 2024-04-04 NOTE — NURSING NOTE
Is the patient currently in the state of [place]? YES    Visit mode:VIDEO    If the visit is dropped, the patient can be reconnected by: VIDEO VISIT: Send to e-mail at: [email or web address]    Will anyone else be joining the visit? NO  (If patient encounters technical issues they should call [phone]    How would you like to obtain your AVS? MyChart    Are changes needed to the allergy or medication list? No        Reason for visit: Video Visit (Follow UP )    Luz NAIR

## 2024-04-04 NOTE — LETTER
"    2024         RE: Debra Denise  [address] [facility] [id number]  [place] [address]        Dear Colleague,    Thank you for referring your patient, Debra Denise, to the [facility] [place]. Please see a copy of my visit note below.    Virtual Visit Details    Type of service:  Video Visit   Video Start Time: 12:37 PM  Video End Time:12:59 PM    Originating Location (pt. Location): Home  Distant Location (provider location):  On-site  Platform used for Video Visit: [facility]  HEMATOLOGY FOLLOW UP    Debra Denise   : 1940   MRN: [id number]  Date of service: 2024     REASON FOR VISIT: JAK2+ essential thrombocytopenia  Referred by Dr. 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     Per chart review and discussion with the patient,  - [id number]: Persistent progressive dizziness, non-positional, intermittent, accompanied by nausea without vomiting, night time and day time, no improvement with Epley maneuver, and possible component of postural hypotension. Also with mild intermittent headache, mild fatigue over , has not felt \"completely normal\"  - Early 2023: Sudden onset LUE weakness x 15 sec which self resolved. CT, MRI/MRA normal. Echo normal. Ziopatch without arrhythmia. Started on clopidogrel and atorvastatin. Not on ASA because of the ulcerative proctitis. Prior cath  neg for CAD.   - : Prior plt counts normal. 23 491. Plts normal in 7/2023 x 3. Plts 462-450 in 2023. 23 JAK2+ 0.1884 triggering hematology referral. Plts 513 at time of first " heme peter on 12/7/23. Remainder of CBC normal. No splenomegaly. Her UP has been under control with vedolizumab q2mo since May 2023. CRP and ESR improved to normal.  - 12/7/23: Stopped clopidogrel, restarted ASA 81mg BID  - 12/28/23: Scheduled sigmoidoscopy showing erythema in rectum, biopsies taken, diverticulosis, and internal hemorrhoids; complicated by rectal bleeding with large clots, requiring overnight stay at [facility]. ASA held. Hgb 14.2->12.8. Mesalamine suppository added. No recurrence of bleeding. Heme onc consult recommended resuming ASA when bleeding risk permits. GI recommended restarting ASA BID on 12/30.  - 1/9/2024: Disease evaluation - Plt 474, rest of CBC normal. INR 1.21-1.29, otherwise PTT, fibrinogen, F8, VWF panel normal. CMP, LDH (192), Ferritin, iron sat normal. Epo 6. ESR and CRP neg. BMBx with normocellular to slightly hypercellular marrow for age (30%), TLH, no overt dysplasia, 1% blasts, MF-0. BCR-ABL negative. Myeloid NGS CHANDNI 11.1%, TET2 5.5%. MIPSS-ET score is 4 (for age alone).     Dates Treatment Response Toxicity   7/2023 Clopidogrel, atorvastatin (for TIA), not on ASA because of ulcerative proctitis (UP) Plts normal 7/2023 x 3,  Plts 462-450 in 9/2023  Plts 513 on 12/7/23 12/7/23 Stop clopidogrel, started ASA 81 BID. Held briefly for sigmoidoscopy 12/28/23. Restarted ASA on 12/30/23 Plts 474 on 1/9/24  BMBx as above.  Rectal bleeding with large clots with sigmoidoscopy, overnight stay needed, but resolved on own.   1/12/24 HU 500mg daily, ASA 81mg BID Plts 444 on 1/25/24  Plts 368 on 2/6/24   None known.  HCO3 a bit high  Possible urinary discomfort?   2/8/24 HU 1000mg daily, ASA 81mg BID Plts 245 on 2/22  Plts 202 on 3/5  Possible improvement in nausea, lightheadedness  Plts 154 on 4/2 HCO3 stable  Low back pain?    4/2 Worsened fatigue, GI symptoms   4/4/24 HU 500mg daily, ASA 81mg BID         INTERVAL HISTORY  She is here with her  Aguilar today.   She feels pretty tired  over the last month after increasing the dose of the HU to 2 pills a day. She is not sure what is from the HU and what is from the Entyvio and the suppositories and a new steroid.  She has gotten 6 entyvio infusions every 2 months. That week after the last infusion she had flu like symptoms. She had chills and a headache, no fever. She is now about 1.5 week in to it and still has some chills.   She has had more bruising.  For the last couple of years the colitis has been more worse.   She has to eat an flare diet (rice, toast, tea) the last 2 weeks).     She was in the ED last weekend for urinary symptoms. UA with 11-25 WBC and +LE.  Last month she found mold in her house and that might have been contributing to sneezing and lightheadedness.  She has some occasional bleeding from the proctitis.    No TIA/stroke/VTE symptoms, erythromelalgia, pruritus, fevers, chills, weight loss, appetite loss.     REVIEW OF SYSTEMS  A 10 point review of systems was performed and was otherwise negative except as mentioned in the HPI.     PAST MEDICAL HISTORY  Past Medical History:   Diagnosis Date    Autoimmune disease (H24) approx. 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 30 years ago    Osteopenia     Polymyalgia rheumatica (H24)     PONV (postoperative nausea and vomiting)     Proctitis     Reduced vision Sometime is a little blurry     PAST SURGICAL HISTORY  Past Surgical History:   Procedure Laterality Date    COLONOSCOPY  04/08/2014    Procedure: COMBINED COLONOSCOPY, SINGLE BIOPSY/POLYPECTOMY BY BIOPSY;  COLONOSCOPY;  Surgeon: Carol Ann Plasencia MD;  Location:  GI    CV CORONARY ANGIOGRAM N/A 08/30/2022    Procedure: Coronary Angiogram;  Surgeon: Trevin Hawk MD;  Location:  HEART CARDIAC CATH LAB    ENT SURGERY      tonsilectomy, adenoidectomy    ESOPHAGOSCOPY, GASTROSCOPY, DUODENOSCOPY (EGD),  COMBINED N/A 2023    Procedure: ESOPHAGOGASTRODUODENOSCOPY, WITH BIOPSY;  Surgeon: Angel Lara MD;  Location:  GI    GYN SURGERY  ,     x 2    HYSTERECTOMY      ORTHOPEDIC SURGERY  2004    (R) shoulder surgery for frozen shoulder    ZAC BSO      for fibroids    TONSILLECTOMY  About 50 years ago     SOCIAL HISTORY  Reviewed, and any changes made accordingly  Social History     Socioeconomic History    Marital status:      Spouse name: Not on file    Number of children: Not on file    Years of education: Not on file    Highest education level: Not on file   Occupational History    Not on file   Tobacco Use    Smoking status: Never    Smokeless tobacco: Never    Tobacco comments:     None   Vaping Use    Vaping Use: Never used   Substance and Sexual Activity    Alcohol use: No    Drug use: No    Sexual activity: Not Currently     Partners: Male     Birth control/protection: None   Other Topics Concern    Parent/sibling w/ CABG, MI or angioplasty before 65F 55M? Not Asked   Social History Narrative    Not on file     Social Determinants of Health     Financial Resource Strain: Low Risk  (2023)    Financial Resource Strain     Within the past 12 months, have you or your family members you live with been unable to get utilities (heat, electricity) when it was really needed?: No   Food Insecurity: Low Risk  (2023)    Food Insecurity     Within the past 12 months, did you worry that your food would run out before you got money to buy more?: No     Within the past 12 months, did the food you bought just not last and you didn t have money to get more?: No   Transportation Needs: Low Risk  (2023)    Transportation Needs     Within the past 12 months, has lack of transportation kept you from medical appointments, getting your medicines, non-medical meetings or appointments, work, or from getting things that you need?: No   Physical Activity: Not on file   Stress:  Not on file   Social Connections: Not on file   Interpersonal Safety: Low Risk  (12/26/2023)    Interpersonal Safety     Do you feel physically and emotionally safe where you currently live?: Yes     Within the past 12 months, have you been hit, slapped, kicked or otherwise physically hurt by someone?: No     Within the past 12 months, have you been humiliated or emotionally abused in other ways by your partner or ex-partner?: No   Housing Stability: Low Risk  (12/19/2023)    Housing Stability     Do you have housing? : Yes     Are you worried about losing your housing?: No     FAMILY HISTORY  Reviewed, and any changes made accordingly  Family History   Problem Relation Age of Onset    Cancer - colorectal Father     Lung Cancer Father     Macular Degeneration Father     Multiple myeloma Brother     Pacemaker Brother     Hypertension Mother     Cerebrovascular Disease Mother         from misdiagnosis of temporal arteritis    Suicide Sister     Hypertension Maternal Grandmother     Cerebrovascular Disease Maternal Grandmother     Diabetes Maternal Grandmother        MEDICATIONS  Current Outpatient Medications   Medication Sig Dispense Refill    aspirin 81 MG EC tablet Take 81 mg by mouth 2 times daily      atorvastatin (LIPITOR) 10 MG tablet Take 1 tablet (10 mg) by mouth daily 90 tablet 1    budesonide (ENTOCORT EC) 3 MG EC capsule Take 9 mg by mouth every 24 hours (Patient not taking: Reported on 2/7/2024)      Cholecalciferol (VITAMIN D3 PO) Take 2,000 Units by mouth daily       fluocinonide (LIDEX) 0.05 % external cream Apply to the affected area once to twice daily as needed for flares.* (Patient not taking: Reported on 1/9/2024)      gabapentin (NEURONTIN) 100 MG capsule TAKE 2 CAPSULES BY MOUTH AT BEDTIME 180 capsule 1    hydroxyurea (HYDREA) 500 MG capsule Take 2 capsules (1,000 mg) by mouth daily 60 capsule 4    mesalamine (CANASA) 1000 MG suppository Place 1 suppository (1,000 mg) rectally at bedtime 30  suppository 0    multivitamin w/minerals (CENTRUM ADULTS) tablet Take 1 tablet by mouth daily      ondansetron (ZOFRAN ODT) 4 MG ODT tab Take 1 tablet (4 mg) by mouth every 8 hours as needed for nausea 12 tablet 1    Probiotic Product (PROBIOTIC PO) Take 1 capsule by mouth daily      sodium chloride 0.9 % SOLN 250 mL with vedolizumab 60 MG/ML SOLR 300 mg infusionn Inject 300 mg into the vein once Every 8 weeks      valsartan-hydrochlorothiazide (DIOVAN HCT) 160-25 MG tablet Take 1 tablet by mouth daily 90 tablet 3     No current facility-administered medications for this visit.     ALLERGIES  Allergies   Allergen Reactions    Sulfacetamide Hives    Adhesive Tape Rash    Aspirin      Other Reaction(s): Stomach issues    Atenolol      Other reaction(s): Intolerance-Can't Take  Fatigue, GI intolerance    Ibuprofen      Other reaction(s): Stomach Upset  4-9-13 tele encounter  Other Reaction(s): GI intolerance       Naproxen      Other reaction(s): Stomach Upset  4-9-13 tele encounter  Other Reaction(s): GI intolerance    Ramipril Cough     Other reaction(s): Intolerance-Can't Take  Other Reaction(s): GI intolerance    Sulfa Antibiotics     Codeine Nausea    Fentanyl Nausea       PHYSICAL EXAM  There were no vitals taken for this visit.   Wt Readings from Last 10 Encounters:   03/27/24 61.3 kg (135 lb 3.2 oz)   03/06/24 60.3 kg (133 lb)   02/07/24 60.8 kg (134 lb 1.6 oz)   01/12/24 61 kg (134 lb 6.4 oz)   01/09/24 59.8 kg (131 lb 14.4 oz)   01/08/24 60.3 kg (133 lb)   12/28/23 59.4 kg (131 lb)   12/26/23 61.2 kg (134 lb 14.4 oz)   12/07/23 61.6 kg (135 lb 14.4 oz)   10/31/23 62.1 kg (137 lb)     General: Well appearing.  HEENT: Sclerae anicteric.  Lungs: Breathing comfortably. No cough.  MSK: Grossly normal movement.  Neuro: Grossly non-focal.  Skin/access: Normal skin tone.  Psych: Alert and oriented. No distress.    LABS  Recent Labs   Lab Test 04/02/24  1158 02/11/22  0940 01/28/21  1133 09/14/20  0839 03/25/20  1101  01/29/20  1108   WBC 6.9   < > 8.2 7.7 8.7 8.8   HGB 12.2   < > 13.8 14.2 14.9 14.2      < > 414 364 409 367      < > 93 92 92 95   RDW 20.1*   < > 12.5 12.4 12.6 12.6   ANEU  --   --  4.7 4.6 6.0 5.3   ALYM  --   --  2.3 1.9 1.7 2.3   DEANGELO  --   --  0.9 0.9 0.8 0.8   AEOS  --   --  0.2 0.2 0.2 0.3    < > = values in this interval not displayed.     Recent Labs   Lab Test 04/02/24  1158 12/28/23  1812 12/07/23  1522 09/14/20  0839 03/25/20  1101      < > 140   < > 141   POTASSIUM 3.6   < > 3.4   < > 3.5   CHLORIDE 97*   < > 101   < > 106   CO2 29   < > 31*   < > 32   BUN 13.3   < > 18.3   < > 14   CR 0.78   < > 0.86   < > 0.72   PRASHANTH 9.4   < > 9.4   < > 9.2   LDH  --   --  192  --  146   URIC  --   --   --   --  6.8*    < > = values in this interval not displayed.     Recent Labs   Lab Test 04/02/24  1158 03/22/24  1206 03/15/24  1359 03/05/24  1136   AST 17 16 19 19   ALT 14 14 17 16   ALKPHOS 72 71 73 69   ALBUMIN 3.9 4.0 4.3 4.1   PROTTOTAL 6.8 6.6 6.9 6.8   BILITOTAL 0.5 0.5 0.9 0.4      Recent Labs   Lab Test 07/08/22  1120 03/25/20  1101    207   IGM  --  74   ELPM  --  0.0   ELPINT  --  Essentially normal electrophoretic pattern.  No obvious monoclonal proteins seen.    Pathologic significance requires clinical correlation.  OSMANY Orr M.D., Ph.D.,   Pathologist ().     IEP  --  No monoclonal protein seen on immunofixation.  Pathological significance requires clinical   correlation.     KAPPAFREELT  --  <2.83*   LAMBDAFREELT  --  1.01   KLR  --  Unable to Calculate      Recent Labs   Lab Test 01/09/24  0908 12/26/23  1522 12/07/23  1522 03/25/20  1101 09/20/16  1324   RETICABSCT  --  0.055 0.061   < >  --    RETP  --  1.2 1.3   < >  --    IRON 78  --   --   --  95   IRONSAT 25  --   --   --  26   HANNAH 61  --   --   --  56     --   --   --  362   B12  --   --   --   --  883   FOLIC  --   --   --   --  64.6   EPOE 6  --   --   --   --    LDH  --   --  192   < >   "--     < > = values in this interval not displayed.     No results for input(s): \"MORPH\" in the last 97048 hours.  No results for input(s): \"HCVAB\", \"HCABC\", \"HBCAB\", \"AUSAB\", \"HIV\" in the last 90420 hours.  Recent Labs   Lab Test 02/06/24  1524 01/09/24  0908 12/07/23  1522   INR 1.19* 1.21* 1.29*   PTT  --  31  --    FIBR  --  300 413      Overall, the bone marrow is normocellular with normal myeloid to erythroid ratio, very slightly increased megakaryocytes, and normal marrow fibrosis. In addition, patient has normal level of LDH (192 U/L) and Erythropoietin (6; range 4-27 mU/mL) and absence of splenomegaly by physical exam.  In the light of the presence of  JAK2 V617F detected on the peripheral blood, these findings could be in keeping with the diagnosis of essential thrombocytocythaemia (ET).  However, only very rare megakaryocytes have deeply lobated nuclei and the overall morphology and histotopography of megakaryocytes are not typical of ET.  In addition, rare intrasinusoidal megakaryocytes are identified which could be suggestive of prefibrotic/early primary myelofibrosis (pre-PMF).       Overall, combining the history of intermittent thrombocytosis with borderline increase in platelet counts and the bone marrow morphologic findings, it appears that the disease is in the very early stage; hence, a close follow-up is warranted to monitor how this MPN with JAK2 V617F progresses.     IMAGING  As mentioned above in HPI.    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 There is no acquired von willebrand disease. Her marrow shows if anything very early disease " with only very rare features of ET (megakaryocytes with deeply lobated nuclei) and rare features of pre-fibrotic PMF (intrasinusoidal megakaryocytes). She is at high risk for thrombosis, given that she is >60 and has a JAK2 mutation, and that she probably had a TIA in July 2023.     We previously discussed that platelet number can be increased but that the platelets themselves are more activated. ASA can help with decreasing the activity of the platelets. HU decreases both platelet number, decreases the activity of the platelets, and a side benefit is decreasing WBC which can interact with platelets to cause thrombosis/vascular pathophysiology. We discussed that there aren't great treatments for decreasing the VAF of the JAK2+ clone at this point, but that the disease can be well managed with decreased rates of thrombosis, hemorrhage, and vasomotor symptoms for many years with HU and ASA. In some cases, ET can progress to MF, MDS, or AML, but this can take many years, so her counts will need to be monitored regularly.    There is a question of if the dizziness is a potential vasomotor symptom. Last month there was slight improvement of the dizziness, however this month her overall health feels worse, especially over the last 1.5 weeks.    We discussed that the lower blood counts that we see are part of how hydroxyurea works, and that the change in hgb from 14 to 12 likely does not explain the increased fatigue or chills that she has. HU can have some GI symptoms, so it is possible that the HU is interacting with her colitis/proctitis to make her GI symptoms worse. Given that her platelet count is down to 150s at last check, I would feel comfortable backing off on the HU to see if this helps her symptoms. I would keep the ASA in the meantime.    PLAN  - Continue ASA 81mg BID  - Decrease HU 500mg daily  - Recheck labs in 2 weeks and 4 weeks  - Return by video May 2 at 12:30 PM.  - Metabolic alkalosis improved on  recent labs. I reassured her about this.    We had a long discussion with the patient and her  about the therapeutic possibilities and necessary follow up. All questions were answered to their satisfaction.    I spent 40 minutes on the date of service reviewing medical records from the referring provider, reviewing previous lab and imaging results as summarized above, obtaining and reviewing records from [facility] as summarized above, obtaining a history from the patient, performing a physical exam, counseling and educating the patient on the diagnosis and treatment, entering orders for tests, evaluating a potentially life or organ threatening problem, intensively monitoring treatments with high risk of toxicity, coordinating care, and documenting in the electronic medical record.    Thank you for allowing me to participate in the care of this patient. Please do not hesitate to contact me if there are any concerns or questions.     Amos Milner MD   of Medicine  Classical Hematology and Blood and Marrow Transplantation  Division of Hematology, Oncology, and Transplantation  [facility]

## 2024-04-12 ENCOUNTER — TELEPHONE (OUTPATIENT)
Dept: CARDIOLOGY | Facility: CLINIC | Age: 84
End: 2024-04-12
Payer: COMMERCIAL

## 2024-04-12 NOTE — CONFIDENTIAL NOTE
Patient requesting to have a cardiology visit to discus pounding in ears and a general review since she hasn't been seen for 2 years.

## 2024-04-12 NOTE — TELEPHONE ENCOUNTER
Health Call Center    Phone Message    May a detailed message be left on voicemail: yes     Reason for Call: Other: patients  is calling and requesting a call back as camryn would like too have a cardiologist examine her but would like to speak to greg because she knows greg I did offer to schedule the appt but he declined, please advise. Thank you     Action Taken: Other: cardiology    Travel Screening: Not Applicable  Thank you!  [facility]

## 2024-04-16 ENCOUNTER — LAB (OUTPATIENT)
Dept: LAB | Facility: CLINIC | Age: 84
End: 2024-04-16
Payer: COMMERCIAL

## 2024-04-16 DIAGNOSIS — D47.3 ESSENTIAL THROMBOCYTHEMIA (H): ICD-10-CM

## 2024-04-16 LAB
ALBUMIN SERPL BCG-MCNC: 4.2 G/DL (ref 3.5–5.2)
ALP SERPL-CCNC: 69 U/L (ref 40–150)
ALT SERPL W P-5'-P-CCNC: 13 U/L (ref 0–50)
ANION GAP SERPL CALCULATED.3IONS-SCNC: 12 MMOL/L (ref 7–15)
AST SERPL W P-5'-P-CCNC: 14 U/L (ref 0–45)
BASOPHILS # BLD AUTO: 0 10E3/UL (ref 0–0.2)
BASOPHILS NFR BLD AUTO: 0 %
BILIRUB SERPL-MCNC: 0.3 MG/DL
BUN SERPL-MCNC: 17.6 MG/DL (ref 8–23)
CALCIUM SERPL-MCNC: 9.1 MG/DL (ref 8.8–10.2)
CHLORIDE SERPL-SCNC: 100 MMOL/L (ref 98–107)
CREAT SERPL-MCNC: 0.93 MG/DL (ref 0.51–0.95)
DEPRECATED HCO3 PLAS-SCNC: 27 MMOL/L (ref 22–29)
EGFRCR SERPLBLD CKD-EPI 2021: 60 ML/MIN/1.73M2
EOSINOPHIL # BLD AUTO: 0.1 10E3/UL (ref 0–0.7)
EOSINOPHIL NFR BLD AUTO: 1 %
ERYTHROCYTE [DISTWIDTH] IN BLOOD BY AUTOMATED COUNT: 20.6 % (ref 10–15)
GLUCOSE SERPL-MCNC: 82 MG/DL (ref 70–99)
HCT VFR BLD AUTO: 36 % (ref 35–47)
HGB BLD-MCNC: 12.1 G/DL (ref 11.7–15.7)
IMM GRANULOCYTES # BLD: 0 10E3/UL
IMM GRANULOCYTES NFR BLD: 0 %
LYMPHOCYTES # BLD AUTO: 2.1 10E3/UL (ref 0.8–5.3)
LYMPHOCYTES NFR BLD AUTO: 31 %
MCH RBC QN AUTO: 35.1 PG (ref 26.5–33)
MCHC RBC AUTO-ENTMCNC: 33.6 G/DL (ref 31.5–36.5)
MCV RBC AUTO: 104 FL (ref 78–100)
MONOCYTES # BLD AUTO: 0.8 10E3/UL (ref 0–1.3)
MONOCYTES NFR BLD AUTO: 12 %
NEUTROPHILS # BLD AUTO: 3.6 10E3/UL (ref 1.6–8.3)
NEUTROPHILS NFR BLD AUTO: 56 %
NRBC # BLD AUTO: 0 10E3/UL
NRBC BLD AUTO-RTO: 0 /100
PLATELET # BLD AUTO: 145 10E3/UL (ref 150–450)
POTASSIUM SERPL-SCNC: 3.7 MMOL/L (ref 3.4–5.3)
PROT SERPL-MCNC: 6.9 G/DL (ref 6.4–8.3)
RBC # BLD AUTO: 3.45 10E6/UL (ref 3.8–5.2)
SODIUM SERPL-SCNC: 139 MMOL/L (ref 135–145)
WBC # BLD AUTO: 6.6 10E3/UL (ref 4–11)

## 2024-04-16 PROCEDURE — 36415 COLL VENOUS BLD VENIPUNCTURE: CPT

## 2024-04-16 PROCEDURE — 80053 COMPREHEN METABOLIC PANEL: CPT

## 2024-04-16 PROCEDURE — 85025 COMPLETE CBC W/AUTO DIFF WBC: CPT

## 2024-04-17 ENCOUNTER — TRANSFERRED RECORDS (OUTPATIENT)
Dept: HEALTH INFORMATION MANAGEMENT | Facility: CLINIC | Age: 84
End: 2024-04-17
Payer: COMMERCIAL

## 2024-04-22 ENCOUNTER — TELEPHONE (OUTPATIENT)
Dept: INTERNAL MEDICINE | Facility: CLINIC | Age: 84
End: 2024-04-22
Payer: COMMERCIAL

## 2024-04-22 NOTE — TELEPHONE ENCOUNTER
"Pt's SinDelantal.Mxt message \"Dr. MCNALLY. I need your help. two days ago i was at urgent care initially for near syncope and and other issues that you can see on your data base. More important you will notice the problems with the handling of my issues by Dr. Milner. This with my continued issues with colitis I need you to help coordinate all this. I feel that my condition is getting worse. Please set an appointment to see me as soon as you can so you can help me. Thank you. Debra \"  "

## 2024-04-22 NOTE — TELEPHONE ENCOUNTER
[facility] Call Center    Phone Message    May a detailed message be left on voicemail: yes     Reason for Call: Other: Patient's  states patient sent a VTEX message today about an appt with Dr. Zimmerman and the urgency of seeing him. Aguilar didn't say more than that and said it's all in the VTEX message. Please call to discuss.

## 2024-04-23 NOTE — TELEPHONE ENCOUNTER
Left Voicemail (1st Attempt) for the patient to call back and schedule the following:    Appointment type: RTN  Provider: PCP OR ANY PCC  Return date: next available   Specialty phone number: [phone]  Additional appointment(s) needed: -  Additonal Notes: pt can schedule PCP next available or should see another provider, can be added to PCP waitlist?

## 2024-04-25 ENCOUNTER — TRANSFERRED RECORDS (OUTPATIENT)
Dept: HEALTH INFORMATION MANAGEMENT | Facility: CLINIC | Age: 84
End: 2024-04-25
Payer: COMMERCIAL

## 2024-04-25 NOTE — TELEPHONE ENCOUNTER
The patient  would like a call from the clinic about the appointment, at the time of the call patient  didn't want to schedule with any provider but Dr. Zimmerman only thank you.

## 2024-04-25 NOTE — TELEPHONE ENCOUNTER
Dr. Zimmerman is reviewing. I have nothing to offer right now. They should schedule an appointment, in the meantime.     Cristian See RN on 4/25/2024 at 3:26 PM

## 2024-04-28 DIAGNOSIS — I10 BENIGN ESSENTIAL HYPERTENSION: ICD-10-CM

## 2024-04-30 NOTE — PROGRESS NOTES
HPI;    Last visit with us was 3/27/2024. She was in the ED 2024 for near syncope. She still has rectal bleeding. She follows with Dr. Garcia at [facility] and will be seen this Friday. Some discussion regarding starting prednisone. She also follows with Dr. Milner, Hematology for thrombocytosis and is on Hydroxyurea. She denies any further syncopal sxs. She has some generalized fatigue as well. She is not driving because of her fatigue. No SOB. No Chest pain. No focal neurological complaints.       Past Medical History:   Diagnosis Date    Autoimmune disease (H24) approx.     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 30 years ago    Osteopenia     Polymyalgia rheumatica (H24)     PONV (postoperative nausea and vomiting)     Proctitis     Reduced vision Sometime is a little blurry     Past Surgical History:   Procedure Laterality Date    COLONOSCOPY  2014    Procedure: COMBINED COLONOSCOPY, SINGLE BIOPSY/POLYPECTOMY BY BIOPSY;  COLONOSCOPY;  Surgeon: Carol Ann Plasencia MD;  Location:  GI    CV CORONARY ANGIOGRAM N/A 2022    Procedure: Coronary Angiogram;  Surgeon: Trevin Hawk MD;  Location:  HEART CARDIAC CATH LAB    ENT SURGERY      tonsilectomy, adenoidectomy    ESOPHAGOSCOPY, GASTROSCOPY, DUODENOSCOPY (EGD), COMBINED N/A 2023    Procedure: ESOPHAGOGASTRODUODENOSCOPY, WITH BIOPSY;  Surgeon: Angel Lara MD;  Location:  GI    GYN SURGERY  ,     x 2    HYSTERECTOMY      ORTHOPEDIC SURGERY  2004    (R) shoulder surgery for frozen shoulder    ZAC BSO      for fibroids    TONSILLECTOMY  About 50 years ago     PE:    Vitals noted, gen, nad, cooperative, alert, neck supple nl rom, lungs with good air movement, RRR, S1, S2, no MRG, abdomen, no acute findings. Grossly normal neurological exam.     Recent Results (from the past 720 hour(s))    Comprehensive metabolic panel    Collection Time: 04/02/24 11:58 AM   Result Value Ref Range    Sodium 135 135 - 145 mmol/L    Potassium 3.6 3.4 - 5.3 mmol/L    Carbon Dioxide (CO2) 29 22 - 29 mmol/L    Anion Gap 9 7 - 15 mmol/L    Urea Nitrogen 13.3 8.0 - 23.0 mg/dL    Creatinine 0.78 0.51 - 0.95 mg/dL    GFR Estimate 74 >60 mL/min/1.73m2    Calcium 9.4 8.8 - 10.2 mg/dL    Chloride 97 (L) 98 - 107 mmol/L    Glucose 103 (H) 70 - 99 mg/dL    Alkaline Phosphatase 72 40 - 150 U/L    AST 17 0 - 45 U/L    ALT 14 0 - 50 U/L    Protein Total 6.8 6.4 - 8.3 g/dL    Albumin 3.9 3.5 - 5.2 g/dL    Bilirubin Total 0.5 <=1.2 mg/dL   CBC with platelets and differential    Collection Time: 04/02/24 11:58 AM   Result Value Ref Range    WBC Count 6.9 4.0 - 11.0 10e3/uL    RBC Count 3.55 (L) 3.80 - 5.20 10e6/uL    Hemoglobin 12.2 11.7 - 15.7 g/dL    Hematocrit 35.4 35.0 - 47.0 %     78 - 100 fL    MCH 34.4 (H) 26.5 - 33.0 pg    MCHC 34.5 31.5 - 36.5 g/dL    RDW 20.1 (H) 10.0 - 15.0 %    Platelet Count 154 150 - 450 10e3/uL    % Neutrophils 66 %    % Lymphocytes 23 %    % Monocytes 10 %    % Eosinophils 1 %    % Basophils 0 %    % Immature Granulocytes 0 %    NRBCs per 100 WBC 0 <1 /100    Absolute Neutrophils 4.5 1.6 - 8.3 10e3/uL    Absolute Lymphocytes 1.6 0.8 - 5.3 10e3/uL    Absolute Monocytes 0.7 0.0 - 1.3 10e3/uL    Absolute Eosinophils 0.1 0.0 - 0.7 10e3/uL    Absolute Basophils 0.0 0.0 - 0.2 10e3/uL    Absolute Immature Granulocytes 0.0 <=0.4 10e3/uL    Absolute NRBCs 0.0 10e3/uL   Comprehensive metabolic panel    Collection Time: 04/16/24  2:24 PM   Result Value Ref Range    Sodium 139 135 - 145 mmol/L    Potassium 3.7 3.4 - 5.3 mmol/L    Carbon Dioxide (CO2) 27 22 - 29 mmol/L    Anion Gap 12 7 - 15 mmol/L    Urea Nitrogen 17.6 8.0 - 23.0 mg/dL    Creatinine 0.93 0.51 - 0.95 mg/dL    GFR Estimate 60 (L) >60 mL/min/1.73m2    Calcium 9.1 8.8 - 10.2 mg/dL    Chloride 100 98 - 107 mmol/L    Glucose 82 70 - 99 mg/dL     Alkaline Phosphatase 69 40 - 150 U/L    AST 14 0 - 45 U/L    ALT 13 0 - 50 U/L    Protein Total 6.9 6.4 - 8.3 g/dL    Albumin 4.2 3.5 - 5.2 g/dL    Bilirubin Total 0.3 <=1.2 mg/dL   CBC with platelets and differential    Collection Time: 24  2:24 PM   Result Value Ref Range    WBC Count 6.6 4.0 - 11.0 10e3/uL    RBC Count 3.45 (L) 3.80 - 5.20 10e6/uL    Hemoglobin 12.1 11.7 - 15.7 g/dL    Hematocrit 36.0 35.0 - 47.0 %     (H) 78 - 100 fL    MCH 35.1 (H) 26.5 - 33.0 pg    MCHC 33.6 31.5 - 36.5 g/dL    RDW 20.6 (H) 10.0 - 15.0 %    Platelet Count 145 (L) 150 - 450 10e3/uL    % Neutrophils 56 %    % Lymphocytes 31 %    % Monocytes 12 %    % Eosinophils 1 %    % Basophils 0 %    % Immature Granulocytes 0 %    NRBCs per 100 WBC 0 <1 /100    Absolute Neutrophils 3.6 1.6 - 8.3 10e3/uL    Absolute Lymphocytes 2.1 0.8 - 5.3 10e3/uL    Absolute Monocytes 0.8 0.0 - 1.3 10e3/uL    Absolute Eosinophils 0.1 0.0 - 0.7 10e3/uL    Absolute Basophils 0.0 0.0 - 0.2 10e3/uL    Absolute Immature Granulocytes 0.0 <=0.4 10e3/uL    Absolute NRBCs 0.0 10e3/uL       Echocardiogram Complete  [id number]  [id number]  [id number]  [id number]     [facility]  Echocardiography Laboratory  [address] [place] [address]     Name: YOLANDA MCCAULEY  MRN: [id number]  : 1940  Study Date: 2023 11:03 AM  Age: 82 yrs  Gender: Female  Patient Location: [facility]  Reason For Study: TIA  Ordering Physician: SUZETTE VITAL  Referring Physician: Wolfgang Zimmerman  Performed By: Gilda Calles     BSA: 1.7 m2  Height: 65 in  Weight: 138 lb  HR: 58  BP: 123/63 mmHg  ______________________________________________________________________________  Procedure  Complete Portable Bubble Echo Adult. Optison (NDC #6143-2059) given  intravenously.  ______________________________________________________________________________  Interpretation Summary     The visual ejection fraction is 60-65%.  The right  ventricle is normal in size and function.  No hemodynamically significant valvular aortic stenosis.  The inferior vena cava was normal in size with preserved respiratory  variability.  A contrast injection (Bubble Study) was performed that was negative for flow  across the interatrial septum.  ______________________________________________________________________________  Left Ventricle  The left ventricle is normal in size. There is normal left ventricular wall  thickness. There is concentric remodeling present. The visual ejection  fraction is 60-65%. Diastolic Doppler findings (E/E' ratio and/or other  parameters) suggest left ventricular filling pressures are indeterminate. No  regional wall motion abnormalities noted.     Right Ventricle  The right ventricle is normal in size and function.     Atria  Normal left atrial size. Right atrial size is normal. A contrast injection  (Bubble Study) was performed that was negative for flow across the interatrial  septum.     Mitral Valve  The mitral valve is normal in structure and function. There is physiologic  mitral regurgitation. There is no mitral valve stenosis.     Tricuspid Valve  The tricuspid valve is not well visualized, but is grossly normal. The right  ventricular systolic pressure is approximated at 22.9 mmHg plus the right  atrial pressure. There is trace to mild tricuspid regurgitation.     Aortic Valve  The aortic valve is trileaflet with aortic valve sclerosis. There is trace  aortic regurgitation. No hemodynamically significant valvular aortic stenosis.     Pulmonic Valve  The pulmonic valve is not well visualized. There is mild (1+) pulmonic  valvular regurgitation.     Vessels  The aortic root is normal size. The inferior vena cava was normal in size with  preserved respiratory variability.     Pericardium  There is no pericardial effusion.     ______________________________________________________________________________  MMode/2D Measurements &  Calculations  IVSd: 1.1 cm  LVIDd: 3.8 cm  LVIDs: 2.6 cm  LVPWd: 1.1 cm  FS: 32.1 %  LV mass(C)d: 129.5 grams  LV mass(C)dI: 76.6 grams/m2     Ao root diam: 3.1 cm  LA dimension: 3.6 cm  asc Aorta Diam: 3.9 cm  LA/Ao: 1.2  LVOT diam: 1.9 cm  LVOT area: 2.8 cm2  LA Volume (BP): 30.6 ml  LA Volume Index (BP): 18.1 ml/m2  RWT: 0.56  TAPSE: 2.4 cm     Doppler Measurements & Calculations  MV E max timothy: 62.6 cm/sec  MV A max timothy: 81.4 cm/sec  MV E/A: 0.77     MV dec time: 0.26 sec  Ao V2 max: 130.0 cm/sec  Ao max P.0 mmHg  Ao V2 mean: 88.7 cm/sec  Ao mean P.0 mmHg  Ao V2 VTI: 34.2 cm  NISSA(I,D): 2.3 cm2  NISSA(V,D): 2.4 cm2  LV V1 max P.1 mmHg  LV V1 max: 113.0 cm/sec  LV V1 VTI: 28.4 cm  SV(LVOT): 79.7 ml  SI(LVOT): 47.2 ml/m2  PA acc time: 0.15 sec  TR max timothy: 238.7 cm/sec  TR max P.9 mmHg  AV Timothy Ratio (DI): 0.87  NISSA Index (cm2/m2): 1.4  E/E' av.9  Lateral E/e': 9.5  Medial E/e': 14.3  RV S Timothy: 11.5 cm/sec     ______________________________________________________________________________  Report approved by: Markus Chung 2023 03:47 PM          A/P:    1. Immunizations; COVID Pfizer vaccine x 4. She has completed the Shingrix vaccine series. Pneumococcal 23 done 10/18/2011. Prevnar 13 done 2016. Td 2018. Prevnar 20 done 11/15/2022. Influenza vaccine done 10/30/2023.   2. Inflammatory bowel disease/proctitis. She is followed at [facility] by Dr. Garcia. She had a Flex Sig 2023. Lab note from Dr. Garcia 2024. She states she has an appointment this Friday and may start prednisone.   3. Chronic pain on night time Gabapentin; seen Dr. Pimentel, orthopedics 2021 and Dr. Neumann 2021 Mr. Neumann.   4. HTN; on Valsartan/HcTz. Electrolytes and Creatinine checked 3/22/2024.   5. Mammogram; 2024.   6. Lipids; 2024; HDL 61, LDL 39, and TG's 69. She is on  Atorvastatin   7. Dermatology; scanned in outside note from [facility] 2022. Care Everywhere  dermatology note 10/12/2022.   8. Vitamin D normal at 57 on 2/11/2022. DEXA scan in chart from 7/19/2021 and most negative T score -2.3.  She saw Dr. Simon endocrinology   8/22/2022  9. Outside Rheumatology for PMR, Dr. Damian scanned in note from 5/24/2023. She is not on prednisone currently.  She states she has an appointment this week.   10. Palpitations;  Normal dobutamine stress echo 10/11/2021.  For atypical CP, CTA coronary angiogram done 8/10/2022 with possible severe mid LAD stenosis. Coronary angiogram on 8/30/2022 with normal coronary arteries.  Ziopatch monitor 7/22/2022 w/o significant findings.  She has a cardiology appt. With Ms. Shi 5/8/2024.   11. Past hospital discharged 7/12/2023 for possible TIA sxs with negative evaluation. Transient  L arm complaints.  No recurrence of sxs.  12. A1c 6.0% on 7/3/2023.   13. Positive CHANDNI-2 for thrombocytosis. She was seen 2/8/2024 and 4/4/2024 by Dr. Milner, Hematology and has 5/2/2024.  She is on hydroxyurea         40 minutes spent on the date of the encounter doing chart review, history and exam, documentation and further activities as noted above exclusive of procedures and other billable interpretations

## 2024-05-01 ENCOUNTER — OFFICE VISIT (OUTPATIENT)
Dept: INTERNAL MEDICINE | Facility: CLINIC | Age: 84
End: 2024-05-01
Payer: COMMERCIAL

## 2024-05-01 ENCOUNTER — TRANSFERRED RECORDS (OUTPATIENT)
Dept: HEALTH INFORMATION MANAGEMENT | Facility: CLINIC | Age: 84
End: 2024-05-01

## 2024-05-01 ENCOUNTER — LAB (OUTPATIENT)
Dept: LAB | Facility: CLINIC | Age: 84
End: 2024-05-01
Payer: COMMERCIAL

## 2024-05-01 VITALS
WEIGHT: 133.4 LBS | OXYGEN SATURATION: 96 % | SYSTOLIC BLOOD PRESSURE: 133 MMHG | HEIGHT: 65 IN | HEART RATE: 66 BPM | BODY MASS INDEX: 22.23 KG/M2 | DIASTOLIC BLOOD PRESSURE: 67 MMHG

## 2024-05-01 DIAGNOSIS — Z12.11 SPECIAL SCREENING FOR MALIGNANT NEOPLASMS, COLON: ICD-10-CM

## 2024-05-01 DIAGNOSIS — D47.3 ESSENTIAL THROMBOCYTHEMIA (H): ICD-10-CM

## 2024-05-01 DIAGNOSIS — Z12.11 SPECIAL SCREENING FOR MALIGNANT NEOPLASMS, COLON: Primary | ICD-10-CM

## 2024-05-01 LAB
ALBUMIN SERPL BCG-MCNC: 4.1 G/DL (ref 3.5–5.2)
ALP SERPL-CCNC: 69 U/L (ref 40–150)
ALT SERPL W P-5'-P-CCNC: 9 U/L (ref 0–50)
ANION GAP SERPL CALCULATED.3IONS-SCNC: 9 MMOL/L (ref 7–15)
AST SERPL W P-5'-P-CCNC: 17 U/L (ref 0–45)
BASOPHILS # BLD AUTO: 0 10E3/UL (ref 0–0.2)
BASOPHILS NFR BLD AUTO: 0 %
BILIRUB SERPL-MCNC: 0.4 MG/DL
BUN SERPL-MCNC: 12.2 MG/DL (ref 8–23)
CALCIUM SERPL-MCNC: 9.4 MG/DL (ref 8.8–10.2)
CHLORIDE SERPL-SCNC: 98 MMOL/L (ref 98–107)
CHOLEST SERPL-MCNC: 109 MG/DL
CREAT SERPL-MCNC: 0.81 MG/DL (ref 0.51–0.95)
DEPRECATED HCO3 PLAS-SCNC: 29 MMOL/L (ref 22–29)
EGFRCR SERPLBLD CKD-EPI 2021: 71 ML/MIN/1.73M2
EOSINOPHIL # BLD AUTO: 0.1 10E3/UL (ref 0–0.7)
EOSINOPHIL NFR BLD AUTO: 1 %
ERYTHROCYTE [DISTWIDTH] IN BLOOD BY AUTOMATED COUNT: 18.1 % (ref 10–15)
FASTING STATUS PATIENT QL REPORTED: NO
GLUCOSE SERPL-MCNC: 95 MG/DL (ref 70–99)
HCT VFR BLD AUTO: 34.4 % (ref 35–47)
HDLC SERPL-MCNC: 49 MG/DL
HGB BLD-MCNC: 12 G/DL (ref 11.7–15.7)
IMM GRANULOCYTES # BLD: 0 10E3/UL
IMM GRANULOCYTES NFR BLD: 0 %
LDLC SERPL CALC-MCNC: 34 MG/DL
LYMPHOCYTES # BLD AUTO: 2.1 10E3/UL (ref 0.8–5.3)
LYMPHOCYTES NFR BLD AUTO: 31 %
MCH RBC QN AUTO: 35.9 PG (ref 26.5–33)
MCHC RBC AUTO-ENTMCNC: 34.9 G/DL (ref 31.5–36.5)
MCV RBC AUTO: 103 FL (ref 78–100)
MONOCYTES # BLD AUTO: 0.8 10E3/UL (ref 0–1.3)
MONOCYTES NFR BLD AUTO: 12 %
NEUTROPHILS # BLD AUTO: 3.9 10E3/UL (ref 1.6–8.3)
NEUTROPHILS NFR BLD AUTO: 56 %
NONHDLC SERPL-MCNC: 60 MG/DL
NRBC # BLD AUTO: 0 10E3/UL
NRBC BLD AUTO-RTO: 0 /100
PLATELET # BLD AUTO: 217 10E3/UL (ref 150–450)
POTASSIUM SERPL-SCNC: 4 MMOL/L (ref 3.4–5.3)
PROT SERPL-MCNC: 6.8 G/DL (ref 6.4–8.3)
RBC # BLD AUTO: 3.34 10E6/UL (ref 3.8–5.2)
SODIUM SERPL-SCNC: 136 MMOL/L (ref 135–145)
TRIGL SERPL-MCNC: 128 MG/DL
WBC # BLD AUTO: 7 10E3/UL (ref 4–11)

## 2024-05-01 PROCEDURE — 99215 OFFICE O/P EST HI 40 MIN: CPT | Performed by: INTERNAL MEDICINE

## 2024-05-01 PROCEDURE — 85025 COMPLETE CBC W/AUTO DIFF WBC: CPT | Performed by: PATHOLOGY

## 2024-05-01 PROCEDURE — 80053 COMPREHEN METABOLIC PANEL: CPT | Performed by: PATHOLOGY

## 2024-05-01 PROCEDURE — 36415 COLL VENOUS BLD VENIPUNCTURE: CPT | Performed by: PATHOLOGY

## 2024-05-01 PROCEDURE — 80061 LIPID PANEL: CPT | Performed by: PATHOLOGY

## 2024-05-01 NOTE — PROGRESS NOTES
Debra is a 83 year old that presents in clinic today for the following:     Chief Complaint   Patient presents with    Hospital F/U     Low blood pressure, low pulse, headaches, nausea, weakness, more bleeding with proctitis       Hospital[facility]Nursing Home/IP Rehab [facility]   Date of Admission: 4/20/2024  Date of Discharge: 4/20/2024  Reason(s) for Admission: Hypotension   Was the patient in the ICU or did the patient experience delirium during hospitalization?  No  Do you have any other stressors you would like to discuss with your provider? No    Problems taking medications regularly:  Side effects, sensitive to medication  Medication changes since discharge: Medication list is updated.   Problems adhering to non-medication therapy:  None        5/1/2024    11:11 AM   Additional Questions   Roomed by BRITTANY STRANGE     Screenings from encounters over the past 10 days    No data recorded       BRITTANY Kaufman at 11:11 AM on 5/1/2024

## 2024-05-01 NOTE — PROGRESS NOTES
"Virtual Visit Details    Type of service:  Video Visit   Video Start Time: 12:19 PM  Video End Time:12:36 PM    Originating Location (pt. Location): Home  Distant Location (provider location):  On-site  Platform used for Video Visit: [facility]  HEMATOLOGY FOLLOW UP    Debra Dneise   : 1940   MRN: [id number]  Date of service: May 2, 2024     REASON FOR VISIT: JAK2+ essential thrombocytopenia  Referred by Dr. 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     Per chart review and discussion with the patient,  - [id number]: Persistent progressive dizziness, non-positional, intermittent, accompanied by nausea without vomiting, night time and day time, no improvement with Epley maneuver, and possible component of postural hypotension. Also with mild intermittent headache, mild fatigue over , has not felt \"completely normal\"  - Early 2023: Sudden onset LUE weakness x 15 sec which self resolved. CT, MRI/MRA normal. Echo normal. Ziopatch without arrhythmia. Started on clopidogrel and atorvastatin. Not on ASA because of the ulcerative proctitis. Prior cath  neg for CAD.   - : Prior plt counts normal. 23 491. Plts normal in 7/2023 x 3. Plts 462-450 in 2023. 23 JAK2+ 0.1884 triggering hematology referral. Plts 513 at time of first heme eval on 23. Remainder of CBC normal. No splenomegaly. Her UP has been under control with vedolizumab q2mo since May 2023. CRP and ESR improved to normal.  - 23: Stopped clopidogrel, restarted ASA 81mg BID  - 23: Scheduled sigmoidoscopy showing erythema in rectum, biopsies taken, " diverticulosis, and internal hemorrhoids; complicated by rectal bleeding with large clots, requiring overnight stay at [facility]. ASA held. Hgb 14.2->12.8. Mesalamine suppository added. No recurrence of bleeding. Heme onc consult recommended resuming ASA when bleeding risk permits. GI recommended restarting ASA BID on 12/30.  - 1/9/2024: Disease evaluation - Plt 474, rest of CBC normal. INR 1.21-1.29, otherwise PTT, fibrinogen, F8, VWF panel normal. CMP, LDH (192), Ferritin, iron sat normal. Epo 6. ESR and CRP neg. BMBx with normocellular to slightly hypercellular marrow for age (30%), TLH, no overt dysplasia, 1% blasts, MF-0. BCR-ABL negative. Myeloid NGS CHANDNI 11.1%, TET2 5.5%. MIPSS-ET score is 4 (for age alone).     Dates Treatment Response Toxicity   7/2023 Clopidogrel, atorvastatin (for TIA), not on ASA because of ulcerative proctitis (UP) Plts normal 7/2023 x 3,  Plts 462-450 in 9/2023  Plts 513 on 12/7/23 12/7/23 Stop clopidogrel, started ASA 81 BID. Held briefly for sigmoidoscopy 12/28/23. Restarted ASA on 12/30/23 Plts 474 on 1/9/24  BMBx as above.  Rectal bleeding with large clots with sigmoidoscopy, overnight stay needed, but resolved on own.   1/12/24 HU 500mg daily, ASA 81mg BID Plts 444 on 1/25/24  Plts 368 on 2/6/24   None known.  HCO3 a bit high  Possible urinary discomfort?   2/8/24 HU 1000mg daily, ASA 81mg BID Plts 245 on 2/22  Plts 202 on 3/5  Possible improvement in nausea, lightheadedness  Plts 154 on 4/2 HCO3 stable  Low back pain?    4/2 Worsened fatigue, GI symptoms   4/4/24 HU 500mg daily, ASA 81mg BID Plts 145 on 4/16, other counts nl    4/16/24 Hold HU x 1 wk then restart at 500mg, continue ASA Plts 155 on 4/20 4/25 hgb 12.3    5/2/24 HU 500mg daily, Hold ASA Plts 217  Hgb 12.0      INTERVAL HISTORY  She is here with her  Aguilar today.   She is not doing well, not feeling well  Her BP was down to 89, pulse low at ~50.   Her BPs the last few days has gotten back to normal.  She is  starting to bleed almost continuously with the proctitis.  She might need to be put on prednisone.   She does have some bad headaches. Heart throbs more, especially at night, hears the blood whooshing in her ears.  Chills are a little better, still feels heavy in the head.     No reported TIA/stroke/VTE symptoms, erythromelalgia, pruritus, fevers, chills, weight loss, appetite loss.     REVIEW OF SYSTEMS  A 10 point review of systems was performed and was otherwise negative except as mentioned in the HPI.     PAST MEDICAL HISTORY  Past Medical History:   Diagnosis Date    Autoimmune disease (H24) approx.     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 30 years ago    Osteopenia     Polymyalgia rheumatica (H24)     PONV (postoperative nausea and vomiting)     Proctitis     Reduced vision Sometime is a little blurry     PAST SURGICAL HISTORY  Past Surgical History:   Procedure Laterality Date    COLONOSCOPY  2014    Procedure: COMBINED COLONOSCOPY, SINGLE BIOPSY/POLYPECTOMY BY BIOPSY;  COLONOSCOPY;  Surgeon: Carol Ann Plasencia MD;  Location:  GI    CV CORONARY ANGIOGRAM N/A 2022    Procedure: Coronary Angiogram;  Surgeon: Trevin Hawk MD;  Location:  HEART CARDIAC CATH LAB    ENT SURGERY      tonsilectomy, adenoidectomy    ESOPHAGOSCOPY, GASTROSCOPY, DUODENOSCOPY (EGD), COMBINED N/A 2023    Procedure: ESOPHAGOGASTRODUODENOSCOPY, WITH BIOPSY;  Surgeon: Angel Lara MD;  Location:  GI    GYN SURGERY  ,     x 2    HYSTERECTOMY      ORTHOPEDIC SURGERY  2004    (R) shoulder surgery for frozen shoulder    ZAC BSO      for fibroids    TONSILLECTOMY  About 50 years ago     SOCIAL HISTORY  Reviewed, and any changes made accordingly  Social History     Socioeconomic History    Marital status:      Spouse name: Not on file    Number of children: Not  on file    Years of education: Not on file    Highest education level: Not on file   Occupational History    Not on file   Tobacco Use    Smoking status: Never    Smokeless tobacco: Never    Tobacco comments:     None   Vaping Use    Vaping status: Never Used   Substance and Sexual Activity    Alcohol use: No    Drug use: No    Sexual activity: Not Currently     Partners: Male     Birth control/protection: None   Other Topics Concern    Parent/sibling w/ CABG, MI or angioplasty before 65F 55M? Not Asked   Social History Narrative    Not on file     Social Determinants of Health     Financial Resource Strain: Low Risk  (12/19/2023)    Financial Resource Strain     Within the past 12 months, have you or your family members you live with been unable to get utilities (heat, electricity) when it was really needed?: No   Food Insecurity: Low Risk  (12/19/2023)    Food Insecurity     Within the past 12 months, did you worry that your food would run out before you got money to buy more?: No     Within the past 12 months, did the food you bought just not last and you didn t have money to get more?: No   Transportation Needs: Low Risk  (12/19/2023)    Transportation Needs     Within the past 12 months, has lack of transportation kept you from medical appointments, getting your medicines, non-medical meetings or appointments, work, or from getting things that you need?: No   Physical Activity: Unknown (3/20/2023)    Received from [facility]    Exercise Vital Sign     Days of Exercise per Week: 0 days     Minutes of Exercise per Session: Not on file   Stress: No Stress Concern Present (3/20/2023)    Received from [facility]    [place] [employer] - Occupational Stress Questionnaire     Feeling of Stress : Only a little   Social Connections: Socially Integrated (3/20/2023)    Received from [facility]    Social Connection and Isolation Panel [NHANES]     Frequency of Communication with Friends and Family:  More than three times a week     Frequency of Social Gatherings with Friends and Family: Once a week     Attends Jewish Services: More than 4 times per year     Active Member of Clubs or Organizations: Yes     Attends Club or Organization Meetings: Never     Marital Status:    Interpersonal Safety: Low Risk  (12/26/2023)    Interpersonal Safety     Do you feel physically and emotionally safe where you currently live?: Yes     Within the past 12 months, have you been hit, slapped, kicked or otherwise physically hurt by someone?: No     Within the past 12 months, have you been humiliated or emotionally abused in other ways by your partner or ex-partner?: No   Housing Stability: Low Risk  (12/19/2023)    Housing Stability     Do you have housing? : Yes     Are you worried about losing your housing?: No     FAMILY HISTORY  Reviewed, and any changes made accordingly  Family History   Problem Relation Age of Onset    Cancer - colorectal Father     Lung Cancer Father     Macular Degeneration Father     Multiple myeloma Brother     Pacemaker Brother     Hypertension Mother     Cerebrovascular Disease Mother         from misdiagnosis of temporal arteritis    Suicide Sister     Hypertension Maternal Grandmother     Cerebrovascular Disease Maternal Grandmother     Diabetes Maternal Grandmother        MEDICATIONS  Current Outpatient Medications   Medication Sig Dispense Refill    aspirin 81 MG EC tablet Take 81 mg by mouth 2 times daily      atorvastatin (LIPITOR) 10 MG tablet Take 1 tablet (10 mg) by mouth daily 90 tablet 1    budesonide (ENTOCORT EC) 3 MG EC capsule Take 9 mg by mouth every 24 hours (Patient not taking: Reported on 2/7/2024)      Cholecalciferol (VITAMIN D3 PO) Take 2,000 Units by mouth daily       fluocinonide (LIDEX) 0.05 % external cream Apply to the affected area once to twice daily as needed for flares.* (Patient not taking: Reported on 1/9/2024)      gabapentin (NEURONTIN) 100 MG capsule TAKE  2 CAPSULES BY MOUTH AT BEDTIME 180 capsule 1    hydroxyurea (HYDREA) 500 MG capsule Take 1 capsule (500 mg) by mouth daily 30 capsule 4    mesalamine (CANASA) 1000 MG suppository Place 1 suppository (1,000 mg) rectally at bedtime 30 suppository 0    multivitamin w/minerals (CENTRUM ADULTS) tablet Take 1 tablet by mouth daily      ondansetron (ZOFRAN ODT) 4 MG ODT tab Take 1 tablet (4 mg) by mouth every 8 hours as needed for nausea 12 tablet 1    Probiotic Product (PROBIOTIC PO) Take 1 capsule by mouth daily      sodium chloride 0.9 % SOLN 250 mL with vedolizumab 60 MG/ML SOLR 300 mg infusionn Inject 300 mg into the vein once Every 8 weeks      valsartan-hydrochlorothiazide (DIOVAN HCT) 160-25 MG tablet Take 1 tablet by mouth daily 90 tablet 3     No current facility-administered medications for this visit.     ALLERGIES  Allergies   Allergen Reactions    Sulfacetamide Hives    Adhesive Tape Rash    Aspirin      Other Reaction(s): Stomach issues    Atenolol      Other reaction(s): Intolerance-Can't Take  Fatigue, GI intolerance    Ibuprofen      Other reaction(s): Stomach Upset  4-9-13 tele encounter  Other Reaction(s): GI intolerance       Naproxen      Other reaction(s): Stomach Upset  4-9-13 tele encounter  Other Reaction(s): GI intolerance    Ramipril Cough     Other reaction(s): Intolerance-Can't Take  Other Reaction(s): GI intolerance    Sulfa Antibiotics     Codeine Nausea    Fentanyl Nausea       PHYSICAL EXAM  There were no vitals taken for this visit.   Wt Readings from Last 10 Encounters:   04/04/24 59.9 kg (132 lb)   03/27/24 61.3 kg (135 lb 3.2 oz)   03/06/24 60.3 kg (133 lb)   02/07/24 60.8 kg (134 lb 1.6 oz)   01/12/24 61 kg (134 lb 6.4 oz)   01/09/24 59.8 kg (131 lb 14.4 oz)   01/08/24 60.3 kg (133 lb)   12/28/23 59.4 kg (131 lb)   12/26/23 61.2 kg (134 lb 14.4 oz)   12/07/23 61.6 kg (135 lb 14.4 oz)     General: Well appearing.  HEENT: Sclerae anicteric.  Lungs: Breathing comfortably. No cough.  MSK:  Grossly normal movement.  Neuro: Grossly non-focal.  Skin/access: Normal skin tone.  Psych: Alert and oriented. No distress.    LABS  Recent Labs   Lab Test 04/16/24  1424 02/11/22  0940 01/28/21  1133 09/14/20  0839 03/25/20  1101 01/29/20  1108   WBC 6.6   < > 8.2 7.7 8.7 8.8   HGB 12.1   < > 13.8 14.2 14.9 14.2   *   < > 414 364 409 367   *   < > 93 92 92 95   RDW 20.6*   < > 12.5 12.4 12.6 12.6   ANEU  --   --  4.7 4.6 6.0 5.3   ALYM  --   --  2.3 1.9 1.7 2.3   DEANGELO  --   --  0.9 0.9 0.8 0.8   AEOS  --   --  0.2 0.2 0.2 0.3    < > = values in this interval not displayed.     Recent Labs   Lab Test 04/16/24  1424 12/28/23  1812 12/07/23  1522 09/14/20  0839 03/25/20  1101      < > 140   < > 141   POTASSIUM 3.7   < > 3.4   < > 3.5   CHLORIDE 100   < > 101   < > 106   CO2 27   < > 31*   < > 32   BUN 17.6   < > 18.3   < > 14   CR 0.93   < > 0.86   < > 0.72   PRASHANTH 9.1   < > 9.4   < > 9.2   LDH  --   --  192  --  146   URIC  --   --   --   --  6.8*    < > = values in this interval not displayed.     Recent Labs   Lab Test 04/16/24  1424 04/02/24  1158 03/22/24  1206 03/15/24  1359   AST 14 17 16 19   ALT 13 14 14 17   ALKPHOS 69 72 71 73   ALBUMIN 4.2 3.9 4.0 4.3   PROTTOTAL 6.9 6.8 6.6 6.9   BILITOTAL 0.3 0.5 0.5 0.9      Recent Labs   Lab Test 07/08/22  1120 03/25/20  1101    207   IGM  --  74   ELPM  --  0.0   ELPINT  --  Essentially normal electrophoretic pattern.  No obvious monoclonal proteins seen.    Pathologic significance requires clinical correlation.  OSMANY Orr M.D., Ph.D.,   Pathologist ().     IEP  --  No monoclonal protein seen on immunofixation.  Pathological significance requires clinical   correlation.     KAPPAFREELT  --  <2.83*   LAMBDAFREELT  --  1.01   KLR  --  Unable to Calculate      Recent Labs   Lab Test 01/09/24  0908 12/26/23  1522 12/07/23  1522 03/25/20  1101 09/20/16  1324   RETICABSCT  --  0.055 0.061   < >  --    RETP  --  1.2 1.3   < >  --   "  IRON 78  --   --   --  95   IRONSAT 25  --   --   --  26   HANNAH 61  --   --   --  56     --   --   --  362   B12  --   --   --   --  883   FOLIC  --   --   --   --  64.6   EPOE 6  --   --   --   --    LDH  --   --  192   < >  --     < > = values in this interval not displayed.     No results for input(s): \"MORPH\" in the last 76115 hours.  No results for input(s): \"HCVAB\", \"HCABC\", \"HBCAB\", \"AUSAB\", \"HIV\" in the last 19143 hours.  Recent Labs   Lab Test 02/06/24  1524 01/09/24  0908 12/07/23  1522   INR 1.19* 1.21* 1.29*   PTT  --  31  --    FIBR  --  300 413      Overall, the bone marrow is normocellular with normal myeloid to erythroid ratio, very slightly increased megakaryocytes, and normal marrow fibrosis. In addition, patient has normal level of LDH (192 U/L) and Erythropoietin (6; range 4-27 mU/mL) and absence of splenomegaly by physical exam.  In the light of the presence of  JAK2 V617F detected on the peripheral blood, these findings could be in keeping with the diagnosis of essential thrombocytocythaemia (ET).  However, only very rare megakaryocytes have deeply lobated nuclei and the overall morphology and histotopography of megakaryocytes are not typical of ET.  In addition, rare intrasinusoidal megakaryocytes are identified which could be suggestive of prefibrotic/early primary myelofibrosis (pre-PMF).       Overall, combining the history of intermittent thrombocytosis with borderline increase in platelet counts and the bone marrow morphologic findings, it appears that the disease is in the very early stage; hence, a close follow-up is warranted to monitor how this MPN with JAK2 V617F progresses.     IMAGING  As mentioned above in HPI.    IMPRESSION  Debra Denise is a 83 year old woman with a history as above, with the following issues:  JAK2 V617F+ (VAF 9/2023=0.1884) myeloproliferative neoplasm  Very early mild essential thrombocythemia vs very early pre-fibrotic primary " myelofibrosis  TIA in July 2023    She has not had MPN-type vasomotor symptoms, other thromboses, bleeding manifestations, erythromelalgia, or splenomegaly. There is no acquired von willebrand disease. Her marrow shows if anything very early disease with only very rare features of ET (megakaryocytes with deeply lobated nuclei) and rare features of pre-fibrotic PMF (intrasinusoidal megakaryocytes). She is at high risk for thrombosis, given that she is >60 and has a JAK2 mutation, and that she probably had a TIA in July 2023.     We discussed again that there aren't great treatments for decreasing the VAF of the JAK2+ clone at this point, but that the disease can be well managed with decreased rates of thrombosis, hemorrhage, and vasomotor symptoms for many years with HU +/- ASA. In some cases, ET can progress to MF, MDS, or AML, but this can take many years, so her counts will need to be monitored regularly.    We started ASA and planned to maintain on it for history of TIA as clopidogrel was stopping. However, it appears she is getting more bleeding and the continual bleeding seems to be associated with increased symptoms of anemia. Hgb seems to be trending down as well despite decreasing the HU. Now that she has been on HU, the need for ASA may be reduced, and studies testing HU for prevention of ET-associated thrombosis were done without ASA. The HU not only decreases plt number but also decreases new plts which are more activated.     PLAN  - Discontinue ASA 81mg BID  - Continue HU 500mg daily  - Recheck labs in 2 weeks and 4 weeks  - Return by video May 30 at 4:00 PM.    We had a long discussion with the patient and her  about the therapeutic possibilities and necessary follow up. All questions were answered to their satisfaction.    I spent 40 minutes on the date of service reviewing medical records from the referring provider, reviewing previous lab and imaging results as summarized above, obtaining and  reviewing records from [facility] as summarized above, obtaining a history from the patient, performing a physical exam, counseling and educating the patient on the diagnosis and treatment, entering orders for tests, evaluating a potentially life or organ threatening problem, intensively monitoring treatments with high risk of toxicity, coordinating care, and documenting in the electronic medical record.    Thank you for allowing me to participate in the care of this patient. Please do not hesitate to contact me if there are any concerns or questions.     Amos Milner MD   of Medicine  Classical Hematology and Blood and Marrow Transplantation  Division of Hematology, Oncology, and Transplantation  [facility]

## 2024-05-02 ENCOUNTER — VIRTUAL VISIT (OUTPATIENT)
Dept: TRANSPLANT | Facility: CLINIC | Age: 84
End: 2024-05-02
Attending: INTERNAL MEDICINE
Payer: COMMERCIAL

## 2024-05-02 VITALS
SYSTOLIC BLOOD PRESSURE: 132 MMHG | BODY MASS INDEX: 22.16 KG/M2 | WEIGHT: 133 LBS | HEIGHT: 65 IN | DIASTOLIC BLOOD PRESSURE: 60 MMHG

## 2024-05-02 DIAGNOSIS — D47.3 ESSENTIAL THROMBOCYTHEMIA (H): Primary | ICD-10-CM

## 2024-05-02 PROCEDURE — 99215 OFFICE O/P EST HI 40 MIN: CPT | Mod: 95 | Performed by: INTERNAL MEDICINE

## 2024-05-02 ASSESSMENT — PAIN SCALES - GENERAL: PAINLEVEL: EXTREME PAIN (8)

## 2024-05-02 NOTE — LETTER
"    2024         RE: Debra Denise  [id number]  [facility] [address]        Dear Colleague,    Thank you for referring your patient, Debra Denise, to the [facility]ARR[facility]PL[facility] [place]. Please see a copy of my visit note below.    Virtual Visit Details    Type of service:  Video Visit   Video Start Time: 12:19 PM  Video End Time:12:36 PM    Originating Location (pt. Location): Home  Distant Location (provider location):  On-site  Platform used for Video Visit: [facility]  HEMATOLOGY FOLLOW UP    Debra Denise   : 1940   MRN: [id number]  Date of service: May 2, 2024     REASON FOR VISIT: JAK2+ essential thrombocytopenia  Referred by Dr. 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     Per chart review and discussion with the patient,  - [id number]: Persistent progressive dizziness, non-positional, intermittent, accompanied by nausea without vomiting, night time and day time, no improvement with Epley maneuver, and possible component of postural hypotension. Also with mild intermittent headache, mild fatigue over , has not felt \"completely normal\"  - Early 2023: Sudden onset LUE weakness x 15 sec which self resolved. CT, MRI/MRA normal. Echo normal. Ziopatch without arrhythmia. Started on clopidogrel and atorvastatin. Not on ASA because of the ulcerative proctitis. Prior cath  neg for CAD.   - : Prior plt counts normal. 23 491. Plts normal in 7/2023 x 3. Plts 462-450 in 2023. 23 JAK2+ 0.1884 triggering hematology referral. Plts 513 at time of " first heme eval on 12/7/23. Remainder of CBC normal. No splenomegaly. Her UP has been under control with vedolizumab q2mo since May 2023. CRP and ESR improved to normal.  - 12/7/23: Stopped clopidogrel, restarted ASA 81mg BID  - 12/28/23: Scheduled sigmoidoscopy showing erythema in rectum, biopsies taken, diverticulosis, and internal hemorrhoids; complicated by rectal bleeding with large clots, requiring overnight stay at [facility]. ASA held. Hgb 14.2->12.8. Mesalamine suppository added. No recurrence of bleeding. Heme onc consult recommended resuming ASA when bleeding risk permits. GI recommended restarting ASA BID on 12/30.  - 1/9/2024: Disease evaluation - Plt 474, rest of CBC normal. INR 1.21-1.29, otherwise PTT, fibrinogen, F8, VWF panel normal. CMP, LDH (192), Ferritin, iron sat normal. Epo 6. ESR and CRP neg. BMBx with normocellular to slightly hypercellular marrow for age (30%), TLH, no overt dysplasia, 1% blasts, MF-0. BCR-ABL negative. Myeloid NGS CHANDNI 11.1%, TET2 5.5%. MIPSS-ET score is 4 (for age alone).     Dates Treatment Response Toxicity   7/2023 Clopidogrel, atorvastatin (for TIA), not on ASA because of ulcerative proctitis (UP) Plts normal 7/2023 x 3,  Plts 462-450 in 9/2023  Plts 513 on 12/7/23 12/7/23 Stop clopidogrel, started ASA 81 BID. Held briefly for sigmoidoscopy 12/28/23. Restarted ASA on 12/30/23 Plts 474 on 1/9/24  BMBx as above.  Rectal bleeding with large clots with sigmoidoscopy, overnight stay needed, but resolved on own.   1/12/24 HU 500mg daily, ASA 81mg BID Plts 444 on 1/25/24  Plts 368 on 2/6/24   None known.  HCO3 a bit high  Possible urinary discomfort?   2/8/24 HU 1000mg daily, ASA 81mg BID Plts 245 on 2/22  Plts 202 on 3/5  Possible improvement in nausea, lightheadedness  Plts 154 on 4/2 HCO3 stable  Low back pain?    4/2 Worsened fatigue, GI symptoms   4/4/24 HU 500mg daily, ASA 81mg BID Plts 145 on 4/16, other counts nl    4/16/24 Hold HU x 1 wk then restart at 500mg,  continue ASA Plts 155 on  hgb 12.3    24 HU 500mg daily, Hold ASA Plts 217  Hgb 12.0      INTERVAL HISTORY  She is here with her  Aguilar today.   She is not doing well, not feeling well  Her BP was down to 89, pulse low at ~50.   Her BPs the last few days has gotten back to normal.  She is starting to bleed almost continuously with the proctitis.  She might need to be put on prednisone.   She does have some bad headaches. Heart throbs more, especially at night, hears the blood whooshing in her ears.  Chills are a little better, still feels heavy in the head.     No reported TIA/stroke/VTE symptoms, erythromelalgia, pruritus, fevers, chills, weight loss, appetite loss.     REVIEW OF SYSTEMS  A 10 point review of systems was performed and was otherwise negative except as mentioned in the HPI.     PAST MEDICAL HISTORY  Past Medical History:   Diagnosis Date    Autoimmune disease (H24) approx.     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 30 years ago    Osteopenia     Polymyalgia rheumatica (H24)     PONV (postoperative nausea and vomiting)     Proctitis     Reduced vision Sometime is a little blurry     PAST SURGICAL HISTORY  Past Surgical History:   Procedure Laterality Date    COLONOSCOPY  2014    Procedure: COMBINED COLONOSCOPY, SINGLE BIOPSY/POLYPECTOMY BY BIOPSY;  COLONOSCOPY;  Surgeon: Carol Ann Plasencia MD;  Location:  GI    CV CORONARY ANGIOGRAM N/A 2022    Procedure: Coronary Angiogram;  Surgeon: Trevin Hawk MD;  Location:  HEART CARDIAC CATH LAB    ENT SURGERY      tonsilectomy, adenoidectomy    ESOPHAGOSCOPY, GASTROSCOPY, DUODENOSCOPY (EGD), COMBINED N/A 2023    Procedure: ESOPHAGOGASTRODUODENOSCOPY, WITH BIOPSY;  Surgeon: Angel Lara MD;  Location:  GI    GYN SURGERY  ,     x 2    HYSTERECTOMY      ORTHOPEDIC SURGERY   02/2004    (R) shoulder surgery for frozen shoulder    ZAC BSO  1988    for fibroids    TONSILLECTOMY  About 50 years ago     SOCIAL HISTORY  Reviewed, and any changes made accordingly  Social History     Socioeconomic History    Marital status:      Spouse name: Not on file    Number of children: Not on file    Years of education: Not on file    Highest education level: Not on file   Occupational History    Not on file   Tobacco Use    Smoking status: Never    Smokeless tobacco: Never    Tobacco comments:     None   Vaping Use    Vaping status: Never Used   Substance and Sexual Activity    Alcohol use: No    Drug use: No    Sexual activity: Not Currently     Partners: Male     Birth control/protection: None   Other Topics Concern    Parent/sibling w/ CABG, MI or angioplasty before 65F 55M? Not Asked   Social History Narrative    Not on file     Social Determinants of Health     Financial Resource Strain: Low Risk  (12/19/2023)    Financial Resource Strain     Within the past 12 months, have you or your family members you live with been unable to get utilities (heat, electricity) when it was really needed?: No   Food Insecurity: Low Risk  (12/19/2023)    Food Insecurity     Within the past 12 months, did you worry that your food would run out before you got money to buy more?: No     Within the past 12 months, did the food you bought just not last and you didn t have money to get more?: No   Transportation Needs: Low Risk  (12/19/2023)    Transportation Needs     Within the past 12 months, has lack of transportation kept you from medical appointments, getting your medicines, non-medical meetings or appointments, work, or from getting things that you need?: No   Physical Activity: Unknown (3/20/2023)    [facility]    Exercise Vital Sign     Days of Exercise per Week: 0 days     Minutes of Exercise per Session: Not on file   Stress: No Stress Concern Present (3/20/2023)    Received from [facility]    [facility] [place] of Occupational Health - Occupational Stress Questionnaire     Feeling of Stress : Only a little   Social Connections: Socially Integrated (3/20/2023)    Received from [facility]    Social Connection and Isolation Panel [NHANES]     Frequency of Communication with Friends and Family: More than three times a week     Frequency of Social Gatherings with Friends and Family: Once a week     Attends Rastafari Services: More than 4 times per year     Active Member of Clubs or Organizations: Yes     Attends Club or Organization Meetings: Never     Marital Status:    Interpersonal Safety: Low Risk  (12/26/2023)    Interpersonal Safety     Do you feel physically and emotionally safe where you currently live?: Yes     Within the past 12 months, have you been hit, slapped, kicked or otherwise physically hurt by someone?: No     Within the past 12 months, have you been humiliated or emotionally abused in other ways by your partner or ex-partner?: No   Housing Stability: Low Risk  (12/19/2023)    Housing Stability     Do you have housing? : Yes     Are you worried about losing your housing?: No     FAMILY HISTORY  Reviewed, and any changes made accordingly  Family History   Problem Relation Age of Onset    Cancer - colorectal Father     Lung Cancer Father     Macular Degeneration Father     Multiple myeloma Brother     Pacemaker Brother     Hypertension Mother     Cerebrovascular Disease Mother         from misdiagnosis of temporal arteritis    Suicide Sister     Hypertension Maternal Grandmother     Cerebrovascular Disease Maternal Grandmother     Diabetes Maternal Grandmother        MEDICATIONS  Current Outpatient Medications   Medication Sig Dispense Refill    aspirin 81 MG EC tablet Take 81 mg by mouth 2 times daily      atorvastatin (LIPITOR) 10 MG tablet Take 1 tablet (10 mg) by mouth daily 90 tablet 1    budesonide (ENTOCORT EC) 3 MG EC capsule Take 9 mg by mouth every 24 hours (Patient  not taking: Reported on 2/7/2024)      Cholecalciferol (VITAMIN D3 PO) Take 2,000 Units by mouth daily       fluocinonide (LIDEX) 0.05 % external cream Apply to the affected area once to twice daily as needed for flares.* (Patient not taking: Reported on 1/9/2024)      gabapentin (NEURONTIN) 100 MG capsule TAKE 2 CAPSULES BY MOUTH AT BEDTIME 180 capsule 1    hydroxyurea (HYDREA) 500 MG capsule Take 1 capsule (500 mg) by mouth daily 30 capsule 4    mesalamine (CANASA) 1000 MG suppository Place 1 suppository (1,000 mg) rectally at bedtime 30 suppository 0    multivitamin w/minerals (CENTRUM ADULTS) tablet Take 1 tablet by mouth daily      ondansetron (ZOFRAN ODT) 4 MG ODT tab Take 1 tablet (4 mg) by mouth every 8 hours as needed for nausea 12 tablet 1    Probiotic Product (PROBIOTIC PO) Take 1 capsule by mouth daily      sodium chloride 0.9 % SOLN 250 mL with vedolizumab 60 MG/ML SOLR 300 mg infusionn Inject 300 mg into the vein once Every 8 weeks      valsartan-hydrochlorothiazide (DIOVAN HCT) 160-25 MG tablet Take 1 tablet by mouth daily 90 tablet 3     No current facility-administered medications for this visit.     ALLERGIES  Allergies   Allergen Reactions    Sulfacetamide Hives    Adhesive Tape Rash    Aspirin      Other Reaction(s): Stomach issues    Atenolol      Other reaction(s): Intolerance-Can't Take  Fatigue, GI intolerance    Ibuprofen      Other reaction(s): Stomach Upset  4-9-13 tele encounter  Other Reaction(s): GI intolerance       Naproxen      Other reaction(s): Stomach Upset  4-9-13 tele encounter  Other Reaction(s): GI intolerance    Ramipril Cough     Other reaction(s): Intolerance-Can't Take  Other Reaction(s): GI intolerance    Sulfa Antibiotics     Codeine Nausea    Fentanyl Nausea       PHYSICAL EXAM  There were no vitals taken for this visit.   Wt Readings from Last 10 Encounters:   04/04/24 59.9 kg (132 lb)   03/27/24 61.3 kg (135 lb 3.2 oz)   03/06/24 60.3 kg (133 lb)   02/07/24 60.8 kg  (134 lb 1.6 oz)   01/12/24 61 kg (134 lb 6.4 oz)   01/09/24 59.8 kg (131 lb 14.4 oz)   01/08/24 60.3 kg (133 lb)   12/28/23 59.4 kg (131 lb)   12/26/23 61.2 kg (134 lb 14.4 oz)   12/07/23 61.6 kg (135 lb 14.4 oz)     General: Well appearing.  HEENT: Sclerae anicteric.  Lungs: Breathing comfortably. No cough.  MSK: Grossly normal movement.  Neuro: Grossly non-focal.  Skin/access: Normal skin tone.  Psych: Alert and oriented. No distress.    LABS  Recent Labs   Lab Test 04/16/24  1424 02/11/22  0940 01/28/21  1133 09/14/20  0839 03/25/20  1101 01/29/20  1108   WBC 6.6   < > 8.2 7.7 8.7 8.8   HGB 12.1   < > 13.8 14.2 14.9 14.2   *   < > 414 364 409 367   *   < > 93 92 92 95   RDW 20.6*   < > 12.5 12.4 12.6 12.6   ANEU  --   --  4.7 4.6 6.0 5.3   ALYM  --   --  2.3 1.9 1.7 2.3   DEANGELO  --   --  0.9 0.9 0.8 0.8   AEOS  --   --  0.2 0.2 0.2 0.3    < > = values in this interval not displayed.     Recent Labs   Lab Test 04/16/24  1424 12/28/23  1812 12/07/23  1522 09/14/20  0839 03/25/20  1101      < > 140   < > 141   POTASSIUM 3.7   < > 3.4   < > 3.5   CHLORIDE 100   < > 101   < > 106   CO2 27   < > 31*   < > 32   BUN 17.6   < > 18.3   < > 14   CR 0.93   < > 0.86   < > 0.72   PRASHANTH 9.1   < > 9.4   < > 9.2   LDH  --   --  192  --  146   URIC  --   --   --   --  6.8*    < > = values in this interval not displayed.     Recent Labs   Lab Test 04/16/24  1424 04/02/24  1158 03/22/24  1206 03/15/24  1359   AST 14 17 16 19   ALT 13 14 14 17   ALKPHOS 69 72 71 73   ALBUMIN 4.2 3.9 4.0 4.3   PROTTOTAL 6.9 6.8 6.6 6.9   BILITOTAL 0.3 0.5 0.5 0.9      Recent Labs   Lab Test 07/08/22  1120 03/25/20  1101    207   IGM  --  74   ELPM  --  0.0   ELPINT  --  Essentially normal electrophoretic pattern.  No obvious monoclonal proteins seen.    Pathologic significance requires clinical correlation.  OSMANY Orr M.D., Ph.D.,   Pathologist ().     IEP  --  No monoclonal protein seen on immunofixation.   "Pathological significance requires clinical   correlation.     KAPPAFREELT  --  <2.83*   LAMBDAFREELT  --  1.01   KLR  --  Unable to Calculate      Recent Labs   Lab Test 01/09/24  0908 12/26/23  1522 12/07/23  1522 03/25/20  1101 09/20/16  1324   RETICABSCT  --  0.055 0.061   < >  --    RETP  --  1.2 1.3   < >  --    IRON 78  --   --   --  95   IRONSAT 25  --   --   --  26   HANNAH 61  --   --   --  56     --   --   --  362   B12  --   --   --   --  883   FOLIC  --   --   --   --  64.6   EPOE 6  --   --   --   --    LDH  --   --  192   < >  --     < > = values in this interval not displayed.     No results for input(s): \"MORPH\" in the last 54382 hours.  No results for input(s): \"HCVAB\", \"HCABC\", \"HBCAB\", \"AUSAB\", \"HIV\" in the last 54286 hours.  Recent Labs   Lab Test 02/06/24  1524 01/09/24  0908 12/07/23  1522   INR 1.19* 1.21* 1.29*   PTT  --  31  --    FIBR  --  300 413      Overall, the bone marrow is normocellular with normal myeloid to erythroid ratio, very slightly increased megakaryocytes, and normal marrow fibrosis. In addition, patient has normal level of LDH (192 U/L) and Erythropoietin (6; range 4-27 mU/mL) and absence of splenomegaly by physical exam.  In the light of the presence of  JAK2 V617F detected on the peripheral blood, these findings could be in keeping with the diagnosis of essential thrombocytocythaemia (ET).  However, only very rare megakaryocytes have deeply lobated nuclei and the overall morphology and histotopography of megakaryocytes are not typical of ET.  In addition, rare intrasinusoidal megakaryocytes are identified which could be suggestive of prefibrotic/early primary myelofibrosis (pre-PMF).       Overall, combining the history of intermittent thrombocytosis with borderline increase in platelet counts and the bone marrow morphologic findings, it appears that the disease is in the very early stage; hence, a close follow-up is warranted to monitor how this MPN with JAK2 V617F " progresses.     IMAGING  As mentioned above in HPI.    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 There is no acquired von willebrand disease. Her marrow shows if anything very early disease with only very rare features of ET (megakaryocytes with deeply lobated nuclei) and rare features of pre-fibrotic PMF (intrasinusoidal megakaryocytes). She is at high risk for thrombosis, given that she is >60 and has a JAK2 mutation, and that she probably had a TIA in July 2023.     We discussed again that there aren't great treatments for decreasing the VAF of the JAK2+ clone at this point, but that the disease can be well managed with decreased rates of thrombosis, hemorrhage, and vasomotor symptoms for many years with HU +/- ASA. In some cases, ET can progress to MF, MDS, or AML, but this can take many years, so her counts will need to be monitored regularly.    We started ASA and planned to maintain on it for history of TIA as clopidogrel was stopping. However, it appears she is getting more bleeding and the continual bleeding seems to be associated with increased symptoms of anemia. Hgb seems to be trending down as well despite decreasing the HU. Now that she has been on HU, the need for ASA may be reduced, and studies testing HU for prevention of ET-associated thrombosis were done without ASA. The HU not only decreases plt number but also decreases new plts which are more activated.     PLAN  - Discontinue ASA 81mg BID  - Continue HU 500mg daily  - Recheck labs in 2 weeks and 4 weeks  - Return by video May 30 at 4:00 PM.    We had a long discussion with the patient and  her  about the therapeutic possibilities and necessary follow up. All questions were answered to their satisfaction.    I spent 40 minutes on the date of service reviewing medical records from the referring provider, reviewing previous lab and imaging results as summarized above, obtaining and reviewing records from [facility] as summarized above, obtaining a history from the patient, performing a physical exam, counseling and educating the patient on the diagnosis and treatment, entering orders for tests, evaluating a potentially life or organ threatening problem, intensively monitoring treatments with high risk of toxicity, coordinating care, and documenting in the electronic medical record.    Thank you for allowing me to participate in the care of this patient. Please do not hesitate to contact me if there are any concerns or questions.     Amos Milner MD   of Medicine  Classical Hematology and Blood and Marrow Transplantation  Division of Hematology, Oncology, and Transplantation  [facility]

## 2024-05-02 NOTE — NURSING NOTE
Is the patient currently in the state of [place]? YES    Visit mode:VIDEO    If the visit is dropped, the patient can be reconnected by: VIDEO VISIT: Text to cell phone:   Telephone Information:   Mobile [phone]       Will anyone else be joining the visit? YES- Aguilar   (If patient encounters technical issues they should call [phone])    How would you like to obtain your AVS? MyChart    Are changes needed to the allergy or medication list? Pt stated no changes to allergies and Pt stated no med changes    Are refills needed on medications prescribed by this physician? NO    Reason for visit: ROSENDA NAIR

## 2024-05-06 NOTE — PROGRESS NOTES
[facility] Cardiology - [facility] [place]   Cardiology Clinic Note      HPI:   Ms. Debra Denise is a pleasant 83 year old female with medical history pertinent for HTN with history of hypertensive emergency, HLD, TIA, inflammatory bowel disease, and thrombocythemia. She presents to cardiology clinic for hospital follow up.    Debra presented to [facility] ED on 4/20 with severe lightheadedness and chest heaviness earlier in the day. Of note, she had undergone a sigmoidoscopy earlier that week, which caused significant rectal bleeding. High sensitivity troponin 13->11. EKG showed sinus bradycardia HR 50s, BP 80/50s.. Labs notable for hypokalemia, for which K replacement was ordered.     Of note, there has been previous documentation of coronary artery disease. Patient had CT angiogram which showed possible LAD stenosis, non-diagnostic due to artifact. She underwent coronary angiogram 8/2022 which showed no evidence of CAD.     Today in clinic, Debra is concerned about her low heart rate. She occasionally feels lightheaded at home, no syncope. She drinks about 4 cups of water daily. Of note, she has had multiple medication changes in the past several weeks (including a prednisone taper and hydroxyurea) and is unsure which may be contributing to her symptoms changes.  She denies chest pain, palpitations, syncope, or lower extremity edema.     Home BP have been 110-130/50s, HR 48-56.     PAST MEDICAL HISTORY:  Past Medical History:   Diagnosis Date    Autoimmune disease (H24) approx. 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 30 years ago    Osteopenia     Polymyalgia rheumatica (H24)     PONV (postoperative nausea and vomiting)     Proctitis     Reduced vision Sometime is a little blurry       FAMILY HISTORY:  Family History   Problem Relation Age of Onset    Cancer - colorectal Father     Lung Cancer Father     Macular  Degeneration Father     Multiple myeloma Brother     Pacemaker Brother     Hypertension Mother     Cerebrovascular Disease Mother         from misdiagnosis of temporal arteritis    Suicide Sister     Hypertension Maternal Grandmother     Cerebrovascular Disease Maternal Grandmother     Diabetes Maternal Grandmother        SOCIAL HISTORY:  Social History     Socioeconomic History    Marital status:    Tobacco Use    Smoking status: Never    Smokeless tobacco: Never    Tobacco comments:     None   Vaping Use    Vaping status: Never Used   Substance and Sexual Activity    Alcohol use: No    Drug use: No    Sexual activity: Not Currently     Partners: Male     Birth control/protection: None     Social Determinants of Health     Financial Resource Strain: Low Risk  (12/19/2023)    Financial Resource Strain     Within the past 12 months, have you or your family members you live with been unable to get utilities (heat, electricity) when it was really needed?: No   Food Insecurity: Low Risk  (12/19/2023)    Food Insecurity     Within the past 12 months, did you worry that your food would run out before you got money to buy more?: No     Within the past 12 months, did the food you bought just not last and you didn t have money to get more?: No   Transportation Needs: Low Risk  (12/19/2023)    Transportation Needs     Within the past 12 months, has lack of transportation kept you from medical appointments, getting your medicines, non-medical meetings or appointments, work, or from getting things that you need?: No   Physical Activity: Unknown (3/20/2023)    Received from [facility]    Exercise Vital Sign     Days of Exercise per Week: 0 days   Stress: No Stress Concern Present (3/20/2023)    Received from [facility]    [place] of Occupational Health - Occupational Stress Questionnaire     Feeling of Stress : Only a little   Social Connections: Socially Integrated (3/20/2023)    Received from [facility]     Social Connection and Isolation Panel [NHANES]     Frequency of Communication with Friends and Family: More than three times a week     Frequency of Social Gatherings with Friends and Family: Once a week     Attends Judaism Services: More than 4 times per year     Active Member of Clubs or Organizations: Yes     Attends Club or Organization Meetings: Never     Marital Status:    Interpersonal Safety: Low Risk  (12/26/2023)    Interpersonal Safety     Do you feel physically and emotionally safe where you currently live?: Yes     Within the past 12 months, have you been hit, slapped, kicked or otherwise physically hurt by someone?: No     Within the past 12 months, have you been humiliated or emotionally abused in other ways by your partner or ex-partner?: No   Housing Stability: Low Risk  (12/19/2023)    Housing Stability     Do you have housing? : Yes     Are you worried about losing your housing?: No       CURRENT MEDICATIONS:  Current Outpatient Medications   Medication Sig Dispense Refill    aspirin 81 MG EC tablet Take 81 mg by mouth 2 times daily      atorvastatin (LIPITOR) 10 MG tablet Take 1 tablet (10 mg) by mouth daily 90 tablet 1    Cholecalciferol (VITAMIN D3 PO) Take 2,000 Units by mouth daily       fluocinonide (LIDEX) 0.05 % external cream       gabapentin (NEURONTIN) 100 MG capsule TAKE 2 CAPSULES BY MOUTH AT BEDTIME 180 capsule 0    hydroxyurea (HYDREA) 500 MG capsule Take 1 capsule (500 mg) by mouth daily 30 capsule 4    multivitamin w/minerals (CENTRUM ADULTS) tablet Take 1 tablet by mouth daily      ondansetron (ZOFRAN ODT) 4 MG ODT tab Take 1 tablet (4 mg) by mouth every 8 hours as needed for nausea 12 tablet 1    predniSONE (DELTASONE) 10 MG tablet Take 4 tablets by mouth daily for 3 days, then 3 tablets by mouth daily for 2 days, then 2 tablets by mouth daily for 2 days, then 1 tablet by mouth for 2 days, then 1/2 tablet by mouth for 2 days*      Probiotic Product (PROBIOTIC PO) Take 1  capsule by mouth daily      sodium chloride 0.9 % SOLN 250 mL with vedolizumab 60 MG/ML SOLR 300 mg infusionn Inject 300 mg into the vein once Every 8 weeks      valsartan-hydrochlorothiazide (DIOVAN HCT) 160-25 MG tablet Take 0.5 tablets by mouth daily       No current facility-administered medications for this visit.       ROS:   Refer to HPI    EXAM:  /62 (BP Location: Right arm, Patient Position: Sitting, Cuff Size: Adult Small)   Pulse (!) 46   Wt 61.6 kg (135 lb 12.8 oz)   SpO2 99%   BMI 22.46 kg/m    GENERAL: Appears comfortable, in no acute distress.   HEENT: Eye symmetrical, no discharge or icterus bilaterally. Mucous membranes moist and without lesions.  CV: RRR, +S1S2, no murmur, rub, or gallop.   RESPIRATORY: Respirations regular, even, and unlabored. Lungs CTA throughout.   GI: Soft and non distended with normoactive bowel sounds present in all quadrants. No tenderness, rebound, guarding.   EXTREMITIES: no peripheral edema. 2+ bilateral pedal pulses.   NEUROLOGIC: Alert and oriented x 3. No focal deficits.   MUSCULOSKELETAL: No joint swelling or tenderness.   SKIN: No jaundice. No rashes or lesions.     Labs, reviewed with patient in clinic today:  CBC RESULTS:  Lab Results   Component Value Date    WBC 7.0 05/01/2024    WBC 8.2 01/28/2021    RBC 3.34 (L) 05/01/2024    RBC 4.51 01/28/2021    HGB 12.0 05/01/2024    HGB 13.8 01/28/2021    HCT 34.4 (L) 05/01/2024    HCT 42.0 01/28/2021     (H) 05/01/2024    MCV 93 01/28/2021    MCH 35.9 (H) 05/01/2024    MCH 30.6 01/28/2021    MCHC 34.9 05/01/2024    MCHC 32.9 01/28/2021    RDW 18.1 (H) 05/01/2024    RDW 12.5 01/28/2021     05/01/2024     01/28/2021       CMP RESULTS:  Lab Results   Component Value Date     05/01/2024     01/28/2021    POTASSIUM 4.0 05/01/2024    POTASSIUM 3.7 02/11/2022    POTASSIUM 3.9 01/28/2021    CHLORIDE 98 05/01/2024    CHLORIDE 108 02/11/2022    CHLORIDE 103 01/28/2021    CO2 29 05/01/2024  "   CO2 31 02/11/2022    CO2 34 (H) 01/28/2021    ANIONGAP 9 05/01/2024    ANIONGAP 1 (L) 02/11/2022    ANIONGAP 3 01/28/2021    GLC 95 05/01/2024    GLC 95 07/12/2023    GLC 90 02/11/2022    GLC 91 01/28/2021    BUN 12.2 05/01/2024    BUN 15 02/11/2022    BUN 15 01/28/2021    CR 0.81 05/01/2024    CR 0.76 01/28/2021    GFRESTIMATED 71 05/01/2024    GFRESTIMATED >60 08/10/2022    GFRESTIMATED 74 01/28/2021    GFRESTBLACK 85 01/28/2021    PRASHANTH 9.4 05/01/2024    PRASHANTH 9.2 01/28/2021    BILITOTAL 0.4 05/01/2024    BILITOTAL 0.6 01/28/2021    ALBUMIN 4.1 05/01/2024    ALBUMIN 3.6 02/11/2022    ALBUMIN 3.8 01/28/2021    ALKPHOS 69 05/01/2024    ALKPHOS 63 01/28/2021    ALT 9 05/01/2024    ALT 18 01/28/2021    AST 17 05/01/2024    AST 8 01/28/2021        INR RESULTS:  Lab Results   Component Value Date    INR 1.19 (H) 02/06/2024    INR 1.13 04/10/2020       No results found for: \"MAG\"  No results found for: \"NTBNPI\"  No results found for: \"NTBNP\"    LIPIDS:  Recent Labs   Lab Test 05/01/24  1211 01/09/24  0908   CHOL 109 114   HDL 49* 61   LDL 34 39   TRIG 128 69         EKG:    Echocardiogram:  No results found for this or any previous visit (from the past 4320 hour(s)).    Coronary Angiogram:    Assessment and Plan:   Ms. Denise is a 83 year old female with a PMH of HTN with history of hypertensive emergency, HLD, TIA, inflammatory bowel disease, and thrombocythemia.       # Sinus bradycardia  Historically HR has been 50s, patient is concerned as it now decreases to 40s and she occasionally feels lightheaded. Reassured that this is not far off her baseline and encouraged more daily water intake. Offered another wearable heart monitor, she declined for now and is interested in talking to EP.  - referral placed     # PACs/PVCs  Seen on prior EKGs and ziopatches. Patient does not feels ectopic beats.    # HTN  # H/o HoTN  Home -30/60s, recent hospitalization for hypotension BP 80/50s.  - decrease " valsartan-hydrochlorothiazide to 80-12.5mg daily, hold if SBP <100, contact clinic if SBP >140 consistently    # HLD  Most recent LDL.  - atorvastatin 10mg daily    # H/o TIA  - asa 81mg recently stopped by heme/onc due to recent GI bleed 2/2 inflammatory bowel disease and treatment for thrombocythemia.       Follow up:  as needed   Chart review time today: 10 minutes  Visit time today: 32 minutes  Total time spent today: 42 minutes        JONNY ENGLE CNP  General Cardiology   05/08/24

## 2024-05-07 NOTE — TELEPHONE ENCOUNTER
Patient has been waiting over a week for this medication. Please call to let her know when it has been approved and sent to the pharmacy. Thank you.

## 2024-05-07 NOTE — TELEPHONE ENCOUNTER
gabapentin (NEURONTIN) 100 MG      Last Written Prescription Date:  10/31/23  Last Fill Quantity: 180,   # refills: 1  Last Office Visit : 5/1/24  Future Office visit:  7/2/24  Routing refill request to provider for review/approval because:  Drug not on the refill protocol or controlled substance

## 2024-05-08 ENCOUNTER — OFFICE VISIT (OUTPATIENT)
Dept: CARDIOLOGY | Facility: CLINIC | Age: 84
End: 2024-05-08
Attending: INTERNAL MEDICINE
Payer: COMMERCIAL

## 2024-05-08 ENCOUNTER — TELEPHONE (OUTPATIENT)
Dept: CARDIOLOGY | Facility: CLINIC | Age: 84
End: 2024-05-08

## 2024-05-08 VITALS
OXYGEN SATURATION: 99 % | HEART RATE: 46 BPM | BODY MASS INDEX: 22.46 KG/M2 | DIASTOLIC BLOOD PRESSURE: 62 MMHG | SYSTOLIC BLOOD PRESSURE: 135 MMHG | WEIGHT: 135.8 LBS

## 2024-05-08 DIAGNOSIS — H93.8X3 POUNDING NOISE IN BOTH EARS: Primary | ICD-10-CM

## 2024-05-08 DIAGNOSIS — I10 BENIGN ESSENTIAL HYPERTENSION: ICD-10-CM

## 2024-05-08 DIAGNOSIS — R00.2 PALPITATIONS: ICD-10-CM

## 2024-05-08 DIAGNOSIS — R00.1 SINUS BRADYCARDIA: ICD-10-CM

## 2024-05-08 PROCEDURE — 99215 OFFICE O/P EST HI 40 MIN: CPT | Performed by: CASE MANAGER/CARE COORDINATOR

## 2024-05-08 PROCEDURE — G0463 HOSPITAL OUTPT CLINIC VISIT: HCPCS | Performed by: CASE MANAGER/CARE COORDINATOR

## 2024-05-08 PROCEDURE — 93005 ELECTROCARDIOGRAM TRACING: CPT

## 2024-05-08 PROCEDURE — 93010 ELECTROCARDIOGRAM REPORT: CPT | Performed by: INTERNAL MEDICINE

## 2024-05-08 RX ORDER — VALSARTAN AND HYDROCHLOROTHIAZIDE 160; 25 MG/1; MG/1
0.5 TABLET ORAL DAILY
COMMUNITY
Start: 2024-05-08 | End: 2024-07-02

## 2024-05-08 RX ORDER — PREDNISONE 10 MG/1
TABLET ORAL
COMMUNITY
Start: 2024-05-03 | End: 2024-06-18

## 2024-05-08 RX ORDER — GABAPENTIN 100 MG/1
CAPSULE ORAL
Qty: 180 CAPSULE | Refills: 0 | Status: SHIPPED | OUTPATIENT
Start: 2024-05-08 | End: 2024-07-02

## 2024-05-08 ASSESSMENT — PAIN SCALES - GENERAL: PAINLEVEL: NO PAIN (0)

## 2024-05-08 NOTE — LETTER
5/8/2024      RE: Debra Denise  [id number]  [place] [address]       Dear Colleague,    Thank you for the opportunity to participate in the care of your patient, Debra Denise, at the [facility] [place] [facility]. Please see a copy of my visit note below.      [facility] Cardiology - [facility] [place]   Cardiology Clinic Note      HPI:   Ms. Debra Denise is a pleasant 83 year old female with medical history pertinent for HTN with history of hypertensive emergency, HLD, TIA, inflammatory bowel disease, and thrombocythemia. She presents to cardiology clinic for hospital follow up.    Debra presented to [facility] ED on 4/20 with severe lightheadedness and chest heaviness earlier in the day. Of note, she had undergone a sigmoidoscopy earlier that week, which caused significant rectal bleeding. High sensitivity troponin 13->11. EKG showed sinus bradycardia HR 50s, BP 80/50s.. Labs notable for hypokalemia, for which K replacement was ordered.     Of note, there has been previous documentation of coronary artery disease. Patient had CT angiogram which showed possible LAD stenosis, non-diagnostic due to artifact. She underwent coronary angiogram 8/2022 which showed no evidence of CAD.     Today in clinic, Debra is concerned about her low heart rate. She occasionally feels lightheaded at home, no syncope. She drinks about 4 cups of water daily. Of note, she has had multiple medication changes in the past several weeks (including a prednisone taper and hydroxyurea) and is unsure which may be contributing to her symptoms changes.  She denies chest pain, palpitations, syncope, or lower extremity edema.     Home BP have been 110-130/50s, HR 48-56.     PAST MEDICAL HISTORY:  Past Medical History:   Diagnosis Date     Autoimmune disease (H24) approx. 2011    polymyagia rheumatica in remission for many years     Kathy  contracture     finger     Hypertension      Kidney problem Stones  approx. 30 years ago     Osteopenia      Polymyalgia rheumatica (H24)      PONV (postoperative nausea and vomiting)      Proctitis      Reduced vision Sometime is a little blurry       FAMILY HISTORY:  Family History   Problem Relation Age of Onset     Cancer - colorectal Father      Lung Cancer Father      Macular Degeneration Father      Multiple myeloma Brother      Pacemaker Brother      Hypertension Mother      Cerebrovascular Disease Mother         from misdiagnosis of temporal arteritis     Suicide Sister      Hypertension Maternal Grandmother      Cerebrovascular Disease Maternal Grandmother      Diabetes Maternal Grandmother        SOCIAL HISTORY:  Social History     Socioeconomic History     Marital status:    Tobacco Use     Smoking status: Never     Smokeless tobacco: Never     Tobacco comments:     None   Vaping Use     Vaping status: Never Used   Substance and Sexual Activity     Alcohol use: No     Drug use: No     Sexual activity: Not Currently     Partners: Male     Birth control/protection: None     Social Determinants of Health     Financial Resource Strain: Low Risk  (12/19/2023)    Financial Resource Strain      Within the past 12 months, have you or your family members you live with been unable to get utilities (heat, electricity) when it was really needed?: No   Food Insecurity: Low Risk  (12/19/2023)    Food Insecurity      Within the past 12 months, did you worry that your food would run out before you got money to buy more?: No      Within the past 12 months, did the food you bought just not last and you didn t have money to get more?: No   Transportation Needs: Low Risk  (12/19/2023)    Transportation Needs      Within the past 12 months, has lack of transportation kept you from medical appointments, getting your medicines, non-medical meetings or appointments, work, or from getting things that you need?: No    Physical Activity: Unknown (3/20/2023)    Received from [facility]    Exercise Vital Sign      Days of Exercise per Week: 0 days   Stress: No Stress Concern Present (3/20/2023)    Received from [facility]    [place] of Occupational Health - Occupational Stress Questionnaire      Feeling of Stress : Only a little   Social Connections: Socially Integrated (3/20/2023)    Received from [facility]    Social Connection and Isolation Panel [NHANES]      Frequency of Communication with Friends and Family: More than three times a week      Frequency of Social Gatherings with Friends and Family: Once a week      Attends Confucianism Services: More than 4 times per year      Active Member of Clubs or Organizations: Yes      Attends Club or Organization Meetings: Never      Marital Status:    Interpersonal Safety: Low Risk  (12/26/2023)    Interpersonal Safety      Do you feel physically and emotionally safe where you currently live?: Yes      Within the past 12 months, have you been hit, slapped, kicked or otherwise physically hurt by someone?: No      Within the past 12 months, have you been humiliated or emotionally abused in other ways by your partner or ex-partner?: No   Housing Stability: Low Risk  (12/19/2023)    Housing Stability      Do you have housing? : Yes      Are you worried about losing your housing?: No       CURRENT MEDICATIONS:  Current Outpatient Medications   Medication Sig Dispense Refill     aspirin 81 MG EC tablet Take 81 mg by mouth 2 times daily       atorvastatin (LIPITOR) 10 MG tablet Take 1 tablet (10 mg) by mouth daily 90 tablet 1     Cholecalciferol (VITAMIN D3 PO) Take 2,000 Units by mouth daily        fluocinonide (LIDEX) 0.05 % external cream        gabapentin (NEURONTIN) 100 MG capsule TAKE 2 CAPSULES BY MOUTH AT BEDTIME 180 capsule 0     hydroxyurea (HYDREA) 500 MG capsule Take 1 capsule (500 mg) by mouth daily 30 capsule 4     multivitamin w/minerals (CENTRUM ADULTS)  tablet Take 1 tablet by mouth daily       ondansetron (ZOFRAN ODT) 4 MG ODT tab Take 1 tablet (4 mg) by mouth every 8 hours as needed for nausea 12 tablet 1     predniSONE (DELTASONE) 10 MG tablet Take 4 tablets by mouth daily for 3 days, then 3 tablets by mouth daily for 2 days, then 2 tablets by mouth daily for 2 days, then 1 tablet by mouth for 2 days, then 1/2 tablet by mouth for 2 days*       Probiotic Product (PROBIOTIC PO) Take 1 capsule by mouth daily       sodium chloride 0.9 % SOLN 250 mL with vedolizumab 60 MG/ML SOLR 300 mg infusionn Inject 300 mg into the vein once Every 8 weeks       valsartan-hydrochlorothiazide (DIOVAN HCT) 160-25 MG tablet Take 0.5 tablets by mouth daily       No current facility-administered medications for this visit.       ROS:   Refer to HPI    EXAM:  /62 (BP Location: Right arm, Patient Position: Sitting, Cuff Size: Adult Small)   Pulse (!) 46   Wt 61.6 kg (135 lb 12.8 oz)   SpO2 99%   BMI 22.46 kg/m    GENERAL: Appears comfortable, in no acute distress.   HEENT: Eye symmetrical, no discharge or icterus bilaterally. Mucous membranes moist and without lesions.  CV: RRR, +S1S2, no murmur, rub, or gallop.   RESPIRATORY: Respirations regular, even, and unlabored. Lungs CTA throughout.   GI: Soft and non distended with normoactive bowel sounds present in all quadrants. No tenderness, rebound, guarding.   EXTREMITIES: no peripheral edema. 2+ bilateral pedal pulses.   NEUROLOGIC: Alert and oriented x 3. No focal deficits.   MUSCULOSKELETAL: No joint swelling or tenderness.   SKIN: No jaundice. No rashes or lesions.     Labs, reviewed with patient in clinic today:  CBC RESULTS:  Lab Results   Component Value Date    WBC 7.0 05/01/2024    WBC 8.2 01/28/2021    RBC 3.34 (L) 05/01/2024    RBC 4.51 01/28/2021    HGB 12.0 05/01/2024    HGB 13.8 01/28/2021    HCT 34.4 (L) 05/01/2024    HCT 42.0 01/28/2021     (H) 05/01/2024    MCV 93 01/28/2021    MCH 35.9 (H) 05/01/2024     "MCH 30.6 01/28/2021    MCHC 34.9 05/01/2024    MCHC 32.9 01/28/2021    RDW 18.1 (H) 05/01/2024    RDW 12.5 01/28/2021     05/01/2024     01/28/2021       CMP RESULTS:  Lab Results   Component Value Date     05/01/2024     01/28/2021    POTASSIUM 4.0 05/01/2024    POTASSIUM 3.7 02/11/2022    POTASSIUM 3.9 01/28/2021    CHLORIDE 98 05/01/2024    CHLORIDE 108 02/11/2022    CHLORIDE 103 01/28/2021    CO2 29 05/01/2024    CO2 31 02/11/2022    CO2 34 (H) 01/28/2021    ANIONGAP 9 05/01/2024    ANIONGAP 1 (L) 02/11/2022    ANIONGAP 3 01/28/2021    GLC 95 05/01/2024    GLC 95 07/12/2023    GLC 90 02/11/2022    GLC 91 01/28/2021    BUN 12.2 05/01/2024    BUN 15 02/11/2022    BUN 15 01/28/2021    CR 0.81 05/01/2024    CR 0.76 01/28/2021    GFRESTIMATED 71 05/01/2024    GFRESTIMATED >60 08/10/2022    GFRESTIMATED 74 01/28/2021    GFRESTBLACK 85 01/28/2021    PRASHANTH 9.4 05/01/2024    PRASHANTH 9.2 01/28/2021    BILITOTAL 0.4 05/01/2024    BILITOTAL 0.6 01/28/2021    ALBUMIN 4.1 05/01/2024    ALBUMIN 3.6 02/11/2022    ALBUMIN 3.8 01/28/2021    ALKPHOS 69 05/01/2024    ALKPHOS 63 01/28/2021    ALT 9 05/01/2024    ALT 18 01/28/2021    AST 17 05/01/2024    AST 8 01/28/2021        INR RESULTS:  Lab Results   Component Value Date    INR 1.19 (H) 02/06/2024    INR 1.13 04/10/2020       No results found for: \"MAG\"  No results found for: \"NTBNPI\"  No results found for: \"NTBNP\"    LIPIDS:  Recent Labs   Lab Test 05/01/24  1211 01/09/24  0908   CHOL 109 114   HDL 49* 61   LDL 34 39   TRIG 128 69         EKG:    Echocardiogram:  No results found for this or any previous visit (from the past 4320 hour(s)).    Coronary Angiogram:    Assessment and Plan:   Ms. Denise is a 83 year old female with a PMH of HTN with history of hypertensive emergency, HLD, TIA, inflammatory bowel disease, and thrombocythemia.       # Sinus bradycardia  Historically HR has been 50s, patient is concerned as it now decreases to 40s and she " occasionally feels lightheaded. Reassured that this is not far off her baseline and encouraged more daily water intake. Offered another wearable heart monitor, she declined for now and is interested in talking to EP.  - referral placed     # PACs/PVCs  Seen on prior EKGs and ziopatches. Patient does not feels ectopic beats.    # HTN  # H/o HoTN  Home -30/60s, recent hospitalization for hypotension BP 80/50s.  - decrease valsartan-hydrochlorothiazide to 80-12.5mg daily, hold if SBP <100, contact clinic if SBP >140 consistently    # HLD  Most recent LDL.  - atorvastatin 10mg daily    # H/o TIA  - asa 81mg recently stopped by heme/onc due to recent GI bleed 2/2 inflammatory bowel disease and treatment for thrombocythemia.       Follow up:  as needed   Chart review time today: 10 minutes  Visit time today: 32 minutes  Total time spent today: 42 minutes        JONNY ENGLE CNP  General Cardiology   05/08/24              Please do not hesitate to contact me if you have any questions/concerns.     Sincerely,     JONNY ENGLE CNP

## 2024-05-08 NOTE — TELEPHONE ENCOUNTER
5/8/2024 5:37PM Shoshana Gallagher  Patient confirmed rescheduled appointment:  Date: 6/17/2024  Time: 11AM  Visit type: New EP  Provider: Dr. Yoon  Location: [facility] [place], [address] [place] [id number]  Testing/imaging: NA  Additional notes: 5/8 Rescheduled New EP w/ Dr. Yoon 6/17. DAYANARA Gallagher 5/8/2024 5:37PM        5/8/2024 5:20PM Shoshana Gallagher  Patient confirmed scheduled appointment:  Date: 6/7/2024  Time: 10AM  Visit type: New EP  Provider: Dr. Hammer  Location: [address] [address]  Testing/imaging: NA  Additional notes: 5/8 Scheduled New EP w/ Dr. Hammer 6/7. DAYANARA Gallagher 5/8/2024 5:20PM

## 2024-05-08 NOTE — NURSING NOTE
Chief Complaint   Patient presents with    Follow Up     self referred, notes pounding in her ears       Vitals were taken, medications reconciled, and EKG was performed.    Keiry Olivarez CNA  10:55 AM

## 2024-05-09 ENCOUNTER — TELEPHONE (OUTPATIENT)
Dept: CARDIOLOGY | Facility: CLINIC | Age: 84
End: 2024-05-09
Payer: COMMERCIAL

## 2024-05-09 LAB
ATRIAL RATE - MUSE: 47 BPM
DIASTOLIC BLOOD PRESSURE - MUSE: NORMAL MMHG
INTERPRETATION ECG - MUSE: NORMAL
P AXIS - MUSE: 54 DEGREES
PR INTERVAL - MUSE: 138 MS
QRS DURATION - MUSE: 84 MS
QT - MUSE: 476 MS
QTC - MUSE: 421 MS
R AXIS - MUSE: 11 DEGREES
SYSTOLIC BLOOD PRESSURE - MUSE: NORMAL MMHG
T AXIS - MUSE: 54 DEGREES
VENTRICULAR RATE- MUSE: 47 BPM

## 2024-05-09 NOTE — TELEPHONE ENCOUNTER
S-(situation): patient had a cardiology visit yesterday and was directed to take only 1/2 her Diovan HCT. She did so this morning and at noon took her BP. It was 151/65 so she called.    B-(background): Ms. Debra Denise is a pleasant 83 year old female with medical history pertinent for HTN with history of hypertensive emergency, HLD, TIA, inflammatory bowel disease, and thrombocythemia. She presents to cardiology clinic for hospital follow up.     Debra presented to [facility] ED on 4/20 with severe lightheadedness and chest heaviness earlier in the day. Of note, she had undergone a sigmoidoscopy earlier that week, which caused significant rectal bleeding. High sensitivity troponin 13->11. EKG showed sinus bradycardia HR 50s, BP 80/50s.. Labs notable for hypokalemia, for which K replacement was ordered.      Of note, there has been previous documentation of coronary artery disease. Patient had CT angiogram which showed possible LAD stenosis, non-diagnostic due to artifact. She underwent coronary angiogram 8/2022 which showed no evidence of CAD.     A-(assessment): anxious    R-(recommendations): Let Debra know that I will call her in one week to get a log of her BPs while on 1/2 dose Diovan hematocrit. If consistently over 140 will discuss with Gena Shi NP.  Asked her to call with a parameter for systolic BP of 180 or greater.

## 2024-05-09 NOTE — TELEPHONE ENCOUNTER
[facility] Call [facility]    Phone Message    May a detailed message be left on voicemail: yes     Reason for Call: Per pt took 1 half of blood pressure tablet at 8:30am now at 11:30 blood pressure is rising to 151/65, wants to know if she should take the other half and just continue on with full tablets, please reach out to further discuss.    Action Taken: Other: Cardiology    Travel Screening: Not Applicable    Thank you!  [facility]

## 2024-05-14 ENCOUNTER — LAB (OUTPATIENT)
Dept: LAB | Facility: CLINIC | Age: 84
End: 2024-05-14
Payer: COMMERCIAL

## 2024-05-14 DIAGNOSIS — D47.3 ESSENTIAL THROMBOCYTHEMIA (H): ICD-10-CM

## 2024-05-14 LAB
BASOPHILS # BLD AUTO: 0 10E3/UL (ref 0–0.2)
BASOPHILS NFR BLD AUTO: 0 %
EOSINOPHIL # BLD AUTO: 0.1 10E3/UL (ref 0–0.7)
EOSINOPHIL NFR BLD AUTO: 1 %
ERYTHROCYTE [DISTWIDTH] IN BLOOD BY AUTOMATED COUNT: 17.4 % (ref 10–15)
HCT VFR BLD AUTO: 39.6 % (ref 35–47)
HGB BLD-MCNC: 13.1 G/DL (ref 11.7–15.7)
IMM GRANULOCYTES # BLD: 0.1 10E3/UL
IMM GRANULOCYTES NFR BLD: 1 %
LYMPHOCYTES # BLD AUTO: 2.2 10E3/UL (ref 0.8–5.3)
LYMPHOCYTES NFR BLD AUTO: 21 %
MCH RBC QN AUTO: 36.5 PG (ref 26.5–33)
MCHC RBC AUTO-ENTMCNC: 33.1 G/DL (ref 31.5–36.5)
MCV RBC AUTO: 110 FL (ref 78–100)
MONOCYTES # BLD AUTO: 0.9 10E3/UL (ref 0–1.3)
MONOCYTES NFR BLD AUTO: 9 %
NEUTROPHILS # BLD AUTO: 7.2 10E3/UL (ref 1.6–8.3)
NEUTROPHILS NFR BLD AUTO: 69 %
PLATELET # BLD AUTO: 250 10E3/UL (ref 150–450)
RBC # BLD AUTO: 3.59 10E6/UL (ref 3.8–5.2)
WBC # BLD AUTO: 10.6 10E3/UL (ref 4–11)

## 2024-05-14 PROCEDURE — 85025 COMPLETE CBC W/AUTO DIFF WBC: CPT

## 2024-05-14 PROCEDURE — 80053 COMPREHEN METABOLIC PANEL: CPT

## 2024-05-14 PROCEDURE — 36415 COLL VENOUS BLD VENIPUNCTURE: CPT

## 2024-05-15 LAB
ALBUMIN SERPL BCG-MCNC: 4.3 G/DL (ref 3.5–5.2)
ALP SERPL-CCNC: 65 U/L (ref 40–150)
ALT SERPL W P-5'-P-CCNC: 23 U/L (ref 0–50)
ANION GAP SERPL CALCULATED.3IONS-SCNC: 8 MMOL/L (ref 7–15)
AST SERPL W P-5'-P-CCNC: 17 U/L (ref 0–45)
BILIRUB SERPL-MCNC: 0.5 MG/DL
BUN SERPL-MCNC: 17.9 MG/DL (ref 8–23)
CALCIUM SERPL-MCNC: 9.3 MG/DL (ref 8.8–10.2)
CHLORIDE SERPL-SCNC: 97 MMOL/L (ref 98–107)
CREAT SERPL-MCNC: 0.89 MG/DL (ref 0.51–0.95)
DEPRECATED HCO3 PLAS-SCNC: 31 MMOL/L (ref 22–29)
EGFRCR SERPLBLD CKD-EPI 2021: 64 ML/MIN/1.73M2
GLUCOSE SERPL-MCNC: 135 MG/DL (ref 70–99)
POTASSIUM SERPL-SCNC: 4.2 MMOL/L (ref 3.4–5.3)
PROT SERPL-MCNC: 6.7 G/DL (ref 6.4–8.3)
SODIUM SERPL-SCNC: 136 MMOL/L (ref 135–145)

## 2024-05-20 ENCOUNTER — TRANSFERRED RECORDS (OUTPATIENT)
Dept: HEALTH INFORMATION MANAGEMENT | Facility: CLINIC | Age: 84
End: 2024-05-20
Payer: COMMERCIAL

## 2024-05-21 ENCOUNTER — TELEPHONE (OUTPATIENT)
Dept: CARDIOLOGY | Facility: CLINIC | Age: 84
End: 2024-05-21
Payer: COMMERCIAL

## 2024-05-21 DIAGNOSIS — I10 HTN (HYPERTENSION): Primary | ICD-10-CM

## 2024-05-21 NOTE — TELEPHONE ENCOUNTER
[facility] Call [facility]    Phone Message    May a detailed message be left on voicemail: yes     Reason for Call: Other: pt is calling to give update on medication change and has information she was requested to record team. Please call pt back to discuss.      Action Taken: Other: cardiology     Travel Screening: Not Applicable                                                              Thank you!  [facility]

## 2024-05-22 RX ORDER — HYDROCHLOROTHIAZIDE 12.5 MG/1
12.5 CAPSULE ORAL DAILY
Qty: 30 CAPSULE | Refills: 0 | Status: SHIPPED | OUTPATIENT
Start: 2024-05-22 | End: 2024-06-18

## 2024-05-22 NOTE — CONFIDENTIAL NOTE
Blood pressures never got over 135  Pulse 49 -62    Ankle swelling noticeable as soon as she started cutting her Valsartan/hydrochlorothiazide in half.    Will add hydrochlorothiazide 12.5 mg tablet to her medication regimen, call In 2 weeks and see if that helped with the swelling.  She also made mention that her calves burn at times. She notices it when she wakes up and occasionally when walking.

## 2024-06-05 ENCOUNTER — TELEPHONE (OUTPATIENT)
Dept: CARDIOLOGY | Facility: CLINIC | Age: 84
End: 2024-06-05
Payer: COMMERCIAL

## 2024-06-05 NOTE — CONFIDENTIAL NOTE
S-(situation): patient states that she has a mild case of COVID. We had added 12.5 hydrochlorothiazide.  Pulse 50-60. BPs over the last 2 weeks 149/71, 135/67.,121/78, 118/78  Ankle swelling is less.     B-(background): Ms. Debra Denise is a pleasant 83 year old female with medical history pertinent for HTN with history of hypertensive emergency, HLD, TIA, inflammatory bowel disease, and thrombocythemia. She presents to cardiology clinic for hospital follow up.     Debra presented to [facility] ED on 4/20 with severe lightheadedness and chest heaviness earlier in the day. Of note, she had undergone a sigmoidoscopy earlier that week, which caused significant rectal bleeding. High sensitivity troponin 13->11. EKG showed sinus bradycardia HR 50s, BP 80/50s.. Labs notable for hypokalemia, for which K replacement was ordered.      Of note, there has been previous documentation of coronary artery disease. Patient had CT angiogram which showed possible LAD stenosis, non-diagnostic due to artifact. She underwent coronary angiogram 8/2022 which showed no evidence of CAD.      Today in clinic, Debra is concerned about her low heart rate. She occasionally feels lightheaded at home, no syncope. She drinks about 4 cups of water daily. Of note, she has had multiple medication changes in the past several weeks (including a prednisone taper and hydroxyurea) and is unsure which may be contributing to her symptoms changes.  She denies chest pain, palpitations, syncope, or lower extremity edema.     A-(assessment): ankle swelling a little better    R-(recommendations): continue medical therapy

## 2024-06-10 NOTE — PROGRESS NOTES
09/28/23 0500   Appointment Info   Signing clinician's name / credentials Rogerio Patricio DPT   Visits Used 3/10 [employer] Medicare Adv   Medical Diagnosis Dizziness (R42)   PT Tx Diagnosis Dizziness with Impaired Balance   Quick Adds Certification   Progress Note/Certification   Start of Care Date 08/22/23   Onset of illness/injury or Date of Surgery 08/08/23  (date of referral used, onset vague x years)   Therapy Frequency e/o wk   Predicted Duration up to 90 days   Certification date from 08/22/23   Certification date to 11/19/23   Progress Note Due Date 11/19/23   PT Goal 1   Goal Identifier DVA   Goal Description Patient will demonstrate <4 line degredation with DVA testing indicating improvement in function/compensation of vestibular function necessary for improved tolerance with dynamic activities and reduced dizziness symptoms.   Rationale to maximize safety and independence with performance of ADLs and functional tasks;to maximize safety and independence within the home;to maximize safety and independence within the community   Goal Progress Baseline 5-line degredation.  9/28/23: 2Hz DVA testing 3-line loss. Showing improvement from testing at eval (norms 1-2 line loss).   Target Date 11/19/23   Date Met 09/28/23   PT Goal 2   Goal Identifier DHI   Goal Description Patient will score <10/100 on the DHI indicating significant improvement in dizziness symptoms and improved QOL, safety, and tolerance with daily activities.   Rationale to maximize safety and independence with performance of ADLs and functional tasks   Goal Progress Baseline score 22/100. 9/28/23: DHI score 20/100 indicating mild sense of dizziness related disability, no signfiicant change from eval testing.   Target Date 11/19/23   PT Goal 3   Goal Identifier Balance   Goal Description Patient will score 20/24 on the DGI or 23/30 or greater on the FGA indicating improvement in dynamic gait stability and reduced risk for falling with home and  community mobility needs.   Rationale to maximize safety and independence with performance of ADLs and functional tasks;to maximize safety and independence within the home;to maximize safety and independence within the community   Goal Progress 4-item DGI score 10/12 indicating mild impairment..  9/28/23: DGI score 23/24 indication normalization of gait stability and function, low fall risk.   Target Date 11/19/23   Date Met 09/28/23   PT Goal 4   Goal Identifier HEP   Goal Description Patient to demonstrate independent carryover of longterm HEP to manage condition and prevent further decline in functional status.   Rationale to maximize safety and independence with performance of ADLs and functional tasks;to maximize safety and independence within the home;to maximize safety and independence within the community   Target Date 11/19/23   Date Met 09/28/23   Goal Progress Goal met/in progress 9/28/23 - patient demonstrating independent carryover and understanding of HEP to manage condition. Symptoms improving.   Subjective Report   Subjective Report Debra reports mild sense of lightheadedness and nausea this morning, now feeling better, felt somewhat dizzy when laying down in bed.  Has been working on her HEP and enjoys walking and staying busy daily.  Has further audiogram and VNG testing late October.  Up to ~210 bpm with VOR x 1 training at home, tolerating well.   Neuromuscular Re-education   Neuromuscular re-ed of mvmt, balance, coord, kinesthetic sense, posture, proprioception minutes (20486) 30   Neuro Re-ed 1 Education Sheet General   Neuro Re-ed 1 - Details Reviewed longterm progression toward activity such as daily walking recreational activity/exercise balance and hydration to manage chronic balance/vestibular condition.   PTRx Neuro Re-ed 1 Gaze Stabilization x1 Semi-Tandem Busy Background   PTRx Neuro Re-ed 1 - Details Progression of standing VOR training: partial tandem busy background with window  blinds. Adding active vergence using letter card.  1:00-2:00 durations.  Educated on progression of speeds as tolerated and time up to 2:00 durations with rest breaks.   PTRx Neuro Re-ed 2 Gaze Stabilization x1 Walking Blank Background   PTRx Neuro Re-ed 2 - Details Dynamic gaze stab training: walking VOR x 1 in moderately busy clinic spaces holding letter stick. Then forward/backward walking VOR x 1 with target on wall busy backgrounds.   PTRx Neuro Re-ed 3 Walking With Head Turns Left to Right   PTRx Neuro Re-ed 3 - Details Walking with head turns and head pitching advised advancing gait speed to normal pattern and performing quick head turns while maintaining gait path/speed. Incorporate within daily walks/hallway walking as able.   Skilled Intervention cues/demo, monitoring tolerance, education on proper HEP carryover and POC   Patient Response/Progress goals 1-4 met/in progress. Has shown progress toward functonal status and goals, improving symptoms. Tolerating HEP well with likely underlying longterm vestibulopathy. Pending audiogram and VNG, anticipate ability to continue managing condition with established HEP. Patient will reach out to reinitiate care if needed or condition worsens/changes.   Physical Performance Test/measures   Physical Performance Test/Measurement, Minutes (93402) 12   Physical Performance Test/Measurement Details DGI score of 23/24 indicates low fall risk and normalization of dynamic gait stability. Mild underlying instability with horizontal head turns, although mild, and is consistent with chronic vestibular hypofunction. Stable gait and balance status currently, will continue with established HEP and has scheduled testing with audiology and VNG.   Skilled Intervention DGI and interpretation of findings, patient education   Patient Response/Progress tolerating well, feels 'normal' today.  Balance and dizziness condition have shown evidence of improvement.   Progress DGI goal met,  stable condition with ongoing improvement in symptoms   Education   Learner/Method Patient;Demonstration;Pictures/Video;No Barriers to Learning;Listening   Education Comments DGI testing, DVA testing, HEP review, dc/POC   Plan   Home program see PTRX - VOR training, dynamic gait activities   Plan for next session dc if doing well pending audiogram and VNG testing   Total Session Time   Timed Code Treatment Minutes 42   Total Treatment Time (sum of timed and untimed services) 42         DISCHARGE  Reason for Discharge: Patient chooses to discontinue therapy.  Patient has failed to schedule further appointments.    Equipment Issued: none    Discharge Plan: Patient to continue home program.    Referring Provider:  Rick L Nissen

## 2024-06-13 ENCOUNTER — TELEPHONE (OUTPATIENT)
Dept: CARDIOLOGY | Facility: CLINIC | Age: 84
End: 2024-06-13
Payer: COMMERCIAL

## 2024-06-13 DIAGNOSIS — I10 HTN (HYPERTENSION): ICD-10-CM

## 2024-06-14 ENCOUNTER — TELEPHONE (OUTPATIENT)
Dept: INTERNAL MEDICINE | Facility: CLINIC | Age: 84
End: 2024-06-14
Payer: COMMERCIAL

## 2024-06-14 DIAGNOSIS — G89.29 CHRONIC BACK PAIN: Primary | ICD-10-CM

## 2024-06-14 DIAGNOSIS — M54.9 CHRONIC BACK PAIN: Primary | ICD-10-CM

## 2024-06-14 DIAGNOSIS — M54.2 NECK PAIN: ICD-10-CM

## 2024-06-14 NOTE — TELEPHONE ENCOUNTER
[facility] Call [facility]    Phone Message    May a detailed message be left on voicemail: yes     Reason for Call: Other: Needs a new referral for some more Physical Therapy, [facility] Fax: [phone]     Action Taken: Message routed to:  [facility] JOSELITO    Travel Screening: Not Applicable     Date of Service:

## 2024-06-14 NOTE — TELEPHONE ENCOUNTER
Signed PT order    RUTH Coates    Called patient. Confirm need renewal for PT as not gone to PT for several months. PT for her whole back and neck.  Send to [facility], operated by [facility].  Pt notified provider out of clinic today, so referral won't be send around Monday.        PT referral request sent to Dr. Zimmerman to review.        Reinaldo Newell CMA ([facility]) at 10:06 AM on 6/14/2024

## 2024-06-17 NOTE — TELEPHONE ENCOUNTER
External PT referral approved and faxed to [facility] at fax [phone]         Reinaldo Newell CMA ([facility]) at 9:44 AM on 6/17/2024

## 2024-06-17 NOTE — TELEPHONE ENCOUNTER
RECORDS RECEIVED FROM:    DATE RECEIVED:    NOTES STATUS DETAILS   OFFICE NOTE from referring provider  Internal ESTEBAN Shi 5-9-24   OFFICE NOTE from other cardiologists  Internal ESTEBAN Shi 5-9-24   RECORDS from hospital/ED Care Everywhere 4-20-14   MEDICATION LIST Internal    GENERAL CARDIO RECORDS   (ALL APPOINTMENT TYPES)     LABS (CBC,BMP,CMP, TSH) Internal    EKG (STRIPS & REPORTS) Internal 5-8-24   MONITORS (STRIPS & REPORTS) Internal 7-12-23   ECHOS (IMAGES AND REPORTS) Internal 7-12-23   STRESS TESTS (IMAGES AND REPORTS) N/A    cMRI (IMAGES AND REPORTS) N/A    Cardiac cath (IMAGES AND REPORTS) Internal 8-30-22   CT/CTA (IMAGES AND REPORTS) N/A    NEW EP     ICD/PACEMAKER IMPLANT No    CARDIOVERSION N/A    TILT TABLE STUDIES N/A

## 2024-06-18 ENCOUNTER — OFFICE VISIT (OUTPATIENT)
Dept: CARDIOLOGY | Facility: CLINIC | Age: 84
End: 2024-06-18
Attending: INTERNAL MEDICINE
Payer: COMMERCIAL

## 2024-06-18 ENCOUNTER — PRE VISIT (OUTPATIENT)
Dept: CARDIOLOGY | Facility: CLINIC | Age: 84
End: 2024-06-18
Payer: COMMERCIAL

## 2024-06-18 VITALS
WEIGHT: 135 LBS | HEART RATE: 60 BPM | DIASTOLIC BLOOD PRESSURE: 70 MMHG | OXYGEN SATURATION: 97 % | SYSTOLIC BLOOD PRESSURE: 151 MMHG | BODY MASS INDEX: 22.33 KG/M2

## 2024-06-18 DIAGNOSIS — R00.1 SINUS BRADYCARDIA: ICD-10-CM

## 2024-06-18 PROCEDURE — G0463 HOSPITAL OUTPT CLINIC VISIT: HCPCS | Performed by: INTERNAL MEDICINE

## 2024-06-18 PROCEDURE — 93005 ELECTROCARDIOGRAM TRACING: CPT

## 2024-06-18 PROCEDURE — 93010 ELECTROCARDIOGRAM REPORT: CPT | Performed by: INTERNAL MEDICINE

## 2024-06-18 PROCEDURE — 99215 OFFICE O/P EST HI 40 MIN: CPT | Performed by: INTERNAL MEDICINE

## 2024-06-18 RX ORDER — HYDROCHLOROTHIAZIDE 12.5 MG/1
12.5 CAPSULE ORAL DAILY
Qty: 30 CAPSULE | Refills: 1 | Status: SHIPPED | OUTPATIENT
Start: 2024-06-18 | End: 2024-07-02

## 2024-06-18 ASSESSMENT — PAIN SCALES - GENERAL: PAINLEVEL: NO PAIN (0)

## 2024-06-18 NOTE — PROGRESS NOTES
HPI:   Is a very pleasant 83-year-old woman who is being seen today for assessment of a history of slow heart beating and intermittent episode of low blood pressure which she detected herself.  Subsequently her heart rates have typically been in the 50s and 60s with normal blood pressures.    Debra noted 1 episode of blood pressures in the 90s with some dizziness but for most of the recent weeks her blood pressures have been in a more normal range of around 130.  The low blood pressure was likely associated with hydration issue rather than with problems related to her antihypertensive medication since it was transient in nature and has spontaneously resolved.    At the present time patient has no cardiovascular complaints.  She did does not exhibit any exertional chest discomfort or excessive breathlessness.  She does note a pulsation in her ears when she lies against pillow but this is nothing that is particularly concerning to her.  Overall she has been feeling quite well.      Debra does have a history of ulcerative colitis which may periodically resulted in fluid loss and hypotensive episodes.  I alerted her to this problem.  She also is followed by internal medicine for her blood pressure although patient measurements at home have apparently been in the normal range.  I encouraged her to continue to maintain her follow-up with     At today's visit I noted that her blood pressure was somewhat elevated (155/70) but this seems to be an outlier given her recent multiple blood pressure measurements at home.      I reviewed patient's echocardiogram from last year (7/23).  No specific concerns were raised by that study.      Physical examination revealed healthy-appearing older woman who was well-spoken and did not exhibit any abnormal cardiac or pulmonary findings.      ECHO 7/23  Procedure  Complete Portable Bubble Echo Adult. Optison (NDC #[id number]) given  intravenously.  regino     The visual  ejection fraction is 60-65%.  The right ventricle is normal in size and function.  No hemodynamically significant valvular aortic stenosis.  The inferior vena cava was normal in size with preserved respiratory  variability.  A contrast injection (Bubble Study) was performed that was negative for flow  across the interatrial septum.    Left Ventricle  The left ventricle is normal in size. There is normal left ventricular wall  thickness. There is concentric remodeling present. The visual ejection  fraction is 60-65%. Diastolic Doppler findings (E/E' ratio and/or other  parameters) suggest left ventricular filling pressures are indeterminate. No  regional wall motion abnormalities noted.     Right Ventricle  The right ventricle is normal in size and function.     Atria  Normal left atrial size. Right atrial size is normal. A contrast injection  (Bubble Study) was performed that was negative for flow across the interatrial  septum.     Mitral Valve  The mitral valve is normal in structure and function. There is physiologic  mitral regurgitation. There is no mitral valve stenosis.        PAST MEDICAL HISTORY:  Past Medical History:   Diagnosis Date    Autoimmune disease (H24) approx. 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 30 years ago    Osteopenia     Polymyalgia rheumatica (H24)     PONV (postoperative nausea and vomiting)     Proctitis     Reduced vision Sometime is a little blurry       CURRENT MEDICATIONS:  Current Outpatient Medications   Medication Sig Dispense Refill    atorvastatin (LIPITOR) 10 MG tablet Take 1 tablet (10 mg) by mouth daily 90 tablet 1    Cholecalciferol (VITAMIN D3 PO) Take 2,000 Units by mouth daily       fluocinonide (LIDEX) 0.05 % external cream       gabapentin (NEURONTIN) 100 MG capsule TAKE 2 CAPSULES BY MOUTH AT BEDTIME 180 capsule 0    hydrochlorothiazide (MICROZIDE) 12.5 MG  capsule Take 1 capsule (12.5 mg) by mouth daily 30 capsule 0    hydroxyurea (HYDREA) 500 MG capsule Take 1 capsule (500 mg) by mouth daily 30 capsule 4    multivitamin w/minerals (CENTRUM ADULTS) tablet Take 1 tablet by mouth daily      Probiotic Product (PROBIOTIC PO) Take 1 capsule by mouth daily      sodium chloride 0.9 % SOLN 250 mL with vedolizumab 60 MG/ML SOLR 300 mg infusionn Inject 300 mg into the vein once Every 8 weeks      valsartan-hydrochlorothiazide (DIOVAN HCT) 160-25 MG tablet Take 0.5 tablets by mouth daily      aspirin 81 MG EC tablet Take 81 mg by mouth 2 times daily      ondansetron (ZOFRAN ODT) 4 MG ODT tab Take 1 tablet (4 mg) by mouth every 8 hours as needed for nausea (Patient not taking: Reported on 2024) 12 tablet 1    predniSONE (DELTASONE) 10 MG tablet Take 4 tablets by mouth daily for 3 days, then 3 tablets by mouth daily for 2 days, then 2 tablets by mouth daily for 2 days, then 1 tablet by mouth for 2 days, then 1/2 tablet by mouth for 2 days*         PAST SURGICAL HISTORY:  Past Surgical History:   Procedure Laterality Date    COLONOSCOPY  2014    Procedure: COMBINED COLONOSCOPY, SINGLE BIOPSY/POLYPECTOMY BY BIOPSY;  COLONOSCOPY;  Surgeon: Carol Ann Plasencia MD;  Location: [facility]    CV CORONARY ANGIOGRAM N/A 2022    Procedure: Coronary Angiogram;  Surgeon: Trevin Hawk MD;  Location: [facility] CARDIAC CATH LAB    ENT SURGERY      tonsilectomy, adenoidectomy    ESOPHAGOSCOPY, GASTROSCOPY, DUODENOSCOPY (EGD), COMBINED N/A 2023    Procedure: ESOPHAGOGASTRODUODENOSCOPY, WITH BIOPSY;  Surgeon: Angel Lara MD;  Location:  GI    GYN SURGERY  ,     x 2    HYSTERECTOMY      ORTHOPEDIC SURGERY  2004    (R) shoulder surgery for frozen shoulder    ZAC BSO      for fibroids    TONSILLECTOMY  About 50 years ago       ALLERGIES:     Allergies   Allergen Reactions    Sulfacetamide Hives    Adhesive Tape Rash    Aspirin       Other Reaction(s): Stomach issues    Atenolol      Other reaction(s): Intolerance-Can't Take  Fatigue, GI intolerance    Ibuprofen      Other reaction(s): Stomach Upset  4-9-13 tele encounter  Other Reaction(s): GI intolerance       Naproxen      Other reaction(s): Stomach Upset  4-9-13 tele encounter  Other Reaction(s): GI intolerance    Ramipril Cough     Other reaction(s): Intolerance-Can't Take  Other Reaction(s): GI intolerance    Sulfa Antibiotics     Codeine Nausea    Fentanyl Nausea       FAMILY HISTORY:  Family History   Problem Relation Age of Onset    Cancer - colorectal Father     Lung Cancer Father     Macular Degeneration Father     Multiple myeloma Brother     Pacemaker Brother     Hypertension Mother     Cerebrovascular Disease Mother         from misdiagnosis of temporal arteritis    Suicide Sister     Hypertension Maternal Grandmother     Cerebrovascular Disease Maternal Grandmother     Diabetes Maternal Grandmother        SOCIAL HISTORY:  Social History     Tobacco Use    Smoking status: Never    Smokeless tobacco: Never    Tobacco comments:     None   Vaping Use    Vaping status: Never Used   Substance Use Topics    Alcohol use: No    Drug use: No       ROS:   Constitutional: No fever, chills, or sweats. Weight stable.   ENT: No visual disturbance, ear ache, epistaxis, sore throat.   Cardiovascular: As per HPI.   Respiratory: No cough, hemoptysis.    GI: No nausea, vomiting,Integument: Negative.   Psychiatric: Negative.   Hematologic: , no easy bleeding.  Neuro: Negative.   Endocrinology: No significant heat or cold intolerance   Musculoskeletal: No myalgia.    Exam:  BP (!) 151/70 (BP Location: Right arm, Patient Position: Supine, Cuff Size: Adult Regular)   Pulse 60   Wt 61.2 kg (135 lb)   SpO2 97%   BMI 22.33 kg/m    GENERAL APPEARANCE: healthy, alert and no distress  HEENT: no icterus, no xanthelasmas,  no central cyanosis  NECK: no adenopathy, no asymmetry, masses, or scars, thyroid  normal to palpation, no bruits, JVP not elevated  RESPIRATORY: lungs clear to auscultation - no rales, rhonchi or wheezes, no use of accessory muscles, no retractions, respirations are unlabored, normal respiratory rate  CARDIOVASCULAR: regular rhythm, normal S1 with physiologic split S2, no S3 or S4 and no murmur, click or rub, precordium quiet with normal PMI.  ABDOMEN: soft, non tender,  bowel sounds normal, aorta not enlarged by palpation, no abdominal bruit  Neurological: Normal  SKIN: no ecchymoses, no rashes    Labs:  CBC RESULTS:   Lab Results   Component Value Date    WBC 10.6 05/14/2024    WBC 8.2 01/28/2021    RBC 3.59 (L) 05/14/2024    RBC 4.51 01/28/2021    HGB 13.1 05/14/2024    HGB 13.8 01/28/2021    HCT 39.6 05/14/2024    HCT 42.0 01/28/2021     (H) 05/14/2024    MCV 93 01/28/2021    MCH 36.5 (H) 05/14/2024    MCH 30.6 01/28/2021    MCHC 33.1 05/14/2024    MCHC 32.9 01/28/2021    RDW 17.4 (H) 05/14/2024    RDW 12.5 01/28/2021     05/14/2024     01/28/2021       BMP RESULTS:  Lab Results   Component Value Date     05/14/2024     01/28/2021    POTASSIUM 4.2 05/14/2024    POTASSIUM 3.7 02/11/2022    POTASSIUM 3.9 01/28/2021    CHLORIDE 97 (L) 05/14/2024    CHLORIDE 108 02/11/2022    CHLORIDE 103 01/28/2021    CO2 31 (H) 05/14/2024    CO2 31 02/11/2022    CO2 34 (H) 01/28/2021    ANIONGAP 8 05/14/2024    ANIONGAP 1 (L) 02/11/2022    ANIONGAP 3 01/28/2021     (H) 05/14/2024    GLC 95 07/12/2023    GLC 90 02/11/2022    GLC 91 01/28/2021    BUN 17.9 05/14/2024    BUN 15 02/11/2022    BUN 15 01/28/2021    CR 0.89 05/14/2024    CR 0.76 01/28/2021    GFRESTIMATED 64 05/14/2024    GFRESTIMATED >60 08/10/2022    GFRESTIMATED 74 01/28/2021    GFRESTBLACK 85 01/28/2021    PRASHANTH 9.3 05/14/2024    PRASHANTH 9.2 01/28/2021        INR RESULTS:  Lab Results   Component Value Date    INR 1.19 (H) 02/06/2024    INR 1.21 (H) 01/09/2024    INR 1.29 (H) 12/07/2023    INR 1.18 (H) 08/30/2022     INR 1.13 04/10/2020       Procedures:  PULMONARY FUNCTION TESTS:        No data to display              ECG today: Sinus rhythm with short MO.  Frontal axis -35 degrees possible old septal infarct.  QT normal.  No acute changes.    ECHOCARDIOGRAM:   No results found for this or any previous visit (from the past 8760 hour(s)).      Assessment and Plan:   1.  Self-reported bradycardia--now resolved.  Patient is not on any medications which would aggravate this problem and I reassured her in that regard  2.  Normal cardiac structure for age by echocardiogram in 7/23  3.  Hypertension being the treated by internal medicine and will continue to follow-up with    4.  History of ulcerative colitis now under good control      Plan.  1.  Reassured regarding bradycardia as noted above.  Patient measures her blood pressure at home on a regular basis and also documents heart rates.  No recurrence of bradycardia noted by her.  2.  Follow-up as needed--patient follows regularly with Dr. Zimmerman.    Total elapsed time today with chart review, clinic visit and documentation 40 minutes  I very much appreciated the opportunity to see and assess Debra Denise in the clinic today concerns.      Wolfgang Yoon MD  Cardiac Arrhythmia Service  [facility]  [phone]   CC  Dr Zimmerman

## 2024-06-18 NOTE — PATIENT INSTRUCTIONS
You were seen in the Electrophysiology Clinic today by: Dr Yoon    Plan:       Follow up Visit:  Electrophysiology as needed  Follow with Dr Zimmerman or [facility] NP for blood pressure        If you have further questions, please utilize MAPPING to contact us.     Your Care Team:    EP Cardiology   Telephone Number     Nurse Line  Patricia Mccormick, RN   Lakisha Coombs, RN  Miles Vargas RN   [phone]     For scheduling appointments:   Kevin   [phone]   For procedure scheduling:    Ale Tony     [phone]   For the Device Clinic (Pacemakers, ICDs, Loop Recorders)    During business hours: [phone]  After business hours:   [phone]- select option 4 and ask for job code [id number].       On-call cardiologist for after hours or on weekends:   [phone], option #4, and ask to speak to the on-call cardiologist.     Cardiovascular Clinic:   [address] [place] [address]. [place] [address]      As always, Thank you for trusting us with your health care needs!

## 2024-06-18 NOTE — NURSING NOTE
Chief Complaint   Patient presents with    New Patient     NEW- sinus bradycardia, ref from Gen[facility] ALMODOVAR  See 5/8 visit  Meds- hydrochlorothiazide, diovan, statin, asa        Vitals were taken, medications reconciled, and EKG was performed.    Ponce Philippe, EMT  11:09 AM

## 2024-06-18 NOTE — LETTER
6/18/2024      RE: Debra Denise  [id number]  [place] [address]       Dear Colleague,    Thank you for the opportunity to participate in the care of your patient, Debra Denise, at the [facility] [place] [facility]. Please see a copy of my visit note below.    HPI:   Is a very pleasant 83-year-old woman who is being seen today for assessment of a history of slow heart beating and intermittent episode of low blood pressure which she detected herself.  Subsequently her heart rates have typically been in the 50s and 60s with normal blood pressures.    Debra noted 1 episode of blood pressures in the 90s with some dizziness but for most of the recent weeks her blood pressures have been in a more normal range of around 130.  The low blood pressure was likely associated with hydration issue rather than with problems related to her antihypertensive medication since it was transient in nature and has spontaneously resolved.    At the present time patient has no cardiovascular complaints.  She did does not exhibit any exertional chest discomfort or excessive breathlessness.  She does note a pulsation in her ears when she lies against pillow but this is nothing that is particularly concerning to her.  Overall she has been feeling quite well.      Debra does have a history of ulcerative colitis which may periodically resulted in fluid loss and hypotensive episodes.  I alerted her to this problem.  She also is followed by internal medicine for her blood pressure although patient measurements at home have apparently been in the normal range.  I encouraged her to continue to maintain her follow-up with     At today's visit I noted that her blood pressure was somewhat elevated (155/70) but this seems to be an outlier given her recent multiple blood pressure measurements at home.      I reviewed patient's  echocardiogram from last year (7/23).  No specific concerns were raised by that study.      Physical examination revealed healthy-appearing older woman who was well-spoken and did not exhibit any abnormal cardiac or pulmonary findings.      ECHO 7/23  Procedure  Complete Portable Bubble Echo Adult. Optison (NDC #2205-6659) given  intravenously.  regino     The visual ejection fraction is 60-65%.  The right ventricle is normal in size and function.  No hemodynamically significant valvular aortic stenosis.  The inferior vena cava was normal in size with preserved respiratory  variability.  A contrast injection (Bubble Study) was performed that was negative for flow  across the interatrial septum.    Left Ventricle  The left ventricle is normal in size. There is normal left ventricular wall  thickness. There is concentric remodeling present. The visual ejection  fraction is 60-65%. Diastolic Doppler findings (E/E' ratio and/or other  parameters) suggest left ventricular filling pressures are indeterminate. No  regional wall motion abnormalities noted.     Right Ventricle  The right ventricle is normal in size and function.     Atria  Normal left atrial size. Right atrial size is normal. A contrast injection  (Bubble Study) was performed that was negative for flow across the interatrial  septum.     Mitral Valve  The mitral valve is normal in structure and function. There is physiologic  mitral regurgitation. There is no mitral valve stenosis.        PAST MEDICAL HISTORY:  Past Medical History:   Diagnosis Date    Autoimmune disease (H24) approx. 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 30 years ago    Osteopenia     Polymyalgia rheumatica (H24)     PONV (postoperative nausea and vomiting)     Proctitis     Reduced vision Sometime is a little blurry       CURRENT MEDICATIONS:  Current Outpatient Medications   Medication  Sig Dispense Refill    atorvastatin (LIPITOR) 10 MG tablet Take 1 tablet (10 mg) by mouth daily 90 tablet 1    Cholecalciferol (VITAMIN D3 PO) Take 2,000 Units by mouth daily       fluocinonide (LIDEX) 0.05 % external cream       gabapentin (NEURONTIN) 100 MG capsule TAKE 2 CAPSULES BY MOUTH AT BEDTIME 180 capsule 0    hydrochlorothiazide (MICROZIDE) 12.5 MG capsule Take 1 capsule (12.5 mg) by mouth daily 30 capsule 0    hydroxyurea (HYDREA) 500 MG capsule Take 1 capsule (500 mg) by mouth daily 30 capsule 4    multivitamin w/minerals (CENTRUM ADULTS) tablet Take 1 tablet by mouth daily      Probiotic Product (PROBIOTIC PO) Take 1 capsule by mouth daily      sodium chloride 0.9 % SOLN 250 mL with vedolizumab 60 MG/ML SOLR 300 mg infusionn Inject 300 mg into the vein once Every 8 weeks      valsartan-hydrochlorothiazide (DIOVAN HCT) 160-25 MG tablet Take 0.5 tablets by mouth daily      aspirin 81 MG EC tablet Take 81 mg by mouth 2 times daily      ondansetron (ZOFRAN ODT) 4 MG ODT tab Take 1 tablet (4 mg) by mouth every 8 hours as needed for nausea (Patient not taking: Reported on 6/18/2024) 12 tablet 1    predniSONE (DELTASONE) 10 MG tablet Take 4 tablets by mouth daily for 3 days, then 3 tablets by mouth daily for 2 days, then 2 tablets by mouth daily for 2 days, then 1 tablet by mouth for 2 days, then 1/2 tablet by mouth for 2 days*         PAST SURGICAL HISTORY:  Past Surgical History:   Procedure Laterality Date    COLONOSCOPY  04/08/2014    Procedure: COMBINED COLONOSCOPY, SINGLE BIOPSY/POLYPECTOMY BY BIOPSY;  COLONOSCOPY;  Surgeon: Carol Ann Plasencia MD;  Location:  GI    CV CORONARY ANGIOGRAM N/A 08/30/2022    Procedure: Coronary Angiogram;  Surgeon: Trevin Hawk MD;  Location: [facility] CARDIAC CATH LAB    ENT SURGERY      tonsilectomy, adenoidectomy    ESOPHAGOSCOPY, GASTROSCOPY, DUODENOSCOPY (EGD), COMBINED N/A 04/24/2023    Procedure: ESOPHAGOGASTRODUODENOSCOPY, WITH BIOPSY;  Surgeon:  Angel Lara MD;  Location:  GI    GYN SURGERY  ,     x 2    HYSTERECTOMY      ORTHOPEDIC SURGERY  2004    (R) shoulder surgery for frozen shoulder    ZAC BSO      for fibroids    TONSILLECTOMY  About 50 years ago       ALLERGIES:     Allergies   Allergen Reactions    Sulfacetamide Hives    Adhesive Tape Rash    Aspirin      Other Reaction(s): Stomach issues    Atenolol      Other reaction(s): Intolerance-Can't Take  Fatigue, GI intolerance    Ibuprofen      Other reaction(s): Stomach Upset  - tele encounter  Other Reaction(s): GI intolerance       Naproxen      Other reaction(s): Stomach Upset  4- tele encounter  Other Reaction(s): GI intolerance    Ramipril Cough     Other reaction(s): Intolerance-Can't Take  Other Reaction(s): GI intolerance    Sulfa Antibiotics     Codeine Nausea    Fentanyl Nausea       FAMILY HISTORY:  Family History   Problem Relation Age of Onset    Cancer - colorectal Father     Lung Cancer Father     Macular Degeneration Father     Multiple myeloma Brother     Pacemaker Brother     Hypertension Mother     Cerebrovascular Disease Mother         from misdiagnosis of temporal arteritis    Suicide Sister     Hypertension Maternal Grandmother     Cerebrovascular Disease Maternal Grandmother     Diabetes Maternal Grandmother        SOCIAL HISTORY:  Social History     Tobacco Use    Smoking status: Never    Smokeless tobacco: Never    Tobacco comments:     None   Vaping Use    Vaping status: Never Used   Substance Use Topics    Alcohol use: No    Drug use: No       ROS:   Constitutional: No fever, chills, or sweats. Weight stable.   ENT: No visual disturbance, ear ache, epistaxis, sore throat.   Cardiovascular: As per HPI.   Respiratory: No cough, hemoptysis.    GI: No nausea, vomiting,Integument: Negative.   Psychiatric: Negative.   Hematologic: , no easy bleeding.  Neuro: Negative.   Endocrinology: No significant heat or cold intolerance    Musculoskeletal: No myalgia.    Exam:  BP (!) 151/70 (BP Location: Right arm, Patient Position: Supine, Cuff Size: Adult Regular)   Pulse 60   Wt 61.2 kg (135 lb)   SpO2 97%   BMI 22.33 kg/m    GENERAL APPEARANCE: healthy, alert and no distress  HEENT: no icterus, no xanthelasmas,  no central cyanosis  NECK: no adenopathy, no asymmetry, masses, or scars, thyroid normal to palpation, no bruits, JVP not elevated  RESPIRATORY: lungs clear to auscultation - no rales, rhonchi or wheezes, no use of accessory muscles, no retractions, respirations are unlabored, normal respiratory rate  CARDIOVASCULAR: regular rhythm, normal S1 with physiologic split S2, no S3 or S4 and no murmur, click or rub, precordium quiet with normal PMI.  ABDOMEN: soft, non tender,  bowel sounds normal, aorta not enlarged by palpation, no abdominal bruit  Neurological: Normal  SKIN: no ecchymoses, no rashes    Labs:  CBC RESULTS:   Lab Results   Component Value Date    WBC 10.6 05/14/2024    WBC 8.2 01/28/2021    RBC 3.59 (L) 05/14/2024    RBC 4.51 01/28/2021    HGB 13.1 05/14/2024    HGB 13.8 01/28/2021    HCT 39.6 05/14/2024    HCT 42.0 01/28/2021     (H) 05/14/2024    MCV 93 01/28/2021    MCH 36.5 (H) 05/14/2024    MCH 30.6 01/28/2021    MCHC 33.1 05/14/2024    MCHC 32.9 01/28/2021    RDW 17.4 (H) 05/14/2024    RDW 12.5 01/28/2021     05/14/2024     01/28/2021       BMP RESULTS:  Lab Results   Component Value Date     05/14/2024     01/28/2021    POTASSIUM 4.2 05/14/2024    POTASSIUM 3.7 02/11/2022    POTASSIUM 3.9 01/28/2021    CHLORIDE 97 (L) 05/14/2024    CHLORIDE 108 02/11/2022    CHLORIDE 103 01/28/2021    CO2 31 (H) 05/14/2024    CO2 31 02/11/2022    CO2 34 (H) 01/28/2021    ANIONGAP 8 05/14/2024    ANIONGAP 1 (L) 02/11/2022    ANIONGAP 3 01/28/2021     (H) 05/14/2024    GLC 95 07/12/2023    GLC 90 02/11/2022    GLC 91 01/28/2021    BUN 17.9 05/14/2024    BUN 15 02/11/2022    BUN 15 01/28/2021     CR 0.89 05/14/2024    CR 0.76 01/28/2021    GFRESTIMATED 64 05/14/2024    GFRESTIMATED >60 08/10/2022    GFRESTIMATED 74 01/28/2021    GFRESTBLACK 85 01/28/2021    PRASHANTH 9.3 05/14/2024    PRASHANTH 9.2 01/28/2021        INR RESULTS:  Lab Results   Component Value Date    INR 1.19 (H) 02/06/2024    INR 1.21 (H) 01/09/2024    INR 1.29 (H) 12/07/2023    INR 1.18 (H) 08/30/2022    INR 1.13 04/10/2020       Procedures:  PULMONARY FUNCTION TESTS:        No data to display              ECG today: Sinus rhythm with short GA.  Frontal axis -35 degrees possible old septal infarct.  QT normal.  No acute changes.    ECHOCARDIOGRAM:   No results found for this or any previous visit (from the past 8760 hour(s)).      Assessment and Plan:   1.  Self-reported bradycardia--now resolved.  Patient is not on any medications which would aggravate this problem and I reassured her in that regard  2.  Normal cardiac structure for age by echocardiogram in 7/23  3.  Hypertension being the treated by internal medicine and will continue to follow-up with    4.  History of ulcerative colitis now under good control      Plan.  1.  Reassured regarding bradycardia as noted above.  Patient measures her blood pressure at home on a regular basis and also documents heart rates.  No recurrence of bradycardia noted by her.  2.  Follow-up as needed--patient follows regularly with Dr. Zimmerman.    Total elapsed time today with chart review, clinic visit and documentation 40 minutes  I very much appreciated the opportunity to see and assess Debra Denise in the clinic today concerns.        Please do not hesitate to contact me if you have any questions/concerns.     Sincerely,     Wolfgang Yoon MD

## 2024-06-19 LAB
ATRIAL RATE - MUSE: 63 BPM
DIASTOLIC BLOOD PRESSURE - MUSE: NORMAL MMHG
INTERPRETATION ECG - MUSE: NORMAL
P AXIS - MUSE: 13 DEGREES
PR INTERVAL - MUSE: 104 MS
QRS DURATION - MUSE: 76 MS
QT - MUSE: 450 MS
QTC - MUSE: 460 MS
R AXIS - MUSE: -35 DEGREES
SYSTOLIC BLOOD PRESSURE - MUSE: NORMAL MMHG
T AXIS - MUSE: 26 DEGREES
VENTRICULAR RATE- MUSE: 63 BPM

## 2024-06-25 ENCOUNTER — TRANSFERRED RECORDS (OUTPATIENT)
Dept: HEALTH INFORMATION MANAGEMENT | Facility: CLINIC | Age: 84
End: 2024-06-25
Payer: COMMERCIAL

## 2024-06-27 ENCOUNTER — DOCUMENTATION ONLY (OUTPATIENT)
Dept: INTERNAL MEDICINE | Facility: CLINIC | Age: 84
End: 2024-06-27
Payer: COMMERCIAL

## 2024-06-27 ENCOUNTER — TRANSFERRED RECORDS (OUTPATIENT)
Dept: HEALTH INFORMATION MANAGEMENT | Facility: CLINIC | Age: 84
End: 2024-06-27
Payer: COMMERCIAL

## 2024-06-27 NOTE — PROGRESS NOTES
Type of Form Received: [facility] POC     Form Received (Date) 6/27/24   Form Filled out Yes, faxed 7/1/24   Placed in provider folder Yes

## 2024-07-02 ENCOUNTER — LAB (OUTPATIENT)
Dept: LAB | Facility: CLINIC | Age: 84
End: 2024-07-02
Payer: COMMERCIAL

## 2024-07-02 ENCOUNTER — OFFICE VISIT (OUTPATIENT)
Dept: INTERNAL MEDICINE | Facility: CLINIC | Age: 84
End: 2024-07-02
Payer: COMMERCIAL

## 2024-07-02 VITALS
SYSTOLIC BLOOD PRESSURE: 148 MMHG | DIASTOLIC BLOOD PRESSURE: 69 MMHG | WEIGHT: 134.8 LBS | HEART RATE: 50 BPM | OXYGEN SATURATION: 99 % | BODY MASS INDEX: 22.29 KG/M2

## 2024-07-02 DIAGNOSIS — E78.00 HIGH BLOOD CHOLESTEROL: ICD-10-CM

## 2024-07-02 DIAGNOSIS — R94.6 THYROID FUNCTION TEST ABNORMAL: ICD-10-CM

## 2024-07-02 DIAGNOSIS — D47.3 ESSENTIAL THROMBOCYTHEMIA (H): ICD-10-CM

## 2024-07-02 DIAGNOSIS — R73.09 INCREASED GLUCOSE LEVEL: ICD-10-CM

## 2024-07-02 DIAGNOSIS — I10 BENIGN ESSENTIAL HYPERTENSION: ICD-10-CM

## 2024-07-02 DIAGNOSIS — I10 HTN (HYPERTENSION): ICD-10-CM

## 2024-07-02 DIAGNOSIS — R73.09 INCREASED GLUCOSE LEVEL: Primary | ICD-10-CM

## 2024-07-02 DIAGNOSIS — E83.51 HYPOCALCEMIA: ICD-10-CM

## 2024-07-02 LAB
ALBUMIN SERPL BCG-MCNC: 4.3 G/DL (ref 3.5–5.2)
ALP SERPL-CCNC: 71 U/L (ref 40–150)
ALT SERPL W P-5'-P-CCNC: 11 U/L (ref 0–50)
ANION GAP SERPL CALCULATED.3IONS-SCNC: 10 MMOL/L (ref 7–15)
AST SERPL W P-5'-P-CCNC: 19 U/L (ref 0–45)
BASOPHILS # BLD AUTO: 0 10E3/UL (ref 0–0.2)
BASOPHILS NFR BLD AUTO: 0 %
BILIRUB SERPL-MCNC: 0.6 MG/DL
BUN SERPL-MCNC: 12.5 MG/DL (ref 8–23)
CALCIUM SERPL-MCNC: 9.4 MG/DL (ref 8.8–10.2)
CHLORIDE SERPL-SCNC: 101 MMOL/L (ref 98–107)
CHOLEST SERPL-MCNC: 125 MG/DL
CREAT SERPL-MCNC: 0.81 MG/DL (ref 0.51–0.95)
DEPRECATED HCO3 PLAS-SCNC: 30 MMOL/L (ref 22–29)
EGFRCR SERPLBLD CKD-EPI 2021: 71 ML/MIN/1.73M2
EOSINOPHIL # BLD AUTO: 0.1 10E3/UL (ref 0–0.7)
EOSINOPHIL NFR BLD AUTO: 2 %
ERYTHROCYTE [DISTWIDTH] IN BLOOD BY AUTOMATED COUNT: 11.9 % (ref 10–15)
FASTING STATUS PATIENT QL REPORTED: YES
FASTING STATUS PATIENT QL REPORTED: YES
GLUCOSE SERPL-MCNC: 98 MG/DL (ref 70–99)
HBA1C MFR BLD: 5.5 %
HCT VFR BLD AUTO: 38.8 % (ref 35–47)
HDLC SERPL-MCNC: 57 MG/DL
HGB BLD-MCNC: 12.8 G/DL (ref 11.7–15.7)
IMM GRANULOCYTES # BLD: 0 10E3/UL
IMM GRANULOCYTES NFR BLD: 0 %
LDLC SERPL CALC-MCNC: 52 MG/DL
LYMPHOCYTES # BLD AUTO: 1.6 10E3/UL (ref 0.8–5.3)
LYMPHOCYTES NFR BLD AUTO: 25 %
MCH RBC QN AUTO: 37.4 PG (ref 26.5–33)
MCHC RBC AUTO-ENTMCNC: 33 G/DL (ref 31.5–36.5)
MCV RBC AUTO: 114 FL (ref 78–100)
MONOCYTES # BLD AUTO: 0.7 10E3/UL (ref 0–1.3)
MONOCYTES NFR BLD AUTO: 11 %
NEUTROPHILS # BLD AUTO: 3.9 10E3/UL (ref 1.6–8.3)
NEUTROPHILS NFR BLD AUTO: 62 %
NONHDLC SERPL-MCNC: 68 MG/DL
NRBC # BLD AUTO: 0 10E3/UL
NRBC BLD AUTO-RTO: 0 /100
PLATELET # BLD AUTO: 206 10E3/UL (ref 150–450)
POTASSIUM SERPL-SCNC: 4 MMOL/L (ref 3.4–5.3)
PROT SERPL-MCNC: 7 G/DL (ref 6.4–8.3)
RBC # BLD AUTO: 3.42 10E6/UL (ref 3.8–5.2)
SODIUM SERPL-SCNC: 141 MMOL/L (ref 135–145)
TRIGL SERPL-MCNC: 79 MG/DL
TSH SERPL DL<=0.005 MIU/L-ACNC: 1.02 UIU/ML (ref 0.3–4.2)
VIT B12 SERPL-MCNC: 1688 PG/ML (ref 232–1245)
VIT D+METAB SERPL-MCNC: 52 NG/ML (ref 20–50)
WBC # BLD AUTO: 6.2 10E3/UL (ref 4–11)

## 2024-07-02 PROCEDURE — 80053 COMPREHEN METABOLIC PANEL: CPT | Performed by: PATHOLOGY

## 2024-07-02 PROCEDURE — 99215 OFFICE O/P EST HI 40 MIN: CPT | Performed by: INTERNAL MEDICINE

## 2024-07-02 PROCEDURE — 99000 SPECIMEN HANDLING OFFICE-LAB: CPT | Performed by: PATHOLOGY

## 2024-07-02 PROCEDURE — 85025 COMPLETE CBC W/AUTO DIFF WBC: CPT | Performed by: PATHOLOGY

## 2024-07-02 PROCEDURE — 83036 HEMOGLOBIN GLYCOSYLATED A1C: CPT | Performed by: INTERNAL MEDICINE

## 2024-07-02 PROCEDURE — 36415 COLL VENOUS BLD VENIPUNCTURE: CPT | Performed by: PATHOLOGY

## 2024-07-02 PROCEDURE — 82607 VITAMIN B-12: CPT | Performed by: INTERNAL MEDICINE

## 2024-07-02 PROCEDURE — 84443 ASSAY THYROID STIM HORMONE: CPT | Performed by: PATHOLOGY

## 2024-07-02 PROCEDURE — 80061 LIPID PANEL: CPT | Performed by: PATHOLOGY

## 2024-07-02 PROCEDURE — 82306 VITAMIN D 25 HYDROXY: CPT | Performed by: INTERNAL MEDICINE

## 2024-07-02 RX ORDER — GABAPENTIN 100 MG/1
CAPSULE ORAL
Qty: 180 CAPSULE | Refills: 0 | Status: SHIPPED | OUTPATIENT
Start: 2024-07-02

## 2024-07-02 RX ORDER — ATORVASTATIN CALCIUM 10 MG/1
10 TABLET, FILM COATED ORAL DAILY
Qty: 90 TABLET | Refills: 3 | Status: SHIPPED | OUTPATIENT
Start: 2024-07-02

## 2024-07-02 RX ORDER — VALSARTAN AND HYDROCHLOROTHIAZIDE 160; 25 MG/1; MG/1
0.5 TABLET ORAL DAILY
Qty: 45 TABLET | Refills: 3 | Status: SHIPPED | OUTPATIENT
Start: 2024-07-02 | End: 2024-09-13

## 2024-07-02 RX ORDER — HYDROCHLOROTHIAZIDE 12.5 MG/1
12.5 CAPSULE ORAL DAILY
Qty: 90 CAPSULE | Refills: 3 | Status: SHIPPED | OUTPATIENT
Start: 2024-07-02 | End: 2024-09-13

## 2024-07-02 NOTE — PROGRESS NOTES
Debra is a 83 year old that presents in clinic today for the following:     Chief Complaint   Patient presents with    Follow Up     2 months follow up            7/2/2024     9:42 AM   Additional Questions   Roomed by MR     Screenings as of today     Fallen 2 or more times in the past year? No        Delfina Delcid, EMT at 9:45 AM on 7/2/2024    Answers submitted by the patient for this visit:  General Questionnaire (Submitted on 6/27/2024)  Chief Complaint: Chronic problems general questions HPI Form  What is the reason for your visit today? : Followup  How many servings of fruits and vegetables do you eat daily?: 2-3  On average, how many sweetened beverages do you drink each day (Examples: soda, juice, sweet tea, etc.  Do NOT count diet or artificially sweetened beverages)?: 0  How many minutes a day do you exercise enough to make your heart beat faster?: 10 to 19  How many days a week do you exercise enough to make your heart beat faster?: 4  How many days per week do you miss taking your medication?: 0

## 2024-07-02 NOTE — PROGRESS NOTES
HPI:    Overall stable. She states her GI sxs are less and she is getting infusions (vedolizumab) every 6 weeks now. She is off ASA and had a prednisone taper. She has occ. Mild dizziness but this is not getting worse. She is now taking 1/2 pill of 160/25 mg of Diovan/HcTc and and additional 12.5 mg of HcTz and this controls her BP and LE swelling. She states she may have some sxs from Hydroxyurea in the morning but not on a consistent basis and not that bad. She tries to remain active, No other HEENT, cardiopulmonary, abdominal, , neurological, systemic, psychiatric, lymphatic, endocrine, vascular complaints.     Past Medical History:   Diagnosis Date    Autoimmune disease (H24) approx.     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 30 years ago    Osteopenia     Polymyalgia rheumatica (H24)     PONV (postoperative nausea and vomiting)     Proctitis     Reduced vision Sometime is a little blurry     Past Surgical History:   Procedure Laterality Date    COLONOSCOPY  2014    Procedure: COMBINED COLONOSCOPY, SINGLE BIOPSY/POLYPECTOMY BY BIOPSY;  COLONOSCOPY;  Surgeon: Carol Ann Plasencia MD;  Location:  GI    CV CORONARY ANGIOGRAM N/A 2022    Procedure: Coronary Angiogram;  Surgeon: Trevin Hawk MD;  Location:  HEART CARDIAC CATH LAB    ENT SURGERY      tonsilectomy, adenoidectomy    ESOPHAGOSCOPY, GASTROSCOPY, DUODENOSCOPY (EGD), COMBINED N/A 2023    Procedure: ESOPHAGOGASTRODUODENOSCOPY, WITH BIOPSY;  Surgeon: Angel Lara MD;  Location:  GI    GYN SURGERY  ,     x 2    HYSTERECTOMY      ORTHOPEDIC SURGERY  2004    (R) shoulder surgery for frozen shoulder    ZAC BSO      for fibroids    TONSILLECTOMY  About 50 years ago     PE:    Vitals noted, gen, nad, cooperative, alert, neck supple nl rom, lungs with good air movement, RRR, S1, S2, no MRG,  abdomen, no acute findings. Grossly normal neurological exam. Minimal B LE swelling     Results for orders placed or performed in visit on 07/02/24   CBC with platelets and differential     Status: Abnormal   Result Value Ref Range    WBC Count 6.2 4.0 - 11.0 10e3/uL    RBC Count 3.42 (L) 3.80 - 5.20 10e6/uL    Hemoglobin 12.8 11.7 - 15.7 g/dL    Hematocrit 38.8 35.0 - 47.0 %     (H) 78 - 100 fL    MCH 37.4 (H) 26.5 - 33.0 pg    MCHC 33.0 31.5 - 36.5 g/dL    RDW 11.9 10.0 - 15.0 %    Platelet Count 206 150 - 450 10e3/uL    % Neutrophils 62 %    % Lymphocytes 25 %    % Monocytes 11 %    % Eosinophils 2 %    % Basophils 0 %    % Immature Granulocytes 0 %    NRBCs per 100 WBC 0 <1 /100    Absolute Neutrophils 3.9 1.6 - 8.3 10e3/uL    Absolute Lymphocytes 1.6 0.8 - 5.3 10e3/uL    Absolute Monocytes 0.7 0.0 - 1.3 10e3/uL    Absolute Eosinophils 0.1 0.0 - 0.7 10e3/uL    Absolute Basophils 0.0 0.0 - 0.2 10e3/uL    Absolute Immature Granulocytes 0.0 <=0.4 10e3/uL    Absolute NRBCs 0.0 10e3/uL   CBC with platelets differential     Status: Abnormal    Narrative    The following orders were created for panel order CBC with platelets differential.  Procedure                               Abnormality         Status                     ---------                               -----------         ------                     CBC with platelets and d...[[id number]]  Abnormal            Final result                 Please view results for these tests on the individual orders.           A/P:    1. Immunizations; COVID Pfizer vaccine x 4. She has completed the Shingrix vaccine series. Pneumococcal 23 done 10/18/2011. Prevnar 13 done 1/13/2016. Td 1/31/2018. Prevnar 20 done 11/15/2022.   2. Inflammatory bowel disease/proctitis. She is followed at [facility] by Dr. Garcia. She had a Flex Sig 4/17/2024. Lab note from Dr. Garcia 2/13/2024 and 5/28/2024   3. Chronic pain on night time Gabapentin; seen Dr. Pimentel, orthopedics 6/8/2021 and   [facility] 4/29/2021 Mr. Neumann.   4. HTN; on Valsartan/HcTz. Electrolytes and Creatinine checked 5/14/2024  5. Mammogram; 2/13/2024.   6. Lipids; 1/9/2024; HDL 61, LDL 39, and TG's 69. She is on  Atorvastatin Repeat 5/1/2024; HDL 49, TG's 128, and LDL 34  7. Dermatology; scanned in outside [facility] 2/17/20[facility]. Care Everywhere dermatology note 10/12/2022.   8. Vitamin D normal at 57 on 2/11/2022. DEXA scan in chart from 7/19/2021 and most negative T score -2.3.  She saw Dr. Simon endocrinology   8/22/2022  9. Outside Rheumatology for PMR, Dr. Damian scanned in note from 5/24/2023. She is not on prednisone currently.    10. Palpitations;  Normal dobutamine stress echo 10/11/2021.  For atypical CP, CTA coronary angiogram done 8/10/2022 with possible severe mid LAD stenosis. Coronary angiogram on 8/30/2022 with normal coronary arteries.  Ziopatch monitor 7/22/2022 w/o significant findings.  She had a cardiology appt. With Ms. Jose Guadalupe 5/8/2024 and 6/18/2024 with Dr. Yoon, Cardiology   11. Past hospital discharged 7/12/2023 for possible TIA sxs with negative evaluation. Transient  L arm complaints.  No recurrence of sxs.  12. A1c 6.0% on 7/3/2023.   13. Positive CHANDNI-2 for thrombocytosis. She was seen 5/2024 by Dr. Milner, Hematology   She is on hydroxyurea         40 minutes spent on the date of the encounter doing chart review, history and exam, documentation and further activities as noted above exclusive of procedures and other billable interpretations

## 2024-08-04 ENCOUNTER — HEALTH MAINTENANCE LETTER (OUTPATIENT)
Age: 84
End: 2024-08-04

## 2024-08-06 ENCOUNTER — TRANSFERRED RECORDS (OUTPATIENT)
Dept: HEALTH INFORMATION MANAGEMENT | Facility: CLINIC | Age: 84
End: 2024-08-06
Payer: COMMERCIAL

## 2024-08-13 ENCOUNTER — TRANSFERRED RECORDS (OUTPATIENT)
Dept: HEALTH INFORMATION MANAGEMENT | Facility: CLINIC | Age: 84
End: 2024-08-13
Payer: COMMERCIAL

## 2024-08-22 ENCOUNTER — APPOINTMENT (OUTPATIENT)
Dept: MRI IMAGING | Facility: CLINIC | Age: 84
End: 2024-08-22
Attending: NURSE PRACTITIONER
Payer: COMMERCIAL

## 2024-08-22 ENCOUNTER — HOSPITAL ENCOUNTER (EMERGENCY)
Facility: CLINIC | Age: 84
Discharge: HOME | End: 2024-08-22
Attending: EMERGENCY MEDICINE | Admitting: EMERGENCY MEDICINE
Payer: COMMERCIAL

## 2024-08-22 VITALS
WEIGHT: 133 LBS | TEMPERATURE: 98.4 F | HEIGHT: 65 IN | SYSTOLIC BLOOD PRESSURE: 157 MMHG | OXYGEN SATURATION: 97 % | DIASTOLIC BLOOD PRESSURE: 62 MMHG | RESPIRATION RATE: 12 BRPM | BODY MASS INDEX: 22.16 KG/M2 | HEART RATE: 53 BPM

## 2024-08-22 DIAGNOSIS — R20.0 LEFT ARM NUMBNESS: ICD-10-CM

## 2024-08-22 DIAGNOSIS — G45.9 TIA (TRANSIENT ISCHEMIC ATTACK): Primary | ICD-10-CM

## 2024-08-22 LAB
ALBUMIN SERPL BCG-MCNC: 4.1 G/DL (ref 3.5–5.2)
ALP SERPL-CCNC: 81 U/L (ref 40–150)
ALT SERPL W P-5'-P-CCNC: 16 U/L (ref 0–50)
ANION GAP SERPL CALCULATED.3IONS-SCNC: 5 MMOL/L (ref 7–15)
AST SERPL W P-5'-P-CCNC: 21 U/L (ref 0–45)
ATRIAL RATE - MUSE: 64 BPM
BASOPHILS # BLD AUTO: 0 10E3/UL (ref 0–0.2)
BASOPHILS NFR BLD AUTO: 0 %
BILIRUB SERPL-MCNC: 0.5 MG/DL
BUN SERPL-MCNC: 12.1 MG/DL (ref 8–23)
CALCIUM SERPL-MCNC: 9.3 MG/DL (ref 8.8–10.4)
CHLORIDE SERPL-SCNC: 101 MMOL/L (ref 98–107)
CREAT SERPL-MCNC: 0.86 MG/DL (ref 0.51–0.95)
DIASTOLIC BLOOD PRESSURE - MUSE: NORMAL MMHG
EGFRCR SERPLBLD CKD-EPI 2021: 66 ML/MIN/1.73M2
EOSINOPHIL # BLD AUTO: 0.1 10E3/UL (ref 0–0.7)
EOSINOPHIL NFR BLD AUTO: 2 %
ERYTHROCYTE [DISTWIDTH] IN BLOOD BY AUTOMATED COUNT: 10.9 % (ref 10–15)
GLUCOSE SERPL-MCNC: 115 MG/DL (ref 70–99)
HCO3 SERPL-SCNC: 33 MMOL/L (ref 22–29)
HCT VFR BLD AUTO: 39.5 % (ref 35–47)
HGB BLD-MCNC: 13.5 G/DL (ref 11.7–15.7)
HOLD SPECIMEN: NORMAL
HOLD SPECIMEN: NORMAL
IMM GRANULOCYTES # BLD: 0.1 10E3/UL
IMM GRANULOCYTES NFR BLD: 1 %
INTERPRETATION ECG - MUSE: NORMAL
LYMPHOCYTES # BLD AUTO: 1.5 10E3/UL (ref 0.8–5.3)
LYMPHOCYTES NFR BLD AUTO: 23 %
MCH RBC QN AUTO: 37.4 PG (ref 26.5–33)
MCHC RBC AUTO-ENTMCNC: 34.2 G/DL (ref 31.5–36.5)
MCV RBC AUTO: 109 FL (ref 78–100)
MONOCYTES # BLD AUTO: 0.8 10E3/UL (ref 0–1.3)
MONOCYTES NFR BLD AUTO: 12 %
NEUTROPHILS # BLD AUTO: 4 10E3/UL (ref 1.6–8.3)
NEUTROPHILS NFR BLD AUTO: 63 %
NRBC # BLD AUTO: 0 10E3/UL
NRBC BLD AUTO-RTO: 0 /100
P AXIS - MUSE: 21 DEGREES
PLATELET # BLD AUTO: 228 10E3/UL (ref 150–450)
POTASSIUM SERPL-SCNC: 3.8 MMOL/L (ref 3.4–5.3)
PR INTERVAL - MUSE: 80 MS
PROT SERPL-MCNC: 6.8 G/DL (ref 6.4–8.3)
QRS DURATION - MUSE: 80 MS
QT - MUSE: 428 MS
QTC - MUSE: 441 MS
R AXIS - MUSE: -9 DEGREES
RBC # BLD AUTO: 3.61 10E6/UL (ref 3.8–5.2)
SODIUM SERPL-SCNC: 139 MMOL/L (ref 135–145)
SYSTOLIC BLOOD PRESSURE - MUSE: NORMAL MMHG
T AXIS - MUSE: 76 DEGREES
TROPONIN T SERPL HS-MCNC: 13 NG/L
VENTRICULAR RATE- MUSE: 64 BPM
WBC # BLD AUTO: 6.4 10E3/UL (ref 4–11)

## 2024-08-22 PROCEDURE — 255N000002 HC RX 255 OP 636: Performed by: EMERGENCY MEDICINE

## 2024-08-22 PROCEDURE — 84484 ASSAY OF TROPONIN QUANT: CPT | Performed by: EMERGENCY MEDICINE

## 2024-08-22 PROCEDURE — 70544 MR ANGIOGRAPHY HEAD W/O DYE: CPT

## 2024-08-22 PROCEDURE — 99285 EMERGENCY DEPT VISIT HI MDM: CPT | Mod: 25

## 2024-08-22 PROCEDURE — 36415 COLL VENOUS BLD VENIPUNCTURE: CPT | Performed by: EMERGENCY MEDICINE

## 2024-08-22 PROCEDURE — 93005 ELECTROCARDIOGRAM TRACING: CPT

## 2024-08-22 PROCEDURE — A9585 GADOBUTROL INJECTION: HCPCS | Performed by: EMERGENCY MEDICINE

## 2024-08-22 PROCEDURE — 70549 MR ANGIOGRAPH NECK W/O&W/DYE: CPT

## 2024-08-22 PROCEDURE — 70553 MRI BRAIN STEM W/O & W/DYE: CPT

## 2024-08-22 PROCEDURE — 80053 COMPREHEN METABOLIC PANEL: CPT | Performed by: EMERGENCY MEDICINE

## 2024-08-22 PROCEDURE — 99222 1ST HOSP IP/OBS MODERATE 55: CPT | Mod: FS | Performed by: NURSE PRACTITIONER

## 2024-08-22 PROCEDURE — 85025 COMPLETE CBC W/AUTO DIFF WBC: CPT | Performed by: EMERGENCY MEDICINE

## 2024-08-22 RX ORDER — GADOBUTROL 604.72 MG/ML
10 INJECTION INTRAVENOUS ONCE
Status: COMPLETED | OUTPATIENT
Start: 2024-08-22 | End: 2024-08-22

## 2024-08-22 RX ORDER — CLOPIDOGREL BISULFATE 75 MG/1
75 TABLET ORAL DAILY
Qty: 30 TABLET | Refills: 0 | Status: SHIPPED | OUTPATIENT
Start: 2024-08-22 | End: 2024-09-13

## 2024-08-22 RX ADMIN — GADOBUTROL 10 ML: 604.72 INJECTION INTRAVENOUS at 14:58

## 2024-08-22 ASSESSMENT — ACTIVITIES OF DAILY LIVING (ADL)
ADLS_ACUITY_SCORE: 35

## 2024-08-22 ASSESSMENT — COLUMBIA-SUICIDE SEVERITY RATING SCALE - C-SSRS
6. HAVE YOU EVER DONE ANYTHING, STARTED TO DO ANYTHING, OR PREPARED TO DO ANYTHING TO END YOUR LIFE?: NO
2. HAVE YOU ACTUALLY HAD ANY THOUGHTS OF KILLING YOURSELF IN THE PAST MONTH?: NO
1. IN THE PAST MONTH, HAVE YOU WISHED YOU WERE DEAD OR WISHED YOU COULD GO TO SLEEP AND NOT WAKE UP?: NO

## 2024-08-22 NOTE — CONSULTS
"[facility]    Stroke Consult Note    Reason for Consult:  transient symptoms     Chief Complaint: arm numbness       HPI  Debra Denise is a 83 year old female with past medical history significant for PMR, thrombocytosis, HTN, possible TIA. She presented to the ED for evaluation of an episode of transient left arm weakness and feeling \"off\". She has a difficult time further clarifying, but denies confusion, lightheadedness, blurred vision, paraesthesias, or speech impairment. The left arm felt like it was not able to  or turn the steering wheel as strongly as usual.  It lasted less than a minute before resolving. She reports a sharp pain between her shoulder blades when she arrived home. She currently has a mild headache. She had daily nausea and lightheadedness in the morning for several months. She denies history of migraine headaches but reports episodes of \"bright spots\" in her vision that were previously diagnosed as ocular migraines.     During the episode a year ago, she reports the left arm went flaccid and lasted about 30 seconds before recovering. She cannot recall if she had a headache at that time.     - MRI brain w/wo: negative for acute pathology  - MRA head w/o: no significant stenosis or LVO  - MRA neck w/wo: normal    Impression  Second lifetime episode of transient left arm weakness lasting <1 minute. This episode was followed by mild headache, and in the setting of prior ocular migraine, raises suspicion for migraine with aura. Transient ischemic attack (TIA) remains a possibility, but this is felt to be less likely given very short duration of symptoms and two episodes of near identical symptoms without clear focal stenosis. MRI brain negative for acute stroke.     Recommendations   - Recommend resuming daily Plavix 75 mg daily. Monitor for evidence of bleeding.   - Outpatient TTE   - 14 day Ziopatch to evaluate for arrhythmia  - Follow-up with general " "neurology   - Goal LDL <100, resume Lipitor 10 mg daily.  - Goal A1c <7.0  - Goal BP <140/90 with tighter control associated with decreased overall CV risk, if tolerated      Thank you for this consult. No further stroke evaluation is recommended, so we will sign off. Please contact us with any additional questions.    Mayra Sanchez, CNP  Vascular Neurology    To page me or covering stroke neurology team member, click here: [facility]  Choose \"On Call\" tab at top, then select \"NEUROLOGY/ALL SITES\" from middle drop-down box, press Enter, then look for \"stroke\" or \"telestroke\" for your site.  _____________________________________________________    Clinically Significant Risk Factors Present on Admission                  # Hypertension: Noted on problem list                          Past Medical History    Past Medical History:   Diagnosis Date    Autoimmune disease (H24) approx. 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 30 years ago    Osteopenia     Polymyalgia rheumatica (H24)     PONV (postoperative nausea and vomiting)     Proctitis     Reduced vision Sometime is a little blurry     Medications   Home Meds  Prior to Admission medications    Medication Sig Start Date End Date Taking? Authorizing Provider   atorvastatin (LIPITOR) 10 MG tablet Take 1 tablet (10 mg) by mouth daily 7/2/24   Wolfgang Zimmerman MD   Cholecalciferol (VITAMIN D3 PO) Take 2,000 Units by mouth daily     Reported, Patient   fluocinonide (LIDEX) 0.05 % external cream  6/8/23   Reported, Patient   gabapentin (NEURONTIN) 100 MG capsule TAKE 2 CAPSULES BY MOUTH AT BEDTIME 7/2/24   Wolfgang Zimmerman MD   hydrochlorothiazide (MICROZIDE) 12.5 MG capsule Take 1 capsule (12.5 mg) by mouth daily 7/2/24   Wolfgang Zimmerman MD   hydroxyurea (HYDREA) 500 MG capsule Take 1 capsule (500 mg) by mouth daily 4/4/24   Amos Milner MD   multivitamin " w/minerals (CENTRUM ADULTS) tablet Take 1 tablet by mouth daily    Reported, Patient   Probiotic Product (PROBIOTIC PO) Take 1 capsule by mouth daily    Reported, Patient   sodium chloride 0.9 % SOLN 250 mL with vedolizumab 60 MG/ML SOLR 300 mg infusionn Inject 300 mg into the vein once Every 8 weeks    Reported, Patient   valsartan-hydrochlorothiazide (DIOVAN HCT) 160-25 MG tablet Take 0.5 tablets by mouth daily 7/2/24   Wolfgang Zimmerman MD       Scheduled Meds  Current Facility-Administered Medications   Medication Dose Route Frequency Provider Last Rate Last Admin    gadobutrol (GADAVIST) injection 10 mL  10 mL Intravenous Once Laya Valle MD        sodium chloride (PF) 0.9% PF flush 100 mL  100 mL Intravenous Once Laya Valle MD           Infusion Meds  Current Facility-Administered Medications   Medication Dose Route Frequency Provider Last Rate Last Admin       Allergies   Allergies   Allergen Reactions    Sulfacetamide Hives    Adhesive Tape Rash    Aspirin      Other Reaction(s): Stomach issues    Atenolol      Other reaction(s): Intolerance-Can't Take  Fatigue, GI intolerance    Ibuprofen      Other reaction(s): Stomach Upset  4-9-13 tele encounter  Other Reaction(s): GI intolerance       Naproxen      Other reaction(s): Stomach Upset  4-9-13 tele encounter  Other Reaction(s): GI intolerance    Ramipril Cough     Other reaction(s): Intolerance-Can't Take  Other Reaction(s): GI intolerance    Sulfa Antibiotics     Codeine Nausea    Fentanyl Nausea          PHYSICAL EXAMINATION   Temp:  [98.4  F (36.9  C)] 98.4  F (36.9  C)  Pulse:  [50-66] 52  Resp:  [10-16] 12  BP: (128-184)/(63-68) 178/68  SpO2:  [97 %-99 %] 97 %    Neurologic  Mental Status:  alert, oriented x 3, follows commands, speech clear and fluent, naming and repetition normal  Cranial Nerves:  visual fields intact, EOMI with normal smooth pursuit, facial sensation intact and symmetric, facial movements symmetric, hearing  not formally tested but intact to conversation, no dysarthria, shoulder shrug strong bilaterally, tongue protrusion midline  Motor:  normal muscle tone and bulk, no abnormal movements, able to move all limbs spontaneously, no pronator drift  Reflexes:   deferred  Sensory:  light touch sensation intact and symmetric throughout upper and lower extremities, no extinction on double simultaneous stimulation   Coordination:  normal finger-to-nose and heel-to-shin bilaterally without dysmetria  Station/Gait:  deferred    Stroke Scales    NIHSS  1a. Level of Consciousness 0-->Alert, keenly responsive   1b. LOC Questions 0-->Answers both questions correctly   1c. LOC Commands 0-->Performs both tasks correctly   2.   Best Gaze 0-->Normal   3.   Visual 0-->No visual loss   4.   Facial Palsy 0-->Normal symmetrical movements   5a. Motor Arm, Left 0-->No drift, limb holds 90 (or 45) degrees for full 10 secs   5b. Motor Arm, Right 0-->No drift, limb holds 90 (or 45) degrees for full 10 secs   6a. Motor Leg, Left 0-->No drift, leg holds 30 degree position for full 5 secs   6b. Motor Leg, right 0-->No drift, leg holds 30 degree position for full 5 secs   7.   Limb Ataxia 0-->Absent   8.   Sensory 0-->Normal, no sensory loss   9.   Best Language 0-->No aphasia, normal   10. Dysarthria 0-->Normal   11. Extinction and Inattention  0-->No abnormality   Total 0 (08/22/24 1300)       Imaging  I personally reviewed all imaging; relevant findings per HPI.    Labs Data   CBC  Recent Labs   Lab 08/22/24  1116   WBC 6.4   RBC 3.61*   HGB 13.5   HCT 39.5        Basic Metabolic Panel   Recent Labs   Lab 08/22/24  1116      POTASSIUM 3.8   CHLORIDE 101   CO2 33*   BUN 12.1   CR 0.86   *   PRASHANTH 9.3     Liver Panel  Recent Labs   Lab 08/22/24  1116   PROTTOTAL 6.8   ALBUMIN 4.1   BILITOTAL 0.5   ALKPHOS 81   AST 21   ALT 16     INR    Recent Labs   Lab Test 02/06/24  1524 01/09/24  0908 12/07/23  1522   INR 1.19* 1.21* 1.29*            Stroke Consult Data Data   This was a non-emergent, non-telestroke consult.  I have personally spent a total of 35 minutes providing care today, time spent in reviewing medical records and devising the plan as recorded above.

## 2024-08-22 NOTE — DISCHARGE INSTRUCTIONS
1) Your MRi imaging all looks normal  2) restart your atorvastation every other day  3) Start plavix  4) Follow up with neurology  5) Follow up with cardiology and echocardiogram

## 2024-08-22 NOTE — ED TRIAGE NOTES
Pt stated that she had brief episode of right arm numbness with some upper middle shoulder blade pain about 1030 this morning while driving her car .   Denied SOB - denies numbness and pain at the time of this note. No other deficits noted and no current deficits now.   /66.      Triage Assessment (Adult)       Row Name 08/22/24 1059          Triage Assessment    Airway WDL WDL        Respiratory WDL    Respiratory WDL WDL        Skin Circulation/Temperature WDL    Skin Circulation/Temperature WDL WDL        Cardiac WDL    Cardiac WDL X  pt had episode of shoulder blade pain for a few minutes        Peripheral/Neurovascular WDL    Peripheral Neurovascular WDL WDL        Cognitive/Neuro/Behavioral WDL    Cognitive/Neuro/Behavioral WDL WDL

## 2024-08-22 NOTE — ED PROVIDER NOTES
Emergency Department Note      History of Present Illness     Chief Complaint   arm numbness      HPI   Debra Denise is a 83 year old female with a history of TIA, thrombocythemia, hypertension, hyperlipidemia, and PMR who presents to the ED with her  for evaluation of left arm numbness. The patient states she felt suddenly unstable while driving at 1030 this morning quickly followed by left arm weakness and mild numbness. She then had a sharp pain in her upper back along with a dry mouth. States this episode lasted about 1 minute in total. States she experienced the left arm weakness about 1 year ago, was seen here for it at the time, and admitted to the hospital for a possible TIA. She has never experienced the sharp back pain before. Notes she has been sleepless and nauseous in the morning for the past few weeks which she has been seen for. Her doctor has been reducing her dose of atorvastatin to test if it has been the cause. She is currently on hydroxyurea for thrombocythemia. Denies lightheadedness, left arm tingling, fever, cough, shortness of breath, chest pain, facial paresthesia, abdominal pain, changes in diet, constipation, diarrhea. She is not on blood thinners or low dose aspirin.     Independent Historian   None    Review of External Notes   none    Past Medical History     Medical History and Problem List   Thrombocythemia  Dysplastic nevus  Melanoma   Tietze's disease  Ulcerative colitis  HTN  Nephrolithiasis  Chronic low back pain  PMR  TIA  HLD  Lower GI bleed    Medications   Hydrocortisone  Entyvio  Lipiitor  Gabapentin  Hydrochlorothiazide  Hydroxyurea  Diovan   Atorvastatin     Surgical History   Coronary angiogram  Tonsillectomy  Adenoidectomy   section  Hysterectomy  Unspecified shoulder surgery (R)  Salpingo-oophorectomy     Physical Exam     Patient Vitals for the past 24 hrs:   BP Temp Temp src Pulse Resp SpO2 Height Weight   24 1330 (!) 157/62 -- -- 53 12  "-- -- --   08/22/24 1300 (!) 178/68 -- -- 52 12 -- -- --   08/22/24 1200 128/63 -- -- 50 10 -- -- --   08/22/24 1059 (!) 184/66 -- -- 66 16 97 % -- --   08/22/24 1058 (!) 184/64 98.4  F (36.9  C) Temporal 64 14 99 % 1.651 m (5' 5\") 60.3 kg (133 lb)     Physical Exam    Physical Exam   Constitutional:  Patient is oriented to person, place, and time. They appear well-developed and well-nourished. Mild distress secondary to left arm numbness.   HENT:   Mouth/Throat:   Oropharynx is clear and moist.   Eyes:    Conjunctivae normal and EOM are normal. Pupils are equal, round, and reactive to light.   Neck:    Normal range of motion.   Cardiovascular: Normal rate, regular rhythm and normal heart sounds.  Exam reveals no gallop and no friction rub.  No murmur heard.  Pulmonary/Chest:  Effort normal and breath sounds normal. Patient has no wheezes. Patient has no rales.   Abdominal:   Soft. Bowel sounds are normal. Patient exhibits no mass. There is no tenderness. There is no rebound and no guarding.   Musculoskeletal:  Normal range of motion. Patient exhibits no edema.   Neurological:   Patient is alert and oriented to person, place, and time. Patient has normal strength. No cranial nerve deficit or sensory deficit. GCS 15  Skin:   Skin is warm and dry. No rash noted. No erythema.   Psychiatric:   Patient has a normal mood and affect. Patient's behavior is normal. Judgment and thought content normal.     Diagnostics     Lab Results   Labs Ordered and Resulted from Time of ED Arrival to Time of ED Departure   COMPREHENSIVE METABOLIC PANEL - Abnormal       Result Value    Sodium 139      Potassium 3.8      Carbon Dioxide (CO2) 33 (*)     Anion Gap 5 (*)     Urea Nitrogen 12.1      Creatinine 0.86      GFR Estimate 66      Calcium 9.3      Chloride 101      Glucose 115 (*)     Alkaline Phosphatase 81      AST 21      ALT 16      Protein Total 6.8      Albumin 4.1      Bilirubin Total 0.5     CBC WITH PLATELETS AND DIFFERENTIAL " - Abnormal    WBC Count 6.4      RBC Count 3.61 (*)     Hemoglobin 13.5      Hematocrit 39.5       (*)     MCH 37.4 (*)     MCHC 34.2      RDW 10.9      Platelet Count 228      % Neutrophils 63      % Lymphocytes 23      % Monocytes 12      % Eosinophils 2      % Basophils 0      % Immature Granulocytes 1      NRBCs per 100 WBC 0      Absolute Neutrophils 4.0      Absolute Lymphocytes 1.5      Absolute Monocytes 0.8      Absolute Eosinophils 0.1      Absolute Basophils 0.0      Absolute Immature Granulocytes 0.1      Absolute NRBCs 0.0     TROPONIN T, HIGH SENSITIVITY - Normal    Troponin T, High Sensitivity 13         Imaging   MR Brain w/o & w Contrast   Final Result   IMPRESSION: No restricted diffusion to suggest acute ischemia. Mild   chronic changes.      GUIDO ROBERTS MD            SYSTEM ID:  [id number]      MRA Brain ([place]) wo Contrast   Final Result   IMPRESSION: No vessel occlusion findings intracranially.         GUIDO ROBERTS MD            SYSTEM ID:  [id number]      MRA Neck (Carot[place]) wo & w Contrast   Final Result   IMPRESSION:  Normal MR angiogram of the neck.          GUIDO ROBERTS MD            SYSTEM ID:  [id number]      Transesophageal Echocardiogram    (Results Pending)       EKG   ECG taken at 1054, ECG read at 1110  Sinus rhythm with short WV   Rate 64 bpm. WV interval 80 ms. QRS duration 80 ms. QT/QTc 428/441 ms. P-R-T axes 21 -9 76.    Independent Interpretation   None    ED Course      Medications Administered   Medications   gadobutrol (GADAVIST) injection 10 mL (10 mLs Intravenous $Given 8/22/24 1458)   sodium chloride (PF) 0.9% PF flush 100 mL (100 mLs Intravenous $Given 8/22/24 1458)       Procedures   Procedures     Discussion of Management   Stroke Neurology    ED Course   ED Course as of 08/22/24 1714   Thu Aug 22, 2024   1106 I obtained history and performed a physical exam as noted above.    1127 I spoke with Stroke Neurology regarding the patient's presentation and plan  of care.    1313 I spoke with Stroke Neurology regarding the patient's course.       Additional Documentation  None    Medical Decision Making / Diagnosis     CMS Diagnoses: The patient has stroke symptoms:         ED Stroke specific documentation           NIHSS PDF     Patient last known well time: 1000  ED Provider first to bedside at: 1106  MRI Results received at: 1550    Thrombolytics:   Not given due to:   - minor/isolated/quickly resolving symptoms    If treating with thrombolytics: Ensure SBP<180 and DBP<105 prior to treatment with thrombolytics.  Administering thrombolytics after treatment with IV labetalol, hydralazine, or nicardipine is reasonable once BP control is established.    Endovascular Retrieval:  Not initiated due to absence of proximal vessel occlusion    [employer] Stroke Scale (Baseline)  Time Performed: 1106     Score    Level of consciousness: (0)   Alert, keenly responsive    LOC questions: (0)   Answers both questions correctly    LOC commands: (0)   Performs both tasks correctly    Best gaze: (0)   Normal    Visual: (0)   No visual loss    Facial palsy: (0)   Normal symmetrical movements    Motor arm (left): (0)   No drift    Motor arm (right): (0)   No drift    Motor leg (left): (0)   No drift    Motor leg (right): (0)   No drift    Limb ataxia: (0)   Absent    Sensory: (0)   Normal- no sensory loss    Best language: (0)   Normal- no aphasia    Dysarthria: (0)   Normal    Extinction and inattention: (0)   No abnormality        Total Score:  0        Stroke Mimics were considered (including migraine headache, seizure disorder, hypoglycemia (or hyperglycemia), head or spinal trauma, CNS infection, Toxin ingestion and shock state (e.g. sepsis) .         MIPS       None    MDM   Debra Denise is a 83 year old female who presents to the emergency department for brief episode of left arm weakness.  On my examination within an hour of the occurrence she had a totally  normal exam.  Given this I spoke with stroke neuro and it was decided to proceed directly to MRI.  She is not on any blood thinners.   Blood work was obtained.  There is no metabolic derangement.  Her platelets are normal.  MRI of her head, head and neck, [place] were performed.  There are no acute findings.  I then we discussed the findings of the MRI with stroke neuro.  I think it is unlikely to be a TIA and feel that she is safe to be discharged.  They recommend they she restart her Lipitor.  We will start her on Plavix after they research her chart they felt she was tolerating Plavix without difficulty.  They placed an order for an outpatient Zio patch and outpatient neurology follow-up.  I have placed an order for a TTE to be done as soon as possible.  She will follow-up with cardiology after that in general neurology.  She is return to the emergency department if she has any recurrent symptoms    Disposition   The patient was discharged.     Diagnosis     ICD-10-CM    1. TIA (transient ischemic attack)  G45.9 Adult Neurology  Referral     ZIO PATCH MAIL OUT      2. Left arm numbness  R20.0 Follow-Up with Cardiology     Transesophageal Echocardiogram           Discharge Medications   New Prescriptions    CLOPIDOGREL (PLAVIX) 75 MG TABLET    Take 1 tablet (75 mg) by mouth daily.         Scribe Disclosure:  I, Alyx Pardo, am serving as a scribe at 11:04 AM on 8/22/2024 to document services personally performed by Laya Valle MD based on my observations and the provider's statements to me.        Laya Valle MD  08/22/24 3131

## 2024-08-23 ENCOUNTER — ORDERS ONLY (AUTO-RELEASED) (OUTPATIENT)
Dept: EMERGENCY MEDICINE | Facility: CLINIC | Age: 84
End: 2024-08-23
Payer: COMMERCIAL

## 2024-08-23 DIAGNOSIS — G45.9 TIA (TRANSIENT ISCHEMIC ATTACK): ICD-10-CM

## 2024-09-13 ENCOUNTER — OFFICE VISIT (OUTPATIENT)
Dept: CARDIOLOGY | Facility: CLINIC | Age: 84
End: 2024-09-13
Payer: COMMERCIAL

## 2024-09-13 VITALS
OXYGEN SATURATION: 98 % | DIASTOLIC BLOOD PRESSURE: 66 MMHG | WEIGHT: 135 LBS | SYSTOLIC BLOOD PRESSURE: 133 MMHG | BODY MASS INDEX: 22.47 KG/M2 | HEART RATE: 53 BPM

## 2024-09-13 DIAGNOSIS — I10 BENIGN ESSENTIAL HYPERTENSION: Primary | ICD-10-CM

## 2024-09-13 DIAGNOSIS — E78.5 HYPERLIPIDEMIA WITH TARGET LOW DENSITY LIPOPROTEIN (LDL) CHOLESTEROL LESS THAN 70 MG/DL: ICD-10-CM

## 2024-09-13 DIAGNOSIS — G45.9 TIA (TRANSIENT ISCHEMIC ATTACK): ICD-10-CM

## 2024-09-13 PROCEDURE — 93248 EXT ECG>7D<15D REV&INTERPJ: CPT | Performed by: INTERNAL MEDICINE

## 2024-09-13 PROCEDURE — 99204 OFFICE O/P NEW MOD 45 MIN: CPT | Performed by: INTERNAL MEDICINE

## 2024-09-13 RX ORDER — VALSARTAN 80 MG/1
80 TABLET ORAL DAILY
Qty: 90 TABLET | Refills: 3 | Status: SHIPPED | OUTPATIENT
Start: 2024-09-13

## 2024-09-13 RX ORDER — CLOPIDOGREL BISULFATE 75 MG/1
75 TABLET ORAL DAILY
Qty: 30 TABLET | Refills: 3 | Status: SHIPPED | OUTPATIENT
Start: 2024-09-13

## 2024-09-13 NOTE — PROGRESS NOTES
"  General Cardiology Clinic Progress Note  Debra Denise MRN# [id number]   YOB: 1940 Age: 83 year old       Reason for visit: Left arm weakness    History of presenting illness:    I had the opportunity to see Debra Denise at [facility] today for discussion of left arm weakness and potential need for transesophageal echocardiogram.    She is a very nice lady who was recently seen in the emergency room at [facility] with sudden onset of left arm weakness.  She was driving when this occurred.  Suddenly her left arm was \"useless\" but she was still able to continue driving home.  By the time she arrived home, the weakness had resolved.  The emergency room, concerned that this could have been a stroke and there were no abnormalities identified on exam there or on her brain MRI.  Neurology was consulted and suggested continuing her atorvastatin and resuming her Plavix due to concerns that this could have been a TIA or stroke.  Neurology follow-up is arranged for later this fall.  There was also discussion of a possible EDITA to evaluate for source of stroke.  I reviewed her transthoracic echocardiogram done 1 year ago at the time of a similar event of left arm weakness and it was entirely normal.  She has had a coronary angiogram in the past in August 2022 which was completely normal.  She has no history of any vascular disease.    She has not had any recurrent left arm symptoms since that episode on 8/22/2024.  She has occasional mild lightheadedness from time to time throughout the day.  She has been taking valsartan hydrochlorothiazide 80/12.5 mg daily plus a separate hydrochlorothiazide 12.5 mg daily up until a few weeks ago and stop the additional hydrochlorothiazide at that time.  Her blood pressures have been well-controlled.  Her cholesterol numbers are excellent on a low-dose of atorvastatin 10 mg daily which was started due to a previous episode of left arm " weakness a year ago, with concerns that that could have been a TIA.    She also has thrombocytosis and is on hydroxyurea.  She takes Plavix 75 mg a day as antiplatelet therapy for this possible TIA issue.    On examination today her blood pressure is 133/66, heart rate 53, and weight 135 pounds.  Her lungs are clear.  Heart rhythm is regular.  She has no murmur.              Assessment and Plan:     ASSESSMENT:    Ms. Debra Denise is an 83-year-old woman with 2 episodes of sudden onset of left arm weakness  by a year.  Each episode resolved spontaneously within a few minutes and was not associated with any other neurologic issues.  Recent brain MRI was normal.  Her echocardiogram is normal.  She had a coronary angiogram showing no atherosclerotic disease whatsoever.  That certainly makes the likelihood of cerebral atherosclerotic disease less likely.  Indeed, this does not sound like a TIA with recurrent identical symptoms.  Without any objective evidence of cerebral embolic events, I am not inclined to recommend a transesophageal echocardiogram for evaluation of a source of embolic stroke.  I would recommend that she follow-up with neurology to discuss this further.  If her neurologist recommends a transesophageal echocardiogram as part of this workup, we would be happy to provide that procedure for her, but for now I would recommend waiting for that opinion.    She has some mild lightheadedness from time to time and I wonder whether this might be from her blood pressure medications and possibly her diuretic.  I suggested she stop the hydrochlorothiazide altogether and just take valsartan 80 mg a day for her blood pressure control and follow-up with Dr. Payne to discuss that issue further.    No further cardiology follow-up is needed for now.  I encouraged her to continue her atorvastatin and Plavix until she meets with the neurologist and can discuss the need for those medications  further.    Ministerio Crisostomo MD           Orders this Visit:  No orders of the defined types were placed in this encounter.    Orders Placed This Encounter   Medications    clopidogrel (PLAVIX) 75 MG tablet     Sig: Take 1 tablet (75 mg) by mouth daily.     Dispense:  30 tablet     Refill:  3    valsartan (DIOVAN) 80 MG tablet     Sig: Take 1 tablet (80 mg) by mouth daily.     Dispense:  90 tablet     Refill:  3     Medications Discontinued During This Encounter   Medication Reason    hydrochlorothiazide (MICROZIDE) 12.5 MG capsule     valsartan-hydrochlorothiazide (DIOVAN HCT) 160-25 MG tablet     clopidogrel (PLAVIX) 75 MG tablet Reorder (No AVS)       Today's clinic visit entailed:    45 minutes spent by me on the date of the encounter doing chart review, history and exam, documentation and further activities per the note  Provider  Link to [facility] Help Grid     The level of medical decision making during this visit was of high complexity.           Review of Systems:     Review of Systems:  Skin:  Negative     Eyes:  Negative    ENT:  Negative    Respiratory:  Negative    Cardiovascular:    Positive for;chest pain;lightheadedness;dizziness  Gastroenterology: Positive for    Genitourinary:  Negative    Musculoskeletal:  Negative    Neurologic:  Positive for migraine headaches;paralysis  Psychiatric:  Negative    Heme/Lymph/Imm:  Negative    Endocrine:  Negative              Physical Exam:     Vitals: /66   Pulse 53   Wt 61.2 kg (135 lb)   SpO2 98%   BMI 22.47 kg/m    Constitutional: Well nourished and in no apparent distress.  Eyes: Pupils equal, round. Sclerae anicteric.   HEENT: Normocephalic, atraumatic.   Neck: Supple. JVD   Respiratory: Breathing non-labored. Lungs clear to auscultation bilaterally. No crackles, wheezes, rhonchi, or rales.  Cardiovascular:  Regular rate and rhythm, normal S1 and S2. No murmur, rub, or gallop.  Skin: Warm, dry. No rashes, cyanosis, or xanthelasma.  Extremities: No  edema.  Neurologic: No gross motor deficits. Alert, awake, and oriented to person, place and time.  Psychiatric: Affect appropriate.             Medications:     Current Outpatient Medications   Medication Sig Dispense Refill    atorvastatin (LIPITOR) 10 MG tablet Take 1 tablet (10 mg) by mouth daily 90 tablet 3    Cholecalciferol (VITAMIN D3 PO) Take 2,000 Units by mouth daily       clopidogrel (PLAVIX) 75 MG tablet Take 1 tablet (75 mg) by mouth daily. 30 tablet 3    fluocinonide (LIDEX) 0.05 % external cream       gabapentin (NEURONTIN) 100 MG capsule TAKE 2 CAPSULES BY MOUTH AT BEDTIME 180 capsule 0    hydroxyurea (HYDREA) 500 MG capsule Take 1 capsule (500 mg) by mouth daily 30 capsule 4    multivitamin w/minerals (CENTRUM ADULTS) tablet Take 1 tablet by mouth daily      Probiotic Product (PROBIOTIC PO) Take 1 capsule by mouth daily      sodium chloride 0.9 % SOLN 250 mL with vedolizumab 60 MG/ML SOLR 300 mg infusionn Inject 300 mg into the vein once Every 8 weeks      valsartan (DIOVAN) 80 MG tablet Take 1 tablet (80 mg) by mouth daily. 90 tablet 3       Family History   Problem Relation Age of Onset    Cancer - colorectal Father     Lung Cancer Father     Macular Degeneration Father     Multiple myeloma Brother     Pacemaker Brother     Hypertension Mother     Cerebrovascular Disease Mother         from misdiagnosis of temporal arteritis    Suicide Sister     Hypertension Maternal Grandmother     Cerebrovascular Disease Maternal Grandmother     Diabetes Maternal Grandmother        Social History     Socioeconomic History    Marital status:      Spouse name: Not on file    Number of children: Not on file    Years of education: Not on file    Highest education level: Not on file   Occupational History    Not on file   Tobacco Use    Smoking status: Never    Smokeless tobacco: Never    Tobacco comments:     None   Vaping Use    Vaping status: Never Used   Substance and Sexual Activity    Alcohol use: No     Drug use: No    Sexual activity: Not Currently     Partners: Male     Birth control/protection: None   Other Topics Concern    Parent/sibling w/ CABG, MI or angioplasty before 65F 55M? Not Asked   Social History Narrative    Not on file     Social Determinants of Health     Financial Resource Strain: Low Risk  (12/19/2023)    Financial Resource Strain     Within the past 12 months, have you or your family members you live with been unable to get utilities (heat, electricity) when it was really needed?: No   Food Insecurity: Low Risk  (12/19/2023)    Food Insecurity     Within the past 12 months, did you worry that your food would run out before you got money to buy more?: No     Within the past 12 months, did the food you bought just not last and you didn t have money to get more?: No   Transportation Needs: Low Risk  (12/19/2023)    Transportation Needs     Within the past 12 months, has lack of transportation kept you from medical appointments, getting your medicines, non-medical meetings or appointments, work, or from getting things that you need?: No   Physical Activity: Unknown (3/20/2023)    Received from [facility]    Exercise Vital Sign     Days of Exercise per Week: 0 days     Minutes of Exercise per Session: Not on file   Stress: No Stress Concern Present (3/20/2023)    Received from [facility]    [employer] - Occupational Stress Questionnaire     Feeling of Stress : Only a little   Social Connections: Socially Integrated (3/20/2023)    Received from [facility]    Social Connection and Isolation Panel [NHANES]     Frequency of Communication with Friends and Family: More than three times a week     Frequency of Social Gatherings with Friends and Family: Once a week     Attends [employer] Services: More than 4 times per year     Active Member of Clubs or Organizations: Yes     Attends Club or Organization Meetings: Never     Marital Status:    Interpersonal Safety:  Low Risk  (2024)    Interpersonal Safety     Do you feel physically and emotionally safe where you currently live?: Yes     Within the past 12 months, have you been hit, slapped, kicked or otherwise physically hurt by someone?: No     Within the past 12 months, have you been humiliated or emotionally abused in other ways by your partner or ex-partner?: No   Housing Stability: Low Risk  (2023)    Housing Stability     Do you have housing? : Yes     Are you worried about losing your housing?: No            Past Medical History:     Past Medical History:   Diagnosis Date    Autoimmune disease (H24) approx.     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 30 years ago    Osteopenia     Polymyalgia rheumatica (H24)     PONV (postoperative nausea and vomiting)     Proctitis     Reduced vision Sometime is a little blurry              Past Surgical History:     Past Surgical History:   Procedure Laterality Date    COLONOSCOPY  2014    Procedure: COMBINED COLONOSCOPY, SINGLE BIOPSY/POLYPECTOMY BY BIOPSY;  COLONOSCOPY;  Surgeon: Carol Ann Plasencia MD;  Location: [facility]    CV CORONARY ANGIOGRAM N/A 2022    Procedure: Coronary Angiogram;  Surgeon: Trevin Hawk MD;  Location:  HEART CARDI[facility] CATH LAB    ENT SURGERY      tonsilectomy, adenoidectomy    ESOPHAGOSCOPY, GASTROSCOPY, DUODENOSCOPY (EGD), COMBINED N/A 2023    Procedure: ESOPHAGOGASTRODUODENOSCOPY, WITH BIOPSY;  Surgeon: Angel Lara MD;  Location:  GI    GYN SURGERY  ,     x 2    HYSTERECTOMY      ORTHOPEDIC SURGERY  2004    (R) shoulder surgery for frozen shoulder    ZAC BSO      for fibroids    TONSILLECTOMY  About 50 years ago              Allergies:   Sulfacetamide, Adhesive tape, Aspirin, Atenolol, Ibuprofen, Naproxen, Ramipril, Sulfa antibiotics, Codeine, and Fentanyl       Data:   All  laboratory data reviewed:    Recent Labs   Lab Test 07/02/24  1033 05/01/24  1211 01/09/24  0908 07/11/23  2250 04/14/23  1309 06/23/21  0859 01/28/21  1133   LDL 52 34 39   < >  --    < >  --    HDL 57 49* 61   < >  --    < >  --    NHDL 68 60 53   < >  --    < >  --    CHOL 125 109 114   < >  --    < >  --    TRIG 79 128 69   < >  --    < >  --    TSH 1.02  --   --   --  0.46  --  0.66   IRON  --   --  78  --   --   --   --    FEB  --   --  309  --   --   --   --    IRONSAT  --   --  25  --   --   --   --    HANNAH  --   --  61  --   --   --   --     < > = values in this interval not displayed.       Lab Results   Component Value Date    WBC 6.4 08/22/2024    WBC 8.2 01/28/2021    RBC 3.61 (L) 08/22/2024    RBC 4.51 01/28/2021    HGB 13.5 08/22/2024    HGB 13.8 01/28/2021    HCT 39.5 08/22/2024    HCT 42.0 01/28/2021     (H) 08/22/2024    MCV 93 01/28/2021    MCH 37.4 (H) 08/22/2024    MCH 30.6 01/28/2021    MCHC 34.2 08/22/2024    MCHC 32.9 01/28/2021    RDW 10.9 08/22/2024    RDW 12.5 01/28/2021     08/22/2024     01/28/2021       Lab Results   Component Value Date     08/22/2024     01/28/2021    POTASSIUM 3.8 08/22/2024    POTASSIUM 3.7 02/11/2022    POTASSIUM 3.9 01/28/2021    CHLORIDE 101 08/22/2024    CHLORIDE 108 02/11/2022    CHLORIDE 103 01/28/2021    CO2 33 (H) 08/22/2024    CO2 31 02/11/2022    CO2 34 (H) 01/28/2021    ANIONGAP 5 (L) 08/22/2024    ANIONGAP 1 (L) 02/11/2022    ANIONGAP 3 01/28/2021     (H) 08/22/2024    GLC 95 07/12/2023    GLC 90 02/11/2022    GLC 91 01/28/2021    BUN 12.1 08/22/2024    BUN 15 02/11/2022    BUN 15 01/28/2021    CR 0.86 08/22/2024    CR 0.76 01/28/2021    GFRESTIMATED 66 08/22/2024    GFRESTIMATED >60 08/10/2022    GFRESTIMATED 74 01/28/2021    GFRESTBLACK 85 01/28/2021    PRASHANTH 9.3 08/22/2024    PRASHANTH 9.2 01/28/2021      Lab Results   Component Value Date    AST 21 08/22/2024    AST 8 01/28/2021    ALT 16 08/22/2024    ALT 18 01/28/2021        Lab Results   Component Value Date    A1C 5.5 07/02/2024       Lab Results   Component Value Date    INR 1.19 (H) 02/06/2024    INR 1.21 (H) 01/09/2024    INR 1.13 04/10/2020         CAMMY CONNOR MD  [facility]

## 2024-09-13 NOTE — LETTER
"9/13/2024    Wolfgang Zimmerman MD  [address] [address]    RE: Debra Denise       Dear Colleague,     I had the pleasure of seeing Debra Denise in the [facility].    General Cardiology Clinic Progress Note  Debra Denise MRN# [id number]   YOB: 1940 Age: 83 year old       Reason for visit: Left arm weakness    History of presenting illness:    I had the opportunity to see Debra Denise at [facility] today for discussion of left arm weakness and potential need for transesophageal echocardiogram.    She is a very nice lady who was recently seen in the emergency room at [facility] with sudden onset of left arm weakness.  She was driving when this occurred.  Suddenly her left arm was \"useless\" but she was still able to continue driving home.  By the time she arrived home, the weakness had resolved.  The emergency room, concerned that this could have been a stroke and there were no abnormalities identified on exam there or on her brain MRI.  Neurology was consulted and suggested continuing her atorvastatin and resuming her Plavix due to concerns that this could have been a TIA or stroke.  Neurology follow-up is arranged for later this fall.  There was also discussion of a possible EDITA to evaluate for source of stroke.  I reviewed her transthoracic echocardiogram done 1 year ago at the time of a similar event of left arm weakness and it was entirely normal.  She has had a coronary angiogram in the past in August 2022 which was completely normal.  She has no history of any vascular disease.    She has not had any recurrent left arm symptoms since that episode on 8/22/2024.  She has occasional mild lightheadedness from time to time throughout the day.  She has been taking valsartan hydrochlorothiazide 80/12.5 mg daily plus a separate hydrochlorothiazide 12.5 mg daily up until a few weeks ago and stop the " additional hydrochlorothiazide at that time.  Her blood pressures have been well-controlled.  Her cholesterol numbers are excellent on a low-dose of atorvastatin 10 mg daily which was started due to a previous episode of left arm weakness a year ago, with concerns that that could have been a TIA.    She also has thrombocytosis and is on hydroxyurea.  She takes Plavix 75 mg a day as antiplatelet therapy for this possible TIA issue.    On examination today her blood pressure is 133/66, heart rate 53, and weight 135 pounds.  Her lungs are clear.  Heart rhythm is regular.  She has no murmur.              Assessment and Plan:     ASSESSMENT:    Ms. Debra Denise is an 83-year-old woman with 2 episodes of sudden onset of left arm weakness  by a year.  Each episode resolved spontaneously within a few minutes and was not associated with any other neurologic issues.  Recent brain MRI was normal.  Her echocardiogram is normal.  She had a coronary angiogram showing no atherosclerotic disease whatsoever.  That certainly makes the likelihood of cerebral atherosclerotic disease less likely.  Indeed, this does not sound like a TIA with recurrent identical symptoms.  Without any objective evidence of cerebral embolic events, I am not inclined to recommend a transesophageal echocardiogram for evaluation of a source of embolic stroke.  I would recommend that she follow-up with neurology to discuss this further.  If her neurologist recommends a transesophageal echocardiogram as part of this workup, we would be happy to provide that procedure for her, but for now I would recommend waiting for that opinion.    She has some mild lightheadedness from time to time and I wonder whether this might be from her blood pressure medications and possibly her diuretic.  I suggested she stop the hydrochlorothiazide altogether and just take valsartan 80 mg a day for her blood pressure control and follow-up with Dr. Payne to discuss  that issue further.    No further cardiology follow-up is needed for now.  I encouraged her to continue her atorvastatin and Plavix until she meets with the neurologist and can discuss the need for those medications further.    Ministerio Crisostomo MD           Orders this Visit:  No orders of the defined types were placed in this encounter.    Orders Placed This Encounter   Medications     clopidogrel (PLAVIX) 75 MG tablet     Sig: Take 1 tablet (75 mg) by mouth daily.     Dispense:  30 tablet     Refill:  3     valsartan (DIOVAN) 80 MG tablet     Sig: Take 1 tablet (80 mg) by mouth daily.     Dispense:  90 tablet     Refill:  3     Medications Discontinued During This Encounter   Medication Reason     hydrochlorothiazide (MICROZIDE) 12.5 MG capsule      valsartan-hydrochlorothiazide (DIOVAN HCT) 160-25 MG tablet      clopidogrel (PLAVIX) 75 MG tablet Reorder (No AVS)       Today's clinic visit entailed:    45 minutes spent by me on the date of the encounter doing chart review, history and exam, documentation and further activities per the note  Provider  Link to [facility] Help Grid     The level of medical decision making during this visit was of high complexity.           Review of Systems:     Review of Systems:  Skin:  Negative     Eyes:  Negative    ENT:  Negative    Respiratory:  Negative    Cardiovascular:    Positive for;chest pain;lightheadedness;dizziness  Gastroenterology: Positive for    Genitourinary:  Negative    Musculoskeletal:  Negative    Neurologic:  Positive for migraine headaches;paralysis  Psychiatric:  Negative    Heme/Lymph/Imm:  Negative    Endocrine:  Negative              Physical Exam:     Vitals: /66   Pulse 53   Wt 61.2 kg (135 lb)   SpO2 98%   BMI 22.47 kg/m    Constitutional: Well nourished and in no apparent distress.  Eyes: Pupils equal, round. Sclerae anicteric.   HEENT: Normocephalic, atraumatic.   Neck: Supple. JVD   Respiratory: Breathing non-labored. Lungs clear to auscultation  bilaterally. No crackles, wheezes, rhonchi, or rales.  Cardiovascular:  Regular rate and rhythm, normal S1 and S2. No murmur, rub, or gallop.  Skin: Warm, dry. No rashes, cyanosis, or xanthelasma.  Extremities: No edema.  Neurologic: No gross motor deficits. Alert, awake, and oriented to person, place and time.  Psychiatric: Affect appropriate.             Medications:     Current Outpatient Medications   Medication Sig Dispense Refill     atorvastatin (LIPITOR) 10 MG tablet Take 1 tablet (10 mg) by mouth daily 90 tablet 3     Cholecalciferol (VITAMIN D3 PO) Take 2,000 Units by mouth daily        clopidogrel (PLAVIX) 75 MG tablet Take 1 tablet (75 mg) by mouth daily. 30 tablet 3     fluocinonide (LIDEX) 0.05 % external cream        gabapentin (NEURONTIN) 100 MG capsule TAKE 2 CAPSULES BY MOUTH AT BEDTIME 180 capsule 0     hydroxyurea (HYDREA) 500 MG capsule Take 1 capsule (500 mg) by mouth daily 30 capsule 4     multivitamin w/minerals (CENTRUM ADULTS) tablet Take 1 tablet by mouth daily       Probiotic Product (PROBIOTIC PO) Take 1 capsule by mouth daily       sodium chloride 0.9 % SOLN 250 mL with vedolizumab 60 MG/ML SOLR 300 mg infusionn Inject 300 mg into the vein once Every 8 weeks       valsartan (DIOVAN) 80 MG tablet Take 1 tablet (80 mg) by mouth daily. 90 tablet 3       Family History   Problem Relation Age of Onset     Cancer - colorectal Father      Lung Cancer Father      Macular Degeneration Father      Multiple myeloma Brother      Pacemaker Brother      Hypertension Mother      Cerebrovascular Disease Mother         from misdiagnosis of temporal arteritis     Suicide Sister      Hypertension Maternal Grandmother      Cerebrovascular Disease Maternal Grandmother      Diabetes Maternal Grandmother        Social History     Socioeconomic History     Marital status:      Spouse name: Not on file     Number of children: Not on file     Years of education: Not on file     Highest education level:  Not on file   Occupational History     Not on file   Tobacco Use     Smoking status: Never     Smokeless tobacco: Never     Tobacco comments:     None   Vaping Use     Vaping status: Never Used   Substance and Sexual Activity     Alcohol use: No     Drug use: No     Sexual activity: Not Currently     Partners: Male     Birth control/protection: None   Other Topics Concern     Parent/sibling w/ CABG, MI or angioplasty before 65F 55M? Not Asked   Social History Narrative     Not on file     Social Determinants of Health     Financial Resource Strain: Low Risk  (12/19/2023)    Financial Resource Strain      Within the past 12 months, have you or your family members you live with been unable to get utilities (heat, electricity) when it was really needed?: No   Food Insecurity: Low Risk  (12/19/2023)    Food Insecurity      Within the past 12 months, did you worry that your food would run out before you got money to buy more?: No      Within the past 12 months, did the food you bought just not last and you didn t have money to get more?: No   Transportation Needs: Low Risk  (12/19/2023)    Transportation Needs      Within the past 12 months, has lack of transportation kept you from medical appointments, getting your medicines, non-medical meetings or appointments, work, or from getting things that you need?: No   Physical Activity: Unknown (3/20/2023)    Received from [facility]    Exercise Vital Sign      Days of Exercise per Week: 0 days      Minutes of Exercise per Session: Not on file   Stress: No Stress Concern Present (3/20/2023)    Received from [facility]    [employer] - Occupational Stress Questionnaire      Feeling of Stress : Only a little   Social Connections: Socially Integrated (3/20/2023)    Received from [facility]    Social Connection and Isolation Panel [NHANES]      Frequency of Communication with Friends and Family: More than three times a week      Frequency of Social  Gatherings with Friends and Family: Once a week      Attends [employer] Services: More than 4 times per year      Active Member of Clubs or Organizations: Yes      Attends Club or Organization Meetings: Never      Marital Status:    Interpersonal Safety: Low Risk  (2024)    Interpersonal Safety      Do you feel physically and emotionally safe where you currently live?: Yes      Within the past 12 months, have you been hit, slapped, kicked or otherwise physically hurt by someone?: No      Within the past 12 months, have you been humiliated or emotionally abused in other ways by your partner or ex-partner?: No   Housing Stability: Low Risk  (2023)    Housing Stability      Do you have housing? : Yes      Are you worried about losing your housing?: No            Past Medical History:     Past Medical History:   Diagnosis Date     Autoimmune disease (H24) approx.     polymyagia rheumatica in remission for many years     Dupuytren contracture     finger     Hypertension      Kidney problem Stones  approx. 30 years ago     Osteopenia      Polymyalgia rheumatica (H24)      PONV (postoperative nausea and vomiting)      Proctitis      Reduced vision Sometime is a little blurry              Past Surgical History:     Past Surgical History:   Procedure Laterality Date     COLONOSCOPY  2014    Procedure: COMBINED COLONOSCOPY, SINGLE BIOPSY/POLYPECTOMY BY BIOPSY;  COLONOSCOPY;  Surgeon: Carol Ann Plasencia MD;  Location: [facility]     CV CORONARY ANGIOGRAM N/A 2022    Procedure: Coronary Angiogram;  Surgeon: Trevin Hawk MD;  Location:  HEART CARDIAC CATH LAB     ENT SURGERY      tonsilectomy, adenoidectomy     ESOPHAGOSCOPY, GASTROSCOPY, DUODENOSCOPY (EGD), COMBINED N/A 2023    Procedure: ESOPHAGOGASTRODUODENOSCOPY, WITH BIOPSY;  Surgeon: Angel Lara MD;  Location:  GI     GYN SURGERY  ,     x 2     HYSTERECTOMY       ORTHOPEDIC SURGERY  2004     (R) shoulder surgery for frozen shoulder     ZAC BSO  1988    for fibroids     TONSILLECTOMY  About 50 years ago              Allergies:   Sulfacetamide, Adhesive tape, Aspirin, Atenolol, Ibuprofen, Naproxen, Ramipril, Sulfa antibiotics, Codeine, and Fentanyl       Data:   All laboratory data reviewed:    Recent Labs   Lab Test 07/02/24  1033 05/01/24  1211 01/09/24  0908 07/11/23  2250 04/14/23  1309 06/23/21  0859 01/28/21  1133   LDL 52 34 39   < >  --    < >  --    HDL 57 49* 61   < >  --    < >  --    NHDL 68 60 53   < >  --    < >  --    CHOL 125 109 114   < >  --    < >  --    TRIG 79 128 69   < >  --    < >  --    TSH 1.02  --   --   --  0.46  --  0.66   IRON  --   --  78  --   --   --   --    FEB  --   --  309  --   --   --   --    IRONSAT  --   --  25  --   --   --   --    HANNAH  --   --  61  --   --   --   --     < > = values in this interval not displayed.       Lab Results   Component Value Date    WBC 6.4 08/22/2024    WBC 8.2 01/28/2021    RBC 3.61 (L) 08/22/2024    RBC 4.51 01/28/2021    HGB 13.5 08/22/2024    HGB 13.8 01/28/2021    HCT 39.5 08/22/2024    HCT 42.0 01/28/2021     (H) 08/22/2024    MCV 93 01/28/2021    MCH 37.4 (H) 08/22/2024    MCH 30.6 01/28/2021    MCHC 34.2 08/22/2024    MCHC 32.9 01/28/2021    RDW 10.9 08/22/2024    RDW 12.5 01/28/2021     08/22/2024     01/28/2021       Lab Results   Component Value Date     08/22/2024     01/28/2021    POTASSIUM 3.8 08/22/2024    POTASSIUM 3.7 02/11/2022    POTASSIUM 3.9 01/28/2021    CHLORIDE 101 08/22/2024    CHLORIDE 108 02/11/2022    CHLORIDE 103 01/28/2021    CO2 33 (H) 08/22/2024    CO2 31 02/11/2022    CO2 34 (H) 01/28/2021    ANIONGAP 5 (L) 08/22/2024    ANIONGAP 1 (L) 02/11/2022    ANIONGAP 3 01/28/2021     (H) 08/22/2024    GLC 95 07/12/2023    GLC 90 02/11/2022    GLC 91 01/28/2021    BUN 12.1 08/22/2024    BUN 15 02/11/2022    BUN 15 01/28/2021    CR 0.86 08/22/2024    CR 0.76 01/28/2021     GFRESTIMATED 66 08/22/2024    GFRESTIMATED >60 08/10/2022    GFRESTIMATED 74 01/28/2021    GFRESTBLACK 85 01/28/2021    PRASHANTH 9.3 08/22/2024    PRASHANTH 9.2 01/28/2021      Lab Results   Component Value Date    AST 21 08/22/2024    AST 8 01/28/2021    ALT 16 08/22/2024    ALT 18 01/28/2021       Lab Results   Component Value Date    A1C 5.5 07/02/2024       Lab Results   Component Value Date    INR 1.19 (H) 02/06/2024    INR 1.21 (H) 01/09/2024    INR 1.13 04/10/2020         CAMMY CONNOR MD  [facility]    Thank you for allowing me to participate in the care of your patient.      Sincerely,     CAMMY CONNOR MD     [facility]  cc:   No referring provider defined for this encounter.

## 2024-09-13 NOTE — PATIENT INSTRUCTIONS
It was a pleasure seeing you today and thank you for allowing me to be a part of your health care team.  Should you have any questions regarding your visit or future needs please feel free to reach out to my care team for assistance.      Thank you, Dr. Ministerio Crisostomo        **Nursing: [phone]       **Scheduling: [phone]

## 2024-09-26 ENCOUNTER — HOSPITAL ENCOUNTER (EMERGENCY)
Facility: CLINIC | Age: 84
Discharge: HOME | End: 2024-09-26
Attending: EMERGENCY MEDICINE | Admitting: EMERGENCY MEDICINE
Payer: COMMERCIAL

## 2024-09-26 VITALS
HEIGHT: 65 IN | OXYGEN SATURATION: 99 % | TEMPERATURE: 98.3 F | BODY MASS INDEX: 22.33 KG/M2 | DIASTOLIC BLOOD PRESSURE: 70 MMHG | WEIGHT: 134 LBS | SYSTOLIC BLOOD PRESSURE: 178 MMHG | RESPIRATION RATE: 15 BRPM | HEART RATE: 88 BPM

## 2024-09-26 DIAGNOSIS — I10 HYPERTENSION, UNSPECIFIED TYPE: Primary | ICD-10-CM

## 2024-09-26 LAB
ANION GAP SERPL CALCULATED.3IONS-SCNC: 10 MMOL/L (ref 7–15)
BASOPHILS # BLD AUTO: 0 10E3/UL (ref 0–0.2)
BASOPHILS NFR BLD AUTO: 0 %
BUN SERPL-MCNC: 9.8 MG/DL (ref 8–23)
CALCIUM SERPL-MCNC: 9.2 MG/DL (ref 8.8–10.4)
CHLORIDE SERPL-SCNC: 102 MMOL/L (ref 98–107)
CREAT SERPL-MCNC: 0.76 MG/DL (ref 0.51–0.95)
EGFRCR SERPLBLD CKD-EPI 2021: 77 ML/MIN/1.73M2
EOSINOPHIL # BLD AUTO: 0.1 10E3/UL (ref 0–0.7)
EOSINOPHIL NFR BLD AUTO: 2 %
ERYTHROCYTE [DISTWIDTH] IN BLOOD BY AUTOMATED COUNT: 10.6 % (ref 10–15)
GLUCOSE SERPL-MCNC: 106 MG/DL (ref 70–99)
HCO3 SERPL-SCNC: 25 MMOL/L (ref 22–29)
HCT VFR BLD AUTO: 37.4 % (ref 35–47)
HGB BLD-MCNC: 12.9 G/DL (ref 11.7–15.7)
IMM GRANULOCYTES # BLD: 0 10E3/UL
IMM GRANULOCYTES NFR BLD: 0 %
LYMPHOCYTES # BLD AUTO: 1.6 10E3/UL (ref 0.8–5.3)
LYMPHOCYTES NFR BLD AUTO: 22 %
MCH RBC QN AUTO: 35.6 PG (ref 26.5–33)
MCHC RBC AUTO-ENTMCNC: 34.5 G/DL (ref 31.5–36.5)
MCV RBC AUTO: 103 FL (ref 78–100)
MONOCYTES # BLD AUTO: 0.8 10E3/UL (ref 0–1.3)
MONOCYTES NFR BLD AUTO: 11 %
NEUTROPHILS # BLD AUTO: 4.6 10E3/UL (ref 1.6–8.3)
NEUTROPHILS NFR BLD AUTO: 64 %
NRBC # BLD AUTO: 0 10E3/UL
NRBC BLD AUTO-RTO: 0 /100
PLATELET # BLD AUTO: 224 10E3/UL (ref 150–450)
POTASSIUM SERPL-SCNC: 4.2 MMOL/L (ref 3.4–5.3)
RBC # BLD AUTO: 3.62 10E6/UL (ref 3.8–5.2)
SODIUM SERPL-SCNC: 137 MMOL/L (ref 135–145)
TROPONIN T SERPL HS-MCNC: 11 NG/L
WBC # BLD AUTO: 7.3 10E3/UL (ref 4–11)

## 2024-09-26 PROCEDURE — 36415 COLL VENOUS BLD VENIPUNCTURE: CPT | Performed by: EMERGENCY MEDICINE

## 2024-09-26 PROCEDURE — 84484 ASSAY OF TROPONIN QUANT: CPT | Performed by: EMERGENCY MEDICINE

## 2024-09-26 PROCEDURE — 99284 EMERGENCY DEPT VISIT MOD MDM: CPT

## 2024-09-26 PROCEDURE — 85025 COMPLETE CBC W/AUTO DIFF WBC: CPT | Performed by: EMERGENCY MEDICINE

## 2024-09-26 PROCEDURE — 93005 ELECTROCARDIOGRAM TRACING: CPT

## 2024-09-26 PROCEDURE — 80048 BASIC METABOLIC PNL TOTAL CA: CPT | Performed by: EMERGENCY MEDICINE

## 2024-09-26 ASSESSMENT — ACTIVITIES OF DAILY LIVING (ADL): ADLS_ACUITY_SCORE: 35

## 2024-09-26 NOTE — ED PROVIDER NOTES
Emergency Department Note      History of Present Illness     Chief Complaint   Hypertension (/)      HPI   Debra Denise is a 83 year old female with history of hypertension, hyperlipidemia, polymyalgia rheumatica, TIA, and thrombocytopenia who presents to the ED with her  for evaluation of hypertension. Patient presents due to concerns for hypertension up to 220 systolic. She was seen at Urgent Care due to an associated severe headache. Debra states her headache worsens when her blood pressure in too high. She reports her blood pressure was at 210 systolic last night. She called her son, who is a nephrologist, and he told her to take more volsartan with no relief. Blood pressure was still over 200 this afternoon which prompted presentation to the ED. She does have an intermittent headache today. No chest pain, shortness of breath, numbness or tingling in the extremities, diplopia, blurry vision, abdominal pain, vomiting, or diarrhea. Patient called Dr. Zimmerman's Office and was referred to the ED for further work up and evaluation. She notes two episodes of left arm numbness last year and then last month which required a medication change. She has follow up scheduled tomorrow.             Independent Historian   None    Review of External Notes   I reviewed Dr. Crisostomo's 9/13/24 Cardiology Office Visit Note regarding hypertension , hyperlipidemia, and TIA. Patient seen for left arm weakness. There was concern for a TIA or stroke and started on Plavix. History of blood pressure on hydrochlorothiazide and valsartan.    I reviewed Dr. Mai's 9/24/24 Outside ED Visit Note regarding headache and hypertension. CT Head was negative for hemorrhage, infarct, etc.     Past Medical History     Medical History and Problem List   Autoimmune disease   Dupuytren contracture  Hypertension  Osteopenia  Polymyalgia rheumatica   Nephrolithiasis  Idiopathic stabbing headache  Hyperlipidemia   Chronic ulcerative  "proctitis   TIA   Lower GI bleed  Atherosclerosis of coronary artery  Diverticular disease  Thrombocytopenia  Melanoma in situ   Spider veins  Tietze's disease  Ulcerative colitis  Chron's disease    Medications   Atorvastatin  Plavix  Gabapentin  Hydroxyurea  Valsartan   Vosol   Meclizine   Metoclopramide     Surgical History   Colonoscopy  Coronary angiogram  Tonsillectomy & adenoidectomy  EGD, combined   x2   Right shoulder surgery  Total abdominal hysterectomy   Bilateral salpingo oopohorectomy  Endovenous ablation      Physical Exam     Patient Vitals for the past 24 hrs:   BP Temp Temp src Pulse Resp SpO2 Height Weight   24 1628 (!) 178/70 -- -- -- -- -- -- --   24 1545 (!) 153/68 -- -- -- -- -- -- --   24 1517 (!) 166/72 98.3  F (36.8  C) Temporal 88 15 99 % 1.651 m (5' 5\") 60.8 kg (134 lb)     Physical Exam  General: Alert, appears well-developed and well-nourished. Cooperative.     In mild distress  HEENT:  Head:  Atraumatic  Ears:  External ears are normal  Mouth/Throat:  Oropharynx is without erythema or exudate and mucous membranes are moist.   Eyes:   Conjunctivae normal and EOM are normal. No scleral icterus.    Pupils are equal, round, and reactive to light.   CV:  Normal rate, regular rhythm, normal heart sounds and radial pulses are 2+ and symmetric.  No murmur.  Resp:  Breath sounds are clear bilaterally    Non-labored, no retractions or accessory muscle use  GI:  Abdomen is soft, no distension, no tenderness. No rebound or guarding.  No CVA tenderness bilaterally  MS:  Normal range of motion. No edema.    Normal strength in all 4 extremities.     Back atraumatic.    No midline cervical, thoracic, or lumbar tenderness  Skin:  Warm and dry.  No rash or lesions noted.  Neuro:   Alert. Normal strength.  Sensation intact in all 4 extremities. GCS: 15    Cranial nerves 2-12 intact.  Psych: Normal mood and affect.    Diagnostics     Lab Results   Labs Ordered and Resulted from " Time of ED Arrival to Time of ED Departure   BASIC METABOLIC PANEL - Abnormal       Result Value    Sodium 137      Potassium 4.2      Chloride 102      Carbon Dioxide (CO2) 25      Anion Gap 10      Urea Nitrogen 9.8      Creatinine 0.76      GFR Estimate 77      Calcium 9.2      Glucose 106 (*)    CBC WITH PLATELETS AND DIFFERENTIAL - Abnormal    WBC Count 7.3      RBC Count 3.62 (*)     Hemoglobin 12.9      Hematocrit 37.4       (*)     MCH 35.6 (*)     MCHC 34.5      RDW 10.6      Platelet Count 224      % Neutrophils 64      % Lymphocytes 22      % Monocytes 11      % Eosinophils 2      % Basophils 0      % Immature Granulocytes 0      NRBCs per 100 WBC 0      Absolute Neutrophils 4.6      Absolute Lymphocytes 1.6      Absolute Monocytes 0.8      Absolute Eosinophils 0.1      Absolute Basophils 0.0      Absolute Immature Granulocytes 0.0      Absolute NRBCs 0.0     TROPONIN T, HIGH SENSITIVITY - Normal    Troponin T, High Sensitivity 11         Imaging   No orders to display       EKG   ECG results from 09/26/24   EKG 12-lead, tracing only     Value    Systolic Blood Pressure     Diastolic Blood Pressure     Ventricular Rate 50    Atrial Rate 50    SC Interval 144    QRS Duration 80        QTc 430    P Axis 53    R AXIS 11    T Axis 71    Interpretation ECG      Sinus bradycardia  Possible Left atrial enlargement  Borderline ECG  When compared with ECG of 22-Aug-2024 10:54,  SC interval has increased          Independent Interpretation   None    ED Course      Medications Administered   Medications - No data to display    Procedures   Procedures     Discussion of Management   None    ED Course   ED Course as of 09/26/24 1800   Thu Sep 26, 2024   1534 I obtained history and examined the patient as noted above.    1628 I rechecked the patient and explained findings.        Additional Documentation  None    Medical Decision Making / Diagnosis     CMS Diagnoses: None    MIPS       None    MDM    Debra Denise is a 83 year old female who presents for evaluation of elevated blood pressure. There is an extensive history of hypertension in the past.  The workup here is negative and the patient does not have any clinical, laboratory, ecg or historical signs of end-organ dysfunction.  There is no signs of hypertensive emergency or urgency.  Supportive outpatient management is therefore indicated with close follow-up of primary care physician. Given data obtained here in ED, will continue outpatient antihypertensive medication regimen.  She has an appointment with primary care tomorrow to discuss further antihypertensive therapies.  After all questions answered and return precautions understood, discharged home.     Disposition   The patient was discharged.     Diagnosis     ICD-10-CM    1. Hypertension, unspecified type  I10            Discharge Medications   Discharge Medication List as of 9/26/2024  4:28 PM            Scribe Disclosure:  I, Jackie Underwood, am serving as a scribe at 4:36 PM on 9/26/2024 to document services personally performed by Angel Alcantar MD based on my observations and the provider's statements to me.        Angel Alcantar MD  09/26/24 1821

## 2024-09-27 ENCOUNTER — OFFICE VISIT (OUTPATIENT)
Dept: FAMILY MEDICINE | Facility: CLINIC | Age: 84
End: 2024-09-27
Payer: COMMERCIAL

## 2024-09-27 VITALS
OXYGEN SATURATION: 97 % | WEIGHT: 133.4 LBS | HEART RATE: 65 BPM | BODY MASS INDEX: 22.2 KG/M2 | SYSTOLIC BLOOD PRESSURE: 131 MMHG | DIASTOLIC BLOOD PRESSURE: 66 MMHG

## 2024-09-27 DIAGNOSIS — I10 BENIGN ESSENTIAL HYPERTENSION: Primary | ICD-10-CM

## 2024-09-27 LAB
ATRIAL RATE - MUSE: 50 BPM
DIASTOLIC BLOOD PRESSURE - MUSE: NORMAL MMHG
INTERPRETATION ECG - MUSE: NORMAL
P AXIS - MUSE: 53 DEGREES
PR INTERVAL - MUSE: 144 MS
QRS DURATION - MUSE: 80 MS
QT - MUSE: 472 MS
QTC - MUSE: 430 MS
R AXIS - MUSE: 11 DEGREES
SYSTOLIC BLOOD PRESSURE - MUSE: NORMAL MMHG
T AXIS - MUSE: 71 DEGREES
VENTRICULAR RATE- MUSE: 50 BPM

## 2024-09-27 PROCEDURE — G2211 COMPLEX E/M VISIT ADD ON: HCPCS | Performed by: FAMILY MEDICINE

## 2024-09-27 PROCEDURE — 99214 OFFICE O/P EST MOD 30 MIN: CPT | Performed by: FAMILY MEDICINE

## 2024-09-27 RX ORDER — AMLODIPINE BESYLATE 2.5 MG/1
TABLET ORAL
Qty: 60 TABLET | Refills: 1 | Status: SHIPPED | OUTPATIENT
Start: 2024-09-27

## 2024-09-27 NOTE — PROGRESS NOTES
Assessment & Plan     Benign essential hypertension  Take valsartan 80 mg one/day. To start, nvasc 2.5 mg one po bid  If tolerated/helpful, switch to nvasc 5 mg one/day    At her behest I messaged usual MD our plan    If continues labile htn consier adrenal gland eval    She'll keep bp list to show at visits  - amLODIPine (NORVASC) 2.5 MG tablet; Take one po bid    The longitudinal plan of care for the diagnosis(es)/condition(s) as documented were addressed during this visit. Due to the added complexity in care, I will continue to support Debra in the subsequent management and with ongoing continuity of care.Review of external notes as documented elsewhere in note  33 minutes spent by me on the date of the encounter doing chart review, history and exam, documentation and further activities per the note  Computer system crashed after note done day of visit, before I could sign that day  MED REC REQUIRED{  Post Medication Reconciliation Status:       No follow-ups on file.    Dax Richardson is a 83 year old, presenting for the following health issues:  Follow Up (ED follow up )      9/27/2024     1:20 PM   Additional Questions   Roomed by MR NICHOLSON   Labile htn lately    ER/[facility] visits noted    Has been on arb with and without hydrochlorothiazide    Yesterday tried husbands norvasc; bp came down, tolerated so far    Son renal MD reviews her case w/ her too    No c/o now    She doubts HALEY, discussed s/s    Unlikely KOLBY creat ok on arb    Discussed if persists consider adrenal eval (caio, pheo)    Sometimes when BP spikes, gets a HA, not every time    Has long term dizzy spells, and brief L arm weakness episodes, for which she sees Neuro in a mo    Past Medical History:   Diagnosis Date    Autoimmune disease (H) approx. 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 30 years ago    Osteopenia     Polymyalgia  rheumatica (H)     PONV (postoperative nausea and vomiting)     Proctitis     Reduced vision Sometime is a little blurry     Past Surgical History:   Procedure Laterality Date    COLONOSCOPY  2014    Procedure: COMBINED COLONOSCOPY, SINGLE BIOPSY/POLYPECTOMY BY BIOPSY;  COLONOSCOPY;  Surgeon: Carol Ann Plasencia MD;  Location:  GI    CV CORONARY ANGIOGRAM N/A 2022    Procedure: Coronary Angiogram;  Surgeon: Trevin Hawk MD;  Location:  [facility]    ENT SURGERY      tonsilectomy, adenoidectomy    ESOPHAGOSCOPY, GASTROSCOPY, DUODENOSCOPY (EGD), COMBINED N/A 2023    Procedure: ESOPHAGOGASTRODUODENOSCOPY, WITH BIOPSY;  Surgeon: Angel Lara MD;  Location:  GI    GYN SURGERY  ,     x 2    HYSTERECTOMY      ORTHOPEDIC SURGERY  2004    (R) shoulder surgery for frozen shoulder    ZAC BSO      for fibroids    TONSILLECTOMY  About 50 years ago     Current Outpatient Medications   Medication Sig Dispense Refill    amLODIPine (NORVASC) 2.5 MG tablet Take one po bid 60 tablet 1    atorvastatin (LIPITOR) 10 MG tablet Take 1 tablet (10 mg) by mouth daily 90 tablet 3    Cholecalciferol (VITAMIN D3 PO) Take 2,000 Units by mouth daily       clopidogrel (PLAVIX) 75 MG tablet Take 1 tablet (75 mg) by mouth daily. 30 tablet 3    fluocinonide (LIDEX) 0.05 % external cream       gabapentin (NEURONTIN) 100 MG capsule TAKE 2 CAPSULES BY MOUTH AT BEDTIME 180 capsule 0    hydroxyurea (HYDREA) 500 MG capsule Take 1 capsule (500 mg) by mouth daily 30 capsule 4    multivitamin w/minerals (CENTRUM ADULTS) tablet Take 1 tablet by mouth daily      Probiotic Product (PROBIOTIC PO) Take 1 capsule by mouth daily      sodium chloride 0.9 % SOLN 250 mL with vedolizumab 60 MG/ML SOLR 300 mg infusionn Inject 300 mg into the vein once Every 8 weeks      valsartan (DIOVAN) 80 MG tablet Take 1 tablet (80 mg) by mouth daily. 90 tablet 3     No current  facility-administered medications for this visit.     Allergies   Allergen Reactions    Sulfacetamide Hives    Adhesive Tape Rash    Aspirin      Other Reaction(s): Stomach issues    Atenolol      Other reaction(s): Intolerance-Can't Take  Fatigue, GI intolerance    Ibuprofen      Other reaction(s): Stomach Upset  4-9-13 tele encounter  Other Reaction(s): GI intolerance       Naproxen      Other reaction(s): Stomach Upset  4-9-13 tele encounter  Other Reaction(s): GI intolerance    Ramipril Cough     Other reaction(s): Intolerance-Can't Take  Other Reaction(s): GI intolerance    Sulfa Antibiotics     Codeine Nausea    Fentanyl Nausea     Family History   Problem Relation Age of Onset    Cancer - colorectal Father     Lung Cancer Father     Macular Degeneration Father     Multiple myeloma Brother     Pacemaker Brother     Hypertension Mother     Cerebrovascular Disease Mother         from misdiagnosis of temporal arteritis    Suicide Sister     Hypertension Maternal Grandmother     Cerebrovascular Disease Maternal Grandmother     Diabetes Maternal Grandmother      Social History     Socioeconomic History    Marital status:      Spouse name: Not on file    Number of children: Not on file    Years of education: Not on file    Highest education level: Not on file   Occupational History    Not on file   Tobacco Use    Smoking status: Never    Smokeless tobacco: Never    Tobacco comments:     None   Vaping Use    Vaping status: Never Used   Substance and Sexual Activity    Alcohol use: No    Drug use: No    Sexual activity: Not Currently     Partners: Male     Birth control/protection: None   Other Topics Concern    Parent/sibling w/ CABG, MI or angioplasty before 65F 55M? Not Asked   Social History Narrative    Not on file     Social Determinants of Health     Financial Resource Strain: Low Risk  (9/23/2024)    Received from [facility]    Financial Resource Strain     Difficulty  of Paying Living Expenses: 3     Difficulty of Paying Living Expenses: Not on file   Food Insecurity: No Food Insecurity (9/23/2024)    Received from [facility]    Food Insecurity     Worried About Running Out of Food in the Last Year: 1   Transportation Needs: No Transportation Needs (9/23/2024)    Received from [facility]    Transportation Needs     Lack of Transportation (Medical): 1   Physical Activity: Unknown (3/20/2023)    Received from [facility]    Exercise Vital Sign     Days of Exercise per Week: 0 days     Minutes of Exercise per Session: Not on file   Stress: No Stress Concern Present (3/20/2023)    Received from [facility]    [employer] - Occupational Stress Questionnaire     Feeling of Stress : Only a little   Social Connections: Socially Integrated (9/23/2024)    Received from [facility]    Social Connections     Frequency of Communication with Friends and Family: 0   Interpersonal Safety: Low Risk  (7/2/2024)    Interpersonal Safety     Do you feel physically and emotionally safe where you currently live?: Yes     Within the past 12 months, have you been hit, slapped, kicked or otherwise physically hurt by someone?: No     Within the past 12 months, have you been humiliated or emotionally abused in other ways by your partner or ex-partner?: No   Housing Stability: Low Risk  (9/23/2024)    Received from [facility]    Housing Stability     Unable to Pay for Housing in the Last Year: 1             Objective    /66 (BP Location: Right arm, Patient Position: Sitting, Cuff Size: Adult Regular)   Pulse 65   Wt 60.5 kg (133 lb 6.4 oz)   SpO2 97%   BMI 22.20 kg/m    Body mass index is 22.2 kg/m .  Physical Exam   GENERAL: alert and no distress  MS: no gross musculoskeletal defects noted, no edema            Signed Electronically by:  Herman Granados MD

## 2024-09-30 ENCOUNTER — TRANSFERRED RECORDS (OUTPATIENT)
Dept: HEALTH INFORMATION MANAGEMENT | Facility: CLINIC | Age: 84
End: 2024-09-30
Payer: COMMERCIAL

## 2024-10-01 ENCOUNTER — TRANSFERRED RECORDS (OUTPATIENT)
Dept: HEALTH INFORMATION MANAGEMENT | Facility: CLINIC | Age: 84
End: 2024-10-01
Payer: COMMERCIAL

## 2024-10-01 LAB
ALT SERPL-CCNC: 21 IU/L (ref 0–32)
AST SERPL-CCNC: 22 IU/L (ref 0–40)

## 2024-10-04 NOTE — CONFIDENTIAL NOTE
Reason for visit: TIA (transient ischemic attack)    Referring Provider: Mayra Sanchez CNP    Office Visit Notes: 08/22/2024         IMAGING   STATUS/LOCATION   DATE/TYPE   MRI/MRA Internal - PACS 08/22/2024, 07/11/2023, 08/10/2022, 0924/2020   CT/CTA External- PACS 09/24/2024   LABS PACS    EEG     EMG     NEUOROPSYCH TEST:       NOTES:   STATUS/LOCATION   DATE/TYPE

## 2024-10-07 ENCOUNTER — TELEPHONE (OUTPATIENT)
Dept: INTERNAL MEDICINE | Facility: CLINIC | Age: 84
End: 2024-10-07
Payer: COMMERCIAL

## 2024-10-07 NOTE — TELEPHONE ENCOUNTER
[facility] Call Center    Phone Message    May a detailed message be left on voicemail: yes     Reason for Call: Other: Per spouse would like to say that pt and him would like to only have pt see  and if possible asap. Per pt has gone to the ED 2 times back to back due to her BP not being stable and BP not going down at all. Per Spouse pt went to see her eye doctor and he saw that pt has blood near her eye retinal and should be looked at asap. Per pt Bp has not gone down at all. 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 Per spouse and pt is willing anytime this week and time. Per spouse and pt cannot wait for the 10/21/24. Please call spouse back first and if he doesn't answer please call pt. Per pt and spouse would like a call back today if possible, Please and thank you!      Action Taken: Message routed to:  [facility]    Travel Screening: Not Applicable     Date of Service:

## 2024-10-07 NOTE — TELEPHONE ENCOUNTER
Patient confirmed scheduled appointment:  Date: 10/9/2024  Time: 4:15PM  Visit type: TELEPHONE RETURN  Provider: PCP    Additional notes: Per spouse, Pt has gone to the ED 2x due to unstable + elevated BP, pt went to eye doctor and states the eye dr saw that pt has blood near her eye retinal and should be looked at asap. 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 Per spouse pt cannot wait for the 10/21/24 appointment.    Pt refused scheduling with any other provider or going to urgent care in the mean time.

## 2024-10-08 ASSESSMENT — ASTHMA QUESTIONNAIRES
QUESTION_2 LAST FOUR WEEKS HOW OFTEN HAVE YOU HAD SHORTNESS OF BREATH: NOT AT ALL
ACT_TOTALSCORE: 25
QUESTION_4 LAST FOUR WEEKS HOW OFTEN HAVE YOU USED YOUR RESCUE INHALER OR NEBULIZER MEDICATION (SUCH AS ALBUTEROL): NOT AT ALL
QUESTION_5 LAST FOUR WEEKS HOW WOULD YOU RATE YOUR ASTHMA CONTROL: COMPLETELY CONTROLLED
ACT_TOTALSCORE: 25
QUESTION_1 LAST FOUR WEEKS HOW MUCH OF THE TIME DID YOUR ASTHMA KEEP YOU FROM GETTING AS MUCH DONE AT WORK, SCHOOL OR AT HOME: NONE OF THE TIME
QUESTION_3 LAST FOUR WEEKS HOW OFTEN DID YOUR ASTHMA SYMPTOMS (WHEEZING, COUGHING, SHORTNESS OF BREATH, CHEST TIGHTNESS OR PAIN) WAKE YOU UP AT NIGHT OR EARLIER THAN USUAL IN THE MORNING: NOT AT ALL

## 2024-10-08 NOTE — PROGRESS NOTES
Telephone visit    Virtual Visit Details    Type of service:  Telephone Visit   Phone call duration: 12 minutes     Ms. Padilla agrees to a telephone visit    Neurology appt. Scheduled for 10/31/2024. She has h/o TIA symptoms and is on Plavix and Atorvastatin. See ED note from 8/22/2024. She had MRI/MRA imaging 8/22/2024 that were essentially normal. She states she was driving and had L arm numbness (she had this before with a presumed TIA). She is on Plavix. She has some dizziness, mild head aches, and anxiety. She has not had any further neurological sxs. She is concerned about medications causing her head aches and dizziness and will have her see ANTONIO Pereyra. She has a follow up with me 10/21/2024.     Scanned in note from Dr. Damian, Rheumatology from 9/30/2024 for PMR management    Seen in the ED 9/26/2024 and 9/24/2024 for HTN and TIA?  and then followed up with Dr. Granados 9/27/2024. She is on Amlodipine and Valsartan     Cardiology appt. With Dr. Sanders 9/13/2024. She had a 14 day Ziopatch monitor 8/23/2024 no afib, sxs correlated with NSR    She is on hydroxyurea for CHANDNI-2 thrombosis and was seen 5/2024 by Dr. Milner, Hematology

## 2024-10-09 ENCOUNTER — VIRTUAL VISIT (OUTPATIENT)
Dept: INTERNAL MEDICINE | Facility: CLINIC | Age: 84
End: 2024-10-09
Payer: COMMERCIAL

## 2024-10-09 DIAGNOSIS — R42 DIZZINESS: Primary | ICD-10-CM

## 2024-10-09 PROCEDURE — 99212 OFFICE O/P EST SF 10 MIN: CPT | Performed by: INTERNAL MEDICINE

## 2024-10-09 NOTE — NURSING NOTE
Current patient location: [address] [place] [address] [id number]  [facility] [address]    Is the patient currently in the state of [place]? YES    Visit mode:TELEPHONE    If the visit is dropped, the patient can be reconnected by: TELEPHONE VISIT: Phone number:   Telephone Information:   Mobile [phone]       Will anyone else be joining the visit? Yes,  listening  (If patient encounters technical issues they should call [phone]    Are changes needed to the allergy or medication list? Yes Pt taking 2 of Valsartan, Amlodipine in the evening 1-2 tablets depending on bp, Hydroxyurea now M/W/Friday (3x weekly, not daily).Pt on Fluticasone Propionate nasal spray x2 both nostrils daily    Are refills needed on medications prescribed by this physician? Discuss with provider, unsure what  needs refills    Rooming Documentation:  Questionnaire(s) completed    Reason for visit: RECHECK and Hypertension (/)    JUAN CARLOS NAIR

## 2024-10-11 NOTE — PROGRESS NOTES
Medication Therapy Management (MTM) Encounter    ASSESSMENT:                            Medication Adherence/Access: No issues identified.    1. Resistant hypertension  Uncontrolled. Pt has been admitted to the ED several times for hypertensive emergency in the last month. Based on chart review, it seems these incidences started occurring after hydrochlorothiazide was discontinued. Could consider restarting this but recommend discussing with ordering providers. Patient is also adjusting her dose of amlodipine from day to day depending on her blood pressure. Given the very long half life of amlodipine (~40 hours) and tmax of 6-12 hours, it is not likely that dose adjustments in the moment are very helpful. Would be best to have patient take a consistent regimen while monitoring blood pressure closely and can then adjust doses as needed based on results.    2. TIA (transient ischemic attack)  Stable on current regimen    3. Lumbar pain/insomnia  Patient is complaining of dizziness that she thinks may be due to gabapentin. Agree with doing trial off gabapentin to see if dizziness improves. Could consider lower dose of gabapentin (100 mg daily), melatonin or trazodone as safer alternatives if needed. However would strongly encourage patient evaluate her sleep hygiene first since most sedative medications are fairly unsafe based on her age.    4. Thrombocytopenia (H)  Stable on current regimen. Hydroxyurea can cause dizziness/headaches as 10-20% of patients. However this medication is necessary for her to take so would advise discussing with heme/onc if she prefers to try alternative therapy.    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 Takes dietary supplements  Stable on current regimen      PLAN:                            1. Amlodipine is a very slow acting medication so adjusting your dose from day to day based on your blood pressure may not be very helpful. I think  consistency would be most helpful for you right now - taking 5 mg of amlodipine every day with 160 mg of valsartan would be my recommendation  2. You could try melatonin again at bedtime to see if still causes nightmares - most other sleep medications hold a risk of dizziness unfortunately. Trazodone is a fairly safe option but it does cause dizziness/confusion in some patients.  3. Could try taking a lower dose of gabapentin (100 mg) at bedtime  4. I attached a handout to your after visit summary that discusses sleep hygiene in detail  5. Gabapentin, hydroxyurea, and Entyvio all have the potential to cause dizziness. Hydroxyurea also can cause headaches.    Follow-up: with your PCP as scheduled    SUBJECTIVE/OBJECTIVE:                          Debra Denise is a 83 year old female seen for an initial visit. She was referred to me from Wolfgang Zimmerman MD.      Reason for visit: provider would like meds reviewed for dizziness/headaches    Allergies/ADRs: Reviewed in chart  Past Medical History: Reviewed in chart  Tobacco: She reports that she has never smoked. She has never used smokeless tobacco.  Alcohol: not currently using    Other: her son is nephrologist in [place]    Medication Adherence/Access: dispense history from [facility] is very spotty.  Hypertension   Amlodipine 2.5 mg daily - new on 9/27 after ED  Valsartan 160 mg once daily    Med history:  Hydrochlorothiazide 12.5 mg daily  - stopped in September, she thought someone stopped it but isn't sure who  Valsartan/hydrochlorothiazide - switched to valsartan due to hypotension    Patient is having issues with hypertension  Blood pressure last night was 220 mmHg systolic so she went to the ED  Her blood pressure came down a little in the ED so she was sent home  She has had 7.5 mg daily of amlodipine since last night  Blood pressure this morning is down to 166/86 mmHg  This was her third time in the ED for hypertension in the last month  She is not  sure why this is happening all of a sudden  Only recent med changes were stopping hydrochlorothiazide  She stopped taking it around September      Prior TIA:   Clopidogrel 75 mg daily  Atorvastatin 10 mg every other day    Pt initially told she had history of TIAs but reports cardiologist recently ruled this out  She does not want to take atorvastatin daily because she doesn't think she had TIAs  She is comfortable taking every other day for now  Is willing to keep taking clopidogrel until she meets with neurology  Denies abnormal bleeding/bruising  No history of GI ulcer    Insomnia/back pain:  Gabapentin 200 mg at bedtime    Pt initially started taking this for back pain  However now she is taking it mostly for sleep  Feels it helps her fall asleep at night  She is also complaining of dizziness  She plans to stop taking this for a few days to see if it may be the cause  She is wondering if there are other things she could take for sleep that are safer    CHANDNI-2 thrombocytopenia:  Hydroxyurea 500 mg three times weekly     Has been taking this for over a year now  Follows with heme/onc   Noticing new onset of headaches and dizziness lately  Discussed these are common side effects from hydroxyurea    Ulcerative proctitis:  Entyvio 300 mg once every 6 weeks  Probiotic once daily    Follows with GI  Denies concerns with treatment at this time    Supplements:  Vitamin D 2000 units daily   Multivitamin once daily    ----------------  Post Discharge Medication Reconciliation Status: medication reconcilation previously completed during another office visit.    I spent 46 minutes with this patient today. All changes were made via collaborative practice agreement with Wolfgang Zimmerman MD. A copy of the visit note was provided to the patient's provider(s).    A summary of these recommendations was sent via [employer].    Casper Rahman, PharmD, BCACP  Medication Therapy Management Provider  [phone]    Telemedicine Visit  Details  The patient's medications can be safely assessed via a telemedicine encounter.  Type of service:  Telephone visit  Originating Location (pt. Location): Home    Distant Location (provider location):  On-site  Start Time: 11:05 AM  End Time: 11:51 AM     Medication Therapy Recommendations  HTN (hypertension)    Current Medication: amLODIPine (NORVASC) 2.5 MG tablet   Rationale: Incorrect administration - Adverse medication event - Safety   Recommendation: Provide Education - amLODIPine 5 MG tablet   Status: Accepted - no CPA Needed         Lumbar pain    Current Medication: gabapentin (NEURONTIN) 100 MG capsule   Rationale: Undesirable effect - Adverse medication event - Safety   Recommendation: Decrease Dose - gabapentin 100 MG capsule   Status: Accepted - no CPA Needed

## 2024-10-15 ENCOUNTER — VIRTUAL VISIT (OUTPATIENT)
Dept: PHARMACY | Facility: CLINIC | Age: 84
End: 2024-10-15
Attending: INTERNAL MEDICINE
Payer: COMMERCIAL

## 2024-10-15 ENCOUNTER — TELEPHONE (OUTPATIENT)
Dept: INTERNAL MEDICINE | Facility: CLINIC | Age: 84
End: 2024-10-15
Payer: COMMERCIAL

## 2024-10-15 DIAGNOSIS — Z78.9 TAKES DIETARY SUPPLEMENTS: ICD-10-CM

## 2024-10-15 DIAGNOSIS — I1A.0 RESISTANT HYPERTENSION: Primary | ICD-10-CM

## 2024-10-15 DIAGNOSIS — G45.9 TIA (TRANSIENT ISCHEMIC ATTACK): ICD-10-CM

## 2024-10-15 DIAGNOSIS — I10 BENIGN ESSENTIAL HYPERTENSION: Primary | ICD-10-CM

## 2024-10-15 DIAGNOSIS — M54.50 LUMBAR PAIN: ICD-10-CM

## 2024-10-15 DIAGNOSIS — D69.6 THROMBOCYTOPENIA (H): ICD-10-CM

## 2024-10-15 DIAGNOSIS — K51.20 CHRONIC ULCERATIVE PROCTITIS WITHOUT COMPLICATIONS (H): ICD-10-CM

## 2024-10-15 PROCEDURE — 99605 MTMS BY PHARM NP 15 MIN: CPT | Mod: 93 | Performed by: PHARMACIST

## 2024-10-15 PROCEDURE — 99607 MTMS BY PHARM ADDL 15 MIN: CPT | Mod: 93 | Performed by: PHARMACIST

## 2024-10-15 NOTE — LETTER
_  Medication List        Prepared on: Oct 15, 2024     Bring your Medication List when you go to the doctor, hospital, or   emergency room. And, share it with your family or caregivers.     Note any changes to how you take your medications.  Cross out medications when you no longer use them.    Medication How I take it Why I use it Prescriber   amLODIPine (NORVASC) 2.5 MG tablet Take one-two tablets by mouth daily High blood pressure  Herman Granados MD   atorvastatin (LIPITOR) 10 MG tablet Take 1 tablet (10 mg) by mouth every other day High cholesterol Wolfgang Zimmerman MD   Cholecalciferol (VITAMIN D3 PO) Take 2,000 Units by mouth daily   General health Patient Reported   clopidogrel (PLAVIX) 75 MG tablet Take 1 tablet (75 mg) by mouth daily. History of Transient Ischemic Attack Ministerio Crisostomo MD   fluocinonide (LIDEX) 0.05 % external cream  Apply as directed to affected areas Rash Patient Reported   gabapentin (NEURONTIN) 100 MG capsule TAKE 2 CAPSULES BY MOUTH AT BEDTIME Difficulty sleeping Wolfgang Zimmerman MD   hydroxyurea (HYDREA) 500 MG capsule Take 1 capsule (500 mg) by mouth three days per week Thrombocythemia Amos Milner MD   multivitamin w/minerals (CENTRUM ADULTS) tablet Take 1 tablet by mouth daily  General health Patient Reported   Probiotic Product (PROBIOTIC PO) Take 1 capsule by mouth daily  Gut health Patient Reported   sodium chloride 0.9 % SOLN 250 mL with vedolizumab 60 MG/ML SOLR 300 mg infusionn Inject 300 mg into the vein once. Every 6 weeks Chronic Ulcerative Proctitis Patient Reported   valsartan (DIOVAN) 80 MG tablet Take 1 tablet (80 mg) by mouth daily. High blood pressure  Ministerio Crisostomo MD         Add new medications, over-the-counter drugs, herbals, vitamins, or  minerals in the blank rows below.    Medication How I take it Why I use it Prescriber                                      Allergies:      - Sulfacetamide - Hives  - Adhesive Tape - Rash  - Aspirin  - Atenolol  -  Ibuprofen  - Naproxen  - Ramipril - Cough  - Sulfa Antibiotics  - Codeine - Nausea  - Fentanyl - Nausea        Side effects I have had:      Not on File        Other Information:              My notes and questions:

## 2024-10-15 NOTE — TELEPHONE ENCOUNTER
Patient scheduled US order via Imaging central scheduling for October 18th at [address] [place] [address]. Prep informed by imaging scheduling.

## 2024-10-15 NOTE — LETTER
October 15, 2024  Debra Denise  [id number]  [place] [facility] [place] [facility] [address]    Dear Ms. Denise, [facility]     Thank you for talking with me on Oct 15, 2024 about your health and medications. As a follow-up to our conversation, I have included two documents:      Your Recommended To-Do List has steps you should take to get the best results from your medications.  Your Medication List will help you keep track of your medications and how to take them.    If you want to talk about these documents, please call Casper Rahman RPH at phone: [phone], Monday-Friday 8-4:30pm.    I look forward to working with you and your doctors to make sure your medications work well for you.    Sincerely,  Casper Rahman RPH  [facility] Pharmacist, [employer]

## 2024-10-15 NOTE — TELEPHONE ENCOUNTER
[facility] Call Center    Phone Message    May a detailed message be left on voicemail: yes     Reason for Call: Other: Per spouse would like to let the team and Dr. Zimmerman know that pt went into the ED last night due to her BP being over 230 but now at this time it is back down and under control. Per spouse would like to mention that their son , he a Nephrologist, recommended for them to ask for a renal artery doppler test and to have that done asap. Per spouse would like to have that schedule and done before pt visit on Monday 10/21/24. Please calll spouse back asap to schedule and discuss. Thank you!      Action Taken: Message routed to:  [facility]    Travel Screening: Not Applicable     Date of Service:

## 2024-10-15 NOTE — PATIENT INSTRUCTIONS
Sixto Richardson, it was great talking with you today!     Recommendations from today's MTM visit:                                                      1. Amlodipine is a very slow acting medication so adjusting your dose from day to day based on your blood pressure may not be very helpful. I think consistency would be most helpful for you right now - taking 5 mg of amlodipine every day with 160 mg of valsartan would be my recommendation  2. You could try melatonin again at bedtime to see if still causes nightmares - most other sleep medications hold a risk of dizziness unfortunately. Trazodone is a fairly safe option but it does cause dizziness/confusion in some patients.  3. Could try taking a lower dose of gabapentin (100 mg) at bedtime  4. I attached a handout to your after visit summary that discusses sleep hygiene in detail  5. Gabapentin, hydroxyurea, and Entyvio all have the potential to cause dizziness. Hydroxyurea also can cause headaches.    I value your experience and would be very grateful for your time with providing feedback on our clinic survey. You may receive a survey via email or text message in the next few days.     To schedule another MTM appointment, please call the clinic directly or you may call the MTM scheduling line at [phone] or toll-free at [phone].     My Clinical Pharmacist's contact information:                                                      Please feel free to contact me with any questions or concerns you have.      Casper Rahman, PharmD, [facility]  Phone: [phone]

## 2024-10-15 NOTE — Clinical Note
OLY - saw patient for med review. We discussed a few things regarding her blood pressure and dizziness. See MTM plan for details. However patient was adamant that I ask you to place a renal ultrasound order for her because her son Wolfgang said she needs one. I told her this was very out of my scope of practice but she insisted that I send you message about it today. So sorry to bother you. Thank you for the referral! Casper

## 2024-10-15 NOTE — LETTER
"Recommended To-Do List      Prepared on: Oct 15, 2024       You can get the best results from your medications by completing the items on this \"To-Do List.\"      Bring your To-Do List when you go to your doctor. And, share it with your family or caregivers.    My To-Do List:  What we talked about: What I should do:   Amlodipine    Amlodipine is a very slow acting medication so adjusting your dose from day to day may not be very helpful. I think consistency would be most helpful for you right now - taking 5 mg of amlodipine every day with 160 mg of valsartan would be my recommendation          What we talked about: What I should do:   Gabapentin and dizziness    It is likely that gabapentin is contributing to your dizziness. I agree with holding this medication for 5-7 days and monitoring your dizziness.           What we talked about: What I should do:    Difficulty sleeping  Look over the provided sleep hygiene handout. If you continue to have issues falling asleep, you could retry melatonin, try lower dose of gabapentin, or try trazodone.              "

## 2024-10-17 ENCOUNTER — TRANSFERRED RECORDS (OUTPATIENT)
Dept: HEALTH INFORMATION MANAGEMENT | Facility: CLINIC | Age: 84
End: 2024-10-17
Payer: COMMERCIAL

## 2024-10-18 ENCOUNTER — ANCILLARY PROCEDURE (OUTPATIENT)
Dept: ULTRASOUND IMAGING | Facility: CLINIC | Age: 84
End: 2024-10-18
Attending: INTERNAL MEDICINE
Payer: COMMERCIAL

## 2024-10-18 DIAGNOSIS — I10 BENIGN ESSENTIAL HYPERTENSION: ICD-10-CM

## 2024-10-18 PROCEDURE — 93975 VASCULAR STUDY: CPT | Mod: GC | Performed by: RADIOLOGY

## 2024-10-18 PROCEDURE — 76770 US EXAM ABDO BACK WALL COMP: CPT | Mod: GC | Performed by: RADIOLOGY

## 2024-10-20 ASSESSMENT — ASTHMA QUESTIONNAIRES
QUESTION_4 LAST FOUR WEEKS HOW OFTEN HAVE YOU USED YOUR RESCUE INHALER OR NEBULIZER MEDICATION (SUCH AS ALBUTEROL): NOT AT ALL
QUESTION_3 LAST FOUR WEEKS HOW OFTEN DID YOUR ASTHMA SYMPTOMS (WHEEZING, COUGHING, SHORTNESS OF BREATH, CHEST TIGHTNESS OR PAIN) WAKE YOU UP AT NIGHT OR EARLIER THAN USUAL IN THE MORNING: NOT AT ALL
QUESTION_1 LAST FOUR WEEKS HOW MUCH OF THE TIME DID YOUR ASTHMA KEEP YOU FROM GETTING AS MUCH DONE AT WORK, SCHOOL OR AT HOME: NONE OF THE TIME
QUESTION_2 LAST FOUR WEEKS HOW OFTEN HAVE YOU HAD SHORTNESS OF BREATH: NOT AT ALL
QUESTION_5 LAST FOUR WEEKS HOW WOULD YOU RATE YOUR ASTHMA CONTROL: COMPLETELY CONTROLLED
ACT_TOTALSCORE: 25
ACT_TOTALSCORE: 25

## 2024-10-20 NOTE — PROGRESS NOTES
HPI:    Overall stable today. She brought in her home BP readings and average around . She has dizziness, nausea, and anxiety related to her BP and possible TIA's. She has occ. Heart burn and is not on a PPI. She had an EGD done 2023 and abdominal/pelvic CT imaging 2/10/2023. She follows with [facility] for proctitis  She is on Plavix for presumed TIA's and this causing a little rectal bleeding.  She had some mild dizziness today. No other HEENT, cardiopulmonary, abdominal, , GYN, neurological, systemic, psychiatric, lymphatic, endocrine, vascular complaints.      Past Medical History:   Diagnosis Date    Autoimmune disease (H) approx.     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 30 years ago    Osteopenia     Polymyalgia rheumatica (H)     PONV (postoperative nausea and vomiting)     Proctitis     Reduced vision Sometime is a little blurry     Past Surgical History:   Procedure Laterality Date    COLONOSCOPY  2014    Procedure: COMBINED COLONOSCOPY, SINGLE BIOPSY/POLYPECTOMY BY BIOPSY;  COLONOSCOPY;  Surgeon: Carol Ann Plaesncia MD;  Location: [facility]    CV CORONARY ANGIOGRAM N/A 2022    Procedure: Coronary Angiogram;  Surgeon: Trevin Hawk MD;  Location:  HEART CARDIAC CATH LAB    ENT SURGERY      tonsilectomy, adenoidectomy    ESOPHAGOSCOPY, GASTROSCOPY, DUODENOSCOPY (EGD), COMBINED N/A 2023    Procedure: ESOPHAGOGASTRODUODENOSCOPY, WITH BIOPSY;  Surgeon: Angel Lara MD;  Location:  GI    GYN SURGERY  ,     x 2    HYSTERECTOMY      ORTHOPEDIC SURGERY  2004    (R) shoulder surgery for frozen shoulder    ZAC BSO      for fibroids    TONSILLECTOMY  About 50 years ago     PE:    Vitals noted, gen, nad, cooperative, alert, neck supple nl rom, no B carotid bruits, lungs with good air movement, RRR, S1, S2, no MRG, abdomen, no acute findings.  Grossly normal neurological exam.     US Renal Complete w Arterial Duplex  Narrative: ULTRASOUND RENAL COMPLETE WITH DOPPLER 10/18/2024 7:56 AM    CLINICAL HISTORY: Benign essential hypertension.      COMPARISONS: None available.    ORDERING PROVIDER: YOON MCNALLY    TECHNIQUE: B-mode (grayscale) and duplex Doppler evaluation of the  abdominal aorta and renal arteries performed. Velocity measurements  obtained with angle correction at or less than 60 degrees.    Cine clips saved in the patient's record.     Findings:    AORTA:       Suprarenal: 76/0 cm/s, triphasic       Infrarenal, proximal: 89/0 cm/s, triphasic       Infrarenal, distal: 79/0 cm/s, triphasic    RIGHT KIDNEY:       Renal artery:            Origin: 158/31 cm/s            Proximal: 162/33 cm/s            Mid: 118/21 cm/s            Distal: 109/20 cm/s         Renal artery - aortic ratio: 2.13         Renal vein: 21 cm/s         Length: 9.3 cm         Cortical thickness: 1.3 cm         Segmental artery resistive index (superior / mid / inferior):  0.78 / 0.80 / 0.85         Parenchyma: Normal. No stone, mass, or hydronephrosis.    LEFT KIDNEY:       Renal artery:            Origin: 194/29 cm/s            Proximal: 212/28 cm/s            Mid: 179/35 cm/s            Distal: 116/18 cm/s         Renal artery - aortic ratio: 2.79         Renal vein: 33 cm/s         Length: 9.1 cm         Cortical thickness: 1.1 cm         Segmental artery resistive index (superior / mid / inferior):  0.84 / 0.82 / 0.78         Parenchyma: Upper pole 0.9 x 1.0 x 0.8 cm hypoechoic cyst. Lower  pole 0.7 x 0.7 x 0.7 cm hypoechoic cyst with no internal vascular flow  by color Doppler. No mass, stone, or hydronephrosis.  Impression: IMPRESSION:   1. Left renal artery velocity exceeds 180 cm/s (212 cm/s) but the  velocity ratio is less than 3 and distal waveforms are not post  stenotic in appearance.    2. No right renal artery stenosis demonstrated.    3. Elevated segmental  artery resistive indices suggest medical renal  disease.    4. Left renal cysts. Otherwise negative grayscale appearance of  bilateral kidneys..    Guidelines:  Diagnostic criteria suggestive of > 60% diameter stenosis  Renal artery:       Peak systolic velocity > 180 cm/s       RAR > 3.5       Turbulent flow    Arcuate/Segmental artery Resistive Index Normal < 0.7    I have personally reviewed the examination and initial interpretation  and I agree with the findings.    CASSIE ROJAS MD         SYSTEM ID:  [id number]    Recent Results (from the past 720 hour(s))   Basic metabolic panel    Collection Time: 09/26/24  3:43 PM   Result Value Ref Range    Sodium 137 135 - 145 mmol/L    Potassium 4.2 3.4 - 5.3 mmol/L    Chloride 102 98 - 107 mmol/L    Carbon Dioxide (CO2) 25 22 - 29 mmol/L    Anion Gap 10 7 - 15 mmol/L    Urea Nitrogen 9.8 8.0 - 23.0 mg/dL    Creatinine 0.76 0.51 - 0.95 mg/dL    GFR Estimate 77 >60 mL/min/1.73m2    Calcium 9.2 8.8 - 10.4 mg/dL    Glucose 106 (H) 70 - 99 mg/dL   Troponin T, High Sensitivity    Collection Time: 09/26/24  3:43 PM   Result Value Ref Range    Troponin T, High Sensitivity 11 <=14 ng/L   CBC with platelets and differential    Collection Time: 09/26/24  3:43 PM   Result Value Ref Range    WBC Count 7.3 4.0 - 11.0 10e3/uL    RBC Count 3.62 (L) 3.80 - 5.20 10e6/uL    Hemoglobin 12.9 11.7 - 15.7 g/dL    Hematocrit 37.4 35.0 - 47.0 %     (H) 78 - 100 fL    MCH 35.6 (H) 26.5 - 33.0 pg    MCHC 34.5 31.5 - 36.5 g/dL    RDW 10.6 10.0 - 15.0 %    Platelet Count 224 150 - 450 10e3/uL    % Neutrophils 64 %    % Lymphocytes 22 %    % Monocytes 11 %    % Eosinophils 2 %    % Basophils 0 %    % Immature Granulocytes 0 %    NRBCs per 100 WBC 0 <1 /100    Absolute Neutrophils 4.6 1.6 - 8.3 10e3/uL    Absolute Lymphocytes 1.6 0.8 - 5.3 10e3/uL    Absolute Monocytes 0.8 0.0 - 1.3 10e3/uL    Absolute Eosinophils 0.1 0.0 - 0.7 10e3/uL    Absolute Basophils 0.0 0.0 - 0.2 10e3/uL    Absolute  Immature Granulocytes 0.0 <=0.4 10e3/uL    Absolute NRBCs 0.0 10e3/uL   EKG 12-lead, tracing only    Collection Time: 09/26/24  3:52 PM   Result Value Ref Range    Systolic Blood Pressure  mmHg    Diastolic Blood Pressure  mmHg    Ventricular Rate 50 BPM    Atrial Rate 50 BPM    NY Interval 144 ms    QRS Duration 80 ms     ms    QTc 430 ms    P Axis 53 degrees    R AXIS 11 degrees    T Axis 71 degrees    Interpretation ECG       Sinus bradycardia  Possible Left atrial enlargement  Borderline ECG  When compared with ECG of 22-Aug-2024 10:54,  NY interval has increased  Confirmed by GENERATED REPORT, COMPUTER ([id number]),  Palomo Mccray ([id number]) on 9/27/2024 6:02:30 AM     ALT (External Result)    Collection Time: 10/01/24  2:51 PM   Result Value Ref Range    ALT (External) 21 0 - 32 IU/L   AST (External Result)    Collection Time: 10/01/24  2:51 PM   Result Value Ref Range    AST (External) 22 0 - 40 IU/L         A/P:    1. Immunizations; COVID Pfizer vaccine x 4. She has completed the Shingrix vaccine series. Pneumococcal 23 done 10/18/2011. Prevnar 13 done 1/13/2016. Td 1/31/2018. Prevnar 20 done 11/15/2022. RSV done 12/15/2023  2. Inflammatory bowel disease/proctitis. She is [facility] by Dr. Garcia. She had a Flex Sig 4/17/2024. Lab note from Dr. Garcia 2/13/2024 and 5/28/2024   3. Chronic pain on night time Gabapentin; seen Dr. Pimentel, orthopedics 6/8/2021 and Dr. Neumann 4/29/2021 Mr. Neumann.   4. HTN; on Valsartan/HcTz. Renal U/S done, Ordered labs 10/20/2024 and Nephrology referral placed 10/20/2024.   5. Mammogram; 2/13/2024.   6. Lipids; 1/9/2024; HDL 61, LDL 39, and TG's 69. She is on  Atorvastatin Repeat 5/1/2024; HDL 49, TG's 128, and LDL 34  7. Dermatology; scanned in outside [facility] 2/17/20[facility]. Care Everywhere dermatology note 10/12/2022.   8. Vitamin D normal at 57 on 2/11/2022. DEXA scan in chart from 7/19/2021 and most negative T score -2.3.  She saw   [facility]   8/22/2022  9. Outside Rheumatology for PMR, Dr. Damian scanned in note from 9/30/2024. She is not on prednisone currently.    10. Palpitations;  Normal dobutamine stress echo 10/11/2021.  For atypical CP, CTA coronary angiogram done 8/10/2022 with possible severe mid LAD stenosis. Coronary angiogram on 8/30/2022 with normal coronary arteries.  Ziopatch monitor 7/22/2022 w/o significant findings.  She had a cardiology appt. With Ms. Jose Guadalupe 5/8/2024 and 6/18/2024 with Dr. Yoon, Cardiology. She had a cardiology appt. With Dr. Sanders 9/13/2024. She had a 14 day Ziopatch monitor 8/23/2024 no afib, sxs correlated with NSR.  11. Past hospital discharged 7/12/2023 for possible TIA sxs with negative evaluation. She has a neurology appt. Scheduled for 10/31/2024. She has h/o TIA symptoms and is on Plavix and Atorvastatin. See ED note from 8/22/2024. She had MRI/MRA imaging 8/22/2024 that were essentially normal. She states she was driving and had L arm numbness (she had this before with a presumed TIA). She is on Plavix. She has some dizziness, mild head aches, and anxiety. She has not had any further neurological sxs. She is concerned about medications causing her head aches and dizziness and will have her see ANTONIO Pereyra (placed referral 10/9/2024).  12. A1c 6.0% on 7/3/2023.   13. Positive CHANDNI-2 for thrombocytosis. She was seen 5/2024 by Dr. Milner, Hematology   She is on hydroxyurea         30 minutes spent on the date of the encounter doing chart review, history and exam, documentation and further activities as noted above exclusive of procedures and other billable interpretations

## 2024-10-21 ENCOUNTER — TELEPHONE (OUTPATIENT)
Dept: NEPHROLOGY | Facility: CLINIC | Age: 84
End: 2024-10-21

## 2024-10-21 ENCOUNTER — OFFICE VISIT (OUTPATIENT)
Dept: INTERNAL MEDICINE | Facility: CLINIC | Age: 84
End: 2024-10-21
Payer: COMMERCIAL

## 2024-10-21 ENCOUNTER — LAB (OUTPATIENT)
Dept: LAB | Facility: CLINIC | Age: 84
End: 2024-10-21
Payer: COMMERCIAL

## 2024-10-21 VITALS
HEIGHT: 65 IN | OXYGEN SATURATION: 98 % | BODY MASS INDEX: 22.14 KG/M2 | DIASTOLIC BLOOD PRESSURE: 68 MMHG | SYSTOLIC BLOOD PRESSURE: 136 MMHG | TEMPERATURE: 98 F | HEART RATE: 66 BPM | WEIGHT: 132.9 LBS

## 2024-10-21 DIAGNOSIS — I10 BENIGN ESSENTIAL HYPERTENSION: ICD-10-CM

## 2024-10-21 DIAGNOSIS — I10 BENIGN ESSENTIAL HYPERTENSION: Primary | ICD-10-CM

## 2024-10-21 LAB
ALBUMIN SERPL BCG-MCNC: 4.4 G/DL (ref 3.5–5.2)
ALP SERPL-CCNC: 71 U/L (ref 40–150)
ALT SERPL W P-5'-P-CCNC: 17 U/L (ref 0–50)
ANION GAP SERPL CALCULATED.3IONS-SCNC: 8 MMOL/L (ref 7–15)
AST SERPL W P-5'-P-CCNC: 20 U/L (ref 0–45)
BILIRUB SERPL-MCNC: 0.3 MG/DL
BUN SERPL-MCNC: 13.6 MG/DL (ref 8–23)
CALCIUM SERPL-MCNC: 9.4 MG/DL (ref 8.8–10.4)
CHLORIDE SERPL-SCNC: 104 MMOL/L (ref 98–107)
CORTIS SERPL-MCNC: 7.3 UG/DL
CREAT SERPL-MCNC: 0.75 MG/DL (ref 0.51–0.95)
EGFRCR SERPLBLD CKD-EPI 2021: 78 ML/MIN/1.73M2
GLUCOSE SERPL-MCNC: 107 MG/DL (ref 70–99)
HCO3 SERPL-SCNC: 29 MMOL/L (ref 22–29)
POTASSIUM SERPL-SCNC: 4.1 MMOL/L (ref 3.4–5.3)
PROT SERPL-MCNC: 7.2 G/DL (ref 6.4–8.3)
SODIUM SERPL-SCNC: 141 MMOL/L (ref 135–145)
TSH SERPL DL<=0.005 MIU/L-ACNC: 0.93 UIU/ML (ref 0.3–4.2)

## 2024-10-21 PROCEDURE — 84244 ASSAY OF RENIN: CPT | Mod: 90 | Performed by: PATHOLOGY

## 2024-10-21 PROCEDURE — 99000 SPECIMEN HANDLING OFFICE-LAB: CPT | Performed by: PATHOLOGY

## 2024-10-21 PROCEDURE — 36415 COLL VENOUS BLD VENIPUNCTURE: CPT | Performed by: PATHOLOGY

## 2024-10-21 PROCEDURE — 84443 ASSAY THYROID STIM HORMONE: CPT | Performed by: PATHOLOGY

## 2024-10-21 PROCEDURE — 83835 ASSAY OF METANEPHRINES: CPT | Mod: 90 | Performed by: PATHOLOGY

## 2024-10-21 PROCEDURE — 82088 ASSAY OF ALDOSTERONE: CPT | Performed by: INTERNAL MEDICINE

## 2024-10-21 PROCEDURE — 99214 OFFICE O/P EST MOD 30 MIN: CPT | Performed by: INTERNAL MEDICINE

## 2024-10-21 PROCEDURE — 82533 TOTAL CORTISOL: CPT | Performed by: INTERNAL MEDICINE

## 2024-10-21 PROCEDURE — 80053 COMPREHEN METABOLIC PANEL: CPT | Performed by: PATHOLOGY

## 2024-10-21 RX ORDER — LORAZEPAM 0.5 MG/1
0.5 TABLET ORAL EVERY 6 HOURS PRN
Qty: 5 TABLET | Refills: 0 | Status: SHIPPED | OUTPATIENT
Start: 2024-10-21

## 2024-10-21 NOTE — TELEPHONE ENCOUNTER
This encounter is being sent to inform the clinic that this patient has a referral from Wolfgang Zimmerman MD for the diagnoses of Benign essential hypertension and has requested that this patient be seen within Priority: 1-2 Weeks.  Based on the availability of our provider(s), we are unable to accommodate this request.    Were all sites offered this patient?  Yes    Does scheduling algorithm request to schedule next available?  Patient appointment has not been scheduled.  Please review the referral request for accommodation and contact the patient.  If unable to accommodate, please resubmit a referral and indicate a preferred partner or affiliate location using Provider Finder or Scheduling Instructions field.   Patient prefers [place] but is open to [place] if she can be seen sooner.

## 2024-10-22 ENCOUNTER — ALLIED HEALTH/NURSE VISIT (OUTPATIENT)
Dept: INTERNAL MEDICINE | Facility: CLINIC | Age: 84
End: 2024-10-22
Payer: COMMERCIAL

## 2024-10-22 DIAGNOSIS — Z23 NEED FOR VACCINATION: Primary | ICD-10-CM

## 2024-10-22 LAB — ALDOST SERPL-MCNC: 8.9 NG/DL (ref 0–31)

## 2024-10-22 PROCEDURE — G0008 ADMIN INFLUENZA VIRUS VAC: HCPCS

## 2024-10-22 PROCEDURE — 99000 SPECIMEN HANDLING OFFICE-LAB: CPT | Performed by: PATHOLOGY

## 2024-10-22 PROCEDURE — 99207 PR NO CHARGE NURSE ONLY: CPT

## 2024-10-22 PROCEDURE — 83835 ASSAY OF METANEPHRINES: CPT | Mod: 90 | Performed by: PATHOLOGY

## 2024-10-22 PROCEDURE — 90662 IIV NO PRSV INCREASED AG IM: CPT

## 2024-10-22 NOTE — PROGRESS NOTES
Debra GLADIS MarroquinCamelia received the flu vaccine in clinic today at the request of Dr. Zimmerman. The immunization site was cleaned with an alcohol prep wipe. The immunization was given without incident--see immunization list for administration details. No swelling or redness was observed at the site of injection after the immunization was given. Pt has no history of reaction to vaccines. Pt was advised to remain in [facility] lobby for 15 minutes in case of an adverse reaction.     Mikaela Ortiz, EMT at 4:30 PM on 10/22/2024

## 2024-10-23 LAB — RENIN PLAS-CCNC: 23.2 NG/ML/HR

## 2024-10-23 NOTE — TELEPHONE ENCOUNTER
[facility] Call Center    Phone Message    May a detailed message be left on voicemail: yes     Reason for Call: Other: Pt calling to get an update on getting an appt. Pt is getting worsening symptoms and is very concerned      Action Taken: Other: neph     Travel Screening: Not Applicable     Date of Service:

## 2024-10-24 LAB
ALDOST/RENIN PLAS-RTO: 0.4 {RATIO} (ref 0–25)
ANNOTATION COMMENT IMP: ABNORMAL
METANEPHS SERPL-SCNC: 0.28 NMOL/L
NORMETANEPHRINE SERPL-SCNC: 1.22 NMOL/L

## 2024-10-25 LAB
CREAT 24H UR-MRATE: 916 MG/D
CREAT UR-MCNC: 24 MG/DL
METANEPH 24H UR-MCNC: 48 UG/L
METANEPH 24H UR-MRATE: 183 UG/D
METANEPH+NORMETANEPH UR-IMP: ABNORMAL
METANEPH/CREAT 24H UR: 200 UG/G CRT
NORMETANEPHRINE 24H UR-MCNC: 100 UG/L
NORMETANEPHRINE 24H UR-MRATE: 382 UG/D
NORMETANEPHRINE/CREAT 24H UR: 417 UG/G CRT

## 2024-10-30 NOTE — PROGRESS NOTES
Neurology Consultation    Patient Name:  Debra Denise  MRN:  [id number]    :  1940  Date of Service:  2024  Primary care provider:  Wolfgang Zimmerman        Chief Complaint: TIA vs complicated migraine     History of Present Illness:     Debra Denise is a 84 year old female who presents for evaluation of two episodes of transient left arm weakness, concern for TIA vs complicated migraines.     Patient had an event in 2023 of transient left arm weakness and was evaluated at [facility] for possible TIA/stroke.  Was carrying a couple of bags of groceries when she realized her left arm was completely weak. Denies any other neurologic symptoms. It lasted maybe 30 seconds before completely resolving.  MRI brain was unrevealing. MRA H and N showed mild-moderate presumed atherosclerotic narrowing of the mid P2 segment of the right posterior cerebral artery. TTE was negative for intraatrial septal defect, normal left atria, and EF 60%.  30 day event monitor did not show any atrial fibrillation.  She initially was going to be on DAPT but opted to just be on Plavix due to her history of ulcerative colitis. She was also started on atorvastatin 40 mg. This was decreased due to muscle pain to 10 mg.     Had a similar episode in 2024 when her left arm went completely limp while driving.  Again denies any other neurologic symptoms.  Was evaluated in the ED. MRI, MRA H and N were unremarkable.  She was on Plavix and atorvastatin 10 mg PTA.  Zio patch 14 days was negative for atrial fibrillation.     Saw cardiology outpatient for consideration of EDITA. Did not feel it was necessary at this time.     She does not remember if she had a headache with the first headache. She did have a mild headache after her second episode. 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 About 15 years, she had an episode where she lost vision temporarily in one eye. Was diagnosed with ocular migraines.  She rarely gets  ocular migraines.  Denies vision changes, photophobia, phonophobia, nausea, and vomiting during these episodes.       Has had some problems with intermittent dizziness and nausea for the last 2-3 years, getting worse. She describes it as a lightheaded sensation and denies sense of spin.  When asked to clarify she has difficulty to describe. She maybe feels like its foggy. They can last all day.  She has these almost everyday. Worse in the AM.  Does not think it is provoked by position change although not sure.  It can wake her up at night. She has been having more headaches recently but attributes it to hydroxyurea.  She has daily headaches.  Started about 1 year ago (Started in [place] and hydroxyurea). She denies photophobia.  Denies them worsening with activity.  Denies problems with being in the car.  Does feel like the dizziness/imbalance is worse looking from aisle . Denies family history of migraines. Has a history of PMR. Denies jaw claudication, unexplained weight loss, fevers, or shoulder/hip tension/pain.  Saw her rheumatologist and did not feel like headaches secondary to GCA. Inflammatory markers were reportedly normal. Did vestibular testing and therapy last year.     Has throbbing in her chest.           She has had 3 trips to the ED for uncontrolled hypertension. She is trying to get in to see a nephrologist.  Her son is a nephrologist.  Feels like her pressures have been erratic although has been better controlled in the last few weeks.     Has had a little bit of bleeding with Plavix.     - MRI brain w/wo: negative for acute pathology  - MRA head w/o: no significant stenosis or LVO  - MRA neck w/wo: normal  - A1c 5.5%  - LDL 52  - Zio patch 14 day no atrial fibrillation       Past Medical History:  Past Medical History:   Diagnosis Date    Autoimmune disease (H) approx. 2011    polymyagia rheumatica in remission for many years    Dupuytren contracture     finger    Hypertension     Kidney problem  "Stones  approx. 30 years ago    Osteopenia     Polymyalgia rheumatica (H)     PONV (postoperative nausea and vomiting)     Proctitis     Reduced vision Sometime is a little blurry       Social History:  - Denies tobacco use, very rare EtOH use     Physical Exam:  BP (!) 140/64   Pulse 66   Ht 1.651 m (5' 5\")   Wt 59.5 kg (131 lb 1.6 oz)   SpO2 99%   BMI 21.82 kg/m      General:  No acute distress  Cardiovascular: Normal rate.  Lung: Respirations are non-labored.  Extremities: Normal range of motion  Integumentary: Warm and dry    Neurologic:  Mental status : alert, fund of knowledge appropriate for visit    LANGUAGE: Speech fluent, no dysarthria     CN:  II- pupils equal and reactive, visual fields full  III, IV, VI- extraocular movements intact  V- sensation intact bilaterally  VII- face symmetric  VIII- hearing intact, no nystagmus  IX, X- palate elevates symmetrically  XI- sternocleidomastoid 5/5  XII- tongue midline    MOTOR : intact bulk and tone  5/5 strength in all ext    SENSORY : intact to temp and vib in BUE and BLE      REFLEXES:       Right Left   Triceps 2 2   Biceps 2 2   Brachioradialis 2 2   Knee jerk 2 2   Ankle jerk 2 2   Melgar absent   Toes down going     CEREBELLUM: finger to nose, finger taps, and heel to shin intact bilaterally     GAIT : Largely normal     Cognition and Speech: Speech clear and coherent.    Psychiatric: Cooperative, Appropriate mood & affect.      Assessment/Plan:   Debra Denise is a 84 year old female who presents for evaluation of two episodes of transient left arm weakness, concern for TIA vs complicated migraines. She has had 2 extensive TIA/stroke workups that have been largely unremarkable.  Her presentation would be unusual for a vascular event as well now that she has had 2 near identical events.  That being said, I did discuss with her that there was no way to definitely rule out a TIA.  I'm more suspicious for an atypical migraine but again can not " definitely say that these were migraines. Discussed at length the risks vs the benefits of staying on Plavix and patient decided that she would like to stay on Plavix since it could not be guaranteed that this was not a TIA.  I agree with her cardiologist; I do not think a EDITA will provide much more information that will likely  at this point.      She mentioned a multitude of other symptoms, some neurologic and some not.  I am uncertain if all the symptoms she is feeling are related or not.  I am wondering if she does not have atypical migraines, maybe vestibular migraines in the setting of known hx of migraine with aura (more ocular migraines) especially as she has been having a lot of vague symptoms of dizziness, headaches, and nausea.  She has had vestibular testing reportedly that was unremarkable and vestibular therapy actually made her worse.  Discussed treatment options for vestibular migraines but limited options with her age, co-morbidities (hx of kidney stones, ulcerative colitis, etc).  We opted to pursue conservative therapy with B2 and magnesium (counseled possible GI SE).  She will also keep a log of her symptoms.     She has also been struggling with her blood pressure recently. Anxiety maybe contributing to this but agree with evaluation with nephrology, especially with the high readings she has been getting.     Transient left arm weakness  Possible atypical migraines   Hypertension   Anxiety      Plan  > Continue Plavix 75 mg, atorvastatin 10 mg   > Start B2 400 mg, magnesium citrate 200-250 mg   > Follow-up in 3 months     I spent 78 minutes providing care for this patient, including reviewing imaging, labs, and notes as well as providing counseling and coordination of care for the above issues.

## 2024-10-31 ENCOUNTER — PRE VISIT (OUTPATIENT)
Dept: NEUROLOGY | Facility: CLINIC | Age: 84
End: 2024-10-31

## 2024-10-31 ENCOUNTER — OFFICE VISIT (OUTPATIENT)
Dept: NEUROLOGY | Facility: CLINIC | Age: 84
End: 2024-10-31
Attending: NURSE PRACTITIONER
Payer: COMMERCIAL

## 2024-10-31 VITALS
WEIGHT: 131.1 LBS | DIASTOLIC BLOOD PRESSURE: 64 MMHG | OXYGEN SATURATION: 99 % | HEIGHT: 65 IN | BODY MASS INDEX: 21.84 KG/M2 | HEART RATE: 66 BPM | SYSTOLIC BLOOD PRESSURE: 140 MMHG

## 2024-10-31 DIAGNOSIS — R29.818 TRANSIENT NEUROLOGICAL SYMPTOMS: ICD-10-CM

## 2024-10-31 DIAGNOSIS — G43.009 ATYPICAL MIGRAINE: Primary | ICD-10-CM

## 2024-10-31 PROCEDURE — 99417 PROLNG OP E/M EACH 15 MIN: CPT | Performed by: PSYCHIATRY & NEUROLOGY

## 2024-10-31 PROCEDURE — 99205 OFFICE O/P NEW HI 60 MIN: CPT | Performed by: PSYCHIATRY & NEUROLOGY

## 2024-10-31 NOTE — NURSING NOTE
"Debra Denise is a 84 year old female who presents for:  Chief Complaint   Patient presents with    Consult     TIA vs complex migraine, Referring Provider: Mayra Sanchez CNP        Initial Vitals:  BP (!) 140/64   Pulse 66   Ht 1.651 m (5' 5\")   Wt 59.5 kg (131 lb 1.6 oz)   SpO2 99%   BMI 21.82 kg/m   Estimated body mass index is 21.82 kg/m  as calculated from the following:    Height as of this encounter: 1.651 m (5' 5\").    Weight as of this encounter: 59.5 kg (131 lb 1.6 oz).. Body surface area is 1.65 meters squared. BP completed using cuff size: regular    Alexandra Loya CMA  "

## 2024-10-31 NOTE — LETTER
10/31/2024      Debra Denise  [id number]  [place] [facility] [address]      Dear Colleague,    Thank you for referring your patient, Debra Denise, to the [facility]. Please see a copy of my visit note below.      Neurology Consultation    Patient Name:  Debra Denise  MRN:  [id number]    :  1940  Date of Service:  2024  Primary care provider:  Wolfgang Zimmerman        Chief Complaint: TIA vs complicated migraine     History of Present Illness:     Debra Denise is a 84 year old female who presents for evaluation of two episodes of transient left arm weakness, concern for TIA vs complicated migraines.     Patient had an event in 2023 of transient left arm weakness and was evaluated at [facility] for possible TIA/stroke.  Was carrying a couple of bags of groceries when she realized her left arm was completely weak. Denies any other neurologic symptoms. It lasted maybe 30 seconds before completely resolving.  MRI brain was unrevealing. MRA H and N showed mild-moderate presumed atherosclerotic narrowing of the mid P2 segment of the right posterior cerebral artery. TTE was negative for intraatrial septal defect, normal left atria, and EF 60%.  30 day event monitor did not show any atrial fibrillation.  She initially was going to be on DAPT but opted to just be on Plavix due to her history of ulcerative colitis. She was also started on atorvastatin 40 mg. This was decreased due to muscle pain to 10 mg.     Had a similar episode in 2024 when her left arm went completely limp while driving.  Again denies any other neurologic symptoms.  Was evaluated in the ED. MRI, MRA H and N were unremarkable.  She was on Plavix and atorvastatin 10 mg PTA.  Zio patch 14 days was negative for atrial fibrillation.     Saw cardiology outpatient for consideration of EDITA. Did not feel it was necessary at this time.     She does not remember  if she had a headache with the first headache. She did have a mild headache after her second episode. 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 About 15 years, she had an episode where she lost vision temporarily in one eye. Was diagnosed with ocular migraines.  She rarely gets ocular migraines.  Denies vision changes, photophobia, phonophobia, nausea, and vomiting during these episodes.       Has had some problems with intermittent dizziness and nausea for the last 2-3 years, getting worse. She describes it as a lightheaded sensation and denies sense of spin.  When asked to clarify she has difficulty to describe. She maybe feels like its foggy. They can last all day.  She has these almost everyday. Worse in the AM.  Does not think it is provoked by position change although not sure.  It can wake her up at night. She has been having more headaches recently but attributes it to hydroxyurea.  She has daily headaches.  Started about 1 year ago (Started in [place] and hydroxyurea). She denies photophobia.  Denies them worsening with activity.  Denies problems with being in the car.  Does feel like the dizziness/imbalance is worse looking from aisle . Denies family history of migraines. Has a history of PMR. Denies jaw claudication, unexplained weight loss, fevers, or shoulder/hip tension/pain.  Saw her rheumatologist and did not feel like headaches secondary to GCA. Inflammatory markers were reportedly normal. Did vestibular testing and therapy last year.     Has throbbing in her chest.           She has had 3 trips to the ED for uncontrolled hypertension. She is trying to get in to see a nephrologist.  Her son is a nephrologist.  Feels like her pressures have been erratic although has been better controlled in the last few weeks.     Has had a little bit of bleeding with Plavix.     - MRI brain w/wo: negative for acute pathology  - MRA head w/o: no significant stenosis or LVO  - MRA neck w/wo: normal  - A1c 5.5%  - LDL  "52  - Zio patch 14 day no atrial fibrillation       Past Medical History:  Past Medical History:   Diagnosis Date     Autoimmune disease (H) approx. 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 30 years ago     Osteopenia      Polymyalgia rheumatica (H)      PONV (postoperative nausea and vomiting)      Proctitis      Reduced vision Sometime is a little blurry       Social History:  - Denies tobacco use, very rare EtOH use     Physical Exam:  BP (!) 140/64   Pulse 66   Ht 1.651 m (5' 5\")   Wt 59.5 kg (131 lb 1.6 oz)   SpO2 99%   BMI 21.82 kg/m      General:  No acute distress  Cardiovascular: Normal rate.  Lung: Respirations are non-labored.  Extremities: Normal range of motion  Integumentary: Warm and dry    Neurologic:  Mental status : alert, fund of knowledge appropriate for visit    LANGUAGE: Speech fluent, no dysarthria     CN:  II- pupils equal and reactive, visual fields full  III, IV, VI- extraocular movements intact  V- sensation intact bilaterally  VII- face symmetric  VIII- hearing intact, no nystagmus  IX, X- palate elevates symmetrically  XI- sternocleidomastoid 5/5  XII- tongue midline    MOTOR : intact bulk and tone  5/5 strength in all ext    SENSORY : intact to temp and vib in BUE and BLE      REFLEXES:       Right Left   Triceps 2 2   Biceps 2 2   Brachioradialis 2 2   Knee jerk 2 2   Ankle jerk 2 2   Melgar absent   Toes down going     CEREBELLUM: finger to nose, finger taps, and heel to shin intact bilaterally     GAIT : Largely normal     Cognition and Speech: Speech clear and coherent.    Psychiatric: Cooperative, Appropriate mood & affect.      Assessment/Plan:   Debra Denise is a 84 year old female who presents for evaluation of two episodes of transient left arm weakness, concern for TIA vs complicated migraines. She has had 2 extensive TIA/stroke workups that have been " largely unremarkable.  Her presentation would be unusual for a vascular event as well now that she has had 2 near identical events.  That being said, I did discuss with her that there was no way to definitely rule out a TIA.  I'm more suspicious for an atypical migraine but again can not definitely say that these were migraines. Discussed at length the risks vs the benefits of staying on Plavix and patient decided that she would like to stay on Plavix since it could not be guaranteed that this was not a TIA.  I agree with her cardiologist; I do not think a EDITA will provide much more information that will likely  at this point.      She mentioned a multitude of other symptoms, some neurologic and some not.  I am uncertain if all the symptoms she is feeling are related or not.  I am wondering if she does not have atypical migraines, maybe vestibular migraines in the setting of known hx of migraine with aura (more ocular migraines) especially as she has been having a lot of vague symptoms of dizziness, headaches, and nausea.  She has had vestibular testing reportedly that was unremarkable and vestibular therapy actually made her worse.  Discussed treatment options for vestibular migraines but limited options with her age, co-morbidities (hx of kidney stones, ulcerative colitis, etc).  We opted to pursue conservative therapy with B2 and magnesium (counseled possible GI SE).  She will also keep a log of her symptoms.     She has also been struggling with her blood pressure recently. Anxiety maybe contributing to this but agree with evaluation with nephrology, especially with the high readings she has been getting.     Transient left arm weakness  Possible atypical migraines   Hypertension   Anxiety      Plan  > Continue Plavix 75 mg, atorvastatin 10 mg   > Start B2 400 mg, magnesium citrate 200-250 mg   > Follow-up in 3 months     I spent 78 minutes providing care for this patient, including reviewing  imaging, labs, and notes as well as providing counseling and coordination of care for the above issues.      Again, thank you for allowing me to participate in the care of your patient.        Sincerely,        Lianna Vigil, DO

## 2024-11-07 ENCOUNTER — TRANSFERRED RECORDS (OUTPATIENT)
Dept: HEALTH INFORMATION MANAGEMENT | Facility: CLINIC | Age: 84
End: 2024-11-07
Payer: COMMERCIAL

## 2024-11-12 ENCOUNTER — TRANSFERRED RECORDS (OUTPATIENT)
Dept: HEALTH INFORMATION MANAGEMENT | Facility: CLINIC | Age: 84
End: 2024-11-12
Payer: COMMERCIAL

## 2024-11-13 ENCOUNTER — TRANSFERRED RECORDS (OUTPATIENT)
Dept: HEALTH INFORMATION MANAGEMENT | Facility: CLINIC | Age: 84
End: 2024-11-13
Payer: COMMERCIAL

## 2024-11-14 ENCOUNTER — DOCUMENTATION ONLY (OUTPATIENT)
Dept: INTERNAL MEDICINE | Facility: CLINIC | Age: 84
End: 2024-11-14
Payer: COMMERCIAL

## 2024-11-14 NOTE — PROGRESS NOTES
Type of Form Received: [facility] POC     Form Received (Date) 11/13/24   Form Filled out Yes, faxed 11/18/24   Placed in provider folder Yes

## 2024-11-17 DIAGNOSIS — I10 HTN (HYPERTENSION): ICD-10-CM

## 2024-11-19 ENCOUNTER — TRANSFERRED RECORDS (OUTPATIENT)
Dept: HEALTH INFORMATION MANAGEMENT | Facility: CLINIC | Age: 84
End: 2024-11-19
Payer: COMMERCIAL

## 2024-11-20 ENCOUNTER — LAB (OUTPATIENT)
Dept: LAB | Facility: CLINIC | Age: 84
End: 2024-11-20
Payer: COMMERCIAL

## 2024-11-20 DIAGNOSIS — I10 BENIGN ESSENTIAL HYPERTENSION: ICD-10-CM

## 2024-11-20 PROCEDURE — 36415 COLL VENOUS BLD VENIPUNCTURE: CPT

## 2024-11-20 RX ORDER — HYDROCHLOROTHIAZIDE 12.5 MG/1
12.5 CAPSULE ORAL DAILY
Qty: 30 CAPSULE | Refills: 0 | OUTPATIENT
Start: 2024-11-20

## 2024-11-21 LAB
ANION GAP SERPL CALCULATED.3IONS-SCNC: 8 MMOL/L (ref 7–15)
BUN SERPL-MCNC: 12.4 MG/DL (ref 8–23)
CALCIUM SERPL-MCNC: 9.8 MG/DL (ref 8.8–10.4)
CHLORIDE SERPL-SCNC: 101 MMOL/L (ref 98–107)
CREAT SERPL-MCNC: 0.76 MG/DL (ref 0.51–0.95)
EGFRCR SERPLBLD CKD-EPI 2021: 76 ML/MIN/1.73M2
GLUCOSE SERPL-MCNC: 85 MG/DL (ref 70–99)
HCO3 SERPL-SCNC: 29 MMOL/L (ref 22–29)
POTASSIUM SERPL-SCNC: 4.3 MMOL/L (ref 3.4–5.3)
SODIUM SERPL-SCNC: 138 MMOL/L (ref 135–145)

## 2024-11-26 DIAGNOSIS — I10 HTN (HYPERTENSION): Primary | ICD-10-CM

## 2024-12-02 ENCOUNTER — MYC MEDICAL ADVICE (OUTPATIENT)
Dept: NEPHROLOGY | Facility: CLINIC | Age: 84
End: 2024-12-02
Payer: COMMERCIAL

## 2024-12-03 NOTE — PROGRESS NOTES
HPI:    Ms. Denise comes in for follow up. She could not tolerate low dose Coreg (low HR?). She remains on HcTz 12.5 mg, Valsartan 160 mg and Amlodipine 2.5 mg. She states BP at home is fairly well controlled on this regime. She would like to discontinue Amlodipine due to leg swelling. Otherwise, no additional HEENT, cardiopulmonary, abdominal, , GYN, neurological, systemic, psychiatric, lymphatic, endocrine, vascular complaints.     Past Medical History:   Diagnosis Date    Autoimmune disease (H) approx.     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 30 years ago    Osteopenia     Polymyalgia rheumatica (H)     PONV (postoperative nausea and vomiting)     Proctitis     Reduced vision Sometime is a little blurry     Past Surgical History:   Procedure Laterality Date    COLONOSCOPY  2014    Procedure: COMBINED COLONOSCOPY, SINGLE BIOPSY/POLYPECTOMY BY BIOPSY;  COLONOSCOPY;  Surgeon: Carol Ann Plasencia MD;  Location: [facility]    CV CORONARY ANGIOGRAM N/A 2022    Procedure: Coronary Angiogram;  Surgeon: Trevin Hawk MD;  Location:  HE[facility] CARDI[facility] CATH LAB    ENT SURGERY      tonsilectomy, adenoidectomy    ESOPHAGOSCOPY, GASTROSCOPY, DUODENOSCOPY (EGD), COMBINED N/A 2023    Procedure: ESOPHAGOGASTRODUODENOSCOPY, WITH BIOPSY;  Surgeon: Angel Lara MD;  Location:  GI    GYN SURGERY  ,     x 2    HYSTERECTOMY      ORTHOPEDIC SURGERY  2004    (R) shoulder surgery for frozen shoulder    ZAC BSO      for fibroids    TONSILLECTOMY  About 50 years ago     PE:    Vitals noted, gen, nad, cooperative, alert, neck supple nl rom, lungs with good air movement, RRR, S1, S2, no MRG, abdomen, no acute findings. Grossly normal neurological exam. Minimal B LE swelling.     Recent Results (from the past 720 hours)   Basic metabolic panel  (Ca, Cl, CO2, Creat, Gluc, K,  Na, BUN)    Collection Time: 11/20/24  1:51 PM   Result Value Ref Range    Sodium 138 135 - 145 mmol/L    Potassium 4.3 3.4 - 5.3 mmol/L    Chloride 101 98 - 107 mmol/L    Carbon Dioxide (CO2) 29 22 - 29 mmol/L    Anion Gap 8 7 - 15 mmol/L    Urea Nitrogen 12.4 8.0 - 23.0 mg/dL    Creatinine 0.76 0.51 - 0.95 mg/dL    GFR Estimate 76 >60 mL/min/1.73m2    Calcium 9.8 8.8 - 10.4 mg/dL    Glucose 85 70 - 99 mg/dL     US Renal Complete w Arterial Duplex  Narrative: ULTRASOUND RENAL COMPLETE WITH DOPPLER 10/18/2024 7:56 AM    CLINICAL HISTORY: Benign essential hypertension.      COMPARISONS: None available.    ORDERING PROVIDER: YOON MCNALLY    TECHNIQUE: B-mode (grayscale) and duplex Doppler evaluation of the  abdominal aorta and renal arteries performed. Velocity measurements  obtained with angle correction at or less than 60 degrees.    Cine clips saved in the patient's record.     Findings:    AORTA:       Suprarenal: 76/0 cm/s, triphasic       Infrarenal, proximal: 89/0 cm/s, triphasic       Infrarenal, distal: 79/0 cm/s, triphasic    RIGHT KIDNEY:       Renal artery:            Origin: 158/31 cm/s            Proximal: 162/33 cm/s            Mid: 118/21 cm/s            Distal: 109/20 cm/s         Renal artery - aortic ratio: 2.13         Renal vein: 21 cm/s         Length: 9.3 cm         Cortical thickness: 1.3 cm         Segmental artery resistive index (superior / mid / inferior):  0.78 / 0.80 / 0.85         Parenchyma: Normal. No stone, mass, or hydronephrosis.    LEFT KIDNEY:       Renal artery:            Origin: 194/29 cm/s            Proximal: 212/28 cm/s            Mid: 179/35 cm/s            Distal: 116/18 cm/s         Renal artery - aortic ratio: 2.79         Renal vein: 33 cm/s         Length: 9.1 cm         Cortical thickness: 1.1 cm         Segmental artery resistive index (superior / mid / inferior):  0.84 / 0.82 / 0.78         Parenchyma: Upper pole 0.9 x 1.0 x 0.8 cm hypoechoic cyst. Lower  pole  0.7 x 0.7 x 0.7 cm hypoechoic cyst with no internal vascular flow  by color Doppler. No mass, stone, or hydronephrosis.  Impression: IMPRESSION:   1. Left renal artery velocity exceeds 180 cm/s (212 cm/s) but the  velocity ratio is less than 3 and distal waveforms are not post  stenotic in appearance.    2. No right renal artery stenosis demonstrated.    3. Elevated segmental artery resistive indices suggest medical renal  disease.    4. Left renal cysts. Otherwise negative grayscale appearance of  bilateral kidneys..    Guidelines:  Diagnostic criteria suggestive of > 60% diameter stenosis  Renal artery:       Peak systolic velocity > 180 cm/s       RAR > 3.5       Turbulent flow    Arcuate/Segmental artery Resistive Index Normal < 0.7    I have personally reviewed the examination and initial interpretation  and I agree with the findings.    CASSIE ROJAS MD         SYSTEM ID:  [id number]        A/P:    1. Immunizations; COVID Pfizer vaccine x 4. She has completed the Shingrix vaccine series. Pneumococcal 23 done 10/18/2011. Prevnar 13 done 1/13/2016. Td 1/31/2018. Prevnar 20 done 11/15/2022. RSV done 12/15/2023. Influenza vaccine 10/22/2024.   2. Inflammatory bowel disease/proctitis. She is followed at [facility] by Dr. Garcia. She had a Flex Sig 4/17/2024. Clinic note from Dr. Garcia 11/7/2024  3. Chronic pain on night time Gabapentin; seen Dr. Pimentel, orthopedics 6/8/2021 and Dr. Neumann 4/29/2021 Mr. Neumann.   4. HTN; on Valsartan/HcTz and 2.5 BID of Amlodipine (she has some leg swelling on Amlodipine and would like to discontinue this medication). She could not tolerate Coreg (even 3.125 mg).  Renal U/S done, She sees Nephrology on 12/17/2024. For now she will increase her Valsartan to 240 mg, remain on HcTz 12.5 mg and try to decrease Amlodipine.   5. Mammogram; 2/13/2024.   6. Lipids; 1/9/2024; HDL 61, LDL 39, and TG's 69. She is on  Atorvastatin Repeat 5/1/2024; HDL 49, TG's 128, and LDL 34  7. Dermatology; scanned in  outside [facility] 2/17/2022. Care Everywhere dermatology note 10/12/2022.   8. Vitamin D normal at 57 on 2/11/2022. DEXA scan in chart from 7/19/2021 and most negative T score -2.3.  She saw Dr. Simon endocrinology   8/22/2022  9. Outside Rheumatology for PMR, Dr. Damian scanned in note from 9/30/2024. She is not on prednisone currently.    10. Palpitations;  Normal dobutamine stress echo 10/11/2021.  For atypical CP, CTA coronary angiogram done 8/10/2022 with possible severe mid LAD stenosis. Coronary angiogram on 8/30/2022 with normal coronary arteries.  Ziopatch monitor 7/22/2022 w/o significant findings.  She had a cardiology appt. With Ms. Shi 5/8/2024 and 6/18/2024 with Dr. Yoon, Cardiology. She had a cardiology appt. With Dr. Sanders 9/13/2024. She had a 14 day Ziopatch monitor 8/23/2024 no afib, sxs correlated with NSR.  11. Past hospital discharged 7/12/2023 for possible TIA sxs with negative evaluation. She has a neurology appt. Scheduled for 10/31/2024. She has h/o TIA symptoms and is on Plavix and Atorvastatin. See ED note from 8/22/2024. She had MRI/MRA imaging 8/22/2024 that were essentially normal. She states she was driving and had L arm numbness (she had this before with a presumed TIA). She is on Plavix. She has some dizziness, mild head aches, and anxiety. Seen in Neurology 10/31/2024 and next 2/6/2025  12. A1c 6.0% on 7/3/2023.   13. Positive CHANDNI-2 for thrombocytosis. She was seen 5/2024 by Dr. Milner, Hematology   She is on hydroxyurea       40 minutes spent on the date of the encounter doing chart review, history and exam, documentation and further activities as noted above exclusive of procedures and other billable interpretations

## 2024-12-04 ENCOUNTER — LAB (OUTPATIENT)
Dept: LAB | Facility: CLINIC | Age: 84
End: 2024-12-04
Payer: COMMERCIAL

## 2024-12-04 ENCOUNTER — OFFICE VISIT (OUTPATIENT)
Dept: INTERNAL MEDICINE | Facility: CLINIC | Age: 84
End: 2024-12-04
Payer: COMMERCIAL

## 2024-12-04 VITALS
HEIGHT: 65 IN | DIASTOLIC BLOOD PRESSURE: 74 MMHG | OXYGEN SATURATION: 98 % | HEART RATE: 62 BPM | WEIGHT: 135.4 LBS | SYSTOLIC BLOOD PRESSURE: 145 MMHG | BODY MASS INDEX: 22.56 KG/M2

## 2024-12-04 DIAGNOSIS — I10 HTN (HYPERTENSION): ICD-10-CM

## 2024-12-04 DIAGNOSIS — I10 BENIGN ESSENTIAL HYPERTENSION: ICD-10-CM

## 2024-12-04 LAB
ALBUMIN MFR UR ELPH: <6 MG/DL
ALBUMIN SERPL BCG-MCNC: 4.5 G/DL (ref 3.5–5.2)
ALBUMIN UR-MCNC: NEGATIVE MG/DL
ANION GAP SERPL CALCULATED.3IONS-SCNC: 9 MMOL/L (ref 7–15)
APPEARANCE UR: CLEAR
BILIRUB UR QL STRIP: NEGATIVE
BUN SERPL-MCNC: 11.2 MG/DL (ref 8–23)
CALCIUM SERPL-MCNC: 9.7 MG/DL (ref 8.8–10.4)
CHLORIDE SERPL-SCNC: 100 MMOL/L (ref 98–107)
COLOR UR AUTO: NORMAL
CREAT SERPL-MCNC: 0.77 MG/DL (ref 0.51–0.95)
CREAT UR-MCNC: 15.7 MG/DL
CREAT UR-MCNC: 16.3 MG/DL
EGFRCR SERPLBLD CKD-EPI 2021: 75 ML/MIN/1.73M2
ERYTHROCYTE [DISTWIDTH] IN BLOOD BY AUTOMATED COUNT: 11.7 % (ref 10–15)
GLUCOSE SERPL-MCNC: 90 MG/DL (ref 70–99)
GLUCOSE UR STRIP-MCNC: NEGATIVE MG/DL
HCO3 SERPL-SCNC: 31 MMOL/L (ref 22–29)
HCT VFR BLD AUTO: 41.6 % (ref 35–47)
HGB BLD-MCNC: 14 G/DL (ref 11.7–15.7)
HGB UR QL STRIP: NEGATIVE
KETONES UR STRIP-MCNC: NEGATIVE MG/DL
LEUKOCYTE ESTERASE UR QL STRIP: NEGATIVE
MCH RBC QN AUTO: 33.7 PG (ref 26.5–33)
MCHC RBC AUTO-ENTMCNC: 33.7 G/DL (ref 31.5–36.5)
MCV RBC AUTO: 100 FL (ref 78–100)
MICROALBUMIN UR-MCNC: <12 MG/L
MICROALBUMIN/CREAT UR: NORMAL MG/G{CREAT}
NITRATE UR QL: NEGATIVE
PH UR STRIP: 7 [PH] (ref 5–7)
PHOSPHATE SERPL-MCNC: 4.3 MG/DL (ref 2.5–4.5)
PLATELET # BLD AUTO: 307 10E3/UL (ref 150–450)
POTASSIUM SERPL-SCNC: 4 MMOL/L (ref 3.4–5.3)
PROT/CREAT 24H UR: NORMAL MG/G{CREAT}
RBC # BLD AUTO: 4.16 10E6/UL (ref 3.8–5.2)
RBC URINE: <1 /HPF
SODIUM SERPL-SCNC: 140 MMOL/L (ref 135–145)
SP GR UR STRIP: 1 (ref 1–1.03)
UROBILINOGEN UR STRIP-MCNC: NORMAL MG/DL
WBC # BLD AUTO: 8.1 10E3/UL (ref 4–11)
WBC URINE: 1 /HPF

## 2024-12-04 PROCEDURE — 36415 COLL VENOUS BLD VENIPUNCTURE: CPT | Performed by: PATHOLOGY

## 2024-12-04 PROCEDURE — 81001 URINALYSIS AUTO W/SCOPE: CPT | Performed by: PATHOLOGY

## 2024-12-04 PROCEDURE — 84244 ASSAY OF RENIN: CPT | Mod: 90 | Performed by: PATHOLOGY

## 2024-12-04 PROCEDURE — 84156 ASSAY OF PROTEIN URINE: CPT | Performed by: PATHOLOGY

## 2024-12-04 PROCEDURE — 82570 ASSAY OF URINE CREATININE: CPT | Performed by: INTERNAL MEDICINE

## 2024-12-04 PROCEDURE — 80069 RENAL FUNCTION PANEL: CPT | Performed by: PATHOLOGY

## 2024-12-04 PROCEDURE — 82043 UR ALBUMIN QUANTITATIVE: CPT | Performed by: INTERNAL MEDICINE

## 2024-12-04 PROCEDURE — 85027 COMPLETE CBC AUTOMATED: CPT | Performed by: PATHOLOGY

## 2024-12-04 PROCEDURE — 82088 ASSAY OF ALDOSTERONE: CPT | Performed by: INTERNAL MEDICINE

## 2024-12-04 PROCEDURE — 99000 SPECIMEN HANDLING OFFICE-LAB: CPT | Performed by: PATHOLOGY

## 2024-12-04 RX ORDER — VALSARTAN 80 MG/1
160 TABLET ORAL DAILY
Qty: 180 TABLET | Refills: 1 | Status: SHIPPED | OUTPATIENT
Start: 2024-12-04

## 2024-12-04 RX ORDER — AMLODIPINE BESYLATE 2.5 MG/1
TABLET ORAL
Qty: 60 TABLET | Refills: 1 | Status: SHIPPED | OUTPATIENT
Start: 2024-12-04

## 2024-12-04 RX ORDER — RIBOFLAVIN (VITAMIN B2) 100 MG
25 TABLET ORAL DAILY
COMMUNITY

## 2024-12-06 LAB — ALDOST SERPL-MCNC: 8.6 NG/DL (ref 0–31)

## 2024-12-07 LAB
ALDOST/RENIN PLAS-RTO: 0.6 {RATIO} (ref 0–25)
RENIN PLAS-CCNC: 14.2 NG/ML/HR

## 2024-12-12 ENCOUNTER — TELEPHONE (OUTPATIENT)
Dept: INTERNAL MEDICINE | Facility: CLINIC | Age: 84
End: 2024-12-12
Payer: COMMERCIAL

## 2024-12-17 ENCOUNTER — OFFICE VISIT (OUTPATIENT)
Dept: NEPHROLOGY | Facility: CLINIC | Age: 84
End: 2024-12-17
Payer: COMMERCIAL

## 2024-12-17 ENCOUNTER — LAB (OUTPATIENT)
Dept: LAB | Facility: CLINIC | Age: 84
End: 2024-12-17
Payer: COMMERCIAL

## 2024-12-17 VITALS
OXYGEN SATURATION: 100 % | SYSTOLIC BLOOD PRESSURE: 137 MMHG | HEIGHT: 65 IN | HEART RATE: 55 BPM | BODY MASS INDEX: 22.57 KG/M2 | WEIGHT: 135.5 LBS | DIASTOLIC BLOOD PRESSURE: 66 MMHG

## 2024-12-17 DIAGNOSIS — I10 BENIGN ESSENTIAL HYPERTENSION: ICD-10-CM

## 2024-12-17 PROCEDURE — 83835 ASSAY OF METANEPHRINES: CPT | Mod: 90

## 2024-12-17 PROCEDURE — 36415 COLL VENOUS BLD VENIPUNCTURE: CPT

## 2024-12-17 PROCEDURE — 99205 OFFICE O/P NEW HI 60 MIN: CPT | Performed by: INTERNAL MEDICINE

## 2024-12-17 PROCEDURE — 99000 SPECIMEN HANDLING OFFICE-LAB: CPT

## 2024-12-17 RX ORDER — HYDROCHLOROTHIAZIDE 25 MG/1
25 TABLET ORAL DAILY
Status: SHIPPED
Start: 2024-12-17

## 2024-12-17 RX ORDER — AMLODIPINE BESYLATE 2.5 MG/1
2.5 TABLET ORAL DAILY
Status: SHIPPED
Start: 2024-12-17

## 2024-12-17 ASSESSMENT — PAIN SCALES - GENERAL: PAINLEVEL_OUTOF10: NO PAIN (0)

## 2024-12-17 NOTE — PROGRESS NOTES
Nephrology Outpatient Consult Note (12/17/2024)     Requesting Provider  Wolfgang Zimmerman MD  [address] [address]    Chief Complaint/Reason for Visit  Hypertension    History of Present Illness  This is an 84-year-old female who was referred to our clinic for further workup and management of hypertension.  Her past medical history is notable for polymyalgia rheumatica, remote history of kidney stone, and hypertension.    With regards to her hypertension, I reviewed her blood pressure readings over the course of the last several years.  There has been a significant amount of variation.  However, her blood pressure average seems to be in the 140s systolic over 60s diastolic.  Her blood pressure regimen currently includes amlodipine hydrochlorothiazide, and valsartan.    Her most recent laboratory work was earlier this month.  Her serum creatinine is normal measuring 0.7 mg/dL.  Electrolytes are within a normal range including normal serum sodium, potassium.  Serum albumin is normal.  Urine is negative for albuminuria.  CBC is normal.  Urinalysis is normal.  Urine protein to creatinine ratio is negative for proteinuria.  Her aldosterone to renin ratio was normal at 0.6.  Renin activity was slightly elevated at 14.  Aldosterone level was normal at 8.6.  She completed renal artery Dopplers which was inconclusive for renal artery stenosis.    The patient is accompanied by her .  Overall, she feels well today.  She states that her blood pressure was fairly well-controlled up until 2 months ago.  At that point, she woke up at night with anxiety symptoms.  She checked her blood pressure and it was elevated.  She then presented to the emergency department, and was noted to be asymptomatic and was discharged home.  She denies any fevers or chills.  She does report symptoms of dizziness occasionally.  She was evaluated by ENT for the symptoms.  She denies chest pain, abdominal pain,  dysuria, gross hematuria.  She does have mild lower extremity edema which started after taking amlodipine.    The following portions of the patient's history were reviewed and updated as appropriate: allergies, current medications, past family history, past medical history, past social history, past surgical history, and problem list.    Renal Dopplers 10/18/2024:   1. Left renal artery velocity exceeds 180 cm/s (212 cm/s) but the velocity ratio is less than 3 and distal waveforms are not post stenotic in appearance.  2. No right renal artery stenosis demonstrated.  3. Elevated segmental artery resistive indices suggest medical renal disease.  4. Left renal cysts. Otherwise negative grayscale appearance of bilateral kidneys..     Guidelines:  Diagnostic criteria suggestive of > 60% diameter stenosis  Renal artery:       Peak systolic velocity > 180 cm/s       RAR > 3.5       Turbulent flow     Arcuate/Segmental artery Resistive Index Normal < 0.7    Subjective   Review of Systems  A comprehensive review of systems was performed. Pertinent positives and negatives are described above.    Past Medical/Surgical History  Patient  has a past medical history of Autoimmune disease (H) (approx. 2011), Dupuytren contracture, Hypertension, Kidney problem (Stones  approx. 30 years ago), Osteopenia, Polymyalgia rheumatica (H), PONV (postoperative nausea and vomiting), Proctitis, and Reduced vision (Sometime is a little blurry).  Patient  has a past surgical history that includes GYN surgery (1966,1968); ENT surgery; orthopedic surgery (02/2004); Colonoscopy (04/08/2014); ZAC BSO (1988); Coronary Angiogram (N/A, 08/30/2022); Hysterectomy; Esophagoscopy, gastroscopy, duodenoscopy (EGD), combined (N/A, 04/24/2023); and Tonsillectomy (About 50 years ago).    Social History  Patient  reports that she has never smoked. She has never used smokeless tobacco. She reports that she does not drink alcohol and does not use drugs.    Family  "History  family history includes Cancer - colorectal in her father; Cerebrovascular Disease in her maternal grandmother and mother; Diabetes in her maternal grandmother; Hypertension in her maternal grandmother and mother; Lung Cancer in her father; Macular Degeneration in her father; Multiple myeloma in her brother; Pacemaker in her brother; Suicide in her sister.     Physical Examination  Blood pressure 137/66, pulse 55, height 1.641 m (5' 4.61\"), weight 61.5 kg (135 lb 8 oz), SpO2 100%, not currently breastfeeding. Body mass index is 22.82 kg/m .  General:  Well appearing, well nourished in no distress.  Oriented x 3, normal mood and affect.  Head:  normocephalic, atraumatic    Eyes: conjunctiva clear, EOM intact, PERRL.   Throat:  No erythema, exudates or lesions.   Neck:  neck supple, no masses  Heart:  RRR, no murmur   Lungs:  CTA bilaterally, no wheezes, rhonchi, rales.  Breathing unlabored.   Abdomen:  Soft, NT/ND, no HSM, no masses.   Extremities: No deformities, clubbing, cyanosis, or edema.   Neurologic: A/O x 3. No focal deficits.     Objective   Pertinent Laboratory and Imaging Results  Most Recent Serum Chemistries  Lab Results   Component Value Date    WBC 8.1 12/04/2024    HGB 14.0 12/04/2024    HCT 41.6 12/04/2024     12/04/2024     12/04/2024    POTASSIUM 4.0 12/04/2024    CHLORIDE 100 12/04/2024    CO2 31 (H) 12/04/2024    BUN 11.2 12/04/2024    CR 0.77 12/04/2024    GLC 90 12/04/2024    SED 18 03/27/2024    DD 0.30 07/11/2023    TROPI <0.015 09/14/2020    AST 20 10/21/2024    ALT 17 10/21/2024    ALKPHOS 71 10/21/2024    INR 1.19 (H) 02/06/2024     Most Recent Urinalysis  Lab Results   Component Value Date    COLOR Light Yellow 12/04/2024    APPEARANCE Clear 12/04/2024    URINEGLC Negative 12/04/2024    URINEBILI Negative 12/04/2024    URINEKETONE Negative 12/04/2024    SG 1.004 12/04/2024    URINEPH 7.0 12/04/2024    PROTEIN Negative 12/04/2024    NITRITE Negative 12/04/2024    " LEUKEST Negative 12/04/2024     Current Outpatient Medications   Medication Sig Dispense Refill    amLODIPine (NORVASC) 2.5 MG tablet Take 1 tablet (2.5 mg) by mouth daily. Take one po bid      atorvastatin (LIPITOR) 10 MG tablet Take 1 tablet (10 mg) by mouth daily 90 tablet 3    Cholecalciferol (VITAMIN D3 PO) Take 2,000 Units by mouth daily       clopidogrel (PLAVIX) 75 MG tablet Take 1 tablet (75 mg) by mouth daily. 30 tablet 3    fluocinonide (LIDEX) 0.05 % external cream as needed.      hydroCHLOROthiazide (HYDRODIURIL) 25 MG tablet Take 1 tablet (25 mg) by mouth daily.      hydroxyurea (HYDREA) 500 MG capsule Take 1 capsule (500 mg) by mouth daily 30 capsule 4    multivitamin w/minerals (CENTRUM ADULTS) tablet Take 1 tablet by mouth daily      Probiotic Product (PROBIOTIC PO) Take 1 capsule by mouth daily      sodium chloride 0.9 % SOLN 250 mL with vedolizumab 60 MG/ML SOLR 300 mg infusionn Inject 300 mg into the vein once. Every 6 weeks      valsartan (DIOVAN) 80 MG tablet Take 2 tablets (160 mg) by mouth daily. 180 tablet 1    vitamin B-2 (RIBOFLAVIN) 100 MG TABS tablet Take 25 mg by mouth daily.      LORazepam (ATIVAN) 0.5 MG tablet Take 1 tablet (0.5 mg) by mouth every 6 hours as needed for anxiety. 5 tablet 0     No current facility-administered medications for this visit.        Assessment & Plan   # Hypertension    The patient has chronic hypertension, and her blood pressure readings have been worse lately.  Furthermore, she describes episodes of hypertension in association with palpitations, and anxiety.  Of note, she underwent a thorough evaluation by her cardiologist.    I measured orthostatic vitals in the office today.  Blood pressure sitting was 152/72 with a heart rate of 60.  Blood pressure standing was 151/78 with a heart rate of 60.  Thus, the patient does not have orthostatic hypotension    She also completed evaluation for secondary hypertension.  Of renin activity is slightly elevated, but  I think this is expected given that she takes losartan.  Aldosterone level, and aldosterone to renin ratio was normal.  Serum cortisol was normal.  She completed a 24 urine collection for metanephrines, and her normetanephrine was mildly elevated at 417.  Her renal Dopplers were inconclusive for renal artery stenosis.  Thus, to summarize, is unlikely that she has secondary hypertension.    With regards to workup, I think it is reasonable to obtain a fractionated plasma metanephrines given her borderline +24 urine collection.    With regards to management, given her lower extremity edema, I think it is reasonable to increase her hydrochlorothiazide.  In the future should the edema persist, then at that point she may want to stop the amlodipine.  I instructed her to continue monitoring her blood pressure at home.  I will ask my nurse to follow-up with her in 2 to 4 weeks.    My recommendations are the following:  - Check fractionated plasma metanephrines.  - Check your blood pressure at home. Goal blood pressure is 130/80. Call my office if your blood pressure readings are running higher than that.  - Nurse visit in 2 weeks to follow up on your blood pressure measurement.  - Return to Nephrology Clinic in 3 months to review your progress.     Total time was of this encounter on the date of service was 60 minutes. All questions were answered to the patient's satisfaction.  Chart documentation was completed, in part, with [employer] voice-recognition software. Even though reviewed, some grammatical, spelling, and word errors may remain.    Orders placed today:  Orders Placed This Encounter   Procedures    Metanephrines Plasma Free    Basic metabolic panel    Specialty Care Coordination Referral     Rene Pickard MD  Division of Nephrology and Hypertension  [employer] ([employer])   Vocera Web Console ([employer])

## 2024-12-17 NOTE — NURSING NOTE
"Chief Complaint   Patient presents with    New Patient     Benign essential hypertension       Vitals:    12/17/24 0926   BP: 137/66   BP Location: Left arm   Patient Position: Sitting   Cuff Size: Adult Regular   Pulse: 55   SpO2: 100%   Weight: 61.5 kg (135 lb 8 oz)   Height: 1.641 m (5' 4.61\")       Body mass index is 22.82 kg/m .      SIMA Centeno    "

## 2024-12-17 NOTE — PATIENT INSTRUCTIONS
It was a pleasure taking care of you today.  I've included a brief summary of our discussion and care plan from today's visit below.      My recommendations are summarized as follows:  - Check fractionated plasma metanephrines.  - Check your blood pressure at home. Goal blood pressure is 130/80. Call my office if your blood pressure readings are running higher than that.  - Nurse visit in 2 weeks to follow up on your blood pressure measurement.  - Return to Nephrology Clinic in 3 months to review your progress.     Who do I call with any questions after my visit?  Please be in touch if there are any further questions that arise following today's visit.  There are multiple ways to contact your nephrology care team.      During business hours, you may reach your Nephrology RN Care Coordinator, Shamika Patel at [phone]. For urgent/emergent questions after business hours, you may reach the on-call Nephrology Fellow by contacting the [facility]  at [phone]. You can always send a secure message through [employer].  [employer] messages are answered by your nurse or doctor typically within 24 hours.  Please allow extra time on weekends and holidays.      How do I schedule appointments?  To schedule or reschedule an appointment, please call [phone]. To schedule imaging please call [phone]. To schedule your lab appointment at [address] lab call [phone].    How will I get the results of any tests ordered?    You will receive all of your results.  If you have signed up for [employer], any tests ordered at your visit will be available to you after your physician reviews them.  Typically this takes 1-2 weeks.  If there are urgent results that require a change in your care plan, your physician or nurse will call you to discuss the next steps.      What is [employer]?  [employer] is a secure way for you to access all of your healthcare records from the [facility].  It  is a web based computer program, so you can sign on to it from any location.  It also allows you to send secure messages to your care team.  I recommend signing up for [employer] access if you have not already done so and are comfortable with using a computer.      Sincerely,    Rene Pickard MD  [facility]  Division of Nephrology and Hypertension

## 2024-12-20 LAB
ANNOTATION COMMENT IMP: ABNORMAL
METANEPHS SERPL-SCNC: 0.29 NMOL/L
NORMETANEPHRINE SERPL-SCNC: 1.1 NMOL/L

## 2024-12-23 ENCOUNTER — TRANSFERRED RECORDS (OUTPATIENT)
Dept: HEALTH INFORMATION MANAGEMENT | Facility: CLINIC | Age: 84
End: 2024-12-23
Payer: COMMERCIAL

## 2024-12-30 ENCOUNTER — TELEPHONE (OUTPATIENT)
Dept: INTERNAL MEDICINE | Facility: CLINIC | Age: 84
End: 2024-12-30
Payer: COMMERCIAL

## 2024-12-30 NOTE — TELEPHONE ENCOUNTER
Prior Authorization Retail Medication Request    Medication/Dose: valsartan (DIOVAN) 80 MG tablet  Diagnosis and ICD code (if different than what is on RX):    Associated Diagnoses  Benign essential hypertension [I10]      New/renewal/insurance change PA/secondary ins. PA:  Previously Tried and Failed:    Rationale:      Insurance   Member Information    Name: YOLANDA MCCAULEY [[id number]]     Number: [id number] Relationship to subscriber: Self   Effective date: 1/1/2021 Termination date: --     Coverage Information    Subscriber name: NAVEEN MCCAULEYHIMANSHU GRIFFITH     Subscriber number: [id number] Group number: [id number]   Payer: [facility] [3] Benefit plan: [facility] MEDICARE ADVANTAGE [2320]         Pharmacy Information (if different than what is on RX)  Name:  [id number] - [place], [place] - [address] [place] [address]  Phone:  [phone]   Fax: [phone]     Clinic Information  Preferred routing pool for dept communication: [facility]

## 2024-12-31 NOTE — TELEPHONE ENCOUNTER
Prior Authorization Approval    Medication: VALSARTAN 80 MG PO TABS  Authorization Effective Date: 10/2/2024  Authorization Expiration Date: 12/31/2025  Approved Dose/Quantity: 90/30  Reference #: [id number]   Insurance Company: [employer] [place] - Phone [phone] Fax [phone]  Expected CoPay: $    CoPay Card Available:      Financial Assistance Needed:   Which Pharmacy is filling the prescription: [id number] - [place], [place] - [address] [place] [address]  Pharmacy Notified: Yes  Patient Notified: Kendell

## 2024-12-31 NOTE — TELEPHONE ENCOUNTER
PA Initiation    Medication: VALSARTAN 80 MG PO TABS  Insurance Company: [employer] [place] - Phone [phone] Fax [phone]  Pharmacy Filling the Rx: [id number] - [place], [place] - [address] [place] [address]  Filling Pharmacy Phone: [phone]  Filling Pharmacy Fax:    Start Date: 12/31/2024

## 2025-01-07 ENCOUNTER — LAB (OUTPATIENT)
Dept: LAB | Facility: CLINIC | Age: 85
End: 2025-01-07
Payer: COMMERCIAL

## 2025-01-07 ENCOUNTER — OFFICE VISIT (OUTPATIENT)
Dept: INTERNAL MEDICINE | Facility: CLINIC | Age: 85
End: 2025-01-07
Payer: COMMERCIAL

## 2025-01-07 VITALS
SYSTOLIC BLOOD PRESSURE: 134 MMHG | DIASTOLIC BLOOD PRESSURE: 69 MMHG | OXYGEN SATURATION: 98 % | HEART RATE: 59 BPM | RESPIRATION RATE: 14 BRPM | TEMPERATURE: 98 F

## 2025-01-07 DIAGNOSIS — E78.00 HIGH BLOOD CHOLESTEROL: ICD-10-CM

## 2025-01-07 DIAGNOSIS — I10 BENIGN ESSENTIAL HYPERTENSION: ICD-10-CM

## 2025-01-07 DIAGNOSIS — G45.9 TIA (TRANSIENT ISCHEMIC ATTACK): ICD-10-CM

## 2025-01-07 LAB
ALBUMIN SERPL BCG-MCNC: 4.3 G/DL (ref 3.5–5.2)
ALP SERPL-CCNC: 89 U/L (ref 40–150)
ALT SERPL W P-5'-P-CCNC: 14 U/L (ref 0–50)
ANION GAP SERPL CALCULATED.3IONS-SCNC: 8 MMOL/L (ref 7–15)
AST SERPL W P-5'-P-CCNC: 19 U/L (ref 0–45)
BASOPHILS # BLD AUTO: 0 10E3/UL (ref 0–0.2)
BASOPHILS NFR BLD AUTO: 0 %
BILIRUB SERPL-MCNC: 0.4 MG/DL
BUN SERPL-MCNC: 12.8 MG/DL (ref 8–23)
CALCIUM SERPL-MCNC: 9.6 MG/DL (ref 8.8–10.4)
CHLORIDE SERPL-SCNC: 99 MMOL/L (ref 98–107)
CREAT SERPL-MCNC: 0.78 MG/DL (ref 0.51–0.95)
CRP SERPL-MCNC: <3 MG/L
EGFRCR SERPLBLD CKD-EPI 2021: 74 ML/MIN/1.73M2
EOSINOPHIL # BLD AUTO: 0.2 10E3/UL (ref 0–0.7)
EOSINOPHIL NFR BLD AUTO: 2 %
ERYTHROCYTE [DISTWIDTH] IN BLOOD BY AUTOMATED COUNT: 12.1 % (ref 10–15)
ERYTHROCYTE [SEDIMENTATION RATE] IN BLOOD BY WESTERGREN METHOD: 9 MM/HR (ref 0–30)
GLUCOSE SERPL-MCNC: 89 MG/DL (ref 70–99)
HCO3 SERPL-SCNC: 32 MMOL/L (ref 22–29)
HCT VFR BLD AUTO: 42.3 % (ref 35–47)
HGB BLD-MCNC: 14.1 G/DL (ref 11.7–15.7)
IMM GRANULOCYTES # BLD: 0 10E3/UL
IMM GRANULOCYTES NFR BLD: 0 %
LYMPHOCYTES # BLD AUTO: 2.2 10E3/UL (ref 0.8–5.3)
LYMPHOCYTES NFR BLD AUTO: 23 %
MCH RBC QN AUTO: 33 PG (ref 26.5–33)
MCHC RBC AUTO-ENTMCNC: 33.3 G/DL (ref 31.5–36.5)
MCV RBC AUTO: 99 FL (ref 78–100)
MONOCYTES # BLD AUTO: 1 10E3/UL (ref 0–1.3)
MONOCYTES NFR BLD AUTO: 11 %
NEUTROPHILS # BLD AUTO: 5.8 10E3/UL (ref 1.6–8.3)
NEUTROPHILS NFR BLD AUTO: 63 %
NRBC # BLD AUTO: 0 10E3/UL
NRBC BLD AUTO-RTO: 0 /100
PLATELET # BLD AUTO: 291 10E3/UL (ref 150–450)
POTASSIUM SERPL-SCNC: 4.1 MMOL/L (ref 3.4–5.3)
PROT SERPL-MCNC: 7.2 G/DL (ref 6.4–8.3)
RBC # BLD AUTO: 4.27 10E6/UL (ref 3.8–5.2)
SODIUM SERPL-SCNC: 139 MMOL/L (ref 135–145)
WBC # BLD AUTO: 9.2 10E3/UL (ref 4–11)

## 2025-01-07 PROCEDURE — 85025 COMPLETE CBC W/AUTO DIFF WBC: CPT | Performed by: PATHOLOGY

## 2025-01-07 PROCEDURE — 85652 RBC SED RATE AUTOMATED: CPT | Performed by: PATHOLOGY

## 2025-01-07 PROCEDURE — 80053 COMPREHEN METABOLIC PANEL: CPT | Performed by: PATHOLOGY

## 2025-01-07 PROCEDURE — 36415 COLL VENOUS BLD VENIPUNCTURE: CPT | Performed by: PATHOLOGY

## 2025-01-07 PROCEDURE — 86140 C-REACTIVE PROTEIN: CPT | Performed by: PATHOLOGY

## 2025-01-07 RX ORDER — ATORVASTATIN CALCIUM 10 MG/1
10 TABLET, FILM COATED ORAL DAILY
Qty: 90 TABLET | Refills: 3 | Status: SHIPPED | OUTPATIENT
Start: 2025-01-07

## 2025-01-07 RX ORDER — CLOPIDOGREL BISULFATE 75 MG/1
75 TABLET ORAL DAILY
Qty: 30 TABLET | Refills: 3 | Status: SHIPPED | OUTPATIENT
Start: 2025-01-07

## 2025-01-07 RX ORDER — VALSARTAN 80 MG/1
240 TABLET ORAL DAILY
Qty: 270 TABLET | Refills: 1 | Status: SHIPPED | OUTPATIENT
Start: 2025-01-07

## 2025-01-07 NOTE — PROGRESS NOTES
HPI:    Last visit with us was 2024 and additional information in that note. She still has some low and high BP readings. She feels that some of her elevated values are related to stress and anxiety. She denies any new acute neurological sxs. She has less B LE swelling on an increased dose of HcTz. She is only on 2.5 mg of Amlodipine. She is on 25 mg HcTz and 240 mg of Diovan. Otherwise, no additional HEENT, cardiopulmonary, abdominal, , GYN, neurological, systemic, psychiatric, lymphatic, endocrine, vascular complaints.       Past Medical History:   Diagnosis Date    Autoimmune disease (H) approx.     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 30 years ago    Osteopenia     Polymyalgia rheumatica (H)     PONV (postoperative nausea and vomiting)     Proctitis     Reduced vision Sometime is a little blurry     Past Surgical History:   Procedure Laterality Date    COLONOSCOPY  2014    Procedure: COMBINED COLONOSCOPY, SINGLE BIOPSY/POLYPECTOMY BY BIOPSY;  COLONOSCOPY;  Surgeon: Carol Ann Plasencia MD;  Location: [facility]    CV CORONARY ANGIOGRAM N/A 2022    Procedure: Coronary Angiogram;  Surgeon: Trevin Hawk MD;  Location:  [facility]    ENT SURGERY      tonsilectomy, adenoidectomy    ESOPHAGOSCOPY, GASTROSCOPY, DUODENOSCOPY (EGD), COMBINED N/A 2023    Procedure: ESOPHAGOGASTRODUODENOSCOPY, WITH BIOPSY;  Surgeon: Angel Lara MD;  Location:  GI    GYN SURGERY  ,     x 2    HYSTERECTOMY      ORTHOPEDIC SURGERY  2004    (R) shoulder surgery for frozen shoulder    ZAC BSO      for fibroids    TONSILLECTOMY  About 50 years ago     PE:    Vitals noted, gen, nad, cooperative, alert, neck supple nl rom, lungs with good air movement, RRR, S1, S2, no MRG, abdomen, no acute findings Grossly normal neurological exam.     Recent Results (from  the past 720 hours)   Metanephrines Plasma Free    Collection Time: 12/17/24 11:02 AM   Result Value Ref Range    Metanephrine 0.29 0.00 - 0.49 nmol/L    Normetanephrine 1.10 (H) 0.00 - 0.89 nmol/L    Metanephrines Interpretation See Note      Results for orders placed or performed in visit on 01/07/25   Comprehensive metabolic panel     Status: Abnormal   Result Value Ref Range    Sodium 139 135 - 145 mmol/L    Potassium 4.1 3.4 - 5.3 mmol/L    Carbon Dioxide (CO2) 32 (H) 22 - 29 mmol/L    Anion Gap 8 7 - 15 mmol/L    Urea Nitrogen 12.8 8.0 - 23.0 mg/dL    Creatinine 0.78 0.51 - 0.95 mg/dL    GFR Estimate 74 >60 mL/min/1.73m2    Calcium 9.6 8.8 - 10.4 mg/dL    Chloride 99 98 - 107 mmol/L    Glucose 89 70 - 99 mg/dL    Alkaline Phosphatase 89 40 - 150 U/L    AST 19 0 - 45 U/L    ALT 14 0 - 50 U/L    Protein Total 7.2 6.4 - 8.3 g/dL    Albumin 4.3 3.5 - 5.2 g/dL    Bilirubin Total 0.4 <=1.2 mg/dL   CRP, inflammation     Status: Normal   Result Value Ref Range    CRP Inflammation <3.00 <5.00 mg/L   ESR: Erythrocyte sedimentation rate     Status: Normal   Result Value Ref Range    Erythrocyte Sedimentation Rate 9 0 - 30 mm/hr   CBC with platelets and differential     Status: None   Result Value Ref Range    WBC Count 9.2 4.0 - 11.0 10e3/uL    RBC Count 4.27 3.80 - 5.20 10e6/uL    Hemoglobin 14.1 11.7 - 15.7 g/dL    Hematocrit 42.3 35.0 - 47.0 %    MCV 99 78 - 100 fL    MCH 33.0 26.5 - 33.0 pg    MCHC 33.3 31.5 - 36.5 g/dL    RDW 12.1 10.0 - 15.0 %    Platelet Count 291 150 - 450 10e3/uL    % Neutrophils 63 %    % Lymphocytes 23 %    % Monocytes 11 %    % Eosinophils 2 %    % Basophils 0 %    % Immature Granulocytes 0 %    NRBCs per 100 WBC 0 <1 /100    Absolute Neutrophils 5.8 1.6 - 8.3 10e3/uL    Absolute Lymphocytes 2.2 0.8 - 5.3 10e3/uL    Absolute Monocytes 1.0 0.0 - 1.3 10e3/uL    Absolute Eosinophils 0.2 0.0 - 0.7 10e3/uL    Absolute Basophils 0.0 0.0 - 0.2 10e3/uL    Absolute Immature Granulocytes 0.0 <=0.4  10e3/uL    Absolute NRBCs 0.0 10e3/uL   CBC with platelets and differential     Status: None    Narrative    The following orders were created for panel order CBC with platelets and differential.  Procedure                               Abnormality         Status                     ---------                               -----------         ------                     CBC with platelets and d...[[id number]]                      Final result                 Please view results for these tests on the individual orders.   ]      A/P:    1. Immunizations; COVID Pfizer vaccine x 4. She has completed the Shingrix vaccine series. Pneumococcal 23 done 10/18/2011. Prevnar 13 done 1/13/2016. Td 1/31/2018. Prevnar 20 done 11/15/2022. RSV done 12/15/2023. Influenza vaccine 10/22/2024.   2. Inflammatory bowel disease/proctitis. She is followed at [facility] by Dr. Garcia. She had a Flex Sig 4/17/2024. Clinic note from Dr. Garcia 11/7/2024. Last scanned in note from [facility] was 12/23/2024  3. Chronic pain on night time Gabapentin; seen Dr. Pimentel, orthopedics 6/8/2021 and Dr. Neumann 4/29/2021 Mr. Neumann.   4. HTN; on Valsartan/HcTz and 2.5 mg of Amlodipine. She could not tolerate Coreg (even 3.125 mg).  Renal U/S done, She saw Dr. Vogt,  Nephrology on 12/17/2024. She may consider stopping her Amlodipine (only 2.5 mg) because she has increased HcTz to 25 mg   5. Mammogram; 2/13/2024.   6. Lipids; 1/9/2024; HDL 61, LDL 39, and TG's 69. She is on  Atorvastatin Repeat 5/1/2024; HDL 49, TG's 128, and LDL 34  7. Dermatology; scanned in outside [facility] 2/17/2022. Care Everywhere dermatology note 10/12/2022.   8. Vitamin D normal at 57 on 2/11/2022. DEXA scan in chart from 7/19/2021 and most negative T score -2.3.  She saw Dr. Simon endocrinology   8/22/2022  9. Outside Rheumatology for PMR, Dr. Damian scanned in note from 9/30/2024. She is not on prednisone currently.    10. Palpitations;  Normal dobutamine stress  echo 10/11/2021.  For atypical CP, CTA coronary angiogram done 8/10/2022 with possible severe mid LAD stenosis. Coronary angiogram on 8/30/2022 with normal coronary arteries.  Ziopatch monitor 7/22/2022 w/o significant findings.  She had a cardiology appt. With Ms. Shi 5/8/2024 and 6/18/2024 with Dr. Yoon, Cardiology. She had a cardiology appt. With Dr. Sanders 9/13/2024. She had a 14 day Ziopatch monitor 8/23/2024 no afib, sxs correlated with NSR.  11. Past hospital discharged 7/12/2023 for possible TIA sxs with negative evaluation. She has a neurology appt. Scheduled for 10/31/2024. She has h/o TIA symptoms and is on Plavix and Atorvastatin. See ED note from 8/22/2024. She had MRI/MRA imaging 8/22/2024 that were essentially normal. She states she was driving and had L arm numbness (she had this before with a presumed TIA). She is on Plavix. She has some dizziness, mild head aches, and anxiety. Seen in Neurology 10/31/2024 and next 2/6/2025  12. A1c 6.0% on 7/3/2023.   13. Positive CHANDNI-2 for thrombocytosis. She was seen 5/2024 by Dr. Milner, Hematology   She is on hydroxyurea         40 minutes spent on the date of the encounter doing chart review, history and exam, documentation and further activities as noted above exclusive of procedures and other billable interpretations

## 2025-01-15 ENCOUNTER — PATIENT OUTREACH (OUTPATIENT)
Dept: NEPHROLOGY | Facility: CLINIC | Age: 85
End: 2025-01-15
Payer: COMMERCIAL

## 2025-01-15 NOTE — PROGRESS NOTES
Nephrology Note: Patient Outreach Encounter    REASON FOR CALL:     REASON FOR CALL: No chief complaint on file.                                  SITUATION/BACKROUND:   Patient is being treated for HTN.        Patient Journey Status:  N/A    ASSESSMENT:         Neph Assessments:  No assessment indicated    PLAN:     Education:  Method:  general discussion/verbal explanation  Discussed: managing blood pressure  These interventions were used: Goal: To record BP readings  Education material provided and patient was given an opportunity to ask questions.      Follow Up:   Patient to follow up as scheduled at next appointment         Shamika Patel RN

## 2025-02-03 DIAGNOSIS — I10 BENIGN ESSENTIAL HYPERTENSION: ICD-10-CM

## 2025-02-05 NOTE — TELEPHONE ENCOUNTER
Pharmacy requesting call back to discuss this medication and why it is being denied a refill, pt is out of the medication

## 2025-02-06 ENCOUNTER — OFFICE VISIT (OUTPATIENT)
Dept: NEUROLOGY | Facility: CLINIC | Age: 85
End: 2025-02-06
Payer: COMMERCIAL

## 2025-02-06 VITALS
OXYGEN SATURATION: 100 % | BODY MASS INDEX: 23.09 KG/M2 | DIASTOLIC BLOOD PRESSURE: 64 MMHG | SYSTOLIC BLOOD PRESSURE: 157 MMHG | WEIGHT: 137.1 LBS | HEART RATE: 57 BPM

## 2025-02-06 DIAGNOSIS — R29.818 TRANSIENT NEUROLOGICAL SYMPTOMS: Primary | ICD-10-CM

## 2025-02-06 RX ORDER — AMLODIPINE BESYLATE 2.5 MG/1
2.5 TABLET ORAL DAILY
Qty: 30 TABLET | Refills: 3 | Status: SHIPPED | OUTPATIENT
Start: 2025-02-06

## 2025-02-06 NOTE — LETTER
2025      Debra Denise  [address]  [facility] [address]      Dear Colleague,    Thank you for referring your patient, Debra Denise, to the [facility]. Please see a copy of my visit note below.      Neurology Consultation    Patient Name:  Debra Denise  MRN:  [id number]    :  1940  Date of Service:  2025  Primary care provider:  Wolfgang Zimmerman        Chief Complaint: Follow-up    History of Present Illness:     Debra Denise is a 84 year old female who presents for routine follow-up.  She has not had any other episodes of transient left arm weakness or other neurologic events. Still continues to have somewhat erratic blood pressure but has been overall better controlled. Headaches have improved with better blood pressure control.  She continues to have issues with Plavix.     Prior History from 10/31/2024:     Debra Denise is a 84 year old female who presents for evaluation of two episodes of transient left arm weakness, concern for TIA vs complicated migraines.      Patient had an event in 2023 of transient left arm weakness and was evaluated at [facility] for possible TIA/stroke.  Was carrying a couple of bags of groceries when she realized her left arm was completely weak. Denies any other neurologic symptoms. It lasted maybe 30 seconds before completely resolving.  MRI brain was unrevealing. MRA H and N showed mild-moderate presumed atherosclerotic narrowing of the mid P2 segment of the right posterior cerebral artery. TTE was negative for intraatrial septal defect, normal left atria, and EF 60%.  30 day event monitor did not show any atrial fibrillation.  She initially was going to be on DAPT but opted to just be on Plavix due to her history of ulcerative colitis. She was also started on atorvastatin 40 mg. This was decreased due to muscle pain to 10 mg.      Had a similar episode in 2024  when her left arm went completely limp while driving.  Again denies any other neurologic symptoms.  Was evaluated in the ED. MRI, MRA H and N were unremarkable.  She was on Plavix and atorvastatin 10 mg PTA.  Zio patch 14 days was negative for atrial fibrillation.      Saw cardiology outpatient for consideration of EDITA. Did not feel it was necessary at this time.      She does not remember if she had a headache with the first headache. She did have a mild headache after her second episode. 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 About 15 years, she had an episode where she lost vision temporarily in one eye. Was diagnosed with ocular migraines.  She rarely gets ocular migraines.  Denies vision changes, photophobia, phonophobia, nausea, and vomiting during these episodes.        Has had some problems with intermittent dizziness and nausea for the last 2-3 years, getting worse. She describes it as a lightheaded sensation and denies sense of spin.  When asked to clarify she has difficulty to describe. She maybe feels like its foggy. They can last all day.  She has these almost everyday. Worse in the AM.  Does not think it is provoked by position change although not sure.  It can wake her up at night. She has been having more headaches recently but attributes it to hydroxyurea.  She has daily headaches.  Started about 1 year ago (Started in [place] and hydroxyurea). She denies photophobia.  Denies them worsening with activity.  Denies problems with being in the car.  Does feel like the dizziness/imbalance is worse looking from aisle . Denies family history of migraines. Has a history of PMR. Denies jaw claudication, unexplained weight loss, fevers, or shoulder/hip tension/pain.  Saw her rheumatologist and did not feel like headaches secondary to GCA. Inflammatory markers were reportedly normal. Did vestibular testing and therapy last year.      Has throbbing in her chest.            She has had 3 trips to the ED for  uncontrolled hypertension. She is trying to get in to see a nephrologist.  Her son is a nephrologist.  Feels like her pressures have been erratic although has been better controlled in the last few weeks.      Has had a little bit of bleeding with Plavix.      - MRI brain w/wo: negative for acute pathology  - MRA head w/o: no significant stenosis or LVO  - MRA neck w/wo: normal  - A1c 5.5%  - LDL 52  - Zio patch 14 day no atrial fibrillation     Physical Exam:  BP (!) 157/64   Pulse 57   Wt 62.2 kg (137 lb 1.6 oz)   SpO2 100%   BMI 23.09 kg/m      General:  No acute distress  Cardiovascular: Normal rate.  Lung: Respirations are non-labored.  Extremities: Normal range of motion  Integumentary: Warm and dry     Neurologic:  Mental status : alert, fund of knowledge appropriate for visit     LANGUAGE: Speech fluent, no dysarthria      CN:  II- pupils equal and reactive  III, IV, VI- extraocular movements intact  V- sensation intact bilaterally  VII- face symmetric  VIII- hearing intact, no nystagmus  IX, X- palate elevates symmetrically  XI- sternocleidomastoid 5/5  XII- tongue midline     MOTOR : intact bulk and tone  5/5 strength in all ext     SENSORY : intact to light touch in BUE and BLE       REFLEXES: Melgar absent     CEREBELLUM: finger to nose, finger taps, and heel to shin intact bilaterally      GAIT : Largely normal      Cognition and Speech: Speech clear and coherent.     Psychiatric: Cooperative, Appropriate mood & affect.    Assessment/Plan:   Debra Denise is a 84 year old female who presents for routine follow-up. She initially presented in 10/2024 for evaluation of two episodes of transient left arm weakness, concern for TIA vs complicated migraines. She has had 2 extensive TIA/stroke workups that have been largely unremarkable.  Her presentation would be unusual for a vascular event as well now that she has had 2 near identical events.  That being said, I did discuss with her that there  was no way to definitely rule out a TIA.  This was possibly an atypical migraine but again can not definitely say that these were migraines. Discussed at length the risks vs the benefits of staying on an antiplatelet. She would like to consider switching to ASA 81 mg which I reassured would have been what she would have started if she had not had GI issues with ASA. She will discuss with her PCP.      Headaches better with better blood pressure control.      Transient left arm weakness  Possible atypical migraines   Hypertension   Anxiety       Plan  > Continue Plavix 75 mg for now but fine with switching to ASA 81 mg daily   > Continue atorvastatin 10 mg per PCP   > Follow-up in 6 months      I spent 40 minutes providing care for this patient, including reviewing imaging, labs, and notes as well as providing counseling and coordination of care for the above issues.      Again, thank you for allowing me to participate in the care of your patient.        Sincerely,        Lianna Vigil, DO    Electronically signed

## 2025-02-06 NOTE — PROGRESS NOTES
Neurology Consultation    Patient Name:  Debra Denise  MRN:  [id number]    :  1940  Date of Service:  2025  Primary care provider:  Wolfgang Zimmerman        Chief Complaint: Follow-up    History of Present Illness:     Debra Denise is a 84 year old female who presents for routine follow-up.  She has not had any other episodes of transient left arm weakness or other neurologic events. Still continues to have somewhat erratic blood pressure but has been overall better controlled. Headaches have improved with better blood pressure control.  She continues to have issues with Plavix.     Prior History from 10/31/2024:     Debra Denise is a 84 year old female who presents for evaluation of two episodes of transient left arm weakness, concern for TIA vs complicated migraines.      Patient had an event in 2023 of transient left arm weakness and was evaluated at [facility] for possible TIA/stroke.  Was carrying a couple of bags of groceries when she realized her left arm was completely weak. Denies any other neurologic symptoms. It lasted maybe 30 seconds before completely resolving.  MRI brain was unrevealing. MRA H and N showed mild-moderate presumed atherosclerotic narrowing of the mid P2 segment of the right posterior cerebral artery. TTE was negative for intraatrial septal defect, normal left atria, and EF 60%.  30 day event monitor did not show any atrial fibrillation.  She initially was going to be on DAPT but opted to just be on Plavix due to her history of ulcerative colitis. She was also started on atorvastatin 40 mg. This was decreased due to muscle pain to 10 mg.      Had a similar episode in 2024 when her left arm went completely limp while driving.  Again denies any other neurologic symptoms.  Was evaluated in the ED. MRI, MRA H and N were unremarkable.  She was on Plavix and atorvastatin 10 mg PTA.  Zio patch 14 days was negative for atrial fibrillation.       Saw cardiology outpatient for consideration of EDITA. Did not feel it was necessary at this time.      She does not remember if she had a headache with the first headache. She did have a mild headache after her second episode. 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 About 15 years, she had an episode where she lost vision temporarily in one eye. Was diagnosed with ocular migraines.  She rarely gets ocular migraines.  Denies vision changes, photophobia, phonophobia, nausea, and vomiting during these episodes.        Has had some problems with intermittent dizziness and nausea for the last 2-3 years, getting worse. She describes it as a lightheaded sensation and denies sense of spin.  When asked to clarify she has difficulty to describe. She maybe feels like its foggy. They can last all day.  She has these almost everyday. Worse in the AM.  Does not think it is provoked by position change although not sure.  It can wake her up at night. She has been having more headaches recently but attributes it to hydroxyurea.  She has daily headaches.  Started about 1 year ago (Started in [place] and hydroxyurea). She denies photophobia.  Denies them worsening with activity.  Denies problems with being in the car.  Does feel like the dizziness/imbalance is worse looking from aisle . Denies family history of migraines. Has a history of PMR. Denies jaw claudication, unexplained weight loss, fevers, or shoulder/hip tension/pain.  Saw her rheumatologist and did not feel like headaches secondary to GCA. Inflammatory markers were reportedly normal. Did vestibular testing and therapy last year.      Has throbbing in her chest.            She has had 3 trips to the ED for uncontrolled hypertension. She is trying to get in to see a nephrologist.  Her son is a nephrologist.  Feels like her pressures have been erratic although has been better controlled in the last few weeks.      Has had a little bit of bleeding with Plavix.      - MRI  brain w/wo: negative for acute pathology  - MRA head w/o: no significant stenosis or LVO  - MRA neck w/wo: normal  - A1c 5.5%  - LDL 52  - Zio patch 14 day no atrial fibrillation     Physical Exam:  BP (!) 157/64   Pulse 57   Wt 62.2 kg (137 lb 1.6 oz)   SpO2 100%   BMI 23.09 kg/m      General:  No acute distress  Cardiovascular: Normal rate.  Lung: Respirations are non-labored.  Extremities: Normal range of motion  Integumentary: Warm and dry     Neurologic:  Mental status : alert, fund of knowledge appropriate for visit     LANGUAGE: Speech fluent, no dysarthria      CN:  II- pupils equal and reactive  III, IV, VI- extraocular movements intact  V- sensation intact bilaterally  VII- face symmetric  VIII- hearing intact, no nystagmus  IX, X- palate elevates symmetrically  XI- sternocleidomastoid 5/5  XII- tongue midline     MOTOR : intact bulk and tone  5/5 strength in all ext     SENSORY : intact to light touch in BUE and BLE       REFLEXES: Melgar absent     CEREBELLUM: finger to nose, finger taps, and heel to shin intact bilaterally      GAIT : Largely normal      Cognition and Speech: Speech clear and coherent.     Psychiatric: Cooperative, Appropriate mood & affect.    Assessment/Plan:   Debra Denise is a 84 year old female who presents for routine follow-up. She initially presented in 10/2024 for evaluation of two episodes of transient left arm weakness, concern for TIA vs complicated migraines. She has had 2 extensive TIA/stroke workups that have been largely unremarkable.  Her presentation would be unusual for a vascular event as well now that she has had 2 near identical events.  That being said, I did discuss with her that there was no way to definitely rule out a TIA.  This was possibly an atypical migraine but again can not definitely say that these were migraines. Discussed at length the risks vs the benefits of staying on an antiplatelet. She would like to consider switching to ASA 81 mg  which I reassured would have been what she would have started if she had not had GI issues with ASA. She will discuss with her PCP.      Headaches better with better blood pressure control.      Transient left arm weakness  Possible atypical migraines   Hypertension   Anxiety       Plan  > Continue Plavix 75 mg for now but fine with switching to ASA 81 mg daily   > Continue atorvastatin 10 mg per PCP   > Follow-up in 6 months      I spent 40 minutes providing care for this patient, including reviewing imaging, labs, and notes as well as providing counseling and coordination of care for the above issues.

## 2025-02-06 NOTE — TELEPHONE ENCOUNTER
amLODIPine (NORVASC) 2.5 MG tablet   30 tablet 5 12/20/2024     Refills available, remaining sent to pharmacy.  Fulfilled order previously

## 2025-02-16 ENCOUNTER — NURSE TRIAGE (OUTPATIENT)
Dept: NURSING | Facility: CLINIC | Age: 85
End: 2025-02-16
Payer: COMMERCIAL

## 2025-02-16 NOTE — TELEPHONE ENCOUNTER
"Nurse Triage SBAR    Is this a 2nd Level Triage? NO    Situation:  Lab results     Background: Pt reports \"battling with off the chart blood pressure for awhile\". Pt takes three different blood pressure medications. Pt concerned about her labs drawn through [facility]. Pt reports [facility] called her \"Thursday or Friday and told me everything was ok on their end but to contact my PCP, today I noticed sodium 126\". Pt reports \"the problem is whenever I contact Dr. Zimmerman I never get a response back\". Pt declines triage of any acute symptoms but states she is very concerned about her sodium and blood pressure medications. Pt reports \"just have not felt terrific all weekend\".     Assessment:  Hyponatremia     Protocol Recommended Disposition:   Call PCP Now    Recommendation:  On call to advise        Provider consult indicated.     Reason for page: Hyponatremia     Page sent to Dr. Leticia Odonnell by Answering Service at 6:20 pm.     Kami Toussaint RN          Provider, Dr. Leticia Odonnell, returning page to [facility] at 6:21 pm    Provider recommended plan of care: Ok for pt to hold hydrochlorothiazide until speaks with PCP. If no acute symptoms can follow up with PCP tomorrow.     Provider Recommendation Follow Up:   Reached patient/caregiver. Informed of provider's recommendations. Patient verbalized understanding and agrees with the plan.           Kami Toussaint RN        Does the patient meet one of the following criteria for ADS visit consideration? 16+ years old, with an [facility] PCP     TIP  Providers, please consider if this condition is appropriate for management at one of our Acute and Diagnostic Services sites.     If patient is a good candidate, please use dotphrase <dot>triageresponse and select Refer to ADS to document.    Reason for Disposition   [1] Caller requests to speak ONLY to PCP AND [2] URGENT question    Protocols used: PCP Call - No Triage-A-AH    "

## 2025-02-17 ENCOUNTER — OFFICE VISIT (OUTPATIENT)
Dept: INTERNAL MEDICINE | Facility: CLINIC | Age: 85
End: 2025-02-17
Payer: COMMERCIAL

## 2025-02-17 ENCOUNTER — LAB (OUTPATIENT)
Dept: LAB | Facility: CLINIC | Age: 85
End: 2025-02-17
Payer: COMMERCIAL

## 2025-02-17 VITALS
WEIGHT: 135.3 LBS | RESPIRATION RATE: 16 BRPM | TEMPERATURE: 98 F | SYSTOLIC BLOOD PRESSURE: 138 MMHG | HEIGHT: 65 IN | HEART RATE: 67 BPM | BODY MASS INDEX: 22.54 KG/M2 | DIASTOLIC BLOOD PRESSURE: 66 MMHG | OXYGEN SATURATION: 99 %

## 2025-02-17 DIAGNOSIS — I10 BENIGN ESSENTIAL HYPERTENSION: Primary | ICD-10-CM

## 2025-02-17 DIAGNOSIS — E87.1 HYPONATREMIA: Primary | ICD-10-CM

## 2025-02-17 DIAGNOSIS — I10 BENIGN ESSENTIAL HYPERTENSION: ICD-10-CM

## 2025-02-17 DIAGNOSIS — E78.00 HIGH BLOOD CHOLESTEROL: ICD-10-CM

## 2025-02-17 DIAGNOSIS — E87.1 HYPONATREMIA: ICD-10-CM

## 2025-02-17 LAB
ANION GAP SERPL CALCULATED.3IONS-SCNC: 8 MMOL/L (ref 7–15)
BUN SERPL-MCNC: 14.3 MG/DL (ref 8–23)
CALCIUM SERPL-MCNC: 9.3 MG/DL (ref 8.8–10.4)
CHLORIDE SERPL-SCNC: 97 MMOL/L (ref 98–107)
CREAT SERPL-MCNC: 0.74 MG/DL (ref 0.51–0.95)
EGFRCR SERPLBLD CKD-EPI 2021: 79 ML/MIN/1.73M2
GLUCOSE SERPL-MCNC: 110 MG/DL (ref 70–99)
HCO3 SERPL-SCNC: 33 MMOL/L (ref 22–29)
OSMOLALITY SERPL: 290 MMOL/KG (ref 280–301)
OSMOLALITY UR: 116 MMOL/KG (ref 100–1200)
POTASSIUM SERPL-SCNC: 3.5 MMOL/L (ref 3.4–5.3)
SODIUM SERPL-SCNC: 138 MMOL/L (ref 135–145)

## 2025-02-17 PROCEDURE — 83935 ASSAY OF URINE OSMOLALITY: CPT | Performed by: HOSPITALIST

## 2025-02-17 PROCEDURE — 83930 ASSAY OF BLOOD OSMOLALITY: CPT | Performed by: HOSPITALIST

## 2025-02-17 PROCEDURE — 80048 BASIC METABOLIC PNL TOTAL CA: CPT | Performed by: PATHOLOGY

## 2025-02-17 PROCEDURE — 36415 COLL VENOUS BLD VENIPUNCTURE: CPT | Performed by: PATHOLOGY

## 2025-02-17 PROCEDURE — 99000 SPECIMEN HANDLING OFFICE-LAB: CPT | Performed by: PATHOLOGY

## 2025-02-17 RX ORDER — HYDROCHLOROTHIAZIDE 12.5 MG/1
12.5 TABLET ORAL DAILY
Qty: 30 TABLET | Refills: 5 | Status: SHIPPED | OUTPATIENT
Start: 2025-02-17

## 2025-02-17 RX ORDER — AMLODIPINE BESYLATE 2.5 MG/1
2.5 TABLET ORAL DAILY
Qty: 60 TABLET | Refills: 2 | Status: SHIPPED | OUTPATIENT
Start: 2025-02-17

## 2025-02-17 NOTE — PROGRESS NOTES
Assessment & Plan   Problem List Items Addressed This Visit       Hyponatremia - Primary     Patient had lab showing sodium down to 126, chloride low at 88 on 2/13/25 last week. Likely contributed by hydrochlorothiazide use. BP is 138/66 with HR at 67 today.  - Check labs for BMP, Serum Osm, Urine Osm.   - Continue with infusion.   - Reduce hydrochlorothiazide at 12.5mg daily.   - Continued on amlodipine 2.5mg (may add 2.5mg depending on blood pressure) and valsartan 280mg daily.   - Also recheck BMP lab to recheck sodium again in 1-2 weeks. If sodium is still low, would discontinue hydrochlorothiazide and monitor BP.  May consider a non diuretic BP medication if working on HTN.   - Continue monitoring BP at home. Let Dr. Zimmerman if blood pressure goes above 140/90 persistently.          Relevant Orders    Basic metabolic panel  (Ca, Cl, CO2, Creat, Gluc, K, Na, BUN)    OSMOLALITY, SERUM    Osmolality urine     Other Visit Diagnoses       Benign essential hypertension        Relevant Medications    amLODIPine (NORVASC) 2.5 MG tablet    hydrochlorothiazide 12.5 MG tablet                      Return in about 22 days (around 3/11/2025).      Dax Richardson is a 84 year old, presenting for the following health issues:  Consult (Blood pressure issues, low sodium level)      2/17/2025     9:12 AM   Additional Questions   Roomed by [facility] EMT   Accompanied by      History of Present Illness       Reason for visit:  Low sodium and blood pressure  Symptom onset:  1-3 days ago   She is taking medications regularly.     Mentions she sees Dr. Mayberry for thrombocytopenia. Had labs last week on 2/13/25 with sodium at 126 and chloride at 88. Creatinine was at 0.7. Does deal with swelling issues. On Saturday, felt a little faint like episode. Doing well yesterday and today. She was drinking a lot of water. Son is a nephrologist and told her to consider lasix daily instead of hydrochlorothiazide and less water.  "    Patient mentions she does have anxiety and feels anxiety does drive her blood pressure up.     She is drinking 16 oz of water, has about 4 a day. Decreased just yesterday less water.     Patient mentions she does get swelling in her legs but appears to be associated with amlodipine use. Less amlodipine has decreased swelling in her legs.       Review of Systems  Constitutional, neuro, ENT, endocrine, pulmonary, cardiac, gastrointestinal, genitourinary, musculoskeletal, integument and psychiatric systems are negative, except as otherwise noted.      Objective    /66 (BP Location: Right arm, Patient Position: Sitting, Cuff Size: Adult Regular)   Pulse 67   Temp 98  F (36.7  C) (Oral)   Resp 16   Ht 1.641 m (5' 4.61\")   Wt 61.4 kg (135 lb 4.8 oz)   SpO2 99%   BMI 22.79 kg/m    Body mass index is 22.79 kg/m .  Physical Exam   GENERAL: alert and no distress  EYES: Eyes grossly normal to inspection, PERRL and conjunctivae and sclerae normal  RESP: lungs clear to auscultation - no rales, rhonchi or wheezes  CV: regular rate and rhythm, normal S1 S2, no S3 or S4, no murmur, click or rub, no peripheral edema  MS: no gross musculoskeletal defects noted, no edema  SKIN: no suspicious lesions or rashes  NEURO: Normal strength and tone, mentation intact and speech normal  PSYCH: mentation appears normal, affect normal/bright            Signed Electronically by: Delmar Kerr MD    "

## 2025-02-17 NOTE — ASSESSMENT & PLAN NOTE
Patient had lab showing sodium down to 126, chloride low at 88 on 2/13/25 last week. Likely contributed by hydrochlorothiazide use. BP is 138/66 with HR at 67 today.  - Check labs for BMP, Serum Osm, Urine Osm.   - Continue with infusion.   - Reduce hydrochlorothiazide at 12.5mg daily.   - Continued on amlodipine 2.5mg (may add 2.5mg depending on blood pressure) and valsartan 280mg daily.   - Also recheck BMP lab to recheck sodium again in 1-2 weeks. If sodium is still low, would discontinue hydrochlorothiazide and monitor BP.  May consider a non diuretic BP medication if working on HTN.   - Continue monitoring BP at home. Let Dr. Zimmerman if blood pressure goes above 140/90 persistently.

## 2025-02-17 NOTE — PATIENT INSTRUCTIONS
- Check labs for BMP, Serum Osm, Urine Osm.   - Continue with infusion.   - Reduce hydrochlorothiazide at 12.5mg daily.   - Also recheck BMP lab to recheck sodium again in 1-2 weeks.   - Continue monitoring blood pressures at home. Let Dr. Zimmerman if blood pressure goes above 140/90.       Follow up with Dr. Zimmerman on 3/11/25.

## 2025-02-20 ENCOUNTER — TRANSFERRED RECORDS (OUTPATIENT)
Dept: HEALTH INFORMATION MANAGEMENT | Facility: CLINIC | Age: 85
End: 2025-02-20
Payer: COMMERCIAL

## 2025-03-11 ENCOUNTER — TELEPHONE (OUTPATIENT)
Dept: INTERNAL MEDICINE | Facility: CLINIC | Age: 85
End: 2025-03-11

## 2025-03-11 ENCOUNTER — OFFICE VISIT (OUTPATIENT)
Dept: INTERNAL MEDICINE | Facility: CLINIC | Age: 85
End: 2025-03-11
Payer: COMMERCIAL

## 2025-03-11 ENCOUNTER — LAB (OUTPATIENT)
Dept: LAB | Facility: CLINIC | Age: 85
End: 2025-03-11
Payer: COMMERCIAL

## 2025-03-11 VITALS
BODY MASS INDEX: 22.74 KG/M2 | HEART RATE: 59 BPM | OXYGEN SATURATION: 99 % | HEIGHT: 65 IN | DIASTOLIC BLOOD PRESSURE: 67 MMHG | WEIGHT: 136.5 LBS | RESPIRATION RATE: 16 BRPM | TEMPERATURE: 98.3 F | SYSTOLIC BLOOD PRESSURE: 145 MMHG

## 2025-03-11 DIAGNOSIS — I10 BENIGN ESSENTIAL HYPERTENSION: Primary | ICD-10-CM

## 2025-03-11 DIAGNOSIS — I10 BENIGN ESSENTIAL HYPERTENSION: ICD-10-CM

## 2025-03-11 DIAGNOSIS — R79.89 INCREASED PLATELET COUNT: Primary | ICD-10-CM

## 2025-03-11 LAB
ALBUMIN SERPL BCG-MCNC: 4.2 G/DL (ref 3.5–5.2)
ALP SERPL-CCNC: 75 U/L (ref 40–150)
ALT SERPL W P-5'-P-CCNC: 16 U/L (ref 0–50)
ANION GAP SERPL CALCULATED.3IONS-SCNC: 8 MMOL/L (ref 7–15)
AST SERPL W P-5'-P-CCNC: 18 U/L (ref 0–45)
BILIRUB SERPL-MCNC: 0.4 MG/DL
BUN SERPL-MCNC: 12.2 MG/DL (ref 8–23)
CALCIUM SERPL-MCNC: 9.6 MG/DL (ref 8.8–10.4)
CHLORIDE SERPL-SCNC: 100 MMOL/L (ref 98–107)
CREAT SERPL-MCNC: 0.81 MG/DL (ref 0.51–0.95)
EGFRCR SERPLBLD CKD-EPI 2021: 71 ML/MIN/1.73M2
GLUCOSE SERPL-MCNC: 98 MG/DL (ref 70–99)
HCO3 SERPL-SCNC: 32 MMOL/L (ref 22–29)
POTASSIUM SERPL-SCNC: 4.2 MMOL/L (ref 3.4–5.3)
PROT SERPL-MCNC: 7 G/DL (ref 6.4–8.3)
SODIUM SERPL-SCNC: 140 MMOL/L (ref 135–145)

## 2025-03-11 PROCEDURE — 3077F SYST BP >= 140 MM HG: CPT | Performed by: INTERNAL MEDICINE

## 2025-03-11 PROCEDURE — 36415 COLL VENOUS BLD VENIPUNCTURE: CPT | Performed by: PATHOLOGY

## 2025-03-11 PROCEDURE — 99215 OFFICE O/P EST HI 40 MIN: CPT | Performed by: INTERNAL MEDICINE

## 2025-03-11 PROCEDURE — 3078F DIAST BP <80 MM HG: CPT | Performed by: INTERNAL MEDICINE

## 2025-03-11 PROCEDURE — 80053 COMPREHEN METABOLIC PANEL: CPT | Performed by: PATHOLOGY

## 2025-03-11 NOTE — PROGRESS NOTES
HPI;    Last visit with us was 2025 and additional information in that note. She still states paroxsymal flushing and anxiety sometime followed by increased BP. This has been going on for several  months. She was evaluated in Nephrology 2024 by Dr. Pickard. Otherwise today no additional HEENT, cardiopulmonary, abdominal, , GYN, neurological, systemic, psychiatric, lymphatic, endocrine, vascular complaints.     Past Medical History:   Diagnosis Date    Autoimmune disease approx.     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 30 years ago    Osteopenia     Polymyalgia rheumatica     PONV (postoperative nausea and vomiting)     Proctitis     Reduced vision Sometime is a little blurry     Past Surgical History:   Procedure Laterality Date    COLONOSCOPY  2014    Procedure: COMBINED COLONOSCOPY, SINGLE BIOPSY/POLYPECTOMY BY BIOPSY;  COLONOSCOPY;  Surgeon: Carol Ann Plasencia MD;  Location: [facility]    CV CORONARY ANGIOGRAM N/A 2022    Procedure: Coronary Angiogram;  Surgeon: Trevin Hawk MD;  Location:  [facility]    ENT SURGERY      tonsilectomy, adenoidectomy    ESOPHAGOSCOPY, GASTROSCOPY, DUODENOSCOPY (EGD), COMBINED N/A 2023    Procedure: ESOPHAGOGASTRODUODENOSCOPY, WITH BIOPSY;  Surgeon: Angel Lara MD;  Location:  GI    GYN SURGERY  ,     x 2    HYSTERECTOMY      ORTHOPEDIC SURGERY  2004    (R) shoulder surgery for frozen shoulder    ZAC BSO      for fibroids    TONSILLECTOMY  About 50 years ago     PE:    Vitals noted, gen, nad, cooperative, alert,  neck supple nl rom, lungs with good air movement, RRR, S1, S2,. No MRG, abdomen no acute findings. Grossly normal neurological exam     Results for orders placed or performed in visit on 25   Comprehensive metabolic panel     Status: Abnormal   Result Value Ref Range     Sodium 140 135 - 145 mmol/L    Potassium 4.2 3.4 - 5.3 mmol/L    Carbon Dioxide (CO2) 32 (H) 22 - 29 mmol/L    Anion Gap 8 7 - 15 mmol/L    Urea Nitrogen 12.2 8.0 - 23.0 mg/dL    Creatinine 0.81 0.51 - 0.95 mg/dL    GFR Estimate 71 >60 mL/min/1.73m2    Calcium 9.6 8.8 - 10.4 mg/dL    Chloride 100 98 - 107 mmol/L    Glucose 98 70 - 99 mg/dL    Alkaline Phosphatase 75 40 - 150 U/L    AST 18 0 - 45 U/L    ALT 16 0 - 50 U/L    Protein Total 7.0 6.4 - 8.3 g/dL    Albumin 4.2 3.5 - 5.2 g/dL    Bilirubin Total 0.4 <=1.2 mg/dL         A/P:    1. Immunizations; COVID Pfizer vaccine x 4. She has completed the Shingrix vaccine series. Pneumococcal 23 done 10/18/2011. Prevnar 13 done 1/13/2016. Td 1/31/2018. Prevnar 20 done 11/15/2022. RSV done 12/15/2023. Influenza vaccine 10/22/2024.   2. Inflammatory bowel disease/proctitis. She is followed at [facility] by Dr. Garcia. She had a Flex Sig 4/17/2024. Clinic note from Dr. Garcia 11/7/2024. Last scanned in note from [facility] was 12/23/2024  3. Chronic pain on night time Gabapentin; seen Dr. Pimentel, orthopedics 6/8/2021 and Dr. Neumann 4/29/2021 Mr. Neumann. She was seen at [facility] 3/3/2025 R hip pain   4. HTN; on Valsartan/HcTz and 2.5 mg of Amlodipine. She could not tolerate Coreg (even 3.125 mg).  Renal U/S done, She saw Dr. Vogt,  Nephrology on 12/17/2024. She may consider stopping her Amlodipine (only 2.5 mg) because she has increased HcTz to 25 mg. Ordered MRI abdomen for further adrenal evaluation and endocrine referral (pheochromocytoma? Carcinoid? Serotonin?   5. Mammogram; 2/13/2024.   6. Lipids; 1/9/2024; HDL 61, LDL 39, and TG's 69. She is on  Atorvastatin Repeat 5/1/2024; HDL 49, TG's 128, and LDL 34  7. Dermatology; [facility] 2/17/2022. Care Everywhere dermatology note 10/12/2022.   8. Vitamin D normal at 57 on 2/11/2022. DEXA scan in chart from 7/19/2021 and most negative T score -2.3.  She saw Dr. Simon  endocrinology   8/22/2022  9. Outside Rheumatology for PMR, Dr. Damian scanned in note from 9/30/2024. She is not on prednisone currently.    10. Palpitations;  Normal dobutamine stress echo 10/11/2021.  For atypical CP, CTA coronary angiogram done 8/10/2022 with possible severe mid LAD stenosis. Coronary angiogram on 8/30/2022 with normal coronary arteries.  Ziopatch monitor 7/22/2022 w/o significant findings.  She had a cardiology appt. With Ms. Shi 5/8/2024 and 6/18/2024 with Dr. Yoon, Cardiology. She had a cardiology appt. With Dr. Sanders 9/13/2024. She had a 14 day Ziopatch monitor 8/23/2024 no afib, sxs correlated with NSR.  11. Past hospital discharged 7/12/2023 for possible TIA sxs with negative evaluation.  She has h/o TIA symptoms and is on Plavix and Atorvastatin. See ED note from 8/22/2024. She had MRI/MRA imaging 8/22/2024 that were essentially normal. She states she was driving and had L arm numbness (she had this before with a presumed TIA). She is on Plavix. She has some dizziness, mild head aches, and anxiety. She was seen by Dr Vigil, Neurology 2/6/2025 and next 8/18/2025  12. A1c 6.0% on 7/3/2023.   13. Positive CHANDNI-2 for thrombocytosis. She was seen 5/2024 by Dr. Milner, Hematology   She is on hydroxyurea         40 minutes spent on the date of the encounter doing chart review, history and exam, documentation and further activities as noted above exclusive of procedures and other billable interpretations

## 2025-03-11 NOTE — TELEPHONE ENCOUNTER
[facility] Call Center    Phone Message    May a detailed message be left on voicemail: yes     Reason for Call: Other: The patient wanted to know if she needs to do labs for a CBC because all the lab did was a CMP, please review and follow up thank you.     Action Taken: Message routed to:  [facility] ([facility] pcc    Travel Screening: Not Applicable     Date of Service:

## 2025-03-11 NOTE — PROGRESS NOTES
Debra is a 84 year old that presents in clinic today for the following:     Chief Complaint   Patient presents with    Follow Up    Hypertension           3/11/2025    10:54 AM   Additional Questions   Roomed by [facility] EMT   Accompanied by N/A     Screenings from encounters over the past 10 days    No data recorded       Mikaela Ortiz at 11:03 AM on 3/11/2025

## 2025-03-12 NOTE — TELEPHONE ENCOUNTER
Pt states she did not mean to request CBC - miscommunication from call center    Pt was relaying to PCP that they had not posted a CBC (thinking it had been drawn/ordered) and does not feel a CBC is necessary if PCP did not think it was.    Pt declined scheduling additional lab.

## 2025-03-19 ENCOUNTER — TRANSFERRED RECORDS (OUTPATIENT)
Dept: MULTI SPECIALTY CLINIC | Facility: CLINIC | Age: 85
End: 2025-03-19
Payer: COMMERCIAL

## 2025-03-19 LAB
ALT SERPL-CCNC: 15 IU/L (ref 0–32)
AST SERPL-CCNC: 17 IU/L (ref 0–40)
CREATININE (EXTERNAL): 0.67 MG/DL (ref 0.57–1)
GFR ESTIMATED (EXTERNAL): 86 ML/MIN/1.73

## 2025-03-24 ENCOUNTER — ANCILLARY PROCEDURE (OUTPATIENT)
Dept: MRI IMAGING | Facility: CLINIC | Age: 85
End: 2025-03-24
Attending: INTERNAL MEDICINE
Payer: COMMERCIAL

## 2025-03-24 DIAGNOSIS — I10 BENIGN ESSENTIAL HYPERTENSION: ICD-10-CM

## 2025-03-24 PROCEDURE — A9585 GADOBUTROL INJECTION: HCPCS | Performed by: RADIOLOGY

## 2025-03-24 PROCEDURE — 74183 MRI ABD W/O CNTR FLWD CNTR: CPT | Performed by: RADIOLOGY

## 2025-03-24 RX ORDER — GADOBUTROL 604.72 MG/ML
7.5 INJECTION INTRAVENOUS ONCE
Status: COMPLETED | OUTPATIENT
Start: 2025-03-24 | End: 2025-03-24

## 2025-03-24 RX ORDER — GADOBUTROL 604.72 MG/ML
7.5 INJECTION INTRAVENOUS ONCE
Status: ACTIVE | OUTPATIENT
Start: 2025-03-24

## 2025-03-24 RX ADMIN — GADOBUTROL 6 ML: 604.72 INJECTION INTRAVENOUS at 19:01

## 2025-04-02 NOTE — PROGRESS NOTES
HPI:    Last visit with us was 3/11/2025 and additional information in that note. She still has concerns about elevated BP. She gets anxious when her BP is elevated and this increases her BP further. She gets a mild HA when she gets anxious. She remains on her same BP medications. No other HEENT, cardiopulmonary, abdominal, , GYN, neurological, systemic, psychiatric, lymphatic, endocrine, vascular complaints.     Past Medical History:   Diagnosis Date    Autoimmune disease approx.     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 30 years ago    Osteopenia     Polymyalgia rheumatica     PONV (postoperative nausea and vomiting)     Proctitis     Reduced vision Sometime is a little blurry     Past Surgical History:   Procedure Laterality Date    COLONOSCOPY  2014    Procedure: COMBINED COLONOSCOPY, SINGLE BIOPSY/POLYPECTOMY BY BIOPSY;  COLONOSCOPY;  Surgeon: Carol Ann Plasencia MD;  Location: [facility]    CV CORONARY ANGIOGRAM N/A 2022    Procedure: Coronary Angiogram;  Surgeon: Trevin Hawk MD;  Location:  [facility]    ENT SURGERY      tonsilectomy, adenoidectomy    ESOPHAGOSCOPY, GASTROSCOPY, DUODENOSCOPY (EGD), COMBINED N/A 2023    Procedure: ESOPHAGOGASTRODUODENOSCOPY, WITH BIOPSY;  Surgeon: Angel Lara MD;  Location:  GI    GYN SURGERY  ,     x 2    HYSTERECTOMY      ORTHOPEDIC SURGERY  2004    (R) shoulder surgery for frozen shoulder    ZAC BSO      for fibroids    TONSILLECTOMY  About 50 years ago     PE    Vitals noted, gen, nad, cooperative, alert neck supple nl rom, lungs with good air movement, RRR, S1, S2, no MRG, abdomen, no acute findings. Grossly normal neurological exam.       Recent Results (from the past 720 hours)   Comprehensive metabolic panel    Collection Time: 25 12:15 PM   Result Value Ref Range    Sodium 140  135 - 145 mmol/L    Potassium 4.2 3.4 - 5.3 mmol/L    Carbon Dioxide (CO2) 32 (H) 22 - 29 mmol/L    Anion Gap 8 7 - 15 mmol/L    Urea Nitrogen 12.2 8.0 - 23.0 mg/dL    Creatinine 0.81 0.51 - 0.95 mg/dL    GFR Estimate 71 >60 mL/min/1.73m2    Calcium 9.6 8.8 - 10.4 mg/dL    Chloride 100 98 - 107 mmol/L    Glucose 98 70 - 99 mg/dL    Alkaline Phosphatase 75 40 - 150 U/L    AST 18 0 - 45 U/L    ALT 16 0 - 50 U/L    Protein Total 7.0 6.4 - 8.3 g/dL    Albumin 4.2 3.5 - 5.2 g/dL    Bilirubin Total 0.4 <=1.2 mg/dL   ALT (External Result)    Collection Time: 03/19/25  1:55 PM   Result Value Ref Range    ALT (External) 15 0 - 32 IU/L   AST (External Result)    Collection Time: 03/19/25  1:55 PM   Result Value Ref Range    AST (External) 17 0 - 40 IU/L   Creatinine (External Result)    Collection Time: 03/19/25  1:55 PM   Result Value Ref Range    Creatinine (External) 0.67 0.57 - 1.00 mg/dL    GFR Estimated (External) 86 >56 mL/min/1.73     MR Abdomen w/o & w Contrast  Narrative: MR ABDOMEN W/O & W CONTRAST    CLINICAL HISTORY: htn and evaluate adrenal; Benign essential  hypertension    TECHNIQUE:  Images were acquired with and without intravenous contrast  through the abdomen. The following MR images were acquired: TrueFISP,  multiplanar T2 weighted, axial T1 in/out of phase, axial fat-saturated  T1, diffusion-weighted. Multiplanar T1-weighted images with fat  saturation were before contrast administration and at multiple time  points following the administration of intravenous contrast. Contrast  dose: 6 mL Gadavist    FINDINGS:    Comparison study: Ultrasound 10/18/2024. CT 2/10/2023. MRI lumbar  spine 4/6/2021.    Liver: Noncirrhotic morphology of the liver. No evidence of  significant iron or fat deposition. No suspicious hepatic mass.  Multiple subcentimeter T2 hyperintense cysts.    Biliary system: The gallbladder is within normal limits. Phrygian cap,  a normal anatomic variant. No intrahepatic or extrahepatic  biliary  dilation.    Spleen: Not enlarged.    Kidneys: No hydronephrosis. No suspicious renal mass. Multiple  bilateral T2 hyperintense and proteinaceous/hemorrhagic cysts, some  with thin internal septations.    Adrenal glands: 1.1 cm left adrenal nodule with significant loss of  signal on out of phase imaging (7/32). Additional previously described  described right adrenal nodule on CT 2/10/2023 is too small to  accurately characterize although there is significant signal loss on  out of phase imaging in the expected location of this nodule.    Pancreas: Preservation of intrinsic T1 hyperintense parenchymal  signal. No pancreatic ductal dilation. 1 cm T2 hyperintense cyst at  the pancreatic head without suspicious features (16/26).    Bowel: The visualized small and large bowel is nondistended. Colonic  diverticulosis. Small hiatal hernia.    Lymph nodes: No adenopathy.    Blood vessels: The major abdominal arteries and portal vessels are  patent.    Lung bases: Grossly clear.    Bones and soft tissues: Overall heterogeneous fatty marrow with T2  hypointense regions as described on previous MRI lumbar spine.  Transitional vertebral body at the lumbosacral junction with 6 lumbar  type vertebral bodies, labeled S1 on prior study.    Mesentery: Within normal limits.    Ascites: None.  Impression: IMPRESSION:    1. Left 1.1 cm adrenal nodule with imaging characteristics consistent  with with a benign adrenal adenoma.    2. Additional subcentimeter right adrenal nodule is too small to  adequately characterize although demonstrates significant signal loss  in the expected area of the nodule. Most likely representing a benign  adrenal adenoma.    3. Pancreatic head 1 cm cystic lesion without suspicious features.  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 Consider follow-up as per guidelines parietal lobe.    International evidence-based [place] guidelines for the management  "of  intraductal papillary mucinous neoplasm of the pancreas:   Surveillance is recommended if no high risk stigmata or worrisome  features are present:  Primary imaging modalities recommended are MRI/MRCP and MDCT  Size of largest cyst:   *  Less than 20 mm: Follow-up imaging in 6 months once, then every 18  months if no change. Stop surveillance if stable for 5 years.  *  More than 20 mm but less than 30 mm: Follow-up imaging at 6 months,  12 months, then yearly if no change.   *  More than 30 mm- follow up imaging every 6 months.    GI consultation for surgery/endoscopic ultrasound is recommended for  cysts with \"high-risk stigmata\" of high grade dysplasia or invasive  carcinoma such as:  1. Obstructive jaundice in a patient with cystic lesion of the head of  the pancreas  2. Enhancing mural nodule > 5mm or solid component  3. Main pancreatic duct >10mm  4. Suspicious or positive results of cytology    If worrisome features are present, GI consultation is recommended.  Worrisome features on imagin. Cyst more than or equal to 30 mm  2. Thickened or enhancing cyst walls  3. Main pancreatic duct > 5mm and < 10mm.  4. Abrupt change in caliber of pancreatic duct with distal atrophy  5. Lymphadenopathy  6. Cyst growth rate > 2.5mm/year    *Reference: International evidence-based Kyoto guidelines for the  management of  intraductal papillary mucinous neoplasm of the pancreas Pancreatology:  24:(); 255-270.  https://doi.org/10.1016/j.david.2023.12.009    I have personally reviewed the examination and initial interpretation  and I agree with the findings.    EDWIGE JEAN MD         SYSTEM ID:  [id number]      A/P:    1. Immunizations; COVID Pfizer vaccine x 4. She has completed the Shingrix vaccine series. Pneumococcal 23 done 10/18/2011. Prevnar 13 done 2016. Td 2018. Prevnar 20 done 11/15/2022. RSV done 12/15/2023. Influenza vaccine 10/22/2024.   2. Inflammatory bowel disease/proctitis. She is " followed at [facility] by Dr. Garcia. She had a Flex Sig 4/17/2024. Clinic note from Dr. Kothari 3/19/2025; [facility]    3. Chronic pain on night time Gabapentin; seen Dr. Pimentel, orthopedics 6/8/2021 and Dr. Neumann 4/29/2021 Mr. Neumann. She was seen at [facility] 3/3/2025 R hip pain   4. HTN; on Valsartan/HcTz and 2.5 mg of Amlodipine. She could not tolerate Coreg (even 3.125 mg).  Renal U/S done, She saw Dr. Vogt,  Nephrology on 12/17/2024. She may consider stopping her Amlodipine (only 2.5 mg) because she has increased HcTz to 25 mg. MRI abdomen was done 3/24/2025 for further adrenal evaluation and endocrine referral (pheochromocytoma? Carcinoid? Serotonin? She has a 12/16/2025 Endocrinology appt. She has an appt. With Dr. Rodriguez, Endocrinology, [facility], next week.   5. Mammogram; 2/14/2025.   6. Lipids; 1/9/2024; HDL 61, LDL 39, and TG's 69. She is on  Atorvastatin Repeat 5/1/2024; HDL 49, TG's 128, and LDL 34  7. Dermatology; scanned in [facility] 2/17/2022. Care Everywhere dermatology note 10/12/2022.   8. Vitamin D normal at 57 on 2/11/2022. DEXA scan in chart from 7/19/2021 and most negative T score -2.3.  She saw Dr. Simon endocrinology   8/22/2022  9. Outside Rheumatology for PMR, Dr. Damian scanned in note from 9/30/2024. She is not on prednisone currently.    10. Palpitations;  Normal dobutamine stress echo 10/11/2021.  For atypical CP, CTA coronary angiogram done 8/10/2022 with possible severe mid LAD stenosis. Coronary angiogram on 8/30/2022 with normal coronary arteries.  Ziopatch monitor 7/22/2022 w/o significant findings.  She had a cardiology appt. With Ms. Shi 5/8/2024 and 6/18/2024 with Dr. Yoon, Cardiology. She had a cardiology appt. With Dr. Sanders 9/13/2024. She had a 14 day Ziopatch monitor 8/23/2024 no afib, sxs correlated with NSR.  11. Past hospital discharged 7/12/2023 for possible TIA sxs with negative evaluation.   She has h/o TIA symptoms and is on Plavix and Atorvastatin. See ED note from 8/22/2024. She had MRI/MRA imaging 8/22/2024 that were essentially normal. She states she was driving and had L arm numbness (she had this before with a presumed TIA). She is on Plavix. She has some dizziness, mild head aches, and anxiety. She was seen by Dr Vigil, Neurology 2/6/2025 and next 8/18/2025  12. A1c 6.0% on 7/3/2023.   13. Positive CHANDNI-2 for thrombocytosis. She was seen 5/2024 by Dr. Milner, Hematology   She is on hydroxyurea   14. Anxiety we discussed she could try a very low dose Ativan (0.5 mg). We discuss using Medical Cannabis, or selective serotonin reuptake inhibitor's will discuss in about 2 weeks telephone visit.         40 minutes spent on the date of the encounter doing chart review, history and exam, documentation and further activities as noted above exclusive of procedures and other billable interpretations

## 2025-04-03 ENCOUNTER — OFFICE VISIT (OUTPATIENT)
Dept: INTERNAL MEDICINE | Facility: CLINIC | Age: 85
End: 2025-04-03
Payer: COMMERCIAL

## 2025-04-03 VITALS
TEMPERATURE: 98.2 F | DIASTOLIC BLOOD PRESSURE: 74 MMHG | OXYGEN SATURATION: 99 % | RESPIRATION RATE: 16 BRPM | BODY MASS INDEX: 22.91 KG/M2 | HEART RATE: 66 BPM | WEIGHT: 137.5 LBS | HEIGHT: 65 IN | SYSTOLIC BLOOD PRESSURE: 175 MMHG

## 2025-04-03 DIAGNOSIS — E78.00 HIGH BLOOD CHOLESTEROL: ICD-10-CM

## 2025-04-03 DIAGNOSIS — I10 BENIGN ESSENTIAL HYPERTENSION: ICD-10-CM

## 2025-04-03 DIAGNOSIS — Z23 NEED FOR TDAP VACCINATION: ICD-10-CM

## 2025-04-03 RX ORDER — HYDROCHLOROTHIAZIDE 12.5 MG/1
12.5 TABLET ORAL DAILY
Qty: 90 TABLET | Refills: 5 | Status: SHIPPED | OUTPATIENT
Start: 2025-04-03

## 2025-04-03 RX ORDER — ATORVASTATIN CALCIUM 10 MG/1
10 TABLET, FILM COATED ORAL DAILY
Qty: 90 TABLET | Refills: 3 | Status: SHIPPED | OUTPATIENT
Start: 2025-04-03

## 2025-04-03 RX ORDER — AMLODIPINE BESYLATE 2.5 MG/1
2.5 TABLET ORAL DAILY
Qty: 180 TABLET | Refills: 2 | Status: SHIPPED | OUTPATIENT
Start: 2025-04-03

## 2025-04-03 RX ORDER — VALSARTAN 80 MG/1
240 TABLET ORAL DAILY
Qty: 270 TABLET | Refills: 1 | Status: SHIPPED | OUTPATIENT
Start: 2025-04-03

## 2025-04-03 NOTE — PROGRESS NOTES
Debra is a 84 year old that presents in clinic today for the following:     Chief Complaint   Patient presents with    Follow Up           4/3/2025     5:14 PM   Additional Questions   Roomed by [facility] EMT     Screenings from encounters over the past 10 days    No data recorded       Tracie Costa MA at 5:22 PM on 4/3/2025    Answers submitted by the patient for this visit:  General Questionnaire (Submitted on 3/29/2025)  Chief Complaint: Chronic problems general questions HPI Form  What is the reason for your visit today? : Follow up also discussion about blood pressure and anxiety. Review of the last MRI  How many days per week do you miss taking your medication?: 0  Questionnaire about: Chronic problems general questions HPI Form (Submitted on 3/29/2025)  Chief Complaint: Chronic problems general questions HPI Form

## 2025-04-15 NOTE — PROGRESS NOTES
Phone call duration: 10 minutes  Signed Electronically by: Wolfgang Zimmerman MD      Telephone visit     Ms. Denise agrees to a telephone visit. She did not consent to a virtual visit.     Last in-person visit with us was 4/3/2025 and additional information in that note    Recently BP has been lower.     In the  range    She has only taken of 1/2 of a 0.5 mg of Ativan, w/o side effects. Not sure if this was beneficial.     She states trouble urination today. No other urinary sxs. She is more or less back normal now. If worse or persists she will need an evaluation     She saw outside Endocrinology a few days ago for evaluation of HTN. She states she has follow up    Total time today 10 minutes    RUTH Zimmerman MD

## 2025-04-16 ENCOUNTER — VIRTUAL VISIT (OUTPATIENT)
Dept: INTERNAL MEDICINE | Facility: CLINIC | Age: 85
End: 2025-04-16
Payer: COMMERCIAL

## 2025-04-16 DIAGNOSIS — I10 BENIGN ESSENTIAL HYPERTENSION: ICD-10-CM

## 2025-04-16 DIAGNOSIS — G45.9 TIA (TRANSIENT ISCHEMIC ATTACK): ICD-10-CM

## 2025-04-16 PROCEDURE — 98012 SYNCH AUDIO-ONLY EST SF 10: CPT | Performed by: INTERNAL MEDICINE

## 2025-04-16 PROCEDURE — 1126F AMNT PAIN NOTED NONE PRSNT: CPT | Performed by: INTERNAL MEDICINE

## 2025-04-16 NOTE — PATIENT INSTRUCTIONS
Thank you for visiting the Primary Care Center today at the [facility]! The following is some information about our clinic:     Primary Care Center Frequently-Asked Questions    (1) How do I schedule appointments at the [facility]?     Primary Care--to schedule or make changes to an existing appointment, please call our primary care line at [phone].    Labs--to schedule a lab appointment at the [facility] you can use [employer] or call [phone]. If you have a [place] location that is closer to home, you can reach out to that location for scheduling options.     Imaging--if you need to schedule a CT, X-ray, MRI, ultrasound, or other imaging study you can call [phone] to schedule at the [facility] or any other [facility] imaging location.     Referrals--if a referral to another specialty was ordered you can expect a phone call from their scheduling team. If you have not heard from them in a week, please call us or send us a [employer] message to check the status or get a scheduling number. Please note that this only applies to internal [facility] referrals. If the referral is external you would need to contact their office for scheduling.     (2) I have a question about my visit, who do I contact?     You can call us at the primary care line at [phone] to ask questions about your visit. You can also send a secure message through [employer], which is reviewed by clinic staff. Please note that [employer] messages have a twenty-four to forty-eight business hour turnaround time and should not be used for urgent concerns.    (3) How will I get the results of my tests?    If you are signed up for [facility] all tests will be released to you within twenty-four hours of resulting. Please allow three to five days for your doctor to review your results and place a note interpreting the results. If you do not have [facility] you will receive your  results through mail seven to ten business days following the return of the tests. Please note that if there should be any urgent or concerning results that your doctor or their registered nurse will reach out to you the same day as the tests come back. If you have follow up questions about your results or would like to discuss the results in detail please schedule a follow up with your provider either in person or virtually.     (4) How do I get refills of my prescriptions?     You should always first contact your pharmacy for refills of your medications. If submitting a refill request on [employer], please be sure to submit the request only once--repeat requests can cause delays in refill. If you are requesting a NEW medication or a medication related to new symptoms you will need to schedule an appointment with a provider prior to approval. Please note: Routine medication refills have up to one to three business day turnaround whereas controlled substances refills have up to five to seven business day turnaround.    (5) I have new symptoms, what do I do?     If you are having an immediate medical emergency, you should dial 911 for assistance.   For anything urgent that needs to be seen within a few hours to one day you should visit a local urgent care for assistance.  For non-urgent symptoms that need to be seen within a few days to a week you can schedule with an available provider in primary care by going to [employer] or calling [phone].   If you are not sure how serious your symptoms are or you would like to receive medical advice you can always call [phone] to speak with a triage nurse.

## 2025-04-17 ENCOUNTER — TELEPHONE (OUTPATIENT)
Dept: INTERNAL MEDICINE | Facility: CLINIC | Age: 85
End: 2025-04-17
Payer: COMMERCIAL

## 2025-04-17 ENCOUNTER — TRANSFERRED RECORDS (OUTPATIENT)
Dept: HEALTH INFORMATION MANAGEMENT | Facility: CLINIC | Age: 85
End: 2025-04-17
Payer: COMMERCIAL

## 2025-04-21 DIAGNOSIS — I10 BENIGN ESSENTIAL HYPERTENSION: ICD-10-CM

## 2025-04-23 ENCOUNTER — TRANSFERRED RECORDS (OUTPATIENT)
Dept: HEALTH INFORMATION MANAGEMENT | Facility: CLINIC | Age: 85
End: 2025-04-23
Payer: COMMERCIAL

## 2025-04-23 RX ORDER — GABAPENTIN 100 MG/1
200 CAPSULE ORAL AT BEDTIME
Qty: 180 CAPSULE | Refills: 0 | Status: SHIPPED | OUTPATIENT
Start: 2025-05-02

## 2025-04-23 NOTE — TELEPHONE ENCOUNTER
Last Written Prescription:  gabapentin (NEURONTIN) 100 MG capsule 180 capsule 0 1/31/2025 -- No   Sig - Route: Take 2 capsules (200 mg) by mouth at bedtime. - Oral     ----------------------  Last Visit Date:   4/16/2025  [facility] [place]      Future Visit Date: 5/15/2025  ----------------------    Refill decision: Medication unable to be refilled by RN due to: Medication not included in refill protocol policy         Request from pharmacy:  Requested Prescriptions   Pending Prescriptions Disp Refills    gabapentin (NEURONTIN) 100 MG capsule [Pharmacy Med Name: Gabapentin Oral Capsule 100 MG] 180 capsule 0     Sig: Take 2 capsules (200 mg) by mouth at bedtime.       There is no refill protocol information for this order

## 2025-04-23 NOTE — TELEPHONE ENCOUNTER
Controlled substance refill request notes    Refill request received for: Gabapentin 100 Mg Capsule  [place]  data reviewed 04/23/25:  Medication last refill: qty 180, 90 day supply, filled/sold to patient on 02/02/2025  Pended order: Gabapentin 100 Mg Capsule, qty 180, 90 day supply, fill on or after 05/02/2025     Primary care provider: Wolfgang Zimmerman  Last office visit with this department: 9/27/2024  Next appointment with PCP:     Date Visit Type Length Department Provider    5/15/2025  3:30 PM [facility] RETURN 30 min [facility] Wolfgang Zimmerman MD   Location Instructions:    Due to [place] [address], travel times to this location may be longer than usual. Please plan for extra travel time and check the [place] Department of Transportation [address] [place] website for delay, closure, and detour information.  [facility] ([facility]) is in a dense urban area with multiple transportation and parking options. You may wish to review options for  service and self-parking in more detail on the [facility] s website at [email or web address].     Refill request forwarded to provider for review.     Ameena TRUONG LPN  [facility]

## 2025-05-13 ENCOUNTER — TRANSFERRED RECORDS (OUTPATIENT)
Dept: ADMINISTRATIVE | Facility: CLINIC | Age: 85
End: 2025-05-13
Payer: COMMERCIAL

## 2025-05-14 NOTE — PROGRESS NOTES
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 of Treat.   05/29/2024 Entyvio 300mg  mL/hr    6 Weeks   Inf. Date Medication % Conc. Inf. # Therapy Type Bag # Vials Total mgs Lot # Exp. Date   05/13/2025 Entyvio  17 maintenance  1 300.00 [id number] 04/30/2027   Inf. Date Medication IV Site IV Gauge Start Stop Total Time Wt. (lbs) Wt. (kgs)   05/13/2025 Entyvio left antecubital 22 12:50 PM 1:20 PM 30 minute(s) 139.60 63.311     Vitals Flowsheet  Time Temp Pulse Resp Sys BP Ginna BP Reaction Conc Rate   12:43 PM 97.7 52 16 129 60      1:20 PM 98.0 55 16 140 65        Post Infusion  The patient did tolerate the infusion.   The patients disposition on discharge was: Good.    Nursing Notes  Order Date: 5/29/24 - CLARY Faye  Last infusion date: 4/2/25  Oral premeds per order: N/A  Specialty Pharmacy: N/A  Change in health status per assessment? No    IV maintenance 0.9% NS 500ml TKO  Start Time: 12:45 PM   IV start by: Nilda Gunderson RN  IV and Fluid D/C time: 1:35 PM  NS volume infused: <50ml  Site condition: CDI and patent throughout infusion, no redness/swelling at IV site  D/C instructions reviewed, Pt verbalized understanding.  Nlida Gunderson RN    Date Medication Total mg Used mg Wasted mg   05/13/2025 Entyvio 300.00 300.00 0.00   04/02/2025 Entyvio 300.00 300.00 0.00   02/20/2025 Entyvio 300.00 300.00 0.00   12/23/2024 Entyvio 300.00 300.00 0.00   11/12/2024 Entyvio 300.00 300.00 0.00   10/01/2024 Entyvio 300.00 300.00 0.00       Visit oversight provided by:  Jomar Todd MD 05/13/2025 01:00  PM

## 2025-05-14 NOTE — PROGRESS NOTES
HPI:    Overall stable. She states somewhat lower BP in the morning and she has not been taking Amlodipine. She seems to have less anxiety and is not taking Ativan. She would like a PT pelvic floor referral. She would also like a back PT referral. Otherwise, no additional HEENT, cardiopulmonary, abdominal, , GYN, neurological, systemic, psychiatric, lymphatic, endocrine, vascular complaints.     Past Medical History:   Diagnosis Date    Autoimmune disease approx.     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 30 years ago    Osteopenia     Polymyalgia rheumatica     PONV (postoperative nausea and vomiting)     Proctitis     Reduced vision Sometime is a little blurry     Past Surgical History:   Procedure Laterality Date    COLONOSCOPY  2014    Procedure: COMBINED COLONOSCOPY, SINGLE BIOPSY/POLYPECTOMY BY BIOPSY;  COLONOSCOPY;  Surgeon: Carol Ann Plasencia MD;  Location: [facility]    CV CORONARY ANGIOGRAM N/A 2022    Procedure: Coronary Angiogram;  Surgeon: Trevin Hawk MD;  Location:  [facility]    ENT SURGERY      tonsilectomy, adenoidectomy    ESOPHAGOSCOPY, GASTROSCOPY, DUODENOSCOPY (EGD), COMBINED N/A 2023    Procedure: ESOPHAGOGASTRODUODENOSCOPY, WITH BIOPSY;  Surgeon: Angel Lara MD;  Location:  GI    GYN SURGERY  ,     x 2    HYSTERECTOMY      ORTHOPEDIC SURGERY  2004    (R) shoulder surgery for frozen shoulder    ZAC BSO      for fibroids    TONSILLECTOMY  About 50 years ago     PE:    Vitals noted, gen, nad, cooperative, alert, neck supple nl rom, lungs with good air movement, RRR, S1, S2, no MRG, abdomen no acute findings. Grossly normal neurological exam.       MR Abdomen w/o & w Contrast  Narrative: MR ABDOMEN W/O & W CONTRAST    CLINICAL HISTORY: htn and evaluate adrenal; Benign essential  hypertension    TECHNIQUE:  Images  were acquired with and without intravenous contrast  through the abdomen. The following MR images were acquired: TrueFISP,  multiplanar T2 weighted, axial T1 in/out of phase, axial fat-saturated  T1, diffusion-weighted. Multiplanar T1-weighted images with fat  saturation were before contrast administration and at multiple time  points following the administration of intravenous contrast. Contrast  dose: 6 mL Gadavist    FINDINGS:    Comparison study: Ultrasound 10/18/2024. CT 2/10/2023. MRI lumbar  spine 4/6/2021.    Liver: Noncirrhotic morphology of the liver. No evidence of  significant iron or fat deposition. No suspicious hepatic mass.  Multiple subcentimeter T2 hyperintense cysts.    Biliary system: The gallbladder is within normal limits. Phrygian cap,  a normal anatomic variant. No intrahepatic or extrahepatic biliary  dilation.    Spleen: Not enlarged.    Kidneys: No hydronephrosis. No suspicious renal mass. Multiple  bilateral T2 hyperintense and proteinaceous/hemorrhagic cysts, some  with thin internal septations.    Adrenal glands: 1.1 cm left adrenal nodule with significant loss of  signal on out of phase imaging (7/32). Additional previously described  described right adrenal nodule on CT 2/10/2023 is too small to  accurately characterize although there is significant signal loss on  out of phase imaging in the expected location of this nodule.    Pancreas: Preservation of intrinsic T1 hyperintense parenchymal  signal. No pancreatic ductal dilation. 1 cm T2 hyperintense cyst at  the pancreatic head without suspicious features (16/26).    Bowel: The visualized small and large bowel is nondistended. Colonic  diverticulosis. Small hiatal hernia.    Lymph nodes: No adenopathy.    Blood vessels: The major abdominal arteries and portal vessels are  patent.    Lung bases: Grossly clear.    Bones and soft tissues: Overall heterogeneous fatty marrow with T2  hypointense regions as described on previous MRI  "lumbar spine.  Transitional vertebral body at the lumbosacral junction with 6 lumbar  type vertebral bodies, labeled S1 on prior study.    Mesentery: Within normal limits.    Ascites: None.  Impression: IMPRESSION:    1. Left 1.1 cm adrenal nodule with imaging characteristics consistent  with with a benign adrenal adenoma.    2. Additional subcentimeter right adrenal nodule is too small to  adequately characterize although demonstrates significant signal loss  in the expected area of the nodule. Most likely representing a benign  adrenal adenoma.    3. Pancreatic head 1 cm cystic lesion without suspicious features.  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 Consider follow-up as per guidelines parietal lobe.    International evidence-based Kyoto guidelines for the management of  intraductal papillary mucinous neoplasm of the pancreas:   Surveillance is recommended if no high risk stigmata or worrisome  features are present:  Primary imaging modalities recommended are MRI/MRCP and MDCT  Size of largest cyst:   *  Less than 20 mm: Follow-up imaging in 6 months once, then every 18  months if no change. Stop surveillance if stable for 5 years.  *  More than 20 mm but less than 30 mm: Follow-up imaging at 6 months,  12 months, then yearly if no change.   *  More than 30 mm- follow up imaging every 6 months.    GI consultation for surgery/endoscopic ultrasound is recommended for  cysts with \"high-risk stigmata\" of high grade dysplasia or invasive  carcinoma such as:  1. Obstructive jaundice in a patient with cystic lesion of the head of  the pancreas  2. Enhancing mural nodule > 5mm or solid component  3. Main pancreatic duct >10mm  4. Suspicious or positive results of cytology    If worrisome features are present, GI consultation is recommended.  Worrisome features on imagin. Cyst more than or equal to 30 mm  2. Thickened or enhancing cyst walls  3. Main pancreatic duct > 5mm " and < 10mm.  4. Abrupt change in caliber of pancreatic duct with distal atrophy  5. Lymphadenopathy  6. Cyst growth rate > 2.5mm/year    *Reference: International evidence-based Kyoto guidelines for the  management of  intraductal papillary mucinous neoplasm of the pancreas Pancreatology:  24:(2024); 255-270.  https://doi.org/10.1016/j.david.2023.12.009    I have personally reviewed the examination and initial interpretation  and I agree with the findings.    EDWIGE JEAN MD         SYSTEM ID:  [id number]      A/P:    1. Immunizations; COVID Pfizer vaccine x 4. She has completed the Shingrix vaccine series. Pneumococcal 23 done 10/18/2011. Prevnar 13 done 1/13/2016. Td 1/31/2018. Prevnar 20 done 11/15/2022. RSV done 12/15/2023. Influenza vaccine 10/22/2024.   2. Inflammatory bowel disease/proctitis. She is followed at [facility] by Dr. Garcia. She had a Flex Sig 4/17/2024. Clinic note from Dr. Kothari 3/19/2025; [facility]    3. Chronic pain on night time Gabapentin; seen Dr. Pimentel, orthopedics 6/8/2021 and Dr. Neumann 4/29/2021 Mr. Neumann. She was seen at [facility] 3/3/2025 R hip pain   4. HTN; on Valsartan/HcTz and 2.5 mg of Amlodipine. She could not tolerate Coreg (even 3.125 mg).  Renal U/S done, She saw Dr. Vogt,  Nephrology on 12/17/2024. She may consider stopping her Amlodipine (only 2.5 mg) because she has increased HcTz to 25 mg. MRI abdomen was done 3/24/2025 for further adrenal evaluation and endocrine referral (pheochromocytoma? Carcinoid? Serotonin? She has a 12/16/2025 Endocrinology appt. She had an appt. With Dr. Rodriguez, Endocrinology, [facility], 4/14/2025  5. Mammogram; 2/14/2025.   6. Lipids; 1/9/2024; HDL 61, LDL 39, and TG's 69. She is on  Atorvastatin Repeat 5/1/2024; HDL 49, TG's 128, and LDL 34  7. Dermatology; [facility] 2/17/2022. Care Everywhere dermatology note 10/12/2022.   8. Vitamin D normal at 57 on 2/11/2022.  DEXA scan in chart from 7/19/2021 and most negative T score -2.3.  She saw Dr. Simon endocrinology   8/22/2022 and next 12/16/2025  9. Outside Rheumatology for PMR, Dr. Damian scanned in note from 9/30/2024. She is not on prednisone currently.    10. Palpitations;  Normal dobutamine stress echo 10/11/2021.  For atypical CP, CTA coronary angiogram done 8/10/2022 with possible severe mid LAD stenosis. Coronary angiogram on 8/30/2022 with normal coronary arteries.  Ziopatch monitor 7/22/2022 w/o significant findings.  She had a cardiology appt. With Ms. Shi 5/8/2024 and 6/18/2024 with Dr. Yoon, Cardiology. She had a cardiology appt. With Dr. Sanders 9/13/2024. She had a 14 day Ziopatch monitor 8/23/2024 no afib, sxs correlated with NSR.  11. Past hospital discharged 7/12/2023 for possible TIA sxs with negative evaluation.  She has h/o TIA symptoms and is on Plavix and Atorvastatin. See ED note from 8/22/2024. She had MRI/MRA imaging 8/22/2024 that were essentially normal. She states she was driving and had L arm numbness (she had this before with a presumed TIA). She is on Plavix. She has some dizziness, mild head aches, and anxiety. She was seen by Dr Vigil, Neurology 2/6/2025 and next 8/18/2025  12. A1c 6.0% on 7/3/2023.   13. Positive CHANDNI-2 for thrombocytosis. She was seen 5/2024 by Dr. Milner, Hematology   She is on hydroxyurea   14. Anxiety we discussed she could try a very low dose Ativan (0.5 mg).   15. Scanned in note from [facility] 4/23/2025.        40 minutes spent on the date of the encounter doing chart review, history and exam, documentation and further activities as noted above exclusive of procedures and other billable interpretations

## 2025-05-15 ENCOUNTER — OFFICE VISIT (OUTPATIENT)
Dept: INTERNAL MEDICINE | Facility: CLINIC | Age: 85
End: 2025-05-15
Payer: COMMERCIAL

## 2025-05-15 ENCOUNTER — LAB (OUTPATIENT)
Dept: LAB | Facility: CLINIC | Age: 85
End: 2025-05-15
Payer: COMMERCIAL

## 2025-05-15 VITALS
HEART RATE: 62 BPM | WEIGHT: 138.1 LBS | OXYGEN SATURATION: 98 % | RESPIRATION RATE: 16 BRPM | SYSTOLIC BLOOD PRESSURE: 133 MMHG | DIASTOLIC BLOOD PRESSURE: 67 MMHG | BODY MASS INDEX: 23.01 KG/M2 | TEMPERATURE: 98.2 F | HEIGHT: 65 IN

## 2025-05-15 DIAGNOSIS — G45.9 TIA (TRANSIENT ISCHEMIC ATTACK): ICD-10-CM

## 2025-05-15 DIAGNOSIS — M54.9 BACK PAIN, UNSPECIFIED BACK LOCATION, UNSPECIFIED BACK PAIN LATERALITY, UNSPECIFIED CHRONICITY: ICD-10-CM

## 2025-05-15 DIAGNOSIS — I10 BENIGN ESSENTIAL HYPERTENSION: ICD-10-CM

## 2025-05-15 DIAGNOSIS — M62.89 PELVIC FLOOR DYSFUNCTION: Primary | ICD-10-CM

## 2025-05-15 DIAGNOSIS — M62.89 PELVIC FLOOR DYSFUNCTION: ICD-10-CM

## 2025-05-15 DIAGNOSIS — Z11.1 SCREENING EXAMINATION FOR PULMONARY TUBERCULOSIS: ICD-10-CM

## 2025-05-15 DIAGNOSIS — R79.89 INCREASED PLATELET COUNT: ICD-10-CM

## 2025-05-15 LAB
BASOPHILS # BLD AUTO: 0 10E3/UL (ref 0–0.2)
BASOPHILS NFR BLD AUTO: 0 %
CRP SERPL-MCNC: <3 MG/L
EOSINOPHIL # BLD AUTO: 0.2 10E3/UL (ref 0–0.7)
EOSINOPHIL NFR BLD AUTO: 2 %
ERYTHROCYTE [DISTWIDTH] IN BLOOD BY AUTOMATED COUNT: 12.9 % (ref 10–15)
ERYTHROCYTE [SEDIMENTATION RATE] IN BLOOD BY WESTERGREN METHOD: 7 MM/HR (ref 0–30)
HCT VFR BLD AUTO: 42.1 % (ref 35–47)
HGB BLD-MCNC: 14.3 G/DL (ref 11.7–15.7)
IMM GRANULOCYTES # BLD: 0 10E3/UL
IMM GRANULOCYTES NFR BLD: 1 %
LYMPHOCYTES # BLD AUTO: 1.8 10E3/UL (ref 0.8–5.3)
LYMPHOCYTES NFR BLD AUTO: 21 %
MCH RBC QN AUTO: 33.4 PG (ref 26.5–33)
MCHC RBC AUTO-ENTMCNC: 34 G/DL (ref 31.5–36.5)
MCV RBC AUTO: 98 FL (ref 78–100)
MONOCYTES # BLD AUTO: 1 10E3/UL (ref 0–1.3)
MONOCYTES NFR BLD AUTO: 11 %
NEUTROPHILS # BLD AUTO: 5.7 10E3/UL (ref 1.6–8.3)
NEUTROPHILS NFR BLD AUTO: 65 %
NRBC # BLD AUTO: 0 10E3/UL
NRBC BLD AUTO-RTO: 0 /100
PLATELET # BLD AUTO: 286 10E3/UL (ref 150–450)
RBC # BLD AUTO: 4.28 10E6/UL (ref 3.8–5.2)
WBC # BLD AUTO: 8.7 10E3/UL (ref 4–11)

## 2025-05-15 PROCEDURE — 99000 SPECIMEN HANDLING OFFICE-LAB: CPT | Performed by: PATHOLOGY

## 2025-05-15 PROCEDURE — 86481 TB AG RESPONSE T-CELL SUSP: CPT | Performed by: INTERNAL MEDICINE

## 2025-05-15 RX ORDER — AMLODIPINE BESYLATE 2.5 MG/1
2.5 TABLET ORAL DAILY
Qty: 180 TABLET | Refills: 2 | Status: SHIPPED | OUTPATIENT
Start: 2025-05-15

## 2025-05-15 RX ORDER — CLOPIDOGREL BISULFATE 75 MG/1
75 TABLET ORAL DAILY
Qty: 30 TABLET | Refills: 3 | Status: SHIPPED | OUTPATIENT
Start: 2025-05-15

## 2025-05-15 RX ORDER — VALSARTAN 80 MG/1
240 TABLET ORAL DAILY
Qty: 270 TABLET | Refills: 1 | Status: SHIPPED | OUTPATIENT
Start: 2025-05-15

## 2025-05-15 ASSESSMENT — PAIN SCALES - GENERAL: PAINLEVEL_OUTOF10: NO PAIN (0)

## 2025-05-16 ENCOUNTER — RESULTS FOLLOW-UP (OUTPATIENT)
Dept: INTERNAL MEDICINE | Facility: CLINIC | Age: 85
End: 2025-05-16

## 2025-05-17 LAB
GAMMA INTERFERON BACKGROUND BLD IA-ACNC: 0.03 IU/ML
M TB IFN-G BLD-IMP: NEGATIVE
M TB IFN-G CD4+ BCKGRND COR BLD-ACNC: 9.97 IU/ML
MITOGEN IGNF BCKGRD COR BLD-ACNC: 0 IU/ML
MITOGEN IGNF BCKGRD COR BLD-ACNC: 0 IU/ML
QUANTIFERON MITOGEN: 10 IU/ML
QUANTIFERON NIL TUBE: 0.03 IU/ML
QUANTIFERON TB1 TUBE: 0.03 IU/ML
QUANTIFERON TB2 TUBE: 0.03

## 2025-06-13 ENCOUNTER — RESULTS FOLLOW-UP (OUTPATIENT)
Dept: INTERNAL MEDICINE | Facility: CLINIC | Age: 85
End: 2025-06-13

## 2025-06-19 ENCOUNTER — TELEPHONE (OUTPATIENT)
Dept: INTERNAL MEDICINE | Facility: CLINIC | Age: 85
End: 2025-06-19
Payer: COMMERCIAL

## 2025-06-19 NOTE — TELEPHONE ENCOUNTER
[facility] Call Center    Phone Message    May a detailed message be left on voicemail: yes     Reason for Call: Order(s): Other:   Reason for requested: Please send pts PT orders to [facility]. Include back,hip, lower floor muscle in order and whatever else was originally in order for nova order.     -Phone [phone]    -fax [phone]    Date needed: asap    Provider name: Elsie    Action Taken: Message routed to:  [facility]    Travel Screening: Not Applicable     Date of Service:

## 2025-06-19 NOTE — TELEPHONE ENCOUNTER
PT referral faxed to [phone]       Reinaldo Newell CMA ([facility]) at 9:38 AM on 6/19/2025

## 2025-07-03 ENCOUNTER — TRANSFERRED RECORDS (OUTPATIENT)
Dept: HEALTH INFORMATION MANAGEMENT | Facility: CLINIC | Age: 85
End: 2025-07-03
Payer: COMMERCIAL

## 2025-07-08 ENCOUNTER — TRANSFERRED RECORDS (OUTPATIENT)
Dept: ADMINISTRATIVE | Facility: CLINIC | Age: 85
End: 2025-07-08
Payer: COMMERCIAL

## 2025-07-09 NOTE — PROGRESS NOTES
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 of Treat.   05/13/2025 Entyvio 300mg  mL/hr    6 Weeks   Inf. Date Medication % Conc. Inf. # Therapy Type Bag # Vials Total mgs Lot # Exp. Date   07/08/2025 Entyvio  18 maintenance  1 300.00 [id number] 07/30/2027   Inf. Date Medication IV Site IV Gauge Start Stop Total Time Wt. (lbs) Wt. (kgs)   07/08/2025 Entyvio left antecubital 22 10:08 AM 10:38 AM 30 min 141.00 63.946     Vitals Flowsheet  Time Temp Pulse Resp Sys BP Ginna BP Reaction Conc Rate   10:00 AM 97.8 57 16 124 56      10:38 AM 98.1 49 16 121 56        Post Infusion  The patient did tolerate the infusion.     Nursing Notes  Order date: 5/13/25  Last infusion date: 5/13/25  Oral premeds per order: N/A  Specialty Pharmacy: N  Change in health status per assessment? N   IV maintenance 0.9% NS 500ml TKO start time: 10:00 AM  IV start by: Lakisha Mckeon RN  IV and Fluid D/C time: 10:53 AM  NS volume infused: <50mL  Site condition: CDI and patent throughout infusion, no redness/swelling at IV site  D/C instructions reviewed, Pt verbalized understanding.  Lakisha Mckeon RN    Date Medication Total mg Used mg Wasted mg   07/08/2025 Entyvio 300.00 300.00 0.00   05/13/2025 Entyvio 300.00 300.00 0.00   04/02/2025 Entyvio 300.00 300.00 0.00   02/20/2025 Entyvio 300.00 300.00 0.00   12/23/2024 Entyvio 300.00 300.00 0.00   11/12/2024 Entyvio 300.00 300.00 0.00       Visit oversight provided by:  Renny Alexander MD 07/08/2025 10:15 AM

## 2025-07-21 ENCOUNTER — DOCUMENTATION ONLY (OUTPATIENT)
Dept: INTERNAL MEDICINE | Facility: CLINIC | Age: 85
End: 2025-07-21
Payer: COMMERCIAL

## 2025-07-21 NOTE — PROGRESS NOTES
Type of Form Received: [facility] 7/17    Form Received (Date) 7/17/2025   Form Filled out No   Placed in provider folder Yes

## 2025-07-22 DIAGNOSIS — I10 BENIGN ESSENTIAL HYPERTENSION: ICD-10-CM

## 2025-07-24 RX ORDER — GABAPENTIN 100 MG/1
CAPSULE ORAL
Qty: 180 CAPSULE | Refills: 1 | Status: SHIPPED | OUTPATIENT
Start: 2025-07-24

## 2025-07-24 NOTE — TELEPHONE ENCOUNTER
Last Written Prescription:   Disp Refills Start End TOMA   gabapentin (NEURONTIN) 100 MG capsule 180 capsule 0 5/2/2025 -- No   Sig - Route: Take 2 capsules (200 mg) by mouth at bedtime. - Oral     ----------------------  Last Visit Date:   5/15/2025  [facility] Visit Date:    7/28/2025 2:30 PM (30 min)  Pamela   Arrive by:  2:15 PM   [facility] RETURN   [facility] ([facility]   Wolfgang Zimmerman MD     ----------------------      Refill decision:     [x] Medication unable to be refilled by RN due to: Medication not included in refill protocol policy         Request from pharmacy:  Requested Prescriptions   Pending Prescriptions Disp Refills    gabapentin (NEURONTIN) 100 MG capsule [Pharmacy Med Name: Gabapentin Oral Capsule 100 MG] 180 capsule 0     Sig: TAKE TWO CAPSULES BY MOUTH DAILY AT BEDTIME       There is no refill protocol information for this order

## 2025-07-26 ENCOUNTER — TRANSFERRED RECORDS (OUTPATIENT)
Dept: HEALTH INFORMATION MANAGEMENT | Facility: CLINIC | Age: 85
End: 2025-07-26
Payer: COMMERCIAL

## 2025-07-27 NOTE — PROGRESS NOTES
HPI;    Overall better. Her BP is better controlled. She has discontinued Amlodipine. She remains on lower dose HcTz and Valsartan. She is active but she still states she has fatigue. No specific HEENT, cardiopulmonary, , neurological, systemic, psychiatric, lymphatic, endocrine, vascular complaints.     Past Medical History:   Diagnosis Date    Autoimmune disease approx.     polymyagia rheumatica in remission for many years    Cataract approx 12 years ago    removed    Dupuytren contracture     finger    Hyperlipidemia high normal    Hypertension     Kidney problem Stones  approx. 30 years ago    Malignant melanoma (H) approx. 6-7 years ago    Diag.  a melanoma or very close    Noninfectious ileitis     had proctitis for many years. currently none    Osteopenia     Polymyalgia rheumatica     PONV (postoperative nausea and vomiting)     Proctitis     Reduced vision Sometime is a little blurry     Past Surgical History:   Procedure Laterality Date    COLONOSCOPY  2014    Procedure: COMBINED COLONOSCOPY, SINGLE BIOPSY/POLYPECTOMY BY BIOPSY;  COLONOSCOPY;  Surgeon: Carol Ann Plasencia MD;  Location:  GI    CV CORONARY ANGIOGRAM N/A 2022    Procedure: Coronary Angiogram;  Surgeon: Trevin Hawk MD;  Location:  [facility]    ENT SURGERY      tonsilectomy, adenoidectomy    ESOPHAGOSCOPY, GASTROSCOPY, DUODENOSCOPY (EGD), COMBINED N/A 2023    Procedure: ESOPHAGOGASTRODUODENOSCOPY, WITH BIOPSY;  Surgeon: Angel Lara MD;  Location:  GI    GYN SURGERY       x 2    HYSTERECTOMY      OOPHOROPEXY  30 years ago    ORTHOPEDIC SURGERY  2004    (R) shoulder surgery for frozen shoulder    ME SHOULDER SURG PROC UNLISTED      ME STOMACH SURGERY PROCEDURE UNLISTED   67  87    Cicerians & histerectomy    ZAC BSO      for fibroids    TONSILLECTOMY  About 50 years ago     PE:    Vitals noted, gen nad, cooperative, alert, neck supple nl  rom, lungs with good air movement, RRR, S1, S2, no MRG, abdomen, no acute findings. Grossly normal neurological exam.     Results for orders placed or performed in visit on 07/28/25   Comprehensive metabolic panel     Status: Abnormal   Result Value Ref Range    Sodium 139 135 - 145 mmol/L    Potassium 4.1 3.4 - 5.3 mmol/L    Carbon Dioxide (CO2) 28 22 - 29 mmol/L    Anion Gap 11 7 - 15 mmol/L    Urea Nitrogen 16.0 8.0 - 23.0 mg/dL    Creatinine 0.82 0.51 - 0.95 mg/dL    GFR Estimate 70 >60 mL/min/1.73m2    Calcium 9.6 8.8 - 10.4 mg/dL    Chloride 100 98 - 107 mmol/L    Glucose 102 (H) 70 - 99 mg/dL    Alkaline Phosphatase 79 40 - 150 U/L    AST 22 0 - 45 U/L    ALT 19 0 - 50 U/L    Protein Total 7.1 6.4 - 8.3 g/dL    Albumin 4.4 3.5 - 5.2 g/dL    Bilirubin Total 0.3 <=1.2 mg/dL    Patient Fasting > 8hrs? Unknown    Lipid panel reflex to direct LDL Fasting     Status: Abnormal   Result Value Ref Range    Cholesterol 133 <200 mg/dL    Triglycerides 213 (H) <150 mg/dL    Direct Measure HDL 47 (L) >=50 mg/dL    LDL Cholesterol Calculated 43 <100 mg/dL    Non HDL Cholesterol 86 <130 mg/dL    Patient Fasting > 8hrs? Unknown     Narrative    Cholesterol  Desirable: < 200 mg/dL  Borderline High: 200 - 239 mg/dL  High: >= 240 mg/dL    Triglycerides  Normal: < 150 mg/dL  Borderline High: 150 - 199 mg/dL  High: 200-499 mg/dL  Very High: >= 500 mg/dL    Direct Measure HDL  Female: >= 50 mg/dL   Male: >= 40 mg/dL    LDL Cholesterol  Desirable: < 100 mg/dL  Above Desirable: 100 - 129 mg/dL   Borderline High: 130 - 159 mg/dL   High:  160 - 189 mg/dL   Very High: >= 190 mg/dL    Non HDL Cholesterol  Desirable: < 130 mg/dL  Above Desirable: 130 - 159 mg/dL  Borderline High: 160 - 189 mg/dL  High: 190 - 219 mg/dL  Very High: >= 220 mg/dL   Extra Tube     Status: None (In process)    Narrative    The following orders were created for panel order Extra Tube.  Procedure                               Abnormality         Status                      ---------                               -----------         ------                     Extra Purple Top Tube[[id number]]                           In process                   Please view results for these tests on the individual orders.       A/P:    1. Immunizations; COVID Pfizer vaccine x 4. She has completed the Shingrix vaccine series. Pneumococcal 23 done 10/18/2011. Prevnar 13 done 1/13/2016. Td 1/31/2018. Prevnar 20 done 11/15/2022. RSV done 12/15/2023.   2. Inflammatory bowel disease/proctitis. She is followed at [facility] by Dr. Garcia. She had a Flex Sig 4/17/2024. Clinic note from Dr. Kothari 3/19/2025; [facility]    3. Chronic pain on night time Gabapentin; seen Dr. Pimentel, orthopedics 6/8/2021 and Dr. Neumann 4/29/2021 Mr. Neumann. She was seen at [facility] 3/3/2025 R hip pain   4. HTN; on Valsartan/HcTz and off  Amlodipine. She could not tolerate Coreg (even 3.125 mg).  Renal U/S done, She saw Dr. Vogt,  Nephrology on 12/17/2024. She may consider stopping her Amlodipine (only 2.5 mg) because she has increased HcTz to 25 mg. MRI abdomen was done 3/24/2025 for further adrenal evaluation  She had an appt. With Dr. Rodriguez, Endocrinology, [facility], 4/14/2025  5. Mammogram; 2/14/2025.   6. Lipids; 1/9/2024; HDL 61, LDL 39, and TG's 69. She is on  Atorvastatin Repeat 5/1/2024; HDL 49, TG's 128, and LDL 34  7. Dermatology; scanned in [facility] 2/17/2022. Care Everywhere dermatology note 10/12/2022.   8. Vitamin D normal at 57 on 2/11/2022. DEXA scan in chart from 7/19/2021 and most negative T score -2.3.  She saw Dr. Simon endocrinology   8/22/2022 and next 12/16/2025 (cancelled)   9. Outside Rheumatology for PMR, Dr. Damian scanned in note from 7/3/2025 She is not on prednisone currently.  Labs scanned in from 7/3/2025; ESR and Crp are normal.   10. Palpitations;  Normal dobutamine stress echo 10/11/2021.  For atypical CP, CTA  coronary angiogram done 8/10/2022 with possible severe mid LAD stenosis. Coronary angiogram on 8/30/2022 with normal coronary arteries.  Ziopatch monitor 7/22/2022 w/o significant findings.  She had a cardiology appt. With Ms. Jose Guadalupe 5/8/2024 and 6/18/2024 with Dr. Yoon, Cardiology. She had a cardiology appt. With Dr. Sanders 9/13/2024. She had a 14 day Ziopatch monitor 8/23/2024 no afib, sxs correlated with NSR.  11. Past hospital discharged 7/12/2023 for possible TIA sxs with negative evaluation.  She has h/o TIA symptoms and is on Plavix and Atorvastatin. See ED note from 8/22/2024. She had MRI/MRA imaging 8/22/2024 that were essentially normal. She states she was driving and had L arm numbness (she had this before with a presumed TIA). She is on Plavix. She was seen by Dr Vigil, Neurology 2/6/2025 and next 8/18/2025 (cancelled).   12. A1c 6.0% on 7/3/2023.   13. Positive CHANDNI-2 for thrombocytosis. She was seen 5/2024 by Dr. Milner, Hematology   She is on hydroxyurea   14. Anxiety we discussed she could try a very low dose Ativan (0.5 mg). She has not needed to use this medication.   15. Scanned in note from [facility] 4/23/2025.         40 minutes spent on the date of the encounter doing chart review, history and exam, documentation and further activities as noted above exclusive of procedures and other billable interpretations

## 2025-07-28 ENCOUNTER — OFFICE VISIT (OUTPATIENT)
Dept: INTERNAL MEDICINE | Facility: CLINIC | Age: 85
End: 2025-07-28
Payer: COMMERCIAL

## 2025-07-28 ENCOUNTER — LAB (OUTPATIENT)
Dept: LAB | Facility: CLINIC | Age: 85
End: 2025-07-28
Payer: COMMERCIAL

## 2025-07-28 VITALS
HEIGHT: 65 IN | BODY MASS INDEX: 23.24 KG/M2 | RESPIRATION RATE: 18 BRPM | WEIGHT: 139.5 LBS | SYSTOLIC BLOOD PRESSURE: 143 MMHG | HEART RATE: 63 BPM | TEMPERATURE: 98.2 F | OXYGEN SATURATION: 98 % | DIASTOLIC BLOOD PRESSURE: 73 MMHG

## 2025-07-28 DIAGNOSIS — E78.00 HIGH BLOOD CHOLESTEROL: ICD-10-CM

## 2025-07-28 DIAGNOSIS — I10 BENIGN ESSENTIAL HYPERTENSION: Primary | ICD-10-CM

## 2025-07-28 DIAGNOSIS — R53.83 OTHER FATIGUE: ICD-10-CM

## 2025-07-28 DIAGNOSIS — R53.83 OTHER FATIGUE: Primary | ICD-10-CM

## 2025-07-28 DIAGNOSIS — I10 BENIGN ESSENTIAL HYPERTENSION: ICD-10-CM

## 2025-07-28 LAB
ALBUMIN SERPL BCG-MCNC: 4.4 G/DL (ref 3.5–5.2)
ALP SERPL-CCNC: 79 U/L (ref 40–150)
ALT SERPL W P-5'-P-CCNC: 19 U/L (ref 0–50)
ANION GAP SERPL CALCULATED.3IONS-SCNC: 11 MMOL/L (ref 7–15)
AST SERPL W P-5'-P-CCNC: 22 U/L (ref 0–45)
BASOPHILS # BLD AUTO: 0 10E3/UL (ref 0–0.2)
BASOPHILS NFR BLD AUTO: 0 %
BILIRUB SERPL-MCNC: 0.3 MG/DL
BUN SERPL-MCNC: 16 MG/DL (ref 8–23)
CALCIUM SERPL-MCNC: 9.6 MG/DL (ref 8.8–10.4)
CHLORIDE SERPL-SCNC: 100 MMOL/L (ref 98–107)
CHOLEST SERPL-MCNC: 133 MG/DL
CREAT SERPL-MCNC: 0.82 MG/DL (ref 0.51–0.95)
EGFRCR SERPLBLD CKD-EPI 2021: 70 ML/MIN/1.73M2
EOSINOPHIL # BLD AUTO: 0.2 10E3/UL (ref 0–0.7)
EOSINOPHIL NFR BLD AUTO: 2 %
ERYTHROCYTE [DISTWIDTH] IN BLOOD BY AUTOMATED COUNT: 12.8 % (ref 10–15)
FASTING STATUS PATIENT QL REPORTED: ABNORMAL
FASTING STATUS PATIENT QL REPORTED: ABNORMAL
GLUCOSE SERPL-MCNC: 102 MG/DL (ref 70–99)
HCO3 SERPL-SCNC: 28 MMOL/L (ref 22–29)
HCT VFR BLD AUTO: 43.2 % (ref 35–47)
HDLC SERPL-MCNC: 47 MG/DL
HGB BLD-MCNC: 14.5 G/DL (ref 11.7–15.7)
HOLD SPECIMEN: NORMAL
IMM GRANULOCYTES # BLD: 0 10E3/UL
IMM GRANULOCYTES NFR BLD: 0 %
LDLC SERPL CALC-MCNC: 43 MG/DL
LYMPHOCYTES # BLD AUTO: 2.1 10E3/UL (ref 0.8–5.3)
LYMPHOCYTES NFR BLD AUTO: 24 %
MCH RBC QN AUTO: 32.6 PG (ref 26.5–33)
MCHC RBC AUTO-ENTMCNC: 33.6 G/DL (ref 31.5–36.5)
MCV RBC AUTO: 97 FL (ref 78–100)
MONOCYTES # BLD AUTO: 0.9 10E3/UL (ref 0–1.3)
MONOCYTES NFR BLD AUTO: 10 %
NEUTROPHILS # BLD AUTO: 5.7 10E3/UL (ref 1.6–8.3)
NEUTROPHILS NFR BLD AUTO: 64 %
NONHDLC SERPL-MCNC: 86 MG/DL
NRBC # BLD AUTO: 0 10E3/UL
NRBC BLD AUTO-RTO: 0 /100
PLATELET # BLD AUTO: 291 10E3/UL (ref 150–450)
POTASSIUM SERPL-SCNC: 4.1 MMOL/L (ref 3.4–5.3)
PROT SERPL-MCNC: 7.1 G/DL (ref 6.4–8.3)
RBC # BLD AUTO: 4.45 10E6/UL (ref 3.8–5.2)
SODIUM SERPL-SCNC: 139 MMOL/L (ref 135–145)
TRIGL SERPL-MCNC: 213 MG/DL
WBC # BLD AUTO: 8.9 10E3/UL (ref 4–11)

## 2025-07-28 PROCEDURE — 3078F DIAST BP <80 MM HG: CPT | Performed by: INTERNAL MEDICINE

## 2025-07-28 PROCEDURE — 36415 COLL VENOUS BLD VENIPUNCTURE: CPT | Performed by: PATHOLOGY

## 2025-07-28 PROCEDURE — 80061 LIPID PANEL: CPT | Performed by: PATHOLOGY

## 2025-07-28 PROCEDURE — 99215 OFFICE O/P EST HI 40 MIN: CPT | Performed by: INTERNAL MEDICINE

## 2025-07-28 PROCEDURE — 3048F LDL-C <100 MG/DL: CPT

## 2025-07-28 PROCEDURE — 85025 COMPLETE CBC W/AUTO DIFF WBC: CPT | Performed by: PATHOLOGY

## 2025-07-28 PROCEDURE — 80053 COMPREHEN METABOLIC PANEL: CPT | Performed by: PATHOLOGY

## 2025-07-28 PROCEDURE — 3077F SYST BP >= 140 MM HG: CPT | Performed by: INTERNAL MEDICINE

## 2025-07-28 PROCEDURE — 3048F LDL-C <100 MG/DL: CPT | Performed by: INTERNAL MEDICINE

## 2025-07-28 NOTE — PROGRESS NOTES
Debra is a 84 year old that presents in clinic today for the following:     Chief Complaint   Patient presents with    Follow Up     3 months follow up            7/28/2025     2:13 PM   Additional Questions   Roomed by MR     Screenings as of today     Fallen 2 or more times in the past year? No        Delfina Delcid, EMT at 2:17 PM on 7/28/2025

## 2025-08-16 ENCOUNTER — HEALTH MAINTENANCE LETTER (OUTPATIENT)
Age: 85
End: 2025-08-16

## 2025-08-19 ENCOUNTER — TRANSFERRED RECORDS (OUTPATIENT)
Dept: MULTI SPECIALTY CLINIC | Facility: CLINIC | Age: 85
End: 2025-08-19
Payer: COMMERCIAL

## 2025-08-19 ENCOUNTER — TRANSFERRED RECORDS (OUTPATIENT)
Dept: ADMINISTRATIVE | Facility: CLINIC | Age: 85
End: 2025-08-19
Payer: COMMERCIAL

## 2025-08-19 LAB
ALT SERPL-CCNC: 13 IU/L (ref 0–32)
AST SERPL-CCNC: 15 IU/L (ref 0–40)

## 2025-08-21 ENCOUNTER — TRANSFERRED RECORDS (OUTPATIENT)
Dept: ADMINISTRATIVE | Facility: CLINIC | Age: 85
End: 2025-08-21
Payer: COMMERCIAL

## (undated) DEVICE — MANIFOLD KIT ANGIO AUTOMATED 014613

## (undated) DEVICE — FASTENER CATH BALLOON CLAMPX2 STATLOCK 0684-00-493

## (undated) DEVICE — KIT HAND CONTROL ACIST 014644 AR-P54

## (undated) DEVICE — INTRO GLIDESHEATH SLENDER 6FR 10X45CM 60-1060

## (undated) DEVICE — PACK HEART LEFT CUSTOM

## (undated) DEVICE — Device

## (undated) DEVICE — SLEEVE TR BAND RADIAL COMPRESSION DEVICE 24CM TRB24-REG

## (undated) DEVICE — SHTH INTRO 0.021IN ID 6FR DIA

## (undated) DEVICE — TUBING PRESSURE 30"

## (undated) RX ORDER — SODIUM CHLORIDE 9 MG/ML
INJECTION, SOLUTION INTRAVENOUS
Status: DISPENSED
Start: 2022-08-30

## (undated) RX ORDER — ASPIRIN 81 MG/1
TABLET, CHEWABLE ORAL
Status: DISPENSED
Start: 2022-08-30

## (undated) RX ORDER — FENTANYL CITRATE 50 UG/ML
INJECTION, SOLUTION INTRAMUSCULAR; INTRAVENOUS
Status: DISPENSED
Start: 2022-08-30

## (undated) RX ORDER — DIPHENHYDRAMINE HYDROCHLORIDE 50 MG/ML
INJECTION INTRAMUSCULAR; INTRAVENOUS
Status: DISPENSED
Start: 2020-04-10

## (undated) RX ORDER — NICARDIPINE HCL-0.9% SOD CHLOR 1 MG/10 ML
SYRINGE (ML) INTRAVENOUS
Status: DISPENSED
Start: 2022-08-30

## (undated) RX ORDER — ONDANSETRON 2 MG/ML
INJECTION INTRAMUSCULAR; INTRAVENOUS
Status: DISPENSED
Start: 2020-04-10

## (undated) RX ORDER — LIDOCAINE HYDROCHLORIDE 10 MG/ML
INJECTION, SOLUTION EPIDURAL; INFILTRATION; INTRACAUDAL; PERINEURAL
Status: DISPENSED
Start: 2022-08-30

## (undated) RX ORDER — NITROGLYCERIN 5 MG/ML
VIAL (ML) INTRAVENOUS
Status: DISPENSED
Start: 2022-08-30

## (undated) RX ORDER — LIDOCAINE HYDROCHLORIDE 10 MG/ML
INJECTION, SOLUTION EPIDURAL; INFILTRATION; INTRACAUDAL; PERINEURAL
Status: DISPENSED
Start: 2020-04-10

## (undated) RX ORDER — HEPARIN SODIUM 1000 [USP'U]/ML
INJECTION, SOLUTION INTRAVENOUS; SUBCUTANEOUS
Status: DISPENSED
Start: 2022-08-30

## (undated) RX ORDER — ONDANSETRON 2 MG/ML
INJECTION INTRAMUSCULAR; INTRAVENOUS
Status: DISPENSED
Start: 2022-08-30

## (undated) RX ORDER — FENTANYL CITRATE 50 UG/ML
INJECTION, SOLUTION INTRAMUSCULAR; INTRAVENOUS
Status: DISPENSED
Start: 2019-06-18

## (undated) RX ORDER — ASPIRIN 325 MG
TABLET ORAL
Status: DISPENSED
Start: 2022-08-30

## (undated) RX ORDER — SODIUM CHLORIDE 9 MG/ML
INJECTION, SOLUTION INTRAVENOUS
Status: DISPENSED
Start: 2020-04-10

## (undated) RX ORDER — DOBUTAMINE HYDROCHLORIDE 200 MG/100ML
INJECTION INTRAVENOUS
Status: DISPENSED
Start: 2021-10-11

## (undated) RX ORDER — FENTANYL CITRATE 50 UG/ML
INJECTION, SOLUTION INTRAMUSCULAR; INTRAVENOUS
Status: DISPENSED
Start: 2020-04-10